# Patient Record
Sex: MALE | Race: WHITE | NOT HISPANIC OR LATINO | Employment: OTHER | URBAN - METROPOLITAN AREA
[De-identification: names, ages, dates, MRNs, and addresses within clinical notes are randomized per-mention and may not be internally consistent; named-entity substitution may affect disease eponyms.]

---

## 2017-01-11 ENCOUNTER — LAB (OUTPATIENT)
Dept: LAB | Facility: CLINIC | Age: 77
End: 2017-01-11
Payer: MEDICARE

## 2017-01-11 DIAGNOSIS — Z12.5 ENCOUNTER FOR SCREENING FOR MALIGNANT NEOPLASM OF PROSTATE: ICD-10-CM

## 2017-01-11 DIAGNOSIS — I10 ESSENTIAL (PRIMARY) HYPERTENSION: ICD-10-CM

## 2017-01-11 DIAGNOSIS — R73.09 OTHER ABNORMAL GLUCOSE: ICD-10-CM

## 2017-01-11 DIAGNOSIS — E55.9 VITAMIN D DEFICIENCY: ICD-10-CM

## 2017-01-11 DIAGNOSIS — E78.5 HYPERLIPIDEMIA: ICD-10-CM

## 2017-01-11 LAB
25(OH)D3 SERPL-MCNC: 35.7 NG/ML (ref 30–100)
ALBUMIN SERPL BCP-MCNC: 3.6 G/DL (ref 3.5–5)
ALP SERPL-CCNC: 80 U/L (ref 46–116)
ALT SERPL W P-5'-P-CCNC: 26 U/L (ref 12–78)
ANION GAP SERPL CALCULATED.3IONS-SCNC: 8 MMOL/L (ref 4–13)
AST SERPL W P-5'-P-CCNC: 21 U/L (ref 5–45)
BASOPHILS # BLD AUTO: 0.03 THOUSANDS/ΜL (ref 0–0.1)
BASOPHILS NFR BLD AUTO: 0 % (ref 0–1)
BILIRUB SERPL-MCNC: 1.39 MG/DL (ref 0.2–1)
BILIRUB UR QL STRIP: NEGATIVE
BUN SERPL-MCNC: 23 MG/DL (ref 5–25)
CALCIUM SERPL-MCNC: 9.2 MG/DL (ref 8.3–10.1)
CHLORIDE SERPL-SCNC: 106 MMOL/L (ref 100–108)
CHOLEST SERPL-MCNC: 208 MG/DL (ref 50–200)
CLARITY UR: CLEAR
CO2 SERPL-SCNC: 27 MMOL/L (ref 21–32)
COLOR UR: NORMAL
CREAT SERPL-MCNC: 1.21 MG/DL (ref 0.6–1.3)
CREAT UR-MCNC: 138 MG/DL
EOSINOPHIL # BLD AUTO: 0.08 THOUSAND/ΜL (ref 0–0.61)
EOSINOPHIL NFR BLD AUTO: 1 % (ref 0–6)
ERYTHROCYTE [DISTWIDTH] IN BLOOD BY AUTOMATED COUNT: 12.6 % (ref 11.6–15.1)
EST. AVERAGE GLUCOSE BLD GHB EST-MCNC: 111 MG/DL
GFR SERPL CREATININE-BSD FRML MDRD: 58.3 ML/MIN/1.73SQ M
GLUCOSE SERPL-MCNC: 102 MG/DL (ref 65–140)
GLUCOSE UR STRIP-MCNC: NEGATIVE MG/DL
HBA1C MFR BLD: 5.5 % (ref 4.2–6.3)
HCT VFR BLD AUTO: 43.5 % (ref 36.5–49.3)
HDLC SERPL-MCNC: 99 MG/DL (ref 40–60)
HGB BLD-MCNC: 15 G/DL (ref 12–17)
HGB UR QL STRIP.AUTO: NEGATIVE
KETONES UR STRIP-MCNC: NEGATIVE MG/DL
LDLC SERPL CALC-MCNC: 88 MG/DL (ref 0–100)
LEUKOCYTE ESTERASE UR QL STRIP: NEGATIVE
LYMPHOCYTES # BLD AUTO: 1.69 THOUSANDS/ΜL (ref 0.6–4.47)
LYMPHOCYTES NFR BLD AUTO: 22 % (ref 14–44)
MCH RBC QN AUTO: 32.3 PG (ref 26.8–34.3)
MCHC RBC AUTO-ENTMCNC: 34.5 G/DL (ref 31.4–37.4)
MCV RBC AUTO: 94 FL (ref 82–98)
MICROALBUMIN UR-MCNC: 13.8 MG/L (ref 0–20)
MICROALBUMIN/CREAT 24H UR: 10 MG/G CREATININE (ref 0–30)
MONOCYTES # BLD AUTO: 0.65 THOUSAND/ΜL (ref 0.17–1.22)
MONOCYTES NFR BLD AUTO: 9 % (ref 4–12)
NEUTROPHILS # BLD AUTO: 5.19 THOUSANDS/ΜL (ref 1.85–7.62)
NEUTS SEG NFR BLD AUTO: 68 % (ref 43–75)
NITRITE UR QL STRIP: NEGATIVE
NRBC BLD AUTO-RTO: 0 /100 WBCS
PH UR STRIP.AUTO: 6.5 [PH] (ref 4.5–8)
PLATELET # BLD AUTO: 190 THOUSANDS/UL (ref 149–390)
PMV BLD AUTO: 11.8 FL (ref 8.9–12.7)
POTASSIUM SERPL-SCNC: 4.1 MMOL/L (ref 3.5–5.3)
PROT SERPL-MCNC: 7 G/DL (ref 6.4–8.2)
PROT UR STRIP-MCNC: NEGATIVE MG/DL
PSA SERPL-MCNC: 1 NG/ML (ref 0–4)
RBC # BLD AUTO: 4.64 MILLION/UL (ref 3.88–5.62)
SODIUM SERPL-SCNC: 141 MMOL/L (ref 136–145)
SP GR UR STRIP.AUTO: 1.02 (ref 1–1.03)
TRIGL SERPL-MCNC: 106 MG/DL
TSH SERPL DL<=0.05 MIU/L-ACNC: 1.3 UIU/ML (ref 0.36–3.74)
UROBILINOGEN UR QL STRIP.AUTO: 1 E.U./DL
WBC # BLD AUTO: 7.66 THOUSAND/UL (ref 4.31–10.16)

## 2017-01-11 PROCEDURE — 81003 URINALYSIS AUTO W/O SCOPE: CPT

## 2017-01-11 PROCEDURE — 83036 HEMOGLOBIN GLYCOSYLATED A1C: CPT

## 2017-01-11 PROCEDURE — 36415 COLL VENOUS BLD VENIPUNCTURE: CPT

## 2017-01-11 PROCEDURE — 82043 UR ALBUMIN QUANTITATIVE: CPT

## 2017-01-11 PROCEDURE — 84443 ASSAY THYROID STIM HORMONE: CPT

## 2017-01-11 PROCEDURE — 80061 LIPID PANEL: CPT

## 2017-01-11 PROCEDURE — 82306 VITAMIN D 25 HYDROXY: CPT

## 2017-01-11 PROCEDURE — 80053 COMPREHEN METABOLIC PANEL: CPT

## 2017-01-11 PROCEDURE — 82570 ASSAY OF URINE CREATININE: CPT

## 2017-01-11 PROCEDURE — 85025 COMPLETE CBC W/AUTO DIFF WBC: CPT

## 2017-01-11 PROCEDURE — G0103 PSA SCREENING: HCPCS

## 2017-01-16 ENCOUNTER — ALLSCRIPTS OFFICE VISIT (OUTPATIENT)
Dept: OTHER | Facility: OTHER | Age: 77
End: 2017-01-16

## 2017-04-21 ENCOUNTER — ALLSCRIPTS OFFICE VISIT (OUTPATIENT)
Dept: OTHER | Facility: OTHER | Age: 77
End: 2017-04-21

## 2017-07-20 ENCOUNTER — ALLSCRIPTS OFFICE VISIT (OUTPATIENT)
Dept: OTHER | Facility: OTHER | Age: 77
End: 2017-07-20

## 2017-09-08 ENCOUNTER — ALLSCRIPTS OFFICE VISIT (OUTPATIENT)
Dept: OTHER | Facility: OTHER | Age: 77
End: 2017-09-08

## 2017-09-08 ENCOUNTER — LAB REQUISITION (OUTPATIENT)
Dept: LAB | Facility: HOSPITAL | Age: 77
End: 2017-09-08
Payer: MEDICARE

## 2017-09-08 DIAGNOSIS — R50.9 FEVER: ICD-10-CM

## 2017-09-08 LAB
BILIRUB UR QL STRIP: NORMAL
CLARITY UR: NORMAL
COLOR UR: NORMAL
GLUCOSE (HISTORICAL): NORMAL
HGB UR QL STRIP.AUTO: NORMAL
KETONES UR STRIP-MCNC: NORMAL MG/DL
LEUKOCYTE ESTERASE UR QL STRIP: NORMAL
NITRITE UR QL STRIP: NORMAL
PH UR STRIP.AUTO: 6 [PH]
PROT UR STRIP-MCNC: NORMAL MG/DL
SP GR UR STRIP.AUTO: 1.01
UROBILINOGEN UR QL STRIP.AUTO: 0.2

## 2017-09-08 PROCEDURE — 87086 URINE CULTURE/COLONY COUNT: CPT | Performed by: NURSE PRACTITIONER

## 2017-09-10 LAB — BACTERIA UR CULT: NORMAL

## 2017-09-11 ENCOUNTER — ALLSCRIPTS OFFICE VISIT (OUTPATIENT)
Dept: OTHER | Facility: OTHER | Age: 77
End: 2017-09-11

## 2017-09-13 ENCOUNTER — APPOINTMENT (OUTPATIENT)
Dept: LAB | Facility: CLINIC | Age: 77
End: 2017-09-13
Payer: MEDICARE

## 2017-09-13 ENCOUNTER — ALLSCRIPTS OFFICE VISIT (OUTPATIENT)
Dept: OTHER | Facility: OTHER | Age: 77
End: 2017-09-13

## 2017-09-13 DIAGNOSIS — L03.115 CELLULITIS OF RIGHT LOWER EXTREMITY: ICD-10-CM

## 2017-09-13 DIAGNOSIS — M54.9 DORSALGIA: ICD-10-CM

## 2017-09-13 LAB
BASOPHILS # BLD AUTO: 0.05 THOUSANDS/ΜL (ref 0–0.1)
BASOPHILS NFR BLD AUTO: 1 % (ref 0–1)
EOSINOPHIL # BLD AUTO: 0.17 THOUSAND/ΜL (ref 0–0.61)
EOSINOPHIL NFR BLD AUTO: 2 % (ref 0–6)
ERYTHROCYTE [DISTWIDTH] IN BLOOD BY AUTOMATED COUNT: 12.4 % (ref 11.6–15.1)
HCT VFR BLD AUTO: 42.3 % (ref 36.5–49.3)
HGB BLD-MCNC: 15.1 G/DL (ref 12–17)
LYMPHOCYTES # BLD AUTO: 1.92 THOUSANDS/ΜL (ref 0.6–4.47)
LYMPHOCYTES NFR BLD AUTO: 25 % (ref 14–44)
MCH RBC QN AUTO: 32.7 PG (ref 26.8–34.3)
MCHC RBC AUTO-ENTMCNC: 35.7 G/DL (ref 31.4–37.4)
MCV RBC AUTO: 92 FL (ref 82–98)
MONOCYTES # BLD AUTO: 1.07 THOUSAND/ΜL (ref 0.17–1.22)
MONOCYTES NFR BLD AUTO: 14 % (ref 4–12)
NEUTROPHILS # BLD AUTO: 4.56 THOUSANDS/ΜL (ref 1.85–7.62)
NEUTS SEG NFR BLD AUTO: 58 % (ref 43–75)
NRBC BLD AUTO-RTO: 0 /100 WBCS
PLATELET # BLD AUTO: 201 THOUSANDS/UL (ref 149–390)
PMV BLD AUTO: 12.9 FL (ref 8.9–12.7)
RBC # BLD AUTO: 4.62 MILLION/UL (ref 3.88–5.62)
WBC # BLD AUTO: 7.79 THOUSAND/UL (ref 4.31–10.16)

## 2017-09-13 PROCEDURE — 85025 COMPLETE CBC W/AUTO DIFF WBC: CPT

## 2017-09-13 PROCEDURE — 36415 COLL VENOUS BLD VENIPUNCTURE: CPT

## 2017-09-14 ENCOUNTER — GENERIC CONVERSION - ENCOUNTER (OUTPATIENT)
Dept: OTHER | Facility: OTHER | Age: 77
End: 2017-09-14

## 2017-10-02 ENCOUNTER — GENERIC CONVERSION - ENCOUNTER (OUTPATIENT)
Dept: OTHER | Facility: OTHER | Age: 77
End: 2017-10-02

## 2017-10-11 ENCOUNTER — GENERIC CONVERSION - ENCOUNTER (OUTPATIENT)
Dept: OTHER | Facility: OTHER | Age: 77
End: 2017-10-11

## 2017-10-12 ENCOUNTER — GENERIC CONVERSION - ENCOUNTER (OUTPATIENT)
Dept: OTHER | Facility: OTHER | Age: 77
End: 2017-10-12

## 2017-10-20 ENCOUNTER — GENERIC CONVERSION - ENCOUNTER (OUTPATIENT)
Dept: OTHER | Facility: OTHER | Age: 77
End: 2017-10-20

## 2017-10-20 ENCOUNTER — APPOINTMENT (OUTPATIENT)
Dept: PHYSICAL THERAPY | Facility: CLINIC | Age: 77
End: 2017-10-20
Payer: MEDICARE

## 2017-10-20 DIAGNOSIS — M54.9 DORSALGIA: ICD-10-CM

## 2017-10-20 PROCEDURE — 97162 PT EVAL MOD COMPLEX 30 MIN: CPT

## 2017-10-20 PROCEDURE — G8978 MOBILITY CURRENT STATUS: HCPCS

## 2017-10-20 PROCEDURE — G8979 MOBILITY GOAL STATUS: HCPCS

## 2017-10-25 ENCOUNTER — APPOINTMENT (OUTPATIENT)
Dept: PHYSICAL THERAPY | Facility: CLINIC | Age: 77
End: 2017-10-25
Payer: MEDICARE

## 2017-10-25 PROCEDURE — 97110 THERAPEUTIC EXERCISES: CPT

## 2017-10-25 PROCEDURE — 97140 MANUAL THERAPY 1/> REGIONS: CPT

## 2017-10-27 ENCOUNTER — APPOINTMENT (OUTPATIENT)
Dept: PHYSICAL THERAPY | Facility: CLINIC | Age: 77
End: 2017-10-27
Payer: MEDICARE

## 2017-10-27 PROCEDURE — 97110 THERAPEUTIC EXERCISES: CPT

## 2017-10-27 PROCEDURE — 97140 MANUAL THERAPY 1/> REGIONS: CPT

## 2017-10-30 ENCOUNTER — APPOINTMENT (OUTPATIENT)
Dept: PHYSICAL THERAPY | Facility: CLINIC | Age: 77
End: 2017-10-30
Payer: MEDICARE

## 2017-10-30 PROCEDURE — 97140 MANUAL THERAPY 1/> REGIONS: CPT

## 2017-10-30 PROCEDURE — 97110 THERAPEUTIC EXERCISES: CPT

## 2017-11-01 ENCOUNTER — APPOINTMENT (OUTPATIENT)
Dept: PHYSICAL THERAPY | Facility: CLINIC | Age: 77
End: 2017-11-01
Payer: MEDICARE

## 2017-11-01 PROCEDURE — 97140 MANUAL THERAPY 1/> REGIONS: CPT

## 2017-11-01 PROCEDURE — 97530 THERAPEUTIC ACTIVITIES: CPT

## 2017-11-01 PROCEDURE — 97110 THERAPEUTIC EXERCISES: CPT

## 2017-11-06 ENCOUNTER — APPOINTMENT (OUTPATIENT)
Dept: PHYSICAL THERAPY | Facility: CLINIC | Age: 77
End: 2017-11-06
Payer: MEDICARE

## 2017-11-06 PROCEDURE — 97530 THERAPEUTIC ACTIVITIES: CPT

## 2017-11-06 PROCEDURE — 97140 MANUAL THERAPY 1/> REGIONS: CPT

## 2017-11-06 PROCEDURE — 97110 THERAPEUTIC EXERCISES: CPT

## 2017-11-08 ENCOUNTER — APPOINTMENT (OUTPATIENT)
Dept: PHYSICAL THERAPY | Facility: CLINIC | Age: 77
End: 2017-11-08
Payer: MEDICARE

## 2017-11-08 PROCEDURE — 97140 MANUAL THERAPY 1/> REGIONS: CPT

## 2017-11-08 PROCEDURE — 97110 THERAPEUTIC EXERCISES: CPT

## 2017-11-13 ENCOUNTER — APPOINTMENT (OUTPATIENT)
Dept: PHYSICAL THERAPY | Facility: CLINIC | Age: 77
End: 2017-11-13
Payer: MEDICARE

## 2017-11-13 PROCEDURE — 97140 MANUAL THERAPY 1/> REGIONS: CPT

## 2017-11-13 PROCEDURE — 97530 THERAPEUTIC ACTIVITIES: CPT

## 2017-11-13 PROCEDURE — 97110 THERAPEUTIC EXERCISES: CPT

## 2017-11-15 ENCOUNTER — APPOINTMENT (OUTPATIENT)
Dept: PHYSICAL THERAPY | Facility: CLINIC | Age: 77
End: 2017-11-15
Payer: MEDICARE

## 2017-11-15 PROCEDURE — 97140 MANUAL THERAPY 1/> REGIONS: CPT

## 2017-11-15 PROCEDURE — 97110 THERAPEUTIC EXERCISES: CPT

## 2017-12-04 ENCOUNTER — ALLSCRIPTS OFFICE VISIT (OUTPATIENT)
Dept: OTHER | Facility: OTHER | Age: 77
End: 2017-12-04

## 2017-12-04 DIAGNOSIS — M54.9 DORSALGIA: ICD-10-CM

## 2017-12-05 ENCOUNTER — GENERIC CONVERSION - ENCOUNTER (OUTPATIENT)
Dept: OTHER | Facility: OTHER | Age: 77
End: 2017-12-05

## 2017-12-05 NOTE — PROGRESS NOTES
Assessment    1  BMI 50 0-59 9, adult (V85 43) (Z68 43)   2  Acute back pain (724 5) (M54 9)    Plan  Acute back pain    · Diclofenac Sodium 1 % Transdermal Gel (Voltaren); Apply to right low back and hiparea 4 times daily as needed for pain  BMI 50 0-59 9, adult    · *1 - 62 [facility]  * patient is morbidlyobese in now with acute back pain that is becoming chronic  Please helphim formulated diet plan with some understanding of calories and nutrition so he canlose weight  Overall needs to lose about 60-80 lb  But for right now weneed to focus on the 1st 25 lb  Status: Hold For - Scheduling  Requested for: [id number]  Care Summary provided  : Yes  Acute back pain Patient will go back to physical therapy, since none of the medicines we gave him really seem to help We could try Voltaren gel over the area 3 or 4 times daily to see if this gives him some relief His BMI is 50 in the certainly isn't helping so I will also ask him to see Nutrition to see if they can even get 26 lb off which make a big difference for him  Patient understands that this is muscular and not his bones We will recheck as scheduled or needed   Discussion/Summary  Possible side effects of new medications were reviewed with the patient/guardian today  The treatment plan was reviewed with the patient/guardian  The patient/guardian understands and agrees with the treatment plan      Chief Complaint  Pt presents here with back issues;  due 07/2018due 02/021due 04/2018exam due 07/2017exam due 07/2017      History of Present Illness  HPI: In October patient started with right lower back pain that came on after wrote a telling his garden  He was treated with muscle relaxers that really did not work, chiropractor that did not really work, physical therapy seem to make a difference  Patient was doing it up until last week and then ask for break because he could do the same exercises at home   So last Friday patient wants to the [facility], did elliptical and treadmill but did not feel like he over did it  Now his pain is back all over again  It is in his right lower back and hip where he can't straighten out, sit long lie down or sleep  All the same complaints he had last time  He has no loss of bowel or bladder continence  He is very nervous because he has reservations to fly to [place] on 12/20/2017      Active Problems  1  Acute back pain (724 5) (M54 9)   2  Adenomatous colon polyp (211 3) (D12 6)   3  Aortic ectasia (447 70) (I77 819)   4  Bilateral hip pain (719 45) (M25 551,M25 552)   5  Colonoscopy (Fiberoptic) Screening   6  Elevated hemoglobin A1c (790 29) (R73 09)   7  Fever (780 60) (R50 9)   8  FUO (fever of unknown origin) (780 60) (R50 9)   9  Hyperlipidemia (272 4) (E78 5)   10  Hypertension (401 9) (I10)   11  Lichen planus (894 5) (L43 9)   12  MTHFR mutation (270 4) (E72 12)   13  Need for prophylactic vaccination and inoculation against influenza (V04 81) (Z23)   14  Obstructive sleep apnea (327 23) (G47 33)   15  Organic impotence (607 84) (N52 9)   16  Premature atrial complexes (427 61) (I49 1)   17  Premature ventricular contraction (427 69) (I49 3)   18  Screening for neurological condition (V80 09) (Z13 89)   19  Special screening examination for neoplasm of prostate (V76 44) (Z12 5)   20  Spinal stenosis of lumbar region (724 02) (M48 061)   21  Vitamin D deficiency (268 9) (E55 9)    Past Medical History    1  History of Abnormal electrocardiogram (794 31) (R94 31)   2  History of Acute back pain (724 5) (M54 9)   3  History of Acute hepatitis c (070 51) (B17 10)   4  History of Cellulitis of leg without foot (682 6) (L03 119)   5  History of Dysplastic Nevus (238 2)   6  History of Glaucoma screening (V80 1) (Z13 5)   7  History of Glucose Intolerance (271 3)   8  History of backache (V13 59) (Z87 39)   9  History of deep venous thrombosis (V12 51) (Z86 718)   10   History of hypercholesterolemia (V12 29) (Z86 39)   11  History of low back pain (V13 59) (Z87 39)   12  History of paroxysmal supraventricular tachycardia (V12 59) (Z86 79)   13  History of transient cerebral ischemia (V12 54) (Z86 73)   14  History of Laceration Of Cheek (873 41)   15  History of Leg abrasion (916 0) (S80 819A)   16  History of Leg edema, right (782 3) (R60 0)   17  Need for prophylactic vaccination and inoculation against influenza (V04 81) (Z23)   18  History of Other nonspecific abnormal finding of lung field (793 19) (R91 8)   19  History of Palpitations (785 1) (R00 2)   20  History of Preop examination (V72 84) (Z01 818)   21  History of Screening for osteoporosis (V82 81) (Z13 820)   22  Skin rash (782 1) (R21)   23  History of Swelling of left eyelid (374 82) (O22 579)    Family History  Mother    1  Family history of Colon Cancer (V16 0)   2  Family history of Mother  At Age 58   4  No family history of mental disorder   4  Denied: Family history of Substance abuse-family  Father    5  Family history of Acute Myocardial Infarction (V17 3)   6  Family history of Father  At Age 64   7  Denied: Family history of Testicular Cancer    Social History   · Alcohol Use (History)   · Always uses seat belt   · Daily Coffee Consumption (1  Cups/Day)   · Former smoker (X45 36) (Q36 443)   · [address]  SMOKED FOR ROUGHLY 25 YEARS A PPD  · No drug use  The social history was reviewed and updated today  Surgical History    1  History of Inguinal Hernia Repair   2  History of Laminectomy Lumbar   3  History of Neuroplasty Median Nerve At Carpal Tunnel    Current Meds   1  Aspirin 81 MG TABS; Take 1 tablet daily; Therapy: (Recorded:20Gro0496) to Recorded   2  Betamethasone Dipropionate Aug 0 05 % External Cream; Apply to bilateral shins every night before bedtime whenever they are itchy; Therapy: 18ODB5491 to (Last Rx:39Ufu2286)  Requested for: 81Puj1794 Ordered   3   Centrum Silver Ultra Mens Oral Tablet; TAKE 1 TABLET DAILY; Therapy: (Recorded:04Mar2014) to Recorded   4  Cialis 20 MG Oral Tablet; Take 1 pill 1 hr before activity  No more than 1 daily; Last Rx:16Jan2017 Ordered   5  Folbic 2 5-25-2 MG Oral Tablet; One daily; Therapy: 92YMX5451 to (Last Rx:14Nov2017)  Requested for: 98ASK7998 Ordered   6  Losartan Potassium 100 MG Oral Tablet; TAKE 1 TABLET ONCE DAILY; Therapy: 05QMD7115 to (Berdie Meckel)  Requested for: 62AHX2114; Last Rx:14Nov2017 Ordered   7  Vitamin D3 TABS; 4000 iu daily; Therapy: (Recorded:04Mar2014) to Recorded    The medication list was reviewed and updated today  Allergies  1  Viagra TABS  2  No Known Environmental Allergies   3  No Known Food Allergies    Vitals   Recorded: 55LNS2404 08:24AM   Heart Rate 80   Respiration 16   Systolic 750, LUE, Sitting   Diastolic 70, LUE, Sitting   Weight 260 lb 1 6 oz   BMI Calculated 50 8   BSA Calculated 2 09       Physical Exam  General Patient in no acute distress, well appearing, well nourished and appears stated age  Mental status Patient very frustrated at this point Good judgment and insight, oriented to time person and place, recent and remote memory is intact, mood and affect are normal, cooperative, and patient is reasonable    Muscular Patient has p m  tenderness especially on the right side L5-S1 paravertebral muscles Has full flexion and side bending with some pain posterior extension and side bending to the right Good heel and toe walking      Future Appointments    Date/Time Provider Specialty Site   01/23/2018 01:30 PM Cristiano Gupta 5       Signatures   Electronically signed by : Chanelle Bone DO; Dec  4 2017  8:46AM EST                       (Author)

## 2017-12-06 ENCOUNTER — GENERIC CONVERSION - ENCOUNTER (OUTPATIENT)
Dept: FAMILY MEDICINE CLINIC | Facility: CLINIC | Age: 77
End: 2017-12-06

## 2017-12-06 ENCOUNTER — APPOINTMENT (OUTPATIENT)
Dept: PHYSICAL THERAPY | Facility: CLINIC | Age: 77
End: 2017-12-06
Payer: MEDICARE

## 2017-12-06 PROCEDURE — G8979 MOBILITY GOAL STATUS: HCPCS

## 2017-12-06 PROCEDURE — G8978 MOBILITY CURRENT STATUS: HCPCS

## 2017-12-06 PROCEDURE — 97162 PT EVAL MOD COMPLEX 30 MIN: CPT

## 2017-12-12 ENCOUNTER — APPOINTMENT (OUTPATIENT)
Dept: PHYSICAL THERAPY | Facility: CLINIC | Age: 77
End: 2017-12-12
Payer: MEDICARE

## 2017-12-12 PROCEDURE — 97140 MANUAL THERAPY 1/> REGIONS: CPT

## 2017-12-12 PROCEDURE — 97014 ELECTRIC STIMULATION THERAPY: CPT

## 2017-12-14 ENCOUNTER — APPOINTMENT (OUTPATIENT)
Dept: PHYSICAL THERAPY | Facility: CLINIC | Age: 77
End: 2017-12-14
Payer: MEDICARE

## 2017-12-14 ENCOUNTER — GENERIC CONVERSION - ENCOUNTER (OUTPATIENT)
Dept: OTHER | Facility: OTHER | Age: 77
End: 2017-12-14

## 2017-12-14 PROCEDURE — 97140 MANUAL THERAPY 1/> REGIONS: CPT

## 2017-12-14 PROCEDURE — 97014 ELECTRIC STIMULATION THERAPY: CPT

## 2017-12-15 ENCOUNTER — APPOINTMENT (OUTPATIENT)
Dept: PHYSICAL THERAPY | Facility: CLINIC | Age: 77
End: 2017-12-15
Payer: MEDICARE

## 2017-12-15 PROCEDURE — 97014 ELECTRIC STIMULATION THERAPY: CPT

## 2017-12-15 PROCEDURE — 97140 MANUAL THERAPY 1/> REGIONS: CPT

## 2017-12-15 PROCEDURE — 97110 THERAPEUTIC EXERCISES: CPT

## 2017-12-18 ENCOUNTER — APPOINTMENT (OUTPATIENT)
Dept: PHYSICAL THERAPY | Facility: CLINIC | Age: 77
End: 2017-12-18
Payer: MEDICARE

## 2017-12-18 PROCEDURE — 97014 ELECTRIC STIMULATION THERAPY: CPT

## 2017-12-18 PROCEDURE — 97140 MANUAL THERAPY 1/> REGIONS: CPT

## 2017-12-20 ENCOUNTER — APPOINTMENT (OUTPATIENT)
Dept: PHYSICAL THERAPY | Facility: CLINIC | Age: 77
End: 2017-12-20
Payer: MEDICARE

## 2017-12-20 PROCEDURE — 97140 MANUAL THERAPY 1/> REGIONS: CPT

## 2017-12-20 PROCEDURE — 97014 ELECTRIC STIMULATION THERAPY: CPT

## 2017-12-22 ENCOUNTER — APPOINTMENT (OUTPATIENT)
Dept: PHYSICAL THERAPY | Facility: CLINIC | Age: 77
End: 2017-12-22
Payer: MEDICARE

## 2017-12-22 PROCEDURE — 97110 THERAPEUTIC EXERCISES: CPT

## 2017-12-22 PROCEDURE — 97140 MANUAL THERAPY 1/> REGIONS: CPT

## 2017-12-22 PROCEDURE — 97014 ELECTRIC STIMULATION THERAPY: CPT

## 2018-01-09 NOTE — MISCELLANEOUS
Message   Recorded as Task   Date: 07/08/2016 09:35 AM, Created By: Larisa Rodgers   Task Name: Miscellaneous   Assigned To: Monse Lopez   Regarding Patient: Sandeep Caldera, Status: Active   CommentTyler Flores - 08 Jul 2016 9:35 AM     TASK CREATED  Caller: Self; [phone] (Home); [phone] (Work)  pt was treated at [facility] for a dog bite and they told him to follow up with his family   pt was offered a 10:45 appt today which pt could not do  pt does have a followup appt on tues and pt stated he will just follow up then  pt will call back if wound seems to be getting infected   fyi        Signatures   Electronically signed by : Adrienne Ochoa DO; Jul 8 2016  5:55PM EST                       (Author)

## 2018-01-11 DIAGNOSIS — R73.09 OTHER ABNORMAL GLUCOSE: ICD-10-CM

## 2018-01-11 DIAGNOSIS — I10 ESSENTIAL (PRIMARY) HYPERTENSION: ICD-10-CM

## 2018-01-11 DIAGNOSIS — E55.9 VITAMIN D DEFICIENCY: ICD-10-CM

## 2018-01-11 DIAGNOSIS — Z12.5 ENCOUNTER FOR SCREENING FOR MALIGNANT NEOPLASM OF PROSTATE: ICD-10-CM

## 2018-01-11 DIAGNOSIS — E78.5 HYPERLIPIDEMIA: ICD-10-CM

## 2018-01-12 VITALS
SYSTOLIC BLOOD PRESSURE: 118 MMHG | RESPIRATION RATE: 16 BRPM | HEART RATE: 72 BPM | BODY MASS INDEX: 51.32 KG/M2 | DIASTOLIC BLOOD PRESSURE: 62 MMHG | WEIGHT: 261.4 LBS | HEIGHT: 60 IN

## 2018-01-13 VITALS
RESPIRATION RATE: 16 BRPM | HEART RATE: 68 BPM | WEIGHT: 257.8 LBS | TEMPERATURE: 98.3 F | SYSTOLIC BLOOD PRESSURE: 124 MMHG | DIASTOLIC BLOOD PRESSURE: 72 MMHG | BODY MASS INDEX: 50.35 KG/M2

## 2018-01-13 VITALS
WEIGHT: 259.9 LBS | TEMPERATURE: 97.6 F | RESPIRATION RATE: 16 BRPM | DIASTOLIC BLOOD PRESSURE: 70 MMHG | BODY MASS INDEX: 50.76 KG/M2 | HEART RATE: 76 BPM | SYSTOLIC BLOOD PRESSURE: 116 MMHG

## 2018-01-13 VITALS
WEIGHT: 267 LBS | BODY MASS INDEX: 36.16 KG/M2 | SYSTOLIC BLOOD PRESSURE: 124 MMHG | HEART RATE: 88 BPM | DIASTOLIC BLOOD PRESSURE: 84 MMHG | HEIGHT: 72 IN

## 2018-01-13 NOTE — MISCELLANEOUS
Message   Recorded as Task   Date: 10/09/2017 01:27 PM, Created By: Sindy Chris   Task Name: Follow Up   Assigned To: Jerson Briseno   Regarding Patient: Jennifer Coombs, Status: Active   CommentLorry Cortes - 09 Oct 2017 1:27 PM     TASK CREATED  General Medical Question  pt was at the chiropractor and they told him to stop and ask you for an anti inflammatory  he uses [facility] [place]  he dropped off a paper with the other doctor's info for your records   Jerson Briseno - 09 Oct 2017 2:05 PM     TASK REPLIED TO: Previously Assigned To Ollieon Finn  Tell patient that I really do not think it is his hip bursitis but his back  Please ask him to see Dr Aden Hawk for another opinion, she is another chiropractor  Please ask him if he is taking the Valium and ask him if it is helping at all  Sindy Chris - 09 Oct 2017 3:35 PM     TASK REPLIED TO: Previously Assigned To Sindy Chrsi  pt informed  he said valium is not helping  Jerson Briseno - 09 Oct 2017 4:44 PM     TASK REASSIGNED: Previously Assigned To Landy Sun  1  Have him stop the Valium  2  I called the [facility] 1 twice a day for the muscle pain  3  See Dr Chuck Ramires - 09 Oct 2017 4:58 PM     TASK REPLIED TO: Previously Assigned To Lyla Perry  Patient was informed  He wants to think about going to see Dr Philippe Saldaña  MRP   Ollieon Gracielabiju - 09 Oct 2017 8:40 PM     TASK REPLIED TO: Previously Assigned To Jerson Briseno  He can also come back to our office for re-evaluation   Lyla Perry - 10 Oct 2017 7:01 AM     TASK REPLIED TO: Previously Assigned To Lyla Perry regarding being re-elevated at the office   MRP        Signatures   Electronically signed by : Alessia Matthews DO; Oct 11 2017  4:55PM EST                       (Author)

## 2018-01-14 VITALS
RESPIRATION RATE: 16 BRPM | WEIGHT: 261.31 LBS | HEART RATE: 84 BPM | TEMPERATURE: 98.6 F | DIASTOLIC BLOOD PRESSURE: 100 MMHG | BODY MASS INDEX: 51.03 KG/M2 | SYSTOLIC BLOOD PRESSURE: 170 MMHG

## 2018-01-14 VITALS
HEIGHT: 73 IN | RESPIRATION RATE: 16 BRPM | SYSTOLIC BLOOD PRESSURE: 110 MMHG | DIASTOLIC BLOOD PRESSURE: 60 MMHG | HEART RATE: 88 BPM | BODY MASS INDEX: 35.47 KG/M2 | WEIGHT: 267.6 LBS

## 2018-01-22 VITALS
HEART RATE: 80 BPM | WEIGHT: 263.6 LBS | RESPIRATION RATE: 16 BRPM | DIASTOLIC BLOOD PRESSURE: 70 MMHG | SYSTOLIC BLOOD PRESSURE: 132 MMHG | BODY MASS INDEX: 51.48 KG/M2

## 2018-01-22 VITALS
BODY MASS INDEX: 50.5 KG/M2 | DIASTOLIC BLOOD PRESSURE: 76 MMHG | RESPIRATION RATE: 16 BRPM | WEIGHT: 258.6 LBS | HEART RATE: 76 BPM | SYSTOLIC BLOOD PRESSURE: 138 MMHG

## 2018-01-22 VITALS
WEIGHT: 258.5 LBS | SYSTOLIC BLOOD PRESSURE: 120 MMHG | DIASTOLIC BLOOD PRESSURE: 70 MMHG | BODY MASS INDEX: 50.48 KG/M2 | HEART RATE: 72 BPM | RESPIRATION RATE: 16 BRPM

## 2018-01-23 VITALS
HEART RATE: 80 BPM | WEIGHT: 260.1 LBS | RESPIRATION RATE: 16 BRPM | BODY MASS INDEX: 50.8 KG/M2 | DIASTOLIC BLOOD PRESSURE: 70 MMHG | SYSTOLIC BLOOD PRESSURE: 138 MMHG

## 2018-01-23 NOTE — MISCELLANEOUS
Message   Recorded as Task   Date: 12/05/2017 10:24 AM, Created By: Joy Pinto   Task Name: Call Back   Assigned To: Krystal Vyas   Regarding Patient: Elisa Bradford, Status: Active   CommentTyler Snow - 05 Dec 2017 10:24 AM     TASK CREATED  Caller: Cleopatra Fermin  pt wants to go back to [facility] for a second opinion on his back but wants dr Juan J Bocanegra to order an mri  also would accupuncture help? # [phone]   Krystal Vyas - 05 Dec 2017 2:54 PM     TASK REPLIED TO: Previously Assigned To Krystal Vyas  Too early for 2nd opinion it just happened and he had a reason for, acupuncture may help, and the orthopedic guys order the MRI  I could send him today in [place] who was not excellent masseuse who may be able to help him out a lot    His card is hanging with the card file on the wall in the back   Koki 7342 Dec 2017 4:15 PM     TASK EDITED  pt informed   Krystal Vyas - 05 Dec 2017 4:33 PM     TASK EDITED  THE NOTE Jazz WHO IS AN EXCELLENT MASSEUSE        Signatures   Electronically signed by : Veronica Gan DO; Dec  5 2017  4:35PM EST                       (Author)

## 2018-01-23 NOTE — MISCELLANEOUS
Message   Recorded as Task   Date: 12/04/2017 01:17 PM, Created By: Yue Mcintyre   Task Name: Call Back   Assigned To: Landy Sun   Regarding Patient: Lindsay Cummings, Status: Active   Comment:    Gloria Woods - 04 Dec 2017 1:17 PM     TASK CREATED  Caller: [facility] pharmacy  pharmacy says voltaren/diclofenac gel is non fomulary- needs preauth  do you want the pre auth, or do you want to prescribe something else? Roberto Underwood - 04 Dec 2017 6:10 PM     TASK REASSIGNED: Previously Assigned To Rubin Sparks, can you please pre authorize this  Patient cannot take oral anti-inflammatories because of age and blood pressure  Lyla Perry - 07 Dec 2017 10:04 AM     TASK REPLIED TO: Previously Assigned To Peters,Michelle Prior Geofm Rubinstein is pending clinic review  MRP   Lyla Perry - 14 Dec 2017 12:27 PM     TASK REPLIED TO: Previously Assigned To Lyla Perry  Patient's Voltaren/Diclofenac was denied  Patient was informed   MRP        Signatures   Electronically signed by : Braeden Garcia DO; Dec 14 2017  1:10PM EST                       (Author)

## 2018-01-24 ENCOUNTER — APPOINTMENT (OUTPATIENT)
Dept: PHYSICAL THERAPY | Facility: CLINIC | Age: 78
End: 2018-01-24
Payer: MEDICARE

## 2018-01-24 ENCOUNTER — TRANSCRIBE ORDERS (OUTPATIENT)
Dept: LAB | Facility: CLINIC | Age: 78
End: 2018-01-24

## 2018-01-24 ENCOUNTER — LAB (OUTPATIENT)
Dept: LAB | Facility: CLINIC | Age: 78
End: 2018-01-24
Payer: MEDICARE

## 2018-01-24 DIAGNOSIS — G47.33 OBSTRUCTIVE SLEEP APNEA SYNDROME: ICD-10-CM

## 2018-01-24 DIAGNOSIS — I10 ESSENTIAL HYPERTENSION, MALIGNANT: Primary | ICD-10-CM

## 2018-01-24 DIAGNOSIS — E78.00 PURE HYPERCHOLESTEROLEMIA: ICD-10-CM

## 2018-01-24 DIAGNOSIS — E72.12 METHYLENE THF REDUCTASE DEFICIENCY AND HOMOCYSTINURIA (HCC): ICD-10-CM

## 2018-01-24 DIAGNOSIS — E72.11 METHYLENE THF REDUCTASE DEFICIENCY AND HOMOCYSTINURIA (HCC): ICD-10-CM

## 2018-01-24 DIAGNOSIS — E55.9 AVITAMINOSIS D: ICD-10-CM

## 2018-01-24 DIAGNOSIS — R73.09 OTHER ABNORMAL GLUCOSE: ICD-10-CM

## 2018-01-24 LAB
25(OH)D3 SERPL-MCNC: 38.2 NG/ML (ref 30–100)
ALBUMIN SERPL BCP-MCNC: 3.7 G/DL (ref 3.5–5)
ALP SERPL-CCNC: 74 U/L (ref 46–116)
ALT SERPL W P-5'-P-CCNC: 25 U/L (ref 12–78)
ANION GAP SERPL CALCULATED.3IONS-SCNC: 6 MMOL/L (ref 4–13)
AST SERPL W P-5'-P-CCNC: 19 U/L (ref 5–45)
BILIRUB SERPL-MCNC: 0.82 MG/DL (ref 0.2–1)
BUN SERPL-MCNC: 21 MG/DL (ref 5–25)
CALCIUM SERPL-MCNC: 8.9 MG/DL (ref 8.3–10.1)
CHLORIDE SERPL-SCNC: 105 MMOL/L (ref 100–108)
CHOLEST SERPL-MCNC: 188 MG/DL (ref 50–200)
CO2 SERPL-SCNC: 29 MMOL/L (ref 21–32)
CREAT SERPL-MCNC: 1.02 MG/DL (ref 0.6–1.3)
ERYTHROCYTE [DISTWIDTH] IN BLOOD BY AUTOMATED COUNT: 12.4 % (ref 11.6–15.1)
EST. AVERAGE GLUCOSE BLD GHB EST-MCNC: 111 MG/DL
GFR SERPL CREATININE-BSD FRML MDRD: 71 ML/MIN/1.73SQ M
GLUCOSE P FAST SERPL-MCNC: 103 MG/DL (ref 65–99)
HBA1C MFR BLD: 5.5 % (ref 4.2–6.3)
HCT VFR BLD AUTO: 44.2 % (ref 36.5–49.3)
HDLC SERPL-MCNC: 79 MG/DL (ref 40–60)
HGB BLD-MCNC: 15.5 G/DL (ref 12–17)
LDLC SERPL CALC-MCNC: 87 MG/DL (ref 0–100)
MCH RBC QN AUTO: 32.3 PG (ref 26.8–34.3)
MCHC RBC AUTO-ENTMCNC: 35.1 G/DL (ref 31.4–37.4)
MCV RBC AUTO: 92 FL (ref 82–98)
PLATELET # BLD AUTO: 171 THOUSANDS/UL (ref 149–390)
PMV BLD AUTO: 13 FL (ref 8.9–12.7)
POTASSIUM SERPL-SCNC: 3.9 MMOL/L (ref 3.5–5.3)
PROT SERPL-MCNC: 7.3 G/DL (ref 6.4–8.2)
RBC # BLD AUTO: 4.8 MILLION/UL (ref 3.88–5.62)
SODIUM SERPL-SCNC: 140 MMOL/L (ref 136–145)
TRIGL SERPL-MCNC: 109 MG/DL
TSH SERPL DL<=0.05 MIU/L-ACNC: 1.63 UIU/ML (ref 0.36–3.74)
WBC # BLD AUTO: 5.91 THOUSAND/UL (ref 4.31–10.16)

## 2018-01-24 PROCEDURE — 82306 VITAMIN D 25 HYDROXY: CPT

## 2018-01-24 PROCEDURE — 84443 ASSAY THYROID STIM HORMONE: CPT

## 2018-01-24 PROCEDURE — 80053 COMPREHEN METABOLIC PANEL: CPT

## 2018-01-24 PROCEDURE — 85027 COMPLETE CBC AUTOMATED: CPT

## 2018-01-24 PROCEDURE — 80061 LIPID PANEL: CPT

## 2018-01-24 PROCEDURE — 36415 COLL VENOUS BLD VENIPUNCTURE: CPT

## 2018-01-24 PROCEDURE — 83036 HEMOGLOBIN GLYCOSYLATED A1C: CPT

## 2018-01-26 ENCOUNTER — EVALUATION (OUTPATIENT)
Dept: PHYSICAL THERAPY | Facility: CLINIC | Age: 78
End: 2018-01-26
Payer: MEDICARE

## 2018-01-26 ENCOUNTER — APPOINTMENT (OUTPATIENT)
Dept: PHYSICAL THERAPY | Facility: CLINIC | Age: 78
End: 2018-01-26
Payer: MEDICARE

## 2018-01-26 DIAGNOSIS — M54.16 LUMBAR RADICULOPATHY: Primary | ICD-10-CM

## 2018-01-26 PROCEDURE — G8991 OTHER PT/OT GOAL STATUS: HCPCS | Performed by: PHYSICAL THERAPIST

## 2018-01-26 PROCEDURE — 97140 MANUAL THERAPY 1/> REGIONS: CPT | Performed by: PHYSICAL THERAPIST

## 2018-01-26 PROCEDURE — 97161 PT EVAL LOW COMPLEX 20 MIN: CPT | Performed by: PHYSICAL THERAPIST

## 2018-01-26 PROCEDURE — G8990 OTHER PT/OT CURRENT STATUS: HCPCS | Performed by: PHYSICAL THERAPIST

## 2018-01-26 NOTE — LETTER
2018    DONNIE Greer [address]    Patient: Maximo Tristan   YOB: 1940   Date of Visit: 2018       Dear Dr Handy James:    Please review the attached summary of Pelon Rodriguez progress and our plan for continued therapy, and verify that you agree therapy should continue by signing the attached document and sending it back to our office  If you have any questions or concerns, please don't hesitate to call  Sincerely,        Nicho Mendoza, PT          CC: No Recipients            PT Evaluation     Today's date: 2018  Patient name: Maximo Tristan  : 1940  MRN: [id number]  Referring provider: Santo Gregory PA-C  Dx: No diagnosis found  Assessment    Assessment details: Pt  presents to outpatient PT with pain, decreased rom, decreased strength and decreased functional mobility  He will benefit from skilled PT to address these deficits in order to achieve his goals and maximize his functional mobility  Understanding of Dx/Px/POC: excellent   Prognosis: fair    Plan  Patient would benefit from: skilled PTPlanned therapy interventions: abdominal trunk stabilization, IADL retraining, joint mobilization, manual therapy, massage, strengthening and stretching  Frequency: 2x week  Duration in weeks: 6        Subjective Evaluation    History of Present Illness  Mechanism of injury: Pt reports history of chronic LBP that has been worsening over the past few weeks  MRI revealed spondylosis of L1-L4  Has history of laminectomy/decompression  Has pain with sitting/standing for extended periods of time  Reports that he feels limited in most IADL's and is unable to work in the yard like he used  Reports R radicular symptoms        Recurrent probem  Pain  Current pain ratin  At worst pain ratin      Diagnostic Tests  MRI studies: abnormal     :   Objective     Active Range of Motion     Lumbar   Flexion: WFL  Extension: Active lumbar extension: max limited with pain  Left lateral flexion: Active left lumbar lateral flexion: max limited/stiff  Right lateral flexion: Active right lumbar lateral flexion: max limited, stiff  Left rotation: Active left lumbar rotation: mod limited, stiff  Right rotation: Active right lumbar rotation: mod limited, stiff  Tests       Thoracic   Positive slump  Lumbar   Positive slumped       Additional Tests Details  Hypomobility noted with spring test  Reactive trigger points in R parapinal and piriformis            Precautions: lumbar surgery    Daily Treatment Diary     Manual  1/26            laser x            DTM x            Lumbar pa's                                           Exercise Diary              Recumbent bike             ltr 5"x20            pball flexion stretch 3 way 10"c10            Hs stetch             Gluteal stretch                                                                                                                                                                                                                    Modalities              traction                                                             Based upon review of the patient's progress and continued therapy plan, it is my medical opinion that Alejandro More should continue physical therapy treatment at the [facility]:

## 2018-01-26 NOTE — LETTER
2018    Kristen Aguilar [address]    Patient: Sarah Jones   YOB: 1940   Date of Visit: 2018     Encounter Diagnosis     ICD-10-CM    1  Lumbar radiculopathy M54 16 PT plan of care cert/re-cert       Dear Dr Benton    Please review the attached Plan of Care from [facility] recent visit  Please verify that you agree therapy should continue by signing the attached document and sending it back to our office  If you have any questions or concerns, please don't hesitate to call  Sincerely,    Odette Medina, PT      Referring Provider:      I certify that I have read the below Plan of Care and certify the need for these services furnished under this plan of treatment while under my care  Kristen Aguilar [address]: [phone]          PT Evaluation     Today's date: 2018  Patient name: Sarah Jones  : 1940  MRN: [id number]  Referring provider: Zoey Palmer PA-C  Dx: No diagnosis found  Assessment    Assessment details: Pt  presents to outpatient PT with pain, decreased rom, decreased strength and decreased functional mobility  He will benefit from skilled PT to address these deficits in order to achieve his goals and maximize his functional mobility  Understanding of Dx/Px/POC: excellent   Prognosis: fair    Plan  Patient would benefit from: skilled PTPlanned therapy interventions: abdominal trunk stabilization, IADL retraining, joint mobilization, manual therapy, massage, strengthening and stretching  Frequency: 2x week  Duration in weeks: 6        Subjective Evaluation    History of Present Illness  Mechanism of injury: Pt reports history of chronic LBP that has been worsening over the past few weeks  MRI revealed spondylosis of L1-L4  Has history of laminectomy/decompression  Has pain with sitting/standing for extended periods of time  Reports that he feels limited in most IADL's and is unable to work in the yard like he used  Reports R radicular symptoms  Recurrent probem  Pain  Current pain ratin  At worst pain ratin      Diagnostic Tests  MRI studies: abnormal     :   Objective     Active Range of Motion     Lumbar   Flexion: WFL  Extension: Active lumbar extension: max limited with pain  Left lateral flexion: Active left lumbar lateral flexion: max limited/stiff  Right lateral flexion: Active right lumbar lateral flexion: max limited, stiff  Left rotation: Active left lumbar rotation: mod limited, stiff  Right rotation: Active right lumbar rotation: mod limited, stiff  Tests       Thoracic   Positive slump  Lumbar   Positive slumped       Additional Tests Details  Hypomobility noted with spring test  Reactive trigger points in R parapinal and piriformis            Precautions: lumbar surgery    Daily Treatment Diary     Manual              laser x            DTM x            Lumbar pa's                                           Exercise Diary              Recumbent bike             ltr 5"x20            pball flexion stretch 3 way 10"c10            Hs stetch             Gluteal stretch                                                                                                                                                                                                                    Modalities              traction

## 2018-01-26 NOTE — PROGRESS NOTES
PT Evaluation     Today's date: 2018  Patient name: Youlanda Goldmann  : 1940  MRN: [id number]  Referring provider: Nash Eng PA-C  Dx: No diagnosis found  Assessment    Assessment details: Pt  presents to outpatient PT with pain, decreased rom, decreased strength and decreased functional mobility  He will benefit from skilled PT to address these deficits in order to achieve his goals and maximize his functional mobility  Understanding of Dx/Px/POC: excellent   Prognosis: fair    Plan  Patient would benefit from: skilled PTPlanned therapy interventions: abdominal trunk stabilization, IADL retraining, joint mobilization, manual therapy, massage, strengthening and stretching  Frequency: 2x week  Duration in weeks: 6        Subjective Evaluation    History of Present Illness  Mechanism of injury: Pt reports history of chronic LBP that has been worsening over the past few weeks  MRI revealed spondylosis of L1-L4  Has history of laminectomy/decompression  Has pain with sitting/standing for extended periods of time  Reports that he feels limited in most IADL's and is unable to work in the yard like he used  Reports R radicular symptoms  Recurrent probem  Pain  Current pain ratin  At worst pain ratin      Diagnostic Tests  MRI studies: abnormal     :   Objective     Active Range of Motion     Lumbar   Flexion: WFL  Extension: Active lumbar extension: max limited with pain  Left lateral flexion: Active left lumbar lateral flexion: max limited/stiff  Right lateral flexion: Active right lumbar lateral flexion: max limited, stiff  Left rotation: Active left lumbar rotation: mod limited, stiff  Right rotation: Active right lumbar rotation: mod limited, stiff  Tests       Thoracic   Positive slump  Lumbar   Positive slumped       Additional Tests Details  Hypomobility noted with spring test  Reactive trigger points in R parapinal and piriformis Precautions: lumbar surgery    Daily Treatment Diary     Manual  1/26            laser x            DTM x            Lumbar pa's                                           Exercise Diary              Recumbent bike             ltr 5"x20            pball flexion stretch 3 way 10"c10            Hs stetch             Gluteal stretch                                                                                                                                                                                                                    Modalities              traction

## 2018-01-27 ENCOUNTER — HOSPITAL ENCOUNTER (EMERGENCY)
Facility: HOSPITAL | Age: 78
Discharge: HOME | End: 2018-01-27
Attending: EMERGENCY MEDICINE | Admitting: EMERGENCY MEDICINE
Payer: MEDICARE

## 2018-01-27 ENCOUNTER — APPOINTMENT (EMERGENCY)
Dept: RADIOLOGY | Facility: HOSPITAL | Age: 78
End: 2018-01-27
Payer: MEDICARE

## 2018-01-27 VITALS
OXYGEN SATURATION: 99 % | HEART RATE: 77 BPM | TEMPERATURE: 97.7 F | WEIGHT: 255 LBS | HEIGHT: 73 IN | BODY MASS INDEX: 33.8 KG/M2 | RESPIRATION RATE: 18 BRPM | DIASTOLIC BLOOD PRESSURE: 100 MMHG | SYSTOLIC BLOOD PRESSURE: 142 MMHG

## 2018-01-27 DIAGNOSIS — M76.31 IT BAND SYNDROME, RIGHT: ICD-10-CM

## 2018-01-27 DIAGNOSIS — M25.551 RIGHT HIP PAIN: Primary | ICD-10-CM

## 2018-01-27 DIAGNOSIS — M46.1 SACROILIITIS (HCC): ICD-10-CM

## 2018-01-27 PROCEDURE — 99283 EMERGENCY DEPT VISIT LOW MDM: CPT

## 2018-01-27 PROCEDURE — 96372 THER/PROPH/DIAG INJ SC/IM: CPT

## 2018-01-27 PROCEDURE — 73502 X-RAY EXAM HIP UNI 2-3 VIEWS: CPT

## 2018-01-27 RX ORDER — KETOROLAC TROMETHAMINE 30 MG/ML
15 INJECTION, SOLUTION INTRAMUSCULAR; INTRAVENOUS ONCE
Status: COMPLETED | OUTPATIENT
Start: 2018-01-27 | End: 2018-01-27

## 2018-01-27 RX ORDER — KETOROLAC TROMETHAMINE 30 MG/ML
INJECTION, SOLUTION INTRAMUSCULAR; INTRAVENOUS
Status: DISCONTINUED
Start: 2018-01-27 | End: 2018-01-27 | Stop reason: HOSPADM

## 2018-01-27 RX ORDER — PREDNISONE 20 MG/1
20 TABLET ORAL 2 TIMES DAILY WITH MEALS
Qty: 10 TABLET | Refills: 0 | Status: SHIPPED | OUTPATIENT
Start: 2018-01-27 | End: 2018-02-01

## 2018-01-27 RX ORDER — PROPOFOL 10 MG/ML
120 INJECTION, EMULSION INTRAVENOUS ONCE
Status: DISCONTINUED | OUTPATIENT
Start: 2018-01-27 | End: 2018-01-27

## 2018-01-27 RX ADMIN — KETOROLAC TROMETHAMINE 15 MG: 30 INJECTION, SOLUTION INTRAMUSCULAR at 09:53

## 2018-01-27 NOTE — ED PROVIDER NOTES
ASSESSMENT AND PLAN    Maximo Tristan is a 68 y o  male who presents with acute with pain  He is currently hemodynamically stable, well-appearing, does not in acute distress  He has no midline back pain, no saddle anesthesia, and reports no urgency or incontinence of urine or stool  On exam, he does have tenderness over the IT band, as well as over the SI joint on the right side,, consistent with either sacroiliitis or ID band syndrome  Initial x-ray of the right hip was negative  IM Toradol was given for pain, and on re-evaluation, the patient acknowledges relieve his pain  He will be discharged home at this time, and strict return precautions were given  Patient will follow up with his primary care physician, and I also provided with the contact information for our orthopedic clinic at [facility] in the case that he wants to establish care with a more local provider  I also discharge the patient home with 5 day course of steroids due to physical exam concern for sacroiliitis  History  Chief Complaint   Patient presents with    Hip Pain     right hip pain, chronic in nature , denies injury, had MRI and xrays in december at [facility]     49-year-old male with history of ESTER,  hypertension, hyperlipidemia, also of of lumbar spine decompression in 2015 for all spinal stenosis, with known chronic L1-L5 disease for which she currently takes physical therapy, and is being evaluated by his orthopedic surgeon at [facility] for possible surgery  He presents with right hip pain  The patient states that since his surgery, he had been doing better, however this fall he had been working in the yard, and at that time had recurrence of his back pain, as well as hip pain  He states the pain became acutely worse approximately 2 days ago    He recently had an MRI, which showed recurrent spinal stenosis at the L-spine region at L1-L4 Apparently when he saw his orthopedic surgeon at [facility] earlier this week for his pain, his pain had been improved, so his orthopedic surgeon may no further recommendations at that time  Currently, the patient states pain is so severe that he cannot lie down to sleep without pain  He states the pain is located on his right hip, over lying the iliac crest, and associated with a dull aching pain down his right lateral thigh  He denies midline back pain  He states the pain is not shooting in nature, and that he does not have any issues with his leg  He denies any fevers chills, chest pain, shortness of breath, recent travel  Prior to Admission Medications   Prescriptions Last Dose Informant Patient Reported? Taking? Cholecalciferol (VITAMIN D-3 PO)   Yes No   Sig: Take 4,000 Units by mouth daily  aspirin 81 MG tablet   Yes No   Sig: Take 162 mg by mouth daily  folic acid-pyridoxine-cyanocobalamin (FOLBIC) 2 5-25-2 mg   Yes No   Sig: Take 1 tablet by mouth daily  losartan (COZAAR) 100 MG tablet   Yes No   Sig: Take 100 mg by mouth daily  multivitamin (THERAGRAN) TABS   Yes No   Sig: Take 1 tablet by mouth daily  Facility-Administered Medications: None       Past Medical History:   Diagnosis Date    Blood clot in vein 2015    in right foot    Hypertension        Past Surgical History:   Procedure Laterality Date    BACK SURGERY      COLONOSCOPY      COLONOSCOPY N/A 2/24/2016    Procedure: COLONOSCOPY;  Surgeon: Julieta Pacheco MD;  Location: [facility];  Service:    [address] [place] [address]         History reviewed  No pertinent family history  I have reviewed and agree with the history as documented  Social History   Substance Use Topics    Smoking status: Former Smoker     Packs/day: 1 00     Types: Cigarettes     Quit date: 1982    Smokeless tobacco: Never Used    Alcohol use No        Review of Systems   Constitutional: Negative for chills and fever  HENT: Negative for congestion  Eyes: Negative for visual disturbance  Respiratory: Negative for shortness of breath  Cardiovascular: Negative for chest pain and palpitations  Gastrointestinal: Negative for nausea and vomiting  Genitourinary: Negative for decreased urine volume, difficulty urinating, frequency and urgency  Musculoskeletal: Negative for back pain and neck pain  Skin: Negative for rash  Neurological: Negative for seizures and light-headedness  Physical Exam  ED Triage Vitals   Temperature Pulse Respirations Blood Pressure SpO2   01/27/18 0829 01/27/18 0829 01/27/18 0829 01/27/18 0829 01/27/18 0829   97 7 °F (36 5 °C) 71 18 (!) 192/93 100 %      Temp Source Heart Rate Source Patient Position - Orthostatic VS BP Location FiO2 (%)   01/27/18 0829 01/27/18 1106 01/27/18 1106 -- --   Oral Monitor Lying        Pain Score       01/27/18 0829       Worst Possible Pain           Orthostatic Vital Signs  Vitals:    01/27/18 0829 01/27/18 0835 01/27/18 1106   BP: (!) 192/93 170/94 142/100   Pulse: 71  77   Patient Position - Orthostatic VS:   Lying       Physical Exam   Constitutional: He is oriented to person, place, and time  Awake and alert, well appearing  Nontoxic  No acute distress   HENT:   Head: Normocephalic and atraumatic  Mouth/Throat: No oropharyngeal exudate  Eyes: Pupils are equal, round, and reactive to light  No scleral icterus  Neck: Normal range of motion  No JVD present  Cardiovascular: Normal rate, regular rhythm and normal heart sounds  Exam reveals no friction rub  No murmur heard  Pulmonary/Chest: Effort normal  No respiratory distress  He has no wheezes  He has no rales  Abdominal: Soft  He exhibits no distension  There is no tenderness  Musculoskeletal: Normal range of motion  He exhibits no edema  Test palpation overlying the right sacroiliac joint  Patient also has tenderness on the right lateral leg, over the distribution of the ITB band  No tenderness over the greater trochanter  Straight leg raise negative   Pain is exacerbated with external rotation, abduction, and hip flexion  Negative for midline back pain   Neurological: He is alert and oriented to person, place, and time  Sensation grossly intact  5/5 strength in all extremities bilaterally  Skin: Skin is warm  No rash noted  No pallor  ED Medications  Medications   ketorolac (TORADOL) injection 15 mg (15 mg Intramuscular Given 1/27/18 0953)       Diagnostic Studies  Results Reviewed     None                 XR hip/pelv 2-3 vws right   Final Result by Get Martin DO (01/28 2905)      No acute osseous abnormality  Mild osteoarthritis bilateral hips  Workstation performed: [id number]               Procedures  Procedures      Phone Consults  ED Phone Contact    ED Course  ED Course            Identification of Seniors at [address] [place] [address] Most Recent Value   (ISAR) Identification of Seniors at Risk   Before the illness or injury that brought you to the Emergency, did you need someone to help you on a regular basis? 0 Filed at: 01/27/2018 0833   In the last 24 hours, have you needed more help than usual?  1 Filed at: 01/27/2018 1612   Have you been hospitalized for one or more nights during the past 6 months? 0 Filed at: 01/27/2018 6066   In general, do you see well?  0 Filed at: 01/27/2018 8930   In general, do you have serious problems with your memory? 0 Filed at: 01/27/2018 4621   Do you take more than three different medications every day?   1 Filed at: 01/27/2018 5294   ISAR Score  2 Filed at: 01/27/2018 5603                          [facility]  CritCare Time    Disposition  Final diagnoses:   Right hip pain   Sacroiliitis ([facility] [address] )   It band syndrome, right     Time reflects when diagnosis was documented in both MDM as applicable and the Disposition within this note     Time User Action Codes Description Comment    1/27/2018 11:33 AM Johnathon Coop Add [M25 551] Right hip pain     1/27/2018 11:33 AM Johnathon Coop Add [M46 1] Sacroiliitis ([facility] [address] )     1/27/2018 11:33 AM Johnathon Coop Add [M76 31] It band syndrome, right       ED Disposition     ED Disposition Condition Comment    Discharge  Raheel Hernandez discharge to home/self care  Condition at discharge: Good        Follow-up Information     Follow up With Specialties Details Why Contact Info    Sohan Gaines MD Orthopedic Surgery Call As needed [address] [facility]  [phone]          Discharge Medication List as of 1/27/2018 11:34 AM      START taking these medications    Details   predniSONE 20 mg tablet Take 1 tablet (20 mg total) by mouth 2 (two) times a day with meals for 5 days, Starting Sat 1/27/2018, Until Thu 2/1/2018, Print         CONTINUE these medications which have NOT CHANGED    Details   aspirin 81 MG tablet Take 162 mg by mouth daily  , Until Discontinued, Historical Med      Cholecalciferol (VITAMIN D-3 PO) Take 4,000 Units by mouth daily  , Until Discontinued, Historical Med      folic acid-pyridoxine-cyanocobalamin (FOLBIC) 2 5-25-2 mg Take 1 tablet by mouth daily  , Until Discontinued, Historical Med      losartan (COZAAR) 100 MG tablet Take 100 mg by mouth daily  , Until Discontinued, Historical Med      multivitamin (THERAGRAN) TABS Take 1 tablet by mouth daily  , Until Discontinued, Historical Med           No discharge procedures on file  ED Provider  Attending physically available and evaluated Raheel Hernandez I managed the patient along with the ED Attending      Electronically Signed by         Avel Fierro MD  01/28/18 1108

## 2018-01-27 NOTE — DISCHARGE INSTRUCTIONS
Hip Pain   WHAT YOU NEED TO KNOW:   Hip pain can be caused by a number of conditions, such as bursitis, arthritis, or muscle or tendon strain  X-rays do not show broken bones  You may have swelling in the fluid-filled sacs that protect your muscles and tendons  Hip pain can also be caused by a lower back problem  Hip pain may be caused by trauma, playing sports, or running  Your pain may start in your hip and go to your thigh, buttock, or groin  DISCHARGE INSTRUCTIONS:   Medicines:   · NSAIDs , such as ibuprofen, help decrease swelling, pain, and fever  This medicine is available with or without a doctor's order  NSAIDs can cause stomach bleeding or kidney problems in certain people  If you take blood thinner medicine, always ask your healthcare provider if NSAIDs are safe for you  Always read the medicine label and follow directions  · Take your medicine as directed  Contact your healthcare provider if you think your medicine is not helping or if you have side effects  Tell him of her if you are allergic to any medicine  Keep a list of the medicines, vitamins, and herbs you take  Include the amounts, and when and why you take them  Bring the list or the pill bottles to follow-up visits  Carry your medicine list with you in case of an emergency  Return to the emergency department if:   · Your pain gets worse  · You have numbness in your leg or toes  · You cannot put any weight on or move your hip  Contact your healthcare provider if:   · You have a fever  · Your pain does not decrease, even after treatment  · You have questions or concerns about your condition or care  Follow up with your healthcare provider as directed: You may need physical therapy, an injection, or more testing  You may need to see an orthopedic specialist  Write down your questions so you remember to ask them during your visits    Manage your hip pain:   · Rest  your injured hip so that it can heal  You may need to avoid putting any weight on your hip for at least 48 hours  Return to normal activities as directed  · Ice  the injury for 20 minutes every 4 hours, or as directed  Use an ice pack, or put crushed ice in a plastic bag  Cover it with a towel to protect your skin  Ice helps prevent tissue damage and decreases swelling and pain  · Elevate  your injured hip above the level of your heart as often as you can  This will help decrease swelling and pain  If possible, prop your hip and leg on pillows or blankets to keep the area elevated comfortably  · Maintain a healthy weight  Extra body weight can cause pressure and pain in your hip, knee, and ankle joints  Ask your healthcare provider how much you should weigh  Ask him to help you create a weight loss plan if you are overweight  · Use assistive devices as directed  You may need to use a cane or crutches  Assistive devices help decrease pain and pressure on your hip when you walk  Ask your healthcare provider for more information about assistive devices and how to use them correctly  © 2017 [facility] Information is for End User's use only and may not be sold, redistributed or otherwise used for commercial purposes  All illustrations and images included in CareNotes® are the copyrighted property of A D A M [employer] Inc  or [employer]  The above information is an  only  It is not intended as medical advice for individual conditions or treatments  Talk to your doctor, nurse or pharmacist before following any medical regimen to see if it is safe and effective for you

## 2018-01-27 NOTE — ED ATTENDING ATTESTATION
Hannah iDxon MD, saw and evaluated the patient  I have discussed the patient with the resident/non-physician practitioner and agree with the resident's/non-physician practitioner's findings, Plan of Care, and MDM as documented in the resident's/non-physician practitioner's note, except where noted  All available labs and Radiology studies were reviewed  At this point I agree with the current assessment done in the Emergency Department  I have conducted an independent evaluation of this patient a history and physical is as follows: Status post spinal stenosis surgery  Recent MRI showed recurrent stenosis  Now pain back and Left hip: PE shows L SI joint and ROM hip painful         Critical Care Time  CritCare Time    Procedures

## 2018-01-29 ENCOUNTER — OFFICE VISIT (OUTPATIENT)
Dept: PHYSICAL THERAPY | Facility: CLINIC | Age: 78
End: 2018-01-29
Payer: MEDICARE

## 2018-01-29 DIAGNOSIS — M54.16 LUMBAR RADICULOPATHY: Primary | ICD-10-CM

## 2018-01-29 PROCEDURE — 97140 MANUAL THERAPY 1/> REGIONS: CPT

## 2018-01-29 PROCEDURE — 97012 MECHANICAL TRACTION THERAPY: CPT

## 2018-01-29 NOTE — PROGRESS NOTES
Daily Note     Today's date: 2018  Patient name: Topher Zuniga  : 1940  MRN: [id number]  Referring provider: Kaity Ortiz PA-C  Dx:   Encounter Diagnosis   Name Primary?  Lumbar radiculopathy Yes                  Subjective: Pt reports his pain is a 12/10 today  Notes he can't get comfortable  Objective: See treatment diary below    Precautions: lumbar surgery    Daily Treatment Diary     Manual             laser x            DTM x x           Lumbar pa's             SIJ mobs  x                            Exercise Diary              Recumbent bike  unable           ltr 5"x20 5" x20           pball flexion stretch 3 way 10"x 10 NV           Hs stetch  3x30"           Gluteal stretch  3x30"                                                                                                                                                                                                                  Modalities              traction  10', 90#/75#, 4 steps, 50% speed           TENS  10'           MHP  10'                 Assessment: Added glute and HS stretch at today's visit and introduced traction and TENS/MHP  Pt noting decreased pain with same  Continue to progress per POC  Pt may benefit from use of back brace to enable pt to be more functional       Plan: Continue per plan of care

## 2018-01-30 ENCOUNTER — TRANSCRIBE ORDERS (OUTPATIENT)
Dept: PHYSICAL THERAPY | Facility: CLINIC | Age: 78
End: 2018-01-30

## 2018-01-30 DIAGNOSIS — M47.816 LUMBAR SPONDYLOSIS: Primary | ICD-10-CM

## 2018-01-31 ENCOUNTER — OFFICE VISIT (OUTPATIENT)
Dept: PHYSICAL THERAPY | Facility: CLINIC | Age: 78
End: 2018-01-31
Payer: MEDICARE

## 2018-01-31 DIAGNOSIS — M54.16 LUMBAR RADICULOPATHY: Primary | ICD-10-CM

## 2018-01-31 PROCEDURE — 97140 MANUAL THERAPY 1/> REGIONS: CPT

## 2018-01-31 PROCEDURE — 97014 ELECTRIC STIMULATION THERAPY: CPT

## 2018-01-31 NOTE — PROGRESS NOTES
Daily Note     Today's date: 2018  Patient name: Yajaira Wood  : 1940  MRN: [id number]  Referring provider: Roxie Gray PA-C  Dx:   Encounter Diagnosis   Name Primary?  Lumbar radiculopathy Yes                  Subjective: Pt reports his pain level is a 15/10 today  Objective: See treatment diary below    Precautions: lumbar surgery    Daily Treatment Diary   X= performed  Manual            laser x            DTM x x STM/DTM          Lumbar pa's             SIJ mobs  x                            Exercise Diary              Recumbent bike  unable           ltr 5"x20 5" x20 HELD          pball flexion stretch 3 way 10"x 10 NV HELD          Hs stetch  3x30" HELD          Gluteal stretch  3x30" HELD                                                                                                                                                                                                                 Modalities              traction  10', 90#/75#, 4 steps, 50% speed attempted          TENS  10' x          MHP  10' x                Assessment: Able to meg STM/DTM to R L-S/glute area in L side lying  Held ex program 2/2 increased pain  Attempted mechanical lumbar traction, but pt was unable to meg position for same  Able to meg MHP/TENS x 15'  Pt may benefit from use of back brace to enable pt to be more functional, may trial Nv, pending findings from consult  Plan: Pt has consult with Dr Freddy Will tomorrow

## 2018-02-01 ENCOUNTER — OFFICE VISIT (OUTPATIENT)
Dept: OBGYN CLINIC | Facility: CLINIC | Age: 78
End: 2018-02-01
Payer: MEDICARE

## 2018-02-01 VITALS
BODY MASS INDEX: 34.95 KG/M2 | DIASTOLIC BLOOD PRESSURE: 90 MMHG | HEIGHT: 72 IN | SYSTOLIC BLOOD PRESSURE: 152 MMHG | HEART RATE: 82 BPM | WEIGHT: 258 LBS

## 2018-02-01 DIAGNOSIS — M46.1 SACROILIITIS (HCC): Primary | ICD-10-CM

## 2018-02-01 PROCEDURE — 99203 OFFICE O/P NEW LOW 30 MIN: CPT | Performed by: ORTHOPAEDIC SURGERY

## 2018-02-01 NOTE — ASSESSMENT & PLAN NOTE
Findings consistent with right sacroiliitis  Discussed findings and treatment options with the patient and his wife  I recommended patient to continue taking over-the-counter NSAID and use cold compresses over the low back  Patient already have appointment to see a pain management doctor which I recommend him to attend  I also recommended him to have a right SI joint injections with cortisone  I do not think that the hip arthritis is causing him issue at this point  I advised patient to contact me if his symptoms persist despite injections  All patient's questions were answered to his satisfaction  This note is created using dictation transcription  It may contains topographical errors, grammatical errors, improperly dictated words, background noise and other errors

## 2018-02-01 NOTE — PROGRESS NOTES
Assessment:     1  Sacroiliitis ([facility] [address] )        Plan:     Problem List Items Addressed This Visit        Musculoskeletal and Integument    Sacroiliitis ([facility] [address] ) - Primary     Findings consistent with right sacroiliitis  Discussed findings and treatment options with the patient and his wife  I recommended patient to continue taking over-the-counter NSAID and use cold compresses over the low back  Patient already have appointment to see a pain management doctor which I recommend him to attend  I also recommended him to have a right SI joint injections with cortisone  I do not think that the hip arthritis is causing him issue at this point  I advised patient to contact me if his symptoms persist despite injections  All patient's questions were answered to his satisfaction  This note is created using dictation transcription  It may contains topographical errors, grammatical errors, improperly dictated words, background noise and other errors  Relevant Orders    Ambulatory referral to Pain Management         Subjective:     Patient ID: Marie Haq is a 68 y o  male  Chief Complaint:  51-year-old gentleman has been complaining of pain over right low back for the past few months  He denies any injury  He is complaining of pain with prolonged sitting, walking, lying, and standing  His pain is localized over the right lower back and sometimes radiates into the outer aspect of his right hip  He denies pain in his groin  He has no numbness or weakness down his right leg  He does have history of low back surgery done many years ago  He denies any bowel or bladder incontinence  He has been taking over-the-counter NSAID but is not helping with his pain  Information on patient's intake form was reviewed        Allergy:  No Known Allergies  Medications:  all current active meds have been reviewed  Past Medical History:  Past Medical History:   Diagnosis Date    Blood clot in vein 2015    in right foot    Hypertension      Past Surgical History:  Past Surgical History:   Procedure Laterality Date    BACK SURGERY      COLONOSCOPY      COLONOSCOPY N/A 2/24/2016    Procedure: COLONOSCOPY;  Surgeon: Bryan Kaplan MD;  Location:  MAIN OR;  Service:    [employer] HERNIA REPAIR       Family History:  History reviewed  No pertinent family history  Social History:  History   Alcohol Use No     History   Drug Use No     History   Smoking Status    Former Smoker    Packs/day: 1 00    Types: Cigarettes    Quit date: 1982   Smokeless Tobacco    Never Used     Review of Systems   Constitutional: Negative  HENT: Negative  Eyes: Negative  Respiratory: Negative  Cardiovascular: Negative  Gastrointestinal: Negative  Endocrine: Negative  Genitourinary: Negative  Musculoskeletal: Positive for arthralgias (Outer right hip), back pain (Right low back) and gait problem  Skin: Negative  Neurological: Negative for weakness and numbness  Hematological: Negative  Psychiatric/Behavioral: Negative  Objective:  BP Readings from Last 1 Encounters:   02/01/18 152/90      Wt Readings from Last 1 Encounters:   02/01/18 117 kg (258 lb)      BMI:   Estimated body mass index is 34 99 kg/m² as calculated from the following:    Height as of this encounter: 6' (1 829 m)  Weight as of this encounter: 117 kg (258 lb)  BSA:   Estimated body surface area is 2 37 meters squared as calculated from the following:    Height as of this encounter: 6' (1 829 m)  Weight as of this encounter: 117 kg (258 lb)  Physical Exam   Constitutional: He is oriented to person, place, and time  He appears well-developed  HENT:   Head: Normocephalic and atraumatic  Eyes: EOM are normal  Pupils are equal, round, and reactive to light  Neck: Neck supple  Neurological: He is alert and oriented to person, place, and time  Skin: Skin is warm  Psychiatric: He has a normal mood and affect     Nursing note and vitals reviewed  Right Hip Exam     Tenderness   The patient is experiencing no tenderness  Muscle Strength   The patient has normal right hip strength  Tests   AILYN: negative  Long: negative    Other   Erythema: absent  Sensation: normal  Pulse: present    Comments:  He has localized tenderness with palpation over right SI joint  Right hip passive internal external rotation at 90 degree of flexion without increased pain  There is slight limitation with internal and external rotation  He has negative straight a raise signs  He has got good motor and sensory function in the lower extremity  I have personally reviewed pertinent films in PACS and my interpretation is bilateral hip osteoarthritis  No soft tissue calcification  Georgia Guevara

## 2018-02-02 ENCOUNTER — TELEPHONE (OUTPATIENT)
Dept: RADIOLOGY | Facility: CLINIC | Age: 78
End: 2018-02-02

## 2018-02-02 ENCOUNTER — HOSPITAL ENCOUNTER (OUTPATIENT)
Dept: RADIOLOGY | Facility: CLINIC | Age: 78
Discharge: HOME | End: 2018-02-02
Attending: ANESTHESIOLOGY
Payer: MEDICARE

## 2018-02-02 VITALS
TEMPERATURE: 97.6 F | RESPIRATION RATE: 20 BRPM | HEART RATE: 76 BPM | OXYGEN SATURATION: 98 % | SYSTOLIC BLOOD PRESSURE: 165 MMHG | DIASTOLIC BLOOD PRESSURE: 90 MMHG

## 2018-02-02 DIAGNOSIS — M46.1 SACROILIITIS (HCC): ICD-10-CM

## 2018-02-02 DIAGNOSIS — M54.16 LUMBAR RADICULITIS: Primary | ICD-10-CM

## 2018-02-02 PROCEDURE — 27096 INJECT SACROILIAC JOINT: CPT | Performed by: ANESTHESIOLOGY

## 2018-02-02 RX ORDER — METHYLPREDNISOLONE ACETATE 80 MG/ML
80 INJECTION, SUSPENSION INTRA-ARTICULAR; INTRALESIONAL; INTRAMUSCULAR; SOFT TISSUE ONCE
Status: COMPLETED | OUTPATIENT
Start: 2018-02-02 | End: 2018-02-02

## 2018-02-02 RX ORDER — LIDOCAINE HYDROCHLORIDE 10 MG/ML
5 INJECTION, SOLUTION EPIDURAL; INFILTRATION; INTRACAUDAL; PERINEURAL ONCE
Status: COMPLETED | OUTPATIENT
Start: 2018-02-02 | End: 2018-02-02

## 2018-02-02 RX ORDER — TRAMADOL HYDROCHLORIDE 50 MG/1
50 TABLET ORAL EVERY 6 HOURS PRN
Qty: 30 TABLET | Refills: 0 | Status: SHIPPED | OUTPATIENT
Start: 2018-02-02 | End: 2018-02-07 | Stop reason: ALTCHOICE

## 2018-02-02 RX ORDER — LIDOCAINE HYDROCHLORIDE 20 MG/ML
5 INJECTION, SOLUTION EPIDURAL; INFILTRATION; INTRACAUDAL; PERINEURAL ONCE
Status: COMPLETED | OUTPATIENT
Start: 2018-02-02 | End: 2018-02-02

## 2018-02-02 RX ADMIN — IOHEXOL 1 ML: 300 INJECTION, SOLUTION INTRAVENOUS at 08:42

## 2018-02-02 RX ADMIN — METHYLPREDNISOLONE ACETATE 80 MG: 80 INJECTION, SUSPENSION INTRA-ARTICULAR; INTRALESIONAL; INTRAMUSCULAR; SOFT TISSUE at 08:42

## 2018-02-02 RX ADMIN — LIDOCAINE HYDROCHLORIDE 4 ML: 20 INJECTION, SOLUTION EPIDURAL; INFILTRATION; INTRACAUDAL; PERINEURAL at 08:42

## 2018-02-02 RX ADMIN — LIDOCAINE HYDROCHLORIDE 5 ML: 10 INJECTION, SOLUTION EPIDURAL; INFILTRATION; INTRACAUDAL; PERINEURAL at 08:42

## 2018-02-02 NOTE — DISCHARGE INSTRUCTIONS
Steroid Joint Injection   WHAT YOU NEED TO KNOW:   A steroid joint injection is a procedure to inject steroid medicine into a joint  Steroid medicine decreases pain and inflammation  The injection may also contain an anesthetic (numbing medicine) to decrease pain  It may be done to treat conditions such as arthritis, gout, or carpal tunnel syndrome  The injections may be given in your knee, ankle, shoulder, elbow, wrist, ankle or sacroiliac joint  1  Do not apply heat to any area that is numb  If you have discomfort or soreness at the injection site, you may apply ice today, 20 minutes on and 20 minutes off  Tomorrow you may use ice or warm, moist heat  Do not apply ice or heat directly to the skin  2  You may have an increase or change in the discomfort for 36-48 hours after your treatment  Apply ice and continue with any pain medicine you have been prescribed  3  Do not do anything strenuous today  You may shower, but no tub baths or hot tubs today  You may resume your normal activities tomorrow, but do not overdo it  Resume normal activities slowly when you are feeling better  4  If you experience redness, drainage or swelling at the injection site, or if you develop a fever above 100 degrees, please call [facility] at [phone] or go to the Emergency Room  5  Continue to take all routine medicines prescribed by your primary care physician unless otherwise instructed by our staff  Most blood thinners should be started again according to your regularly scheduled dosing  If you have any questions, please give our office a call  If you have a problem specifically related to your procedure, please call our office at [phone]  Problems not related to your procedure should be directed to your primary care physician

## 2018-02-02 NOTE — TELEPHONE ENCOUNTER
[facility] requesting a copy of the pt's most recent mri  Please fax to [phone] or call back w/ questions, [phone]  Please tx call back to the nurse   Thank you

## 2018-02-05 ENCOUNTER — OFFICE VISIT (OUTPATIENT)
Dept: PHYSICAL THERAPY | Facility: CLINIC | Age: 78
End: 2018-02-05
Payer: MEDICARE

## 2018-02-05 DIAGNOSIS — M54.16 LUMBAR RADICULOPATHY: Primary | ICD-10-CM

## 2018-02-05 PROCEDURE — 97140 MANUAL THERAPY 1/> REGIONS: CPT | Performed by: PHYSICAL THERAPIST

## 2018-02-05 PROCEDURE — 97012 MECHANICAL TRACTION THERAPY: CPT

## 2018-02-05 NOTE — PROGRESS NOTES
Daily Note     Today's date: 2018  Patient name: Ruperto Telles  : 1940  MRN: [id number]  Referring provider: Casa Bustamante PA-C  Dx:   Encounter Diagnosis   Name Primary?  Lumbar radiculopathy Yes                  Subjective: Patient reports his pain is at least a 9/10  Stated nothing helps at all  Reported he went and got shots on Friday but they have not seemed to help  Objective: See treatment diary below    Precautions: lumbar surgery    Daily Treatment Diary   X= performed  Manual  18         laser x            DTM x x STM/DTM STM/DTM          Lumbar pa's             SIJ mobs  x                            Exercise Diary             Recumbent bike  unable           ltr 5"x20 5" x20 HELD          pball flexion stretch 3 way 10"x 10 NV HELD          Hs stetch  3x30" HELD          Gluteal stretch  3x30" HELD                                                                                                                                                                                                                 Modalities              traction  10', 90#/75#, 4 steps, 50% speed attempted 10' 95#/75#         TENS  10' x np         MHP  10' x 15'               Assessment: Patient noted relief with heat followed by traction  Still reported having soreness stating he could "feel it was still there"  No TE today as just about every movement bothers him  Encouraged to find most comfortable position and rest to allow for injections to kick in  Plan: Progress per POC and as meg

## 2018-02-07 ENCOUNTER — APPOINTMENT (OUTPATIENT)
Dept: PHYSICAL THERAPY | Facility: CLINIC | Age: 78
End: 2018-02-07
Payer: MEDICARE

## 2018-02-07 ENCOUNTER — OFFICE VISIT (OUTPATIENT)
Dept: PAIN MEDICINE | Facility: CLINIC | Age: 78
End: 2018-02-07
Payer: MEDICARE

## 2018-02-07 ENCOUNTER — TELEPHONE (OUTPATIENT)
Dept: PAIN MEDICINE | Facility: MEDICAL CENTER | Age: 78
End: 2018-02-07

## 2018-02-07 VITALS
SYSTOLIC BLOOD PRESSURE: 148 MMHG | DIASTOLIC BLOOD PRESSURE: 80 MMHG | HEART RATE: 88 BPM | WEIGHT: 259 LBS | HEIGHT: 72 IN | BODY MASS INDEX: 35.08 KG/M2 | TEMPERATURE: 98.3 F

## 2018-02-07 DIAGNOSIS — M96.1 POSTLAMINECTOMY SYNDROME, LUMBAR: Primary | ICD-10-CM

## 2018-02-07 DIAGNOSIS — M54.16 RIGHT LUMBAR RADICULOPATHY: ICD-10-CM

## 2018-02-07 DIAGNOSIS — M48.061 LUMBAR FORAMINAL STENOSIS: ICD-10-CM

## 2018-02-07 PROBLEM — K57.30 DIVERTICULAR DISEASE OF COLON: Status: ACTIVE | Noted: 2018-02-07

## 2018-02-07 PROBLEM — M25.551 BILATERAL HIP PAIN: Status: ACTIVE | Noted: 2017-12-04

## 2018-02-07 PROBLEM — D12.6 BENIGN NEOPLASM OF COLON: Status: ACTIVE | Noted: 2018-02-07

## 2018-02-07 PROBLEM — R50.9 FUO (FEVER OF UNKNOWN ORIGIN): Status: ACTIVE | Noted: 2017-09-08

## 2018-02-07 PROBLEM — M25.552 BILATERAL HIP PAIN: Status: ACTIVE | Noted: 2017-12-04

## 2018-02-07 PROBLEM — E72.10 DISORDER OF SULFUR-BEARING AMINO ACID METABOLISM (HCC): Status: ACTIVE | Noted: 2018-02-07

## 2018-02-07 PROBLEM — R73.09 ABNORMAL GLUCOSE LEVEL: Status: ACTIVE | Noted: 2018-02-07

## 2018-02-07 PROBLEM — R50.9 FEVER: Status: ACTIVE | Noted: 2017-09-08

## 2018-02-07 PROBLEM — K64.8 INTERNAL HEMORRHOIDS: Status: ACTIVE | Noted: 2018-02-07

## 2018-02-07 PROBLEM — M54.9 ACUTE BACK PAIN: Status: ACTIVE | Noted: 2017-10-02

## 2018-02-07 PROBLEM — E78.00 PURE HYPERCHOLESTEROLEMIA: Status: ACTIVE | Noted: 2018-02-07

## 2018-02-07 PROCEDURE — 99204 OFFICE O/P NEW MOD 45 MIN: CPT | Performed by: ANESTHESIOLOGY

## 2018-02-07 RX ORDER — GABAPENTIN 100 MG/1
100 CAPSULE ORAL 3 TIMES DAILY
Qty: 90 CAPSULE | Refills: 0 | Status: SHIPPED | OUTPATIENT
Start: 2018-02-07 | End: 2022-03-01

## 2018-02-07 RX ORDER — HYDROCODONE BITARTRATE AND ACETAMINOPHEN 7.5; 325 MG/1; MG/1
1 TABLET ORAL EVERY 8 HOURS PRN
Qty: 30 TABLET | Refills: 0 | Status: SHIPPED | OUTPATIENT
Start: 2018-02-07 | End: 2020-08-31 | Stop reason: ALTCHOICE

## 2018-02-07 RX ORDER — HYDROCODONE BITARTRATE AND ACETAMINOPHEN 7.5; 325 MG/1; MG/1
1 TABLET ORAL EVERY 6 HOURS PRN
Qty: 30 TABLET | Refills: 0 | Status: SHIPPED | OUTPATIENT
Start: 2018-02-07 | End: 2018-02-07 | Stop reason: CLARIF

## 2018-02-07 RX ORDER — LORAZEPAM 1 MG/1
TABLET ORAL
Qty: 2 TABLET | Refills: 0 | Status: SHIPPED | OUTPATIENT
Start: 2018-02-07 | End: 2020-08-31 | Stop reason: ALTCHOICE

## 2018-02-07 NOTE — PROGRESS NOTES
Assessment:  1  Postlaminectomy syndrome, lumbar    2  Lumbar foraminal stenosis    3  Right lumbar radiculopathy        Plan:  Patient's pain persists despite time relative rest activity modification and physical therapy  Sacroiliac joint injection provided no significant relief  Reviewed MRI which reveals significant stenosis my recommendations are as follows:    I recommend he is re-evaluated by Neurosurgery, a referral was provided  Discontinue the tramadol does not providing relief in starting on hydrocodone p r n     The risks of opioid medications, including dependence, addiction and tolerance were explained to the patient  The patient understands and agrees to use these medications only as prescribed  I have fully discussed the potential side effects of the medication with the patient, which include, but are not limited to, constipation, drowsiness, addiction, impaired judgment and risk of fatal overdose if not taken as prescribed  I warned against driving while taking sedating medications  At this point and time, the patient is showing no signs of addiction, abuse, diversion or suicidal ideation  While the patient was in the office today, I did review the patient's report on the [address] web site and found it to be appropriate for what is being prescribed and I reviewed it for any inconsistencies or evidence of multiple prescribers/drug diversion  A copy of the patient's report can be found in their chart  Will schedule right L3/L4 transforaminal epidural steroid injection to directly address any inflammatory component of the patient's pain  In the office today reviewed the knee shins the patient's pathology deficits in diagrams and models, discussed the approach I would use for the transforaminal epidural steroid injection and provided literature for home review    Patient understands the risks associated with the procedure not limited to but including bleeding, tissue injury, allergic reaction, paralysis, exacerbation of symptoms and spinal headache patient provided written verbal consent  My impressions and treatment recommendations were discussed in detail with the patient who verbalized understanding and had no further questions  Discharge instructions were provided  I personally saw and examined the patient and I agree with the above discussed plan of care  Orders Placed This Encounter   Procedures    FL spine and pain procedure     Order Specific Question:   Reason for Exam:     Answer:   Right L3/L4 transforaminal epidural steroid injection x1     Order Specific Question:   Anticoagulant hold needed? Answer:   No    Ambulatory referral to Neurosurgery     Standing Status:   Future     Standing Expiration Date:   8/7/2018     Referral Priority:   Routine     Referral Type:   Consult - AMB     Referral Reason:   Specialty Services Required     Requested Specialty:   Neurosurgery     Number of Visits Requested:   1     Expiration Date:   2/7/2019     New Medications Ordered This Visit   Medications    HYDROcodone-acetaminophen (NORCO) 7 5-325 mg per tablet     Sig: Take 1 tablet by mouth every 6 (six) hours as needed for pain Max Daily Amount: 4 tablets     Dispense:  30 tablet     Refill:  0    gabapentin (NEURONTIN) 100 mg capsule     Sig: Take 1 capsule (100 mg total) by mouth 3 (three) times a day     Dispense:  90 capsule     Refill:  0       History of Present Illness:    Darcie Swanson is a 68 y o  male with a 6 month history of severe low back and right leg pain  He had previous lumbar spine surgery at the [address] [place] [address] but his pain has been significant he was initially referred for sacroiliac joint injection which did not provide any relief  He was started on tramadol which did not provide relief    Describes the pain as starting the right side of his low back radiates down the posterior lateral aspect of his right leg with numbness and tingling  He feels that his leg occasionally gives out  Physical therapy is not providing relief relaxation does provide relief standing and walking exacerbates his symptoms  He rates his pain as 8 to 9/10 on the visual analog scale and significant interfering with his daily living activities  I have personally reviewed and/or updated the patient's past medical history, past surgical history, family history, social history, current medications, allergies, and vital signs today  Review of Systems:    Review of Systems   Constitutional: Negative for fever and unexpected weight change  HENT: Negative for trouble swallowing  Eyes: Negative for visual disturbance  Respiratory: Positive for shortness of breath  Negative for wheezing  Cardiovascular: Negative for chest pain and palpitations  Gastrointestinal: Negative for constipation, diarrhea, nausea and vomiting  Endocrine: Negative for cold intolerance, heat intolerance and polydipsia  Genitourinary: Negative for difficulty urinating and frequency  Musculoskeletal: Positive for gait problem  Negative for arthralgias, joint swelling (joint stiffness) and myalgias  Skin: Negative for rash  Neurological: Negative for dizziness, seizures, syncope, weakness and headaches  Hematological: Does not bruise/bleed easily  Psychiatric/Behavioral: Negative for dysphoric mood  All other systems reviewed and are negative        Patient Active Problem List   Diagnosis    Sacroiliitis (HCC)    Abnormal glucose level    Acute back pain    Adenomatous colon polyp    Aortic ectasia (HCC)    Benign neoplasm of colon    Bilateral hip pain    DDD (degenerative disc disease), lumbar    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[facility] [address] )    Diverticular disease of colon    Elevated hemoglobin A1c    Encounter for long-term (current) use of other medications    Fever    FUO (fever of unknown origin)    Hyperlipidemia    Hypertension    Internal hemorrhoids    Lichen planus    Lumbar back pain    Lumbar disc herniation    MTHFR mutation (HCC)    Obstructive sleep apnea    Organic impotence    Premature atrial complexes    Premature ventricular contraction    Pure hypercholesterolemia    Radiculopathy, lumbar region    Spinal stenosis of lumbar region    Vitamin D deficiency       Past Medical History:   Diagnosis Date    Blood clot in vein 2015    in right foot    Hypertension        Past Surgical History:   Procedure Laterality Date    BACK SURGERY      COLONOSCOPY      COLONOSCOPY N/A 2/24/2016    Procedure: COLONOSCOPY;  Surgeon: Jessica Fernández MD;  Location:  MAIN OR;  Service:     HERNIA REPAIR         No family history on file  Social History     Occupational History    Not on file  Social History Main Topics    Smoking status: Former Smoker     Packs/day: 1 00     Types: Cigarettes     Quit date: 1982    Smokeless tobacco: Never Used    Alcohol use No    Drug use: No    Sexual activity: Not on file       Current Outpatient Prescriptions on File Prior to Visit   Medication Sig    aspirin 81 MG tablet Take 162 mg by mouth daily   Cholecalciferol (VITAMIN D-3 PO) Take 4,000 Units by mouth daily   folic acid-pyridoxine-cyanocobalamin (FOLBIC) 2 5-25-2 mg Take 1 tablet by mouth daily   losartan (COZAAR) 100 MG tablet Take 100 mg by mouth daily   multivitamin-minerals (CENTRUM) tablet Take 1 tablet by mouth daily    [DISCONTINUED] traMADol (ULTRAM) 50 mg tablet Take 1 tablet (50 mg total) by mouth every 6 (six) hours as needed for moderate pain for up to 10 days     No current facility-administered medications on file prior to visit          Allergies   Allergen Reactions    Sildenafil Hives       Physical Exam:    /80   Pulse 88   Temp 98 3 °F (36 8 °C)   Ht 6' (1 829 m)   Wt 117 kg (259 lb)   BMI 35 13 kg/m²     Constitutional: normal, well developed, well nourished, alert, in no distress and non-toxic and no overt pain behavior  Looks uncomfortable  Eyes: anicteric  HEENT: grossly intact  Neck: supple, symmetric, trachea midline and no masses   Pulmonary:even and unlabored  Cardiovascular:No edema or pitting edema present  Skin:Normal without rashes or lesions and well hydrated  Psychiatric:Mood and affect appropriate  Neurologic:Cranial Nerves II-XII grossly intact  Musculoskeletal:  Difficulty going from sitting to standing sitting position, no obvious skin lesions or erythema lumbar sacral spine there is no tenderness to palpation lumbar sacral spine spinous process sacroiliac joint or greater trochanter bilateral, he has weakness with right knee extension 4/5 and 3/5 strength right extensor hallucis longus extension decreased sensation right L4 distribution to pinwheel compared to the left  Deep tendon reflexes diminished but symmetrical patella and Achilles positive straight leg raising on the right negative on the left    Imaging  MRI LUMBAR SPINE WO CONTRAST (12/22/2017 3:00 PM)  MRI LUMBAR SPINE WO CONTRAST (12/22/2017 3:00 PM)   Specimen Performing Laboratory     RADIOLOGY       MRI LUMBAR SPINE WO CONTRAST (12/22/2017 3:00 PM)   Impressions   Impression:        Please note the same nomenclature was utilized as on the prior examination     There may be a hypoplastic disc at L5-S1         There has been prior posterior spinal surgery on the right at L1-2 and the    central aspect of the canal at L3-4 decreasing the central canal stenosis at    these levels         There remains a inferiorly directed disc extrusion at L1-2  There is some    decreased T2-weighted signal along the right side of the canal at the L1-2 level    which could represent postsurgical changes or calcification or even some flow    artifact   CT could be obtained for further evaluation         There remains multilevel neural foraminal stenosis as discussed above                         Workstation:[id number]  MRI LUMBAR SPINE WO CONTRAST (12/22/2017 3:00 PM)   Narrative   History: Lumbar spondylosis        MRI of the lumbar spine was performed on a 1 5 [employer] magnet  The following    sequences were obtained: sagittal STIR, T1-weighted and T2-weighted sequences as    well as axial T2-weighted sequences were performed         Reference is made to prior study 5/28/2015         Findings:        Please note for the purposes of this dictation it is assumed that the patient    has 5 lumbar-type vertebral bodies         Alignment is grossly unchanged with grade 1 anterolisthesis of L3 on L4  Lorry Silvestre   is loss of disc height L4-5         The conus medullaris terminates at the inferior L1 level         There is an old appearing endplate fracture along the inferior aspect of T11         At T12-L1 there is a minimal disc bulge with mild degenerative changes of the    facets and some bilateral neural foraminal stenosis          At L1-2 there is minimal retrolisthesis of L1 on L2  There remains a broad-based    disc bulge with an inferiorly directed right-sided disc extrusion which has    decreased in AP dimension and compared to the prior examination  There are    degenerative changes of the facets and left-sided ligamentum flavum thickening     Neck prior right-sided hemilaminectomy with resection of the right ligamentum    flavum  The central canal is not severely compressed as it was previously  Lorry Silvestre   remains severe right greater than left neural foraminal stenosis  There is some    low T2-weighted signal along the right side of the canal as visualized on series    500 image 13  Unsure if this represents postsurgical changes, flow artifact or    partial averaging with calcification   CT could be obtained for further    evaluation         At L2-3 there is a broad-based disc bulge with degenerative changes of the    facets and ligamentum flavum thickening there is moderate central canal stenosis    and bilateral neural foraminal stenosis, similar to prior study         At L3-4 there remains grade 1 anterolisthesis of L3 on L4 with degenerative    changes of the facets and ligamentum flavum thickening  There is evidence of    prior posterior spinal surgery with some stripping ligamentum flavum causing a    decrease in the central canal stenosis  There remains moderate to severe    bilateral neural foraminal stenosis          At L4-5 is posterior disc osteophyte complex with degenerative changes of the    facets  Remains a small superimposed central disc herniation  There remains    severe bilateral subarticular recess stenosis with severe left and moderate to    severe right neural foraminal stenosis          At L5-S1 there is a hypoplastic disc degenerative change facets          I have personally reviewed pertinent films in PACS

## 2018-02-07 NOTE — TELEPHONE ENCOUNTER
Pt called stating he had an injection on Friday (2/2) by Dr Verna Ernst and is having as much pain as he did before  Pls call pt at [phone]

## 2018-02-07 NOTE — PATIENT INSTRUCTIONS
Constipation   WHAT YOU NEED TO KNOW:   Constipation is when you have hard, dry bowel movements, or you go longer than usual between bowel movements  DISCHARGE INSTRUCTIONS:   Return to the emergency department if:   · You have blood in your bowel movements  · You have a fever and abdominal pain with the constipation  Contact your healthcare provider if:   · Your constipation gets worse  · You start to vomit  · You have questions or concerns about your condition or care  Medicines:   · Medicine or a fiber supplement  may help make your bowel movement softer  A laxative may help relax and loosen your intestines to help you have a bowel movement  You may also be given medicine to increase fluid in your intestines  The fluid may help move bowel movements through your intestines  · Take your medicine as directed  Contact your healthcare provider if you think your medicine is not helping or if you have side effects  Tell him of her if you are allergic to any medicine  Keep a list of the medicines, vitamins, and herbs you take  Include the amounts, and when and why you take them  Bring the list or the pill bottles to follow-up visits  Carry your medicine list with you in case of an emergency  Manage your constipation:   · Drink liquids as directed  You may need to drink extra liquids to help soften and move your bowels  Ask how much liquid to drink each day and which liquids are best for you  · Eat high-fiber foods  This may help decrease constipation by adding bulk to your bowel movements  High-fiber foods include fruit, vegetables, whole-grain breads and cereals, and beans  Your healthcare provider or dietitian can help you create a high-fiber meal plan  · Exercise regularly  Regular physical activity can help stimulate your intestines  Ask which exercises are best for you  · Schedule a time each day to have a bowel movement    This may help train your body to have regular bowel movements  Bend forward while you are on the toilet to help move the bowel movement out  Sit on the toilet for at least 10 minutes, even if you do not have a bowel movement  Follow up with your healthcare provider as directed:  Write down your questions so you remember to ask them during your visits  © 2017 [employer] Information is for End User's use only and may not be sold, redistributed or otherwise used for commercial purposes  All illustrations and images included in CareNotes® are the copyrighted property of [employer]  or [employer]  The above information is an  only  It is not intended as medical advice for individual conditions or treatments  Talk to your doctor, nurse or pharmacist before following any medical regimen to see if it is safe and effective for you

## 2018-02-07 NOTE — H&P
Assessment:  1  Postlaminectomy syndrome, lumbar    2  Lumbar foraminal stenosis    3  Right lumbar radiculopathy        Plan:  Patient's pain persists despite time relative rest activity modification and physical therapy  Sacroiliac joint injection provided no significant relief  Reviewed MRI which reveals significant stenosis my recommendations are as follows:    I recommend he is re-evaluated by Neurosurgery, a referral was provided  Discontinue the tramadol does not providing relief in starting on hydrocodone p r n     The risks of opioid medications, including dependence, addiction and tolerance were explained to the patient  The patient understands and agrees to use these medications only as prescribed  I have fully discussed the potential side effects of the medication with the patient, which include, but are not limited to, constipation, drowsiness, addiction, impaired judgment and risk of fatal overdose if not taken as prescribed  I warned against driving while taking sedating medications  At this point and time, the patient is showing no signs of addiction, abuse, diversion or suicidal ideation  While the patient was in the office today, I did review the patient's report on the [facility] web site and found it to be appropriate for what is being prescribed and I reviewed it for any inconsistencies or evidence of multiple prescribers/drug diversion  A copy of the patient's report can be found in their chart  Will schedule right L3/L4 transforaminal epidural steroid injection to directly address any inflammatory component of the patient's pain  In the office today reviewed the knee shins the patient's pathology deficits in diagrams and models, discussed the approach I would use for the transforaminal epidural steroid injection and provided literature for home review    Patient understands the risks associated with the procedure not limited to but including bleeding, tissue injury, allergic reaction, paralysis, exacerbation of symptoms and spinal headache patient provided written verbal consent  My impressions and treatment recommendations were discussed in detail with the patient who verbalized understanding and had no further questions  Discharge instructions were provided  I personally saw and examined the patient and I agree with the above discussed plan of care  Orders Placed This Encounter   Procedures    FL spine and pain procedure     Order Specific Question:   Reason for Exam:     Answer:   Right L3/L4 transforaminal epidural steroid injection x1     Order Specific Question:   Anticoagulant hold needed? Answer:   No    Ambulatory referral to Neurosurgery     Standing Status:   Future     Standing Expiration Date:   8/7/2018     Referral Priority:   Routine     Referral Type:   Consult - AMB     Referral Reason:   Specialty Services Required     Requested Specialty:   Neurosurgery     Number of Visits Requested:   1     Expiration Date:   2/7/2019     New Medications Ordered This Visit   Medications    HYDROcodone-acetaminophen (NORCO) 7 5-325 mg per tablet     Sig: Take 1 tablet by mouth every 6 (six) hours as needed for pain Max Daily Amount: 4 tablets     Dispense:  30 tablet     Refill:  0    gabapentin (NEURONTIN) 100 mg capsule     Sig: Take 1 capsule (100 mg total) by mouth 3 (three) times a day     Dispense:  90 capsule     Refill:  0       History of Present Illness:    Antony Linares is a 68 y o  male with a 6 month history of severe low back and right leg pain  He had previous lumbar spine surgery at the [address] [place] [address] but his pain has been significant he was initially referred for sacroiliac joint injection which did not provide any relief  He was started on tramadol which did not provide relief    Describes the pain as starting the right side of his low back radiates down the posterior lateral aspect of his right leg with numbness and tingling  He feels that his leg occasionally gives out  Physical therapy is not providing relief relaxation does provide relief standing and walking exacerbates his symptoms  He rates his pain as 8 to 9/10 on the visual analog scale and significant interfering with his daily living activities  I have personally reviewed and/or updated the patient's past medical history, past surgical history, family history, social history, current medications, allergies, and vital signs today  Review of Systems:    Review of Systems   Constitutional: Negative for fever and unexpected weight change  HENT: Negative for trouble swallowing  Eyes: Negative for visual disturbance  Respiratory: Positive for shortness of breath  Negative for wheezing  Cardiovascular: Negative for chest pain and palpitations  Gastrointestinal: Negative for constipation, diarrhea, nausea and vomiting  Endocrine: Negative for cold intolerance, heat intolerance and polydipsia  Genitourinary: Negative for difficulty urinating and frequency  Musculoskeletal: Positive for gait problem  Negative for arthralgias, joint swelling (joint stiffness) and myalgias  Skin: Negative for rash  Neurological: Negative for dizziness, seizures, syncope, weakness and headaches  Hematological: Does not bruise/bleed easily  Psychiatric/Behavioral: Negative for dysphoric mood  All other systems reviewed and are negative        Patient Active Problem List   Diagnosis    Sacroiliitis (HCC)    Abnormal glucose level    Acute back pain    Adenomatous colon polyp    Aortic ectasia (HCC)    Benign neoplasm of colon    Bilateral hip pain    DDD (degenerative disc disease), lumbar    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[facility] [address] )    Diverticular disease of colon    Elevated hemoglobin A1c    Encounter for long-term (current) use of other medications    Fever    FUO (fever of unknown origin)    Hyperlipidemia    Hypertension    Internal hemorrhoids    Lichen planus    Lumbar back pain    Lumbar disc herniation    MTHFR mutation (HCC)    Obstructive sleep apnea    Organic impotence    Premature atrial complexes    Premature ventricular contraction    Pure hypercholesterolemia    Radiculopathy, lumbar region    Spinal stenosis of lumbar region    Vitamin D deficiency       Past Medical History:   Diagnosis Date    Blood clot in vein 2015    in right foot    Hypertension        Past Surgical History:   Procedure Laterality Date    BACK SURGERY      COLONOSCOPY      COLONOSCOPY N/A 2/24/2016    Procedure: COLONOSCOPY;  Surgeon: Jeffrey Rodríguez MD;  Location:  MAIN OR;  Service:     HERNIA REPAIR         No family history on file  Social History     Occupational History    Not on file  Social History Main Topics    Smoking status: Former Smoker     Packs/day: 1 00     Types: Cigarettes     Quit date: 1982    Smokeless tobacco: Never Used    Alcohol use No    Drug use: No    Sexual activity: Not on file       Current Outpatient Prescriptions on File Prior to Visit   Medication Sig    aspirin 81 MG tablet Take 162 mg by mouth daily   Cholecalciferol (VITAMIN D-3 PO) Take 4,000 Units by mouth daily   folic acid-pyridoxine-cyanocobalamin (FOLBIC) 2 5-25-2 mg Take 1 tablet by mouth daily   losartan (COZAAR) 100 MG tablet Take 100 mg by mouth daily   multivitamin-minerals (CENTRUM) tablet Take 1 tablet by mouth daily    [DISCONTINUED] traMADol (ULTRAM) 50 mg tablet Take 1 tablet (50 mg total) by mouth every 6 (six) hours as needed for moderate pain for up to 10 days     No current facility-administered medications on file prior to visit          Allergies   Allergen Reactions    Sildenafil Hives       Physical Exam:    /80   Pulse 88   Temp 98 3 °F (36 8 °C)   Ht 6' (1 829 m)   Wt 117 kg (259 lb)   BMI 35 13 kg/m²      Constitutional: normal, well developed, well nourished, alert, in no distress and non-toxic and no overt pain behavior  Looks uncomfortable  Eyes: anicteric  HEENT: grossly intact  Neck: supple, symmetric, trachea midline and no masses   Pulmonary:even and unlabored  Cardiovascular:No edema or pitting edema present  Skin:Normal without rashes or lesions and well hydrated  Psychiatric:Mood and affect appropriate  Neurologic:Cranial Nerves II-XII grossly intact  Musculoskeletal:  Difficulty going from sitting to standing sitting position, no obvious skin lesions or erythema lumbar sacral spine there is no tenderness to palpation lumbar sacral spine spinous process sacroiliac joint or greater trochanter bilateral, he has weakness with right knee extension 4/5 and 3/5 strength right extensor hallucis longus extension decreased sensation right L4 distribution to pinwheel compared to the left  Deep tendon reflexes diminished but symmetrical patella and Achilles positive straight leg raising on the right negative on the left    Imaging  MRI LUMBAR SPINE WO CONTRAST (12/22/2017 3:00 PM)  MRI LUMBAR SPINE WO CONTRAST (12/22/2017 3:00 PM)   Specimen Performing Laboratory     RADIOLOGY       MRI LUMBAR SPINE WO CONTRAST (12/22/2017 3:00 PM)   Impressions   Impression:        Please note the same nomenclature was utilized as on the prior examination     There may be a hypoplastic disc at L5-S1         There has been prior posterior spinal surgery on the right at L1-2 and the    central aspect of the canal at L3-4 decreasing the central canal stenosis at    these levels         There remains a inferiorly directed disc extrusion at L1-2  There is some    decreased T2-weighted signal along the right side of the canal at the L1-2 level    which could represent postsurgical changes or calcification or even some flow    artifact   CT could be obtained for further evaluation         There remains multilevel neural foraminal stenosis as discussed above                         Workstation:[id number]  MRI LUMBAR SPINE WO CONTRAST (12/22/2017 3:00 PM)   Narrative   History: Lumbar spondylosis        MRI of the lumbar spine was performed on a 1 5 [employer] magnet  The following    sequences were obtained: sagittal STIR, T1-weighted and T2-weighted sequences as    well as axial T2-weighted sequences were performed         Reference is made to prior study 5/28/2015         Findings:        Please note for the purposes of this dictation it is assumed that the patient    has 5 lumbar-type vertebral bodies         Alignment is grossly unchanged with grade 1 anterolisthesis of L3 on L4  Malou Dill   is loss of disc height L4-5         The conus medullaris terminates at the inferior L1 level         There is an old appearing endplate fracture along the inferior aspect of T11         At T12-L1 there is a minimal disc bulge with mild degenerative changes of the    facets and some bilateral neural foraminal stenosis          At L1-2 there is minimal retrolisthesis of L1 on L2  There remains a broad-based    disc bulge with an inferiorly directed right-sided disc extrusion which has    decreased in AP dimension and compared to the prior examination  There are    degenerative changes of the facets and left-sided ligamentum flavum thickening     Neck prior right-sided hemilaminectomy with resection of the right ligamentum    flavum  The central canal is not severely compressed as it was previously  Malou Dill   remains severe right greater than left neural foraminal stenosis  There is some    low T2-weighted signal along the right side of the canal as visualized on series    500 image 13  Unsure if this represents postsurgical changes, flow artifact or    partial averaging with calcification   CT could be obtained for further    evaluation         At L2-3 there is a broad-based disc bulge with degenerative changes of the    facets and ligamentum flavum thickening there is moderate central canal stenosis    and bilateral neural foraminal stenosis, similar to prior study         At L3-4 there remains grade 1 anterolisthesis of L3 on L4 with degenerative    changes of the facets and ligamentum flavum thickening  There is evidence of    prior posterior spinal surgery with some stripping ligamentum flavum causing a    decrease in the central canal stenosis  There remains moderate to severe    bilateral neural foraminal stenosis          At L4-5 is posterior disc osteophyte complex with degenerative changes of the    facets  Remains a small superimposed central disc herniation  There remains    severe bilateral subarticular recess stenosis with severe left and moderate to    severe right neural foraminal stenosis          At L5-S1 there is a hypoplastic disc degenerative change facets          I have personally reviewed pertinent films in PACS

## 2018-02-08 ENCOUNTER — HOSPITAL ENCOUNTER (OUTPATIENT)
Dept: RADIOLOGY | Facility: CLINIC | Age: 78
Discharge: HOME | End: 2018-02-08
Attending: ANESTHESIOLOGY | Admitting: ANESTHESIOLOGY
Payer: MEDICARE

## 2018-02-08 VITALS
SYSTOLIC BLOOD PRESSURE: 157 MMHG | RESPIRATION RATE: 18 BRPM | HEART RATE: 93 BPM | DIASTOLIC BLOOD PRESSURE: 89 MMHG | TEMPERATURE: 98.2 F | OXYGEN SATURATION: 96 %

## 2018-02-08 PROCEDURE — 64484 NJX AA&/STRD TFRM EPI L/S EA: CPT | Performed by: ANESTHESIOLOGY

## 2018-02-08 PROCEDURE — 64483 NJX AA&/STRD TFRM EPI L/S 1: CPT | Performed by: ANESTHESIOLOGY

## 2018-02-08 RX ORDER — LIDOCAINE HYDROCHLORIDE 10 MG/ML
5 INJECTION, SOLUTION EPIDURAL; INFILTRATION; INTRACAUDAL; PERINEURAL ONCE
Status: COMPLETED | OUTPATIENT
Start: 2018-02-08 | End: 2018-02-08

## 2018-02-08 RX ORDER — LIDOCAINE HYDROCHLORIDE 20 MG/ML
5 INJECTION, SOLUTION EPIDURAL; INFILTRATION; INTRACAUDAL; PERINEURAL ONCE
Status: COMPLETED | OUTPATIENT
Start: 2018-02-08 | End: 2018-02-08

## 2018-02-08 RX ADMIN — IOHEXOL 1 ML: 300 INJECTION, SOLUTION INTRAVENOUS at 14:16

## 2018-02-08 RX ADMIN — DEXAMETHASONE SODIUM PHOSPHATE 20 MG: 10 INJECTION INTRAMUSCULAR; INTRAVENOUS at 14:17

## 2018-02-08 RX ADMIN — LIDOCAINE HYDROCHLORIDE 4 ML: 10 INJECTION, SOLUTION EPIDURAL; INFILTRATION; INTRACAUDAL; PERINEURAL at 14:17

## 2018-02-08 RX ADMIN — LIDOCAINE HYDROCHLORIDE 5 ML: 20 INJECTION, SOLUTION EPIDURAL; INFILTRATION; INTRACAUDAL; PERINEURAL at 14:17

## 2018-02-08 NOTE — H&P
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)    Diverticular disease of colon    Elevated hemoglobin A1c    Encounter for long-term (current) use of other medications    Fever    FUO (fever of unknown origin)    Hyperlipidemia    Hypertension    Internal hemorrhoids    Lichen planus    Lumbar back pain    Lumbar disc herniation    MTHFR mutation ([facility] [address] )    Obstructive sleep apnea    Organic impotence    Premature atrial complexes    Premature ventricular contraction    Pure hypercholesterolemia    Radiculopathy, lumbar region    Spinal stenosis of lumbar region    Vitamin D deficiency       Past Medical History:   Diagnosis Date    Blood clot in vein 2015    in right foot    Hypertension        Past Surgical History:   Procedure Laterality Date    BACK SURGERY      COLONOSCOPY      COLONOSCOPY N/A 2/24/2016    Procedure: COLONOSCOPY;  Surgeon: Tiffany Diego MD;  Location: [facility];  Service:    [facility] HERNIA REPAIR           Current Outpatient Prescriptions:     aspirin 81 MG tablet, Take 162 mg by mouth daily  , Disp: , Rfl:     Cholecalciferol (VITAMIN D-3 PO), Take 4,000 Units by mouth daily  , Disp: , Rfl:     folic acid-pyridoxine-cyanocobalamin (FOLBIC) 2 5-25-2 mg, Take 1 tablet by mouth daily  , Disp: , Rfl:     gabapentin (NEURONTIN) 100 mg capsule, Take 1 capsule (100 mg total) by mouth 3 (three) times a day, Disp: 90 capsule, Rfl: 0    HYDROcodone-acetaminophen (NORCO) 7 5-325 mg per tablet, Take 1 tablet by mouth every 8 (eight) hours as needed for pain Max Daily Amount: 3 tablets, Disp: 30 tablet, Rfl: 0    LORazepam (ATIVAN) 1 mg tablet, Take 1 tablet 60 minutes prior to procedure, may take 2nd tablet 30 minutes prior to procedure, needs  day of procedure, Disp: 2 tablet, Rfl: 0    losartan (COZAAR) 100 MG tablet, Take 100 mg by mouth daily  , Disp: , Rfl:     multivitamin-minerals (CENTRUM) tablet, Take 1 tablet by mouth daily, Disp: , Rfl:     Allergies   Allergen Reactions    Sildenafil Hives       Physical Exam:   Vitals:    02/08/18 1356   BP: 151/88   Pulse: 85   Resp: 20   Temp: 98 2 °F (36 8 °C)   SpO2: 94%     General: Awake, Alert, Oriented x 3, Mood and affect appropriate  Respiratory: Respirations even and unlabored  Cardiovascular: Peripheral pulses intact; no edema  Musculoskeletal Exam:  Decreased motion lumbar spine    ASA Score: II    Assessment: No diagnosis found      Plan: Right L3/L4 transforaminal epidural steroid injection x1

## 2018-02-08 NOTE — DISCHARGE INSTRUCTIONS
Epidural Steroid Injection   WHAT YOU NEED TO KNOW:   An epidural steroid injection (ISABEL) is a procedure to inject steroid medicine into the epidural space  The epidural space is between your spinal cord and vertebrae  Steroids reduce inflammation and fluid buildup in your spine that may be causing pain  You may be given pain medicine along with the steroids  ACTIVITY  · Do not drive or operate machinery today  · No strenuous activity today - bending, lifting, etc   · You may resume normal activites starting tomorrow - start slowly and as tolerated  · You may shower today, but no tub baths or hot tubs  · You may have numbness for several hours from the local anesthetic  Please use caution and common sense, especially with weight-bearing activities  CARE OF THE INJECTION SITE  · If you have soreness or pain, apply ice to the area today (20 minutes on/20 minutes off)  · Starting tomorrow, you may use warm, moist heat or ice if needed  · You may have an increase or change in your discomfort for 36-48 hours after your treatment  · Apply ice and continue with any pain medication you have been prescribed  · Notify [facility] if you have any of the following: redness, drainage, swelling, headache, stiff neck or fever above 100°F     SPECIAL INSTRUCTIONS  · Our office will contact you in approximately 7 days for a progress report  MEDICATIONS  · Continue to take all routine medications  · Our office may have instructed you to hold some medications  If you have a problem specifically related to your procedure, please call our office at [phone]  Problems not related to your procedure should be directed to your primary care physician

## 2018-02-09 ENCOUNTER — TELEPHONE (OUTPATIENT)
Dept: PAIN MEDICINE | Facility: CLINIC | Age: 78
End: 2018-02-09

## 2018-02-09 NOTE — TELEPHONE ENCOUNTER
Patient states he got 25% relief and pain level is a 6/10      S/P RT SIJ on 02/02/2018 w/SL [place]  RT L3/4 TFESI #2 scheduled on 02/23/2017

## 2018-02-12 ENCOUNTER — OFFICE VISIT (OUTPATIENT)
Dept: PHYSICAL THERAPY | Facility: CLINIC | Age: 78
End: 2018-02-12
Payer: MEDICARE

## 2018-02-12 DIAGNOSIS — M54.16 LUMBAR RADICULOPATHY: Primary | ICD-10-CM

## 2018-02-12 PROCEDURE — 97012 MECHANICAL TRACTION THERAPY: CPT | Performed by: PHYSICAL THERAPIST

## 2018-02-12 PROCEDURE — 97140 MANUAL THERAPY 1/> REGIONS: CPT | Performed by: PHYSICAL THERAPIST

## 2018-02-12 NOTE — PROGRESS NOTES
Daily Note     Today's date: 2018  Patient name: Brigid Alarcon  : 1940  MRN: [id number]  Referring provider: Quita Phan PA-C  Dx:   Encounter Diagnosis   Name Primary?  Lumbar radiculopathy Yes                  Subjective: Pt reports no sig changes since his Lv and that he has reached out to his orthopeadic surgeon at [facility] to discuss surgical options         Objective: See treatment diary below    Precautions: lumbar surgery    Daily Treatment Diary   X= performed  Manual  18        laser x            DTM x x STM/DTM STM/DTM  stm/dtm        Lumbar pa's             SIJ mobs  x                            Exercise Diary             Recumbent bike  unable           ltr 5"x20 5" x20 HELD          pball flexion stretch 3 way 10"x 10 NV HELD          Hs stetch  3x30" HELD          Gluteal stretch  3x30" HELD                                                                                                                                                                                                                 Modalities              traction  10', 90#/75#, 4 steps, 50% speed attempted 10' 95#/75# x        TENS  10' x np         MHP  10' x 15' x              Assessment: Patient noted relief with heat followed by traction  Still reported having soreness stating he could "feel it was still there"  No TE today as just about every movement bothers him  Encouraged to find most comfortable position and rest to allow for injections to kick in  Plan: Progress per POC and as meg

## 2018-02-14 ENCOUNTER — OFFICE VISIT (OUTPATIENT)
Dept: PHYSICAL THERAPY | Facility: CLINIC | Age: 78
End: 2018-02-14
Payer: MEDICARE

## 2018-02-14 DIAGNOSIS — M54.16 LUMBAR RADICULOPATHY: Primary | ICD-10-CM

## 2018-02-14 DIAGNOSIS — M54.40 ACUTE BILATERAL LOW BACK PAIN WITH SCIATICA, SCIATICA LATERALITY UNSPECIFIED: ICD-10-CM

## 2018-02-14 PROCEDURE — 97012 MECHANICAL TRACTION THERAPY: CPT | Performed by: PHYSICAL THERAPIST

## 2018-02-14 NOTE — PROGRESS NOTES
Daily Note     Today's date: 2018  Patient name: Irving Summers  : 1940  MRN: [id number]  Referring provider: Lei Calles PA-C  Dx:   No diagnosis found  Subjective: Pt reports that he has pain reduction after he does traction but that it does not last >24 hrs         Objective: See treatment diary below    Precautions: lumbar surgery    Daily Treatment Diary   X= performed  Manual  18       laser x            DTM x x STM/DTM STM/DTM  stm/dtm        Lumbar pa's             SIJ mobs  x                            Exercise Diary             Recumbent bike  unable           ltr 5"x20 5" x20 HELD          pball flexion stretch 3 way 10"x 10 NV HELD          Hs stetch  3x30" HELD          Gluteal stretch  3x30" HELD                                                                                                                                                                                                                 Modalities           traction  10', 90#/75#, 4 steps, 50% speed attempted 10' 95#/75# x 15 min       TENS  10' x np         MHP  10' x 15' x 15'             Assessment: Pt reports pain reduction while on traction and mild pain reduction after traction  Plan: Progress per POC and as meg

## 2018-02-15 ENCOUNTER — TELEPHONE (OUTPATIENT)
Dept: PAIN MEDICINE | Facility: CLINIC | Age: 78
End: 2018-02-15

## 2018-02-15 NOTE — TELEPHONE ENCOUNTER
Patient states he got 15-20% relief after the injection when he is sitting the pain is a 3/10 and when he is walking his pain is a 8/10/ Conf 2nd injection on 02/23/2018        S/P RT L3/4 TFESI on 02/08/2018 w/SL [place]  RT L3/4 TFESI #2 scheduled on 02/23/2018

## 2018-02-19 ENCOUNTER — OFFICE VISIT (OUTPATIENT)
Dept: PHYSICAL THERAPY | Facility: CLINIC | Age: 78
End: 2018-02-19
Payer: MEDICARE

## 2018-02-19 DIAGNOSIS — M54.16 LUMBAR RADICULOPATHY: Primary | ICD-10-CM

## 2018-02-19 PROCEDURE — 97012 MECHANICAL TRACTION THERAPY: CPT

## 2018-02-19 PROCEDURE — 97010 HOT OR COLD PACKS THERAPY: CPT

## 2018-02-19 NOTE — PROGRESS NOTES
Daily Note     Today's date: 2018  Patient name: Siri Plata  : 1940  MRN: [id number]  Referring provider: Broderick Farias PA-C  Dx:   No diagnosis found  Subjective: Patient rates right sided low back/hip pain "15/10" - complains pain is also radiating into the anterior thigh  Patient is also noticing swelling in the right foot  Patient reports he "kind of stopped taking the pills" he was prescribed as he feels OTC Tylenol often works just as well  Patient reports he has an appointment with pain management on Friday  Objective: See treatment diary below  Precautions: lumbar surgery    Daily Treatment Diary   X= performed  Manual  18       laser x            DTM x x STM/DTM STM/DTM  stm/dtm        Lumbar pa's             SIJ mobs  x               Exercise Diary             Recumbent bike  unable           ltr 5"x20 5" x20 HELD          pball flexion stretch 3 way 10"x 10 NV HELD          Hs stetch  3x30" HELD          Gluteal stretch  3x30" HELD                                                                                                                                                                                                                 Modalities          traction  10', 90#/75#, 4 steps, 50% speed attempted 10' 95#/75# x 15 min 15 min      TENS  10' x np         MHP pre tx  10' x 15' x 15' 15 min        Assessment: Unfortunately patient reports pain relief is only temporary after mechanical lumbar traction  Plan: Continue treatment as per PT plan of care

## 2018-02-21 ENCOUNTER — OFFICE VISIT (OUTPATIENT)
Dept: PHYSICAL THERAPY | Facility: CLINIC | Age: 78
End: 2018-02-21
Payer: MEDICARE

## 2018-02-21 DIAGNOSIS — M54.16 LUMBAR RADICULOPATHY: Primary | ICD-10-CM

## 2018-02-21 PROCEDURE — 97010 HOT OR COLD PACKS THERAPY: CPT

## 2018-02-21 PROCEDURE — 97012 MECHANICAL TRACTION THERAPY: CPT

## 2018-02-21 NOTE — PROGRESS NOTES
Daily Note     Today's date: 2018  Patient name: Shaylee Izaguirre  : 1940  MRN: [id number]  Referring provider: Ria Obrien PA-C  Dx:   No diagnosis found  Subjective: Patient reports no significant changes in pain since the last visit  Patient reports he is scheduled for an appointment with pain management on Friday  Patient reports he is also scheduled to see neurosurgeon in [place] on 3/6/18  Objective: See treatment diary below  Precautions: lumbar surgery    Daily Treatment Diary   X= performed  Manual  18       laser x            DTM x x STM/DTM STM/DTM  stm/dtm        Lumbar pa's             SIJ mobs  x               Exercise Diary             Recumbent bike  unable           ltr 5"x20 5" x20 HELD          pball flexion stretch 3 way 10"x 10 NV HELD          Hs stetch  3x30" HELD          Gluteal stretch  3x30" HELD              Modalities         traction  10', 90#/75#, 4 steps, 50% speed attempted 10' 95#/75# x 15 min 15 min x     TENS  10' x np         MHP pre tx  10' x 15' x 15' 15 min x       Assessment: Unfortunately patient reports pain relief continues to be only temporary after mechanical lumbar traction  Plan: Continue treatment as per PT plan of care

## 2018-02-23 ENCOUNTER — HOSPITAL ENCOUNTER (OUTPATIENT)
Dept: RADIOLOGY | Facility: CLINIC | Age: 78
Discharge: HOME | End: 2018-02-23
Attending: ANESTHESIOLOGY
Payer: MEDICARE

## 2018-02-23 VITALS
RESPIRATION RATE: 18 BRPM | TEMPERATURE: 97.7 F | SYSTOLIC BLOOD PRESSURE: 138 MMHG | HEART RATE: 91 BPM | OXYGEN SATURATION: 99 % | DIASTOLIC BLOOD PRESSURE: 86 MMHG

## 2018-02-23 DIAGNOSIS — M96.1 POSTLAMINECTOMY SYNDROME, LUMBAR: ICD-10-CM

## 2018-02-23 DIAGNOSIS — M54.16 LUMBAR RADICULOPATHY: ICD-10-CM

## 2018-02-23 DIAGNOSIS — M48.061 NEURAL FORAMINAL STENOSIS OF LUMBAR SPINE: ICD-10-CM

## 2018-02-23 PROCEDURE — 62323 NJX INTERLAMINAR LMBR/SAC: CPT | Performed by: ANESTHESIOLOGY

## 2018-02-23 RX ORDER — LIDOCAINE HYDROCHLORIDE 10 MG/ML
5 INJECTION, SOLUTION EPIDURAL; INFILTRATION; INTRACAUDAL; PERINEURAL ONCE
Status: COMPLETED | OUTPATIENT
Start: 2018-02-23 | End: 2018-02-23

## 2018-02-23 RX ORDER — METHYLPREDNISOLONE ACETATE 80 MG/ML
160 INJECTION, SUSPENSION INTRA-ARTICULAR; INTRALESIONAL; INTRAMUSCULAR; PARENTERAL; SOFT TISSUE ONCE
Status: COMPLETED | OUTPATIENT
Start: 2018-02-23 | End: 2018-02-23

## 2018-02-23 RX ADMIN — METHYLPREDNISOLONE ACETATE 160 MG: 80 INJECTION, SUSPENSION INTRA-ARTICULAR; INTRALESIONAL; INTRAMUSCULAR; SOFT TISSUE at 10:06

## 2018-02-23 RX ADMIN — IOHEXOL 1 ML: 300 INJECTION, SOLUTION INTRAVENOUS at 10:15

## 2018-02-23 RX ADMIN — LIDOCAINE HYDROCHLORIDE 5 ML: 10 INJECTION, SOLUTION EPIDURAL; INFILTRATION; INTRACAUDAL; PERINEURAL at 10:02

## 2018-02-23 NOTE — DISCHARGE INSTRUCTIONS
Epidural Steroid Injection   WHAT YOU NEED TO KNOW:   An epidural steroid injection (ISABEL) is a procedure to inject steroid medicine into the epidural space  The epidural space is between your spinal cord and vertebrae  Steroids reduce inflammation and fluid buildup in your spine that may be causing pain  You may be given pain medicine along with the steroids  ACTIVITY  · Do not drive or operate machinery today  · No strenuous activity today - bending, lifting, etc   · You may resume normal activites starting tomorrow - start slowly and as tolerated  · You may shower today, but no tub baths or hot tubs  · You may have numbness for several hours from the local anesthetic  Please use caution and common sense, especially with weight-bearing activities  CARE OF THE INJECTION SITE  · If you have soreness or pain, apply ice to the area today (20 minutes on/20 minutes off)  · Starting tomorrow, you may use warm, moist heat or ice if needed  · You may have an increase or change in your discomfort for 36-48 hours after your treatment  · Apply ice and continue with any pain medication you have been prescribed  · Notify the [facility] if you have any of the following: redness, drainage, swelling, headache, stiff neck or fever above 100°F     SPECIAL INSTRUCTIONS  · Our office will contact you in approximately 7 days for a progress report  MEDICATIONS  · Continue to take all routine medications  · Our office may have instructed you to hold some medications  If you have a problem specifically related to your procedure, please call our office at [phone]  Problems not related to your procedure should be directed to your primary care physician

## 2018-02-26 ENCOUNTER — OFFICE VISIT (OUTPATIENT)
Dept: PHYSICAL THERAPY | Facility: CLINIC | Age: 78
End: 2018-02-26
Payer: MEDICARE

## 2018-02-26 DIAGNOSIS — M54.16 LUMBAR RADICULOPATHY: Primary | ICD-10-CM

## 2018-02-26 PROCEDURE — G8992 OTHER PT/OT  D/C STATUS: HCPCS | Performed by: PHYSICAL THERAPIST

## 2018-02-26 PROCEDURE — G8991 OTHER PT/OT GOAL STATUS: HCPCS | Performed by: PHYSICAL THERAPIST

## 2018-02-26 PROCEDURE — 97010 HOT OR COLD PACKS THERAPY: CPT

## 2018-02-26 PROCEDURE — 97012 MECHANICAL TRACTION THERAPY: CPT

## 2018-02-26 NOTE — PROGRESS NOTES
Daily Note     Today's date: 2018  Patient name: Ruperto Telles  : 1940  MRN: [id number]  Referring provider: Casa Bustamante PA-C  Dx:   No diagnosis found  Subjective: Patient reports he had an epidural injection on Friday - reports he felt better on Saturday and  however "I think it started wearing off last night "  Patient reports he is now scheduled to see neurosurgeon in [place] on 18  Objective: See treatment diary below  Precautions: lumbar surgery    Daily Treatment Diary   X= performed  Manual  18       laser x            DTM x x STM/DTM STM/DTM  stm/dtm        Lumbar pa's             SIJ mobs  x               Exercise Diary             Recumbent bike  unable           ltr 5"x20 5" x20 HELD          pball flexion stretch 3 way 10"x 10 NV HELD          Hs stetch  3x30" HELD          Gluteal stretch  3x30" HELD              Modalities        traction  10', 90#/75#, 4 steps, 50% speed attempted 10' 95#/75# x 15 min 15 min x x    TENS  10' x np         MHP pre tx  10' x 15' x 15' 15 min x x      Assessment: Unfortunately patient reports pain relief continues to be only temporary after mechanical lumbar traction  Plan: Continue treatment as per PT plan of care

## 2018-02-28 ENCOUNTER — APPOINTMENT (OUTPATIENT)
Dept: PHYSICAL THERAPY | Facility: CLINIC | Age: 78
End: 2018-02-28
Payer: MEDICARE

## 2018-02-28 NOTE — PROGRESS NOTES
Pt will be undergoing surgery next week  He will be Dc'd at this time and a new episode will be initiated if appropriate post-surgery

## 2018-03-02 ENCOUNTER — TELEPHONE (OUTPATIENT)
Dept: PAIN MEDICINE | Facility: CLINIC | Age: 78
End: 2018-03-02

## 2018-03-02 NOTE — TELEPHONE ENCOUNTER
Patient states he is getting lumbar fusion surgery on  03/07/2018 with Dr Dione Domingo  Patient cancelled his f/u appointment scheduled on 03/13/2018 due to being in rehab for his surgery he states he will call back to reschedule when he gets back home       S/P LESI on 02/23/18 w/NORA Astorga  2wk f/u scheduled on 03/13/2018 w/BRENDAN

## 2018-04-02 ENCOUNTER — HOSPITAL ENCOUNTER (OUTPATIENT)
Dept: RADIOLOGY | Facility: HOSPITAL | Age: 78
Discharge: HOME | End: 2018-04-02
Attending: FAMILY MEDICINE
Payer: MEDICARE

## 2018-04-02 ENCOUNTER — TRANSCRIBE ORDERS (OUTPATIENT)
Dept: ADMINISTRATIVE | Facility: HOSPITAL | Age: 78
End: 2018-04-02

## 2018-04-02 DIAGNOSIS — M54.16 LUMBAR RADICULOPATHY: ICD-10-CM

## 2018-04-02 DIAGNOSIS — M54.16 LUMBAR RADICULOPATHY: Primary | ICD-10-CM

## 2018-04-02 PROCEDURE — 72100 X-RAY EXAM L-S SPINE 2/3 VWS: CPT

## 2018-05-01 ENCOUNTER — EVALUATION (OUTPATIENT)
Dept: PHYSICAL THERAPY | Facility: CLINIC | Age: 78
End: 2018-05-01
Payer: MEDICARE

## 2018-05-01 DIAGNOSIS — M54.16 LUMBAR RADICULOPATHY: Primary | ICD-10-CM

## 2018-05-01 PROCEDURE — 97161 PT EVAL LOW COMPLEX 20 MIN: CPT | Performed by: PHYSICAL THERAPIST

## 2018-05-01 PROCEDURE — G8990 OTHER PT/OT CURRENT STATUS: HCPCS | Performed by: PHYSICAL THERAPIST

## 2018-05-01 PROCEDURE — G8991 OTHER PT/OT GOAL STATUS: HCPCS | Performed by: PHYSICAL THERAPIST

## 2018-05-01 NOTE — PROGRESS NOTES
PT Evaluation     Today's date: 2018  Patient name: Luis Mcgarry  : 1940  MRN: [id number]  Referring provider: Gabriel Spear PA-C  Dx: No diagnosis found  Assessment/Plan     Pt  presents to outpatient PT with pain, decreased rom, decreased strength and decreased functional mobility  He will benefit from skilled PT to address these deficits in order to achieve his goals and maximize his functional mobility  PT 2-3x's/week for 8 weeks    Short Term Goals: Independent performance of initial hep  Decrease pain 2 points on VAS      Long Term Goals: Independent performance of comprehensive hep  Able to walk community distances with no AD  Performance of IADL's is returned to max level of function  Performance in recreational activities is improved to max level of function        Subjective     Pt reports that he underwent lumbar fusion surgery on 3/9/18  Was walking with a RW and has progress to a SPC  Reports that 2 weeks ago he fell asleep with his legs elevated and has been R hip pain since that time  Reports that he has difficulty with stair climbing secondary to this  Reports that he has frequent disturbances at night from pain  Reports that he has noticed a decrease in mm mass on his R thigh  Reports that he is limited in most IADL's, household activities and with his ability to walk for recreation  f/u with his surgeon on 7/10/18      Pain Currently  4/10  At worst  7/10      Objective     ROM:   Flexion: mod limited   Extension: max limited   Right Rotation: mod limited   Left Rotation: mod limited   Right Lateral Flexion: max limited   Left Lateral Flexion:  Max limited  Stiffness reported in all planes      Poor control of abdominals noted with TaA      Straight Leg Raise: neg  Slump Test:  neg  Prone Knee Bend: neg    Decreased flexibility: HS, piriformis    Flexed posture while ambulating  Visible R quad atrophy  TTP R piriformis        Precautions: lifting restrictions    Daily Treatment Diary     Manual              R piriformis TPR             R piriformis/glute stretching                                                        Exercise Diary              TM             Bike if more comfortable             ltr             TaA             TaA bridge             TaA clamshell             TaA squats             rows             laq             slr flexion             Hr/tr             Hs curls                                                                                                                         Modalities              prn

## 2018-05-01 NOTE — LETTER
May 3, 2018    DONNIE [id number]    Patient: Evin Stephens   YOB: 1940   Date of Visit: 2018     Encounter Diagnosis     ICD-10-CM    1  Lumbar radiculopathy M54 16        Dear Dr Janay Gan:    Please review the attached Plan of Care from [facility] recent visit  Please verify that you agree therapy should continue by signing the attached document and sending it back to our office  If you have any questions or concerns, please don't hesitate to call  Sincerely,    Jonnathan Galvin, PT      Referring Provider:      I certify that I have read the below Plan of Care and certify the need for these services furnished under this plan of treatment while under my care  Heather Pryor PA-C  [address] [place] [address]: [phone]              PT Evaluation     Today's date: 2018  Patient name: Evin Stephens  : 1940  MRN: [id number]  Referring provider: Chantal Graham PA-C  Dx: No diagnosis found  Assessment/Plan     Pt  presents to outpatient PT with pain, decreased rom, decreased strength and decreased functional mobility  He will benefit from skilled PT to address these deficits in order to achieve his goals and maximize his functional mobility  PT 2-3x's/week for 8 weeks    Short Term Goals: Independent performance of initial hep  Decrease pain 2 points on VAS      Long Term Goals: Independent performance of comprehensive hep  Able to walk community distances with no AD  Performance of IADL's is returned to max level of function  Performance in recreational activities is improved to max level of function        Subjective     Pt reports that he underwent lumbar fusion surgery on 3/9/18  Was walking with a RW and has progress to a SPC  Reports that 2 weeks ago he fell asleep with his legs elevated and has been R hip pain since that time   Reports that he has difficulty with stair climbing secondary to this  Reports that he has frequent disturbances at night from pain  Reports that he has noticed a decrease in mm mass on his R thigh  Reports that he is limited in most IADL's, household activities and with his ability to walk for recreation  f/u with his surgeon on 7/10/18      Pain Currently  4/10  At worst  7/10      Objective     ROM:   Flexion: mod limited   Extension: max limited   Right Rotation: mod limited   Left Rotation: mod limited   Right Lateral Flexion: max limited   Left Lateral Flexion:  Max limited  Stiffness reported in all planes      Poor control of abdominals noted with TaA      Straight Leg Raise: neg  Slump Test:  neg  Prone Knee Bend: neg    Decreased flexibility: HS, piriformis    Flexed posture while ambulating  Visible R quad atrophy  TTP R piriformis        Precautions: lifting restrictions    Daily Treatment Diary     Manual              R piriformis TPR             R piriformis/glute stretching                                                        Exercise Diary              TM             Bike if more comfortable             ltr             TaA             TaA bridge             TaA clamshell             TaA squats             rows             laq             slr flexion             Hr/tr             Hs curls                                                                                                                         Modalities              prn

## 2018-05-03 ENCOUNTER — OFFICE VISIT (OUTPATIENT)
Dept: PHYSICAL THERAPY | Facility: CLINIC | Age: 78
End: 2018-05-03
Payer: MEDICARE

## 2018-05-03 ENCOUNTER — TRANSCRIBE ORDERS (OUTPATIENT)
Dept: PHYSICAL THERAPY | Facility: CLINIC | Age: 78
End: 2018-05-03

## 2018-05-03 DIAGNOSIS — M54.16 LUMBAR RADICULOPATHY: Primary | ICD-10-CM

## 2018-05-03 PROCEDURE — 97140 MANUAL THERAPY 1/> REGIONS: CPT | Performed by: PHYSICAL THERAPIST

## 2018-05-03 PROCEDURE — 97110 THERAPEUTIC EXERCISES: CPT | Performed by: PHYSICAL THERAPIST

## 2018-05-03 NOTE — PROGRESS NOTES
Daily Note     Today's date: 5/3/2018  Patient name: Heide Torres  : 1940  MRN: [id number]  Referring provider: Jessi Hammond PA-C  Dx: No diagnosis found  Subjective: Pt reports complaince with his hep but that he "may have overdone it" Reports mod increase in pain  Objective: See treatment diary below      Manual              R piriformis TPR nv            R piriformis/glute stretching nv                                                       Exercise Diary  5/3            TM 4'            Bike if more comfortable             ltr 5"x20            TaA 5"x10            TaA bridge with ball squeeze 5"x15            TaA clamshell             TaA squats 20            rows             laq 5"x 30            slr flexion             Hr/tr 20            Hs curls 3#x20            sidestepping 6 laps            slr abd standing 3#x20                                                                                              Modalities              prn                                             Assessment: Initiated therex as listed with no complaints, monitor response and progress as meg nV      Plan: Continue per plan of care

## 2018-05-07 ENCOUNTER — OFFICE VISIT (OUTPATIENT)
Dept: PHYSICAL THERAPY | Facility: CLINIC | Age: 78
End: 2018-05-07
Payer: MEDICARE

## 2018-05-07 DIAGNOSIS — M54.16 LUMBAR RADICULOPATHY: Primary | ICD-10-CM

## 2018-05-07 PROCEDURE — 97140 MANUAL THERAPY 1/> REGIONS: CPT

## 2018-05-07 PROCEDURE — 97110 THERAPEUTIC EXERCISES: CPT

## 2018-05-07 NOTE — PROGRESS NOTES
Daily Note     Today's date: 2018  Patient name: Evin Stephens  : 1940  MRN: [id number]  Referring provider: Chantal Graham PA-C  Dx:   Encounter Diagnosis     ICD-10-CM    1  Lumbar radiculopathy M54 16                 Subjective: Patient reports he had difficulty sleeping last night - states, "I just couldn't find the right position "    Objective: See treatment diary below  Manual              R piriformis TPR nv            R piriformis/glute stretching nv 30"x3                                                      Exercise Diary  5/3 5/7           TM 4' 5'           Bike if more comfortable             ltr 5"x20 5"x20           TaA 5"x10 5"x10           TaA bridge with ball squeeze 5"x15 5"x20           TaA clamshell  Supine  Green  2x10           TaA squats 20 pball   2x10           rows             laq 5"x 30 1# 5"x30           slr flexion  1x10           Hr/tr 20 2x10           Hs curls 3#x20 3# 2x10           sidestepping 6 laps 6 laps           slr abd standing 3#x20 3# 2x10                                                                                             Modalities              prn                                         Assessment: Addition of clamshells and slr flexion exercises are performed with complaints of fatigue  Plan: Continue treatment as per PT plan of care

## 2018-05-09 ENCOUNTER — OFFICE VISIT (OUTPATIENT)
Dept: PHYSICAL THERAPY | Facility: CLINIC | Age: 78
End: 2018-05-09
Payer: MEDICARE

## 2018-05-09 DIAGNOSIS — M54.16 LUMBAR RADICULOPATHY: Primary | ICD-10-CM

## 2018-05-09 PROCEDURE — 97110 THERAPEUTIC EXERCISES: CPT

## 2018-05-09 NOTE — PROGRESS NOTES
Daily Note     Today's date: 2018  Patient name: Sarah Cho  : 1940  MRN: [id number]  Referring provider: Harika Plata PA-C  Dx:   Encounter Diagnosis     ICD-10-CM    1  Lumbar radiculopathy M54 16                 Subjective: Patient reports he spent approximately 5 hours in the car yesterday and noticed increased stiffness in his back because of this  Objective: See treatment diary below  Manual             R piriformis TPR nv            R piriformis/glute stretching nv 30"x3 30"x3                                                     Exercise Diary  5/3 5/7 5/9          TM 4' 5' 5'          Bike if more comfortable             ltr 5"x20 5"x20 5"x20          TaA 5"x10 5"x10 5"x10          TaA bridge with ball squeeze 5"x15 5"x20 5"x20          TaA clamshell  Supine  Green  2x10 Supine  Green  2x10          TaA squats 20 pball   2x10 pball  2x10          rows             laq 5"x 30 1# 5"x30 3# x30          slr flexion  1x10 1x10          Hr/tr 20 2x10 2x10          Hs curls 3#x20 3# 2x10 3# x20          sidestepping 6 laps 6 laps 6 laps          slr abd standing 3#x20 3# 2x10 3# x20                                                                                            Modalities              prn                                         Assessment: Increased weight to 3 pounds for LAQ with complaints of mild right lower extremity fatigue  Patient reports decreased stiffness with manual piriformis stretching  Plan: Continue treatment as per PT plan of care

## 2018-05-11 ENCOUNTER — OFFICE VISIT (OUTPATIENT)
Dept: PHYSICAL THERAPY | Facility: CLINIC | Age: 78
End: 2018-05-11
Payer: MEDICARE

## 2018-05-11 DIAGNOSIS — M54.16 LUMBAR RADICULOPATHY: Primary | ICD-10-CM

## 2018-05-11 PROCEDURE — 97140 MANUAL THERAPY 1/> REGIONS: CPT | Performed by: PHYSICAL THERAPIST

## 2018-05-11 PROCEDURE — 97110 THERAPEUTIC EXERCISES: CPT | Performed by: PHYSICAL THERAPIST

## 2018-05-11 NOTE — PROGRESS NOTES
Daily Note     Today's date: 2018  Patient name: Bing Olivo  : 1940  MRN: [id number]  Referring provider: Arabella Hamlin PA-C  Dx:   No diagnosis found  Subjective: Pt reports that he is starting to have "more good days but still has bad days"  Objective: See treatment diary below  Manual            R piriformis TPR nv            R piriformis/glute stretching nv 30"x3 30"x3 3                                                    Exercise Diary  5/3 5/7 5/9 5/11         TM 4' 5' 5' 5'         Bike if more comfortable             ltr 5"x20 5"x20 5"x20 20         TaA 5"x10 5"x10 5"x10 hep         TaA bridge with ball squeeze 5"x15 5"x20 5"x20 20         TaA clamshell  Supine  Green  2x10 Supine  Green  2x10 30         TaA squats 20 pball   2x10 pball  2x10 20         rows             laq 5"x 30 1# 5"x30 3# x30 3#x30         slr flexion  1x10 1x10 nv         Hr/tr 20 2x10 2x10 20         Hs curls 3#x20 3# 2x10 3# x20 20         sidestepping 6 laps 6 laps 6 laps 6 laps         slr abd standing 3#x20 3# 2x10 3# x20 30           Step outs    Blue tb c10ea                                                                              Modalities              prn                                         Assessment: Progressed therex as listed with no complaints other then reports of fitigue  Plan: Continue treatment as per PT plan of care

## 2018-05-14 ENCOUNTER — OFFICE VISIT (OUTPATIENT)
Dept: PHYSICAL THERAPY | Facility: CLINIC | Age: 78
End: 2018-05-14
Payer: MEDICARE

## 2018-05-14 DIAGNOSIS — M54.16 LUMBAR RADICULOPATHY: Primary | ICD-10-CM

## 2018-05-14 PROCEDURE — 97110 THERAPEUTIC EXERCISES: CPT

## 2018-05-14 NOTE — PROGRESS NOTES
Daily Note     Today's date: 2018  Patient name: Jennifer Coley  : 1940  MRN: [id number]  Referring provider: Nury Elizabeth PA-C  Dx:   Encounter Diagnosis     ICD-10-CM    1  Lumbar radiculopathy M54 16                 Subjective: Pt reports that he has "pain once in a while " Notes pain in the morning and of being "aware that I have done something" for a short time after therapy   Objective: See treatment diary below  Manual           R piriformis TPR nv            R piriformis/glute stretching nv 30"x3 30"x3 3 30"x3                                                   Exercise Diary  5/3 5/7 5/9 5/11 5/14        TM 4' 5' 5' 5' 5'        Bike if more comfortable             ltr 5"x20 5"x20 5"x20 20 5"x20        TaA 5"x10 5"x10 5"x10 hep hep        TaA bridge with ball squeeze 5"x15 5"x20 5"x20 20 5"x20        TaA clamshell  Supine  Green  2x10 Supine  Green  2x10 30 Supine GTB  2x10        TaA squats 20 pball   2x10 pball  2x10 20 pball  x20        rows             laq 5"x 30 1# 5"x30 3# x30 3#x30 3#x30        slr flexion  1x10 1x10 nv 1x10        Hr/tr 20 2x10 2x10 20 x20        Hs curls 3#x20 3# 2x10 3# x20 20 3#  x30        sidestepping 6 laps 6 laps 6 laps 6 laps 6laps        slr abd standing 3#x20 3# 2x10 3# x20 30   3#  x30        Step outs    Blue tb c10ea Blue  TB  x10  ea                                                                             Modalities              prn                                         Assessment: Performed exercise program w/o complaint  Required vc;ing at times throughout same  Able to meg manual stretch  Will monitor  Plan: Continue treatment as per PT plan of care

## 2018-05-16 ENCOUNTER — OFFICE VISIT (OUTPATIENT)
Dept: PHYSICAL THERAPY | Facility: CLINIC | Age: 78
End: 2018-05-16
Payer: MEDICARE

## 2018-05-16 DIAGNOSIS — M54.16 LUMBAR RADICULOPATHY: Primary | ICD-10-CM

## 2018-05-16 PROCEDURE — 97110 THERAPEUTIC EXERCISES: CPT

## 2018-05-16 NOTE — PROGRESS NOTES
Daily Note     Today's date: 2018  Patient name: Bing Olivo  : 1940  MRN: [id number]  Referring provider: Arabella Hamlin PA-C  Dx:   Encounter Diagnosis     ICD-10-CM    1  Lumbar radiculopathy M54 16                 Subjective: Patient arrives to today's treatment complaining of tightness in his lower back  Patient reports he drove to and from [place] yesterday - 2 1/2 hours each way  Patient reports he would like to return to exercising at the [facility]  Objective: See treatment diary below  Manual          R piriformis TPR nv            R piriformis/glute stretching nv 30"x3 30"x3 3 30"x3 30"x4                                                  Exercise Diary  5/3 5/7 5/9 5/11 5/14 5/16       TM 4' 5' 5' 5' 5' 5' 10' NV      Bike if more comfortable             ltr 5"x20 5"x20 5"x20 20 5"x20 5"x20       TaA 5"x10 5"x10 5"x10 hep hep        TaA bridge with ball squeeze 5"x15 5"x20 5"x20 20 5"x20 5"x30       TaA clamshell  Supine  Green  2x10 Supine  Green  2x10 30 Supine GTB  2x10 Supine  Green  3x10       TaA squats 20 pball   2x10 pball  2x10 20 pball  x20 pball  3x10       rows             laq 5"x 30 1# 5"x30 3# x30 3#x30 3#x30 3#  3x10       slr flexion  1x10 1x10 nv 1x10 2x10       Hr/tr 20 2x10 2x10 20 x20 30 ea       Hs curls 3#x20 3# 2x10 3# x20 20 3#  x30 3#  3x10       sidestepping 6 laps 6 laps 6 laps 6 laps 6 laps Green  6 laps       slr abd standing 3#x20 3# 2x10 3# x20 30   3#  x30 3#  3x10       Step outs    Blue tb 10ea Blue  TB  x10  ea Blue tb  15 ea                                                  Modalities              prn                            Assessment: Progression to the green theraband for sidestepping is tolerated well  Patient complains of right lower extremity fatigue when performing supine slr flexion  Plan: Continue treatment as per PT plan of care  Consider increasing time on the treadmill next visit

## 2018-05-18 ENCOUNTER — OFFICE VISIT (OUTPATIENT)
Dept: PHYSICAL THERAPY | Facility: CLINIC | Age: 78
End: 2018-05-18
Payer: MEDICARE

## 2018-05-18 DIAGNOSIS — M54.16 LUMBAR RADICULOPATHY: Primary | ICD-10-CM

## 2018-05-18 PROCEDURE — 97110 THERAPEUTIC EXERCISES: CPT

## 2018-05-18 NOTE — PROGRESS NOTES
Daily Note     Today's date: 2018  Patient name: Owen Cevallos  : 1940  MRN: [id number]  Referring provider: Jayant Mcghee PA-C  Dx:   Encounter Diagnosis     ICD-10-CM    1  Lumbar radiculopathy M54 16                 Subjective: No significant changes to report since the last visit  Objective: See treatment diary below  Manual         R piriformis TPR nv            R piriformis/glute stretching nv 30"x3 30"x3 3 30"x3 30"x4 30"x4                       Exercise Diary  5/3 5/7 5/9 5/11 5/14 5/16 5/18      TM 4' 5' 5' 5' 5' 5' 10'      Bike if more comfortable             ltr 5"x20 5"x20 5"x20 20 5"x20 5"x20 5"x20      TaA 5"x10 5"x10 5"x10 hep hep        TaA bridge with ball squeeze 5"x15 5"x20 5"x20 20 5"x20 5"x30 5"x30      TaA clamshell  Supine  Green  2x10 Supine  Green  2x10 30 Supine GTB  2x10 Supine  Green  3x10 Supine  Green  3x10      TaA squats 20 pball   2x10 pball  2x10 20 pball  x20 pball  3x10 pball  2x10      rows             laq 5"x 30 1# 5"x30 3# x30 3#x30 3#x30 3#  3x10 5#  2x10      slr flexion  1x10 1x10 nv 1x10 2x10 2x10      Hr/tr 20 2x10 2x10 20 x20 30 ea 30 ea      Hs curls 3#x20 3# 2x10 3# x20 20 3#  x30 3#  3x10 5#  2x10      sidestepping 6 laps 6 laps 6 laps 6 laps 6 laps Green  6 laps Green  6 laps      slr abd standing 3#x20 3# 2x10 3# x20 30   3#  x30 3#  3x10 3#  3x10      Step outs    Blue tb 10ea Blue  TB  x10  ea Blue tb  15 ea Blue tb  15 ea                                                 Modalities              prn                            Assessment: Fatigue reported with progression to the 5 pound cuff weight when performing LAQ and hamstring curls  Decreased stiffness reported with manual glute stretching  Plan: Continue treatment as per PT plan of care

## 2018-05-21 ENCOUNTER — OFFICE VISIT (OUTPATIENT)
Dept: PHYSICAL THERAPY | Facility: CLINIC | Age: 78
End: 2018-05-21
Payer: MEDICARE

## 2018-05-21 DIAGNOSIS — M54.16 LUMBAR RADICULOPATHY: Primary | ICD-10-CM

## 2018-05-21 PROCEDURE — 97140 MANUAL THERAPY 1/> REGIONS: CPT

## 2018-05-21 PROCEDURE — 97110 THERAPEUTIC EXERCISES: CPT

## 2018-05-21 NOTE — PROGRESS NOTES
Daily Note     Today's date: 2018  Patient name: Kash Suárez  : 1940  MRN: [id number]  Referring provider: Diogo Diaz PA-C  Dx:   Encounter Diagnosis     ICD-10-CM    1  Lumbar radiculopathy M54 16        Start Time: 1400  Stop Time: 1028  Total time in clinic (min): 51 minutes  Subjective: "I'm a little sore and tired today "    Objective: See treatment diary below  Manual        R piriformis TPR nv            R piriformis/glute stretching nv 30"x3 30"x3 3 30"x3 30"x4 30"x4 4x30''                      Exercise Diary  5/3 5/7 5/9 5/11 5/14 5/16 5/18 5/21     TM 4' 5' 5' 5' 5' 5' 10' 8'     Bike if more comfortable             ltr 5"x20 5"x20 5"x20 20 5"x20 5"x20 5"x20 20x5''     TaA 5"x10 5"x10 5"x10 hep hep        TaA bridge with ball squeeze 5"x15 5"x20 5"x20 20 5"x20 5"x30 5"x30 30x5''     TaA clamshell  Supine  Green  2x10 Supine  Green  2x10 30 Supine GTB  2x10 Supine  Green  3x10 Supine  Green  3x10 3x10  Supine  gtb     TaA squats 20 pball   2x10 pball  2x10 20 pball  x20 pball  3x10 pball  2x10 2x10   pball     rows             laq 5"x 30 1# 5"x30 3# x30 3#x30 3#x30 3#  3x10 5#  2x10 5#  3x10     slr flexion  1x10 1x10 nv 1x10 2x10 2x10 2x10     Hr/tr 20 2x10 2x10 20 x20 30 ea 30 ea 30x ea     Hs curls 3#x20 3# 2x10 3# x20 20 3#  x30 3#  3x10 5#  2x10 5#  2x10     sidestepping 6 laps 6 laps 6 laps 6 laps 6 laps Green  6 laps Green  6 laps 6 laps  gtb     slr abd standing 3#x20 3# 2x10 3# x20 30   3#  x30 3#  3x10 3#  3x10 3#  3x10     Step outs    Blue tb 10ea Blue  TB  x10  ea Blue tb  15 ea Blue tb  15 ea btb  15x ea                                                Modalities              prn                            Assessment: Patient noted generalized fatigue this afternoon and expressed a desire to hold back on some of the more strenuous PREs such as the TM    Patient is a good rehabilitation candidate and will benefit from continued skilled PT intervention to address his remaining deficits  Plan: Continue treatment as per PT plan of care

## 2018-05-23 ENCOUNTER — OFFICE VISIT (OUTPATIENT)
Dept: PHYSICAL THERAPY | Facility: CLINIC | Age: 78
End: 2018-05-23
Payer: MEDICARE

## 2018-05-23 DIAGNOSIS — M54.16 LUMBAR RADICULOPATHY: Primary | ICD-10-CM

## 2018-05-23 PROCEDURE — 97140 MANUAL THERAPY 1/> REGIONS: CPT

## 2018-05-23 PROCEDURE — 97110 THERAPEUTIC EXERCISES: CPT

## 2018-05-25 ENCOUNTER — OFFICE VISIT (OUTPATIENT)
Dept: PHYSICAL THERAPY | Facility: CLINIC | Age: 78
End: 2018-05-25
Payer: MEDICARE

## 2018-05-25 DIAGNOSIS — M54.16 LUMBAR RADICULOPATHY: Primary | ICD-10-CM

## 2018-05-25 PROCEDURE — 97110 THERAPEUTIC EXERCISES: CPT

## 2018-05-25 NOTE — PROGRESS NOTES
Daily Note     Today's date: 2018  Patient name: Lambert Velasco  : 1940  MRN: [id number]  Referring provider: Boogie Pedraza PA-C  Dx:   Encounter Diagnosis     ICD-10-CM    1  Lumbar radiculopathy M54 16                 Subjective: Patient arrives to today's treatment complaining of mild stiffness in his lower back - this is improved after performing lower trunk rotations  Patient reports he did weeding in his yard yesterday - also replaced some bricks around his patio  Objective: See treatment diary below      Manual      R piriformis TPR nv            R piriformis/glute stretching nv 30"x3 30"x3 3 30"x3 30"x4 30"x4 4x30'' 30"x4 30"x4                    Exercise Diary  5/3 5/7 5/9 5/11 5/14 5/16 5/18 5/21 5/23 5/25   TM 4' 5' 5' 5' 5' 5' 10' 8' 8' 2 2 mph  10 min   Bike if more comfortable             ltr 5"x20 5"x20 5"x20 20 5"x20 5"x20 5"x20 20x5'' 5"x20 5"x20   TaA 5"x10 5"x10 5"x10 hep hep        TaA bridge with ball squeeze 5"x15 5"x20 5"x20 20 5"x20 5"x30 5"x30 30x5'' 5"x30 5"x30   TaA clamshell  Supine  Green  2x10 Supine  Green  2x10 30 Supine GTB  2x10 Supine  Green  3x10 Supine  Green  3x10 3x10  Supine  gtb Blue  3x10 Blue  3x10   TaA squats 20 pball   2x10 pball  2x10 20 pball  x20 pball  3x10 pball  2x10 2x10   pball pball  2x10 pball  3x10   rows             laq 5"x 30 1# 5"x30 3# x30 3#x30 3#x30 3#  3x10 5#  2x10 5#  3x10 30#  2x10 40# x10  50# x10   slr flexion  1x10 1x10 nv 1x10 2x10 2x10 2x10 2x10 2x10   Hr/tr 20 2x10 2x10 20 x20 30 ea 30 ea 30x ea 30 ea 30 ea   Hs curls 3#x20 3# 2x10 3# x20 20 3#  x30 3#  3x10 5#  2x10 5#  2x10 50# x10  60# x10 60# x10  70# x10   sidestepping 6 laps 6 laps 6 laps 6 laps 6 laps Green  6 laps Green  6 laps 6 laps  gtb [employer]  6 laps Blue   8 laps   slr abd standing 3#x20 3# 2x10 3# x20 30   3#  x30 3#  3x10 3#  3x10 3#  3x10 3#  3x10 3#  3x10   Step outs    Blue tb 10ea Blue  TB  x10  ea Blue tb  15 ea Blue tb  15 ea btb  15x ea Cable column 50#  15 ea Cable column  50#  15 ea                                 Modalities              prn                            Assessment: Therapeutic exercise program is tolerated well  Decreased stiffness reported with manual glute stretching  Plan: Continue treatment as per PT plan of care

## 2018-05-30 ENCOUNTER — APPOINTMENT (OUTPATIENT)
Dept: PHYSICAL THERAPY | Facility: CLINIC | Age: 78
End: 2018-05-30
Payer: MEDICARE

## 2018-05-31 ENCOUNTER — OFFICE VISIT (OUTPATIENT)
Dept: CARDIOLOGY CLINIC | Facility: CLINIC | Age: 78
End: 2018-05-31
Payer: MEDICARE

## 2018-05-31 VITALS
SYSTOLIC BLOOD PRESSURE: 124 MMHG | BODY MASS INDEX: 33.24 KG/M2 | HEART RATE: 68 BPM | DIASTOLIC BLOOD PRESSURE: 76 MMHG | HEIGHT: 74 IN | WEIGHT: 259 LBS

## 2018-05-31 DIAGNOSIS — I10 ESSENTIAL HYPERTENSION: ICD-10-CM

## 2018-05-31 DIAGNOSIS — I49.1 PREMATURE ATRIAL COMPLEXES: ICD-10-CM

## 2018-05-31 DIAGNOSIS — I49.3 PVC (PREMATURE VENTRICULAR CONTRACTION): Primary | ICD-10-CM

## 2018-05-31 DIAGNOSIS — G47.33 OBSTRUCTIVE SLEEP APNEA: ICD-10-CM

## 2018-05-31 PROCEDURE — 99214 OFFICE O/P EST MOD 30 MIN: CPT | Performed by: INTERNAL MEDICINE

## 2018-05-31 PROCEDURE — 93000 ELECTROCARDIOGRAM COMPLETE: CPT | Performed by: INTERNAL MEDICINE

## 2018-05-31 NOTE — PROGRESS NOTES
Cardiology Follow Up    [address]  1940  [phone]  [address] [facility]  [address] [place] [address] [address]    1  PVC (premature ventricular contraction)  POCT ECG   2  Premature atrial complexes     3  Obstructive sleep apnea     4  Essential hypertension         Interval History:     Ed returns for followup regarding his history of hypertension, hypertensive heart disease, and hypercholesterolemia  He also has palpitations associated with PACs, PVCs and brief short runs of atrial tachycardia on a Holter monitor  I had him go through an echo and stress nuclear testing about 3 years ago  He had no ischemia by stress testing and his echo demonstrated normal left ventricular ejection fraction, mild left ventricular hypertrophy and diastolic dysfunction and no significant valvular disease  A few visits ago he conveyed to me to me two instances of what sound like a transient ischemic attack  What he described was left-sided arm weakness to the point where he was not able to lift his arm, lasting only for 10-20 seconds and then completely resolving  He was also continuing to get intermittent palpitations  Because of a concern for paroxysmal atrial fibrillation, I had him wear a month long event monitor  This did not demonstrate any atrial fibrillation, just PACs and PVCs as his Holter monitor showed  At that time he was not wearing his CPAP mask for sleep apnea  After getting another sleep study and seeing his sleep physician, he became compliant on this mask  Also he was diagnosed with a lower extremity DVT, and took Xarelto for 6 months  He went through a workup for hypercoagulable state which was negative  Ed's symptoms of palpitations have tremendously decreased ever since getting back on CPAP  He denies any other cardiac symptoms currently  He denies any chest pain or any worsening shortness of breath   He is active and does exercise on a regular basis  He does get some shortness of breath with upper levels of exertion which have not changed for years  He denies orthopnea, PND or any other symptoms of congestive heart failure  He remains in sinus rhythm today  He has not had any return of focal neurologic deficits  Patient Active Problem List   Diagnosis    Sacroiliitis (HCC)    Abnormal glucose level    Acute back pain    Adenomatous colon polyp    Aortic ectasia (HCC)    Benign neoplasm of colon    Bilateral hip pain    DDD (degenerative disc disease), lumbar    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[facility] [address] )    Diverticular disease of colon    Elevated hemoglobin A1c    Encounter for long-term (current) use of other medications    Fever    FUO (fever of unknown origin)    Hyperlipidemia    Hypertension    Internal hemorrhoids    Lichen planus    Lumbar back pain    Lumbar disc herniation    [facility]FR mutation [facility])    Obstructive sleep apnea    Organic impotence    Premature atrial complexes    Premature ventricular contraction    Pure hypercholesterolemia    Radiculopathy, lumbar region    Spinal stenosis of lumbar region    Vitamin D deficiency     Past Medical History:   Diagnosis Date    Blood clot in vein 2015    in right foot    Hypertension      Social History     Social History    Marital status: /Civil Union     Spouse name: N/A    Number of children: N/A    Years of education: N/A     Occupational History    Not on file  Social History Main Topics    Smoking status: Former Smoker     Packs/day: 1 00     Types: Cigarettes     Quit date: 1982    Smokeless tobacco: Never Used    Alcohol use No    Drug use: No    Sexual activity: Not on file     Other Topics Concern    Not on file     Social History Narrative    No narrative on file      No family history on file    Past Surgical History:   Procedure Laterality Date    BACK SURGERY      COLONOSCOPY      COLONOSCOPY N/A 2/24/2016    Procedure: COLONOSCOPY;  Surgeon: Loly Barrientos MD;  Location: [facility];  Service:    Areta Emmer HERNIA REPAIR         Current Outpatient Prescriptions:     aspirin 81 MG tablet, Take 162 mg by mouth daily  , Disp: , Rfl:     Cholecalciferol (VITAMIN D-3 PO), Take 4,000 Units by mouth daily  , Disp: , Rfl:     folic acid-pyridoxine-cyanocobalamin (FOLBIC) 2 5-25-2 mg, Take 1 tablet by mouth daily  , Disp: , Rfl:     LORazepam (ATIVAN) 1 mg tablet, Take 1 tablet 60 minutes prior to procedure, may take 2nd tablet 30 minutes prior to procedure, needs  day of procedure, Disp: 2 tablet, Rfl: 0    losartan (COZAAR) 100 MG tablet, Take 100 mg by mouth daily  , Disp: , Rfl:     multivitamin-minerals (CENTRUM) tablet, Take 1 tablet by mouth daily, Disp: , Rfl:     gabapentin (NEURONTIN) 100 mg capsule, Take 1 capsule (100 mg total) by mouth 3 (three) times a day, Disp: 90 capsule, Rfl: 0    HYDROcodone-acetaminophen (NORCO) 7 5-325 mg per tablet, Take 1 tablet by mouth every 8 (eight) hours as needed for pain Max Daily Amount: 3 tablets, Disp: 30 tablet, Rfl: 0  Allergies   Allergen Reactions    Sildenafil Hives       Labs:  Lab Results   Component Value Date     01/24/2018     12/17/2015    K 3 9 01/24/2018    K 4 5 12/17/2015     01/24/2018     12/17/2015    CO2 29 01/24/2018    CO2 30 12/17/2015    BUN 21 01/24/2018    BUN 20 12/17/2015    CREATININE 1 02 01/24/2018    CREATININE 1 01 12/17/2015    GLUCOSE 102 01/11/2017    GLUCOSE 92 12/17/2015    CALCIUM 8 9 01/24/2018    CALCIUM 8 6 12/17/2015     Lab Results   Component Value Date    WBC 5 91 01/24/2018    WBC 6 61 12/17/2015    HGB 15 5 01/24/2018    HGB 15 4 12/17/2015    HCT 44 2 01/24/2018    HCT 44 8 12/17/2015    MCV 92 01/24/2018    MCV 92 12/17/2015     01/24/2018     12/17/2015     Lab Results   Component Value Date    CHOL 188 01/24/2018    CHOL 185 12/17/2015    TRIG 109 01/24/2018    TRIG 162 12/17/2015    HDL 79 (H) 01/24/2018    HDL 80 12/17/2015     Imaging:   ECG obtained today demonstrates sinus rhythm, possible prior inferior infarct, PACs, poor R-wave progression  Review of Systems:  Review of Systems   Constitutional: Negative  HENT: Negative  Eyes: Negative  Respiratory: Negative  Cardiovascular: Positive for palpitations  Gastrointestinal: Negative  Musculoskeletal: Positive for back pain  Skin: Negative  Allergic/Immunologic: Negative  Neurological: Negative  Hematological: Negative  Psychiatric/Behavioral: Negative  All other systems reviewed and are negative  Physical Exam:  Physical Exam   Constitutional: He is oriented to person, place, and time  He appears well-developed and well-nourished  HENT:   Head: Normocephalic and atraumatic  Eyes: EOM are normal  Pupils are equal, round, and reactive to light  Right eye exhibits no discharge  Left eye exhibits no discharge  No scleral icterus  Neck: Normal range of motion  Neck supple  No JVD present  No thyromegaly present  Cardiovascular: Normal rate, regular rhythm, S1 normal, S2 normal, normal heart sounds, intact distal pulses and normal pulses  Extrasystoles are present  Exam reveals no gallop and no friction rub  No murmur heard  Pulmonary/Chest: Effort normal and breath sounds normal  No respiratory distress  He has no wheezes  He has no rales  Abdominal: Soft  Bowel sounds are normal  He exhibits no distension  There is no tenderness  Musculoskeletal: Normal range of motion  He exhibits no edema, tenderness or deformity  Neurological: He is alert and oriented to person, place, and time  No cranial nerve deficit  Skin: Skin is warm and dry  No rash noted  Psychiatric: He has a normal mood and affect  Judgment and thought content normal    Nursing note and vitals reviewed  Discussion/Summary:    1   PVCs - These are improved symptomatically ever since getting back on CPAP  His palpitations are now at minimum  He we'll continue the same medical therapy  I've asked him to continue to keep his caffeine intake low  No changes were made and we will continue to follow yearly    2  History of DVT - He completed a course of anticoagulation  His workup for a hypercoagulable state was negative  Therefore long-term anticoagulation was not needed  3  HTN - Well controlled  No changes were made to his therapy today  I've asked him to periodically check his blood pressure home  We will now follow yearly  I've asked him to call in the interim if any symptoms change  4  Questionable TIA in the past - As stated I did have him wear an event monitor that showed no evidence of atrial fibrillation  However, certainly he could qualify for a Loop recorder  But these symptoms were remote and he is now on CPAP for his sleep apnea, however if they do not return we could pursue this  No changes were made from this perspective  Counseling / Coordination of Care  Total floor / unit time spent today 25 minutes  Greater than 50% of total time was spent with the patient and / or family counseling and / or coordination of care

## 2018-06-01 ENCOUNTER — OFFICE VISIT (OUTPATIENT)
Dept: PHYSICAL THERAPY | Facility: CLINIC | Age: 78
End: 2018-06-01
Payer: MEDICARE

## 2018-06-01 DIAGNOSIS — M54.16 LUMBAR RADICULOPATHY: Primary | ICD-10-CM

## 2018-06-01 PROCEDURE — 97110 THERAPEUTIC EXERCISES: CPT

## 2018-06-01 NOTE — PROGRESS NOTES
Daily Note     Today's date: 2018  Patient name: Gem Villar  : 1940  MRN: [id number]  Referring provider: Regulo Monaco PA-C  Dx:   Encounter Diagnosis     ICD-10-CM    1  Lumbar radiculopathy M54 16                 Subjective: Patient reports he planted 18 tomato plants on  - reports "I took it slow" and took frequent rest breaks  Patient reports his back felt stiff the next day however not painful  Objective: See treatment diary below  Manual     R piriformis TPR             R piriformis/glute stretching 30"x4         30"x4                    Exercise Diary     TM 2 2 mph  10 min         2 2 mph  10 min   Bike if more comfortable             ltr 5"x20         5"x20   TaA             TaA bridge with ball squeeze 5"x30         5"x30   TaA clamshell Black  3x10         Blue  3x10   TaA squats 20         pball  3x10   rows             laq 40# x10  60# x10         40# x10  50# x10   slr flexion 3x10         2x10   Hr/tr 20         30 ea   Hs curls 60# x10  80# x10         60# x10  70# x10   sidestepping Black tb  8 laps         Blue   8 laps   slr abd standing 3#  3x10         3#  3x10   Step outs Cable column 50#  20 ea         Cable column  50#  15 ea                                 Modalities              prn                            Assessment: Fatigue reported with increase in weight for hamstring curls on knee flexion machine  Plan: Continue treatment as per PT plan of care

## 2018-06-04 ENCOUNTER — OFFICE VISIT (OUTPATIENT)
Dept: PHYSICAL THERAPY | Facility: CLINIC | Age: 78
End: 2018-06-04
Payer: MEDICARE

## 2018-06-04 DIAGNOSIS — M54.16 LUMBAR RADICULOPATHY: Primary | ICD-10-CM

## 2018-06-04 PROCEDURE — 97116 GAIT TRAINING THERAPY: CPT | Performed by: PHYSICAL THERAPIST

## 2018-06-04 PROCEDURE — 97112 NEUROMUSCULAR REEDUCATION: CPT | Performed by: PHYSICAL THERAPIST

## 2018-06-04 NOTE — PROGRESS NOTES
Daily Note     Today's date: 2018  Patient name: Helen Padron  : 1940  MRN: [id number]  Referring provider: Aquilino Mabry PA-C  Dx:   No diagnosis found  Subjective: Pt reports that he feels like he is slowly improving, reports that his walking is improving  Objective: See treatment diary below  Manual     R piriformis TPR             R piriformis/glute stretching 30"x4 kl        30"x4                    Exercise Diary     TM 2 2 mph  10 min 12min        2 2 mph  10 min   Bike if more comfortable             ltr 5"x20 20        5"x20   TaA             TaA bridge with ball squeeze 5"x30         5"x30   TaA clamshell Black  3x10 30        Blue  3x10   TaA squats 20 30        pball  3x10   rows             laq 40# x10  60# x10 60#x20        40# x10  50# x10   slr flexion 3x10 30        2x10   Hr/tr 20 20        30 ea   Hs curls 60# x10  80# x10 80#x20        60# x10  70# x10   sidestepping Black tb  8 laps 8        Blue   8 laps   slr abd standing 3#  3x10 30        3#  3x10   Step outs Cable column 50#  20 ea 20        Cable column  50#  15 ea   lpd  Cc 50# 5"x20                            Modalities              prn                            Assessment: Increased reps and resistance as listed with no complaints other then fatigue  Monitor respj      Plan: Continue treatment as per PT plan of care

## 2018-06-06 ENCOUNTER — OFFICE VISIT (OUTPATIENT)
Dept: PHYSICAL THERAPY | Facility: CLINIC | Age: 78
End: 2018-06-06
Payer: MEDICARE

## 2018-06-06 DIAGNOSIS — M54.16 LUMBAR RADICULOPATHY: Primary | ICD-10-CM

## 2018-06-06 PROCEDURE — 97110 THERAPEUTIC EXERCISES: CPT

## 2018-06-06 PROCEDURE — 97116 GAIT TRAINING THERAPY: CPT

## 2018-06-06 NOTE — PROGRESS NOTES
Daily Note     Today's date: 2018  Patient name: Kash Suárez  : 1940  MRN: [id number]  Referring provider: Diogo Diaz PA-C  Dx:   Encounter Diagnosis     ICD-10-CM    1  Lumbar radiculopathy M54 16                 Subjective: Patient reports he was walking around yesterday for approximately 1 hour looking at flowers - admits to bending down several times - reports he is now experiencing increased soreness on the right side of his lower back  Objective: See treatment diary below  Manual     R piriformis DTM   JLW          R piriformis/glute stretching 30"x4 kl JLW       30"x4                    Exercise Diary     TM 2 2 mph  10 min 12 min 12 min  2 2-2 5 mph       2 2 mph  10 min   Bike if more comfortable             ltr 5"x20 20 5"x20       5"x20   TaA             TaA bridge with ball squeeze 5"x30  5"x30       5"x30   TaA clamshell Black  3x10 30 Black  3x10       Blue  3x10   TaA squats 20 30 pball  2x10       pball  3x10   rows             laq 40# x10  60# x10 60#x20 50#  2x10       40# x10  50# x10   slr flexion 3x10 30 2x10       2x10   Hr/tr 20 20 2x10       30 ea   Hs curls 60# x10  80# x10 80#x20 60#  2x10       60# x10  70# x10   sidestepping Black tb  8 laps 8 Black 6 laps       Blue   8 laps   slr abd standing 3#  3x10 30 3#  2x10       3#  3x10   Step outs Cable column 50#  20 ea 20 NP       Cable column  50#  15 ea   lpd  Cc 50# 5"x20 NP                           Modalities              prn                            Assessment: Therapeutic exercise program is tolerated well  With additional DTM, patient reports a decrease in soreness and stiffness  Plan: Continue treatment as per PT plan of care

## 2018-06-08 ENCOUNTER — OFFICE VISIT (OUTPATIENT)
Dept: PHYSICAL THERAPY | Facility: CLINIC | Age: 78
End: 2018-06-08
Payer: MEDICARE

## 2018-06-08 DIAGNOSIS — M54.16 LUMBAR RADICULOPATHY: Primary | ICD-10-CM

## 2018-06-08 PROCEDURE — 97116 GAIT TRAINING THERAPY: CPT

## 2018-06-08 PROCEDURE — 97110 THERAPEUTIC EXERCISES: CPT

## 2018-06-08 NOTE — PROGRESS NOTES
Daily Note     Today's date: 2018  Patient name: Summer Marin  : 1940  MRN: [id number]  Referring provider: Asia Mcgarry PA-C  Dx:   No diagnosis found  Subjective: Patient reports he is still noticing some discomfort in his lower back however "it's not as much as it was on Wednesday "    Objective: See treatment diary below  Manual     R piriformis DTM   JLW NP         R piriformis/glute stretching 30"x4 kl JLW JLW      30"x4                    Exercise Diary     TM 2 2 mph  10 min 12 min 12 min  2 2-2 5 mph 10 min  2 2 mph      2 2 mph  10 min   Bike if more comfortable             ltr 5"x20 20 5"x20 5"x20      5"x20   TaA             TaA bridge with ball squeeze 5"x30  5"x30 5"x30      5"x30   TaA clamshell Black  3x10 30 Black  3x10 Black  3x10      Blue  3x10   TaA squats 20 30 pball  2x10 pball  2x10      pball  3x10   rows             laq 40# x10  60# x10 60#x20 50#  2x10 60#  2x10      40# x10  50# x10   slr flexion 3x10 30 2x10 2x10      2x10   Hr/tr 20 20 2x10 2x10      30 ea   Hs curls 60# x10  80# x10 80#x20 60#  2x10 60#  2x10      60# x10  70# x10   sidestepping Black tb  8 laps 8 Black 6 laps Black tb  6 laps      Blue   8 laps   slr abd standing 3#  3x10 30 3#  2x10 3#  2x10      3#  3x10   Step outs Cable column 50#  20 ea 20 NP NP      Cable column  50#  15 ea   lpd  Cc 50# 5"x20 NP NP                          Modalities              prn                            Assessment: Therapeutic exercise program is tolerated well  Plan: Continue treatment as per PT plan of care

## 2018-06-11 ENCOUNTER — OFFICE VISIT (OUTPATIENT)
Dept: PHYSICAL THERAPY | Facility: CLINIC | Age: 78
End: 2018-06-11
Payer: MEDICARE

## 2018-06-11 DIAGNOSIS — M54.16 LUMBAR RADICULOPATHY: Primary | ICD-10-CM

## 2018-06-11 PROCEDURE — 97110 THERAPEUTIC EXERCISES: CPT

## 2018-06-11 PROCEDURE — 97116 GAIT TRAINING THERAPY: CPT

## 2018-06-11 PROCEDURE — 97530 THERAPEUTIC ACTIVITIES: CPT

## 2018-06-11 NOTE — PROGRESS NOTES
Daily Note     Today's date: 2018  Patient name: Gem Villar  : 1940  MRN: [id number]  Referring provider: Regulo Monaco PA-C  Dx:   Encounter Diagnosis     ICD-10-CM    1  Lumbar radiculopathy M54 16                 Subjective: Patient reports he planted martínez on Saturday - reports taking rest breaks as needed "when my back gets stiff" maybe every 30 minutes  Patient reports "today is a good day" adding he is not experiencing any significant back pain  Objective: See treatment diary below  Manual     R piriformis DTM   JLW NP NP        R piriformis/glute stretching 30"x4 kl JLW JLW JLW     30"x4                    Exercise Diary          TM 2 2 mph  10 min 12 min 12 min  2 2-2 5 mph 10 min  2 2 mph 12 min  2 2 mph        Bike if more comfortable             ltr 5"x20 20 5"x20 5"x20 5"x20        TaA             TaA bridge with ball squeeze 5"x30  5"x30 5"x30 5"x20        TaA clamshell Black  3x10 30 Black  3x10 Black  3x10 Black  3x10        TaA squats 20 30 pball  2x10 pball  2x10 pball  2x10        rows             laq 40# x10  60# x10 60#x20 50#  2x10 60#  2x10 60#  3x10        slr flexion 3x10 30 2x10 2x10 2x10        Hr/tr 20 20 2x10 2x10 3x10        Hs curls 60# x10  80# x10 80#x20 60#  2x10 60#  2x10 60#  3x10        sidestepping Black tb  8 laps 8 Black 6 laps Black tb  6 laps Black tb  8 laps        slr abd standing 3#  3x10 30 3#  2x10 3#  2x10 3#  3x10        Step outs Cable column 50#  20 ea 20 NP NP CC 40#  2x10        lpd  Cc 50# 5"x20 NP NP CC 40#  2x10                         Modalities              prn                            Assessment: No complaints reported when resuming step outs and lat pull downs on the cable column today  Plan: Continue treatment as per PT plan of care

## 2018-06-13 ENCOUNTER — OFFICE VISIT (OUTPATIENT)
Dept: PHYSICAL THERAPY | Facility: CLINIC | Age: 78
End: 2018-06-13
Payer: MEDICARE

## 2018-06-13 ENCOUNTER — APPOINTMENT (OUTPATIENT)
Dept: PHYSICAL THERAPY | Facility: CLINIC | Age: 78
End: 2018-06-13
Payer: MEDICARE

## 2018-06-13 DIAGNOSIS — M54.16 LUMBAR RADICULOPATHY: Primary | ICD-10-CM

## 2018-06-13 PROCEDURE — 97110 THERAPEUTIC EXERCISES: CPT

## 2018-06-13 PROCEDURE — 97530 THERAPEUTIC ACTIVITIES: CPT

## 2018-06-13 PROCEDURE — 97116 GAIT TRAINING THERAPY: CPT

## 2018-06-13 NOTE — PROGRESS NOTES
Daily Note     Today's date: 2018  Patient name: Vik Gross  : 1940  MRN: [id number]  Referring provider: Say Bauer PA-C  Dx:   Encounter Diagnosis     ICD-10-CM    1  Lumbar radiculopathy M54 16                 Subjective: Patient denies back pain today  Objective: See treatment diary below  Manual         R piriformis DTM   JLW NP NP NP       R piriformis/glute stretching 30"x4 kl JLW JLW JLW JLW                        Exercise Diary         TM 2 2 mph  10 min 12 min 12 min  2 2-2 5 mph 10 min  2 2 mph 12 min  2 2 mph 12 min  2 2 mph       Bike if more comfortable             ltr 5"x20 20 5"x20 5"x20 5"x20 5"x20       TaA             TaA bridge with ball squeeze 5"x30  5"x30 5"x30 5"x20 5"x20       TaA clamshell Black  3x10 30 Black  3x10 Black  3x10 Black  3x10 Black  3x10       TaA squats 20 30 pball  2x10 pball  2x10 pball  2x10 pball  2x15       rows             laq 40# x10  60# x10 60#x20 50#  2x10 60#  2x10 60#  3x10 60#  3x10       slr flexion 3x10 30 2x10 2x10 2x10 2x10       Hr/tr 20 20 2x10 2x10 3x10 3x10       Hs curls 60# x10  80# x10 80#x20 60#  2x10 60#  2x10 60#  3x10 60#  3x10       sidestepping Black tb  8 laps 8 Black 6 laps Black tb  6 laps Black tb  8 laps Black tb  8 laps       slr abd standing 3#  3x10 30 3#  2x10 3#  2x10 3#  3x10 NP       Step outs Cable column 50#  20 ea 20 NP NP CC 40#  2x10 CC 50#  2x10       lpd  Cc 50# 5"x20 NP NP CC 40#  2x10 CC 50#  2x10                        Modalities              prn                            Assessment: Therapeutic exercise program is tolerated well without complaints  Plan: Continue treatment as per PT plan of care

## 2018-06-15 ENCOUNTER — APPOINTMENT (OUTPATIENT)
Dept: PHYSICAL THERAPY | Facility: CLINIC | Age: 78
End: 2018-06-15
Payer: MEDICARE

## 2018-06-18 ENCOUNTER — OFFICE VISIT (OUTPATIENT)
Dept: PHYSICAL THERAPY | Facility: CLINIC | Age: 78
End: 2018-06-18
Payer: MEDICARE

## 2018-06-18 DIAGNOSIS — M54.16 LUMBAR RADICULOPATHY: Primary | ICD-10-CM

## 2018-06-18 PROCEDURE — 97530 THERAPEUTIC ACTIVITIES: CPT

## 2018-06-18 PROCEDURE — 97116 GAIT TRAINING THERAPY: CPT

## 2018-06-18 PROCEDURE — 97110 THERAPEUTIC EXERCISES: CPT

## 2018-06-18 NOTE — PROGRESS NOTES
Daily Note     Today's date: 2018  Patient name: Turner Mclaughlin  : 1940  MRN: [id number]  Referring provider: Zandra Lee PA-C  Dx:   Encounter Diagnosis     ICD-10-CM    1  Lumbar radiculopathy M54 16                 Subjective: Patient reports he planted martínez on Saturday and tolerated this well  Objective: See treatment diary below  Manual        R piriformis DTM   JLW NP NP NP NP      R piriformis/glute stretching 30"x4 kl JLW JLW JLW JLW NP                       Exercise Diary        TM 2 2 mph  10 min 12 min 12 min  2 2-2 5 mph 10 min  2 2 mph 12 min  2 2 mph 12 min  2 2 mph 12 min  2 3 mph      Bike if more comfortable             ltr 5"x20 20 5"x20 5"x20 5"x20 5"x20 5"x20      TaA             TaA bridge with ball squeeze 5"x30  5"x30 5"x30 5"x20 5"x20 5"x30      TaA clamshell Black  3x10 30 Black  3x10 Black  3x10 Black  3x10 Black  3x10 Black  3x10      TaA squats 20 30 pball  2x10 pball  2x10 pball  2x10 pball  2x15 pball  2x15      rows             laq 40# x10  60# x10 60#x20 50#  2x10 60#  2x10 60#  3x10 60#  3x10 60#  2x15      slr flexion 3x10 30 2x10 2x10 2x10 2x10 2x10      Hr/tr 20 20 2x10 2x10 3x10 3x10 NP      Hs curls 60# x10  80# x10 80#x20 60#  2x10 60#  2x10 60#  3x10 60#  3x10 60#  2x15      sidestepping Black tb  8 laps 8 Black 6 laps Black tb  6 laps Black tb  8 laps Black tb  8 laps Black tb  8 laps      slr abd standing 3#  3x10 30 3#  2x10 3#  2x10 3#  3x10 NP NP      Step outs Cable column 50#  20 ea 20 NP NP CC 40#  2x10 CC 50#  2x10 CC 50#  2x12      lpd  Cc 50# 5"x20 NP NP CC 40#  2x10 CC 50#  2x10 CC 50#  2x12      glute stretch       30"x4          Modalities              prn                            Assessment: Therapeutic exercise program is tolerated well without complaints  Patient does well performing glute stretching independently this visit      Plan: Continue treatment as per PT plan of care

## 2018-06-20 ENCOUNTER — APPOINTMENT (OUTPATIENT)
Dept: PHYSICAL THERAPY | Facility: CLINIC | Age: 78
End: 2018-06-20
Payer: MEDICARE

## 2018-06-22 ENCOUNTER — OFFICE VISIT (OUTPATIENT)
Dept: PHYSICAL THERAPY | Facility: CLINIC | Age: 78
End: 2018-06-22
Payer: MEDICARE

## 2018-06-22 DIAGNOSIS — M54.16 LUMBAR RADICULOPATHY: Primary | ICD-10-CM

## 2018-06-22 PROCEDURE — 97110 THERAPEUTIC EXERCISES: CPT

## 2018-06-22 PROCEDURE — 97116 GAIT TRAINING THERAPY: CPT

## 2018-06-22 PROCEDURE — 97530 THERAPEUTIC ACTIVITIES: CPT

## 2018-06-22 NOTE — PROGRESS NOTES
Daily Note     Today's date: 2018  Patient name: Funmilayo Oswald  : 1940  MRN: [id number]  Referring provider: Argentina Carter PA-C  Dx:   Encounter Diagnosis     ICD-10-CM    1  Lumbar radiculopathy M54 16                 Subjective: No significant changes to report since the last visit  Objective: See treatment diary below  Manual       R piriformis DTM   JLW NP NP NP NP NP     R piriformis/glute stretching 30"x4 kl JLW JLW JLW JLW NP NP                      Exercise Diary       TM 2 2 mph  10 min 12 min 12 min  2 2-2 5 mph 10 min  2 2 mph 12 min  2 2 mph 12 min  2 2 mph 12 min  2 3 mph 12 min  2 3 mph     Bike if more comfortable             ltr 5"x20 20 5"x20 5"x20 5"x20 5"x20 5"x20 5"x20     TaA             TaA bridge with ball squeeze 5"x30  5"x30 5"x30 5"x20 5"x20 5"x30 5"x30     TaA clamshell Black  3x10 30 Black  3x10 Black  3x10 Black  3x10 Black  3x10 Black  3x10 Black   3x10     TaA squats 20 30 pball  2x10 pball  2x10 pball  2x10 pball  2x15 pball  2x15 pball  2x15     rows             laq 40# x10  60# x10 60#x20 50#  2x10 60#  2x10 60#  3x10 60#  3x10 60#  2x15 60#  2x15     slr flexion 3x10 30 2x10 2x10 2x10 2x10 2x10 3x10     Hr/tr 20 20 2x10 2x10 3x10 3x10 NP NP     Hs curls 60# x10  80# x10 80#x20 60#  2x10 60#  2x10 60#  3x10 60#  3x10 60#  2x15 60#  2x15     sidestepping Black tb  8 laps 8 Black 6 laps Black tb  6 laps Black tb  8 laps Black tb  8 laps Black tb  8 laps Black tb  8  laps     slr abd standing 3#  3x10 30 3#  2x10 3#  2x10 3#  3x10 NP NP NP     Step outs Cable column 50#  20 ea 20 NP NP CC 40#  2x10 CC 50#  2x10 CC 50#  2x12 CC 50#  2x10     lpd  Cc 50# 5"x20 NP NP CC 40#  2x10 CC 50#  2x10 CC 50#  2x12 CC 50#  2x12     glute stretch       30"x4 30"x4         Modalities              prn                            Assessment: Therapeutic exercise program is tolerated well without complaints  Patient continues to be comfortable performing glute stretching independently  Plan: Continue treatment as per PT plan of care

## 2018-06-25 ENCOUNTER — OFFICE VISIT (OUTPATIENT)
Dept: PHYSICAL THERAPY | Facility: CLINIC | Age: 78
End: 2018-06-25
Payer: MEDICARE

## 2018-06-25 DIAGNOSIS — M54.16 LUMBAR RADICULOPATHY: Primary | ICD-10-CM

## 2018-06-25 PROCEDURE — 97110 THERAPEUTIC EXERCISES: CPT

## 2018-06-25 PROCEDURE — 97112 NEUROMUSCULAR REEDUCATION: CPT

## 2018-06-25 NOTE — PROGRESS NOTES
Daily Note     Today's date: 2018  Patient name: Casey Bryant  : 1940  MRN: [id number]  Referring provider: Rachel Mchugh PA-C  Dx:   Encounter Diagnosis     ICD-10-CM    1  Lumbar radiculopathy M54 16        Start Time: 3130  Stop Time: 1530  Total time in clinic (min): 45 minutes    Subjective: Patient reports to clinic reporting no new significant change since LV  Stated he is able to garden with no pain as long as he takes breaks  Objective: See treatment diary below      Assessment: Tolerated treatment well  Patient exhibited good technique with therapeutic exercises  No complaints at all with any TE      Plan: Continue per plan of care       Manual       R piriformis DTM     JLW NP NP NP NP NP       R piriformis/glute stretching 30"x4 kl JLW JLW JLW JLW NP NP                                     Exercise Diary       TM 2 2 mph  10 min 12 min 12 min  2 2-2 5 mph 10 min  2 2 mph 12 min  2 2 mph 12 min  2 2 mph 12 min  2 3 mph 12 min  2 3 mph  13 min 2 5 mph     Bike if more comfortable                       ltr 5"x20 20 5"x20 5"x20 5"x20 5"x20 5"x20 5"x20  5"x20     TaA                       TaA bridge with ball squeeze 5"x30   5"x30 5"x30 5"x20 5"x20 5"x30 5"x30  5"x30     TaA clamshell Black  3x10 30 Black  3x10 Black  3x10 Black  3x10 Black  3x10 Black  3x10 Black   3x10  Black 3x10     TaA squats 20 30 pball  2x10 pball  2x10 pball  2x10 pball  2x15 pball  2x15 pball  2x15  pball 2x15     rows                       laq 40# x10  60# x10 60#x20 50#  2x10 60#  2x10 60#  3x10 60#  3x10 60#  2x15 60#  2x15  60# 2x15     slr flexion 3x10 30 2x10 2x10 2x10 2x10 2x10 3x10  3x10     Hr/tr 20 20 2x10 2x10 3x10 3x10 NP NP       Hs curls 60# x10  80# x10 80#x20 60#  2x10 60#  2x10 60#  3x10 60#  3x10 60#  2x15 60#  2x15  60# 2x15     sidestepping Black tb  8 laps 8 Black 6 laps Black tb  6 laps Black tb  8 laps Black tb  8 laps Black tb  8 laps Black tb  8  laps  Black tb 8 laps     slr abd standing 3#  3x10 30 3#  2x10 3#  2x10 3#  3x10 NP NP NP       Step outs Cable column 50#  20 ea 20 NP NP CC 40#  2x10 CC 50#  2x10 CC 50#  2x12 CC 50#  2x10 [address] [facility] 50# 2x10     lpd   Cc 50# 5"x20 NP NP CC 40#  2x10 CC 50#  2x10 CC 50#  2x12 CC 50#  2x12  CC 50# 2x12     glute stretch             30"x4 30"x4  30"x4           Modalities                        prn

## 2018-06-27 ENCOUNTER — TRANSCRIBE ORDERS (OUTPATIENT)
Dept: ADMINISTRATIVE | Facility: HOSPITAL | Age: 78
End: 2018-06-27

## 2018-06-27 ENCOUNTER — HOSPITAL ENCOUNTER (OUTPATIENT)
Dept: RADIOLOGY | Facility: HOSPITAL | Age: 78
Discharge: HOME | End: 2018-06-27
Payer: MEDICARE

## 2018-06-27 ENCOUNTER — OFFICE VISIT (OUTPATIENT)
Dept: PHYSICAL THERAPY | Facility: CLINIC | Age: 78
End: 2018-06-27
Payer: MEDICARE

## 2018-06-27 DIAGNOSIS — M48.062 PSEUDOCLAUDICATION SYNDROME: ICD-10-CM

## 2018-06-27 DIAGNOSIS — M54.16 LUMBAR RADICULOPATHY: Primary | ICD-10-CM

## 2018-06-27 DIAGNOSIS — M48.062 PSEUDOCLAUDICATION SYNDROME: Primary | ICD-10-CM

## 2018-06-27 PROCEDURE — 72100 X-RAY EXAM L-S SPINE 2/3 VWS: CPT

## 2018-06-27 PROCEDURE — 97110 THERAPEUTIC EXERCISES: CPT

## 2018-06-27 PROCEDURE — 97112 NEUROMUSCULAR REEDUCATION: CPT

## 2018-06-27 NOTE — PROGRESS NOTES
Daily Note     Today's date: 2018  Patient name: Tavia Edwards  : 1940  MRN: [id number]  Referring provider: Darin Armijo PA-C  Dx:   Encounter Diagnosis     ICD-10-CM    1  Lumbar radiculopathy M54 16                 Subjective: No significant changes to report since the last visit  Objective: See treatment diary below      Manual     R piriformis DTM     JLW NP NP NP NP NP    NP   R piriformis/glute stretching 30"x4 kl JLW JLW JLW JLW NP NP    NP                                 Exercise Diary     TM 2 2 mph  10 min 12 min 12 min  2 2-2 5 mph 10 min  2 2 mph 12 min  2 2 mph 12 min  2 2 mph 12 min  2 3 mph 12 min  2 3 mph  13 min 2 5 mph  14 min   2 5 mph   Bike if more comfortable                       ltr 5"x20 20 5"x20 5"x20 5"x20 5"x20 5"x20 5"x20  5"x20  5"x20   TaA                       TaA bridge with ball squeeze 5"x30   5"x30 5"x30 5"x20 5"x20 5"x30 5"x30  5"x30  5"x30   TaA clamshell Black  3x10 30 Black  3x10 Black  3x10 Black  3x10 Black  3x10 Black  3x10 Black   3x10  Black 3x10  black   3x10   TaA squats 20 30 pball  2x10 pball  2x10 pball  2x10 pball  2x15 pball  2x15 pball  2x15  pball 2x15  pball    2x15   rows                       laq 40# x10  60# x10 60#x20 50#  2x10 60#  2x10 60#  3x10 60#  3x10 60#  2x15 60#  2x15  60# 2x15  60#   2x15   slr flexion 3x10 30 2x10 2x10 2x10 2x10 2x10 3x10  3x10  3x10   Hr/tr 20 20 2x10 2x10 3x10 3x10 NP NP    NP   Hs curls 60# x10  80# x10 80#x20 60#  2x10 60#  2x10 60#  3x10 60#  3x10 60#  2x15 60#  2x15  60# 2x15  60#   2x15   sidestepping Black tb  8 laps 8 Black 6 laps Black tb  6 laps Black tb  8 laps Black tb  8 laps Black tb  8 laps Black tb  8  laps  Black tb 8 laps  black tb   8 laps   slr abd standing 3#  3x10 30 3#  2x10 3#  2x10 3#  3x10 NP NP NP    NP   Step outs Cable column 50#  20 ea 20 NP NP CC 40#  2x10 CC 50#  2x10 CC 50#  2x12 CC 50#  2x10 [address] [facility] [place] 50# 2x10 [address] [facility] [place]   60#   3x10   lpd   Cc 50# 5"x20 NP NP CC 40#  2x10 CC 50#  2x10 CC 50#  2x12 CC 50#  2x12 [address] [facility] [place] 50# 2x12 [address] [facility] [place]   60#   3x10   glute stretch             30"x4 30"x4  30"x4  30"x4         Modalities                        prn                                                 Assessment: No complaints reported throughout therapeutic exercise program     Plan: Continue treatment as per PT plan of care

## 2018-06-29 ENCOUNTER — OFFICE VISIT (OUTPATIENT)
Dept: PHYSICAL THERAPY | Facility: CLINIC | Age: 78
End: 2018-06-29
Payer: MEDICARE

## 2018-06-29 DIAGNOSIS — M54.16 LUMBAR RADICULOPATHY: Primary | ICD-10-CM

## 2018-06-29 PROCEDURE — G8991 OTHER PT/OT GOAL STATUS: HCPCS | Performed by: PHYSICAL THERAPIST

## 2018-06-29 PROCEDURE — 97110 THERAPEUTIC EXERCISES: CPT | Performed by: PHYSICAL THERAPIST

## 2018-06-29 PROCEDURE — 97112 NEUROMUSCULAR REEDUCATION: CPT | Performed by: PHYSICAL THERAPIST

## 2018-06-29 PROCEDURE — G8990 OTHER PT/OT CURRENT STATUS: HCPCS | Performed by: PHYSICAL THERAPIST

## 2018-06-29 PROCEDURE — 97530 THERAPEUTIC ACTIVITIES: CPT | Performed by: PHYSICAL THERAPIST

## 2018-06-29 NOTE — LETTER
2018    DONNIE Ratliff [address]    Patient: Funmilayo Oswald   YOB: 1940   Date of Visit: 2018     Encounter Diagnosis     ICD-10-CM    1  Lumbar radiculopathy M54 16        Dear Dr Janette Macias:    Please review the attached Plan of Care from [facility] recent visit  Please verify that you agree therapy should continue by signing the attached document and sending it back to our office  If you have any questions or concerns, please don't hesitate to call  Sincerely,    Rolando Montelongo PT      Referring Provider:      I certify that I have read the below Plan of Care and certify the need for these services furnished under this plan of treatment while under my care  Agustín Grace PA-C  [address] [place] [address]: [phone]          PT Re-Evaluation     Today's date: 2018  Patient name: Funmilayo Oswald  : 1940  MRN: [id number]  Referring provider: Argentina Carter PA-C  Dx: No diagnosis found  Assessment/Plan   Pt is being treated with outpatient PT  They have made good gains in rom, strength, pain reduction and functional mobility but continues to have deficits  They will benefit from continued skilled PT to address these deficits and to progress to an indep hep  Thank you for this referral       PT 2-3x's/week for 2-4 weeks    Short Term Goals: Independent performance of initial hep-met  Decrease pain 2 points on VAS-met      Long Term Goals: Independent performance of comprehensive hep  Able to walk community distances with no AD  Performance of IADL's is returned to max level of function  Performance in recreational activities is improved to max level of function        Subjective     Pt reports overall improvements in his pain and mobility since starting PT but continues to have limitations   Reports that he fatigues quickly when performing household chores and when working in his garden  Reports that her requires a [facility] for community ambulation  Reports compliance with his current hep        Pain Currently  2/10  At worst  4/10      Objective     ROM:   Flexion: mod limited   Extension: max limited   Right Rotation: min limited   Left Rotation: min limited   Right Lateral Flexion: max limited   Left Lateral Flexion:  Max limited  Stiffness reported in all planes    improved control of abdominals noted with TaA but pt fatigues with a dynamic challenge      Straight Leg Raise: neg  Slump Test:  neg  Prone Knee Bend: neg    Decreased flexibility: HS, piriformis    Flexed posture while ambulating  Visible R quad atrophy          Manual              R piriformis DTM np            R piriformis/glute stretching np                                          Exercise Diary  6/29 6/27   TM 16' 2 5          14 min   2 5 mph   Bike if more comfortable              ltr 20          5"x20   TaA              TaA bridge with ball squeeze 30          5"x30   TaA clamshell 30          black   3x10   TaA squats 30          pball    2x15   rows              laq 60#x30          60#   2x15   slr flexion 30          3x10   Hr/tr           NP   Hs curls 30          60#   2x15   sidestepping 8 laps          black tb   8 laps   slr abd standing           NP   Step outs 30          CC   60#   3x10   lpd 60#x30          CC   60#   3x10   glute stretch 30"x4          30"x4         Modalities                        prn

## 2018-06-29 NOTE — PROGRESS NOTES
PT Re-Evaluation     Today's date: 2018  Patient name: Shin Matthew  : 1940  MRN: [id number]  Referring provider: Anu Gilmore PA-C  Dx: No diagnosis found  Assessment/Plan   Pt is being treated with outpatient PT  They have made good gains in rom, strength, pain reduction and functional mobility but continues to have deficits  They will benefit from continued skilled PT to address these deficits and to progress to an indep hep  Thank you for this referral       PT 2-3x's/week for 2-4 weeks    Short Term Goals: Independent performance of initial hep-met  Decrease pain 2 points on VAS-met      Long Term Goals: Independent performance of comprehensive hep  Able to walk community distances with no AD  Performance of IADL's is returned to max level of function  Performance in recreational activities is improved to max level of function        Subjective     Pt reports overall improvements in his pain and mobility since starting PT but continues to have limitations  Reports that he fatigues quickly when performing household chores and when working in his garden  Reports that her requires a [facility] for community ambulation  Reports compliance with his current hep        Pain Currently  2/10  At worst  4/10      Objective     ROM:   Flexion: mod limited   Extension: max limited   Right Rotation: min limited   Left Rotation: min limited   Right Lateral Flexion: max limited   Left Lateral Flexion:  Max limited  Stiffness reported in all planes    improved control of abdominals noted with TaA but pt fatigues with a dynamic challenge      Straight Leg Raise: neg  Slump Test:  neg  Prone Knee Bend: neg    Decreased flexibility: HS, piriformis    Flexed posture while ambulating  Visible R quad atrophy          Manual              R piriformis DTM np            R piriformis/glute stretching np                                          Exercise Diary     TM 16' 2 5          73 min   2 5 mph   Bike if more comfortable              ltr 20          5"x20   TaA              TaA bridge with ball squeeze 30          5"x30   TaA clamshell 30          black   3x10   TaA squats 30          pball    2x15   rows              laq 60#x30          60#   2x15   slr flexion 30          3x10   Hr/tr           NP   Hs curls 30          60#   2x15   sidestepping 8 laps          black tb   8 laps   slr abd standing           NP   Step outs 30          CC   60#   3x10   lpd 60#x30          CC   60#   3x10   glute stretch 30"x4          30"x4         Modalities                        prn

## 2018-07-02 ENCOUNTER — APPOINTMENT (OUTPATIENT)
Dept: PHYSICAL THERAPY | Facility: CLINIC | Age: 78
End: 2018-07-02
Payer: MEDICARE

## 2018-07-05 ENCOUNTER — OFFICE VISIT (OUTPATIENT)
Dept: PHYSICAL THERAPY | Facility: CLINIC | Age: 78
End: 2018-07-05
Payer: MEDICARE

## 2018-07-05 DIAGNOSIS — M54.16 LUMBAR RADICULOPATHY: Primary | ICD-10-CM

## 2018-07-05 PROCEDURE — 97110 THERAPEUTIC EXERCISES: CPT

## 2018-07-05 PROCEDURE — 97112 NEUROMUSCULAR REEDUCATION: CPT

## 2018-07-05 NOTE — PROGRESS NOTES
Daily Note     Today's date: 2018  Patient name: Heide Torres  : 1940  MRN: [id number]  Referring provider: Jessi Hammond PA-C  Dx:   Encounter Diagnosis     ICD-10-CM    1  Lumbar radiculopathy M54 16                   Subjective: Patient reports he saw his neurosurgeon on Tuesday who told him everything looks okay and his back needs about 2 months for a lot of the symptoms to improve  Today, Patient indicates he has a twinge in the right low back  Objective: See treatment diary below        Manual              R piriformis DTM np np           R piriformis/glute stretching np np                                         Exercise Diary     TM 16' 2 5 15 min  2 5 mph         14 min   2 5 mph   Bike if more comfortable              ltr 20 30         5"x20   TaA              TaA bridge with ball squeeze 30 30         5"x30   TaA clamshell 30 30         black   3x10   TaA squats 30 np         pball    2x15   rows              laq 60#x30 60#x30         60#   2x15   slr flexion 30 30         3x10   Hr/tr           NP   Hs curls 30 60#  30         60#   2x15   sidestepping 8 laps np         black tb   8 laps   slr abd standing  np         NP   Step outs 30 np         CC   60#   3x10   lpd 60#x30 60#x30         CC   60#   3x10   glute stretch 30"x4 30"x4         30"x4         Modalities                        prn                                                       Assessment: Tolerated treatment well  Patient to see Lam Armas DPT next visit for a re-evaluation  Plan: Continue therapy as tolerated per plan of care

## 2018-07-13 ENCOUNTER — APPOINTMENT (OUTPATIENT)
Dept: PHYSICAL THERAPY | Facility: CLINIC | Age: 78
End: 2018-07-13
Payer: MEDICARE

## 2018-07-17 ENCOUNTER — OFFICE VISIT (OUTPATIENT)
Dept: PHYSICAL THERAPY | Facility: CLINIC | Age: 78
End: 2018-07-17
Payer: MEDICARE

## 2018-07-17 DIAGNOSIS — M54.16 LUMBAR RADICULOPATHY: Primary | ICD-10-CM

## 2018-07-17 PROCEDURE — 97112 NEUROMUSCULAR REEDUCATION: CPT

## 2018-07-17 PROCEDURE — 97110 THERAPEUTIC EXERCISES: CPT

## 2018-07-17 NOTE — PROGRESS NOTES
Daily Note     Today's date: 2018  Patient name: Funmilayo Oswald  : 1940  MRN: [id number]  Referring provider: Argentina Carter PA-C  Dx:   Encounter Diagnosis     ICD-10-CM    1  Lumbar radiculopathy M54 16      1:1 with PTA CR 2:00- 2:45  Subjective: Patient no longer experiencing radicular symptoms just AM stiffness that resolves after 30 mins  Objective: See treatment diary below        Manual             R piriformis DTM np np NP         R piriformis/glute stretching np np NP               Exercise Diary     TM 16' 2 5 15 min  2 5 mph 15 mins       14 min   2 5 mph   ltr 20 30 30 ea        5"x20   TaA   --          TaA bridge with ball squeeze 30 30 5"x30       5"x30   TaA clamshell 30 Black  30 Black supine  5"x30       black   3x10   TaA squats 30 np pball  2x15       pball    2x15   rows   --          laq 60#x30 60#x30 CC  60#  2x15       60#   2x15   slr flexion 30 30 30 ea        3x10   Hr/tr   --       NP   Hs curls 30 60#  30 CC  60#  2x15       60#   2x15   sidestepping 8 laps np Black TB  8 laps       black tb   8 laps   slr abd standing  np --       NP   Step outs 30 np --       CC   60#   3x10   lpd 60#x30 60#x30 CC  60#  2x15       CC   60#   3x10   glute stretch 30"x4 30"x4 30"x4       30"x4         Modalities                        prn                                                       Assessment: Patient deferred progressions  Cues to perform exercises at a slow and controlled pace  Scheduled for RE [place]  Plan: Continue therapy as tolerated per plan of care

## 2018-07-19 ENCOUNTER — APPOINTMENT (OUTPATIENT)
Dept: PHYSICAL THERAPY | Facility: CLINIC | Age: 78
End: 2018-07-19
Payer: MEDICARE

## 2018-07-25 ENCOUNTER — EVALUATION (OUTPATIENT)
Dept: PHYSICAL THERAPY | Facility: CLINIC | Age: 78
End: 2018-07-25
Payer: MEDICARE

## 2018-07-25 DIAGNOSIS — M54.16 LUMBAR RADICULOPATHY: Primary | ICD-10-CM

## 2018-07-25 PROCEDURE — 97140 MANUAL THERAPY 1/> REGIONS: CPT | Performed by: PHYSICAL THERAPIST

## 2018-07-25 PROCEDURE — G8992 OTHER PT/OT  D/C STATUS: HCPCS | Performed by: PHYSICAL THERAPIST

## 2018-07-25 PROCEDURE — 97110 THERAPEUTIC EXERCISES: CPT | Performed by: PHYSICAL THERAPIST

## 2018-07-25 PROCEDURE — 97112 NEUROMUSCULAR REEDUCATION: CPT | Performed by: PHYSICAL THERAPIST

## 2018-07-25 PROCEDURE — G8991 OTHER PT/OT GOAL STATUS: HCPCS | Performed by: PHYSICAL THERAPIST

## 2018-07-25 NOTE — PROGRESS NOTES
Daily Note     Today's date: 2018  Patient name: Leesa Zazueta  : 1940  MRN: [id number]  Referring provider: Damian Trevizo PA-C  Dx:   Encounter Diagnosis     ICD-10-CM    1  Lumbar radiculopathy M54 16      1:1 with PTA CR 2:00- 2:45  Subjective: Pt has achieved the majority of his goals set at the start of PT and will be DC'd at this time  Objective: See treatment diary below        Manual            R piriformis DTM np np NP         R piriformis/glute stretching np np NP               Exercise Diary     TM 16' 2 5 15 min  2 5 mph 15 mins 15min      14 min   2 5 mph   ltr 20 30 30 ea  30      5"x20   TaA   --          TaA bridge with ball squeeze 30 30 5"x30       5"x30   TaA clamshell 30 Black  30 Black supine  5"x30       black   3x10   TaA squats 30 np pball  2x15       pball    2x15   rows   --          laq 60#x30 60#x30 CC  60#  2x15       60#   2x15   slr flexion 30 30 30 ea        3x10   Hr/tr   --       NP   Hs curls 30 60#  30 CC  60#  2x15       60#   2x15   sidestepping 8 laps np Black TB  8 laps       black tb   8 laps   slr abd standing  np --       NP   Step outs 30 np --       CC   60#   3x10   lpd 60#x30 60#x30 CC  60#  2x15       CC   60#   3x10   glute stretch 30"x4 30"x4 30"x4       30"x4   Leg press   2 plates 30                 Modalities                        prn                                                       Assessment: Patient deferred progressions  Cues to perform exercises at a slow and controlled pace  Good tolerance to manual tx and good form noted with therex  Plan: DC to hep

## 2018-08-13 ENCOUNTER — TELEPHONE (OUTPATIENT)
Dept: PULMONOLOGY | Facility: CLINIC | Age: 78
End: 2018-08-13

## 2018-08-13 NOTE — TELEPHONE ENCOUNTER
Dilan Regan calling saying he is having issues with hi CPAP machine  He says whenever he turns it on, it makes a humming sound and he cannot sleep  He said he called [facility] in [facility] but they do not seem to know how to help him  He said he is not due for a new CPAP machine until February of 2019  Please call patient  He hasn't been seen in 2 years  Maybe he needs an appt with the CPAP clinic?

## 2018-08-28 NOTE — TELEPHONE ENCOUNTER
Patient called again today stating he is still having issues with his cpap machine   He is also aware of his appointment 10/30/18

## 2018-10-01 ENCOUNTER — TRANSCRIBE ORDERS (OUTPATIENT)
Dept: ADMINISTRATIVE | Facility: HOSPITAL | Age: 78
End: 2018-10-01

## 2018-10-01 ENCOUNTER — HOSPITAL ENCOUNTER (OUTPATIENT)
Dept: RADIOLOGY | Facility: HOSPITAL | Age: 78
Discharge: HOME | End: 2018-10-01
Payer: MEDICARE

## 2018-10-01 DIAGNOSIS — M47.816 LUMBAR SPONDYLOSIS: Primary | ICD-10-CM

## 2018-10-01 DIAGNOSIS — M47.816 LUMBAR SPONDYLOSIS: ICD-10-CM

## 2018-10-01 PROCEDURE — 72100 X-RAY EXAM L-S SPINE 2/3 VWS: CPT

## 2018-10-25 RX ORDER — TADALAFIL 20 MG/1
TABLET ORAL
COMMUNITY
End: 2020-08-31 | Stop reason: ALTCHOICE

## 2018-10-25 RX ORDER — ACETAMINOPHEN 500 MG
1000 TABLET ORAL AS NEEDED
COMMUNITY
Start: 2018-03-09 | End: 2022-03-01

## 2018-10-25 RX ORDER — TRIAMCINOLONE ACETONIDE 1 MG/G
CREAM TOPICAL EVERY 12 HOURS
COMMUNITY
Start: 2018-04-11 | End: 2022-03-01

## 2018-10-25 RX ORDER — SENNOSIDES 8.6 MG
17.2 TABLET ORAL
COMMUNITY
Start: 2018-03-09 | End: 2022-03-01

## 2018-10-25 RX ORDER — OXYCODONE HYDROCHLORIDE 5 MG/1
5-10 TABLET ORAL
COMMUNITY
Start: 2018-03-09 | End: 2020-08-31 | Stop reason: ALTCHOICE

## 2018-10-25 RX ORDER — DIAZEPAM 5 MG/1
5 TABLET ORAL
COMMUNITY
Start: 2018-03-09 | End: 2020-12-14 | Stop reason: ALTCHOICE

## 2018-10-25 RX ORDER — POLYETHYLENE GLYCOL 3350 17 G/17G
17 POWDER, FOR SOLUTION ORAL AS NEEDED
COMMUNITY
Start: 2018-03-09 | End: 2022-03-01

## 2018-10-25 RX ORDER — BETAMETHASONE DIPROPIONATE 0.5 MG/G
CREAM TOPICAL
COMMUNITY
Start: 2015-12-29 | End: 2022-03-01

## 2018-10-30 ENCOUNTER — OFFICE VISIT (OUTPATIENT)
Dept: PULMONOLOGY | Facility: CLINIC | Age: 78
End: 2018-10-30
Payer: MEDICARE

## 2018-10-30 ENCOUNTER — DOCUMENTATION (OUTPATIENT)
Dept: PULMONOLOGY | Facility: CLINIC | Age: 78
End: 2018-10-30

## 2018-10-30 VITALS
HEART RATE: 90 BPM | BODY MASS INDEX: 32.33 KG/M2 | SYSTOLIC BLOOD PRESSURE: 126 MMHG | OXYGEN SATURATION: 97 % | TEMPERATURE: 98.9 F | HEIGHT: 75 IN | RESPIRATION RATE: 18 BRPM | WEIGHT: 260 LBS | DIASTOLIC BLOOD PRESSURE: 80 MMHG

## 2018-10-30 DIAGNOSIS — G47.33 OBSTRUCTIVE SLEEP APNEA: Primary | ICD-10-CM

## 2018-10-30 PROCEDURE — 99214 OFFICE O/P EST MOD 30 MIN: CPT | Performed by: INTERNAL MEDICINE

## 2018-10-30 NOTE — PROGRESS NOTES
Assessment/Plan:    Obstructive sleep apnea  Unfortunately Nathanael Sandy continues to suffer from severe obstructive sleep apnea which certainly puts him at risk for uncontrolled heart disease, tachy arrhythmias, stroke as well as a myriad of other side effects of untreated severe sleep apnea  He was compliant doing well on his CPAP until his machine malfunctioned  Over the course of the past 2 years he has had trouble with increased noise coming from his CPAP machine  He has taken back to [place] several times and at 6 is the problem temporarily but continues to malfunction  He is followed suggestion such as getting a white noise machine and using cotton balls in his ears to suppress the noise and is still unable to tolerate the noise and not able to use his machine  He is now sleeping without the support of his CPAP machine and is at great health risk  I suggested that week contact [facility] and will do so to try and get him a new CPAP machine  We discussed other options but really there are no other successful options for his degree of sleep apnea as he has an AHI of close to 80  Diagnoses and all orders for this visit:    Obstructive sleep apnea    Other orders  -     acetaminophen (TYLENOL) 500 mg tablet; Take 1,000 mg by mouth  -     betamethasone, augmented, (DIPROLENE-AF) 0 05 % cream; Apply topically  -     tadalafil (CIALIS) 20 MG tablet; 1 tablet  -     diazepam (VALIUM) 5 mg tablet; Take 5 mg by mouth  -     diclofenac sodium (VOLTAREN) 1 %; Place on the skin  -     Discontinue: folic acid-pyridoxine-cyanocobalamin (FOLBIC) 2 5-25-2 mg; every 24 hours  -     oxyCODONE (ROXICODONE) 5 mg immediate release tablet; Take 5-10 mg by mouth  -     polyethylene glycol (MIRALAX) 17 g packet; Take 17 g by mouth  -     senna (SENOKOT) 8 6 mg; Take 17 2 mg by mouth  -     triamcinolone (KENALOG) 0 1 % cream; Every 12 hours          Subjective:      Patient ID: Siri Plata is a 66 y o  male      Mr Sabillon Gris is here for follow-up of sleep apnea  He has not been using his CPAP for over 6 months due to increased noise and malfunction of the machine  During that time  He continues to suffer from daytime sleepiness snoring and witnessed apneas        The following portions of the patient's history were reviewed and updated as appropriate: allergies, current medications, past family history, past medical history, past social history, past surgical history and problem list     Review of Systems   Constitutional: Negative  HENT: Negative  Eyes: Negative  Respiratory: Negative for shortness of breath  Cardiovascular: Negative  Gastrointestinal: Negative  Endocrine: Negative  Genitourinary: Negative  Allergic/Immunologic: Negative  Neurological: Negative  Hematological: Negative  Psychiatric/Behavioral: Negative  Objective:      /80 (BP Location: Left arm, Patient Position: Sitting, Cuff Size: Adult)   Pulse 90   Temp 98 9 °F (37 2 °C) (Tympanic)   Resp 18   Ht 6' 3" (1 905 m)   Wt 118 kg (260 lb)   SpO2 97%   BMI 32 50 kg/m²          Physical Exam   Constitutional: He is oriented to person, place, and time  He appears well-developed and well-nourished  HENT:   Head: Normocephalic  Eyes: Pupils are equal, round, and reactive to light  Neck: Neck supple  Cardiovascular: Normal rate  Pulmonary/Chest: Effort normal  No respiratory distress  He has no wheezes  He has no rales  Abdominal: Soft  Musculoskeletal: Normal range of motion  He exhibits no edema  Neurological: He is alert and oriented to person, place, and time  Skin: Skin is warm and dry

## 2018-10-30 NOTE — PROGRESS NOTES
I called [facility] regarding the noise and trouble the patient is having with his cpap machine they agreed to give him a loner cpap and want the patient to bring his machine in so that they can look at it again  Medicare will not cover another one until February  I called his home and spoke with his wife [facility] she will relay the message to Mayelin Lopez

## 2018-10-30 NOTE — ASSESSMENT & PLAN NOTE
Unfortunately Soniya Marie continues to suffer from severe obstructive sleep apnea which certainly puts him at risk for uncontrolled heart disease, tachy arrhythmias, stroke as well as a myriad of other side effects of untreated severe sleep apnea  He was compliant doing well on his CPAP until his machine malfunctioned  Over the course of the past 2 years he has had trouble with increased noise coming from his CPAP machine  He has taken back to [place] several times and at 6 is the problem temporarily but continues to malfunction  He is followed suggestion such as getting a white noise machine and using cotton balls in his ears to suppress the noise and is still unable to tolerate the noise and not able to use his machine  He is now sleeping without the support of his CPAP machine and is at great health risk  I suggested that week contact [facility] and will do so to try and get him a new CPAP machine  We discussed other options but really there are no other successful options for his degree of sleep apnea as he has an AHI of close to 80

## 2018-12-11 ENCOUNTER — TRANSCRIBE ORDERS (OUTPATIENT)
Dept: PHYSICAL THERAPY | Facility: CLINIC | Age: 78
End: 2018-12-11

## 2018-12-11 DIAGNOSIS — M54.16 LUMBAR RADICULOPATHY: Primary | ICD-10-CM

## 2019-01-16 ENCOUNTER — TRANSCRIBE ORDERS (OUTPATIENT)
Dept: ADMINISTRATIVE | Facility: HOSPITAL | Age: 79
End: 2019-01-16

## 2019-01-16 ENCOUNTER — APPOINTMENT (OUTPATIENT)
Dept: LAB | Facility: HOSPITAL | Age: 79
End: 2019-01-16
Attending: FAMILY MEDICINE
Payer: MEDICARE

## 2019-01-16 DIAGNOSIS — E78.00 PURE HYPERCHOLESTEROLEMIA: ICD-10-CM

## 2019-01-16 DIAGNOSIS — I10 ESSENTIAL HYPERTENSION, MALIGNANT: ICD-10-CM

## 2019-01-16 DIAGNOSIS — R73.09 OTHER ABNORMAL GLUCOSE: ICD-10-CM

## 2019-01-16 DIAGNOSIS — R63.8 EFFECTS OF THIRST: Primary | ICD-10-CM

## 2019-01-16 DIAGNOSIS — E55.9 AVITAMINOSIS D: ICD-10-CM

## 2019-01-16 DIAGNOSIS — E72.12 METHYLENE THF REDUCTASE DEFICIENCY AND HOMOCYSTINURIA (HCC): ICD-10-CM

## 2019-01-16 DIAGNOSIS — E72.11 METHYLENE THF REDUCTASE DEFICIENCY AND HOMOCYSTINURIA (HCC): ICD-10-CM

## 2019-01-16 DIAGNOSIS — G47.33 OBSTRUCTIVE SLEEP APNEA (ADULT) (PEDIATRIC): ICD-10-CM

## 2019-01-16 DIAGNOSIS — R63.8 EFFECTS OF THIRST: ICD-10-CM

## 2019-01-16 LAB
25(OH)D3 SERPL-MCNC: 31.7 NG/ML (ref 30–100)
ALBUMIN SERPL BCP-MCNC: 3.5 G/DL (ref 3.5–5)
ALP SERPL-CCNC: 92 U/L (ref 46–116)
ALT SERPL W P-5'-P-CCNC: 30 U/L (ref 12–78)
ANION GAP SERPL CALCULATED.3IONS-SCNC: 11 MMOL/L (ref 4–13)
AST SERPL W P-5'-P-CCNC: 21 U/L (ref 5–45)
BILIRUB SERPL-MCNC: 1.2 MG/DL (ref 0.2–1)
BUN SERPL-MCNC: 20 MG/DL (ref 5–25)
CALCIUM SERPL-MCNC: 8.9 MG/DL (ref 8.3–10.1)
CHLORIDE SERPL-SCNC: 104 MMOL/L (ref 100–108)
CHOLEST SERPL-MCNC: 194 MG/DL (ref 50–200)
CO2 SERPL-SCNC: 26 MMOL/L (ref 21–32)
CREAT SERPL-MCNC: 1.08 MG/DL (ref 0.6–1.3)
ERYTHROCYTE [DISTWIDTH] IN BLOOD BY AUTOMATED COUNT: 11.9 % (ref 11.6–15.1)
GFR SERPL CREATININE-BSD FRML MDRD: 65 ML/MIN/1.73SQ M
GLUCOSE P FAST SERPL-MCNC: 98 MG/DL (ref 65–99)
HCT VFR BLD AUTO: 44.8 % (ref 36.5–49.3)
HDLC SERPL-MCNC: 81 MG/DL (ref 40–60)
HGB BLD-MCNC: 15.4 G/DL (ref 12–17)
LDLC SERPL CALC-MCNC: 92 MG/DL (ref 0–100)
MCH RBC QN AUTO: 32.2 PG (ref 26.8–34.3)
MCHC RBC AUTO-ENTMCNC: 34.4 G/DL (ref 31.4–37.4)
MCV RBC AUTO: 94 FL (ref 82–98)
PLATELET # BLD AUTO: 196 THOUSANDS/UL (ref 149–390)
PMV BLD AUTO: 11.3 FL (ref 8.9–12.7)
POTASSIUM SERPL-SCNC: 3.9 MMOL/L (ref 3.5–5.3)
PROT SERPL-MCNC: 7.3 G/DL (ref 6.4–8.2)
PSA SERPL-MCNC: 1.1 NG/ML (ref 0–4)
RBC # BLD AUTO: 4.78 MILLION/UL (ref 3.88–5.62)
SODIUM SERPL-SCNC: 141 MMOL/L (ref 136–145)
TRIGL SERPL-MCNC: 106 MG/DL
WBC # BLD AUTO: 6.89 THOUSAND/UL (ref 4.31–10.16)

## 2019-01-16 PROCEDURE — 82306 VITAMIN D 25 HYDROXY: CPT

## 2019-01-16 PROCEDURE — 80053 COMPREHEN METABOLIC PANEL: CPT

## 2019-01-16 PROCEDURE — 85027 COMPLETE CBC AUTOMATED: CPT

## 2019-01-16 PROCEDURE — G0103 PSA SCREENING: HCPCS

## 2019-01-16 PROCEDURE — 36415 COLL VENOUS BLD VENIPUNCTURE: CPT

## 2019-01-16 PROCEDURE — 80061 LIPID PANEL: CPT

## 2019-01-18 NOTE — H&P
History of Present Illness: The patient is a 68 y o  male who presents with complaints of low back and right leg pain  Patient Active Problem List   Diagnosis    Sacroiliitis (HCC)    Abnormal glucose level    Acute back pain    Adenomatous colon polyp    Aortic ectasia (HCC)    Benign neoplasm of colon    Bilateral hip pain    DDD (degenerative disc disease), lumbar    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[address] )    Diverticular disease of colon    Elevated hemoglobin A1c    Encounter for long-term (current) use of other medications    Fever    FUO (fever of unknown origin)    Hyperlipidemia    Hypertension    Internal hemorrhoids    Lichen planus    Lumbar back pain    Lumbar disc herniation    MTHFR mutation (HCC)    Obstructive sleep apnea    Organic impotence    Premature atrial complexes    Premature ventricular contraction    Pure hypercholesterolemia    Radiculopathy, lumbar region    Spinal stenosis of lumbar region    Vitamin D deficiency       Past Medical History:   Diagnosis Date    Blood clot in vein 2015    in right foot    Hypertension        Past Surgical History:   Procedure Laterality Date    BACK SURGERY      COLONOSCOPY      COLONOSCOPY N/A 2/24/2016    Procedure: COLONOSCOPY;  Surgeon: Leta Jacques MD;  Location: [facility];  Service:    [facility] HERNIA REPAIR           Current Outpatient Prescriptions:     aspirin 81 MG tablet, Take 162 mg by mouth daily  , Disp: , Rfl:     Cholecalciferol (VITAMIN D-3 PO), Take 4,000 Units by mouth daily  , Disp: , Rfl:     folic acid-pyridoxine-cyanocobalamin (FOLBIC) 2 5-25-2 mg, Take 1 tablet by mouth daily  , Disp: , Rfl:     losartan (COZAAR) 100 MG tablet, Take 100 mg by mouth daily  , Disp: , Rfl:     multivitamin-minerals (CENTRUM) tablet, Take 1 tablet by mouth daily, Disp: , Rfl:     gabapentin (NEURONTIN) 100 mg capsule, Take 1 capsule (100 mg total) by mouth 3 (three) times a day, Disp: 90 capsule, Rfl: 0    HYDROcodone-acetaminophen (NORCO) 7 5-325 mg per tablet, Take 1 tablet by mouth every 8 (eight) hours as needed for pain Max Daily Amount: 3 tablets, Disp: 30 tablet, Rfl: 0    LORazepam (ATIVAN) 1 mg tablet, Take 1 tablet 60 minutes prior to procedure, may take 2nd tablet 30 minutes prior to procedure, needs  day of procedure, Disp: 2 tablet, Rfl: 0    Allergies   Allergen Reactions    Sildenafil Hives       Physical Exam:   Vitals:    02/23/18 0943   BP: 138/86   Pulse: 81   Resp: 20   Temp: 97 7 °F (36 5 °C)   SpO2: 99%     General: Awake, Alert, Oriented x 3, Mood and affect appropriate  Respiratory: Respirations even and unlabored  Cardiovascular: Peripheral pulses intact; no edema  Musculoskeletal Exam:  Decreased range of motion lumbar spine    ASA Score: II    Assessment:   1  Postlaminectomy syndrome, lumbar    2  Neural foraminal stenosis of lumbar spine    3   Lumbar radiculopathy        Plan: MAYA Initial (On Arrival)

## 2019-03-04 ENCOUNTER — TELEPHONE (OUTPATIENT)
Dept: PULMONOLOGY | Facility: CLINIC | Age: 79
End: 2019-03-04

## 2019-03-05 DIAGNOSIS — Z99.89 OSA ON CPAP: Primary | ICD-10-CM

## 2019-03-05 DIAGNOSIS — G47.33 OSA ON CPAP: Primary | ICD-10-CM

## 2019-03-18 ENCOUNTER — TELEPHONE (OUTPATIENT)
Dept: PULMONOLOGY | Facility: CLINIC | Age: 79
End: 2019-03-18

## 2019-03-18 NOTE — TELEPHONE ENCOUNTER
I spoke with pt and he states that he feels as though the pressure that he is set at now (4-20 cm) for his CPAP is to low  He states that when he puts the mask on he feels as though his breath is labored and that it takes more effort to breath  He wanted to know if we could change his pressure from 4-20 cm to 8-20 cm, because his old machine was set at 8, and it was working well for him  I have sent the message to Dr Darin Ribeiro

## 2019-03-19 DIAGNOSIS — Z99.89 OSA ON CPAP: Primary | ICD-10-CM

## 2019-03-19 DIAGNOSIS — G47.33 OSA ON CPAP: Primary | ICD-10-CM

## 2019-03-19 NOTE — TELEPHONE ENCOUNTER
Order was placed by Dr Regina Lux and faxed to [address]  on 3/19/19 at 9:00am  Patient has been notified

## 2019-03-21 NOTE — TELEPHONE ENCOUNTER
Pt called back and wants to inform me that he heard from [address] , and that they changed his pressures and it will take about 24hrs for him to notice a change on his machine

## 2019-03-21 NOTE — TELEPHONE ENCOUNTER
I returned Mr Suzanne Geiger phone call and he was not home  I left a message with his wife, to have him call back when he comes home

## 2019-04-16 ENCOUNTER — TELEPHONE (OUTPATIENT)
Dept: CARDIOLOGY CLINIC | Facility: CLINIC | Age: 79
End: 2019-04-16

## 2019-05-30 ENCOUNTER — OFFICE VISIT (OUTPATIENT)
Dept: CARDIOLOGY CLINIC | Facility: CLINIC | Age: 79
End: 2019-05-30
Payer: MEDICARE

## 2019-05-30 VITALS
HEIGHT: 75 IN | BODY MASS INDEX: 32.33 KG/M2 | WEIGHT: 260 LBS | SYSTOLIC BLOOD PRESSURE: 114 MMHG | DIASTOLIC BLOOD PRESSURE: 66 MMHG | HEART RATE: 84 BPM

## 2019-05-30 DIAGNOSIS — G47.33 OBSTRUCTIVE SLEEP APNEA: ICD-10-CM

## 2019-05-30 DIAGNOSIS — I49.3 PREMATURE VENTRICULAR CONTRACTION: Primary | ICD-10-CM

## 2019-05-30 DIAGNOSIS — I49.1 PREMATURE ATRIAL COMPLEXES: ICD-10-CM

## 2019-05-30 DIAGNOSIS — I10 ESSENTIAL HYPERTENSION: ICD-10-CM

## 2019-05-30 PROCEDURE — 99214 OFFICE O/P EST MOD 30 MIN: CPT | Performed by: INTERNAL MEDICINE

## 2019-05-30 RX ORDER — KETOROLAC TROMETHAMINE 5 MG/ML
SOLUTION OPHTHALMIC
Refills: 1 | COMMUNITY
Start: 2019-04-28 | End: 2020-08-31 | Stop reason: ALTCHOICE

## 2019-05-30 RX ORDER — OFLOXACIN 3 MG/ML
SOLUTION/ DROPS OPHTHALMIC
Refills: 1 | COMMUNITY
Start: 2019-04-28 | End: 2020-08-31 | Stop reason: ALTCHOICE

## 2019-05-30 RX ORDER — TRIPROLIDINE/PSEUDOEPHEDRINE 2.5MG-60MG
TABLET ORAL
Refills: 1 | COMMUNITY
Start: 2019-04-09 | End: 2020-08-31 | Stop reason: ALTCHOICE

## 2019-07-02 ENCOUNTER — DOCUMENTATION (OUTPATIENT)
Dept: PULMONOLOGY | Facility: CLINIC | Age: 79
End: 2019-07-02

## 2020-01-22 ENCOUNTER — APPOINTMENT (OUTPATIENT)
Dept: LAB | Facility: CLINIC | Age: 80
End: 2020-01-22
Payer: MEDICARE

## 2020-01-22 ENCOUNTER — TRANSCRIBE ORDERS (OUTPATIENT)
Dept: URGENT CARE | Facility: CLINIC | Age: 80
End: 2020-01-22

## 2020-01-22 DIAGNOSIS — E72.11 METHYLENE THF REDUCTASE DEFICIENCY AND HOMOCYSTINURIA (HCC): ICD-10-CM

## 2020-01-22 DIAGNOSIS — E72.12 METHYLENE THF REDUCTASE DEFICIENCY AND HOMOCYSTINURIA (HCC): ICD-10-CM

## 2020-01-22 DIAGNOSIS — E55.9 AVITAMINOSIS D: ICD-10-CM

## 2020-01-22 DIAGNOSIS — E78.00 PURE HYPERCHOLESTEROLEMIA: ICD-10-CM

## 2020-01-22 DIAGNOSIS — G47.33 OBSTRUCTIVE SLEEP APNEA (ADULT) (PEDIATRIC): ICD-10-CM

## 2020-01-22 DIAGNOSIS — I10 ESSENTIAL HYPERTENSION, MALIGNANT: ICD-10-CM

## 2020-01-22 DIAGNOSIS — I10 ESSENTIAL HYPERTENSION, MALIGNANT: Primary | ICD-10-CM

## 2020-01-22 LAB
ALBUMIN SERPL BCP-MCNC: 3.4 G/DL (ref 3.5–5)
ALP SERPL-CCNC: 77 U/L (ref 46–116)
ALT SERPL W P-5'-P-CCNC: 35 U/L (ref 12–78)
ANION GAP SERPL CALCULATED.3IONS-SCNC: 3 MMOL/L (ref 4–13)
AST SERPL W P-5'-P-CCNC: 23 U/L (ref 5–45)
BILIRUB SERPL-MCNC: 1.42 MG/DL (ref 0.2–1)
BUN SERPL-MCNC: 22 MG/DL (ref 5–25)
CALCIUM SERPL-MCNC: 9.3 MG/DL (ref 8.3–10.1)
CHLORIDE SERPL-SCNC: 108 MMOL/L (ref 100–108)
CHOLEST SERPL-MCNC: 183 MG/DL (ref 50–200)
CO2 SERPL-SCNC: 29 MMOL/L (ref 21–32)
CREAT SERPL-MCNC: 1.29 MG/DL (ref 0.6–1.3)
ERYTHROCYTE [DISTWIDTH] IN BLOOD BY AUTOMATED COUNT: 12.3 % (ref 11.6–15.1)
GFR SERPL CREATININE-BSD FRML MDRD: 52 ML/MIN/1.73SQ M
GLUCOSE P FAST SERPL-MCNC: 103 MG/DL (ref 65–99)
HCT VFR BLD AUTO: 49.5 % (ref 36.5–49.3)
HDLC SERPL-MCNC: 80 MG/DL
HGB BLD-MCNC: 16.4 G/DL (ref 12–17)
LDLC SERPL CALC-MCNC: 83 MG/DL (ref 0–100)
MCH RBC QN AUTO: 32 PG (ref 26.8–34.3)
MCHC RBC AUTO-ENTMCNC: 33.1 G/DL (ref 31.4–37.4)
MCV RBC AUTO: 97 FL (ref 82–98)
NONHDLC SERPL-MCNC: 103 MG/DL
PLATELET # BLD AUTO: 149 THOUSANDS/UL (ref 149–390)
PMV BLD AUTO: 11.9 FL (ref 8.9–12.7)
POTASSIUM SERPL-SCNC: 4 MMOL/L (ref 3.5–5.3)
PROT SERPL-MCNC: 7.1 G/DL (ref 6.4–8.2)
PSA SERPL-MCNC: 1.2 NG/ML (ref 0–4)
RBC # BLD AUTO: 5.12 MILLION/UL (ref 3.88–5.62)
SODIUM SERPL-SCNC: 140 MMOL/L (ref 136–145)
TRIGL SERPL-MCNC: 101 MG/DL
TSH SERPL DL<=0.05 MIU/L-ACNC: 1.13 UIU/ML (ref 0.36–3.74)
WBC # BLD AUTO: 7.95 THOUSAND/UL (ref 4.31–10.16)

## 2020-01-22 PROCEDURE — 36415 COLL VENOUS BLD VENIPUNCTURE: CPT

## 2020-01-22 PROCEDURE — 80061 LIPID PANEL: CPT

## 2020-01-22 PROCEDURE — G0103 PSA SCREENING: HCPCS

## 2020-01-22 PROCEDURE — 84443 ASSAY THYROID STIM HORMONE: CPT

## 2020-01-22 PROCEDURE — 85027 COMPLETE CBC AUTOMATED: CPT

## 2020-01-22 PROCEDURE — 80053 COMPREHEN METABOLIC PANEL: CPT

## 2020-05-14 ENCOUNTER — TELEPHONE (OUTPATIENT)
Dept: CARDIOLOGY CLINIC | Facility: CLINIC | Age: 80
End: 2020-05-14

## 2020-05-14 ENCOUNTER — OFFICE VISIT (OUTPATIENT)
Dept: CARDIOLOGY CLINIC | Facility: CLINIC | Age: 80
End: 2020-05-14
Payer: MEDICARE

## 2020-05-14 VITALS
HEIGHT: 75 IN | HEART RATE: 89 BPM | BODY MASS INDEX: 32.95 KG/M2 | DIASTOLIC BLOOD PRESSURE: 74 MMHG | SYSTOLIC BLOOD PRESSURE: 110 MMHG | WEIGHT: 265 LBS

## 2020-05-14 DIAGNOSIS — I48.19 PERSISTENT ATRIAL FIBRILLATION (HCC): Primary | ICD-10-CM

## 2020-05-14 DIAGNOSIS — E78.2 MIXED HYPERLIPIDEMIA: ICD-10-CM

## 2020-05-14 DIAGNOSIS — G47.33 OBSTRUCTIVE SLEEP APNEA: ICD-10-CM

## 2020-05-14 DIAGNOSIS — I10 ESSENTIAL HYPERTENSION: ICD-10-CM

## 2020-05-14 DIAGNOSIS — I49.3 PREMATURE VENTRICULAR CONTRACTION: ICD-10-CM

## 2020-05-14 DIAGNOSIS — I49.1 PREMATURE ATRIAL COMPLEXES: ICD-10-CM

## 2020-05-14 PROCEDURE — 99215 OFFICE O/P EST HI 40 MIN: CPT | Performed by: INTERNAL MEDICINE

## 2020-05-14 PROCEDURE — 93000 ELECTROCARDIOGRAM COMPLETE: CPT | Performed by: INTERNAL MEDICINE

## 2020-05-14 RX ORDER — METOPROLOL SUCCINATE 25 MG/1
25 TABLET, EXTENDED RELEASE ORAL DAILY
Qty: 90 TABLET | Refills: 3 | Status: SHIPPED | OUTPATIENT
Start: 2020-05-14 | End: 2021-04-20 | Stop reason: SDUPTHER

## 2020-05-14 RX ORDER — LOSARTAN POTASSIUM 50 MG/1
50 TABLET ORAL DAILY
Qty: 90 TABLET | Refills: 3 | Status: SHIPPED | OUTPATIENT
Start: 2020-05-14

## 2020-05-15 DIAGNOSIS — I48.19 PERSISTENT ATRIAL FIBRILLATION (HCC): Primary | ICD-10-CM

## 2020-05-15 RX ORDER — WARFARIN SODIUM 5 MG/1
TABLET ORAL
Qty: 90 TABLET | Refills: 1 | Status: SHIPPED | OUTPATIENT
Start: 2020-05-15 | End: 2020-10-15 | Stop reason: SDUPTHER

## 2020-05-18 ENCOUNTER — APPOINTMENT (OUTPATIENT)
Dept: LAB | Facility: HOSPITAL | Age: 80
End: 2020-05-18
Attending: INTERNAL MEDICINE
Payer: MEDICARE

## 2020-05-18 DIAGNOSIS — I48.19 PERSISTENT ATRIAL FIBRILLATION (HCC): ICD-10-CM

## 2020-05-18 LAB
INR PPP: 1.11 (ref 0.84–1.19)
PROTHROMBIN TIME: 13.9 SECONDS (ref 11.6–14.5)

## 2020-05-18 PROCEDURE — 85610 PROTHROMBIN TIME: CPT

## 2020-05-18 PROCEDURE — 36415 COLL VENOUS BLD VENIPUNCTURE: CPT

## 2020-05-19 ENCOUNTER — ANTICOAG VISIT (OUTPATIENT)
Dept: CARDIOLOGY CLINIC | Facility: CLINIC | Age: 80
End: 2020-05-19

## 2020-05-19 DIAGNOSIS — I48.19 PERSISTENT ATRIAL FIBRILLATION (HCC): Primary | ICD-10-CM

## 2020-05-19 DIAGNOSIS — I48.19 PERSISTENT ATRIAL FIBRILLATION (HCC): ICD-10-CM

## 2020-05-21 ENCOUNTER — ANTICOAG VISIT (OUTPATIENT)
Dept: CARDIOLOGY CLINIC | Facility: CLINIC | Age: 80
End: 2020-05-21

## 2020-05-21 ENCOUNTER — APPOINTMENT (OUTPATIENT)
Dept: LAB | Facility: CLINIC | Age: 80
End: 2020-05-21
Payer: MEDICARE

## 2020-05-21 DIAGNOSIS — I48.19 PERSISTENT ATRIAL FIBRILLATION (HCC): ICD-10-CM

## 2020-05-21 LAB
INR PPP: 1.25 (ref 0.84–1.19)
PROTHROMBIN TIME: 15.3 SECONDS (ref 11.6–14.5)

## 2020-05-21 PROCEDURE — 36415 COLL VENOUS BLD VENIPUNCTURE: CPT

## 2020-05-21 PROCEDURE — 85610 PROTHROMBIN TIME: CPT

## 2020-05-26 ENCOUNTER — ANTICOAG VISIT (OUTPATIENT)
Dept: CARDIOLOGY CLINIC | Facility: CLINIC | Age: 80
End: 2020-05-26

## 2020-05-26 ENCOUNTER — APPOINTMENT (OUTPATIENT)
Dept: LAB | Facility: CLINIC | Age: 80
End: 2020-05-26
Payer: MEDICARE

## 2020-05-26 DIAGNOSIS — I48.19 PERSISTENT ATRIAL FIBRILLATION (HCC): ICD-10-CM

## 2020-05-28 ENCOUNTER — APPOINTMENT (OUTPATIENT)
Dept: LAB | Facility: CLINIC | Age: 80
End: 2020-05-28
Payer: MEDICARE

## 2020-05-28 ENCOUNTER — ANTICOAG VISIT (OUTPATIENT)
Dept: CARDIOLOGY CLINIC | Facility: CLINIC | Age: 80
End: 2020-05-28

## 2020-05-28 DIAGNOSIS — I48.19 PERSISTENT ATRIAL FIBRILLATION (HCC): ICD-10-CM

## 2020-05-28 LAB
INR PPP: 1.6 (ref 0.84–1.19)
PROTHROMBIN TIME: 18.6 SECONDS (ref 11.6–14.5)

## 2020-05-28 PROCEDURE — 36415 COLL VENOUS BLD VENIPUNCTURE: CPT

## 2020-05-28 PROCEDURE — 85610 PROTHROMBIN TIME: CPT

## 2020-06-01 ENCOUNTER — APPOINTMENT (OUTPATIENT)
Dept: LAB | Facility: CLINIC | Age: 80
End: 2020-06-01
Payer: MEDICARE

## 2020-06-01 ENCOUNTER — ANTICOAG VISIT (OUTPATIENT)
Dept: CARDIOLOGY CLINIC | Facility: CLINIC | Age: 80
End: 2020-06-01

## 2020-06-01 DIAGNOSIS — I48.19 PERSISTENT ATRIAL FIBRILLATION (HCC): ICD-10-CM

## 2020-06-01 LAB
INR PPP: 2.06 (ref 0.84–1.19)
PROTHROMBIN TIME: 22.7 SECONDS (ref 11.6–14.5)

## 2020-06-01 PROCEDURE — 85610 PROTHROMBIN TIME: CPT

## 2020-06-01 PROCEDURE — 36415 COLL VENOUS BLD VENIPUNCTURE: CPT

## 2020-06-08 ENCOUNTER — ANTICOAG VISIT (OUTPATIENT)
Dept: CARDIOLOGY CLINIC | Facility: CLINIC | Age: 80
End: 2020-06-08

## 2020-06-08 ENCOUNTER — APPOINTMENT (OUTPATIENT)
Dept: LAB | Facility: CLINIC | Age: 80
End: 2020-06-08
Payer: MEDICARE

## 2020-06-08 DIAGNOSIS — I48.19 PERSISTENT ATRIAL FIBRILLATION (HCC): ICD-10-CM

## 2020-06-08 LAB
INR PPP: 2 (ref 0.84–1.19)
PROTHROMBIN TIME: 22.2 SECONDS (ref 11.6–14.5)

## 2020-06-08 PROCEDURE — 85610 PROTHROMBIN TIME: CPT

## 2020-06-08 PROCEDURE — 36415 COLL VENOUS BLD VENIPUNCTURE: CPT

## 2020-06-22 ENCOUNTER — APPOINTMENT (OUTPATIENT)
Dept: LAB | Facility: CLINIC | Age: 80
End: 2020-06-22
Payer: MEDICARE

## 2020-06-22 ENCOUNTER — ANTICOAG VISIT (OUTPATIENT)
Dept: CARDIOLOGY CLINIC | Facility: CLINIC | Age: 80
End: 2020-06-22

## 2020-06-22 DIAGNOSIS — I48.19 PERSISTENT ATRIAL FIBRILLATION (HCC): ICD-10-CM

## 2020-07-06 ENCOUNTER — ANTICOAG VISIT (OUTPATIENT)
Dept: CARDIOLOGY CLINIC | Facility: CLINIC | Age: 80
End: 2020-07-06

## 2020-07-06 ENCOUNTER — APPOINTMENT (OUTPATIENT)
Dept: LAB | Facility: CLINIC | Age: 80
End: 2020-07-06
Payer: MEDICARE

## 2020-07-06 DIAGNOSIS — I48.19 PERSISTENT ATRIAL FIBRILLATION (HCC): ICD-10-CM

## 2020-07-20 ENCOUNTER — APPOINTMENT (OUTPATIENT)
Dept: LAB | Facility: CLINIC | Age: 80
End: 2020-07-20
Payer: MEDICARE

## 2020-07-21 ENCOUNTER — ANTICOAG VISIT (OUTPATIENT)
Dept: CARDIOLOGY CLINIC | Facility: CLINIC | Age: 80
End: 2020-07-21

## 2020-07-21 DIAGNOSIS — I48.19 PERSISTENT ATRIAL FIBRILLATION (HCC): ICD-10-CM

## 2020-07-22 ENCOUNTER — HOSPITAL ENCOUNTER (OUTPATIENT)
Dept: NON INVASIVE DIAGNOSTICS | Age: 80
Discharge: HOME | End: 2020-07-22
Payer: MEDICARE

## 2020-07-22 DIAGNOSIS — I48.19 PERSISTENT ATRIAL FIBRILLATION (HCC): ICD-10-CM

## 2020-07-22 PROCEDURE — 93306 TTE W/DOPPLER COMPLETE: CPT

## 2020-07-22 PROCEDURE — 93306 TTE W/DOPPLER COMPLETE: CPT | Performed by: INTERNAL MEDICINE

## 2020-08-03 ENCOUNTER — APPOINTMENT (OUTPATIENT)
Dept: LAB | Facility: CLINIC | Age: 80
End: 2020-08-03
Payer: MEDICARE

## 2020-08-03 ENCOUNTER — ANTICOAG VISIT (OUTPATIENT)
Dept: CARDIOLOGY CLINIC | Facility: CLINIC | Age: 80
End: 2020-08-03

## 2020-08-03 DIAGNOSIS — I48.19 PERSISTENT ATRIAL FIBRILLATION (HCC): ICD-10-CM

## 2020-08-24 ENCOUNTER — APPOINTMENT (OUTPATIENT)
Dept: LAB | Facility: CLINIC | Age: 80
End: 2020-08-24
Payer: MEDICARE

## 2020-08-24 ENCOUNTER — ANTICOAG VISIT (OUTPATIENT)
Dept: CARDIOLOGY CLINIC | Facility: CLINIC | Age: 80
End: 2020-08-24

## 2020-08-24 DIAGNOSIS — I48.19 PERSISTENT ATRIAL FIBRILLATION (HCC): ICD-10-CM

## 2020-08-28 ENCOUNTER — TELEPHONE (OUTPATIENT)
Dept: ADMINISTRATIVE | Facility: HOSPITAL | Age: 80
End: 2020-08-28

## 2020-08-31 ENCOUNTER — CONSULT (OUTPATIENT)
Dept: VASCULAR SURGERY | Facility: CLINIC | Age: 80
End: 2020-08-31
Payer: MEDICARE

## 2020-08-31 VITALS
WEIGHT: 262 LBS | HEIGHT: 75 IN | DIASTOLIC BLOOD PRESSURE: 84 MMHG | RESPIRATION RATE: 18 BRPM | BODY MASS INDEX: 32.58 KG/M2 | TEMPERATURE: 98 F | HEART RATE: 80 BPM | SYSTOLIC BLOOD PRESSURE: 118 MMHG

## 2020-08-31 DIAGNOSIS — R09.89 DECREASED PEDAL PULSES: Primary | ICD-10-CM

## 2020-08-31 PROCEDURE — 99203 OFFICE O/P NEW LOW 30 MIN: CPT | Performed by: NURSE PRACTITIONER

## 2020-08-31 NOTE — PROGRESS NOTES
Assessment/Plan:    Localized edema  70-year-old male with HTN, HLD, AFib on warfarin, history of right lower extremity DVT, MTHFR, degenerative disc disease, lumbar disc herniation who presents to the office for evaluation of bilateral lower extremity swelling with R ankle superficial ulceration  -Chronic bilateral lower extremity swelling  -Decreased pedal pulses  -Will evaluate with ankle brachial index for baseline perfusion  -Light Tubigrip compression given  -Will return to the office after testing and determine if he can tolerate a higher level compression  -Dermagran dressing to ulceration  Supplies ordered through [employer]  Diagnoses and all orders for this visit:    Decreased pedal pulses  -     VAS SIMI & waveform analysis, multiple levels; Future          Subjective:      Patient ID: Siri Plata is a 78 y o  male  New patient presents in office for b/l leg swelling for years that is getting worse  Patient reports his legs get blisters on his legs that end up popping and opening  Patient has 2 small wounds from blisters that have popped on his R ankle/shine  Patient denies any pain, numbness, or tingling in the legs or feet  Patient reports he wears compression stockings daily  Patient is taking aspirin and warfarin  HPI   Patient presents to the office for evaluation of bilateral lower extremity swelling with blistering and weeping ulceration  He is referred by Dr Cash Ellis  No significant cellulitis  He is wearing over-the-counter compression socks  He denies any significant pain or discomfort in the lower extremities  He is on warfarin for AFib  He had history of right lower extremity DVT was treated with Xarelto for 6 months      The following portions of the patient's history were reviewed and updated as appropriate: allergies, current medications, past family history, past medical history, past social history, past surgical history and problem list   Review of systems reviewed    Review of Systems   Constitutional: Negative  Negative for chills and fever  HENT: Negative  Negative for ear pain, hearing loss, sinus pain, sneezing, sore throat and trouble swallowing  Eyes: Negative  Negative for pain  Respiratory: Negative  Negative for cough, chest tightness and shortness of breath  Cardiovascular: Positive for leg swelling  Negative for chest pain  Gastrointestinal: Negative  Negative for diarrhea, nausea and vomiting  Endocrine: Negative  Genitourinary: Negative  Negative for difficulty urinating  Musculoskeletal: Negative  Negative for gait problem  Skin: Positive for wound (R leg)  Allergic/Immunologic: Negative  Neurological: Negative  Negative for speech difficulty, numbness and headaches  Hematological: Negative  Does not bruise/bleed easily  Psychiatric/Behavioral: Negative  Negative for self-injury  Objective:  I have reviewed and made appropriate changes to the review of systems input by the medical assistant      Vitals:    08/31/20 1440   BP: 118/84   BP Location: Left arm   Patient Position: Sitting   Cuff Size: Adult   Pulse: 80   Resp: 18   Temp: 98 °F (36 7 °C)   TempSrc: Tympanic   Weight: 119 kg (262 lb)   Height: 6' 3" (1 905 m)       Patient Active Problem List   Diagnosis    Sacroiliitis (HCC)    Abnormal glucose level    Acute back pain    Adenomatous colon polyp    Aortic ectasia (HCC)    Benign neoplasm of colon    Bilateral hip pain    DDD (degenerative disc disease), lumbar    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)    Diverticular disease of colon    Elevated hemoglobin A1c    Encounter for long-term (current) use of other medications    Fever    FUO (fever of unknown origin)    Hyperlipidemia    Hypertension    Internal hemorrhoids    Lichen planus    Lumbar back pain    Lumbar disc herniation    MTHFR mutation (HCC)    Obstructive sleep apnea    Organic impotence    Premature atrial complexes    Premature ventricular contraction    Pure hypercholesterolemia    Radiculopathy, lumbar region    Spinal stenosis of lumbar region    Vitamin D deficiency    Persistent atrial fibrillation (HCC)    Localized edema       Past Surgical History:   Procedure Laterality Date    BACK SURGERY      COLONOSCOPY      COLONOSCOPY N/A 2016    Procedure: COLONOSCOPY;  Surgeon: Leta Jacques MD;  Location: [facility] OR;  Service:    [address] [place] [address] [address]         History reviewed  No pertinent family history      Social History     Socioeconomic History    Marital status: /Civil Union     Spouse name: Not on file    Number of children: Not on file    Years of education: Not on file    Highest education level: Not on file   Occupational History    Not on file   Social Needs    Financial resource strain: Not on file    Food insecurity     Worry: Not on file     Inability: Not on file   [employer] needs     Medical: Not on file     Non-medical: Not on file   Tobacco Use    Smoking status: Former Smoker     Packs/day: 1 00     Types: Cigarettes     Last attempt to quit: 1982     Years since quittin 7    Smokeless tobacco: Never Used   Substance and Sexual Activity    Alcohol use: No    Drug use: No    Sexual activity: Not on file   Lifestyle    Physical activity     Days per week: Not on file     Minutes per session: Not on file    Stress: Not on file   Relationships    Social connections     Talks on phone: Not on file     Gets together: Not on file     Attends Zoroastrian service: Not on file     Active member of club or organization: Not on file     Attends meetings of clubs or organizations: Not on file     Relationship status: Not on file    Intimate partner violence     Fear of current or ex partner: Not on file     Emotionally abused: Not on file     Physically abused: Not on file     Forced sexual activity: Not on file   Other Topics Concern    Not on file Social History Narrative    Not on file       Allergies   Allergen Reactions    Sildenafil Hives     Other reaction(s): severe congestion, cialis was OK         Current Outpatient Medications:     acetaminophen (TYLENOL) 500 mg tablet, Take 1,000 mg by mouth as needed , Disp: , Rfl:     aspirin 81 MG tablet, Take 81 mg by mouth Take 1 tablet every other day, Disp: , Rfl:     betamethasone, augmented, (DIPROLENE-AF) 0 05 % cream, Apply topically, Disp: , Rfl:     Cholecalciferol (VITAMIN D-3 PO), Take 4,000 Units by mouth daily  , Disp: , Rfl:     diclofenac sodium (VOLTAREN) 1 %, Place on the skin, Disp: , Rfl:     folic acid-pyridoxine-cyanocobalamin (FOLBIC) 2 5-25-2 mg, Take 1 tablet by mouth daily  , Disp: , Rfl:     losartan (COZAAR) 50 mg tablet, Take 1 tablet (50 mg total) by mouth daily, Disp: 90 tablet, Rfl: 3    metoprolol succinate (TOPROL-XL) 25 mg 24 hr tablet, Take 1 tablet (25 mg total) by mouth daily, Disp: 90 tablet, Rfl: 3    multivitamin-minerals (CENTRUM) tablet, Take 1 tablet by mouth daily, Disp: , Rfl:     polyethylene glycol (MIRALAX) 17 g packet, Take 17 g by mouth as needed , Disp: , Rfl:     warfarin (COUMADIN) 5 mg tablet, Take one tablet by mouth daily or as directed by a physician , Disp: 90 tablet, Rfl: 1    diazepam (VALIUM) 5 mg tablet, Take 5 mg by mouth, Disp: , Rfl:     gabapentin (NEURONTIN) 100 mg capsule, Take 1 capsule (100 mg total) by mouth 3 (three) times a day (Patient not taking: Reported on 8/31/2020), Disp: 90 capsule, Rfl: 0    senna (SENOKOT) 8 6 mg, Take 17 2 mg by mouth, Disp: , Rfl:     triamcinolone (KENALOG) 0 1 % cream, Every 12 hours, Disp: , Rfl:       /84 (BP Location: Left arm, Patient Position: Sitting, Cuff Size: Adult)   Pulse 80   Temp 98 °F (36 7 °C) (Tympanic)   Resp 18   Ht 6' 3" (1 905 m)   Wt 119 kg (262 lb)   BMI 32 75 kg/m²          Physical Exam  Vitals signs and nursing note reviewed     Constitutional: Appearance: Normal appearance  He is obese  HENT:      Head: Normocephalic and atraumatic  Eyes:      Extraocular Movements: Extraocular movements intact  Cardiovascular:      Pulses:           Dorsalis pedis pulses are detected w/ Doppler on the right side and detected w/ Doppler on the left side  Heart sounds: Normal heart sounds  Pulmonary:      Effort: Pulmonary effort is normal       Breath sounds: Normal breath sounds  Abdominal:      General: Abdomen is flat  Palpations: Abdomen is soft  Musculoskeletal:         General: Swelling present  Skin:     General: Skin is warm  Comments: Small superficial ulceration right ankle   Neurological:      General: No focal deficit present  Mental Status: He is alert and oriented to person, place, and time  Psychiatric:         Behavior: Behavior normal          Thought Content:  Thought content normal

## 2020-09-10 PROBLEM — R60.0 LOCALIZED EDEMA: Status: ACTIVE | Noted: 2020-09-10

## 2020-09-10 NOTE — ASSESSMENT & PLAN NOTE
42-year-old male with HTN, HLD, AFib on warfarin, history of right lower extremity DVT, MTHFR, degenerative disc disease, lumbar disc herniation who presents to the office for evaluation of bilateral lower extremity swelling with R ankle superficial ulceration  -Chronic bilateral lower extremity swelling  -Decreased pedal pulses  -Will evaluate with ankle brachial index for baseline perfusion  -Light Tubigrip compression given  -Will return to the office after testing and determine if he can tolerate a higher level compression  -Dermagran dressing to ulceration  Supplies ordered through [employer]

## 2020-09-21 ENCOUNTER — ANTICOAG VISIT (OUTPATIENT)
Dept: CARDIOLOGY CLINIC | Facility: CLINIC | Age: 80
End: 2020-09-21

## 2020-09-21 ENCOUNTER — APPOINTMENT (OUTPATIENT)
Dept: LAB | Facility: CLINIC | Age: 80
End: 2020-09-21
Payer: MEDICARE

## 2020-09-21 DIAGNOSIS — I48.19 PERSISTENT ATRIAL FIBRILLATION (HCC): ICD-10-CM

## 2020-09-22 ENCOUNTER — HOSPITAL ENCOUNTER (OUTPATIENT)
Dept: NON INVASIVE DIAGNOSTICS | Facility: CLINIC | Age: 80
Discharge: HOME | End: 2020-09-22
Payer: MEDICARE

## 2020-09-22 DIAGNOSIS — R09.89 DECREASED PEDAL PULSES: ICD-10-CM

## 2020-09-22 PROCEDURE — 93923 UPR/LXTR ART STDY 3+ LVLS: CPT

## 2020-09-22 PROCEDURE — 93923 UPR/LXTR ART STDY 3+ LVLS: CPT | Performed by: SURGERY

## 2020-09-25 ENCOUNTER — TELEPHONE (OUTPATIENT)
Dept: VASCULAR SURGERY | Facility: CLINIC | Age: 80
End: 2020-09-25

## 2020-09-25 NOTE — TELEPHONE ENCOUNTER

## 2020-09-28 ENCOUNTER — OFFICE VISIT (OUTPATIENT)
Dept: VASCULAR SURGERY | Facility: CLINIC | Age: 80
End: 2020-09-28
Payer: MEDICARE

## 2020-09-28 VITALS
DIASTOLIC BLOOD PRESSURE: 76 MMHG | HEART RATE: 68 BPM | SYSTOLIC BLOOD PRESSURE: 128 MMHG | WEIGHT: 265 LBS | HEIGHT: 74 IN | RESPIRATION RATE: 20 BRPM | BODY MASS INDEX: 34.01 KG/M2 | TEMPERATURE: 97.3 F

## 2020-09-28 DIAGNOSIS — R60.0 LOCALIZED EDEMA: Primary | ICD-10-CM

## 2020-09-28 PROCEDURE — 99213 OFFICE O/P EST LOW 20 MIN: CPT | Performed by: NURSE PRACTITIONER

## 2020-09-28 NOTE — PROGRESS NOTES
Assessment/Plan:    Localized edema  68-year-old male with HTN, HLD, AFib on warfarin, chronic BLE swelling with stasis changes, history of right lower extremity DVT, MTHFR, degenerative disc disease, lumbar disc herniation who returns to the office to review SIMI 9/22/20  -LEAD showed triphasic arterial waveforms, SIMI 1 22/1 15  -Rx 20-30mmHg compression given   -R ankle ulceration healed now w/ multiple small scabs BLE secondary to scratching   -Eucerin or Aquaphor moisturizer daily to maintain skin integrity   -Periodic leg elevation and frequent ambulation   -Follow up in 3 months to check lower extremity swelling with use of compression   -Notify the office if new lower extremity wounds  Diagnoses and all orders for this visit:    Localized edema  -     Compression Stocking          Subjective:      Patient ID: Margot Terry is a 78 y o  male  Patient had an SIMI & Waveform on 9/22/20  Pt has several scabbed over areas on BLE  Pt denies leg pain, but does pick at the legs  Pt changes the Dermagram dressing daily  Pt uses compression stocking and elevates his legs daily  Pt is on ASA 81 mg and Warfarin  HPI  Patient returns to the office to review SIMI 9/22/20  R ankle ulceration healed with Dermagran dressing daily   Now has small BLE scabs from scratching to the distal thigh knee and medial below knee calf   He uses vaseline intensive lotion on occasion when skin feels dry   He is wearing over the counter compression  Reviewed LEAD which showed triphasic arterial waveforms and preserved SIMI  The following portions of the patient's history were reviewed and updated as appropriate: allergies, current medications, past family history, past medical history, past social history, past surgical history and problem list   ROS reviewed     Review of Systems   Constitutional: Negative  HENT: Negative  Eyes: Negative  Respiratory: Negative  Cardiovascular: Positive for leg swelling  Gastrointestinal: Negative  Endocrine: Negative  Genitourinary: Negative  Musculoskeletal: Positive for arthralgias and myalgias  Skin: Positive for color change and wound  Allergic/Immunologic: Negative  Neurological: Negative  Hematological: Negative  Psychiatric/Behavioral: Negative  Objective:    I have reviewed and made appropriate changes to the review of systems input by the medical assistant  Vitals:    09/28/20 1409 09/28/20 1412   BP: 132/80 128/76   BP Location: Right arm Left arm   Patient Position: Sitting Sitting   Pulse: 68    Resp: 20    Temp: (!) 97 3 °F (36 3 °C)    TempSrc: Tympanic    Weight: 120 kg (265 lb)    Height: 6' 2" (1 88 m)        Patient Active Problem List   Diagnosis    Sacroiliitis (HCC)    Abnormal glucose level    Acute back pain    Adenomatous colon polyp    Aortic ectasia (HCC)    Benign neoplasm of colon    Bilateral hip pain    DDD (degenerative disc disease), lumbar    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)    Diverticular disease of colon    Elevated hemoglobin A1c    Encounter for long-term (current) use of other medications    Fever    FUO (fever of unknown origin)    Hyperlipidemia    Hypertension    Internal hemorrhoids    Lichen planus    Lumbar back pain    Lumbar disc herniation    MTHFR mutation (HCC)    Obstructive sleep apnea    Organic impotence    Premature atrial complexes    Premature ventricular contraction    Pure hypercholesterolemia    Radiculopathy, lumbar region    Spinal stenosis of lumbar region    Vitamin D deficiency    Persistent atrial fibrillation (HCC)    Localized edema       Past Surgical History:   Procedure Laterality Date    BACK SURGERY      COLONOSCOPY      COLONOSCOPY N/A 2/24/2016    Procedure: COLONOSCOPY;  Surgeon: Tiffany Diego MD;  [facility]: [facility];  Service:    [facility] HERNIA REPAIR         History reviewed  No pertinent family history      Social History     Socioeconomic History    Marital status: /Civil Union     Spouse name: Not on file    Number of children: Not on file    Years of education: Not on file    Highest education level: Not on file   Occupational History    Not on file   Social Needs    Financial resource strain: Not on file    Food insecurity     Worry: Not on file     Inability: Not on file    Transportation needs     Medical: Not on file     Non-medical: Not on file   Tobacco Use    Smoking status: Former Smoker     Packs/day: 1 00     Types: Cigarettes     Last attempt to quit:      Years since quittin 7    Smokeless tobacco: Never Used   Substance and Sexual Activity    Alcohol use: No    Drug use: No    Sexual activity: Not on file   Lifestyle    Physical activity     Days per week: Not on file     Minutes per session: Not on file    Stress: Not on file   Relationships    Social connections     Talks on phone: Not on file     Gets together: Not on file     Attends Synagogue service: Not on file     Active member of club or organization: Not on file     Attends meetings of clubs or organizations: Not on file     Relationship status: Not on file    Intimate partner violence     Fear of current or ex partner: Not on file     Emotionally abused: Not on file     Physically abused: Not on file     Forced sexual activity: Not on file   Other Topics Concern    Not on file   Social History Narrative    Not on file       Allergies   Allergen Reactions    Sildenafil Hives     Other reaction(s): severe congestion, cialis was OK         Current Outpatient Medications:     acetaminophen (TYLENOL) 500 mg tablet, Take 1,000 mg by mouth as needed , Disp: , Rfl:     aspirin 81 MG tablet, Take 81 mg by mouth Take 1 tablet every other day, Disp: , Rfl:     folic acid-pyridoxine-cyanocobalamin (FOLBIC) 2 5-25-2 mg, Take 1 tablet by mouth daily  , Disp: , Rfl:     losartan (COZAAR) 50 mg tablet, Take 1 tablet (50 mg total) by mouth daily, Disp: 90 tablet, Rfl: 3    metoprolol succinate (TOPROL-XL) 25 mg 24 hr tablet, Take 1 tablet (25 mg total) by mouth daily, Disp: 90 tablet, Rfl: 3    multivitamin-minerals (CENTRUM) tablet, Take 1 tablet by mouth daily, Disp: , Rfl:     polyethylene glycol (MIRALAX) 17 g packet, Take 17 g by mouth as needed , Disp: , Rfl:     triamcinolone (KENALOG) 0 1 % cream, Every 12 hours, Disp: , Rfl:     warfarin (COUMADIN) 5 mg tablet, Take one tablet by mouth daily or as directed by a physician , Disp: 90 tablet, Rfl: 1    betamethasone, augmented, (DIPROLENE-AF) 0 05 % cream, Apply topically, Disp: , Rfl:     Cholecalciferol (VITAMIN D-3 PO), Take 4,000 Units by mouth daily  , Disp: , Rfl:     diazepam (VALIUM) 5 mg tablet, Take 5 mg by mouth, Disp: , Rfl:     diclofenac sodium (VOLTAREN) 1 %, Place on the skin, Disp: , Rfl:     gabapentin (NEURONTIN) 100 mg capsule, Take 1 capsule (100 mg total) by mouth 3 (three) times a day (Patient not taking: Reported on 8/31/2020), Disp: 90 capsule, Rfl: 0    senna (SENOKOT) 8 6 mg, Take 17 2 mg by mouth, Disp: , Rfl:     /76 (BP Location: Left arm, Patient Position: Sitting)   Pulse 68   Temp (!) 97 3 °F (36 3 °C) (Tympanic)   Resp 20   Ht 6' 2" (1 88 m)   Wt 120 kg (265 lb)   BMI 34 02 kg/m²          Physical Exam  Vitals signs and nursing note reviewed  Eyes:      Extraocular Movements: Extraocular movements intact  Pulmonary:      Effort: Pulmonary effort is normal    Abdominal:      Palpations: Abdomen is soft  Musculoskeletal:         General: No swelling  Skin:     General: Skin is warm and dry  Comments: BLE stasis changes below knee and small scabs BLE thigh, knee and medial calf    Neurological:      General: No focal deficit present  Mental Status: He is alert and oriented to person, place, and time     Psychiatric:         Mood and Affect: Mood normal          Behavior: Behavior normal

## 2020-09-28 NOTE — PATIENT INSTRUCTIONS
Varicose Veins   AMBULATORY CARE:   Varicose veins  are veins that become large, twisted, and swollen  They are common on the back of the calves, knees, and thighs  Varicose veins are caused by valves in your veins that do not work properly  This causes blood to collect and increase pressure in the veins of your legs  The increased pressure causes your veins to stretch, get larger, swell, and twist        Common symptoms include the following: Your symptoms may be worse after you stand or sit for long periods of time  You may have any of the following:  · Blue, purple, or bulging veins in your legs     · Pain, swelling, or muscle cramps in your legs    · Feeling of fatigue or heaviness in your legs    · Cramping in your legs  Seek care immediately if:   · You have a wound that does not heal or is infected  · You have an injury that has broken your skin and caused your varicose veins to bleed  · Your leg is swollen and hard  · You notice that your legs or feet are turning blue or black  · Your leg feels warm, tender, and painful  It may look swollen and red  Contact your healthcare provider if:   · You have pain in your leg that does not go away or gets worse  · You notice sudden large bruising on your legs  · You have a rash on your leg  · Your symptoms keep you from doing your daily activities  · You have questions or concerns about your condition or care  Treatment of varicose veins  aims to decrease symptoms, improve appearance, and prevent further problems  Treatment will depend on which veins are affected and how severe your condition is  You may need procedures to treat or remove your varicose veins  For example, your healthcare provider may inject a solution or use a laser to close the varicose veins  Surgery to remove long veins may also be done  Ask your healthcare provider for more information about procedures used to treat varicose veins    Manage varicose veins:   · Do not sit or stand for long periods of time  This can cause the blood to collect in your legs and make your symptoms worse  Bend or rotate your ankles several times every hour  Walk around for a few minutes every hour to get blood moving in your legs  · Do not cross your legs when you sit  This decreases blood flow to your feet and can make your symptoms worse  · Do not wear tight clothing or shoes  Do not wear high-heeled shoes  Do not wear clothes that are tight around the waist or knees  · Maintain a healthy weight  Being overweight or obese can make your varicose veins worse  Ask your healthcare provider how much you should weigh  Ask him or her to help you create a weight loss plan if you are overweight  · Wear pressure stockings as directed  The stockings are tight and put pressure on your legs  They improve blood flow and help prevent clots  · Elevate your legs  Keep them above the level of your heart for 15 to 30 minutes several times a day  You can also prop the end of your bed up slightly to elevate your legs while you sleep  This will help blood to flow back to your heart  · Get regular exercise  Talk to your healthcare provider about the best exercise plan for you  Exercise can improve blood flow to your legs and feet  Follow up with your healthcare provider as directed:  Write down your questions so you remember to ask them during your visits  © 2017 [facility] Information is for End User's use only and may not be sold, redistributed or otherwise used for commercial purposes  All illustrations and images included in CareNotes® are the copyrighted property of A D A M , Inc  or [employer]  The above information is an  only  It is not intended as medical advice for individual conditions or treatments  Talk to your doctor, nurse or pharmacist before following any medical regimen to see if it is safe and effective for you

## 2020-09-29 NOTE — ASSESSMENT & PLAN NOTE
57-year-old male with HTN, HLD, AFib on warfarin, chronic BLE swelling with stasis changes, history of right lower extremity DVT, MTHFR, degenerative disc disease, lumbar disc herniation who returns to the office to review SIMI 9/22/20  -LEAD showed triphasic arterial waveforms, SIMI 1 22/1 15  -Rx 20-30mmHg compression given   -R ankle ulceration healed now w/ multiple small scabs BLE secondary to scratching   -Eucerin or Aquaphor moisturizer daily to maintain skin integrity   -Periodic leg elevation and frequent ambulation   -Follow up in 3 months to check lower extremity swelling with use of compression   -Notify the office if new lower extremity wounds

## 2020-10-15 ENCOUNTER — TELEPHONE (OUTPATIENT)
Dept: CARDIOLOGY CLINIC | Facility: CLINIC | Age: 80
End: 2020-10-15

## 2020-10-15 DIAGNOSIS — I48.19 PERSISTENT ATRIAL FIBRILLATION (HCC): ICD-10-CM

## 2020-10-15 RX ORDER — WARFARIN SODIUM 5 MG/1
TABLET ORAL
Qty: 135 TABLET | Refills: 3 | Status: SHIPPED | OUTPATIENT
Start: 2020-10-15 | End: 2021-10-19

## 2020-10-19 ENCOUNTER — APPOINTMENT (OUTPATIENT)
Dept: LAB | Facility: CLINIC | Age: 80
End: 2020-10-19
Payer: MEDICARE

## 2020-10-20 ENCOUNTER — ANTICOAG VISIT (OUTPATIENT)
Dept: CARDIOLOGY CLINIC | Facility: CLINIC | Age: 80
End: 2020-10-20

## 2020-10-20 DIAGNOSIS — I48.19 PERSISTENT ATRIAL FIBRILLATION (HCC): ICD-10-CM

## 2020-11-16 ENCOUNTER — ANTICOAG VISIT (OUTPATIENT)
Dept: CARDIOLOGY CLINIC | Facility: CLINIC | Age: 80
End: 2020-11-16

## 2020-11-16 ENCOUNTER — LAB (OUTPATIENT)
Dept: LAB | Facility: CLINIC | Age: 80
End: 2020-11-16
Payer: MEDICARE

## 2020-11-16 DIAGNOSIS — I48.19 PERSISTENT ATRIAL FIBRILLATION (HCC): ICD-10-CM

## 2020-12-11 ENCOUNTER — TELEPHONE (OUTPATIENT)
Dept: VASCULAR SURGERY | Facility: CLINIC | Age: 80
End: 2020-12-11

## 2020-12-14 ENCOUNTER — ANTICOAG VISIT (OUTPATIENT)
Dept: CARDIOLOGY CLINIC | Facility: CLINIC | Age: 80
End: 2020-12-14

## 2020-12-14 ENCOUNTER — TRANSCRIBE ORDERS (OUTPATIENT)
Dept: LAB | Facility: CLINIC | Age: 80
End: 2020-12-14

## 2020-12-14 ENCOUNTER — OFFICE VISIT (OUTPATIENT)
Dept: VASCULAR SURGERY | Facility: CLINIC | Age: 80
End: 2020-12-14
Payer: MEDICARE

## 2020-12-14 ENCOUNTER — LAB (OUTPATIENT)
Dept: LAB | Facility: CLINIC | Age: 80
End: 2020-12-14
Payer: MEDICARE

## 2020-12-14 VITALS
SYSTOLIC BLOOD PRESSURE: 140 MMHG | HEART RATE: 86 BPM | WEIGHT: 269.2 LBS | DIASTOLIC BLOOD PRESSURE: 90 MMHG | HEIGHT: 74 IN | BODY MASS INDEX: 34.55 KG/M2

## 2020-12-14 DIAGNOSIS — I48.19 PERSISTENT ATRIAL FIBRILLATION (HCC): ICD-10-CM

## 2020-12-14 DIAGNOSIS — R60.0 LOCALIZED EDEMA: Primary | ICD-10-CM

## 2020-12-14 PROCEDURE — 99213 OFFICE O/P EST LOW 20 MIN: CPT | Performed by: NURSE PRACTITIONER

## 2021-01-11 ENCOUNTER — ANTICOAG VISIT (OUTPATIENT)
Dept: CARDIOLOGY CLINIC | Facility: CLINIC | Age: 81
End: 2021-01-11

## 2021-01-11 ENCOUNTER — LAB (OUTPATIENT)
Dept: LAB | Facility: CLINIC | Age: 81
End: 2021-01-11
Payer: MEDICARE

## 2021-01-11 DIAGNOSIS — I48.19 PERSISTENT ATRIAL FIBRILLATION (HCC): ICD-10-CM

## 2021-01-18 ENCOUNTER — HOSPITAL ENCOUNTER (OUTPATIENT)
Dept: RADIOLOGY | Facility: HOSPITAL | Age: 81
Discharge: HOME | End: 2021-01-18
Attending: FAMILY MEDICINE
Payer: MEDICARE

## 2021-01-18 ENCOUNTER — TRANSCRIBE ORDERS (OUTPATIENT)
Dept: ADMINISTRATIVE | Facility: HOSPITAL | Age: 81
End: 2021-01-18

## 2021-01-18 DIAGNOSIS — M79.673 PAIN OF FOOT, UNSPECIFIED LATERALITY: Primary | ICD-10-CM

## 2021-01-18 DIAGNOSIS — M79.673 PAIN OF FOOT, UNSPECIFIED LATERALITY: ICD-10-CM

## 2021-01-18 PROCEDURE — 73630 X-RAY EXAM OF FOOT: CPT

## 2021-02-08 ENCOUNTER — ANTICOAG VISIT (OUTPATIENT)
Dept: CARDIOLOGY CLINIC | Facility: CLINIC | Age: 81
End: 2021-02-08

## 2021-02-08 ENCOUNTER — LAB (OUTPATIENT)
Dept: LAB | Facility: CLINIC | Age: 81
End: 2021-02-08
Payer: MEDICARE

## 2021-02-08 DIAGNOSIS — I48.19 PERSISTENT ATRIAL FIBRILLATION (HCC): ICD-10-CM

## 2021-03-08 ENCOUNTER — LAB (OUTPATIENT)
Dept: LAB | Facility: CLINIC | Age: 81
End: 2021-03-08
Payer: MEDICARE

## 2021-03-08 ENCOUNTER — ANTICOAG VISIT (OUTPATIENT)
Dept: CARDIOLOGY CLINIC | Facility: CLINIC | Age: 81
End: 2021-03-08

## 2021-03-08 ENCOUNTER — TRANSCRIBE ORDERS (OUTPATIENT)
Dept: ADMINISTRATIVE | Facility: HOSPITAL | Age: 81
End: 2021-03-08

## 2021-03-08 DIAGNOSIS — M54.16 RADICULOPATHY, LUMBAR REGION: Primary | ICD-10-CM

## 2021-03-08 DIAGNOSIS — I48.19 PERSISTENT ATRIAL FIBRILLATION (HCC): ICD-10-CM

## 2021-03-16 ENCOUNTER — OFFICE VISIT (OUTPATIENT)
Dept: CARDIOLOGY CLINIC | Facility: CLINIC | Age: 81
End: 2021-03-16
Payer: MEDICARE

## 2021-03-16 VITALS
WEIGHT: 272 LBS | BODY MASS INDEX: 34.91 KG/M2 | HEIGHT: 74 IN | DIASTOLIC BLOOD PRESSURE: 70 MMHG | HEART RATE: 95 BPM | SYSTOLIC BLOOD PRESSURE: 110 MMHG

## 2021-03-16 DIAGNOSIS — G47.33 OBSTRUCTIVE SLEEP APNEA: ICD-10-CM

## 2021-03-16 DIAGNOSIS — I49.3 PREMATURE VENTRICULAR CONTRACTION: ICD-10-CM

## 2021-03-16 DIAGNOSIS — I49.1 PREMATURE ATRIAL COMPLEXES: ICD-10-CM

## 2021-03-16 DIAGNOSIS — I48.19 PERSISTENT ATRIAL FIBRILLATION (HCC): Primary | ICD-10-CM

## 2021-03-16 DIAGNOSIS — I10 ESSENTIAL HYPERTENSION: ICD-10-CM

## 2021-03-16 DIAGNOSIS — E78.2 MIXED HYPERLIPIDEMIA: ICD-10-CM

## 2021-03-16 PROCEDURE — 99214 OFFICE O/P EST MOD 30 MIN: CPT | Performed by: INTERNAL MEDICINE

## 2021-03-16 PROCEDURE — 93000 ELECTROCARDIOGRAM COMPLETE: CPT | Performed by: INTERNAL MEDICINE

## 2021-03-16 NOTE — PATIENT INSTRUCTIONS
A-fib (Atrial Fibrillation)   AMBULATORY CARE:   Atrial fibrillation (a-fib)  is an irregular heartbeat  It reduces your heart's ability to pump blood through your body  A-fib may come and go, or it may be a long-term condition  A-fib can cause life-threatening blood clots, stroke, or heart failure  It is important to treat and manage a-fib to help prevent these problems  Common signs and symptoms include the following:   · A heartbeat that races, pounds, or flutters    · Weakness, severe tiredness, or confusion    · Feeling lightheaded, sweaty, dizzy, or faint    · Shortness of breath or anxiety    · Chest pain or pressure    Call your local emergency number (911 in the [address] [facility]) if:   · You have any of the following signs of a heart attack:      ? Squeezing, pressure, or pain in your chest    ? You may  also have any of the following:     ? Discomfort or pain in your back, neck, jaw, stomach, or arm    ? Shortness of breath    ? Nausea or vomiting    ? Lightheadedness or a sudden cold sweat    · You have any of the following signs of a stroke:      ? Numbness or drooping on one side of your face     ? Weakness in an arm or leg    ? Confusion or difficulty speaking    ? Dizziness, a severe headache, or vision loss    Call your cardiologist if:   · Your arm or leg feels warm, tender, and painful  It may look swollen and red  · Your heart rate is more than 110 beats per minute  · You have new or worsening swelling in your legs, feet, ankles, or abdomen  · You are short of breath, even at rest      · You have questions or concerns about your condition or care  Treatment for A-fib:  Conditions that cause a-fib, such as thyroid disease, will be treated  You may also need any of the following:  · Heart medicines  help control your heart rate or rhythm  You may need more than one medicine to treat your symptoms  · Antiplatelet or blood thinner medicines  help prevent blood clots and stroke  · Cardioversion  is a procedure to return your heart rate and rhythm to normal  It can be done using medicines or electric shock  · A-fib ablation  is a procedure that uses energy to burn a small area of heart tissue  This creates scar tissue and prevents electrical signals that cause a-fib  You may need this procedure more than once  Ask for more information on a-fib ablation  · A pacemaker  may be inserted into your heart  A pacemaker is a device that controls your heartbeat  A pacemaker may be inserted during an ablation procedure or surgery  Ask your healthcare provider for more information on pacemakers  · Surgery  may be needed if other procedures do not work  During surgery your healthcare provider will make cuts in the upper part of your heart  The provider will stitch the cuts together to create scar tissue  The scar tissue will prevent electrical signals that cause a-fib  Manage A-fib:   · Know your target heart rate  Learn how to check your pulse and monitor your heart rate  · Know the risks if you choose to drink alcohol  Alcohol can make a-fib hard to manage  Ask your healthcare provider if it is safe for you to drink alcohol  A drink of alcohol is 12 ounces of beer, 5 ounces of wine, or 1½ ounces of liquor  · Do not smoke  Nicotine can cause heart damage and make it more difficult to manage your a-fib  Do not use e-cigarettes or smokeless tobacco in place of cigarettes or to help you quit  They still contain nicotine  Ask your healthcare provider for information if you currently smoke and need help quitting  · Eat heart-healthy foods  Heart healthy foods will help keep your cholesterol low  These include fruits, vegetables, whole-grain breads, low-fat dairy products, beans, lean meats, and fish  Replace butter and margarine with heart-healthy oils such as olive oil and canola oil  · Maintain a healthy weight  Ask your healthcare provider how much you should weigh  Ask him or her to help you create a safe weight loss plan if you are overweight  Even a small goal of a 10% weight loss can improve your heart health  · Get regular physical activity  Physical activity helps improve your heart health  Get at least 150 minutes of moderate aerobic physical activity each week  Your healthcare provider can help you create an activity plan  · Manage other health conditions  This includes high blood pressure or cholesterol, sleep apnea, diabetes, and other heart conditions  Take medicine as directed and follow your treatment plan  Follow up with your cardiologist as directed: You will need regular blood tests and monitoring  Write down your questions so you remember to ask them during your visits  © Copyright [address] Information is for End User's use only and may not be sold, redistributed or otherwise used for commercial purposes  All illustrations and images included in CareNotes® are the copyrighted property of [employer]  or [facility] [employer]   The above information is an  only  It is not intended as medical advice for individual conditions or treatments  Talk to your doctor, nurse or pharmacist before following any medical regimen to see if it is safe and effective for you

## 2021-03-16 NOTE — PROGRESS NOTES
Cardiology Follow Up    [address]  1940  [id number]  [address] [facility]  [address] [place] [address] [address]    1  Persistent atrial fibrillation (HCC)  POCT ECG   2  Premature ventricular contraction     3  Premature atrial complexes     4  Obstructive sleep apnea     5  Mixed hyperlipidemia     6  Essential hypertension           Discussion/Summary:    1  Persistent atrial fibrillation - He remains asymptomatic and heart rates remain controlled  He will remain on the same dose of Toprol-XL  He is on warfarin for stroke prevention and has his INRs followed regularly  We will continue to follow ECGs with each visit  He will see us back in 6 months  2   PVCs - These were once symptomatic, when he was off CPAP  However now he is asymptomatic and was started on Toprol-XL for his atrial fibrillation  No other issues from this standpoint  3   HTN - Well controlled  No changes were made to his therapy today  He should periodically check his blood pressure at home  4   Questionable TIA in the past - This is presumed to be a TIA, and likely embolic from paroxysmal atrial fibrillation  Now on anticoagulation  Interval History:     Ed returns for followup regarding his history of hypertension, hypertensive heart disease, and hypercholesterolemia  He also has palpitations associated with PACs, PVCs and brief short runs of atrial tachycardia on a Holter monitor  I had him go through an echo and stress nuclear testing about 5 years ago  He had no ischemia by stress testing and his echo demonstrated normal left ventricular ejection fraction, mild left ventricular hypertrophy and diastolic dysfunction and no significant valvular disease  Then a couple years ago he conveyed to me two instances of what sounded like a transient ischemic attack   What he described was left-sided arm weakness to the point where he was not able to lift his arm, lasting only for 10-20 seconds and then completely resolving  He was also continuing to get intermittent palpitations  Because of a concern for paroxysmal atrial fibrillation, I had him wear a month long event monitor  This did not demonstrate any atrial fibrillation, just PACs and PVCs as his Holter monitor showed  At that time he was not wearing his CPAP mask for sleep apnea  After getting another sleep study and seeing his sleep physician, he became compliant on this mask  Also he was diagnosed with a lower extremity DVT, and took Xarelto for 6 months  He went through a workup for hypercoagulable state which was negative  Then at our last visit Ed arrived  in new onset atrial fibrillation  His heart rates were controlled and he was asymptomatic from this standpoint  At that visit we added Toprol-XL 25 mg daily and decreased his losartan  Since he was asymptomatic we chose to pursue a rate control strategy  We also initiated anticoagulation, but he did not like the price of Xarelto  Therefore he was started on warfarin  We did an echocardiogram that showed normal LV systolic function, moderate LVH and moderate biatrial enlargement  He continues to remain asymptomatic from an atrial fibrillation standpoint  He denies any other cardiac symptoms currently  He denies any chest pain or any worsening shortness of breath  He is active without limitations  He does get some shortness of breath with upper levels of exertion which have not changed for years  He denies orthopnea, PND or any other symptoms of congestive heart failure  He remains in sinus rhythm today  He has not had any return of focal neurologic deficits  He is active and does exercise on a regular basis without limitations        Patient Active Problem List   Diagnosis    Sacroiliitis (HCC)    Abnormal glucose level    Acute back pain    Adenomatous colon polyp    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)    Diverticular disease of colon    Elevated hemoglobin A1c    Encounter for long-term (current) use of other medications    Fever    FUO (fever of unknown origin)    Hyperlipidemia    Hypertension    Internal hemorrhoids    Lichen planus    Lumbar back pain    Lumbar disc herniation    MTHFR mutation (HCC)    Obstructive sleep apnea    Organic impotence    Premature atrial complexes    Premature ventricular contraction    Pure hypercholesterolemia    Radiculopathy, lumbar region    Spinal stenosis of lumbar region    Vitamin D deficiency    Persistent atrial fibrillation (HCC)    Localized edema     Past Medical History:   Diagnosis Date    Blood clot in vein 2015    in right foot    Hypertension      Social History     Socioeconomic History    Marital status: /Civil Union     Spouse name: Not on file    Number of children: Not on file    Years of education: Not on file    Highest education level: Not on file   Occupational History    Not on file   Social Needs    Financial resource strain: Not on file    Food insecurity     Worry: Not on file     Inability: Not on file   [employer] needs     Medical: Not on file     Non-medical: Not on file   Tobacco Use    Smoking status: Former Smoker     Packs/day: 1 00     Types: Cigarettes     Quit date:      Years since quittin 2    Smokeless tobacco: Never Used   Substance and Sexual Activity    Alcohol use: No    Drug use: No    Sexual activity: Not on file   Lifestyle    Physical activity     Days per week: Not on file     Minutes per session: Not on file    Stress: Not on file   Relationships    Social connections     Talks on phone: Not on file     Gets together: Not on file     Attends [employer] service: Not on file     Active member of club or organization: Not on file     Attends meetings of clubs or organizations: Not on file     Relationship status: Not on file    Intimate partner violence     Fear of current or ex partner: Not on file     Emotionally abused: Not on file     Physically abused: Not on file     Forced sexual activity: Not on file   Other Topics Concern    Not on file   Social History Narrative    Not on file      No family history on file  Past Surgical History:   Procedure Laterality Date    BACK SURGERY      COLONOSCOPY      COLONOSCOPY N/A 2/24/2016    Procedure: COLONOSCOPY;  Surgeon: Malu Medrano MD;  Location: [facility];  Service:    [facility] HERNIA REPAIR         Current Outpatient Medications:     acetaminophen (TYLENOL) 500 mg tablet, Take 1,000 mg by mouth as needed , Disp: , Rfl:     aspirin 81 MG tablet, Take 81 mg by mouth Take 1 tablet every other day, Disp: , Rfl:     betamethasone, augmented, (DIPROLENE-AF) 0 05 % cream, Apply topically, Disp: , Rfl:     Cholecalciferol (VITAMIN D-3 PO), Take 4,000 Units by mouth daily  , Disp: , Rfl:     diclofenac sodium (VOLTAREN) 1 %, Place on the skin, Disp: , Rfl:     folic acid-pyridoxine-cyanocobalamin (FOLBIC) 2 5-25-2 mg, Take 1 tablet by mouth daily  , Disp: , Rfl:     losartan (COZAAR) 50 mg tablet, Take 1 tablet (50 mg total) by mouth daily (Patient taking differently: Take 25 mg by mouth daily ), Disp: 90 tablet, Rfl: 3    metoprolol succinate (TOPROL-XL) 25 mg 24 hr tablet, Take 1 tablet (25 mg total) by mouth daily, Disp: 90 tablet, Rfl: 3    multivitamin-minerals (CENTRUM) tablet, Take 1 tablet by mouth daily, Disp: , Rfl:     polyethylene glycol (MIRALAX) 17 g packet, Take 17 g by mouth as needed , Disp: , Rfl:     senna (SENOKOT) 8 6 mg, Take 17 2 mg by mouth, Disp: , Rfl:     triamcinolone (KENALOG) 0 1 % cream, Every 12 hours, Disp: , Rfl:     warfarin (COUMADIN) 5 mg tablet, TAKE 1 TO 1 1/2 TABS BY MOUTH DAILY OR AS DIRECTED BY PHYSICIAN , Disp: 135 tablet, Rfl: 3    gabapentin (NEURONTIN) 100 mg capsule, Take 1 capsule (100 mg total) by mouth 3 (three) times a day (Patient not taking: Reported on 8/31/2020), Disp: 90 capsule, Rfl: 0  Allergies   Allergen Reactions    Sildenafil Hives     Other reaction(s): severe congestion, cialis was OK       Labs:  Lab Results   Component Value Date     12/17/2015    K 4 0 01/22/2020    K 4 5 12/17/2015     01/22/2020     12/17/2015    CO2 29 01/22/2020    CO2 30 12/17/2015    BUN 22 01/22/2020    BUN 20 12/17/2015    CREATININE 1 29 01/22/2020    CREATININE 1 01 12/17/2015    GLUCOSE 92 12/17/2015    CALCIUM 9 3 01/22/2020    CALCIUM 8 6 12/17/2015     Lab Results   Component Value Date    WBC 7 95 01/22/2020    WBC 6 61 12/17/2015    HGB 16 4 01/22/2020    HGB 15 4 12/17/2015    HCT 49 5 (H) 01/22/2020    HCT 44 8 12/17/2015    MCV 97 01/22/2020    MCV 92 12/17/2015     01/22/2020     12/17/2015     Lab Results   Component Value Date    CHOL 185 12/17/2015    TRIG 101 01/22/2020    TRIG 162 12/17/2015    HDL 80 01/22/2020    HDL 80 12/17/2015     Imaging:  ECG today shows atrial fibrillation, left axis deviation low voltage, poor R-wave progression    Review of Systems:  Review of Systems   Constitutional: Negative  HENT: Negative  Eyes: Negative  Respiratory: Negative  Cardiovascular: Positive for palpitations  Gastrointestinal: Negative  Musculoskeletal: Positive for back pain  Skin: Negative  Allergic/Immunologic: Negative  Neurological: Negative  Hematological: Negative  Psychiatric/Behavioral: Negative  All other systems reviewed and are negative  Vitals:    03/16/21 1524   BP: 110/70   BP Location: Left arm   Patient Position: Sitting   Cuff Size: Standard   Pulse: 95   Weight: 123 kg (272 lb)   Height: 6' 2" (1 88 m)       Physical Exam:  Physical Exam   Constitutional: He is oriented to person, place, and time  He appears well-developed and well-nourished  HENT:   Head: Normocephalic and atraumatic  Eyes: Pupils are equal, round, and reactive to light  EOM are normal  Right eye exhibits no discharge  Left eye exhibits no discharge  No scleral icterus  Neck: Normal range of motion  Neck supple  No JVD present  No thyromegaly present  Cardiovascular: Normal rate, regular rhythm, S1 normal, S2 normal, normal heart sounds, intact distal pulses and normal pulses  Extrasystoles are present  Exam reveals no gallop and no friction rub  No murmur heard  Pulmonary/Chest: Effort normal and breath sounds normal  No respiratory distress  He has no wheezes  He has no rales  Abdominal: Soft  Bowel sounds are normal  He exhibits no distension  There is no abdominal tenderness  Musculoskeletal: Normal range of motion  General: No tenderness, deformity or edema  Neurological: He is alert and oriented to person, place, and time  No cranial nerve deficit  Skin: Skin is warm and dry  No rash noted  Psychiatric: He has a normal mood and affect  Judgment and thought content normal    Nursing note and vitals reviewed  Counseling / Coordination of Care  Total floor / unit time spent today 25 minutes  Greater than 50% of total time was spent with the patient and / or family counseling and / or coordination of care

## 2021-03-25 ENCOUNTER — HOSPITAL ENCOUNTER (OUTPATIENT)
Dept: MRI IMAGING | Facility: HOSPITAL | Age: 81
Discharge: HOME | End: 2021-03-25
Attending: FAMILY MEDICINE
Payer: MEDICARE

## 2021-03-25 DIAGNOSIS — M54.16 RADICULOPATHY, LUMBAR REGION: ICD-10-CM

## 2021-03-25 PROCEDURE — 72148 MRI LUMBAR SPINE W/O DYE: CPT

## 2021-03-25 PROCEDURE — G1004 CDSM NDSC: HCPCS

## 2021-04-05 ENCOUNTER — EVALUATION (OUTPATIENT)
Dept: PHYSICAL THERAPY | Facility: CLINIC | Age: 81
End: 2021-04-05
Payer: MEDICARE

## 2021-04-05 DIAGNOSIS — M21.372 LEFT FOOT DROP: ICD-10-CM

## 2021-04-05 DIAGNOSIS — M54.16 LUMBAR RADICULOPATHY, CHRONIC: Primary | ICD-10-CM

## 2021-04-05 PROCEDURE — 97162 PT EVAL MOD COMPLEX 30 MIN: CPT

## 2021-04-05 NOTE — PROGRESS NOTES
PT Evaluation     Today's date: 2021  Patient name: Brigid Alarcon  : 1940  MRN: [id number]  Referring provider: Ryan Fuller DO  Dx:   Encounter Diagnosis     ICD-10-CM    1  Lumbar radiculopathy, chronic  M54 16    2  Left foot drop  M21 372                   Assessment  Assessment details: Pt is a [de-identified] y o  male presenting to PT with complaints of L ankle weakness and diagnosis of lumbar radiculopathy with L foot drop  PT findings include: relatively strong L ankle DF with no evidence of significant foot drop  Pt with slight ankle DF ROM deficits B  Pt ambulating with no drop foot and good clearance  MRI foraminal narrowing at level L5 may be consistent with L ankle weakness  Pt with no significant balance or clearance issues and with good mm  Activation and strength of L anterior tibialis, therefore PT does not recommend AFO at this time  Pt reports no worsening of foot drop, therefore will address strengthening and core stabilization program at this time  Pt will benefit from skilled PT to address above impairments to return to PLOF with less restriction and to reach functional goals  Impairments: abnormal or restricted ROM, impaired balance, impaired physical strength and lacks appropriate home exercise program  Functional limitations: weakness with walking, feeling of unsteadiness  Symptom irritability: lowUnderstanding of Dx/Px/POC: good   Prognosis: fair    Goals  1  Pt will demonstrate understanding of HEP and POC in order to improve compliance with course of rehab in 2 weeks  2  Pt will demonstrate decreased pain in the lower back by at lesat 3 points VAS in order for pt to ambulate with less restriction by d/c    3  Pt will demonstrate 5/5 L ankle DF MMT so that pt can step over objects without restriction by d/c    4  Pt will demonstrate improvement in L ankle DF ROM by 5° to promote improved gait by d/c       Plan  Planned modality interventions: low level laser therapy  Planned therapy interventions: joint mobilization, manual therapy, patient education, stretching, strengthening, therapeutic activities, therapeutic exercise, abdominal trunk stabilization and home exercise program  Frequency: 2x week  Duration in weeks: 6  Treatment plan discussed with: patient        Subjective Evaluation    History of Present Illness  Mechanism of injury: Pt reports that he started to notice L ankle weakness around 3 months ago (foot drop)  States that in the morning he specifically notices slapping of foot on the ground  He denies any specific mechanism or any changes in back pain; no red flag signs of bowel/bladder changes, no numbness/tingling  Pt with significant history of multiple level laminectomy and lumbar fusion in 2018  Pt reports he has felt more unsteady this year vs last year  Pt had MRI performed due to foot drop type symptoms; degeneration noted  Pt has seen podiatrist who thought potential compression stocking contributing to nn  Involvement  Pt is questioning whether ankle brace/orthosis is necessary at this time  Pain  Current pain ratin  At best pain ratin  At worst pain ratin  Quality: dull ache, throbbing and tight  Aggravating factors: nothing  Progression: no change      Diagnostic Tests  MRI studies: abnormal  Patient Goals  Patient goals for therapy: decreased pain, increased strength, independence with ADLs/IADLs and improved balance          Objective    Ankle ROM (R/L):  DF: 5°/5°  Lumbar ROM:  Flex: WNL (however lacking about 50% lumbosacral flex)  Ext: WNL (pain in R LB)  SB: symmetrical B (pain noted in R LB)    Repeated motions:  Status quo all directions     Joint mobility:   TCJ AP: hypo/hypo    Ankle Strength:  DF: 5/5; 4/5  PF: 5/5; 5/5    Heel rise endurance TBA    Balance:  SLS TBA  Tandem stance TBA       Precautions: lumbar fusion, hx laminectomy, on warfarin  Manuals 4/5            TCJ AP joint mob             Laser peroneal nn   L Neuro Re-Ed                                                                                                        Ther Ex             3 way ankle DF, INV/EV HEP demo (grn)            HR/TR standing HEP demo            Bridge             PPT             Isometric abdominal crunch Cue to not hold breath NV                                                   Ther Activity             Bike              SLS             Tandem Stance             Marching with Ankle DF              Gait Training                                       Modalities

## 2021-04-05 NOTE — LETTER
2021    Mervat Beverly DO  [address] [facility] [address]    Patient: Darcie Swanson   YOB: 1940   Date of Visit: 2021     Encounter Diagnosis     ICD-10-CM    1  Lumbar radiculopathy, chronic  M54 16    2  Left foot drop  M21 372        Dear Dr Spencer Odell: Thank you for your recent referral of Darcie Swanson  Please review the attached evaluation summary from Edward's recent visit  Please verify that you agree with the plan of care by signing the attached order  If you have any questions or concerns, please do not hesitate to call  I sincerely appreciate the opportunity to share in the care of one of your patients and hope to have another opportunity to work with you in the near future  Sincerely,    Lucas Deleon, PT      Referring Provider:      I certify that I have read the below Plan of Care and certify the need for these services furnished under this plan of treatment while under my care  Mervat Beverly DO  [address] [address]  Via Fax: [phone]          PT Evaluation     Today's date: 2021  Patient name: Darcie Swanson  : 1940  MRN: [id number]  Referring provider: Stanton Olivas DO  Dx:   Encounter Diagnosis     ICD-10-CM    1  Lumbar radiculopathy, chronic  M54 16    2  Left foot drop  M21 372                   Assessment  Assessment details: Pt is a [de-identified] y o  male presenting to PT with complaints of L ankle weakness and diagnosis of lumbar radiculopathy with L foot drop  PT findings include: relatively strong L ankle DF with no evidence of significant foot drop  Pt with slight ankle DF ROM deficits B  Pt ambulating with no drop foot and good clearance  MRI foraminal narrowing at level L5 may be consistent with L ankle weakness  Pt with no significant balance or clearance issues and with good mm  Activation and strength of L anterior tibialis, therefore PT does not recommend AFO at this time   Pt reports no worsening of foot drop, therefore will address strengthening and core stabilization program at this time  Pt will benefit from skilled PT to address above impairments to return to PLOF with less restriction and to reach functional goals  Impairments: abnormal or restricted ROM, impaired balance, impaired physical strength and lacks appropriate home exercise program  Functional limitations: weakness with walking, feeling of unsteadiness  Symptom irritability: lowUnderstanding of Dx/Px/POC: good   Prognosis: fair    Goals  1  Pt will demonstrate understanding of HEP and POC in order to improve compliance with course of rehab in 2 weeks  2  Pt will demonstrate decreased pain in the lower back by at lesat 3 points VAS in order for pt to ambulate with less restriction by d/c    3  Pt will demonstrate 5/5 L ankle DF MMT so that pt can step over objects without restriction by d/c    4  Pt will demonstrate improvement in L ankle DF ROM by 5° to promote improved gait by d/c  Plan  Planned modality interventions: low level laser therapy  Planned therapy interventions: joint mobilization, manual therapy, patient education, stretching, strengthening, therapeutic activities, therapeutic exercise, abdominal trunk stabilization and home exercise program  Frequency: 2x week  Duration in weeks: 6  Treatment plan discussed with: patient        Subjective Evaluation    History of Present Illness  Mechanism of injury: Pt reports that he started to notice L ankle weakness around 3 months ago (foot drop)  States that in the morning he specifically notices slapping of foot on the ground  He denies any specific mechanism or any changes in back pain; no red flag signs of bowel/bladder changes, no numbness/tingling  Pt with significant history of multiple level laminectomy and lumbar fusion in 2018  Pt reports he has felt more unsteady this year vs last year  Pt had MRI performed due to foot drop type symptoms; degeneration noted   Pt has seen podiatrist who thought potential compression stocking contributing to nn  Involvement  Pt is questioning whether ankle brace/orthosis is necessary at this time  Pain  Current pain ratin  At best pain ratin  At worst pain ratin  Quality: dull ache, throbbing and tight  Aggravating factors: nothing  Progression: no change      Diagnostic Tests  MRI studies: abnormal  Patient Goals  Patient goals for therapy: decreased pain, increased strength, independence with ADLs/IADLs and improved balance          Objective    Ankle ROM (R/L):  DF: 5°/5°  Lumbar ROM:  Flex: WNL (however lacking about 50% lumbosacral flex)  Ext: WNL (pain in R LB)  SB: symmetrical B (pain noted in R LB)    Repeated motions:  Status quo all directions     Joint mobility:   TCJ AP: hypo/hypo    Ankle Strength:  DF: 5/5; 4/5  PF: 5/5; 5/5    Heel rise endurance TBA    Balance:  SLS TBA  Tandem stance TBA       Precautions: lumbar fusion, hx laminectomy, on warfarin  Manuals 4/5            TCJ AP joint mob             Laser peroneal nn   L                                       Neuro Re-Ed                                                                                                        Ther Ex             3 way ankle DF, INV/EV HEP demo (grn)            HR/TR standing HEP demo            Bridge             PPT             Isometric abdominal crunch Cue to not hold breath NV                                                   Ther Activity             Bike              SLS             Tandem Stance             Marching with Ankle DF              Gait Training                                       Modalities

## 2021-04-06 ENCOUNTER — ANTICOAG VISIT (OUTPATIENT)
Dept: CARDIOLOGY CLINIC | Facility: CLINIC | Age: 81
End: 2021-04-06

## 2021-04-06 ENCOUNTER — APPOINTMENT (OUTPATIENT)
Dept: LAB | Facility: CLINIC | Age: 81
End: 2021-04-06
Payer: MEDICARE

## 2021-04-06 DIAGNOSIS — I48.19 PERSISTENT ATRIAL FIBRILLATION (HCC): ICD-10-CM

## 2021-04-12 ENCOUNTER — OFFICE VISIT (OUTPATIENT)
Dept: PHYSICAL THERAPY | Facility: CLINIC | Age: 81
End: 2021-04-12
Payer: MEDICARE

## 2021-04-12 DIAGNOSIS — M54.16 LUMBAR RADICULOPATHY, CHRONIC: Primary | ICD-10-CM

## 2021-04-12 DIAGNOSIS — M21.372 LEFT FOOT DROP: ICD-10-CM

## 2021-04-12 PROCEDURE — 97530 THERAPEUTIC ACTIVITIES: CPT

## 2021-04-12 PROCEDURE — 97110 THERAPEUTIC EXERCISES: CPT

## 2021-04-12 PROCEDURE — 97140 MANUAL THERAPY 1/> REGIONS: CPT

## 2021-04-15 ENCOUNTER — OFFICE VISIT (OUTPATIENT)
Dept: PHYSICAL THERAPY | Facility: CLINIC | Age: 81
End: 2021-04-15
Payer: MEDICARE

## 2021-04-15 DIAGNOSIS — M54.16 LUMBAR RADICULOPATHY, CHRONIC: Primary | ICD-10-CM

## 2021-04-15 DIAGNOSIS — M21.372 LEFT FOOT DROP: ICD-10-CM

## 2021-04-15 PROCEDURE — 97110 THERAPEUTIC EXERCISES: CPT

## 2021-04-15 PROCEDURE — 97530 THERAPEUTIC ACTIVITIES: CPT

## 2021-04-15 NOTE — PROGRESS NOTES
Daily Note     Today's date: 4/15/2021  Patient name: Rashid Kraft  : 1940  MRN: [id number]  Referring provider: Darnelle Councilman, DO  Dx:   Encounter Diagnosis     ICD-10-CM    1  Lumbar radiculopathy, chronic  M54 16    2  Left foot drop  M21 372                   Subjective: Pt reports that he feels okay today with no soreness after last PT visit  Objective: See treatment diary below      Assessment: Initiated calf stretch for L LE  Attempted heel walking, pt able to actively perform DF on L, however upon WB, unable to maintain DF  PT will continue to monitor and per pt ability make appropriate progressions  Plan: Continue per plan of care  Precautions: lumbar fusion, hx laminectomy, on warfarin  Manuals 4/5 4/12 4/15          TCJ AP joint mob  DL Gr II-III L DL Gr II-III L          Laser peroneal nn  L  5'                                      Neuro Re-Ed                                                                                                        Ther Ex             3 way ankle DF, INV/EV HEP demo (grn) Grn 20x each direction  Grn 30x each  HR/TR standing HEP demo 30x ea  30x ea  Bridge   2x10          PPT HEP 5"x30           Isometric abdominal crunch Cue to not hold breath NV 15x 5"; cue to breathe 20x 5"          Hip Adduction sq    20x 5" W/ PPT 20x 5"          Calf stretch  NV prostretch 3x30" ea  Ther Activity             Bike   8' 8'          SLS  attempted Difficulty maintaining           Tandem Stance  2x30" each 2x30" each             NBOS balance   Feet together (no LOB noted)          Marching with Ankle DF   Red @ midfoot 2x10 alternating Red @ midfoot 3x10 alternating          Heel Walking   4x12'          Gait Training                                       Modalities

## 2021-04-19 ENCOUNTER — OFFICE VISIT (OUTPATIENT)
Dept: PHYSICAL THERAPY | Facility: CLINIC | Age: 81
End: 2021-04-19
Payer: MEDICARE

## 2021-04-19 DIAGNOSIS — M54.16 LUMBAR RADICULOPATHY, CHRONIC: Primary | ICD-10-CM

## 2021-04-19 DIAGNOSIS — M21.372 LEFT FOOT DROP: ICD-10-CM

## 2021-04-19 PROCEDURE — 97110 THERAPEUTIC EXERCISES: CPT

## 2021-04-19 PROCEDURE — 97530 THERAPEUTIC ACTIVITIES: CPT

## 2021-04-19 NOTE — PROGRESS NOTES
Daily Note     Today's date: 2021  Patient name: Sarah Jones  : 1940  MRN: [id number]  Referring provider: Morales Hutchins DO  Dx:   Encounter Diagnosis     ICD-10-CM    1  Lumbar radiculopathy, chronic  M54 16    2  Left foot drop  M21 372                   Subjective: Pt reports no pain, "it's still flopping "      Objective: See treatment diary below      Assessment: Performed exercise program w/o difficulty or discomfort  Tolerated treatment well  Patient would benefit from continued PT      Plan: Continue per plan of care  Precautions: lumbar fusion, hx laminectomy, on warfarin  Manuals 4/5 4/12 4/15 4/19         TCJ AP joint mob  DL Gr II-III L DL Gr II-III L DL  Gr II-III         Laser peroneal nn  L  5'                                      Neuro Re-Ed                                                                                                        Ther Ex             3 way ankle DF, INV/EV HEP demo (grn) Grn 20x each direction  Grn 30x each  Grn  30x ea         HR/TR standing HEP demo 30x ea  30x ea  30x  ea         Bridge   2x10 2x10         PPT HEP 5"x30           Isometric abdominal crunch Cue to not hold breath NV 15x 5"; cue to breathe 20x 5" 20x 5"         Hip Adduction sq    20x 5" W/ PPT 20x 5" W/  PPT  20x5"         Calf stretch  NV prostretch 3x30" ea   prostretch  3x30" ea                      Ther Activity             Bike   8' 8' 8'         SLS  attempted Difficulty maintaining           Tandem Stance  2x30" each 2x30" each    2x30"  ea         NBOS balance   Feet together (no LOB noted)  1'         Marching with Ankle DF   Red @ midfoot 2x10 alternating Red @ midfoot 3x10 alternating Red@  Midfoot  3x10 alternating         Heel Walking   4x12' 4x12'         Gait Training                                       Modalities

## 2021-04-20 DIAGNOSIS — I48.19 PERSISTENT ATRIAL FIBRILLATION (HCC): ICD-10-CM

## 2021-04-20 RX ORDER — METOPROLOL SUCCINATE 25 MG/1
25 TABLET, EXTENDED RELEASE ORAL DAILY
Qty: 90 TABLET | Refills: 3 | Status: SHIPPED | OUTPATIENT
Start: 2021-04-20 | End: 2021-05-11 | Stop reason: SDUPTHER

## 2021-04-22 ENCOUNTER — OFFICE VISIT (OUTPATIENT)
Dept: PHYSICAL THERAPY | Facility: CLINIC | Age: 81
End: 2021-04-22
Payer: MEDICARE

## 2021-04-22 DIAGNOSIS — M21.372 LEFT FOOT DROP: ICD-10-CM

## 2021-04-22 DIAGNOSIS — M54.16 LUMBAR RADICULOPATHY, CHRONIC: Primary | ICD-10-CM

## 2021-04-22 PROCEDURE — 97110 THERAPEUTIC EXERCISES: CPT

## 2021-04-22 PROCEDURE — 97530 THERAPEUTIC ACTIVITIES: CPT

## 2021-04-22 PROCEDURE — 97140 MANUAL THERAPY 1/> REGIONS: CPT

## 2021-04-22 NOTE — PROGRESS NOTES
Daily Note     Today's date: 2021  Patient name: Paco Khan  : 1940  MRN: [id number]  Referring provider: Nolberto Shah DO  Dx:   Encounter Diagnosis     ICD-10-CM    1  Lumbar radiculopathy, chronic  M54 16    2  Left foot drop  M21 372                   Subjective: Pt reports he has been doing well  He reports being consistent with his home exercise program  Does feel fatigue with exercises, especially 4 way ankle at home  Objective: See treatment diary below      Assessment: Pt continues to fatigue with exercises and DF  Pt educated to continue progressions with increased repetitions as able, especially with DF  PT to continue to monitor and make progressions to maximize function  Plan: Continue per plan of care  Precautions: lumbar fusion, hx laminectomy, on warfarin  Manuals 4/5 4/12 4/15 4/19 4/22        TCJ AP joint mob  DL Gr II-III L DL Gr II-III L DL  Gr II-III DL  Gr II-III        Laser peroneal nn  L  5'    3'                                  Neuro Re-Ed                                                                                                        Ther Ex             3 way ankle DF, INV/EV HEP demo (grn) Grn 20x each direction  Grn 30x each  Grn  30x ea Grn 30x ea  HR/TR standing HEP demo 30x ea  30x ea  30x  ea 30x ea  (NV 2x30 ea)        Bridge   2x10 2x10 3x10        PPT HEP 5"x30           Isometric abdominal crunch Cue to not hold breath NV 15x 5"; cue to breathe 20x 5" 20x 5" 20x 5" pball on lap        Hip Adduction sq    20x 5" W/ PPT 20x 5" W/  PPT  20x5" W/  PPT  20x5"        Calf stretch  NV prostretch 3x30" ea   prostretch  3x30" ea prostretch  3x30" ea                     Ther Activity             Bike   8' 8' 8' 8'        SLS  attempted Difficulty maintaining   2x30" ea  Tandem Stance  2x30" each 2x30" each    2x30"  ea 2x30" ea (airex)        NBOS balance   Feet together (no LOB noted)  1' np        Marching with Ankle DF   Red @ midfoot 2x10 alternating Red @ midfoot 3x10 alternating Red@  Midfoot  3x10 alternating Grn @ midfoot 3x10 alternating        Heel Walking   4x12' 4x12' 4x12'        Gait Training                                       Modalities

## 2021-04-26 ENCOUNTER — OFFICE VISIT (OUTPATIENT)
Dept: PHYSICAL THERAPY | Facility: CLINIC | Age: 81
End: 2021-04-26
Payer: MEDICARE

## 2021-04-26 DIAGNOSIS — M54.16 LUMBAR RADICULOPATHY, CHRONIC: Primary | ICD-10-CM

## 2021-04-26 DIAGNOSIS — M21.372 LEFT FOOT DROP: ICD-10-CM

## 2021-04-26 PROCEDURE — 97530 THERAPEUTIC ACTIVITIES: CPT

## 2021-04-26 PROCEDURE — 97140 MANUAL THERAPY 1/> REGIONS: CPT

## 2021-04-26 PROCEDURE — 97110 THERAPEUTIC EXERCISES: CPT

## 2021-04-26 NOTE — PROGRESS NOTES
Daily Note     Today's date: 2021  Patient name: Youlanda Goldmann  : 1940  MRN: [id number]  Referring provider: Suzi Andrew DO  Dx:   Encounter Diagnosis     ICD-10-CM    1  Lumbar radiculopathy, chronic  M54 16    2  Left foot drop  M21 372                   Subjective: "Pretty good "      Objective: See treatment diary below      Assessment: Increased reps for HR/TR, as below  Performed exercise program w/o complaint, although B/L SLS was challenging today  Comfortable during manual therapies  Tolerated treatment well  Patient would benefit from continued PT      Plan: Continue per plan of care  Precautions: lumbar fusion, hx laminectomy, on warfarin  Manuals 4/5 4/12 4/15 4/19 4/22 4/26       TCJ AP joint mob  DL Gr II-III L DL Gr II-III L DL  Gr II-III DL  Gr II-III DL  Gr  II-III       Laser peroneal nn  L  5'    3' 3'                                 Neuro Re-Ed                                                                                                        Ther Ex             3 way ankle DF, INV/EV HEP demo (grn) Grn 20x each direction  Grn 30x each  Grn  30x ea Grn 30x ea  Grn  30x ea       HR/TR standing HEP demo 30x ea  30x ea  30x  ea 30x ea  (NV 2x30 ea) 2x30  ea       Bridge   2x10 2x10 3x10 3x10       PPT HEP 5"x30           Isometric abdominal crunch Cue to not hold breath NV 15x 5"; cue to breathe 20x 5" 20x 5" 20x 5" pball on lap 20x5"  pball  On lap       Hip Adduction sq    20x 5" W/ PPT 20x 5" W/  PPT  20x5" W/  PPT  20x5" W/  PPT  20x5"       Calf stretch  NV prostretch 3x30" ea   prostretch  3x30" ea prostretch  3x30" ea prostretch  3x30"  ea                    Ther Activity             Bike   8' 8' 8' 8' 8'       SLS  attempted Difficulty maintaining   2x30" ea  2x30"  ea       Tandem Stance  2x30" each 2x30" each    2x30"  ea 2x30" ea (airex)  2x30  Ea  airex       NBOS balance   Feet together (no LOB noted)  1' np  45"       Marching with Ankle DF   Red @ midfoot 2x10 alternating Red @ midfoot 3x10 alternating Red@  Midfoot  3x10 alternating Grn @ midfoot 3x10 alternating [email or web address] foot  3x10 alternating       Heel Walking   4x12' 4x12' 4x12' 4x12'       Gait Training                                       Modalities

## 2021-04-29 ENCOUNTER — OFFICE VISIT (OUTPATIENT)
Dept: PHYSICAL THERAPY | Facility: CLINIC | Age: 81
End: 2021-04-29
Payer: MEDICARE

## 2021-04-29 DIAGNOSIS — M21.372 LEFT FOOT DROP: ICD-10-CM

## 2021-04-29 DIAGNOSIS — M54.16 LUMBAR RADICULOPATHY, CHRONIC: Primary | ICD-10-CM

## 2021-04-29 PROCEDURE — 97140 MANUAL THERAPY 1/> REGIONS: CPT

## 2021-04-29 PROCEDURE — 97530 THERAPEUTIC ACTIVITIES: CPT

## 2021-04-29 PROCEDURE — 97110 THERAPEUTIC EXERCISES: CPT

## 2021-04-29 NOTE — PROGRESS NOTES
Daily Note     Today's date: 2021  Patient name: Yajaira Wood  : 1940  MRN: [id number]  Referring provider: Teresa Tam DO  Dx:   Encounter Diagnosis     ICD-10-CM    1  Lumbar radiculopathy, chronic  M54 16    2  Left foot drop  M21 372                   Subjective: Pt reports feeling sore from working in his garden yesterday  He feels like he did a lot  Objective: See treatment diary below      Assessment: Pt progressing with balance well without as many episodes of LOB or reaching strategy  Continue to challenge pt as able  Pt unable to keep ankle DF with heel walking, however able to perform during swing phase, therefore maintaining clearance needed to avoid difficulty with obstacle negotiation  Balance and strengthening to continue to be goal to maintain strength and improve static/dynamic balance in WB  Plan: Continue per plan of care  Precautions: lumbar fusion, hx laminectomy, on warfarin  Manuals 4/5 4/12 4/15 4/19 4/22 4/26 4/29      TCJ AP joint mob  DL Gr II-III L DL Gr II-III L DL  Gr II-III DL  Gr II-III DL  Gr  II-III DL Gr II-III      Laser peroneal nn  L  5'    3' 3' 4'                                 Neuro Re-Ed                                                                                                        Ther Ex             3 way ankle DF, INV/EV HEP demo (grn) Grn 20x each direction  Grn 30x each  Grn  30x ea Grn 30x ea  Grn  30x ea Blue 30x ea  HR/TR standing HEP demo 30x ea  30x ea  30x  ea 30x ea  (NV 2x30 ea) 2x30  ea 2x30 ea         Bridge   2x10 2x10 3x10 3x10 3x10 5"      PPT HEP 5"x30           Isometric abdominal crunch Cue to not hold breath NV 15x 5"; cue to breathe 20x 5" 20x 5" 20x 5" pball on lap 20x5"  pball  On lap 20x5"  pball  On lap      Hip Adduction sq    20x 5" W/ PPT 20x 5" W/  PPT  20x5" W/  PPT  20x5" W/  PPT  20x5" W/  PPT  20x5"      Calf stretch  NV prostretch 3x30" ea   prostretch  3x30" ea prostretch  3x30" ea prostretch  3x30"  ea       Biodex Balance Catch       2' easy lvl 12      Ther Activity             Bike   8' 8' 8' 8' 8' 8'      SLS  attempted Difficulty maintaining   2x30" ea  2x30"  ea 2x30" ea  Tandem Stance  2x30" each 2x30" each    2x30"  ea 2x30" ea (airex)  2x30  Ea  airex 2x30" ea airex      NBOS balance   Feet together (no LOB noted)  1' np  45"       Marching with Ankle DF   Red @ midfoot 2x10 alternating Red @ midfoot 3x10 alternating Red@  Midfoot  3x10 alternating Grn @ midfoot 3x10 alternating [email or web address] foot  3x10 alternating Grn 3x10 alternating      Heel Walking   4x12' 4x12' 4x12' 4x12' 4x12'      Tandem walking       Trial NV      Gait Training                                       Modalities

## 2021-05-03 ENCOUNTER — APPOINTMENT (OUTPATIENT)
Dept: LAB | Facility: CLINIC | Age: 81
End: 2021-05-03
Payer: MEDICARE

## 2021-05-03 ENCOUNTER — ANTICOAG VISIT (OUTPATIENT)
Dept: CARDIOLOGY CLINIC | Facility: CLINIC | Age: 81
End: 2021-05-03

## 2021-05-03 DIAGNOSIS — I48.19 PERSISTENT ATRIAL FIBRILLATION (HCC): ICD-10-CM

## 2021-05-04 ENCOUNTER — OFFICE VISIT (OUTPATIENT)
Dept: PHYSICAL THERAPY | Facility: CLINIC | Age: 81
End: 2021-05-04
Payer: MEDICARE

## 2021-05-04 DIAGNOSIS — M54.16 LUMBAR RADICULOPATHY, CHRONIC: Primary | ICD-10-CM

## 2021-05-04 DIAGNOSIS — M21.372 LEFT FOOT DROP: ICD-10-CM

## 2021-05-04 PROCEDURE — 97530 THERAPEUTIC ACTIVITIES: CPT

## 2021-05-04 PROCEDURE — 97110 THERAPEUTIC EXERCISES: CPT

## 2021-05-04 NOTE — PROGRESS NOTES
Daily Note     Today's date: 2021  Patient name: Oliver Dobson  : 1940  MRN: [id number]  Referring provider: Samantha Johnston DO  Dx:   Encounter Diagnosis     ICD-10-CM    1  Lumbar radiculopathy, chronic  M54 16    2  Left foot drop  M21 372                   Subjective: Pt offered no new complaints upon arrival to therapy  Objective: See treatment diary below      Assessment:  Biodex was challenging, performed a second set with different foot placement, with same issues  Performed new exercise and remaining program w/o complaint  Comfortable during manual therapies  Tolerated treatment well  Will monitor  Patient would benefit from continued PT      Plan: Continue per plan of care  Precautions: lumbar fusion, hx laminectomy, on warfarin  Manuals 4/5 4/12 4/15 4/19 4/22 4/26 4/29 5/4     TCJ AP joint mob  DL Gr II-III L DL Gr II-III L DL  Gr II-III DL  Gr II-III DL  Gr  II-III DL Gr II-III DL  Gr  II-III     Laser peroneal nn  L  5'    3' 3' 4'  3'                               Neuro Re-Ed                                                                                                        Ther Ex             3 way ankle DF, INV/EV HEP demo (grn) Grn 20x each direction  Grn 30x each  Grn  30x ea Grn 30x ea  Grn  30x ea Blue 30x ea  Blue  30x ea     HR/TR standing HEP demo 30x ea  30x ea  30x  ea 30x ea  (NV 2x30 ea) 2x30  ea 2x30 ea    2x30  ea     Bridge   2x10 2x10 3x10 3x10 3x10 5" 3x10  5"     PPT HEP 5"x30           Isometric abdominal crunch Cue to not hold breath NV 15x 5"; cue to breathe 20x 5" 20x 5" 20x 5" pball on lap 20x5"  pball  On lap 20x5"  pball  On lap 20x5"  pball on lap     Hip Adduction sq    20x 5" W/ PPT 20x 5" W/  PPT  20x5" W/  PPT  20x5" W/  PPT  20x5" W/  PPT  20x5" W/  PPT  20x5"     Calf stretch  NV prostretch 3x30" ea   prostretch  3x30" ea prostretch  3x30" ea prostretch  3x30"  ea       Biodex Balance Catch       2' easy lvl 12 2'x2  Easy  L-12 Ther Activity             Bike   8' 8' 8' 8' 8' 8' 8'     SLS  attempted Difficulty maintaining   2x30" ea  2x30"  ea 2x30" ea  2x30"  ea     Tandem Stance  2x30" each 2x30" each    2x30"  ea 2x30" ea (airex)  2x30  Ea  airex 2x30" ea airex 2x30"  Ea airex     NBOS balance   Feet together (no LOB noted)  1' np  45"       Marching with Ankle DF   Red @ midfoot 2x10 alternating Red @ midfoot 3x10 alternating Red@  Midfoot  3x10 alternating Grn @ midfoot 3x10 alternating [email or web address] foot  3x10 alternating Grn 3x10 alternating Grn  3x10  alternating     Heel Walking   4x12' 4x12' 4x12' 4x12' 4x12' 4x12'     Tandem walking       Trial NV 4x12'     Gait Training                                       Modalities

## 2021-05-06 ENCOUNTER — OFFICE VISIT (OUTPATIENT)
Dept: PHYSICAL THERAPY | Facility: CLINIC | Age: 81
End: 2021-05-06
Payer: MEDICARE

## 2021-05-06 DIAGNOSIS — M54.16 LUMBAR RADICULOPATHY, CHRONIC: Primary | ICD-10-CM

## 2021-05-06 DIAGNOSIS — M21.372 LEFT FOOT DROP: ICD-10-CM

## 2021-05-06 PROCEDURE — 97110 THERAPEUTIC EXERCISES: CPT

## 2021-05-06 PROCEDURE — 97530 THERAPEUTIC ACTIVITIES: CPT

## 2021-05-06 PROCEDURE — 97140 MANUAL THERAPY 1/> REGIONS: CPT

## 2021-05-06 NOTE — PROGRESS NOTES
Daily Note     Today's date: 2021  Patient name: Brigid Alarcon  : 1940  MRN: [id number]  Referring provider: Ryan Fuller DO  Dx:   Encounter Diagnosis     ICD-10-CM    1  Lumbar radiculopathy, chronic  M54 16    2  Left foot drop  M21 372                   Subjective: Pt reports feeling as if he is not hearing as much foot slapping while walking this morning  Objective: See treatment diary below      Assessment: Pt continues with steady progressions in ability to maintain static balance with less reaching and less LOB  Pt continues with challenge with biodex catch, likely fatigue contributing to difficulty  Pt will benefit from continued skilled PT  Plan: Continue per plan of care  Precautions: lumbar fusion, hx laminectomy, on warfarin  Manuals 4/5 4/12 4/15 4/19 4/22 4/26 4/29 5/4 5/6    TCJ AP joint mob  DL Gr II-III L DL Gr II-III L DL  Gr II-III DL  Gr II-III DL  Gr  II-III DL Gr II-III DL  Gr  II-III DL  Gr  II-III    Laser peroneal nn  L  5'    3' 3' 4'  3' 3'                              Neuro Re-Ed                                                                                                        Ther Ex             3 way ankle DF, INV/EV HEP demo (grn) Grn 20x each direction  Grn 30x each  Grn  30x ea Grn 30x ea  Grn  30x ea Blue 30x ea  Blue  30x ea Blue  30x ea    HR/TR standing HEP demo 30x ea  30x ea  30x  ea 30x ea  (NV 2x30 ea) 2x30  ea 2x30 ea  2x30  ea 2x30 ea       Bridge   2x10 2x10 3x10 3x10 3x10 5" 3x10  5" 3x10  5"    PPT HEP 5"x30           Isometric abdominal crunch Cue to not hold breath NV 15x 5"; cue to breathe 20x 5" 20x 5" 20x 5" pball on lap 20x5"  pball  On lap 20x5"  pball  On lap 20x5"  pball on lap 20x5"  pball on lap    Hip Adduction sq    20x 5" W/ PPT 20x 5" W/  PPT  20x5" W/  PPT  20x5" W/  PPT  20x5" W/  PPT  20x5" W/  PPT  20x5" W/  PPT  20x5"    Calf stretch  NV prostretch 3x30" ea   prostretch  3x30" ea prostretch  3x30" ea prostretch  3x30"  ea       Biodex Balance Catch       2' easy lvl 12 2'x2  Easy  L-12 2'  Easy  L-12    Ther Activity             Bike   8' 8' 8' 8' 8' 8' 8' 8'    SLS  attempted Difficulty maintaining   2x30" ea  2x30"  ea 2x30" ea  2x30"  ea 2x30"  ea    Tandem Stance  2x30" each 2x30" each    2x30"  ea 2x30" ea (airex)  2x30  Ea  airex 2x30" ea airex 2x30"  Ea airex 2x30"  Ea airex    NBOS balance   Feet together (no LOB noted)  1' np  45"       Marching with Ankle DF   Red @ midfoot 2x10 alternating Red @ midfoot 3x10 alternating Red@  Midfoot  3x10 alternating Grn @ midfoot 3x10 alternating [email or web address] foot  3x10 alternating Grn 3x10 alternating Grn  3x10  alternating Blue 20x alternating    Heel Walking   4x12' 4x12' 4x12' 4x12' 4x12' 4x12' 4x12'     Tandem walking       Trial NV 4x12' 4x12'     Gait Training                                       Modalities

## 2021-05-10 ENCOUNTER — OFFICE VISIT (OUTPATIENT)
Dept: PHYSICAL THERAPY | Facility: CLINIC | Age: 81
End: 2021-05-10
Payer: MEDICARE

## 2021-05-10 DIAGNOSIS — M21.372 LEFT FOOT DROP: ICD-10-CM

## 2021-05-10 DIAGNOSIS — M54.16 LUMBAR RADICULOPATHY, CHRONIC: Primary | ICD-10-CM

## 2021-05-10 PROCEDURE — 97110 THERAPEUTIC EXERCISES: CPT

## 2021-05-10 PROCEDURE — 97140 MANUAL THERAPY 1/> REGIONS: CPT

## 2021-05-10 PROCEDURE — 97530 THERAPEUTIC ACTIVITIES: CPT

## 2021-05-10 NOTE — PROGRESS NOTES
Daily Note     Today's date: 5/10/2021  Patient name: Seamus Holder  : 1940  MRN: [id number]  Referring provider: Jossie Islas DO  Dx:   Encounter Diagnosis     ICD-10-CM    1  Lumbar radiculopathy, chronic  M54 16    2  Left foot drop  M21 372                   Subjective: "No pain at all, really," adding  "it just flops "      Objective: See treatment diary below      Assessment: Biodex remains quite challenging  Performed remaining exercises w/o complaint  Comfortable throughout manual therapies  Tolerated treatment well  Patient would benefit from continued PT      Plan: Continue per plan of care  Precautions: lumbar fusion, hx laminectomy, on warfarin  Manuals 4/5 4/12 4/15 4/19 4/22 4/26 4/29 5/4 5/6 5/10   TCJ AP joint mob  DL Gr II-III L DL Gr II-III L DL  Gr II-III DL  Gr II-III DL  Gr  II-III DL Gr II-III DL  Gr  II-III DL  Gr  II-III DL  Gr  II-III   Laser peroneal nn  L  5'    3' 3' 4'  3' 3' 3'                             Neuro Re-Ed                                                                                                        Ther Ex             3 way ankle DF, INV/EV HEP demo (grn) Grn 20x each direction  Grn 30x each  Grn  30x ea Grn 30x ea  Grn  30x ea Blue 30x ea  Blue  30x ea Blue  30x ea Blue  30x  ea   HR/TR standing HEP demo 30x ea  30x ea  30x  ea 30x ea  (NV 2x30 ea) 2x30  ea 2x30 ea  2x30  ea 2x30 ea    2x30  ea   Bridge   2x10 2x10 3x10 3x10 3x10 5" 3x10  5" 3x10  5" 3x10  5"   PPT HEP 5"x30           Isometric abdominal crunch Cue to not hold breath NV 15x 5"; cue to breathe 20x 5" 20x 5" 20x 5" pball on lap 20x5"  pball  On lap 20x5"  pball  On lap 20x5"  pball on lap 20x5"  pball on lap 20x5"  pball on lap   Hip Adduction sq    20x 5" W/ PPT 20x 5" W/  PPT  20x5" W/  PPT  20x5" W/  PPT  20x5" W/  PPT  20x5" W/  PPT  20x5" W/  PPT  20x5" w/PPT 20x5"   Calf stretch  NV prostretch 3x30" ea   prostretch  3x30" ea prostretch  3x30" ea prostretch  3x30"  ea       Biodex Balance Catch       2' easy lvl 12 2'x2  Easy  L-12 2'  Easy  L-12 2'   Easy  L-12   Ther Activity             Bike   8' 8' 8' 8' 8' 8' 8' 8' 8'   SLS  attempted Difficulty maintaining   2x30" ea  2x30"  ea 2x30" ea  2x30"  ea 2x30"  ea 2x30"  ea   Tandem Stance  2x30" each 2x30" each    2x30"  ea 2x30" ea (airex)  2x30  Ea  airex 2x30" ea airex 2x30"  Ea airex 2x30"  Ea airex 2x30  Ea  airex   NBOS balance   Feet together (no LOB noted)  1' np  45"       Marching with Ankle DF   Red @ midfoot 2x10 alternating Red @ midfoot 3x10 alternating Red@  Midfoot  3x10 alternating Grn @ midfoot 3x10 alternating [email or web address] foot  3x10 alternating Grn 3x10 alternating Grn  3x10  alternating Blue 20x alternating  blue  20x alternating   Heel Walking   4x12' 4x12' 4x12' 4x12' 4x12' 4x12' 4x12'  4x12'   Tandem walking       Trial NV 4x12' 4x12'  4x12'   Gait Training                                       Modalities

## 2021-05-11 DIAGNOSIS — I48.19 PERSISTENT ATRIAL FIBRILLATION (HCC): ICD-10-CM

## 2021-05-11 RX ORDER — METOPROLOL SUCCINATE 25 MG/1
25 TABLET, EXTENDED RELEASE ORAL DAILY
Qty: 90 TABLET | Refills: 3 | Status: SHIPPED | OUTPATIENT
Start: 2021-05-11 | End: 2022-04-28

## 2021-05-13 ENCOUNTER — TRANSCRIBE ORDERS (OUTPATIENT)
Dept: PHYSICAL THERAPY | Facility: CLINIC | Age: 81
End: 2021-05-13

## 2021-05-13 ENCOUNTER — EVALUATION (OUTPATIENT)
Dept: PHYSICAL THERAPY | Facility: CLINIC | Age: 81
End: 2021-05-13
Payer: MEDICARE

## 2021-05-13 DIAGNOSIS — M21.372 LEFT FOOT DROP: ICD-10-CM

## 2021-05-13 DIAGNOSIS — M54.16 LUMBAR RADICULOPATHY, CHRONIC: Primary | ICD-10-CM

## 2021-05-13 PROCEDURE — 97530 THERAPEUTIC ACTIVITIES: CPT

## 2021-05-13 PROCEDURE — 97110 THERAPEUTIC EXERCISES: CPT

## 2021-05-13 NOTE — PROGRESS NOTES
Daily Note/Re-evaluation     Today's date: 2021  Patient name: Rodriguez King  : 1940  MRN: [id number]  Referring provider: Severiano Flor DO  Dx:   Encounter Diagnosis     ICD-10-CM    1  Lumbar radiculopathy, chronic  M54 16    2  Left foot drop  M21 372                   Subjective: Pt reports he has been doing well  Continues with foot slapping, but feels that it is improving  Pt continues with low back pain, at times no pain but with heavy activities such as bending and lifting, pain will increase  Objective: See treatment diary below    Assessment  Assessment details: Pt is a [de-identified] y o  male presenting to PT with complaints of L ankle weakness and diagnosis of lumbar radiculopathy with L foot drop  PT findings of significant improvement in ankle strength, static balance and ankle DF ROM  Static balance of unable to maintain tandem stance improved from less than 10" to 30" without LOB  Pt continues with difficulty performing SLS  Pt pain in low back is better at best, but at worst continues rating 6/10; this is consistent with FOTO back score, however primary deficit of balance and L foot drop is improved  Pt will benefit from couple more weeks of continued skilled PT with focus on ankle strength and balance to maximize function  Impairments: abnormal or restricted ROM, impaired balance, impaired physical strength and lacks appropriate home exercise program  Functional limitations: weakness with walking, feeling of unsteadiness  Symptom irritability: lowUnderstanding of Dx/Px/POC: good   Prognosis: fair    Goals  1  Pt will demonstrate understanding of HEP and POC in order to improve compliance with course of rehab in 2 weeks  (MET)  2  Pt will demonstrate decreased pain in the lower back by at least 3 points VAS in order for pt to ambulate with less restriction by d/c  (ongoing)  3  Pt will demonstrate 5/5 L ankle DF MMT so that pt can step over objects without restriction by d/c  (MET)  4  Pt will demonstrate improvement in L ankle DF ROM by 5° to promote improved gait by d/c  (MET)    Plan  Planned modality interventions: low level laser therapy  Planned therapy interventions: joint mobilization, manual therapy, patient education, stretching, strengthening, therapeutic activities, therapeutic exercise, abdominal trunk stabilization and home exercise program  Frequency: 2x week  Duration in weeks: 6  Treatment plan discussed with: patient      Pain  Current pain ratin  At best pain ratin  At worst pain ratin  Quality: dull ache, throbbing and tight  Aggravating factors: nothing  Progression: no change      Diagnostic Tests  MRI studies: abnormal  Patient Goals  Patient goals for therapy: decreased pain, increased strength, independence with ADLs/IADLs and improved balance          Objective    Ankle ROM (R/L):  DF: 5°/10°  Lumbar ROM:  Flex: WNL (however lacking about 50% lumbosacral flex)  Ext: WNL (pain in R LB)   SB: symmetrical B (pain noted in R LB)    Repeated motions:  Status quo all directions     Joint mobility:   TCJ AP: hypo/hypo    Ankle Strength:  DF: 5/5; 5/5  PF: 5/5; 5/5    Balance:  Tandem Stance:   30" R foot forward   30" L foot forward    SLS: 5" B     Plan: Continue per plan of care  Precautions: lumbar fusion, hx laminectomy, on warfarin  Manuals 5/13  4/15 4/19 4/22 4/26 4/29 5/4 5/6 5/10   TCJ AP joint mob DL Gr II-III  DL Gr II-III L DL  Gr II-III DL  Gr II-III DL  Gr  II-III DL Gr II-III DL  Gr  II-III DL  Gr  II-III DL  Gr  II-III   Laser peroneal nn  L NV    3' 3' 4'  3' 3' 3'                             Neuro Re-Ed                                                                                                        Ther Ex             3 way ankle Blue 30x ea  Grn 30x each  Grn  30x ea Grn 30x ea  Grn  30x ea Blue 30x ea  Blue  30x ea Blue  30x ea Blue  30x  ea   HR/TR NV  30x ea  30x  ea 30x ea  (NV 2x30 ea) 2x30  ea 2x30 ea  2x30  ea 2x30 ea  2x30  ea   Bridge   2x10 2x10 3x10 3x10 3x10 5" 3x10  5" 3x10  5" 3x10  5"   PPT             Isometric abdominal crunch   20x 5" 20x 5" 20x 5" pball on lap 20x5"  pball  On lap 20x5"  pball  On lap 20x5"  pball on lap 20x5"  pball on lap 20x5"  pball on lap   Hip Adduction sq  W/ PPT 20x 5" W/  PPT  20x5" W/  PPT  20x5" W/  PPT  20x5" W/  PPT  20x5" W/  PPT  20x5" W/  PPT  20x5" w/PPT 20x5"   Calf stretch   prostretch 3x30" ea   prostretch  3x30" ea prostretch  3x30" ea prostretch  3x30"  ea       Biodex Balance Catch 2'   Easy  L-12      2' easy lvl 12 2'x2  Easy  L-12 2'  Easy  L-12 2'   Easy  L-12   Tests/measures See obj  Ther Activity             Bike  8'  8' 8' 8' 8' 8' 8' 8' 8'   SLS 2x30"  ea    2x30" ea  2x30"  ea 2x30" ea  2x30"  ea 2x30"  ea 2x30"  ea   SLS cone knock overs 10x each  Tandem Stance 2x30  Ea  airex  2x30" each    2x30"  ea 2x30" ea (airex)  2x30  Ea  airex 2x30" ea airex 2x30"  Ea airex 2x30"  Ea airex 2x30  Ea  airex   NBOS balance NV Trial ball toss on rebounder w/ NBOS  Feet together (no LOB noted)  1' np  39"       Marching with Ankle DF  NV  Red @ midfoot 3x10 alternating Red@  Midfoot  3x10 alternating Grn @ midfoot 3x10 alternating [email or web address] foot  3x10 alternating Grn 3x10 alternating Grn  3x10  alternating Blue 20x alternating  blue  20x alternating   Heel Walking NV  4x12' 4x12' 4x12' 4x12' 4x12' 4x12' 4x12'  4x12'   Tandem walking NV      Trial NV 4x12' 4x12'  4x12'   Gait Training                                       Modalities

## 2021-05-13 NOTE — LETTER
May 13, 2021    Mervat Beverly, [address] [facility]  [address] [address]    Patient: Darcie Swanson   YOB: 1940   Date of Visit: 2021     Encounter Diagnosis     ICD-10-CM    1  Lumbar radiculopathy, chronic  M54 16    2  Left foot drop  M21 372        Dear Dr Spencer Odell: Thank you for your recent referral of Darcie Swanson  Please review the attached evaluation summary from Edward's recent visit  Please verify that you agree with the plan of care by signing the attached order  If you have any questions or concerns, please do not hesitate to call  I sincerely appreciate the opportunity to share in the care of one of your patients and hope to have another opportunity to work with you in the near future  Sincerely,    Lucas Deleon, PT      Referring Provider:      I certify that I have read the below Plan of Care and certify the need for these services furnished under this plan of treatment while under my care  Mervat Beverly, [address] [facility]  [address] [address]  Via Fax: [phone]          Daily Note/Re-evaluation     Today's date: 2021  Patient name: Darcie Swanson  : 1940  MRN: [id number]  Referring provider: Stanton Olivas DO  Dx:   Encounter Diagnosis     ICD-10-CM    1  Lumbar radiculopathy, chronic  M54 16    2  Left foot drop  M21 372                   Subjective: Pt reports he has been doing well  Continues with foot slapping, but feels that it is improving  Pt continues with low back pain, at times no pain but with heavy activities such as bending and lifting, pain will increase  Objective: See treatment diary below    Assessment  Assessment details: Pt is a [de-identified] y o  male presenting to PT with complaints of L ankle weakness and diagnosis of lumbar radiculopathy with L foot drop  PT findings of significant improvement in ankle strength, static balance and ankle DF ROM   Static balance of unable to maintain tandem stance improved from less than 10" to 30" without LOB  Pt continues with difficulty performing SLS  Pt pain in low back is better at best, but at worst continues rating 6/10; this is consistent with FOTO back score, however primary deficit of balance and L foot drop is improved  Pt will benefit from couple more weeks of continued skilled PT with focus on ankle strength and balance to maximize function  Impairments: abnormal or restricted ROM, impaired balance, impaired physical strength and lacks appropriate home exercise program  Functional limitations: weakness with walking, feeling of unsteadiness  Symptom irritability: lowUnderstanding of Dx/Px/POC: good   Prognosis: fair    Goals  1  Pt will demonstrate understanding of HEP and POC in order to improve compliance with course of rehab in 2 weeks  (MET)  2  Pt will demonstrate decreased pain in the lower back by at least 3 points VAS in order for pt to ambulate with less restriction by d/c  (ongoing)  3  Pt will demonstrate 5/5 L ankle DF MMT so that pt can step over objects without restriction by d/c  (MET)  4  Pt will demonstrate improvement in L ankle DF ROM by 5° to promote improved gait by d/c   (MET)    Plan  Planned modality interventions: low level laser therapy  Planned therapy interventions: joint mobilization, manual therapy, patient education, stretching, strengthening, therapeutic activities, therapeutic exercise, abdominal trunk stabilization and home exercise program  Frequency: 2x week  Duration in weeks: 6  Treatment plan discussed with: patient      Pain  Current pain ratin  At best pain ratin  At worst pain ratin  Quality: dull ache, throbbing and tight  Aggravating factors: nothing  Progression: no change      Diagnostic Tests  MRI studies: abnormal  Patient Goals  Patient goals for therapy: decreased pain, increased strength, independence with ADLs/IADLs and improved balance          Objective    Ankle ROM (R/L):  DF: 5°/10°  Lumbar ROM:  Flex: WNL (however lacking about 50% lumbosacral flex)  Ext: WNL (pain in R LB)   SB: symmetrical B (pain noted in R LB)    Repeated motions:  Status quo all directions     Joint mobility:   TCJ AP: hypo/hypo    Ankle Strength:  DF: 5/5; 5/5  PF: 5/5; 5/5    Balance:  Tandem Stance:   30" R foot forward   30" L foot forward    SLS: 5" B     Plan: Continue per plan of care  Precautions: lumbar fusion, hx laminectomy, on warfarin  Manuals 5/13  4/15 4/19 4/22 4/26 4/29 5/4 5/6 5/10   TCJ AP joint mob DL Gr II-III  DL Gr II-III L DL  Gr II-III DL  Gr II-III DL  Gr  II-III DL Gr II-III DL  Gr  II-III DL  Gr  II-III DL  Gr  II-III   Laser peroneal nn  L NV    3' 3' 4'  3' 3' 3'                             Neuro Re-Ed                                                                                                        Ther Ex             3 way ankle Blue 30x ea  Grn 30x each  Grn  30x ea Grn 30x ea  Grn  30x ea Blue 30x ea  Blue  30x ea Blue  30x ea Blue  30x  ea   HR/TR NV  30x ea  30x  ea 30x ea  (NV 2x30 ea) 2x30  ea 2x30 ea  2x30  ea 2x30 ea  2x30  ea   Bridge   2x10 2x10 3x10 3x10 3x10 5" 3x10  5" 3x10  5" 3x10  5"   PPT             Isometric abdominal crunch   20x 5" 20x 5" 20x 5" pball on lap 20x5"  pball  On lap 20x5"  pball  On lap 20x5"  pball on lap 20x5"  pball on lap 20x5"  pball on lap   Hip Adduction sq  W/ PPT 20x 5" W/  PPT  20x5" W/  PPT  20x5" W/  PPT  20x5" W/  PPT  20x5" W/  PPT  20x5" W/  PPT  20x5" w/PPT 20x5"   Calf stretch   prostretch 3x30" ea   prostretch  3x30" ea prostretch  3x30" ea prostretch  3x30"  ea       Biodex Balance Catch 2'   Easy  L-12      2' easy lvl 12 2'x2  Easy  L-12 2'  Easy  L-12 2'   Easy  L-12   Tests/measures See obj  Ther Activity             Bike  8'  8' 8' 8' 8' 8' 8' 8' 8'   SLS 2x30"  ea    2x30" ea  2x30"  ea 2x30" ea  2x30"  ea 2x30"  ea 2x30"  ea   SLS cone knock overs 10x each  Tandem Stance 2x30  Ea  airex  2x30" each    2x30"  ea 2x30" ea (airex)  2x30  Ea  airex 2x30" ea airex 2x30"  Ea airex 2x30"  Ea airex 2x30  Ea  airex   NBOS balance NV Trial ball toss on rebounder w/ NBOS  Feet together (no LOB noted)  1' np  39"       Marching with Ankle DF  NV  Red @ midfoot 3x10 alternating Red@  Midfoot  3x10 alternating Grn @ midfoot 3x10 alternating [email or web address] foot  3x10 alternating Grn 3x10 alternating Grn  3x10  alternating Blue 20x alternating  blue  20x alternating   Heel Walking NV  4x12' 4x12' 4x12' 4x12' 4x12' 4x12' 4x12'  4x12'   Tandem walking NV      Trial NV 4x12' 4x12'  4x12'   Gait Training                                       Modalities

## 2021-05-17 ENCOUNTER — OFFICE VISIT (OUTPATIENT)
Dept: PHYSICAL THERAPY | Facility: CLINIC | Age: 81
End: 2021-05-17
Payer: MEDICARE

## 2021-05-17 DIAGNOSIS — M21.372 LEFT FOOT DROP: ICD-10-CM

## 2021-05-17 DIAGNOSIS — M54.16 LUMBAR RADICULOPATHY, CHRONIC: Primary | ICD-10-CM

## 2021-05-17 PROCEDURE — 97530 THERAPEUTIC ACTIVITIES: CPT

## 2021-05-17 PROCEDURE — 97110 THERAPEUTIC EXERCISES: CPT

## 2021-05-17 PROCEDURE — 97140 MANUAL THERAPY 1/> REGIONS: CPT

## 2021-05-17 NOTE — PROGRESS NOTES
Daily Note     Today's date: 2021  Patient name: Thedford Boas  : 1940  MRN: [id number]  Referring provider: Yanira Frances DO  Dx: No diagnosis found  Subjective: "It's better, sometimes it's hard to say "      Objective: See treatment diary below      Assessment: Performed new exercise and ex progression w/o complaint  Comfortable throughout manual therapies  Tolerated treatment well  Patient would benefit from continued PT      Plan: Continue per plan of care  Precautions: lumbar fusion, hx laminectomy, on warfarin  Manuals 5/13 5/17  4/19 4/22 4/26 4/29 5/4 5/6 5/10   TCJ AP joint mob DL Gr II-III DL  Gr II-III  DL  Gr II-III DL  Gr II-III DL  Gr  II-III DL Gr II-III DL  Gr  II-III DL  Gr  II-III DL  Gr  II-III   Laser peroneal nn  L NV 3'   3' 3' 4'  3' 3' 3'                             Neuro Re-Ed                                                                                                        Ther Ex             3 way ankle Blue 30x ea  Blue  30x ea  Grn  30x ea Grn 30x ea  Grn  30x ea Blue 30x ea  Blue  30x ea Blue  30x ea Blue  30x  ea   HR/TR NV 2x30  ea  30x  ea 30x ea  (NV 2x30 ea) 2x30  ea 2x30 ea  2x30  ea 2x30 ea  2x30  ea   Bridge    2x10 3x10 3x10 3x10 5" 3x10  5" 3x10  5" 3x10  5"   PPT             Isometric abdominal crunch    20x 5" 20x 5" pball on lap 20x5"  pball  On lap 20x5"  pball  On lap 20x5"  pball on lap 20x5"  pball on lap 20x5"  pball on lap   Hip Adduction sq  W/  PPT  20x5" W/  PPT  20x5" W/  PPT  20x5" W/  PPT  20x5" W/  PPT  20x5" W/  PPT  20x5" w/PPT 20x5"   Calf stretch    prostretch  3x30" ea prostretch  3x30" ea prostretch  3x30"  ea       Biodex Balance Catch 2'   Easy  L-12 2'  Easy L-12     2' easy lvl 12 2'x2  Easy  L-12 2'  Easy  L-12 2'   Easy  L-12   Tests/measures See obj  Ther Activity             Bike  8' 8'       8' 8' 8' 8' 8' 8' 8'   SLS 2x30"  ea 2x30"   2x30" ea  2x30"  ea 2x30" ea    2x30"  ea 2x30"  ea 2x30"  ea   SLS cone knock overs 10x each    NV           Tandem Stance 2x30  Ea  airex 2x30"ea  2x30"  ea 2x30" ea (airex)  2x30  Ea  airex 2x30" ea airex 2x30"  Ea airex 2x30"  Ea airex 2x30  Ea  airex   NBOS balance NV Trial ball toss on rebounder w/ NBOS  2# x30     1' np  45"       Marching with Ankle DF  NV Blue   30x alternating  Red@  Midfoot  3x10 alternating Grn @ midfoot 3x10 alternating [email or web address] foot  3x10 alternating Grn 3x10 alternating Grn  3x10  alternating Blue 20x alternating  blue  20x alternating   Heel Walking NV 4x12'  4x12' 4x12' 4x12' 4x12' 4x12' 4x12'  4x12'   Tandem walking NV 4x12'     Trial NV 4x12' 4x12'  4x12'   Gait Training                                       Modalities

## 2021-05-20 ENCOUNTER — TRANSCRIBE ORDERS (OUTPATIENT)
Dept: ADMINISTRATIVE | Facility: HOSPITAL | Age: 81
End: 2021-05-20

## 2021-05-20 ENCOUNTER — OFFICE VISIT (OUTPATIENT)
Dept: PHYSICAL THERAPY | Facility: CLINIC | Age: 81
End: 2021-05-20
Payer: MEDICARE

## 2021-05-20 ENCOUNTER — LAB (OUTPATIENT)
Dept: LAB | Facility: HOSPITAL | Age: 81
End: 2021-05-20
Attending: FAMILY MEDICINE
Payer: MEDICARE

## 2021-05-20 DIAGNOSIS — M21.372 LEFT FOOT DROP: ICD-10-CM

## 2021-05-20 DIAGNOSIS — I48.19 PERSISTENT ATRIAL FIBRILLATION (HCC): Primary | ICD-10-CM

## 2021-05-20 DIAGNOSIS — R26.81 UNSTEADINESS: ICD-10-CM

## 2021-05-20 DIAGNOSIS — I10 DIASTOLIC HYPERTENSION: ICD-10-CM

## 2021-05-20 DIAGNOSIS — M54.16 LUMBAR RADICULOPATHY, CHRONIC: Primary | ICD-10-CM

## 2021-05-20 DIAGNOSIS — R26.81 UNSTEADINESS: Primary | ICD-10-CM

## 2021-05-20 LAB
25(OH)D3 SERPL-MCNC: 37.3 NG/ML (ref 30–100)
ALBUMIN SERPL BCP-MCNC: 3.5 G/DL (ref 3.5–5)
ALP SERPL-CCNC: 76 U/L (ref 46–116)
ALT SERPL W P-5'-P-CCNC: 31 U/L (ref 12–78)
ANION GAP SERPL CALCULATED.3IONS-SCNC: 4 MMOL/L (ref 4–13)
AST SERPL W P-5'-P-CCNC: 28 U/L (ref 5–45)
BILIRUB SERPL-MCNC: 2.1 MG/DL (ref 0.2–1)
BUN SERPL-MCNC: 22 MG/DL (ref 5–25)
CALCIUM SERPL-MCNC: 9.6 MG/DL (ref 8.3–10.1)
CHLORIDE SERPL-SCNC: 107 MMOL/L (ref 100–108)
CHOLEST SERPL-MCNC: 178 MG/DL (ref 50–200)
CO2 SERPL-SCNC: 30 MMOL/L (ref 21–32)
CREAT SERPL-MCNC: 1.28 MG/DL (ref 0.6–1.3)
ERYTHROCYTE [DISTWIDTH] IN BLOOD BY AUTOMATED COUNT: 12.4 % (ref 11.6–15.1)
EST. AVERAGE GLUCOSE BLD GHB EST-MCNC: 117 MG/DL
GFR SERPL CREATININE-BSD FRML MDRD: 53 ML/MIN/1.73SQ M
GLUCOSE P FAST SERPL-MCNC: 105 MG/DL (ref 65–99)
HBA1C MFR BLD: 5.7 %
HCT VFR BLD AUTO: 50.2 % (ref 36.5–49.3)
HDLC SERPL-MCNC: 63 MG/DL
HGB BLD-MCNC: 17.2 G/DL (ref 12–17)
LDLC SERPL CALC-MCNC: 95 MG/DL (ref 0–100)
MCH RBC QN AUTO: 32.6 PG (ref 26.8–34.3)
MCHC RBC AUTO-ENTMCNC: 34.3 G/DL (ref 31.4–37.4)
MCV RBC AUTO: 95 FL (ref 82–98)
PLATELET # BLD AUTO: 155 THOUSANDS/UL (ref 149–390)
PMV BLD AUTO: 12.5 FL (ref 8.9–12.7)
POTASSIUM SERPL-SCNC: 4.2 MMOL/L (ref 3.5–5.3)
PROT SERPL-MCNC: 7.1 G/DL (ref 6.4–8.2)
RBC # BLD AUTO: 5.27 MILLION/UL (ref 3.88–5.62)
SODIUM SERPL-SCNC: 141 MMOL/L (ref 136–145)
TRIGL SERPL-MCNC: 102 MG/DL
TSH SERPL DL<=0.05 MIU/L-ACNC: 1.78 UIU/ML (ref 0.36–3.74)
WBC # BLD AUTO: 7.53 THOUSAND/UL (ref 4.31–10.16)

## 2021-05-20 PROCEDURE — 83036 HEMOGLOBIN GLYCOSYLATED A1C: CPT

## 2021-05-20 PROCEDURE — 97110 THERAPEUTIC EXERCISES: CPT

## 2021-05-20 PROCEDURE — 80053 COMPREHEN METABOLIC PANEL: CPT

## 2021-05-20 PROCEDURE — 82306 VITAMIN D 25 HYDROXY: CPT

## 2021-05-20 PROCEDURE — 84443 ASSAY THYROID STIM HORMONE: CPT

## 2021-05-20 PROCEDURE — 80061 LIPID PANEL: CPT

## 2021-05-20 PROCEDURE — 85027 COMPLETE CBC AUTOMATED: CPT

## 2021-05-20 PROCEDURE — 97530 THERAPEUTIC ACTIVITIES: CPT

## 2021-05-20 NOTE — PROGRESS NOTES
Daily Note     Today's date: 2021  Patient name: Rodriguez King  : 1940  MRN: [id number]  Referring provider: Severiano Flor DO  Dx:   Encounter Diagnosis     ICD-10-CM    1  Lumbar radiculopathy, chronic  M54 16    2  Left foot drop  M21 372                   Subjective: Pt reports his L drop foot seem a little better  Objective: See treatment diary below      Assessment: Performed exercise program w/o complaint  Comfortable throughout manual therapies  Tolerated treatment well  Patient would benefit from continued PT      Plan: Continue per plan of care  Precautions: lumbar fusion, hx laminectomy, on warfarin  Manuals 5/13 5/17 5/20  4/22 4/26 4/29 5/4 5/6 5/10   TCJ AP joint mob DL Gr II-III DL  Gr II-III DL  Gr II-III  DL  Gr II-III DL  Gr  II-III DL Gr II-III DL  Gr  II-III DL  Gr  II-III DL  Gr  II-III   Laser peroneal nn  L NV 3' 3'  3' 3' 4'  3' 3' 3'                             Neuro Re-Ed                                                                                                        Ther Ex             3 way ankle Blue 30x ea  Blue  30x ea Blue  30x ea  Grn 30x ea  Grn  30x ea Blue 30x ea  Blue  30x ea Blue  30x ea Blue  30x  ea   HR/TR NV 2x30  ea 2x30   ea  30x ea  (NV 2x30 ea) 2x30  ea 2x30 ea  2x30  ea 2x30 ea  2x30  ea   Bridge     3x10 3x10 3x10 5" 3x10  5" 3x10  5" 3x10  5"   PPT             Isometric abdominal crunch     20x 5" pball on lap 20x5"  pball  On lap 20x5"  pball  On lap 20x5"  pball on lap 20x5"  pball on lap 20x5"  pball on lap   Hip Adduction sq  W/  PPT  20x5" W/  PPT  20x5" W/  PPT  20x5" W/  PPT  20x5" W/  PPT  20x5" w/PPT 20x5"   Calf stretch     prostretch  3x30" ea prostretch  3x30"  ea       Biodex Balance Catch 2'   Easy  L-12 2'  Easy L-12 Not avail  NV    2' easy lvl 12 2'x2  Easy  L-12 2'  Easy  L-12 2'   Easy  L-12   Tests/measures See obj               Ther Activity             Bike  8' 8'      8'  8' 8' 8' 8' 8' 8'   SLS 2x30"  ea 2x30" 2x30"  2x30" ea  2x30"  ea 2x30" ea  2x30"  ea 2x30"  ea 2x30"  ea   SLS cone knock overs 10x each    NV           Tandem Stance 2x30  Ea  airex 2x30"ea 2x30"ea  2x30" ea (airex)  2x30  Ea  airex 2x30" ea airex 2x30"  Ea airex 2x30"  Ea airex 2x30  Ea  airex   NBOS balance NV Trial ball toss on rebounder w/ NBOS  2# x30    2#x30  np  45"       Marching with Ankle DF  NV Blue   30x alternating  Blue  30x  alternating  Grn @ midfoot 3x10 alternating [email or web address] foot  3x10 alternating Grn 3x10 alternating Grn  3x10  alternating Blue 20x alternating  blue  20x alternating   Heel Walking NV 4x12'   4x12' 4x12' 4x12' 4x12' 4x12'  4x12'   Tandem walking NV 4x12'     Trial NV 4x12' 4x12'  4x12'   Gait Training                                       Modalities

## 2021-05-24 ENCOUNTER — OFFICE VISIT (OUTPATIENT)
Dept: PHYSICAL THERAPY | Facility: CLINIC | Age: 81
End: 2021-05-24
Payer: MEDICARE

## 2021-05-24 DIAGNOSIS — M54.16 LUMBAR RADICULOPATHY, CHRONIC: Primary | ICD-10-CM

## 2021-05-24 DIAGNOSIS — M21.372 LEFT FOOT DROP: ICD-10-CM

## 2021-05-24 PROCEDURE — 97110 THERAPEUTIC EXERCISES: CPT

## 2021-05-24 PROCEDURE — 97530 THERAPEUTIC ACTIVITIES: CPT

## 2021-05-24 NOTE — PROGRESS NOTES
Daily Note     Today's date: 2021  Patient name: Rashid Kraft  : 1940  MRN: [id number]  Referring provider: Darnelle Councilman, DO  Dx:   Encounter Diagnosis     ICD-10-CM    1  Lumbar radiculopathy, chronic  M54 16    2  Left foot drop  M21 372                   Subjective: Pt arrives offering no complaints  Objective: See treatment diary below      Assessment: Pt is able to maintain strength of L anterior tibialis, therefore able to continue to step over obstacles without risk of foot catch  He does continue with subjective foot slapping but not functionally impacting patient  PT continued to challenge balance  Pt will benefit from 1x more PT to review HEP and maintenance program for strength; pt to be discharged from skilled PT NV  Plan: Continue per plan of care  Precautions: lumbar fusion, hx laminectomy, on warfarin  Manuals 5/13 5/17 5/20 5/24  4/26 4/29 5/4 5/6 5/10   TCJ AP joint mob DL Gr II-III DL  Gr II-III DL  Gr II-III DL Gr II-III  DL  Gr  II-III DL Gr II-III DL  Gr  II-III DL  Gr  II-III DL  Gr  II-III   Laser peroneal nn  L NV 3' 3' 3'  3' 4'  3' 3' 3'                             Neuro Re-Ed                                                                                                        Ther Ex             3 way ankle Blue 30x ea  Blue  30x ea Blue  30x ea Blue  30x ea  Grn  30x ea Blue 30x ea  Blue  30x ea Blue  30x ea Blue  30x  ea   HR/TR NV 2x30  ea 2x30   ea 2x30 ea  2x30  ea 2x30 ea  2x30  ea 2x30 ea  2x30  ea   Bridge      3x10 3x10 5" 3x10  5" 3x10  5" 3x10  5"   PPT             Isometric abdominal crunch      20x5"  pball  On lap 20x5"  pball  On lap 20x5"  pball on lap 20x5"  pball on lap 20x5"  pball on lap   Hip Adduction sq  W/  PPT  20x5" W/  PPT  20x5" W/  PPT  20x5" W/  PPT  20x5" w/PPT 20x5"   Calf stretch      prostretch  3x30"  ea       Biodex Balance Catch 2'   Easy  L-12 2'  Easy L-12 Not avail    NV 3' easy L-12   2' easy lvl 12 2'x2  Easy  L-12 2'  Easy  L-12 2'   Easy  L-12   Tests/measures See obj  Ther Activity             Bike  8' 8'      8' 8'  8' 8' 8' 8' 8'   SLS 2x30"  ea 2x30" 2x30" 2x30"  2x30"  ea 2x30" ea  2x30"  ea 2x30"  ea 2x30"  ea   SLS cone knock overs 10x each  NV  10x each            Tandem Stance 2x30  Ea  airex 2x30"ea 2x30"ea D/c    2x30  Ea  airex 2x30" ea airex 2x30"  Ea airex 2x30"  Ea airex 2x30  Ea  airex   NBOS balance NV Trial ball toss on rebounder w/ NBOS  2# x30    2#x30 semitandem 2# x30   45"       Marching with Ankle DF  NV Blue   30x alternating  Blue  30x  alternating  Blue  30x  alternating  [email or web address] foot  3x10 alternating Grn 3x10 alternating Grn  3x10  alternating Blue 20x alternating  blue  20x alternating   Heel Walking NV 4x12'    4x12' 4x12' 4x12' 4x12'  4x12'   Tandem walking NV 4x12'  Balance beam 4x12'    Trial NV 4x12' 4x12'  4x12'   Gait Training                                       Modalities

## 2021-05-27 ENCOUNTER — OFFICE VISIT (OUTPATIENT)
Dept: PHYSICAL THERAPY | Facility: CLINIC | Age: 81
End: 2021-05-27
Payer: MEDICARE

## 2021-05-27 DIAGNOSIS — M54.16 LUMBAR RADICULOPATHY, CHRONIC: Primary | ICD-10-CM

## 2021-05-27 DIAGNOSIS — M21.372 LEFT FOOT DROP: ICD-10-CM

## 2021-05-27 PROCEDURE — 97530 THERAPEUTIC ACTIVITIES: CPT

## 2021-05-27 PROCEDURE — 97110 THERAPEUTIC EXERCISES: CPT

## 2021-05-27 NOTE — PROGRESS NOTES
Daily Note/Discharge     Today's date: 2021  Patient name: Sarah Jones  : 1940  MRN: [id number]  Referring provider: Morales Hutchins DO  Dx:   Encounter Diagnosis     ICD-10-CM    1  Lumbar radiculopathy, chronic  M54 16    2  Left foot drop  M21 372                   Subjective: Yesterday noted being in prolonged seated, forward bending posture while working in his garden without issues  However upon waking up today, signficant stiffness in the back and some glute pain  Other than this, no complaints; has been doing well  Objective: See treatment diary below      Assessment: Pt understands home exercises well, with maintenance of strength in ankle mm  Pt continues without foot drop with swing phase, therefore no risk of foot catching  Pt is appropriate for discharge from skilled PT  Educated on maintenance of HEP  Plan: Discharge from skilled PT  Precautions: lumbar fusion, hx laminectomy, on warfarin  Manuals 5/13 5/17 5/20 5/24   4/29 5/4 5/6 5/10   TCJ AP joint mob DL Gr II-III DL  Gr II-III DL  Gr II-III DL Gr II-III   DL Gr II-III DL  Gr  II-III DL  Gr  II-III DL  Gr  II-III   Laser peroneal nn  L NV 3' 3' 3'   4'  3' 3' 3'                             Neuro Re-Ed                                                                                                        Ther Ex             3 way ankle Blue 30x ea  Blue  30x ea Blue  30x ea Blue  30x ea   Blue 30x ea  Blue  30x ea Blue  30x ea Blue  30x  ea   HR/TR NV 2x30  ea 2x30   ea 2x30 ea   2x30 ea  2x30  ea 2x30 ea  2x30  ea   Bridge       3x10 5" 3x10  5" 3x10  5" 3x10  5"   PPT             Isometric abdominal crunch       20x5"  pball  On lap 20x5"  pball on lap 20x5"  pball on lap 20x5"  pball on lap   Hip Adduction sq  W/  PPT  20x5" W/  PPT  20x5" W/  PPT  20x5" w/PPT 20x5"   Calf stretch             Biodex Balance Catch 2'   Easy  L-12 2'  Easy L-12 Not avail    NV 3' easy L-12   2' easy lvl 12 2'x2  Easy  L-12 2'  Easy  L-12 2'   Easy  L-12   Tests/measures See obj  Ther Activity             Bike  8' 8'      8' 8'   8' 8' 8' 8'   SLS 2x30"  ea 2x30" 2x30" 2x30"   2x30" ea  2x30"  ea 2x30"  ea 2x30"  ea   SLS cone knock overs 10x each  NV  10x each            Tandem Stance 2x30  Ea  airex 2x30"ea 2x30"ea D/c    2x30" ea airex 2x30"  Ea airex 2x30"  Ea airex 2x30  Ea  airex   NBOS balance NV Trial ball toss on rebounder w/ NBOS  2# x30    2#x30 semitandem 2# x30         Marching with Ankle DF  NV Blue   30x alternating  Blue  30x  alternating  Blue  30x  alternating   Grn 3x10 alternating Grn  3x10  alternating Blue 20x alternating  blue  20x alternating   Heel Walking NV 4x12'     4x12' 4x12' 4x12'  4x12'   Tandem walking NV 4x12'  Balance beam 4x12'    Trial NV 4x12' 4x12'  4x12'   Gait Training                                       Modalities

## 2021-06-01 ENCOUNTER — APPOINTMENT (OUTPATIENT)
Dept: LAB | Facility: CLINIC | Age: 81
End: 2021-06-01
Payer: MEDICARE

## 2021-06-01 ENCOUNTER — ANTICOAG VISIT (OUTPATIENT)
Dept: CARDIOLOGY CLINIC | Facility: CLINIC | Age: 81
End: 2021-06-01

## 2021-06-01 DIAGNOSIS — I48.19 PERSISTENT ATRIAL FIBRILLATION (HCC): ICD-10-CM

## 2021-06-07 ENCOUNTER — TELEPHONE (OUTPATIENT)
Dept: CARDIOLOGY CLINIC | Facility: CLINIC | Age: 81
End: 2021-06-07

## 2021-06-07 NOTE — TELEPHONE ENCOUNTER
Pt's PCP called - saw pt for pre-op visit  Pt will be having eyelid surgery 6/21  They would like to hold coumadin for 5 days prior (6/16 to 6/22 per OV note)    They would like to confirm if this is OK and does pt need bridging? Please advise, thank you

## 2021-06-09 ENCOUNTER — TELEPHONE (OUTPATIENT)
Dept: CARDIOLOGY CLINIC | Facility: CLINIC | Age: 81
End: 2021-06-09

## 2021-06-28 ENCOUNTER — PREPPED CHART (OUTPATIENT)
Dept: URBAN - METROPOLITAN AREA CLINIC 6 | Facility: CLINIC | Age: 81
End: 2021-06-28

## 2021-07-06 ENCOUNTER — ANTICOAG VISIT (OUTPATIENT)
Dept: CARDIOLOGY CLINIC | Facility: CLINIC | Age: 81
End: 2021-07-06

## 2021-07-06 ENCOUNTER — APPOINTMENT (OUTPATIENT)
Dept: LAB | Facility: CLINIC | Age: 81
End: 2021-07-06
Payer: MEDICARE

## 2021-07-06 DIAGNOSIS — I48.19 PERSISTENT ATRIAL FIBRILLATION (HCC): Primary | ICD-10-CM

## 2021-07-26 ENCOUNTER — APPOINTMENT (OUTPATIENT)
Dept: LAB | Facility: CLINIC | Age: 81
End: 2021-07-26
Payer: MEDICARE

## 2021-07-26 ENCOUNTER — ANTICOAG VISIT (OUTPATIENT)
Dept: CARDIOLOGY CLINIC | Facility: CLINIC | Age: 81
End: 2021-07-26

## 2021-07-26 DIAGNOSIS — I48.19 PERSISTENT ATRIAL FIBRILLATION (HCC): Primary | ICD-10-CM

## 2021-08-11 ENCOUNTER — APPOINTMENT (OUTPATIENT)
Dept: LAB | Facility: CLINIC | Age: 81
End: 2021-08-11
Payer: MEDICARE

## 2021-08-11 ENCOUNTER — ANTICOAG VISIT (OUTPATIENT)
Dept: CARDIOLOGY CLINIC | Facility: CLINIC | Age: 81
End: 2021-08-11

## 2021-08-11 DIAGNOSIS — I48.19 PERSISTENT ATRIAL FIBRILLATION (HCC): Primary | ICD-10-CM

## 2021-08-30 ENCOUNTER — APPOINTMENT (OUTPATIENT)
Dept: LAB | Facility: CLINIC | Age: 81
End: 2021-08-30
Payer: MEDICARE

## 2021-08-30 ENCOUNTER — POST-OP CHECK (OUTPATIENT)
Dept: URBAN - METROPOLITAN AREA CLINIC 6 | Facility: CLINIC | Age: 81
End: 2021-08-30

## 2021-08-30 ENCOUNTER — ANTICOAG VISIT (OUTPATIENT)
Dept: CARDIOLOGY CLINIC | Facility: CLINIC | Age: 81
End: 2021-08-30

## 2021-08-30 DIAGNOSIS — Z98.890: ICD-10-CM

## 2021-08-30 DIAGNOSIS — H02.413: ICD-10-CM

## 2021-08-30 DIAGNOSIS — I48.19 PERSISTENT ATRIAL FIBRILLATION (HCC): Primary | ICD-10-CM

## 2021-08-30 PROCEDURE — 99024 POSTOP FOLLOW-UP VISIT: CPT

## 2021-08-30 ASSESSMENT — VISUAL ACUITY
OS_CC: 20/30-2
OD_CC: 20/30-2

## 2021-08-30 ASSESSMENT — TONOMETRY
OD_IOP_MMHG: 18
OS_IOP_MMHG: 12

## 2021-09-27 ENCOUNTER — APPOINTMENT (OUTPATIENT)
Dept: LAB | Facility: CLINIC | Age: 81
End: 2021-09-27
Payer: MEDICARE

## 2021-09-27 ENCOUNTER — ANTICOAG VISIT (OUTPATIENT)
Dept: CARDIOLOGY CLINIC | Facility: CLINIC | Age: 81
End: 2021-09-27

## 2021-09-27 DIAGNOSIS — I48.19 PERSISTENT ATRIAL FIBRILLATION (HCC): Primary | ICD-10-CM

## 2021-10-13 PROBLEM — E66.01 MORBID OBESITY (HCC): Status: ACTIVE | Noted: 2021-10-13

## 2021-10-19 ENCOUNTER — APPOINTMENT (OUTPATIENT)
Dept: LAB | Facility: CLINIC | Age: 81
End: 2021-10-19
Payer: MEDICARE

## 2021-10-19 DIAGNOSIS — I48.19 PERSISTENT ATRIAL FIBRILLATION (HCC): ICD-10-CM

## 2021-10-19 RX ORDER — WARFARIN SODIUM 5 MG/1
TABLET ORAL
Qty: 135 TABLET | Refills: 3 | Status: SHIPPED | OUTPATIENT
Start: 2021-10-19 | End: 2022-06-13 | Stop reason: SDUPTHER

## 2021-10-25 ENCOUNTER — ANTICOAG VISIT (OUTPATIENT)
Dept: CARDIOLOGY CLINIC | Facility: CLINIC | Age: 81
End: 2021-10-25

## 2021-10-25 ENCOUNTER — APPOINTMENT (OUTPATIENT)
Dept: LAB | Facility: CLINIC | Age: 81
End: 2021-10-25
Payer: MEDICARE

## 2021-10-25 DIAGNOSIS — I48.19 PERSISTENT ATRIAL FIBRILLATION (HCC): Primary | ICD-10-CM

## 2021-11-22 ENCOUNTER — ANTICOAG VISIT (OUTPATIENT)
Dept: CARDIOLOGY CLINIC | Facility: CLINIC | Age: 81
End: 2021-11-22

## 2021-11-22 ENCOUNTER — APPOINTMENT (OUTPATIENT)
Dept: LAB | Facility: CLINIC | Age: 81
End: 2021-11-22
Payer: MEDICARE

## 2021-11-22 DIAGNOSIS — I48.19 PERSISTENT ATRIAL FIBRILLATION (HCC): Primary | ICD-10-CM

## 2021-11-30 ENCOUNTER — APPOINTMENT (OUTPATIENT)
Dept: LAB | Facility: HOSPITAL | Age: 81
End: 2021-11-30
Attending: FAMILY MEDICINE
Payer: MEDICARE

## 2021-11-30 ENCOUNTER — ANTICOAG VISIT (OUTPATIENT)
Dept: CARDIOLOGY CLINIC | Facility: CLINIC | Age: 81
End: 2021-11-30

## 2021-11-30 DIAGNOSIS — Z01.818 OTHER SPECIFIED PRE-OPERATIVE EXAMINATION: ICD-10-CM

## 2021-11-30 DIAGNOSIS — I48.19 PERSISTENT ATRIAL FIBRILLATION (HCC): Primary | ICD-10-CM

## 2021-11-30 LAB
25(OH)D3 SERPL-MCNC: 32.2 NG/ML (ref 30–100)
ALBUMIN SERPL BCP-MCNC: 3.3 G/DL (ref 3.5–5)
ALP SERPL-CCNC: 88 U/L (ref 46–116)
ALT SERPL W P-5'-P-CCNC: 29 U/L (ref 12–78)
ANION GAP SERPL CALCULATED.3IONS-SCNC: 5 MMOL/L (ref 4–13)
AST SERPL W P-5'-P-CCNC: 23 U/L (ref 5–45)
BILIRUB SERPL-MCNC: 1.39 MG/DL (ref 0.2–1)
BUN SERPL-MCNC: 23 MG/DL (ref 5–25)
CALCIUM ALBUM COR SERPL-MCNC: 9.7 MG/DL (ref 8.3–10.1)
CALCIUM SERPL-MCNC: 9.1 MG/DL (ref 8.3–10.1)
CHLORIDE SERPL-SCNC: 106 MMOL/L (ref 100–108)
CHOLEST SERPL-MCNC: 193 MG/DL
CO2 SERPL-SCNC: 29 MMOL/L (ref 21–32)
CREAT SERPL-MCNC: 1.17 MG/DL (ref 0.6–1.3)
ERYTHROCYTE [DISTWIDTH] IN BLOOD BY AUTOMATED COUNT: 13.1 % (ref 11.6–15.1)
EST. AVERAGE GLUCOSE BLD GHB EST-MCNC: 120 MG/DL
GFR SERPL CREATININE-BSD FRML MDRD: 58 ML/MIN/1.73SQ M
GLUCOSE P FAST SERPL-MCNC: 99 MG/DL (ref 65–99)
HBA1C MFR BLD: 5.8 %
HCT VFR BLD AUTO: 51.4 % (ref 36.5–49.3)
HDLC SERPL-MCNC: 78 MG/DL
HGB BLD-MCNC: 17.4 G/DL (ref 12–17)
LDLC SERPL CALC-MCNC: 94 MG/DL (ref 0–100)
MCH RBC QN AUTO: 32 PG (ref 26.8–34.3)
MCHC RBC AUTO-ENTMCNC: 33.9 G/DL (ref 31.4–37.4)
MCV RBC AUTO: 95 FL (ref 82–98)
PLATELET # BLD AUTO: 178 THOUSANDS/UL (ref 149–390)
PMV BLD AUTO: 11.4 FL (ref 8.9–12.7)
POTASSIUM SERPL-SCNC: 4.2 MMOL/L (ref 3.5–5.3)
PROT SERPL-MCNC: 7.4 G/DL (ref 6.4–8.2)
RBC # BLD AUTO: 5.43 MILLION/UL (ref 3.88–5.62)
SODIUM SERPL-SCNC: 140 MMOL/L (ref 136–145)
TRIGL SERPL-MCNC: 105 MG/DL
WBC # BLD AUTO: 8.64 THOUSAND/UL (ref 4.31–10.16)

## 2021-11-30 PROCEDURE — 82306 VITAMIN D 25 HYDROXY: CPT

## 2021-11-30 PROCEDURE — 80053 COMPREHEN METABOLIC PANEL: CPT

## 2021-11-30 PROCEDURE — 85027 COMPLETE CBC AUTOMATED: CPT

## 2021-11-30 PROCEDURE — 80061 LIPID PANEL: CPT

## 2021-11-30 PROCEDURE — 83036 HEMOGLOBIN GLYCOSYLATED A1C: CPT

## 2022-01-06 ENCOUNTER — ANTICOAG VISIT (OUTPATIENT)
Dept: CARDIOLOGY CLINIC | Facility: CLINIC | Age: 82
End: 2022-01-06

## 2022-01-06 ENCOUNTER — APPOINTMENT (OUTPATIENT)
Dept: LAB | Facility: CLINIC | Age: 82
End: 2022-01-06
Payer: MEDICARE

## 2022-01-06 DIAGNOSIS — I48.19 PERSISTENT ATRIAL FIBRILLATION (HCC): Primary | ICD-10-CM

## 2022-01-21 ENCOUNTER — ANTICOAG VISIT (OUTPATIENT)
Dept: CARDIOLOGY CLINIC | Facility: CLINIC | Age: 82
End: 2022-01-21

## 2022-01-21 ENCOUNTER — APPOINTMENT (OUTPATIENT)
Dept: LAB | Facility: CLINIC | Age: 82
End: 2022-01-21
Payer: MEDICARE

## 2022-01-21 DIAGNOSIS — I48.19 PERSISTENT ATRIAL FIBRILLATION (HCC): Primary | ICD-10-CM

## 2022-02-07 ENCOUNTER — ANTICOAG VISIT (OUTPATIENT)
Dept: CARDIOLOGY CLINIC | Facility: CLINIC | Age: 82
End: 2022-02-07

## 2022-02-07 ENCOUNTER — APPOINTMENT (OUTPATIENT)
Dept: LAB | Facility: CLINIC | Age: 82
End: 2022-02-07
Payer: MEDICARE

## 2022-02-07 DIAGNOSIS — I48.19 PERSISTENT ATRIAL FIBRILLATION (HCC): Primary | ICD-10-CM

## 2022-02-14 ENCOUNTER — OFFICE VISIT (OUTPATIENT)
Dept: VASCULAR SURGERY | Facility: CLINIC | Age: 82
End: 2022-02-14
Payer: MEDICARE

## 2022-02-14 VITALS
WEIGHT: 270 LBS | SYSTOLIC BLOOD PRESSURE: 110 MMHG | HEART RATE: 74 BPM | HEIGHT: 75 IN | BODY MASS INDEX: 33.57 KG/M2 | DIASTOLIC BLOOD PRESSURE: 84 MMHG

## 2022-02-14 DIAGNOSIS — I87.2 CHRONIC VENOUS INSUFFICIENCY OF LOWER EXTREMITY: Chronic | ICD-10-CM

## 2022-02-14 DIAGNOSIS — I82.561 CHRONIC DEEP VEIN THROMBOSIS (DVT) OF CALF MUSCLE VEIN OF RIGHT LOWER EXTREMITY (HCC): Primary | Chronic | ICD-10-CM

## 2022-02-14 PROCEDURE — 99213 OFFICE O/P EST LOW 20 MIN: CPT | Performed by: SURGERY

## 2022-02-14 RX ORDER — FOLIC ACID 1 MG/1
2 TABLET ORAL DAILY
COMMUNITY
Start: 2022-02-12

## 2022-02-14 RX ORDER — DOXYCYCLINE 100 MG/1
CAPSULE ORAL
COMMUNITY
Start: 2022-02-01 | End: 2022-03-01 | Stop reason: ALTCHOICE

## 2022-02-14 RX ORDER — FUROSEMIDE 20 MG/1
20 TABLET ORAL DAILY PRN
COMMUNITY
Start: 2021-11-23

## 2022-02-14 NOTE — LETTER
February 14, 2022     [facility] [address] [place] [id number]    Patient: Ruperto Telles   YOB: 1940   Date of Visit: 2/14/2022       Dear Dr Navi Barrett: Thank you for referring Barbara Payan to me for evaluation  Below are the relevant portions of my assessment and plan of care  Chronic venous insufficiency of lower extremity  Chronic venous insufficiency bilateral lower extremities with associated hyperpigmentation and lipodermatosclerosis  We discussed the pathophysiology of venous disease/post phlebitic syndrome in the importance of compressive therapy, elevation, walking/exercise and weight loss  He already utilizes knee-high compressive stockings daily and replaces them every 6 months  I have encouraged him to continue his current therapy  He will follow up on an as-needed basis  Of note in regards to the rash on both lower extremities he has been evaluated and is being treated by Dermatology  If you have questions, please do not hesitate to call me  I look forward to following Kelli Thibodeaux along with you           Sincerely,        Gabino Jansen MD        CC: No Recipients

## 2022-02-14 NOTE — PATIENT INSTRUCTIONS
Chronic venous insufficiency of lower extremity  Chronic venous insufficiency bilateral lower extremities with associated hyperpigmentation and lipodermatosclerosis  We discussed the pathophysiology of venous disease/post phlebitic syndrome in the importance of compressive therapy, elevation, walking/exercise and weight loss  He already utilizes knee-high compressive stockings daily and replaces them every 6 months  I have encouraged him to continue his current therapy  He will follow up on an as-needed basis  Of note in regards to the rash on both lower extremities he has been evaluated and is being treated by Dermatology

## 2022-02-14 NOTE — ASSESSMENT & PLAN NOTE
Chronic venous insufficiency bilateral lower extremities with associated hyperpigmentation and lipodermatosclerosis  We discussed the pathophysiology of venous disease/post phlebitic syndrome in the importance of compressive therapy, elevation, walking/exercise and weight loss  He already utilizes knee-high compressive stockings daily and replaces them every 6 months  I have encouraged him to continue his current therapy  He will follow up on an as-needed basis  Of note in regards to the rash on both lower extremities he has been evaluated and is being treated by Dermatology

## 2022-02-14 NOTE — PROGRESS NOTES
Assessment/Plan:    Chronic venous insufficiency of lower extremity  Chronic venous insufficiency bilateral lower extremities with associated hyperpigmentation and lipodermatosclerosis  We discussed the pathophysiology of venous disease/post phlebitic syndrome in the importance of compressive therapy, elevation, walking/exercise and weight loss  He already utilizes knee-high compressive stockings daily and replaces them every 6 months  I have encouraged him to continue his current therapy  He will follow up on an as-needed basis  Of note in regards to the rash on both lower extremities he has been evaluated and is being treated by Dermatology  Diagnoses and all orders for this visit:    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 (HCC)    Chronic venous insufficiency of lower extremity    Other orders  -     doxycycline monohydrate (MONODOX) 100 mg capsule; TAKE 1 CAPSULE BY MOUTH TWICE DAILY WITH MEALS FOR 10 DAYS  -     folic acid (FOLVITE) 1 mg tablet  -     furosemide (LASIX) 20 mg tablet          Subjective:      Patient ID: Owen Valdivia is a 80 y o  male  Patient new to our practice referred by Choco Hendrickson for EDEMA  Patient present for Edema on both legs L>R  Patient also has some discoloration and tinging  Patient wear compression but don't elevated his legs much  Patient is currently taking ASA and Warfarin  80-year-old with long history of lower extremity swelling and previous history of DVT associated with hypercoagulability from MTHFR mutation on chronic Coumadin therapy  He continues to complain of bilateral lower extremity swelling more severe on the left as compared to the right despite the use of compressive stockings daily  He does replace the stockings every 6 months  He utilizes a Jobst 20-30 mm compression  He has noticed some increased skin pigmentation more so on the left calf as compared to the right        Previous venous duplex studies with chronic DVT in the right posterior tibial and peroneal veins  Previous arterial duplex 2020 with normal ankle-brachial indices bilaterally  The following portions of the patient's history were reviewed and updated as appropriate: allergies, current medications, past family history, past medical history, past social history, past surgical history and problem list     Past Medical History:  Past Medical History:   Diagnosis Date    Blood clot in vein     in right foot    Hypertension        Past Surgical History:  Past Surgical History:   Procedure Laterality Date    BACK SURGERY  2018    COLONOSCOPY      COLONOSCOPY N/A 2016    Procedure: COLONOSCOPY;  Surgeon: Stu Smith MD;  Location: [facility];  Service:    Valentin Bledsoe HERNIA REPAIR         Social History:  Social History     Substance and Sexual Activity   Alcohol Use Yes    Comment: 4 drinks a week     Social History     Substance and Sexual Activity   Drug Use No     Social History     Tobacco Use   Smoking Status Former Smoker    Packs/day:     Years:     Pack years: 26     Types: Cigarettes    Quit date: 18    Years since quittin 1   Smokeless Tobacco Never Used       Family History:  Family History   Problem Relation Age of Onset    Heart disease Other     Cancer Other        Allergies: Allergies   Allergen Reactions    Sildenafil Hives     Other reaction(s): severe congestion, cialis was OK       Medications:    Current Outpatient Medications:     Cholecalciferol (VITAMIN D-3 PO), Take 4,000 Units by mouth daily  , Disp: , Rfl:     folic acid-pyridoxine-cyanocobalamin (FOLBIC) 2 5-25-2 mg, Take 1 tablet by mouth daily  , Disp: , Rfl:     losartan (COZAAR) 50 mg tablet, Take 1 tablet (50 mg total) by mouth daily (Patient taking differently: Take 25 mg by mouth daily ), Disp: 90 tablet, Rfl: 3    metoprolol succinate (TOPROL-XL) 25 mg 24 hr tablet, Take 1 tablet (25 mg total) by mouth daily, Disp: 90 tablet, Rfl: 3    multivitamin-minerals (CENTRUM) tablet, Take 1 tablet by mouth daily, Disp: , Rfl:     warfarin (COUMADIN) 5 mg tablet, TAKE 1 TO 1 AND 1/2 TABLETS BY MOUTH DAILY AS DIRECTED BY PHYSICIAN, Disp: 135 tablet, Rfl: 3    acetaminophen (TYLENOL) 500 mg tablet, Take 1,000 mg by mouth as needed , Disp: , Rfl:     aspirin 81 MG tablet, Take 81 mg by mouth Take 1 tablet every other day, Disp: , Rfl:     betamethasone, augmented, (DIPROLENE-AF) 0 05 % cream, Apply topically, Disp: , Rfl:     diclofenac sodium (VOLTAREN) 1 %, Place on the skin, Disp: , Rfl:     doxycycline monohydrate (MONODOX) 100 mg capsule, TAKE 1 CAPSULE BY MOUTH TWICE DAILY WITH MEALS FOR 10 DAYS, Disp: , Rfl:     fluticasone (FLONASE) 50 mcg/act nasal spray, 1 spray into each nostril 2 (two) times a day, Disp: 10 mL, Rfl: 1    folic acid (FOLVITE) 1 mg tablet, , Disp: , Rfl:     furosemide (LASIX) 20 mg tablet, , Disp: , Rfl:     gabapentin (NEURONTIN) 100 mg capsule, Take 1 capsule (100 mg total) by mouth 3 (three) times a day, Disp: 90 capsule, Rfl: 0    polyethylene glycol (MIRALAX) 17 g packet, Take 17 g by mouth as needed , Disp: , Rfl:     senna (SENOKOT) 8 6 mg, Take 17 2 mg by mouth, Disp: , Rfl:     triamcinolone (KENALOG) 0 1 % cream, Every 12 hours, Disp: , Rfl:     Vitals:  /84 (02/14/22 1405)    Temp      Pulse 74 (02/14/22 1405)   Resp      SpO2        Lab Results and Cultures:   CBC with diff:   Lab Results   Component Value Date    WBC 8 64 11/30/2021    HGB 17 4 (H) 11/30/2021    HCT 51 4 (H) 11/30/2021    MCV 95 11/30/2021     11/30/2021    MCH 32 0 11/30/2021    MCHC 33 9 11/30/2021    RDW 13 1 11/30/2021    MPV 11 4 11/30/2021    NRBC 0 09/13/2017   ,   BMP/CMP:  Lab Results   Component Value Date     12/17/2015    K 4 2 11/30/2021    K 4 5 12/17/2015     11/30/2021     12/17/2015    CO2 29 11/30/2021    CO2 30 12/17/2015    ANIONGAP 6 12/17/2015    BUN 23 11/30/2021    BUN 20 12/17/2015    CREATININE 1 17 11/30/2021    CREATININE 1 01 12/17/2015    GLUCOSE 92 12/17/2015    CALCIUM 9 1 11/30/2021    CALCIUM 8 6 12/17/2015    AST 23 11/30/2021    AST 21 12/17/2015    ALT 29 11/30/2021    ALT 30 12/17/2015    ALKPHOS 88 11/30/2021    ALKPHOS 78 12/17/2015    PROT 7 0 12/17/2015    BILITOT 1 04 (H) 12/17/2015    EGFR 58 11/30/2021   ,   Lipid Panel:   Lab Results   Component Value Date    CHOL 185 12/17/2015   ,   Coags:   Lab Results   Component Value Date    INR 2 41 (H) 02/07/2022   ,       Review of Systems   Constitutional: Negative  HENT: Negative  Eyes: Negative  Respiratory: Positive for shortness of breath  Cardiovascular: Positive for leg swelling (at times  )  Gastrointestinal: Negative  Endocrine: Negative  Genitourinary: Negative  Musculoskeletal: Negative  Skin: Positive for color change (BLE)  Allergic/Immunologic: Negative  Neurological: Negative  Hematological: Negative  Psychiatric/Behavioral: Negative  Objective:      /84 (BP Location: Right arm, Patient Position: Sitting, Cuff Size: Large)   Pulse 74   Ht 6' 3" (1 905 m)   Wt 122 kg (270 lb)   BMI 33 75 kg/m²          Physical Exam  Constitutional:       Appearance: Normal appearance  He is obese  Cardiovascular:      Rate and Rhythm: Normal rate  Pulses:           Dorsalis pedis pulses are 1+ on the right side and 1+ on the left side  Musculoskeletal:         General: Swelling (Bilateral calves and ankles) present  No tenderness  Normal range of motion  Skin:     General: Skin is warm  Comments: Hyperpigmentation of the skin more severe on the left as compared to the right with skin thickening consistent with chronic venous insufficiency/lipid amount of sclerosis    There is also a rash over both lower extremities    Neurological:      General: No focal deficit present        Mental Status: He is alert and oriented to person, place, and time  Sensory: No sensory deficit  Motor: No weakness     Psychiatric:         Mood and Affect: Mood normal          Behavior: Behavior normal

## 2022-03-01 ENCOUNTER — OFFICE VISIT (OUTPATIENT)
Dept: CARDIOLOGY CLINIC | Facility: CLINIC | Age: 82
End: 2022-03-01
Payer: MEDICARE

## 2022-03-01 VITALS
DIASTOLIC BLOOD PRESSURE: 78 MMHG | BODY MASS INDEX: 33.69 KG/M2 | SYSTOLIC BLOOD PRESSURE: 120 MMHG | HEIGHT: 75 IN | HEART RATE: 78 BPM | WEIGHT: 271 LBS

## 2022-03-01 DIAGNOSIS — G47.33 OBSTRUCTIVE SLEEP APNEA: ICD-10-CM

## 2022-03-01 DIAGNOSIS — I10 PRIMARY HYPERTENSION: ICD-10-CM

## 2022-03-01 DIAGNOSIS — I49.3 PREMATURE VENTRICULAR CONTRACTION: ICD-10-CM

## 2022-03-01 DIAGNOSIS — I48.19 PERSISTENT ATRIAL FIBRILLATION (HCC): Primary | ICD-10-CM

## 2022-03-01 PROCEDURE — 99214 OFFICE O/P EST MOD 30 MIN: CPT | Performed by: INTERNAL MEDICINE

## 2022-03-01 RX ORDER — CLOBETASOL PROPIONATE 0.5 MG/G
CREAM TOPICAL 2 TIMES DAILY
COMMUNITY

## 2022-03-01 RX ORDER — PYRIDOXINE HCL (VITAMIN B6) 25 MG
25 TABLET ORAL DAILY
COMMUNITY

## 2022-03-01 NOTE — PROGRESS NOTES
Cardiology Follow Up    [address]  1940  [id number]  [address] [facility]  [address]    1  Persistent atrial fibrillation ([address] )     2  Premature ventricular contraction     3  Primary hypertension     4  Obstructive sleep apnea         Discussion/Summary:    1  Persistent atrial fibrillation - He remains asymptomatic and heart rates remain controlled  He will remain on the same dose of Toprol-XL  He is on warfarin for stroke prevention and has his INRs followed regularly  We will see him back in 1 year  2  PVCs - These were once symptomatic, when he was off CPAP  However now he is asymptomatic and was started on Toprol-XL for his atrial fibrillation  No other issues from this standpoint  3   HTN - Well controlled  No changes were made to his therapy today  He should periodically check his blood pressure at home  4   Questionable TIA in the past - This is presumed to be a TIA, and likely embolic from paroxysmal atrial fibrillation  Now on anticoagulation  5   ESTER - Compliant on CPAP  Interval History:     Ed returns for followup regarding his history of hypertension, hypertensive heart disease, and hypercholesterolemia  He also has palpitations associated with PACs, PVCs and brief short runs of atrial tachycardia on a Holter monitor  I had him go through an echo and stress nuclear testing about 5 years ago  He had no ischemia by stress testing and his echo demonstrated normal left ventricular ejection fraction, mild left ventricular hypertrophy and diastolic dysfunction and no significant valvular disease  Then a couple years ago he conveyed to me two instances of what sounded like a transient ischemic attack   What he described was left-sided arm weakness to the point where he was not able to lift his arm, lasting only for 10-20 seconds and then completely resolving  He was also continuing to get intermittent palpitations  Because of a concern for paroxysmal atrial fibrillation, I had him wear a month long event monitor  This did not demonstrate any atrial fibrillation, just PACs and PVCs as his Holter monitor showed  At that time he was not wearing his CPAP mask for sleep apnea  After getting another sleep study and seeing his sleep physician, he became compliant on this mask  Also he was diagnosed with a lower extremity DVT, and took Xarelto for 6 months  He went through a workup for hypercoagulable state which was negative  Then close to 2 years ago Ed arrived  in new onset atrial fibrillation  His heart rates were controlled and he was asymptomatic from this standpoint  At that visit we added Toprol-XL 25 mg daily and decreased his losartan  Since he was asymptomatic we chose to pursue a rate control strategy  We also initiated anticoagulation, but he did not like the price of Xarelto  Therefore he was started on warfarin  We did an echocardiogram that showed normal LV systolic function, moderate LVH and moderate biatrial enlargement  He denies any significant cardiac symptoms currently  He denies any chest pain or any worsening shortness of breath  He is active without limitations  He does get some shortness of breath with upper levels of exertion which have not changed for years  He has very few and far between palpitations, sometimes with exertion  No lightheadedness or syncope  He denies orthopnea, PND or any other symptoms of congestive heart failure  He has not had any return of focal neurologic deficits  He is active and does exercise on a regular basis without limitations        Patient Active Problem List   Diagnosis    Sacroiliitis (HCC)    Abnormal glucose level    Acute back pain    Adenomatous colon polyp    Aortic ectasia (HCC)    Benign neoplasm of colon    Bilateral hip pain    DDD (degenerative disc disease), lumbar  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)    Diverticular disease of colon    Elevated hemoglobin A1c    Encounter for long-term (current) use of other medications    Fever    FUO (fever of unknown origin)    Hyperlipidemia    Hypertension    Internal hemorrhoids    Lichen planus    Lumbar back pain    Lumbar disc herniation    MTHFR mutation    Obstructive sleep apnea    Organic impotence    Premature atrial complexes    Premature ventricular contraction    Pure hypercholesterolemia    Radiculopathy, lumbar region    Spinal stenosis of lumbar region    Vitamin D deficiency    Persistent atrial fibrillation (HCC)    Localized edema    Morbid obesity (HCC)    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 (HCC)    Chronic venous insufficiency of lower extremity     Past Medical History:   Diagnosis Date    Blood clot in vein     in right foot    Hypertension      Social History     Socioeconomic History    Marital status: /Civil Union     Spouse name: Not on file    Number of children: Not on file    Years of education: Not on file    Highest education level: Not on file   Occupational History    Not on file   Tobacco Use    Smoking status: Former Smoker     Packs/day: 1 00     Years: 26 00     Pack years: 26 00     Types: Cigarettes     Quit date:      Years since quittin 1    Smokeless tobacco: Never Used   Vaping Use    Vaping Use: Never used   Substance and Sexual Activity    Alcohol use: Yes     Comment: 4 drinks a week    Drug use: No    Sexual activity: Not on file   Other Topics Concern    Not on file   Social History Narrative    Not on file     Social Determinants of Health     Financial Resource Strain: Not on file   Food Insecurity: Not on file   Transportation Needs: Not on file   Physical Activity: Not on file   Stress: Not on file   Social Connections: Not on file   Intimate Partner Violence: Not on file   Housing Stability: Not on file      Family History   Problem Relation Age of Onset    Heart disease Other     Cancer Other      Past Surgical History:   Procedure Laterality Date    BACK SURGERY  2018    COLONOSCOPY      COLONOSCOPY N/A 2/24/2016    Procedure: COLONOSCOPY;  Surgeon: Nick Alonso MD;  Location:  MAIN OR;  Service:    [facility] HERNIA REPAIR         Current Outpatient Medications:     Cholecalciferol (VITAMIN D-3 PO), Take 4,000 Units by mouth daily  , Disp: , Rfl:     clobetasol (TEMOVATE) 0 05 % cream, Apply topically 2 (two) times a day, Disp: , Rfl:     folic acid (FOLVITE) 1 mg tablet, Take 2 mg by mouth daily  , Disp: , Rfl:     furosemide (LASIX) 20 mg tablet, Take 20 mg by mouth daily as needed  , Disp: , Rfl:     losartan (COZAAR) 50 mg tablet, Take 1 tablet (50 mg total) by mouth daily (Patient taking differently: Take 25 mg by mouth daily ), Disp: 90 tablet, Rfl: 3    metoprolol succinate (TOPROL-XL) 25 mg 24 hr tablet, Take 1 tablet (25 mg total) by mouth daily, Disp: 90 tablet, Rfl: 3    multivitamin-minerals (CENTRUM) tablet, Take 1 tablet by mouth daily, Disp: , Rfl:     pyridoxine (B-6) 25 MG tablet, Take 25 mg by mouth daily, Disp: , Rfl:     warfarin (COUMADIN) 5 mg tablet, TAKE 1 TO 1 AND 1/2 TABLETS BY MOUTH DAILY AS DIRECTED BY PHYSICIAN, Disp: 135 tablet, Rfl: 3  Allergies   Allergen Reactions    Sildenafil Hives     Other reaction(s): severe congestion, cialis was OK       Labs:  Lab Results   Component Value Date     12/17/2015    K 4 2 11/30/2021    K 4 5 12/17/2015     11/30/2021     12/17/2015    CO2 29 11/30/2021    CO2 30 12/17/2015    BUN 23 11/30/2021    BUN 20 12/17/2015    CREATININE 1 17 11/30/2021    CREATININE 1 01 12/17/2015    GLUCOSE 92 12/17/2015    CALCIUM 9 1 11/30/2021    CALCIUM 8 6 12/17/2015     Lab Results   Component Value Date    WBC 8 64 11/30/2021    WBC 6 61 12/17/2015    HGB 17 4 (H) 11/30/2021    HGB 15 4 12/17/2015    HCT 51 4 (H) 11/30/2021    HCT 44 8 12/17/2015    MCV 95 11/30/2021    MCV 92 12/17/2015     11/30/2021     12/17/2015     Lab Results   Component Value Date    CHOL 185 12/17/2015    TRIG 105 11/30/2021    TRIG 162 12/17/2015    HDL 78 11/30/2021    HDL 80 12/17/2015     Imaging:    ECHO (7/2020):  LEFT VENTRICLE:  Systolic function was normal by visual assessment  Ejection fraction was estimated in the range of 60 % to 70 %  Atrial fibrillation  Although no diagnostic regional wall motion abnormality was identified, this possibility cannot be completely excluded on the basis of this study  Wall thickness was moderately increased  There was moderate concentric hypertrophy      LEFT ATRIUM:  The atrium was moderately dilated      RIGHT ATRIUM:  The atrium was moderately dilated      MITRAL VALVE:  There was mild annular calcification  There was trace regurgitation      TRICUSPID VALVE:  There was mild regurgitation  Review of Systems:  Review of Systems   Constitutional: Negative  HENT: Negative  Eyes: Negative  Respiratory: Negative  Cardiovascular: Positive for palpitations and leg swelling  Gastrointestinal: Negative  Musculoskeletal: Positive for back pain  Skin: Negative  Allergic/Immunologic: Negative  Neurological: Negative  Hematological: Negative  Psychiatric/Behavioral: Negative  All other systems reviewed and are negative  Vitals:    03/01/22 1141   BP: 120/78   BP Location: Left arm   Patient Position: Sitting   Cuff Size: Standard   Pulse: 78   Weight: 123 kg (271 lb)   Height: 6' 3" (1 905 m)     Physical Exam:  Physical Exam  Vitals and nursing note reviewed  Constitutional:       Appearance: He is well-developed  HENT:      Head: Normocephalic and atraumatic  Eyes:      General: No scleral icterus  Right eye: No discharge  Left eye: No discharge  Pupils: Pupils are equal, round, and reactive to light  Neck:      Thyroid: No thyromegaly  Vascular: No JVD  Cardiovascular:      Rate and Rhythm: Normal rate  Rhythm irregularly irregular  Pulses: Normal pulses  Heart sounds: Normal heart sounds, S1 normal and S2 normal  No murmur heard  No friction rub  No gallop  Pulmonary:      Effort: Pulmonary effort is normal  No respiratory distress  Breath sounds: Normal breath sounds  No wheezing or rales  Abdominal:      General: Bowel sounds are normal  There is no distension  Palpations: Abdomen is soft  Tenderness: There is no abdominal tenderness  Musculoskeletal:         General: No tenderness or deformity  Normal range of motion  Cervical back: Normal range of motion and neck supple  Right lower le+ Pitting Edema present  Left lower le+ Pitting Edema present  Skin:     General: Skin is warm and dry  Findings: No rash  Neurological:      Mental Status: He is alert and oriented to person, place, and time  Cranial Nerves: No cranial nerve deficit  Psychiatric:         Thought Content: Thought content normal          Judgment: Judgment normal        Counseling / Coordination of Care  Total floor / unit time spent today 25 minutes  Greater than 50% of total time was spent with the patient and / or family counseling and / or coordination of care

## 2022-03-01 NOTE — PATIENT INSTRUCTIONS
Low-Sodium Diet   AMBULATORY CARE:   A low-sodium diet  limits foods that are high in sodium (salt)  You will need to follow a low-sodium diet if you have high blood pressure, kidney disease, or heart failure  You may also need to follow this diet if you have a condition that is causing your body to retain (hold) extra fluid  You may need to limit the amount of sodium you eat in a day to 1,500 to 2,000 mg  Ask your healthcare provider how much sodium you can have each day  How to use food labels to choose foods that are low in sodium:  Read food labels to find the amount of sodium they contain  The amount of sodium is listed in milligrams (mg)  The % Daily Value (DV) column tells you how much of your daily needs are met by 1 serving of the food for each nutrient listed  Choose foods that have less than 5% of the DV of sodium  These foods are considered low in sodium  Foods that have 20% or more of the DV of sodium are considered high in sodium  Some food labels may also list any of the following terms that tell you about the sodium content in the food:  · Sodium-free:  Less than 5 mg in each serving    · Very low sodium:  35 mg of sodium or less in each serving    · Low sodium:  140 mg of sodium or less in each serving    · Reduced sodium: At least 25% less sodium in each serving than the regular type    · Light in sodium:  50% less sodium in each serving    · Unsalted or no added salt:  No extra salt is added during processing (the food may still contain sodium)       Foods to avoid:  Salty foods are high in sodium  You should avoid the following:  · Processed foods:      ? Mixes for cornbread, biscuits, cake, and pudding     ? Instant foods, such as potatoes, cereals, noodles, and rice     ? Packaged foods, such as bread stuffing, rice and pasta mixes, snack dip mixes, and macaroni and cheese     ? Canned foods, such as canned vegetables, soups, broths, sauces, and vegetable or tomato juice    ?  Snack foods, such as salted chips, popcorn, pretzels, pork rinds, salted crackers, and salted nuts    ? Frozen foods, such as dinners, entrees, vegetables with sauces, and breaded meats    ? Sauerkraut, pickled vegetables, and other foods prepared in brine    · Meats and cheeses:      ? Smoked or cured meat, such as corned beef, goodman, ham, hot dogs, and sausage    ? Canned meats or spreads, such as potted meats, sardines, anchovies, and imitation seafood    ? Deli or lunch meats, such as bologna, ham, turkey, and roast beef    ? Processed cheese, such as American cheese and cheese spreads    · Condiments, sauces, and seasonings:      ? Salt (¼ teaspoon of salt contains 575 mg of sodium)    ? Seasonings made with salt, such as garlic salt, celery salt, onion salt, and seasoned salt    ? Regular soy sauce, barbecue sauce, teriyaki sauce, steak sauce, Worcestershire sauce, and most flavored vinegars    ? Canned gravy and mixes     ? Regular condiments, such as mustard, ketchup, and salad dressings    ? Pickles and olives    ? Meat tenderizers and monosodium glutamate (MSG)    Foods to include:  Read the food label to find the exact amount of sodium in each serving  · Bread and cereal:  Try to choose breads with less than 80 mg of sodium per serving  ? Bread, roll, noelle, tortilla, or unsalted crackers  ? Ready-to-eat cereals with less than 5% DV of sodium (examples include shredded wheat and puffed rice)    ? Pasta    · Vegetables and fruits:      ? Unsalted fresh, frozen, or canned vegetables    ? Fresh, frozen, or canned fruits    ? Fruit juice    · Dairy:  One serving has about 150 mg of sodium  ? Milk, all types    ? Yogurt    ? Hard cheese, such as cheddar, Swiss, [employer], or mozzarella    · Meat and other protein foods:  Some raw meats may have added sodium  ? Plain meats, fish, and poultry     ? Eggs    · Other foods:      ? Homemade pudding    ? Unsalted nuts, popcorn, or pretzels    ?  Unsalted butter or margarine    Ways to decrease sodium:   · Add spices and herbs to foods instead of salt during cooking  Use salt-free seasonings to add flavor to foods  Examples include onion powder, garlic powder, basil, mckoy powder, paprika, and parsley  Try lemon or lime juice or vinegar to give foods a tart flavor  Use hot peppers, pepper, or cayenne pepper to add a spicy flavor  · Do not keep a salt shaker at your kitchen table  This may help keep you from adding salt to food at the table  A teaspoon of salt has 2,300 mg of sodium  It may take time to get used to enjoying the natural flavor of food instead of adding salt  Talk to your healthcare provider before you use salt substitutes  Some salt substitutes have a high amount of potassium and need to be avoided if you have kidney disease  · Choose low-sodium foods at restaurants  Meals from restaurants are often high in sodium  Some restaurants have nutrition information on the menu that tells you the amount of sodium in their foods  If possible, ask for your food to be prepared with less, or no salt  · Shop for unsalted or low-sodium foods and snacks at the grocery store  Examples include unsalted or low-sodium broths, soups, and canned vegetables  Choose fresh or frozen vegetables instead  Choose unsalted nuts or seeds or fresh fruits or vegetables as snacks  Read food labels and choose salt-free, very low-sodium, or low-sodium foods  © Copyright [employer] 2022 Information is for End User's use only and may not be sold, redistributed or otherwise used for commercial purposes  All illustrations and images included in CareNotes® are the copyrighted property of A D A M [employer] Inc  or Marlyn Castañeda   The above information is an  only  It is not intended as medical advice for individual conditions or treatments  Talk to your doctor, nurse or pharmacist before following any medical regimen to see if it is safe and effective for you

## 2022-03-07 ENCOUNTER — ANTICOAG VISIT (OUTPATIENT)
Dept: CARDIOLOGY CLINIC | Facility: CLINIC | Age: 82
End: 2022-03-07

## 2022-03-07 ENCOUNTER — APPOINTMENT (OUTPATIENT)
Dept: LAB | Facility: CLINIC | Age: 82
End: 2022-03-07
Payer: MEDICARE

## 2022-03-07 DIAGNOSIS — I48.19 PERSISTENT ATRIAL FIBRILLATION (HCC): Primary | ICD-10-CM

## 2022-04-04 ENCOUNTER — ANTICOAG VISIT (OUTPATIENT)
Dept: CARDIOLOGY CLINIC | Facility: CLINIC | Age: 82
End: 2022-04-04

## 2022-04-04 ENCOUNTER — APPOINTMENT (OUTPATIENT)
Dept: LAB | Facility: CLINIC | Age: 82
End: 2022-04-04
Payer: MEDICARE

## 2022-04-04 DIAGNOSIS — I48.19 PERSISTENT ATRIAL FIBRILLATION (HCC): Primary | ICD-10-CM

## 2022-04-26 ENCOUNTER — EVALUATION (OUTPATIENT)
Dept: PHYSICAL THERAPY | Facility: CLINIC | Age: 82
End: 2022-04-26
Payer: MEDICARE

## 2022-04-26 DIAGNOSIS — M17.0 OSTEOARTHRITIS OF BOTH KNEES, UNSPECIFIED OSTEOARTHRITIS TYPE: Primary | ICD-10-CM

## 2022-04-26 PROCEDURE — 97140 MANUAL THERAPY 1/> REGIONS: CPT | Performed by: PHYSICAL THERAPIST

## 2022-04-26 PROCEDURE — 97161 PT EVAL LOW COMPLEX 20 MIN: CPT | Performed by: PHYSICAL THERAPIST

## 2022-04-26 PROCEDURE — 97110 THERAPEUTIC EXERCISES: CPT | Performed by: PHYSICAL THERAPIST

## 2022-04-26 NOTE — LETTER
2022    Monica Cárdenas DO  [address] [facility]    Patient: Rigoberto Torres   YOB: 1940   Date of Visit: 2022     Encounter Diagnosis     ICD-10-CM    1  Osteoarthritis of both knees, unspecified osteoarthritis type  M17 0        Dear Dr Claudette Butler:    Thank you for your recent referral of Rigoberto Torres  Please review the attached evaluation summary from Edward's recent visit  Please verify that you agree with the plan of care by signing the attached order  If you have any questions or concerns, please do not hesitate to call  I sincerely appreciate the opportunity to share in the care of one of your patients and hope to have another opportunity to work with you in the near future  Sincerely,    Emma Jones, PT      Referring Provider:      I certify that I have read the below Plan of Care and certify the need for these services furnished under this plan of treatment while under my care  Monica Cárdenas DO  [address] [place] [address]  Via Fax: [phone]          PT Evaluation     Today's date: 2022  Patient name: Rigoberto Torres  : 1940  MRN: [id number]  Referring provider: Lila Og DO  Dx:   Encounter Diagnosis     ICD-10-CM    1  Osteoarthritis of both knees, unspecified osteoarthritis type  M17 0                   Assessment  Assessment details: Pt is a 79yo male presenting with chronic knee OA  Pt has decreased B/L knee ext rom, Rt LE strength that increase the load on the joints  Due to these impairments pt is having difficulty with ADL's  Pt impairments are consistent with knee OA  Pt would benefit from course of PT services to improve rom and strength to decrease pain with ADL's    Impairments: abnormal or restricted ROM, activity intolerance, impaired physical strength, lacks appropriate home exercise program and pain with function  Functional limitations: ADL's, walking  Symptom irritability: lowUnderstanding of Dx/Px/POC: good   Prognosis: fair    Goals  1  Pt will improve b/l knee ext rom to 0 degrees  2  Pt will be able to walk for 5 minutes without pain  3  Pt will improve LE strength to 5/5 to allow minimal pain with ADL's   4  Pt will be independent with HEP upon discharge  Plan  Patient would benefit from: skilled physical therapy  Planned modality interventions: low level laser therapy  Planned therapy interventions: functional ROM exercises, therapeutic activities, therapeutic exercise, therapeutic training, stretching, strengthening, home exercise program, neuromuscular re-education, manual therapy, patient education and joint mobilization  Frequency: 2x week  Duration in visits: 12  Duration in weeks: 6  Treatment plan discussed with: patient        Subjective Evaluation    History of Present Illness  Mechanism of injury: Pt reports knee pain (Rt>Lt) for 2 months ago  Xray shows more spurring in the the rt knee  Pt reports she got a cortisone injection in Rt knee that helped but still having some pain  Pt reports pain is worse in the morning  "Once they get moving they are better"  Rt hip and back also limit his walking  Quality of life: fair    Pain  Current pain rating: 3  At worst pain ratin  Quality: dull ache and throbbing  Relieving factors: rest  Aggravating factors: stair climbing  Progression: worsening    Exercise history: yard work, walking      Diagnostic Tests  X-ray: abnormal        Objective     Tenderness     Right Knee   Tenderness in the lateral patella and quadriceps tendon  Passive Range of Motion   Left Knee   Flexion: 130 degrees   Extension: -4 degrees     Right Knee   Flexion: 130 degrees   Extension: -10 degrees     Mobility   Patellar Mobility:   Left Knee   WFL: medial, lateral, superior and inferior       Right Knee   WFL: lateral, superior and inferior  Hypermobile: medial     Additional Mobility Details  Pain on lateral aspect of patella with medial glide    Strength/Myotome Testing     Left Knee   Flexion: 5  Extension: 5    Right Knee   Flexion: 4  Extension: 4    Additional Strength Details  Hip Flexion: Rt 4 Lt 5  Hip Extension: Rt 4- Lt 4-  Hip ABD: Rt 3+ Lt 3+  Hip IR: Rt 5 Lt 5  Hip ER: Rt 5 Lt 5      Flowsheet Rows      Most Recent Value   PT/OT G-Codes    Current Score 47   Projected Score 61             Precautions: a-fib, on warifin      Manuals 4/26            Laser knee OA protocol FH            Knee Ext stretching FH            Jt mobs                          Neuro Re-Ed             Standing hip 3 ways             SLS                                                                              Ther Ex             Quad sets 10x5''            SLR 2x10            Bridges 2x10 5''            Clamshells 2x10 green            TKE with ball             Mini squats                                       Ther Activity             Bike             Step ups (f/l)             Gait Training                                       Modalities

## 2022-04-26 NOTE — PROGRESS NOTES
PT Evaluation     Today's date: 2022  Patient name: Rojelio Jain  : 1940  MRN: [id number]  Referring provider: Jessica Bradley DO  Dx:   Encounter Diagnosis     ICD-10-CM    1  Osteoarthritis of both knees, unspecified osteoarthritis type  M17 0                   Assessment  Assessment details: Pt is a 79yo male presenting with chronic knee OA  Pt has decreased B/L knee ext rom, Rt LE strength that increase the load on the joints  Due to these impairments pt is having difficulty with ADL's  Pt impairments are consistent with knee OA  Pt would benefit from course of PT services to improve rom and strength to decrease pain with ADL's  Impairments: abnormal or restricted ROM, activity intolerance, impaired physical strength, lacks appropriate home exercise program and pain with function  Functional limitations: ADL's, walking  Symptom irritability: lowUnderstanding of Dx/Px/POC: good   Prognosis: fair    Goals  1  Pt will improve b/l knee ext rom to 0 degrees  2  Pt will be able to walk for 5 minutes without pain  3  Pt will improve LE strength to 5/5 to allow minimal pain with ADL's   4  Pt will be independent with HEP upon discharge  Plan  Patient would benefit from: skilled physical therapy  Planned modality interventions: low level laser therapy  Planned therapy interventions: functional ROM exercises, therapeutic activities, therapeutic exercise, therapeutic training, stretching, strengthening, home exercise program, neuromuscular re-education, manual therapy, patient education and joint mobilization  Frequency: 2x week  Duration in visits: 12  Duration in weeks: 6  Treatment plan discussed with: patient        Subjective Evaluation    History of Present Illness  Mechanism of injury: Pt reports knee pain (Rt>Lt) for 2 months ago  Xray shows more spurring in the the rt knee  Pt reports she got a cortisone injection in Rt knee that helped but still having some pain  Pt reports pain is worse in the morning  "Once they get moving they are better"  Rt hip and back also limit his walking  Quality of life: fair    Pain  Current pain rating: 3  At worst pain ratin  Quality: dull ache and throbbing  Relieving factors: rest  Aggravating factors: stair climbing  Progression: worsening    Exercise history: yard work, walking      Diagnostic Tests  X-ray: abnormal        Objective     Tenderness     Right Knee   Tenderness in the lateral patella and quadriceps tendon  Passive Range of Motion   Left Knee   Flexion: 130 degrees   Extension: -4 degrees     Right Knee   Flexion: 130 degrees   Extension: -10 degrees     Mobility   Patellar Mobility:   Left Knee   WFL: medial, lateral, superior and inferior       Right Knee   WFL: lateral, superior and inferior  Hypermobile: medial     Additional Mobility Details  Pain on lateral aspect of patella with medial glide    Strength/Myotome Testing     Left Knee   Flexion: 5  Extension: 5    Right Knee   Flexion: 4  Extension: 4    Additional Strength Details  Hip Flexion: Rt 4 Lt 5  Hip Extension: Rt 4- Lt 4-  Hip ABD: Rt 3+ Lt 3+  Hip IR: Rt 5 Lt 5  Hip ER: Rt 5 Lt 5      Flowsheet Rows      Most Recent Value   PT/OT G-Codes    Current Score 47   Projected Score 61             Precautions: a-fib, on warifin      Manuals             Laser knee OA protocol FH            Knee Ext stretching FH            Jt mobs                          Neuro Re-Ed             Standing hip 3 ways             SLS                                                                              Ther Ex             Quad sets 10x5''            SLR 2x10            Bridges 2x10 5''            Clamshells 2x10 green            TKE with ball             Mini squats                                       Ther Activity             Bike             Step ups (f/l)             Gait Training                                       Modalities

## 2022-04-28 ENCOUNTER — OFFICE VISIT (OUTPATIENT)
Dept: PHYSICAL THERAPY | Facility: CLINIC | Age: 82
End: 2022-04-28
Payer: MEDICARE

## 2022-04-28 DIAGNOSIS — I48.19 PERSISTENT ATRIAL FIBRILLATION (HCC): ICD-10-CM

## 2022-04-28 DIAGNOSIS — M17.0 OSTEOARTHRITIS OF BOTH KNEES, UNSPECIFIED OSTEOARTHRITIS TYPE: Primary | ICD-10-CM

## 2022-04-28 PROCEDURE — 97140 MANUAL THERAPY 1/> REGIONS: CPT | Performed by: PHYSICAL THERAPIST

## 2022-04-28 PROCEDURE — 97110 THERAPEUTIC EXERCISES: CPT | Performed by: PHYSICAL THERAPIST

## 2022-04-28 RX ORDER — METOPROLOL SUCCINATE 25 MG/1
TABLET, EXTENDED RELEASE ORAL
Qty: 90 TABLET | Refills: 3 | Status: SHIPPED | OUTPATIENT
Start: 2022-04-28

## 2022-04-28 NOTE — PROGRESS NOTES
Daily Note     Today's date: 2022  Patient name: Mis Dow  : 1940  MRN: [id number]  Referring provider: Manuela Reese DO  Dx:   Encounter Diagnosis     ICD-10-CM    1  Osteoarthritis of both knees, unspecified osteoarthritis type  M17 0                   Subjective: Pt reports a little soreness in both knees following evaluation  Objective: See treatment diary below      Assessment: Pt tolerated exercises well today  Pt had some back discomfort with mat exercises  Pt reported improvement at the end of session  Continue to progress as able  Plan: Continue per plan of care        Precautions: a-fib, on warifin      Manuals            Laser knee OA protocol FH FH           Knee Ext stretching  FH           Jt mobs  FH                        Neuro Re-Ed             Standing hip 3 ways             SLS                                                                              Ther Ex             Quad sets 10x5'' 10x5''           SLR 2x10 2x10           Bridges 2x10 5'' 2x10 5''           Clamshells 2x10 green 2x10 green           LAQ  2x10 5#           TKE with ball  10x10''           Mini squats             Seated Hamstring stretch  3x15''           Leg Press             Ther Activity             Bike  5'           Step ups (f/l)             Gait Training                                       Modalities

## 2022-05-02 ENCOUNTER — APPOINTMENT (OUTPATIENT)
Dept: LAB | Facility: CLINIC | Age: 82
End: 2022-05-02
Payer: MEDICARE

## 2022-05-02 ENCOUNTER — ANTICOAG VISIT (OUTPATIENT)
Dept: CARDIOLOGY CLINIC | Facility: CLINIC | Age: 82
End: 2022-05-02

## 2022-05-02 DIAGNOSIS — I48.19 PERSISTENT ATRIAL FIBRILLATION (HCC): Primary | ICD-10-CM

## 2022-05-03 ENCOUNTER — OFFICE VISIT (OUTPATIENT)
Dept: PHYSICAL THERAPY | Facility: CLINIC | Age: 82
End: 2022-05-03
Payer: MEDICARE

## 2022-05-03 DIAGNOSIS — M17.0 OSTEOARTHRITIS OF BOTH KNEES, UNSPECIFIED OSTEOARTHRITIS TYPE: Primary | ICD-10-CM

## 2022-05-03 PROCEDURE — 97110 THERAPEUTIC EXERCISES: CPT | Performed by: PHYSICAL THERAPIST

## 2022-05-03 PROCEDURE — 97140 MANUAL THERAPY 1/> REGIONS: CPT | Performed by: PHYSICAL THERAPIST

## 2022-05-03 NOTE — PROGRESS NOTES
Daily Note     Today's date: 5/3/2022  Patient name: Diogo Stockton  : 1940  MRN: [id number]  Referring provider: Tejal Mendez DO  Dx:   Encounter Diagnosis     ICD-10-CM    1  Osteoarthritis of both knees, unspecified osteoarthritis type  M17 0                   Subjective: Pt reports no new complaints,knees still bother him some  Objective: See treatment diary below      Assessment: Pt tolerating manual well and showing improvement with knee ext rom  Pt had some discomfort with leg press added today, will continue at 30# next session  Continue to progress quad loading as tolerable  Plan: Continue per plan of care        Precautions: a-fib, on warifin      Manuals 4/26 4/29 5/3          Laser knee OA protocol  FH FH          Knee Ext stretching [facility] FH          Jt mobs  [facility]                       Neuro Re-Ed             Standing hip 3 ways             SLS                                                                              Ther Ex             Quad sets 10x5'' 10x5'' 10x10''          SLR 2x10 2x10 2x10          Bridges 2x10 5'' 2x10 5'' 2x10 5''          Clamshells 2x10 green 2x10 green 2x10 green          LAQ  2x10 5# 2x10 5# Leg Ext         TKE with ball  10x10'' 10x10''          Mini squats             Seated Hamstring stretch  3x15''           Leg Press   2x10 30/40#          Ther Activity             Bike  5' 8' L3          Step ups (f/l)             Gait Training                                       Modalities

## 2022-05-05 ENCOUNTER — OFFICE VISIT (OUTPATIENT)
Dept: PHYSICAL THERAPY | Facility: CLINIC | Age: 82
End: 2022-05-05
Payer: MEDICARE

## 2022-05-05 DIAGNOSIS — M17.0 OSTEOARTHRITIS OF BOTH KNEES, UNSPECIFIED OSTEOARTHRITIS TYPE: Primary | ICD-10-CM

## 2022-05-05 PROCEDURE — 97530 THERAPEUTIC ACTIVITIES: CPT | Performed by: PHYSICAL THERAPIST

## 2022-05-05 PROCEDURE — 97110 THERAPEUTIC EXERCISES: CPT | Performed by: PHYSICAL THERAPIST

## 2022-05-05 PROCEDURE — 97140 MANUAL THERAPY 1/> REGIONS: CPT | Performed by: PHYSICAL THERAPIST

## 2022-05-05 NOTE — PROGRESS NOTES
Daily Note     Today's date: 2022  Patient name: Milagros Peters  : 1940  MRN: [id number]  Referring provider: Shonna Osorio DO  Dx:   Encounter Diagnosis     ICD-10-CM    1  Osteoarthritis of both knees, unspecified osteoarthritis type  M17 0                   Subjective: Pt reports knees are feeling pretty good today  Objective: See treatment diary below      Assessment: Pt tolerated added exercises well with minimal discomfort  Pt is showing improvement with knee ext rom as well  Continue to progress advanced standing exercises as tolerated  Plan: Continue per plan of care        Precautions: a-fib, on warifin      Manuals 4/26 4/29 5/3 5/5         Laser knee OA protocol FH FH FH FH         Knee Ext stretching FH FH FH FH         Jt mobs  FH FH FH                      Neuro Re-Ed             Standing hip 3 ways             SLS                                                                              Ther Ex             Quad sets 10x5'' 10x5'' 10x10'' 10x10''         SLR 2x10 2x10 2x10 2x10         Bridges 2x10 5'' 2x10 5'' 2x10 5'' 2x10 5''         Clamshells 2x10 green 2x10 green 2x10 green 3x10 green         LAQ  2x10 5# 2x10 5# Leg Ext         TKE with ball  10x10'' 10x10'' 10x10''         Mini squats    2x10         Seated Hamstring stretch  3x15''           Leg Press   2x10 30/40# 2x10 40#         Leg Ext    2x10 30#         Ther Activity             Bike  5' 8' L3 8' L3         Step ups (f/l)             Gait Training                                       Modalities

## 2022-05-11 ENCOUNTER — OFFICE VISIT (OUTPATIENT)
Dept: PHYSICAL THERAPY | Facility: CLINIC | Age: 82
End: 2022-05-11
Payer: MEDICARE

## 2022-05-11 DIAGNOSIS — M17.0 OSTEOARTHRITIS OF BOTH KNEES, UNSPECIFIED OSTEOARTHRITIS TYPE: Primary | ICD-10-CM

## 2022-05-11 PROCEDURE — 97110 THERAPEUTIC EXERCISES: CPT | Performed by: PHYSICAL THERAPIST

## 2022-05-11 PROCEDURE — 97530 THERAPEUTIC ACTIVITIES: CPT | Performed by: PHYSICAL THERAPIST

## 2022-05-11 PROCEDURE — 97140 MANUAL THERAPY 1/> REGIONS: CPT | Performed by: PHYSICAL THERAPIST

## 2022-05-11 NOTE — PROGRESS NOTES
Daily Note     Today's date: 2022  Patient name: Arina Smiley  : 1940  MRN: [id number]  Referring provider: Martin Ribeiro DO  Dx:   Encounter Diagnosis     ICD-10-CM    1  Osteoarthritis of both knees, unspecified osteoarthritis type  M17 0                   Subjective: Pt reports knees are feeling pretty good  Objective: See treatment diary below      Assessment: Knee Ext rom is much improved and full  Added hip 3 ways which was tolerated well but showed decreased hip rom, added stretching in for hips  Will continue with strengthening as tolerable  Plan: Continue per plan of care        Precautions: a-fib, on warifin      Manuals 4/26 4/29 5/3 5/5 5/11        Laser knee OA protocol FH FH FH FH FH        Knee Ext stretching FH FH FH FH FH        Jt mobs  FH FH FH FH                     Neuro Re-Ed             Standing hip 3 ways     10x ea        SLS                                                                              Ther Ex             Quad sets 10x5'' 10x5'' 10x10'' 10x10'' --        SLR 2x10 2x10 2x10 2x10 2x10        Bridges 2x10 5'' 2x10 5'' 2x10 5'' 2x10 5'' 2x10 5''        Clamshells 2x10 green 2x10 green 2x10 green 3x10 green 3x10 green        LAQ  2x10 5# 2x10 5# Leg Ext --        TKE with ball  10x10'' 10x10'' 10x10'' 10x10''        Mini squats    2x10 2x10        Seated Hamstring stretch  3x15''   3x15''        Seated piriformis stretch     3x15''        Leg Press   2x10 30/40# 2x10 40# 2x10 60#        Leg Ext    2x10 30# 2x10 30#        Ther Activity             Bike  5' 8' L3 8' L3 8' L3        Step ups (f/l)             Gait Training                                       Modalities

## 2022-05-13 ENCOUNTER — OFFICE VISIT (OUTPATIENT)
Dept: PHYSICAL THERAPY | Facility: CLINIC | Age: 82
End: 2022-05-13
Payer: MEDICARE

## 2022-05-13 DIAGNOSIS — M17.0 OSTEOARTHRITIS OF BOTH KNEES, UNSPECIFIED OSTEOARTHRITIS TYPE: Primary | ICD-10-CM

## 2022-05-13 PROCEDURE — 97140 MANUAL THERAPY 1/> REGIONS: CPT | Performed by: PHYSICAL THERAPIST

## 2022-05-13 PROCEDURE — 97110 THERAPEUTIC EXERCISES: CPT | Performed by: PHYSICAL THERAPIST

## 2022-05-13 NOTE — PROGRESS NOTES
Daily Note     Today's date: 2022  Patient name: Isma Islas  : 1940  MRN: [id number]  Referring provider: Efraín Albrecht DO  Dx:   Encounter Diagnosis     ICD-10-CM    1  Osteoarthritis of both knees, unspecified osteoarthritis type  M17 0                   Subjective: Pt reports some day are much improved but so days are still sore  Objective: See treatment diary below      Assessment: Pt tolerating program well  Progressed some strengthening exercises  Pt will be away for a week, educated to continue strengthening exercises  Knee ext rom has improved if still improved when back, decrease manual for ext  Plan: Continue per plan of care        Precautions: a-fib, on warifin      Manuals 4/26 4/29 5/3 5/5 5/11 5/13       Laser knee OA protocol FH FH FH FH FH FH       Knee Ext stretching FH FH FH FH FH FH       Jt mobs  FH FH FH FH FH                    Neuro Re-Ed             Standing hip 3 ways     10x ea 10x ea       SLS                                                                              Ther Ex             Quad sets 10x5'' 10x5'' 10x10'' 10x10'' --        SLR 2x10 2x10 2x10 2x10 2x10 2x10       Bridges 2x10 5'' 2x10 5'' 2x10 5'' 2x10 5'' 2x10 5'' 2x10 5''       Clamshells 2x10 green 2x10 green 2x10 green 3x10 green 3x10 green 3x10 green       LAQ  2x10 5# 2x10 5# Leg Ext --        TKE with ball  10x10'' 10x10'' 10x10'' 10x10'' 10x10''       Mini squats    2x10 2x10 2x10       Seated Hamstring stretch  3x15''   3x15'' 3x15''       Seated piriformis stretch     3x15'' 3x15''       Leg Press   2x10 30/40# 2x10 40# 2x10 60# 2x10 50#       Leg Ext    2x10 30# 2x10 30# 2x10 40#       Ther Activity             Bike  5' 8' L3 8' L3 8' L3 8' L4       Step ups (f/l)      10xea 6''       Gait Training                                       Modalities

## 2022-05-23 ENCOUNTER — LAB (OUTPATIENT)
Dept: LAB | Facility: HOSPITAL | Age: 82
End: 2022-05-23
Attending: FAMILY MEDICINE
Payer: MEDICARE

## 2022-05-23 ENCOUNTER — ANTICOAG VISIT (OUTPATIENT)
Dept: CARDIOLOGY CLINIC | Facility: CLINIC | Age: 82
End: 2022-05-23

## 2022-05-23 DIAGNOSIS — I48.19 PERSISTENT ATRIAL FIBRILLATION (HCC): Primary | ICD-10-CM

## 2022-05-23 DIAGNOSIS — I48.91 ATRIAL FIBRILLATION, UNSPECIFIED TYPE (HCC): ICD-10-CM

## 2022-05-23 DIAGNOSIS — D68.59 PRIMARY HYPERCOAGULABLE STATE (HCC): ICD-10-CM

## 2022-05-23 DIAGNOSIS — R26.89 SCISSOR GAIT: ICD-10-CM

## 2022-05-23 DIAGNOSIS — I87.2 PERIPHERAL VENOUS INSUFFICIENCY: ICD-10-CM

## 2022-05-23 LAB
25(OH)D3 SERPL-MCNC: 52.1 NG/ML (ref 30–100)
ALBUMIN SERPL BCP-MCNC: 3.5 G/DL (ref 3.5–5)
ALP SERPL-CCNC: 81 U/L (ref 46–116)
ALT SERPL W P-5'-P-CCNC: 35 U/L (ref 12–78)
ANION GAP SERPL CALCULATED.3IONS-SCNC: 7 MMOL/L (ref 4–13)
AST SERPL W P-5'-P-CCNC: 33 U/L (ref 5–45)
BASOPHILS # BLD AUTO: 0.05 THOUSANDS/ΜL (ref 0–0.1)
BASOPHILS NFR BLD AUTO: 1 % (ref 0–1)
BILIRUB SERPL-MCNC: 1.61 MG/DL (ref 0.2–1)
BUN SERPL-MCNC: 25 MG/DL (ref 5–25)
CALCIUM SERPL-MCNC: 9.4 MG/DL (ref 8.3–10.1)
CHLORIDE SERPL-SCNC: 107 MMOL/L (ref 100–108)
CHOLEST SERPL-MCNC: 186 MG/DL
CO2 SERPL-SCNC: 27 MMOL/L (ref 21–32)
CREAT SERPL-MCNC: 1.29 MG/DL (ref 0.6–1.3)
EOSINOPHIL # BLD AUTO: 0.22 THOUSAND/ΜL (ref 0–0.61)
EOSINOPHIL NFR BLD AUTO: 3 % (ref 0–6)
ERYTHROCYTE [DISTWIDTH] IN BLOOD BY AUTOMATED COUNT: 12.8 % (ref 11.6–15.1)
EST. AVERAGE GLUCOSE BLD GHB EST-MCNC: 120 MG/DL
GFR SERPL CREATININE-BSD FRML MDRD: 51 ML/MIN/1.73SQ M
GLUCOSE P FAST SERPL-MCNC: 96 MG/DL (ref 65–99)
HBA1C MFR BLD: 5.8 %
HCT VFR BLD AUTO: 49.7 % (ref 36.5–49.3)
HDLC SERPL-MCNC: 70 MG/DL
HGB BLD-MCNC: 17.3 G/DL (ref 12–17)
IMM GRANULOCYTES # BLD AUTO: 0.02 THOUSAND/UL (ref 0–0.2)
IMM GRANULOCYTES NFR BLD AUTO: 0 % (ref 0–2)
LDLC SERPL CALC-MCNC: 99 MG/DL (ref 0–100)
LYMPHOCYTES # BLD AUTO: 1.99 THOUSANDS/ΜL (ref 0.6–4.47)
LYMPHOCYTES NFR BLD AUTO: 27 % (ref 14–44)
MCH RBC QN AUTO: 32.4 PG (ref 26.8–34.3)
MCHC RBC AUTO-ENTMCNC: 34.8 G/DL (ref 31.4–37.4)
MCV RBC AUTO: 93 FL (ref 82–98)
MONOCYTES # BLD AUTO: 0.8 THOUSAND/ΜL (ref 0.17–1.22)
MONOCYTES NFR BLD AUTO: 11 % (ref 4–12)
NEUTROPHILS # BLD AUTO: 4.32 THOUSANDS/ΜL (ref 1.85–7.62)
NEUTS SEG NFR BLD AUTO: 58 % (ref 43–75)
NONHDLC SERPL-MCNC: 116 MG/DL
NRBC BLD AUTO-RTO: 0 /100 WBCS
PLATELET # BLD AUTO: 140 THOUSANDS/UL (ref 149–390)
PMV BLD AUTO: 12.7 FL (ref 8.9–12.7)
POTASSIUM SERPL-SCNC: 4.1 MMOL/L (ref 3.5–5.3)
PROT SERPL-MCNC: 6.9 G/DL (ref 6.4–8.2)
RBC # BLD AUTO: 5.34 MILLION/UL (ref 3.88–5.62)
SODIUM SERPL-SCNC: 141 MMOL/L (ref 136–145)
TRIGL SERPL-MCNC: 86 MG/DL
WBC # BLD AUTO: 7.4 THOUSAND/UL (ref 4.31–10.16)

## 2022-05-23 PROCEDURE — 80061 LIPID PANEL: CPT

## 2022-05-23 PROCEDURE — 82306 VITAMIN D 25 HYDROXY: CPT

## 2022-05-23 PROCEDURE — 83036 HEMOGLOBIN GLYCOSYLATED A1C: CPT

## 2022-05-23 PROCEDURE — 85025 COMPLETE CBC W/AUTO DIFF WBC: CPT

## 2022-05-23 PROCEDURE — 80053 COMPREHEN METABOLIC PANEL: CPT

## 2022-05-24 ENCOUNTER — OFFICE VISIT (OUTPATIENT)
Dept: PHYSICAL THERAPY | Facility: CLINIC | Age: 82
End: 2022-05-24
Payer: MEDICARE

## 2022-05-24 DIAGNOSIS — M17.0 OSTEOARTHRITIS OF BOTH KNEES, UNSPECIFIED OSTEOARTHRITIS TYPE: Primary | ICD-10-CM

## 2022-05-24 PROCEDURE — 97110 THERAPEUTIC EXERCISES: CPT | Performed by: PHYSICAL THERAPIST

## 2022-05-24 PROCEDURE — 97530 THERAPEUTIC ACTIVITIES: CPT | Performed by: PHYSICAL THERAPIST

## 2022-05-24 PROCEDURE — 97140 MANUAL THERAPY 1/> REGIONS: CPT | Performed by: PHYSICAL THERAPIST

## 2022-05-24 NOTE — PROGRESS NOTES
Daily Note     Today's date: 2022  Patient name: Quintin Salazar  : 1940  MRN: [id number]  Referring provider: Abdoulaye Do DO  Dx:   Encounter Diagnosis     ICD-10-CM    1  Osteoarthritis of both knees, unspecified osteoarthritis type  M17 0                   Subjective: Pt was on vacation last week  Pt [place] the [place] and doing well,but just a little pain  Objective: See treatment diary below      Assessment: Pt knee extension showing good improvement with rom  Pt still having some discomfort with joint mobs on lateral aspect of musculature  Pt tolerating strengthening exercises well  Plan: Continue per plan of care        Precautions: a-fib, on warifin      Manuals 4/26 4/29 5/3 5/5 5/11 5/13 5/24      Laser knee OA protocol FH FH FH FH FH FH       Knee Ext stretching FH FH FH FH FH FH --      Jt mobs  FH FH FH FH FH FH                   Neuro Re-Ed             Standing hip 3 ways     10x ea 10x ea 10x ea      SLS                                                                              Ther Ex             Quad sets 10x5'' 10x5'' 10x10'' 10x10'' --        SLR 2x10 2x10 2x10 2x10 2x10 2x10 2x10      Bridges 2x10 5'' 2x10 5'' 2x10 5'' 2x10 5'' 2x10 5'' 2x10 5'' 2x10 5''      Clamshells 2x10 green 2x10 green 2x10 green 3x10 green 3x10 green 3x10 green 3x10 blue      LAQ  2x10 5# 2x10 5# Leg Ext --        TKE with ball  10x10'' 10x10'' 10x10'' 10x10'' 10x10'' Black 20x5''      Mini squats    2x10 2x10 2x10 2x10      Seated Hamstring stretch  3x15''   3x15'' 3x15'' 3x15''      Seated piriformis stretch     3x15'' 3x15'' 3x15''      Leg Press   2x10 30/40# 2x10 40# 2x10 60# 2x10 50# 2x10 50#      Leg Ext    2x10 30# 2x10 30# 2x10 40# 2x10 40#      Side stepping             Ther Activity             Bike  5' 8' L3 8' L3 8' L3 8' L4 8' L4      Step ups (f/l)      10xea 6'' 10xea 6''      Gait Training                                       Modalities

## 2022-05-26 ENCOUNTER — OFFICE VISIT (OUTPATIENT)
Dept: PHYSICAL THERAPY | Facility: CLINIC | Age: 82
End: 2022-05-26
Payer: MEDICARE

## 2022-05-26 DIAGNOSIS — M17.0 OSTEOARTHRITIS OF BOTH KNEES, UNSPECIFIED OSTEOARTHRITIS TYPE: Primary | ICD-10-CM

## 2022-05-26 PROCEDURE — 97110 THERAPEUTIC EXERCISES: CPT | Performed by: PHYSICAL THERAPIST

## 2022-05-26 PROCEDURE — 97140 MANUAL THERAPY 1/> REGIONS: CPT | Performed by: PHYSICAL THERAPIST

## 2022-05-26 PROCEDURE — 97530 THERAPEUTIC ACTIVITIES: CPT | Performed by: PHYSICAL THERAPIST

## 2022-05-26 NOTE — PROGRESS NOTES
Daily Note     Today's date: 2022  Patient name: Adonis Macias  : 1940  MRN: [id number]  Referring provider: Ildefonso Ruggiero DO  Dx:   Encounter Diagnosis     ICD-10-CM    1  Osteoarthritis of both knees, unspecified osteoarthritis type  M17 0                   Subjective: Pt reports right knee is bothering him since yesterday when he was moving patio furniture upstairs, but its different spot of his knee  Objective: See treatment diary below      Assessment: Due to quad discomfort from yesterday's house tasks, added STM and inferior patellar mobs and decreased load to quad tendon today  Continue to progress back next session  Plan: Continue per plan of care        Precautions: a-fib, on warifin      Manuals 4/26 4/29 5/3 5/5 5/11 5/13 5/24 5/26     Laser knee OA protocol FH FH FH FH FH FH FH FH     Knee Ext stretching FH FH FH FH FH FH -- STM FH     Jt mobs  FH FH FH FH FH FH FH                  Neuro Re-Ed             Standing hip 3 ways     10x ea 10x ea 10x ea 10x ea     SLS                                                                              Ther Ex             Quad sets 10x5'' 10x5'' 10x10'' 10x10'' --        SLR 2x10 2x10 2x10 2x10 2x10 2x10 2x10 2x10     Bridges 2x10 5'' 2x10 5'' 2x10 5'' 2x10 5'' 2x10 5'' 2x10 5'' 2x10 5'' 2x10 5''     Clamshells 2x10 green 2x10 green 2x10 green 3x10 green 3x10 green 3x10 green 3x10 blue      LAQ  2x10 5# 2x10 5# Leg Ext --        TKE with ball  10x10'' 10x10'' 10x10'' 10x10'' 10x10'' Black 20x5'' 20x5''black     Mini squats    2x10 2x10 2x10 2x10 2x10     Seated Hamstring stretch  3x15''   3x15'' 3x15'' 3x15''      Seated piriformis stretch     3x15'' 3x15'' 3x15''      Leg Press   2x10 30/40# 2x10 40# 2x10 60# 2x10 50# 2x10 50#      Leg Ext    2x10 30# 2x10 30# 2x10 40# 2x10 40#      Side stepping        5x10ft green     Ther Activity             Bike  5' 8' L3 8' L3 8' L3 8' L4 8' L4 8' L3     Step ups (f/l)      10xea 6'' 10xea 6'' 10xea 8''     Gait Training                                       Modalities

## 2022-05-31 ENCOUNTER — OFFICE VISIT (OUTPATIENT)
Dept: PHYSICAL THERAPY | Facility: CLINIC | Age: 82
End: 2022-05-31
Payer: MEDICARE

## 2022-05-31 DIAGNOSIS — M17.0 OSTEOARTHRITIS OF BOTH KNEES, UNSPECIFIED OSTEOARTHRITIS TYPE: Primary | ICD-10-CM

## 2022-05-31 PROCEDURE — 97140 MANUAL THERAPY 1/> REGIONS: CPT | Performed by: PHYSICAL THERAPIST

## 2022-05-31 PROCEDURE — 97110 THERAPEUTIC EXERCISES: CPT | Performed by: PHYSICAL THERAPIST

## 2022-05-31 NOTE — PROGRESS NOTES
Daily Note     Today's date: 2022  Patient name: Caitlin Whitaker  : 1940  MRN: [id number]  Referring provider: Carin Noonan DO  Dx:   Encounter Diagnosis     ICD-10-CM    1  Osteoarthritis of both knees, unspecified osteoarthritis type  M17 0                   Subjective: Pt reports distal quad is still bothering him but he was also gardening over the weekend and kneeling  Objective: See treatment diary below      Assessment: Due to distal quad discomfort, added IASTM and quad stretching in sidelying for Rt LE  Also added eccentrics for quad with leg ext machine to work on tendon health  Pt tolerating program fairly well but discomfort throughout program with weight bearing bending exercises  Plan: Continue per plan of care        Precautions: a-fib, on warifin      Manuals 4/26 4/29 5/3 5/5 5/11 5/13 5/24 5/26 5/31    Laser knee OA protocol FH FH FH FH FH FH FH FH FH    Knee Ext stretching FH FH FH FH FH FH -- STM FH IASTM FH    Jt mobs  FH FH FH FH FH FH FH     Manual quad stretch SL         FH    Neuro Re-Ed             Standing hip 3 ways     10x ea 10x ea 10x ea 10x ea     SLS                                                                              Ther Ex             Quad sets 10x5'' 10x5'' 10x10'' 10x10'' --        SLR 2x10 2x10 2x10 2x10 2x10 2x10 2x10 2x10     Supine Hip flexor stretch         3x15''ea    Bridges 2x10 5'' 2x10 5'' 2x10 5'' 2x10 5'' 2x10 5'' 2x10 5'' 2x10 5'' 2x10 5'' 2x10 5''    Clamshells 2x10 green 2x10 green 2x10 green 3x10 green 3x10 green 3x10 green 3x10 blue      LAQ  2x10 5# 2x10 5# Leg Ext --        TKE with ball  10x10'' 10x10'' 10x10'' 10x10'' 10x10'' Black 20x5'' 20x5''black     Mini squats    2x10 2x10 2x10 2x10 2x10 2x10    Seated Hamstring stretch  3x15''   3x15'' 3x15'' 3x15''      Seated piriformis stretch     3x15'' 3x15'' 3x15''      Leg Press   2x10 30/40# 2x10 40# 2x10 60# 2x10 50# 2x10 50#      Leg Ext    2x10 30# 2x10 30# 2x10 40# 2x10 40# 20x 20# Ecc    Side stepping        5x10ft green 5x10ft green    Ther Activity             Bike  5' 8' L3 8' L3 8' L3 8' L4 8' L4 8' L3 8'L3    Step ups (f/l)      10xea 6'' 10xea 6'' 10xea 8''     Bosu step ups         10xea    Gait Training                                       Modalities

## 2022-06-02 ENCOUNTER — EVALUATION (OUTPATIENT)
Dept: PHYSICAL THERAPY | Facility: CLINIC | Age: 82
End: 2022-06-02
Payer: MEDICARE

## 2022-06-02 DIAGNOSIS — M17.0 OSTEOARTHRITIS OF BOTH KNEES, UNSPECIFIED OSTEOARTHRITIS TYPE: Primary | ICD-10-CM

## 2022-06-02 PROCEDURE — 97140 MANUAL THERAPY 1/> REGIONS: CPT | Performed by: PHYSICAL THERAPIST

## 2022-06-02 PROCEDURE — 97110 THERAPEUTIC EXERCISES: CPT | Performed by: PHYSICAL THERAPIST

## 2022-06-02 NOTE — PROGRESS NOTES
Daily Note     Today's date: 2022  Patient name: Elsa Aguilar  : 1940  MRN: [id number]  Referring provider: Jesus Friday, DO  Dx:   Encounter Diagnosis     ICD-10-CM    1  Osteoarthritis of both knees, unspecified osteoarthritis type  M17 0              Assessment  Assessment details: Pt is a 79yo male presenting with chronic knee OA, that is improving with lateral aspect knee pain  Pt recently was doing a lot of steps and having more discomfort in distal quad tendon  That pain is decreasing but has limited progressions due to flare up  Pt would benefit from 4 more weeks of therapy for advanced strengthening  Impairments: abnormal or restricted ROM, activity intolerance, impaired physical strength, lacks appropriate home exercise program and pain with function  Functional limitations: ADL's, walking  Symptom irritability: lowUnderstanding of Dx/Px/POC: good   Prognosis: fair    Goals  1  Pt will improve b/l knee ext rom to 0 degrees  MET  2  Pt will be able to walk for 5 minutes without pain  Progressing 3 minutes  3  Pt will improve LE strength to 5/5 to allow minimal pain with ADL's  Progressing  4  Pt will be independent with HEP upon discharge  Plan  Patient would benefit from: skilled physical therapy  Planned modality interventions: low level laser therapy  Planned therapy interventions: functional ROM exercises, therapeutic activities, therapeutic exercise, therapeutic training, stretching, strengthening, home exercise program, neuromuscular re-education, manual therapy, patient education and joint mobilization  Frequency: 2x week  Duration in visits: 8  Duration in weeks: 4  Treatment plan discussed with: patient        Subjective Evaluation    History of Present Illness  Mechanism of injury: Pt reports knee pain is improving from recent flare up    Quality of life: fair    Pain  Current pain ratin  At worst pain ratin  Quality: dull ache and throbbing  Relieving factors: rest  Aggravating factors: stair climbing  Progression: worsening    Exercise history: yard work, walking      Diagnostic Tests  X-ray: abnormal        Objective     Tenderness     Right Knee   Tenderness in the lateral patella and quadriceps tendon  Passive Range of Motion   Left Knee   Flexion: 130 degrees   Extension: 0 degrees     Right Knee   Flexion: 130 degrees   Extension: 0 degrees     Mobility   Patellar Mobility:   Left Knee   WFL: medial, lateral, superior and inferior       Right Knee   WFL: lateral, superior and inferior  Hypermobile: medial     Additional Mobility Details  No pain with patellar glides    Strength/Myotome Testing     Left Knee   Flexion: 5  Extension: 5    Right Knee   Flexion: 5  Extension: 4+    Additional Strength Details  Hip Flexion: Rt 5 Lt 5  Hip Extension: Rt 4 Lt 4  Hip ABD: Rt 4 Lt 4  Hip IR: Rt 5 Lt 5  Hip ER: Rt 5 Lt 5        Precautions: a-fib, on warifin      Manuals 4/26 4/29 5/3 5/5 5/11 5/13 5/24 5/26 5/31 6/2   Laser knee OA protocol FH FH FH FH FH FH FH FH FH    Knee Ext stretching FH FH FH FH FH FH -- STM FH IASTM FH IASTM FH   Jt mobs  FH FH FH FH FH FH FH     Manual quad stretch SL         FH FH   Neuro Re-Ed             Standing hip 3 ways     10x ea 10x ea 10x ea 10x ea     SLS                                                                              Ther Ex             Quad sets 10x5'' 10x5'' 10x10'' 10x10'' --        SLR 2x10 2x10 2x10 2x10 2x10 2x10 2x10 2x10  3x10   Supine Hip flexor stretch         3x15''ea    Bridges 2x10 5'' 2x10 5'' 2x10 5'' 2x10 5'' 2x10 5'' 2x10 5'' 2x10 5'' 2x10 5'' 2x10 5'' --   Clamshells 2x10 green 2x10 green 2x10 green 3x10 green 3x10 green 3x10 green 3x10 blue   3x10 blue   LAQ  2x10 5# 2x10 5# Leg Ext --        TKE with ball  10x10'' 10x10'' 10x10'' 10x10'' 10x10'' Black 20x5'' 20x5''black  20x5'' black   Mini squats    2x10 2x10 2x10 2x10 2x10 2x10 2x10   Seated Hamstring stretch  3x15''   3x15'' 3x15'' 3x15''      Seated piriformis stretch     3x15'' 3x15'' 3x15''      Leg Press   2x10 30/40# 2x10 40# 2x10 60# 2x10 50# 2x10 50#      Leg Ext    2x10 30# 2x10 30# 2x10 40# 2x10 40#  20x 20# Ecc 2x10 20# SL   Side stepping        5x10ft green 5x10ft green 5x10ft green   Ther Activity             Bike  5' 8' L3 8' L3 8' L3 8' L4 8' L4 8' L3 8'L3 8' L3   Step ups (f/l)      10xea 6'' 10xea 6'' 10xea 8''  20x 4''   Bosu step ups         10xea    Gait Training                                       Modalities

## 2022-06-02 NOTE — LETTER
2022    Denice Severe, DO  [address] [facility]    Patient: Tawana Juan   YOB: 1940   Date of Visit: 2022     Encounter Diagnosis     ICD-10-CM    1  Osteoarthritis of both knees, unspecified osteoarthritis type  M17 0 CANCELED: PT plan of care cert/re-cert       Dear Dr Addison Copeland:    Thank you for your recent referral of Tawana Juan  Please review the attached evaluation summary from Edward's recent visit  Please verify that you agree with the plan of care by signing the attached order  If you have any questions or concerns, please do not hesitate to call  I sincerely appreciate the opportunity to share in the care of one of your patients and hope to have another opportunity to work with you in the near future  Sincerely,    Armand Tam, PT      Referring Provider:      I certify that I have read the below Plan of Care and certify the need for these services furnished under this plan of treatment while under my care  Denice Severe, DO  [address] [place] [address]  Via Fax: [phone]          Daily Note     Today's date: 2022  Patient name: Tawana Juan  : 1940  MRN: [id number]  Referring provider: Dmitriy Baldwin DO  Dx:   Encounter Diagnosis     ICD-10-CM    1  Osteoarthritis of both knees, unspecified osteoarthritis type  M17 0              Assessment  Assessment details: Pt is a 79yo male presenting with chronic knee OA, that is improving with lateral aspect knee pain  Pt recently was doing a lot of steps and having more discomfort in distal quad tendon  That pain is decreasing but has limited progressions due to flare up  Pt would benefit from 4 more weeks of therapy for advanced strengthening    Impairments: abnormal or restricted ROM, activity intolerance, impaired physical strength, lacks appropriate home exercise program and pain with function  Functional limitations: ADL's, walking  Symptom irritability: lowUnderstanding of Dx/Px/POC: good   Prognosis: fair    Goals  1  Pt will improve b/l knee ext rom to 0 degrees  MET  2  Pt will be able to walk for 5 minutes without pain  Progressing 3 minutes  3  Pt will improve LE strength to 5/5 to allow minimal pain with ADL's  Progressing  4  Pt will be independent with HEP upon discharge  Plan  Patient would benefit from: skilled physical therapy  Planned modality interventions: low level laser therapy  Planned therapy interventions: functional ROM exercises, therapeutic activities, therapeutic exercise, therapeutic training, stretching, strengthening, home exercise program, neuromuscular re-education, manual therapy, patient education and joint mobilization  Frequency: 2x week  Duration in visits: 8  Duration in weeks: 4  Treatment plan discussed with: patient        Subjective Evaluation    History of Present Illness  Mechanism of injury: Pt reports knee pain is improving from recent flare up  Quality of life: fair    Pain  Current pain ratin  At worst pain ratin  Quality: dull ache and throbbing  Relieving factors: rest  Aggravating factors: stair climbing  Progression: worsening    Exercise history: yard work, walking      Diagnostic Tests  X-ray: abnormal        Objective     Tenderness     Right Knee   Tenderness in the lateral patella and quadriceps tendon  Passive Range of Motion   Left Knee   Flexion: 130 degrees   Extension: 0 degrees     Right Knee   Flexion: 130 degrees   Extension: 0 degrees     Mobility   Patellar Mobility:   Left Knee   WFL: medial, lateral, superior and inferior       Right Knee   WFL: lateral, superior and inferior  Hypermobile: medial     Additional Mobility Details  No pain with patellar glides    Strength/Myotome Testing     Left Knee   Flexion: 5  Extension: 5    Right Knee   Flexion: 5  Extension: 4+    Additional Strength Details  Hip Flexion: Rt 5 Lt 5  Hip Extension: Rt 4 Lt 4  Hip ABD: Rt 4 Lt 4  Hip IR: Rt 5 Lt 5  Hip ER: Rt 5 Lt 5        Precautions: a-fib, on warifin      Manuals 4/26 4/29 5/3 5/5 5/11 5/13 5/24 5/26 5/31 6/2   Laser knee OA protocol FH FH FH FH FH FH FH FH FH    Knee Ext stretching FH FH FH FH FH FH -- STM FH IASTM FH IASTM FH   Jt mobs  FH FH FH FH FH FH FH     Manual quad stretch SL         FH FH   Neuro Re-Ed             Standing hip 3 ways     10x ea 10x ea 10x ea 10x ea     SLS                                                                              Ther Ex             Quad sets 10x5'' 10x5'' 10x10'' 10x10'' --        SLR 2x10 2x10 2x10 2x10 2x10 2x10 2x10 2x10  3x10   Supine Hip flexor stretch         3x15''ea    Bridges 2x10 5'' 2x10 5'' 2x10 5'' 2x10 5'' 2x10 5'' 2x10 5'' 2x10 5'' 2x10 5'' 2x10 5'' --   Clamshells 2x10 green 2x10 green 2x10 green 3x10 green 3x10 green 3x10 green 3x10 blue   3x10 blue   LAQ  2x10 5# 2x10 5# Leg Ext --        TKE with ball  10x10'' 10x10'' 10x10'' 10x10'' 10x10'' Black 20x5'' 20x5''black  20x5'' black   Mini squats    2x10 2x10 2x10 2x10 2x10 2x10 2x10   Seated Hamstring stretch  3x15''   3x15'' 3x15'' 3x15''      Seated piriformis stretch     3x15'' 3x15'' 3x15''      Leg Press   2x10 30/40# 2x10 40# 2x10 60# 2x10 50# 2x10 50#      Leg Ext    2x10 30# 2x10 30# 2x10 40# 2x10 40#  20x 20# Ecc 2x10 20# SL   Side stepping        5x10ft green 5x10ft green 5x10ft green   Ther Activity             Bike  5' 8' L3 8' L3 8' L3 8' L4 8' L4 8' L3 8'L3 8' L3   Step ups (f/l)      10xea 6'' 10xea 6'' 10xea 8''  20x 4''   Bosu step ups         10xea    Gait Training                                       Modalities

## 2022-06-07 ENCOUNTER — OFFICE VISIT (OUTPATIENT)
Dept: PHYSICAL THERAPY | Facility: CLINIC | Age: 82
End: 2022-06-07
Payer: MEDICARE

## 2022-06-07 DIAGNOSIS — M17.0 OSTEOARTHRITIS OF BOTH KNEES, UNSPECIFIED OSTEOARTHRITIS TYPE: Primary | ICD-10-CM

## 2022-06-07 PROCEDURE — 97110 THERAPEUTIC EXERCISES: CPT | Performed by: PHYSICAL THERAPIST

## 2022-06-07 PROCEDURE — 97140 MANUAL THERAPY 1/> REGIONS: CPT | Performed by: PHYSICAL THERAPIST

## 2022-06-07 NOTE — PROGRESS NOTES
Daily Note     Today's date: 2022  Patient name: Becki Rausch  : 1940  MRN: [id number]  Referring provider: Bishnu Bolaños DO  Dx:   Encounter Diagnosis     ICD-10-CM    1  Osteoarthritis of both knees, unspecified osteoarthritis type  M17 0                   Subjective: Pt reports he got an injection for the quad tendon, pain is improved but not gone  Objective: See treatment diary below      Assessment: Pt tolerating quad dominant exercises better than last session but slow improvement  Will continue slow progression as tolerated for quad tendon and continue to progress to functional activities  Plan: Continue per plan of care        Precautions: a-fib, on warifin      Manuals 6/7  5/3 5/5 5/11 5/13 5/24 5/26 5/31 6/2   Laser knee OA protocol FH  FH FH FH FH FH FH FH    Knee Ext stretching IASTM FH  FH FH FH FH -- STM FH IASTM FH IASTM FH   Jt mobs   FH FH FH FH FH FH     Manual quad stretch SL FH        FH FH   Neuro Re-Ed             Standing hip 3 ways     10x ea 10x ea 10x ea 10x ea     SLS                                                                              Ther Ex             Quad sets   10x10'' 10x10'' --        SLR 2x10  2x10 2x10 2x10 2x10 2x10 2x10  3x10   Supine Hip flexor stretch         3x15''ea    Bridges   2x10 5'' 2x10 5'' 2x10 5'' 2x10 5'' 2x10 5'' 2x10 5'' 2x10 5'' --   Clamshells   2x10 green 3x10 green 3x10 green 3x10 green 3x10 blue   3x10 blue   LAQ   2x10 5# Leg Ext --        TKE with ball   10x10'' 10x10'' 10x10'' 10x10'' Black 20x5'' 20x5''black  20x5'' black   Mini squats 2x10   2x10 2x10 2x10 2x10 2x10 2x10 2x10   Seated Hamstring stretch     3x15'' 3x15'' 3x15''      Seated piriformis stretch     3x15'' 3x15'' 3x15''      Leg Press   2x10 30/40# 2x10 40# 2x10 60# 2x10 50# 2x10 50#      Leg Ext 2x10 20# Ecc   2x10 30# 2x10 30# 2x10 40# 2x10 40#  20x 20# Ecc 2x10 20# SL   Side stepping 5x10ft green       5x10ft green 5x10ft green 5x10ft green   Ther Activity             Bike 8' L3  8' L3 8' L3 8' L3 8' L4 8' L4 8' L3 8'L3 8' L3   Step ups (f/l) 20x 4''     10xea 6'' 10xea 6'' 10xea 8''  20x 4''   Bosu step ups 10xea        10xea    Gait Training                                       Modalities

## 2022-06-08 ENCOUNTER — ANTICOAG VISIT (OUTPATIENT)
Dept: CARDIOLOGY CLINIC | Facility: CLINIC | Age: 82
End: 2022-06-08

## 2022-06-08 ENCOUNTER — APPOINTMENT (OUTPATIENT)
Dept: LAB | Facility: CLINIC | Age: 82
End: 2022-06-08
Payer: MEDICARE

## 2022-06-08 DIAGNOSIS — I48.19 PERSISTENT ATRIAL FIBRILLATION (HCC): Primary | ICD-10-CM

## 2022-06-08 LAB
INR PPP: 2.26 (ref 0.84–1.19)
PROTHROMBIN TIME: 23.8 SECONDS (ref 11.6–14.5)

## 2022-06-08 PROCEDURE — 36415 COLL VENOUS BLD VENIPUNCTURE: CPT

## 2022-06-08 PROCEDURE — 85610 PROTHROMBIN TIME: CPT

## 2022-06-09 ENCOUNTER — OFFICE VISIT (OUTPATIENT)
Dept: PHYSICAL THERAPY | Facility: CLINIC | Age: 82
End: 2022-06-09
Payer: MEDICARE

## 2022-06-09 DIAGNOSIS — M17.0 OSTEOARTHRITIS OF BOTH KNEES, UNSPECIFIED OSTEOARTHRITIS TYPE: Primary | ICD-10-CM

## 2022-06-09 PROCEDURE — 97110 THERAPEUTIC EXERCISES: CPT | Performed by: PHYSICAL THERAPIST

## 2022-06-09 PROCEDURE — 97530 THERAPEUTIC ACTIVITIES: CPT | Performed by: PHYSICAL THERAPIST

## 2022-06-09 NOTE — PROGRESS NOTES
Daily Note     Today's date: 2022  Patient name: Tay Jonas  : 1940  MRN: [id number]  Referring provider: Nadeen Rodgers DO  Dx:   Encounter Diagnosis     ICD-10-CM    1  Osteoarthritis of both knees, unspecified osteoarthritis type  M17 0                   Subjective: Pt reports he was trimming bushes yesterday so he is feeling sore overall, mostly back and hip though  85% overall  Objective: See treatment diary below      Assessment: Pt was progressed back into leg press and other standing exercises  Pt tolerated POC better, with just small discomfort in knee  Pt would benefit from a few more weeks of advancement in POC and look to discharge if knee is still feeling good  Plan: Continue per plan of care        Precautions: a-fib, on warifin      Manuals    Laser knee OA protocol FH FH   FH FH FH FH FH    Knee Ext stretching IASTM FH    FH FH -- STM FH IASTM FH IASTM FH   Jt mobs     FH FH FH FH     Manual quad stretch SL FH        FH FH   Neuro Re-Ed             Standing hip 3 ways  10x ea green   10x ea 10x ea 10x ea 10x ea     SLS  5x10''                                                                            Ther Ex             SLR 2x10    2x10 2x10 2x10 2x10  3x10   Supine Hip flexor stretch         3x15''ea    Bridges     2x10 5'' 2x10 5'' 2x10 5'' 2x10 5'' 2x10 5'' --   Clamshells     3x10 green 3x10 green 3x10 blue   3x10 blue   TKE with ball     10x10'' 10x10'' Black 20x5'' 20x5''black  20x5'' black   Mini squats 2x10 2x10   2x10 2x10 2x10 2x10 2x10 2x10   Seated Hamstring stretch     3x15'' 3x15'' 3x15''      Seated piriformis stretch     3x15'' 3x15'' 3x15''      Leg Press  2x10 30#   2x10 60# 2x10 50# 2x10 50#      Leg Ext 2x10 20# Ecc 2x10 20# ecc   2x10 30# 2x10 40# 2x10 40#  20x 20# Ecc 2x10 20# SL   Side stepping 5x10ft green 5x10ft green      5x10ft green 5x10ft green 5x10ft green   Ther Activity             Bike 8' L3 8' L3 8' L3 8' L3 8' L3 8' L4 8' L4 8' L3 8'L3 8' L3   Step ups (f/l) 20x 4'' 20x ea 6''    10xea 6'' 10xea 6'' 10xea 8''  20x 4''   Bosu step ups 10xea 10x ea       10xea    Gait Training                                       Modalities

## 2022-06-13 ENCOUNTER — OFFICE VISIT (OUTPATIENT)
Dept: PHYSICAL THERAPY | Facility: CLINIC | Age: 82
End: 2022-06-13
Payer: MEDICARE

## 2022-06-13 DIAGNOSIS — M17.0 OSTEOARTHRITIS OF BOTH KNEES, UNSPECIFIED OSTEOARTHRITIS TYPE: Primary | ICD-10-CM

## 2022-06-13 DIAGNOSIS — I48.19 PERSISTENT ATRIAL FIBRILLATION (HCC): ICD-10-CM

## 2022-06-13 PROCEDURE — 97530 THERAPEUTIC ACTIVITIES: CPT | Performed by: PHYSICAL THERAPIST

## 2022-06-13 PROCEDURE — 97110 THERAPEUTIC EXERCISES: CPT | Performed by: PHYSICAL THERAPIST

## 2022-06-13 RX ORDER — WARFARIN SODIUM 5 MG/1
TABLET ORAL
Qty: 135 TABLET | Refills: 3 | Status: SHIPPED | OUTPATIENT
Start: 2022-06-13

## 2022-06-13 NOTE — PROGRESS NOTES
Daily Note     Today's date: 2022  Patient name: Harriet Daugherty  : 1940  MRN: [id number]  Referring provider: Omar Kinney DO  Dx:   Encounter Diagnosis     ICD-10-CM    1  Osteoarthritis of both knees, unspecified osteoarthritis type  M17 0                   Subjective: Pt reports knees are feeling pretty good but his back has been bothering him more  Objective: See treatment diary below      Assessment: Pt is reporting more back discomfort on his right side today  Step ups are getting easier and less discomfort with them  Continue to progress for knee and monitor back discomfort as well  Plan: Continue per plan of care        Precautions: a-fib, on warifin      Manuals    Laser knee OA protocol FH FH FH  FH FH FH FH FH    Knee Ext stretching IASTM FH    FH FH -- STM FH IASTM FH IASTM FH   Jt mobs     FH FH FH FH     Manual quad stretch SL FH        FH FH   Neuro Re-Ed             Standing hip 3 ways  10x ea green 10xea green  10x ea 10x ea 10x ea 10x ea     SLS  5x10'' 5x10''                                                                           Ther Ex             SLR 2x10 2x10 2x10  2x10 2x10 2x10 2x10  3x10   Supine Hip flexor stretch         3x15''ea    Bridges     2x10 5'' 2x10 5'' 2x10 5'' 2x10 5'' 2x10 5'' --   Clamshells     3x10 green 3x10 green 3x10 blue   3x10 blue   TKE with ball     10x10'' 10x10'' Black 20x5'' 20x5''black  20x5'' black   Mini squats 2x10 2x10 2x10  2x10 2x10 2x10 2x10 2x10 2x10   Seated Hamstring stretch     3x15'' 3x15'' 3x15''      Seated piriformis stretch     3x15'' 3x15'' 3x15''      Leg Press  2x10 30# 2x10 30#  2x10 60# 2x10 50# 2x10 50#      Leg Ext 2x10 20# Ecc 2x10 20# ecc 2x10 20# ecc  2x10 30# 2x10 40# 2x10 40#  20x 20# Ecc 2x10 20# SL   Side stepping 5x10ft green 5x10ft green 5x10ft green     5x10ft green 5x10ft green 5x10ft green   Ther Activity             Bike 8' L3 8' L3 8' L3  8' L3 8' L4 8' L4 8' L3 8'L3 8' L3   Step ups (f/l) 20x 4'' 20x ea 6'' 20xea 6''   10xea 6'' 10xea 6'' 10xea 8''  20x 4''   Bosu step ups 10xea 10x ea 10x ea      10xea    Gait Training                                       Modalities

## 2022-06-17 ENCOUNTER — OFFICE VISIT (OUTPATIENT)
Dept: PHYSICAL THERAPY | Facility: CLINIC | Age: 82
End: 2022-06-17
Payer: MEDICARE

## 2022-06-17 DIAGNOSIS — M17.0 OSTEOARTHRITIS OF BOTH KNEES, UNSPECIFIED OSTEOARTHRITIS TYPE: Primary | ICD-10-CM

## 2022-06-17 PROCEDURE — 97530 THERAPEUTIC ACTIVITIES: CPT | Performed by: PHYSICAL THERAPIST

## 2022-06-17 PROCEDURE — 97110 THERAPEUTIC EXERCISES: CPT | Performed by: PHYSICAL THERAPIST

## 2022-06-17 NOTE — PROGRESS NOTES
Daily Note     Today's date: 2022  Patient name: Adonis Stage  : 1940  MRN: [id number]  Referring provider: Ildefonso Ruggiero DO  Dx:   Encounter Diagnosis     ICD-10-CM    1  Osteoarthritis of both knees, unspecified osteoarthritis type  M17 0                   Subjective: Pt reports knee and back pain comes and goes  Objective: See treatment diary below      Assessment: Pt tolerating treatment very well  Will continue to progress with strengthening as able  Pt educated to decrease to 1xweek with general improvements  Pt agreed with POC  Plan: Continue per plan of care        Precautions: a-fib, on warifin      Manuals    Laser knee OA protocol FH FH FH FH  FH FH FH FH    Knee Ext stretching IASTM FH     FH -- STM FH IASTM FH IASTM FH   Jt mobs      FH FH FH     Manual quad stretch SL FH        FH FH   Neuro Re-Ed             Standing hip 3 ways  10x ea green 10xea green 2x10ea green  10x ea 10x ea 10x ea     SLS  5x10'' 5x10'' 5x10''                                                                          Ther Ex             SLR 2x10 2x10 2x10 2x10  2x10 2x10 2x10  3x10   Supine Hip flexor stretch         3x15''ea    Bridges      2x10 5'' 2x10 5'' 2x10 5'' 2x10 5'' --   Clamshells      3x10 green 3x10 blue   3x10 blue   TKE with ball      10x10'' Black 20x5'' 20x5''black  20x5'' black   Mini squats 2x10 2x10 2x10 2x10  2x10 2x10 2x10 2x10 2x10   Seated Hamstring stretch      3x15'' 3x15''      Seated piriformis stretch      3x15'' 3x15''      Leg Press  2x10 30# 2x10 30# 2x10 30#  2x10 50# 2x10 50#      Leg Ext 2x10 20# Ecc 2x10 20# ecc 2x10 20# ecc 2x10 20# ecc  2x10 40# 2x10 40#  20x 20# Ecc 2x10 20# SL   Side stepping 5x10ft green 5x10ft green 5x10ft green 5x10ft green    5x10ft green 5x10ft green 5x10ft green   Ther Activity             Bike 8' L3 8' L3 8' L3 8' L3  8' L4 8' L4 8' L3 8'L3 8' L3   Step ups (f/l) 20x 4'' 20x ea 6'' 20xea 6'' 20xea 6''  10xea 6'' 10xea 6'' 10xea 8''  20x 4''   Bosu step ups 10xea 10x ea 10x ea 10xea     10xea    Gait Training                                       Modalities

## 2022-06-20 ENCOUNTER — OFFICE VISIT (OUTPATIENT)
Dept: PHYSICAL THERAPY | Facility: CLINIC | Age: 82
End: 2022-06-20
Payer: MEDICARE

## 2022-06-20 DIAGNOSIS — M17.0 OSTEOARTHRITIS OF BOTH KNEES, UNSPECIFIED OSTEOARTHRITIS TYPE: Primary | ICD-10-CM

## 2022-06-20 PROCEDURE — 97110 THERAPEUTIC EXERCISES: CPT | Performed by: PHYSICAL THERAPIST

## 2022-06-20 PROCEDURE — 97530 THERAPEUTIC ACTIVITIES: CPT | Performed by: PHYSICAL THERAPIST

## 2022-06-20 NOTE — PROGRESS NOTES
Daily Note     Today's date: 2022  Patient name: Mis Dow  : 1940  MRN: [id number]  Referring provider: Manuela Reese DO  Dx:   Encounter Diagnosis     ICD-10-CM    1  Osteoarthritis of both knees, unspecified osteoarthritis type  M17 0                   Subjective: Pt reports his back is bothering him more than his knee lately  Objective: See treatment diary below      Assessment: Pt tolerating treatment well  Pt will decrease to 1xweek for carry over of strength and advancement of strengthening  Continue to progress as able  Plan: Continue per plan of care        Precautions: a-fib, on warifin      Manuals    Laser knee OA protocol FH FH FH FH FH  FH FH FH    Knee Ext stretching IASTM FH      -- STM FH IASTM FH IASTM FH   Jt mobs       FH FH     Manual quad stretch SL FH        FH FH   Neuro Re-Ed             Standing hip 3 ways  10x ea green 10xea green 2x10ea green 2x10 ea green  10x ea 10x ea     SLS  5x10'' 5x10'' 5x10'' 5x10''                                                                         Ther Ex             SLR 2x10 2x10 2x10 2x10 2x10  2x10 2x10  3x10   Supine Hip flexor stretch         3x15''ea    Bridges       2x10 5'' 2x10 5'' 2x10 5'' --   Clamshells       3x10 blue   3x10 blue   TKE with ball       Black 20x5'' 20x5''black  20x5'' black   Mini squats 2x10 2x10 2x10 2x10 2x10  2x10 2x10 2x10 2x10   Seated Hamstring stretch       3x15''      Seated piriformis stretch       3x15''      Leg Press  2x10 30# 2x10 30# 2x10 30# 2x10 30#  2x10 50#      Leg Ext 2x10 20# Ecc 2x10 20# ecc 2x10 20# ecc 2x10 20# ecc 2x10 20# ecc  2x10 40#  20x 20# Ecc 2x10 20# SL   Side stepping 5x10ft green 5x10ft green 5x10ft green 5x10ft green 5x10ft green   5x10ft green 5x10ft green 5x10ft green   Ther Activity             Bike 8' L3 8' L3 8' L3 8' L3 8' L3  8' L4 8' L3 8'L3 8' L3   Step ups (f/l) 20x 4'' 20x ea 6'' 20xea 6'' 20xea 6'' 20xea 6'' 10xea 6'' 10xea 8''  20x 4''   Bosu step ups 10xea 10x ea 10x ea 10xea 10x ea    10xea    Gait Training                                       Modalities

## 2022-06-24 ENCOUNTER — OFFICE VISIT (OUTPATIENT)
Dept: PHYSICAL THERAPY | Facility: CLINIC | Age: 82
End: 2022-06-24
Payer: MEDICARE

## 2022-06-24 DIAGNOSIS — M17.0 OSTEOARTHRITIS OF BOTH KNEES, UNSPECIFIED OSTEOARTHRITIS TYPE: Primary | ICD-10-CM

## 2022-06-24 PROCEDURE — 97110 THERAPEUTIC EXERCISES: CPT | Performed by: PHYSICAL THERAPIST

## 2022-06-24 PROCEDURE — 97530 THERAPEUTIC ACTIVITIES: CPT | Performed by: PHYSICAL THERAPIST

## 2022-06-24 NOTE — PROGRESS NOTES
Daily Note     Today's date: 2022  Patient name: Abby Villanueva  : 1940  MRN: [id number]  Referring provider: Gustavo Mccabe DO  Dx:   Encounter Diagnosis     ICD-10-CM    1  Osteoarthritis of both knees, unspecified osteoarthritis type  M17 0                   Subjective: Pt reports back has still been worse than the knee lately  Objective: See treatment diary below      Assessment: Pt tolerating knee POC very well with minimal discomfort throughout  Pt to decrease to 1x week and possible discharge in 2-3 weeks  Plan: Continue per plan of care        Precautions: a-fib, on warifin      Manuals    Laser knee OA protocol FH FH FH FH FH FH  FH FH    Knee Ext stretching IASTM FH       STM FH IASTM FH IASTM FH   Jt mobs        FH     Manual quad stretch SL FH        FH FH   Neuro Re-Ed             Standing hip 3 ways  10x ea green 10xea green 2x10ea green 2x10 ea green 2x10 ea green  10x ea     SLS  5x10'' 5x10'' 5x10'' 5x10'' 5x10''                                                                        Ther Ex             SLR 2x10 2x10 2x10 2x10 2x10 2x10  2x10  3x10   Crossed LTR      To Lt 10x5''       Supine Hip flexor stretch         3x15''ea    Bridges        2x10 5'' 2x10 5'' --   Clamshells          3x10 blue   TKE with ball        20x5''black  20x5'' black   Mini squats 2x10 2x10 2x10 2x10 2x10 2x10  2x10 2x10 2x10   Seated Hamstring stretch             Seated piriformis stretch             Leg Press  2x10 30# 2x10 30# 2x10 30# 2x10 30# 2x10 30#       Leg Ext 2x10 20# Ecc 2x10 20# ecc 2x10 20# ecc 2x10 20# ecc 2x10 20# ecc 2x10 20# ecc   20x 20# Ecc 2x10 20# SL   Side stepping 5x10ft green 5x10ft green 5x10ft green 5x10ft green 5x10ft green --  5x10ft green 5x10ft green 5x10ft green   Ther Activity             Bike 8' L3 8' L3 8' L3 8' L3 8' L3 8'L3  8' L3 8'L3 8' L3   Step ups (f/l) 20x 4'' 20x ea 6'' 20xea 6'' 20xea 6'' 20xea 6'' 20xea 6'' 10xea 8''  20x 4''   Bosu step ups 10xea 10x ea 10x ea 10xea 10x ea 10xea   10xea    Gait Training                                       Modalities

## 2022-06-29 ENCOUNTER — OFFICE VISIT (OUTPATIENT)
Dept: PHYSICAL THERAPY | Facility: CLINIC | Age: 82
End: 2022-06-29
Payer: MEDICARE

## 2022-06-29 DIAGNOSIS — M17.0 OSTEOARTHRITIS OF BOTH KNEES, UNSPECIFIED OSTEOARTHRITIS TYPE: Primary | ICD-10-CM

## 2022-06-29 PROCEDURE — 97110 THERAPEUTIC EXERCISES: CPT | Performed by: PHYSICAL THERAPIST

## 2022-06-29 PROCEDURE — 97530 THERAPEUTIC ACTIVITIES: CPT | Performed by: PHYSICAL THERAPIST

## 2022-06-29 NOTE — PROGRESS NOTES
Daily Note     Today's date: 2022  Patient name: Adonis Stage  : 1940  MRN: [id number]  Referring provider: Ildefonso Ruggiero DO  Dx:   Encounter Diagnosis     ICD-10-CM    1  Osteoarthritis of both knees, unspecified osteoarthritis type  M17 0                   Subjective: Pt reports his knee is bothering him a little more today  Objective: See treatment diary below      Assessment: Pt tolerated program well  Step downs seem to be the most challenging on the knee eccentrically, educated to patient regarding the load tolerance and loading eccentrically  Monitor progress with decreasing frequency  Plan: Continue per plan of care        Precautions: a-fib, on warifin      Manuals    Laser knee OA protocol FH FH FH FH FH FH FH      Knee Ext stretching IASTM FH         IASTM FH   Jt mobs             Manual quad stretch SL FH         FH   Neuro Re-Ed             Standing hip 3 ways  10x ea green 10xea green 2x10ea green 2x10 ea green 2x10 ea green 2x10ea green      SLS  5x10'' 5x10'' 5x10'' 5x10'' 5x10'' 5x10''                                                                       Ther Ex             SLR 2x10 2x10 2x10 2x10 2x10 2x10 2x10   3x10   Crossed LTR      To Lt 10x5''       Supine Hip flexor stretch             Bridges          --   Clamshells          3x10 blue   TKE with ball          20x5'' black   Mini squats 2x10 2x10 2x10 2x10 2x10 2x10 2x10   2x10   Seated Hamstring stretch             Seated piriformis stretch             Leg Press  2x10 30# 2x10 30# 2x10 30# 2x10 30# 2x10 30# 2x10 30#      Leg Ext 2x10 20# Ecc 2x10 20# ecc 2x10 20# ecc 2x10 20# ecc 2x10 20# ecc 2x10 20# ecc 2x10 20# ecc   2x10 20# SL   Side stepping 5x10ft green 5x10ft green 5x10ft green 5x10ft green 5x10ft green --    5x10ft green   Ther Activity             Bike 8' L3 8' L3 8' L3 8' L3 8' L3 8'L3 8' L3   8' L3   Step ups (f/l) 20x 4'' 20x ea 6'' 20xea 6'' 20xea 6'' 20xea 6'' 20xea 6'' 20xea 6''   20x 4''   Bosu step ups 10xea 10x ea 10x ea 10xea 10x ea 10xea 10x ea      Gait Training                                       Modalities

## 2022-07-06 ENCOUNTER — OFFICE VISIT (OUTPATIENT)
Dept: PHYSICAL THERAPY | Facility: CLINIC | Age: 82
End: 2022-07-06
Payer: MEDICARE

## 2022-07-06 DIAGNOSIS — M17.0 OSTEOARTHRITIS OF BOTH KNEES, UNSPECIFIED OSTEOARTHRITIS TYPE: Primary | ICD-10-CM

## 2022-07-06 PROCEDURE — 97110 THERAPEUTIC EXERCISES: CPT | Performed by: PHYSICAL THERAPIST

## 2022-07-06 PROCEDURE — 97530 THERAPEUTIC ACTIVITIES: CPT | Performed by: PHYSICAL THERAPIST

## 2022-07-06 NOTE — PROGRESS NOTES
Daily Note     Today's date: 2022  Patient name: Shaylee Izaguirre  : 1940  MRN: [id number]  Referring provider: Tommy Alcala DO  Dx:   Encounter Diagnosis     ICD-10-CM    1  Osteoarthritis of both knees, unspecified osteoarthritis type  M17 0                   Subjective: Pt reports back was bothering him yesterday  Today his left knee is bothering him more  Objective: See treatment diary below      Assessment: Pt overall tolerating exercises better  Pt still reports slight discomfort overall  Educated on possible plateau, likely discharge in 2 weeks  Plan: Continue per plan of care        Precautions: a-fib, on warifin      Manuals    Laser knee OA protocol FH FH FH FH FH FH FH FH     Knee Ext stretching IASTM FH         IASTM FH   Jt mobs             Manual quad stretch SL FH         FH   Neuro Re-Ed             Standing hip 3 ways  10x ea green 10xea green 2x10ea green 2x10 ea green 2x10 ea green 2x10ea green 2x10ea green     SLS  5x10'' 5x10'' 5x10'' 5x10'' 5x10'' 5x10'' 5x10''                                                                      Ther Ex             SLR 2x10 2x10 2x10 2x10 2x10 2x10 2x10   3x10   Crossed LTR      To Lt 10x5''       Supine Hip flexor stretch             Bridges          --   Clamshells          3x10 blue   TKE with ball          20x5'' black   Mini squats 2x10 2x10 2x10 2x10 2x10 2x10 2x10 2x10  2x10   Seated Hamstring stretch             Seated piriformis stretch             Leg Press  2x10 30# 2x10 30# 2x10 30# 2x10 30# 2x10 30# 2x10 30# 2x10 30#     Leg Ext 2x10 20# Ecc 2x10 20# ecc 2x10 20# ecc 2x10 20# ecc 2x10 20# ecc 2x10 20# ecc 2x10 20# ecc 2x10 20# ecc  2x10 20# SL   Side stepping 5x10ft green 5x10ft green 5x10ft green 5x10ft green 5x10ft green --    5x10ft green   Ther Activity             Bike 8' L3 8' L3 8' L3 8' L3 8' L3 8'L3 8' L3 8' L4  8' L3   Step ups (f/l) 20x 4'' 20x ea 6'' 20xea 6'' 20xea 6'' 20xea 6'' 20xea 6'' 20xea 6'' 20xea 6''  20x 4''   Bosu step ups 10xea 10x ea 10x ea 10xea 10x ea 10xea 10x ea 10xea     Gait Training                                       Modalities

## 2022-07-07 ENCOUNTER — ANTICOAG VISIT (OUTPATIENT)
Dept: CARDIOLOGY CLINIC | Facility: CLINIC | Age: 82
End: 2022-07-07

## 2022-07-07 ENCOUNTER — APPOINTMENT (OUTPATIENT)
Dept: LAB | Facility: CLINIC | Age: 82
End: 2022-07-07
Payer: MEDICARE

## 2022-07-07 DIAGNOSIS — I48.19 PERSISTENT ATRIAL FIBRILLATION (HCC): Primary | ICD-10-CM

## 2022-07-13 ENCOUNTER — OFFICE VISIT (OUTPATIENT)
Dept: PHYSICAL THERAPY | Facility: CLINIC | Age: 82
End: 2022-07-13
Payer: MEDICARE

## 2022-07-13 DIAGNOSIS — M17.0 OSTEOARTHRITIS OF BOTH KNEES, UNSPECIFIED OSTEOARTHRITIS TYPE: Primary | ICD-10-CM

## 2022-07-13 PROCEDURE — 97530 THERAPEUTIC ACTIVITIES: CPT | Performed by: PHYSICAL THERAPIST

## 2022-07-13 PROCEDURE — 97110 THERAPEUTIC EXERCISES: CPT | Performed by: PHYSICAL THERAPIST

## 2022-07-13 NOTE — PROGRESS NOTES
Daily Note     Today's date: 2022  Patient name: Topher Zuniga  : 1940  MRN: [id number]  Referring provider: Ania Lopez DO  Dx:   Encounter Diagnosis     ICD-10-CM    1  Osteoarthritis of both knees, unspecified osteoarthritis type  M17 0                   Subjective: Pt reports he was working under the sink this morning so his back is really sore  Objective: See treatment diary below      Assessment: Pt tolerating therapy well with minimal cues for proper form  Pt is possible discharge next week to [facility]  Plan: Potential discharge next visit       Precautions: a-fib, on warifin      Manuals    Laser knee OA protocol FH FH FH FH FH FH FH FH     Knee Ext stretching IASTM FH         IASTM FH   Jt mobs             Manual quad stretch SL FH         FH   Neuro Re-Ed             Standing hip 3 ways  10x ea green 10xea green 2x10ea green 2x10 ea green 2x10 ea green 2x10ea green 2x10ea green     SLS  5x10'' 5x10'' 5x10'' 5x10'' 5x10'' 5x10'' 5x10'' 5x10''    PPT         10x10''                                                        Ther Ex             SLR 2x10 2x10 2x10 2x10 2x10 2x10 2x10  2x10 3x10   Crossed LTR      To Lt 10x5''   10x5''    SKTC         10x5''    Bridges          --   Clamshells          3x10 blue   TKE with ball          20x5'' black   Mini squats 2x10 2x10 2x10 2x10 2x10 2x10 2x10 2x10 2x10 2x10   Seated Hamstring stretch             Seated piriformis stretch             Leg Press  2x10 30# 2x10 30# 2x10 30# 2x10 30# 2x10 30# 2x10 30# 2x10 30# 3x10 30#    Leg Ext 2x10 20# Ecc 2x10 20# ecc 2x10 20# ecc 2x10 20# ecc 2x10 20# ecc 2x10 20# ecc 2x10 20# ecc 2x10 20# ecc 2x10 20# ecc 2x10 20# SL   Side stepping 5x10ft green 5x10ft green 5x10ft green 5x10ft green 5x10ft green --    5x10ft green   Ther Activity             Bike 8' L3 8' L3 8' L3 8' L3 8' L3 8'L3 8' L3 8' L4 8' L4 8' L3   Step ups (f/l) 20x 4'' 20x ea 6'' 20xea 6'' 20xea 6'' 20xea 6'' 20xea 6'' 20xea 6'' 20xea 6'' 20xea 6'' 20x 4''   Bosu step ups 10xea 10x ea 10x ea 10xea 10x ea 10xea 10x ea 10xea 10xea    Gait Training                                       Modalities

## 2022-07-20 ENCOUNTER — OFFICE VISIT (OUTPATIENT)
Dept: PHYSICAL THERAPY | Facility: CLINIC | Age: 82
End: 2022-07-20
Payer: MEDICARE

## 2022-07-20 DIAGNOSIS — M17.0 OSTEOARTHRITIS OF BOTH KNEES, UNSPECIFIED OSTEOARTHRITIS TYPE: Primary | ICD-10-CM

## 2022-07-20 PROCEDURE — 97110 THERAPEUTIC EXERCISES: CPT | Performed by: PHYSICAL THERAPIST

## 2022-07-20 PROCEDURE — 97530 THERAPEUTIC ACTIVITIES: CPT | Performed by: PHYSICAL THERAPIST

## 2022-07-20 NOTE — PROGRESS NOTES
Daily Note     Today's date: 2022  Patient name: Shaylee Izaguirre  : 1940  MRN: [id number]  Referring provider: Tommy Alcala DO  Dx:   Encounter Diagnosis     ICD-10-CM    1  Osteoarthritis of both knees, unspecified osteoarthritis type  M17 0                   Subjective: Pt reports good and bad days  Today is a good day  Objective: See treatment diary below      Assessment: Pt feels he is able to continue with HEP at home, most goals are met  Discharge to HEP  Plan: Discharge to HEP       Precautions: a-fib, on warifin      Manuals    Laser knee OA protocol FH FH FH FH FH FH FH FH     Knee Ext stretching IASTM FH            Jt mobs             Manual quad stretch SL FH            Neuro Re-Ed             Standing hip 3 ways  10x ea green 10xea green 2x10ea green 2x10 ea green 2x10 ea green 2x10ea green 2x10ea green     SLS  5x10'' 5x10'' 5x10'' 5x10'' 5x10'' 5x10'' 5x10'' 5x10'' 5x10''   PPT         10x10''                                                        Ther Ex             SLR 2x10 2x10 2x10 2x10 2x10 2x10 2x10  2x10    Crossed LTR      To Lt 10x5''   10x5''    SKTC         10x5''    Bridges             Clamshells             TKE with ball             Mini squats 2x10 2x10 2x10 2x10 2x10 2x10 2x10 2x10 2x10 2x10   Seated Hamstring stretch             Seated piriformis stretch             Leg Press  2x10 30# 2x10 30# 2x10 30# 2x10 30# 2x10 30# 2x10 30# 2x10 30# 3x10 30# 3x10 30#   Leg Ext 2x10 20# Ecc 2x10 20# ecc 2x10 20# ecc 2x10 20# ecc 2x10 20# ecc 2x10 20# ecc 2x10 20# ecc 2x10 20# ecc 2x10 20# ecc 2x10 20# ecc   Side stepping 5x10ft green 5x10ft green 5x10ft green 5x10ft green 5x10ft green --       Ther Activity             Bike 8' L3 8' L3 8' L3 8' L3 8' L3 8'L3 8' L3 8' L4 8' L4 8' L4   Step ups (f/l) 20x 4'' 20x ea 6'' 20xea 6'' 20xea 6'' 20xea 6'' 20xea 6'' 20xea 6'' 20xea 6'' 20xea 6'' 20x ea 6''   Bosu step ups 10xea 10x ea 10x ea 10xea 10x ea 10xea 10x ea 10xea 10xea 10xea   Gait Training                                       Modalities

## 2022-08-02 ENCOUNTER — APPOINTMENT (OUTPATIENT)
Dept: LAB | Facility: CLINIC | Age: 82
End: 2022-08-02
Payer: MEDICARE

## 2022-08-02 ENCOUNTER — ANTICOAG VISIT (OUTPATIENT)
Dept: CARDIOLOGY CLINIC | Facility: CLINIC | Age: 82
End: 2022-08-02

## 2022-08-02 DIAGNOSIS — I48.19 PERSISTENT ATRIAL FIBRILLATION (HCC): Primary | ICD-10-CM

## 2022-09-06 ENCOUNTER — ANTICOAG VISIT (OUTPATIENT)
Dept: CARDIOLOGY CLINIC | Facility: CLINIC | Age: 82
End: 2022-09-06

## 2022-09-06 ENCOUNTER — APPOINTMENT (OUTPATIENT)
Dept: LAB | Facility: CLINIC | Age: 82
End: 2022-09-06
Payer: MEDICARE

## 2022-09-06 DIAGNOSIS — I48.19 PERSISTENT ATRIAL FIBRILLATION (HCC): Primary | ICD-10-CM

## 2022-09-22 ENCOUNTER — PROBLEM (OUTPATIENT)
Dept: URBAN - METROPOLITAN AREA CLINIC 6 | Facility: CLINIC | Age: 82
End: 2022-09-22

## 2022-09-22 DIAGNOSIS — Z98.890: ICD-10-CM

## 2022-09-22 DIAGNOSIS — H10.013: ICD-10-CM

## 2022-09-22 DIAGNOSIS — H02.413: ICD-10-CM

## 2022-09-22 PROCEDURE — 92012 INTRM OPH EXAM EST PATIENT: CPT

## 2022-09-22 ASSESSMENT — TONOMETRY
OS_IOP_MMHG: 17
OD_IOP_MMHG: 19

## 2022-09-22 ASSESSMENT — VISUAL ACUITY
OS_PH: 20/30-1
OS_CC: 20/40+1
OD_CC: 20/20

## 2022-10-11 ENCOUNTER — ANTICOAG VISIT (OUTPATIENT)
Dept: CARDIOLOGY CLINIC | Facility: CLINIC | Age: 82
End: 2022-10-11

## 2022-10-11 ENCOUNTER — APPOINTMENT (OUTPATIENT)
Dept: LAB | Facility: CLINIC | Age: 82
End: 2022-10-11
Payer: MEDICARE

## 2022-10-11 DIAGNOSIS — I48.19 PERSISTENT ATRIAL FIBRILLATION (HCC): Primary | ICD-10-CM

## 2022-10-21 ENCOUNTER — FOLLOW UP (OUTPATIENT)
Dept: URBAN - METROPOLITAN AREA CLINIC 6 | Facility: CLINIC | Age: 82
End: 2022-10-21

## 2022-10-21 DIAGNOSIS — H10.012: ICD-10-CM

## 2022-10-21 DIAGNOSIS — H02.413: ICD-10-CM

## 2022-10-21 DIAGNOSIS — Z98.890: ICD-10-CM

## 2022-10-21 DIAGNOSIS — H35.371: ICD-10-CM

## 2022-10-21 DIAGNOSIS — Z96.1: ICD-10-CM

## 2022-10-21 PROCEDURE — 92014 COMPRE OPH EXAM EST PT 1/>: CPT

## 2022-10-21 PROCEDURE — 92134 CPTRZ OPH DX IMG PST SGM RTA: CPT

## 2022-10-21 ASSESSMENT — VISUAL ACUITY
OD_PH: 20/30-2
OS_SC: 20/50-2
OS_PH: 20/30-1
OD_SC: 20/40-1
OU_SC: J5

## 2022-10-21 ASSESSMENT — TONOMETRY
OS_IOP_MMHG: 14
OD_IOP_MMHG: 18

## 2022-11-14 ENCOUNTER — APPOINTMENT (OUTPATIENT)
Dept: LAB | Facility: CLINIC | Age: 82
End: 2022-11-14

## 2022-11-14 ENCOUNTER — ANTICOAG VISIT (OUTPATIENT)
Dept: CARDIOLOGY CLINIC | Facility: CLINIC | Age: 82
End: 2022-11-14

## 2022-11-14 DIAGNOSIS — I48.19 PERSISTENT ATRIAL FIBRILLATION (HCC): Primary | ICD-10-CM

## 2022-11-21 ENCOUNTER — RX CHECK (OUTPATIENT)
Dept: URBAN - METROPOLITAN AREA CLINIC 6 | Facility: CLINIC | Age: 82
End: 2022-11-21

## 2022-11-21 DIAGNOSIS — H52.13: ICD-10-CM

## 2022-11-21 PROCEDURE — 92015 DETERMINE REFRACTIVE STATE: CPT

## 2022-11-21 ASSESSMENT — VISUAL ACUITY
OD_SC: 20/70
OS_SC: 20/60+1

## 2022-12-01 ENCOUNTER — APPOINTMENT (OUTPATIENT)
Dept: LAB | Facility: HOSPITAL | Age: 82
End: 2022-12-01
Attending: INTERNAL MEDICINE

## 2022-12-01 ENCOUNTER — ANTICOAG VISIT (OUTPATIENT)
Dept: CARDIOLOGY CLINIC | Facility: CLINIC | Age: 82
End: 2022-12-01

## 2022-12-01 DIAGNOSIS — E72.11 METHYLENE THF REDUCTASE DEFICIENCY AND HOMOCYSTINURIA (HCC): ICD-10-CM

## 2022-12-01 DIAGNOSIS — I48.19 PERSISTENT ATRIAL FIBRILLATION (HCC): Primary | ICD-10-CM

## 2022-12-01 DIAGNOSIS — D68.59 PRIMARY HYPERCOAGULABLE STATE (HCC): ICD-10-CM

## 2022-12-01 DIAGNOSIS — E78.00 PURE HYPERCHOLESTEROLEMIA: ICD-10-CM

## 2022-12-01 DIAGNOSIS — I48.19 PERSISTENT ATRIAL FIBRILLATION (HCC): ICD-10-CM

## 2022-12-01 DIAGNOSIS — R73.09 IMPAIRED GLUCOSE TOLERANCE TEST: ICD-10-CM

## 2022-12-01 DIAGNOSIS — E72.12 METHYLENE THF REDUCTASE DEFICIENCY AND HOMOCYSTINURIA (HCC): ICD-10-CM

## 2022-12-01 LAB
ALBUMIN SERPL BCP-MCNC: 3.4 G/DL (ref 3.5–5)
ALP SERPL-CCNC: 92 U/L (ref 46–116)
ALT SERPL W P-5'-P-CCNC: 30 U/L (ref 12–78)
ANION GAP SERPL CALCULATED.3IONS-SCNC: 5 MMOL/L (ref 4–13)
AST SERPL W P-5'-P-CCNC: 29 U/L (ref 5–45)
BASOPHILS # BLD AUTO: 0.06 THOUSANDS/ÂΜL (ref 0–0.1)
BASOPHILS NFR BLD AUTO: 1 % (ref 0–1)
BILIRUB SERPL-MCNC: 1.85 MG/DL (ref 0.2–1)
BUN SERPL-MCNC: 21 MG/DL (ref 5–25)
CALCIUM ALBUM COR SERPL-MCNC: 9.8 MG/DL (ref 8.3–10.1)
CALCIUM SERPL-MCNC: 9.3 MG/DL (ref 8.3–10.1)
CHLORIDE SERPL-SCNC: 107 MMOL/L (ref 96–108)
CHOLEST SERPL-MCNC: 194 MG/DL
CO2 SERPL-SCNC: 28 MMOL/L (ref 21–32)
CREAT SERPL-MCNC: 1.2 MG/DL (ref 0.6–1.3)
EOSINOPHIL # BLD AUTO: 0.11 THOUSAND/ÂΜL (ref 0–0.61)
EOSINOPHIL NFR BLD AUTO: 1 % (ref 0–6)
ERYTHROCYTE [DISTWIDTH] IN BLOOD BY AUTOMATED COUNT: 12.6 % (ref 11.6–15.1)
GFR SERPL CREATININE-BSD FRML MDRD: 55 ML/MIN/1.73SQ M
GLUCOSE P FAST SERPL-MCNC: 109 MG/DL (ref 65–99)
HCT VFR BLD AUTO: 52.9 % (ref 36.5–49.3)
HDLC SERPL-MCNC: 74 MG/DL
HGB BLD-MCNC: 17.4 G/DL (ref 12–17)
IMM GRANULOCYTES # BLD AUTO: 0.02 THOUSAND/UL (ref 0–0.2)
IMM GRANULOCYTES NFR BLD AUTO: 0 % (ref 0–2)
LDLC SERPL CALC-MCNC: 94 MG/DL (ref 0–100)
LYMPHOCYTES # BLD AUTO: 2.14 THOUSANDS/ÂΜL (ref 0.6–4.47)
LYMPHOCYTES NFR BLD AUTO: 26 % (ref 14–44)
MCH RBC QN AUTO: 31.6 PG (ref 26.8–34.3)
MCHC RBC AUTO-ENTMCNC: 32.9 G/DL (ref 31.4–37.4)
MCV RBC AUTO: 96 FL (ref 82–98)
MONOCYTES # BLD AUTO: 0.98 THOUSAND/ÂΜL (ref 0.17–1.22)
MONOCYTES NFR BLD AUTO: 12 % (ref 4–12)
NEUTROPHILS # BLD AUTO: 4.84 THOUSANDS/ÂΜL (ref 1.85–7.62)
NEUTS SEG NFR BLD AUTO: 60 % (ref 43–75)
NONHDLC SERPL-MCNC: 120 MG/DL
NRBC BLD AUTO-RTO: 0 /100 WBCS
PLATELET # BLD AUTO: 149 THOUSANDS/UL (ref 149–390)
PMV BLD AUTO: 12.6 FL (ref 8.9–12.7)
POTASSIUM SERPL-SCNC: 3.9 MMOL/L (ref 3.5–5.3)
PROT SERPL-MCNC: 7.7 G/DL (ref 6.4–8.4)
RBC # BLD AUTO: 5.51 MILLION/UL (ref 3.88–5.62)
SODIUM SERPL-SCNC: 140 MMOL/L (ref 135–147)
TRIGL SERPL-MCNC: 132 MG/DL
WBC # BLD AUTO: 8.15 THOUSAND/UL (ref 4.31–10.16)

## 2022-12-02 LAB
EST. AVERAGE GLUCOSE BLD GHB EST-MCNC: 114 MG/DL
HBA1C MFR BLD: 5.6 %

## 2022-12-21 ENCOUNTER — APPOINTMENT (OUTPATIENT)
Dept: LAB | Facility: CLINIC | Age: 82
End: 2022-12-21

## 2022-12-21 ENCOUNTER — ANTICOAG VISIT (OUTPATIENT)
Dept: CARDIOLOGY CLINIC | Facility: CLINIC | Age: 82
End: 2022-12-21

## 2022-12-21 DIAGNOSIS — I48.19 PERSISTENT ATRIAL FIBRILLATION (HCC): Primary | ICD-10-CM

## 2023-01-05 ENCOUNTER — APPOINTMENT (OUTPATIENT)
Dept: LAB | Facility: CLINIC | Age: 83
End: 2023-01-05

## 2023-01-05 ENCOUNTER — ANTICOAG VISIT (OUTPATIENT)
Dept: CARDIOLOGY CLINIC | Facility: CLINIC | Age: 83
End: 2023-01-05

## 2023-01-05 DIAGNOSIS — I48.19 PERSISTENT ATRIAL FIBRILLATION (HCC): Primary | ICD-10-CM

## 2023-01-25 ENCOUNTER — APPOINTMENT (OUTPATIENT)
Dept: LAB | Facility: CLINIC | Age: 83
End: 2023-01-25

## 2023-01-25 ENCOUNTER — ANTICOAG VISIT (OUTPATIENT)
Dept: CARDIOLOGY CLINIC | Facility: CLINIC | Age: 83
End: 2023-01-25

## 2023-01-25 DIAGNOSIS — I48.19 PERSISTENT ATRIAL FIBRILLATION (HCC): Primary | ICD-10-CM

## 2023-02-13 ENCOUNTER — ANTICOAG VISIT (OUTPATIENT)
Dept: CARDIOLOGY CLINIC | Facility: CLINIC | Age: 83
End: 2023-02-13

## 2023-02-13 ENCOUNTER — APPOINTMENT (OUTPATIENT)
Dept: LAB | Facility: CLINIC | Age: 83
End: 2023-02-13

## 2023-02-13 DIAGNOSIS — I48.19 PERSISTENT ATRIAL FIBRILLATION (HCC): Primary | ICD-10-CM

## 2023-03-21 ENCOUNTER — ANTICOAG VISIT (OUTPATIENT)
Dept: CARDIOLOGY CLINIC | Facility: CLINIC | Age: 83
End: 2023-03-21

## 2023-03-21 ENCOUNTER — APPOINTMENT (OUTPATIENT)
Dept: LAB | Facility: CLINIC | Age: 83
End: 2023-03-21

## 2023-03-21 DIAGNOSIS — I48.19 PERSISTENT ATRIAL FIBRILLATION (HCC): Primary | ICD-10-CM

## 2023-04-19 ENCOUNTER — APPOINTMENT (OUTPATIENT)
Dept: LAB | Facility: CLINIC | Age: 83
End: 2023-04-19

## 2023-04-24 ENCOUNTER — APPOINTMENT (INPATIENT)
Dept: NON INVASIVE DIAGNOSTICS | Facility: HOSPITAL | Age: 83
End: 2023-04-24

## 2023-04-24 ENCOUNTER — HOSPITAL ENCOUNTER (INPATIENT)
Facility: HOSPITAL | Age: 83
LOS: 9 days | Discharge: HOME HEALTH | End: 2023-05-03
Attending: EMERGENCY MEDICINE | Admitting: INTERNAL MEDICINE

## 2023-04-24 ENCOUNTER — APPOINTMENT (EMERGENCY)
Dept: RADIOLOGY | Facility: HOSPITAL | Age: 83
End: 2023-04-24

## 2023-04-24 ENCOUNTER — APPOINTMENT (EMERGENCY)
Dept: NON INVASIVE DIAGNOSTICS | Facility: HOSPITAL | Age: 83
End: 2023-04-24

## 2023-04-24 DIAGNOSIS — R60.0 BILATERAL LOWER EXTREMITY EDEMA: ICD-10-CM

## 2023-04-24 DIAGNOSIS — L03.90 CELLULITIS: Primary | ICD-10-CM

## 2023-04-24 DIAGNOSIS — I48.19 PERSISTENT ATRIAL FIBRILLATION (HCC): ICD-10-CM

## 2023-04-24 DIAGNOSIS — I87.2 CHRONIC VENOUS INSUFFICIENCY OF LOWER EXTREMITY: Chronic | ICD-10-CM

## 2023-04-24 DIAGNOSIS — R60.9 SWELLING: ICD-10-CM

## 2023-04-24 DIAGNOSIS — L03.119 CELLULITIS OF LOWER EXTREMITY, UNSPECIFIED LATERALITY: ICD-10-CM

## 2023-04-24 PROBLEM — E66.9 OBESITY: Status: ACTIVE | Noted: 2018-03-07

## 2023-04-24 PROBLEM — M48.061 LUMBAR STENOSIS: Status: ACTIVE | Noted: 2018-03-07

## 2023-04-24 PROBLEM — N18.31 STAGE 3A CHRONIC KIDNEY DISEASE (CKD) (HCC): Status: ACTIVE | Noted: 2023-04-24

## 2023-04-24 PROBLEM — I10 HTN (HYPERTENSION): Status: ACTIVE | Noted: 2018-03-07

## 2023-04-24 PROBLEM — G47.33 OSA (OBSTRUCTIVE SLEEP APNEA): Status: ACTIVE | Noted: 2018-03-07

## 2023-04-24 PROBLEM — E80.6 HYPERBILIRUBINEMIA: Status: ACTIVE | Noted: 2023-04-24

## 2023-04-24 LAB
2HR DELTA HS TROPONIN: 10 NG/L
4HR DELTA HS TROPONIN: 9 NG/L
ALBUMIN SERPL BCP-MCNC: 3 G/DL (ref 3.5–5)
ALP SERPL-CCNC: 84 U/L (ref 46–116)
ALT SERPL W P-5'-P-CCNC: 31 U/L (ref 12–78)
ANION GAP SERPL CALCULATED.3IONS-SCNC: 4 MMOL/L (ref 4–13)
APTT PPP: 45 SECONDS (ref 23–37)
AST SERPL W P-5'-P-CCNC: 27 U/L (ref 5–45)
BASOPHILS # BLD AUTO: 0.04 THOUSANDS/ΜL (ref 0–0.1)
BASOPHILS NFR BLD AUTO: 0 % (ref 0–1)
BILIRUB SERPL-MCNC: 2.93 MG/DL (ref 0.2–1)
BUN SERPL-MCNC: 25 MG/DL (ref 5–25)
CALCIUM ALBUM COR SERPL-MCNC: 9.2 MG/DL (ref 8.3–10.1)
CALCIUM SERPL-MCNC: 8.4 MG/DL (ref 8.3–10.1)
CARDIAC TROPONIN I PNL SERPL HS: 48 NG/L
CARDIAC TROPONIN I PNL SERPL HS: 57 NG/L
CARDIAC TROPONIN I PNL SERPL HS: 58 NG/L
CHLORIDE SERPL-SCNC: 107 MMOL/L (ref 96–108)
CO2 SERPL-SCNC: 27 MMOL/L (ref 21–32)
CREAT SERPL-MCNC: 1.24 MG/DL (ref 0.6–1.3)
EOSINOPHIL # BLD AUTO: 0.07 THOUSAND/ΜL (ref 0–0.61)
EOSINOPHIL NFR BLD AUTO: 1 % (ref 0–6)
ERYTHROCYTE [DISTWIDTH] IN BLOOD BY AUTOMATED COUNT: 12.4 % (ref 11.6–15.1)
GFR SERPL CREATININE-BSD FRML MDRD: 53 ML/MIN/1.73SQ M
GLUCOSE SERPL-MCNC: 144 MG/DL (ref 65–140)
HCT VFR BLD AUTO: 46.3 % (ref 36.5–49.3)
HGB BLD-MCNC: 15.8 G/DL (ref 12–17)
IMM GRANULOCYTES # BLD AUTO: 0.04 THOUSAND/UL (ref 0–0.2)
IMM GRANULOCYTES NFR BLD AUTO: 0 % (ref 0–2)
INR PPP: 2.17 (ref 0.84–1.19)
LYMPHOCYTES # BLD AUTO: 1.38 THOUSANDS/ΜL (ref 0.6–4.47)
LYMPHOCYTES NFR BLD AUTO: 14 % (ref 14–44)
MCH RBC QN AUTO: 32.5 PG (ref 26.8–34.3)
MCHC RBC AUTO-ENTMCNC: 34.1 G/DL (ref 31.4–37.4)
MCV RBC AUTO: 95 FL (ref 82–98)
MONOCYTES # BLD AUTO: 1.13 THOUSAND/ΜL (ref 0.17–1.22)
MONOCYTES NFR BLD AUTO: 12 % (ref 4–12)
NEUTROPHILS # BLD AUTO: 7.06 THOUSANDS/ΜL (ref 1.85–7.62)
NEUTS SEG NFR BLD AUTO: 73 % (ref 43–75)
NRBC BLD AUTO-RTO: 0 /100 WBCS
NT-PROBNP SERPL-MCNC: 1939 PG/ML
PLATELET # BLD AUTO: 186 THOUSANDS/UL (ref 149–390)
PMV BLD AUTO: 11.2 FL (ref 8.9–12.7)
POTASSIUM SERPL-SCNC: 3.9 MMOL/L (ref 3.5–5.3)
PROCALCITONIN SERPL-MCNC: 0.09 NG/ML
PROT SERPL-MCNC: 6.8 G/DL (ref 6.4–8.4)
PROTHROMBIN TIME: 24.4 SECONDS (ref 11.6–14.5)
RBC # BLD AUTO: 4.86 MILLION/UL (ref 3.88–5.62)
SODIUM SERPL-SCNC: 138 MMOL/L (ref 135–147)
WBC # BLD AUTO: 9.72 THOUSAND/UL (ref 4.31–10.16)

## 2023-04-24 RX ORDER — CEFAZOLIN SODIUM 2 G/50ML
2000 SOLUTION INTRAVENOUS EVERY 8 HOURS
Status: DISCONTINUED | OUTPATIENT
Start: 2023-04-24 | End: 2023-04-24

## 2023-04-24 RX ORDER — METOPROLOL SUCCINATE 25 MG/1
25 TABLET, EXTENDED RELEASE ORAL DAILY
Status: DISCONTINUED | OUTPATIENT
Start: 2023-04-25 | End: 2023-05-03 | Stop reason: HOSPADM

## 2023-04-24 RX ORDER — FOLIC ACID 1 MG/1
2 TABLET ORAL DAILY
Status: DISCONTINUED | OUTPATIENT
Start: 2023-04-24 | End: 2023-05-03 | Stop reason: HOSPADM

## 2023-04-24 RX ORDER — WARFARIN SODIUM 5 MG/1
5 TABLET ORAL
Status: DISCONTINUED | OUTPATIENT
Start: 2023-04-24 | End: 2023-04-24

## 2023-04-24 RX ORDER — WARFARIN SODIUM 7.5 MG/1
7.5 TABLET ORAL
Status: DISCONTINUED | OUTPATIENT
Start: 2023-04-27 | End: 2023-05-03 | Stop reason: HOSPADM

## 2023-04-24 RX ORDER — FUROSEMIDE 20 MG/1
20 TABLET ORAL DAILY
Status: DISCONTINUED | OUTPATIENT
Start: 2023-04-24 | End: 2023-04-27

## 2023-04-24 RX ORDER — PYRIDOXINE HCL (VITAMIN B6) 50 MG
50 TABLET ORAL DAILY
Status: DISCONTINUED | OUTPATIENT
Start: 2023-04-24 | End: 2023-05-03 | Stop reason: HOSPADM

## 2023-04-24 RX ORDER — HEPARIN SODIUM 5000 [USP'U]/ML
5000 INJECTION, SOLUTION INTRAVENOUS; SUBCUTANEOUS EVERY 12 HOURS SCHEDULED
Status: DISCONTINUED | OUTPATIENT
Start: 2023-04-24 | End: 2023-04-24

## 2023-04-24 RX ORDER — WARFARIN SODIUM 5 MG/1
5 TABLET ORAL
Status: DISCONTINUED | OUTPATIENT
Start: 2023-04-25 | End: 2023-05-03 | Stop reason: HOSPADM

## 2023-04-24 RX ORDER — LOSARTAN POTASSIUM 50 MG/1
50 TABLET ORAL DAILY
Status: DISCONTINUED | OUTPATIENT
Start: 2023-04-25 | End: 2023-04-27

## 2023-04-24 RX ORDER — PYRIDOXINE HCL (VITAMIN B6) 50 MG
25 TABLET ORAL DAILY
Status: DISCONTINUED | OUTPATIENT
Start: 2023-04-24 | End: 2023-04-24

## 2023-04-24 RX ADMIN — VANCOMYCIN HYDROCHLORIDE 1750 MG: 5 INJECTION, POWDER, LYOPHILIZED, FOR SOLUTION INTRAVENOUS at 14:14

## 2023-04-24 RX ADMIN — FUROSEMIDE 20 MG: 20 TABLET ORAL at 16:02

## 2023-04-24 NOTE — ASSESSMENT & PLAN NOTE
Patient has hx of right lower extremity DVT in 2014 at which time he was seen by hematology & placed on Xarelto for 6 months  Patient had coagulability testing in the past; patient has MTHFR mutation    · Lower extremity Dopplers this admission shows no signs of DVT bilaterally  · Currently patient is on warfarin for persistent A-fib  · Continue folate supplementation in setting of MTHFR mut

## 2023-04-24 NOTE — ASSESSMENT & PLAN NOTE
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 which was treated with Xarelto  Lower extremity Dopplers did not show any DVTs bilateral this admission  BNP elevated to almost 2000  No cultures or MRSA infection in chart  Differentials include bilateral lower extremity cellulitis versus erysipelas vs venous stasis versus scalded skin, however no systemic symptoms with normal leukocyte count and no fevers  Able to express green/yellow drainage concerning for abscess     · Start IV antibiotics, patient given 1 dose of vancomycin in the ED; continue until r/o MRSA -- MRSA culture obtained  · Start Lasix 20 mg p o  daily  · Obtain echocardiogram to rule out cardiac fluid overload  · Follow-up BMP and CBC  · Monitor fever curve  · Monitor I's and O's and weights  · PT/OT eval

## 2023-04-24 NOTE — ASSESSMENT & PLAN NOTE
"Patient has chronic venous insufficiency for past 1 to 2 years  He noticed its gotten worse in the past 2 months  For about 1 to 2 weeks he noticed his legs were weeping and had \"bubbles\" on his skin  At home patient prescribed Lasix 20 mg as needed p o  however patient has not been taking it often due to frequent urination  At home he uses compression stockings but has not been able to since he has had worsening of lower extremity rash    · Start IV antibiotics as above  · Wound care consulted, appreciate recommendations  ·   "

## 2023-04-24 NOTE — ED PROVIDER NOTES
History  Chief Complaint   Patient presents with    Leg Swelling     Pt c/o BLLE swelling and drainage x 1 week  HPI  Patient is a 28-year-old male presenting for concerns of increased erythema and swelling of right lower extremity  Past medical history significant for hypertension, chronic DVT of right lower extremity with chronic lower extremity edema  Patient states she has had worsening bilateral lower extremity swelling with difficulty ambulating for the past week  Patient had been placed on antibiotic and Epsom salt for venous stasis wounds  Patient denies any fever/chills, nausea/vomit/diarrhea, shortness of breath, lightheadedness, dizziness, chest pain, abdominal pain  Prior to Admission Medications   Prescriptions Last Dose Informant Patient Reported? Taking? Cholecalciferol (VITAMIN D-3 PO) 2023  Yes Yes   Sig: Take 4,000 Units by mouth daily     clobetasol (TEMOVATE) 0 05 % cream Unknown  Yes No   Sig: Apply topically 2 (two) times a day   dexamethasone sodium phosphate 0 1 % ophthalmic solution Unknown  No No   Si drops into left ear twice daily x 10 days   folic acid (FOLVITE) 1 mg tablet 2023  Yes Yes   Sig: Take 2 mg by mouth daily     furosemide (LASIX) 20 mg tablet Past Month  Yes Yes   Sig: Take 20 mg by mouth daily as needed     losartan (COZAAR) 50 mg tablet 2023  No Yes   Sig: Take 1 tablet (50 mg total) by mouth daily   metoprolol succinate (TOPROL-XL) 25 mg 24 hr tablet 2023  No Yes   Sig: TAKE 1 TABLET(25 MG) BY MOUTH DAILY   multivitamin-minerals (CENTRUM) tablet 2023  Yes Yes   Sig: Take 1 tablet by mouth daily   pyridoxine (B-6) 25 MG tablet 2023  Yes Yes   Sig: Take 25 mg by mouth daily   warfarin (COUMADIN) 5 mg tablet 2023  No Yes   Sig: TAKE 1 TO 1 AND 1/2 TABLETS BY MOUTH DAILY AS DIRECTED BY PHYSICIAN      Facility-Administered Medications: None       Past Medical History:   Diagnosis Date    Blood clot in vein     in right foot    Hypertension        Past Surgical History:   Procedure Laterality Date    BACK SURGERY  2018    COLONOSCOPY      COLONOSCOPY N/A 2016    Procedure: COLONOSCOPY;  Surgeon: Mat Singletary MD;  Location:  MAIN OR;  Service:    Nina Jara HERNIA REPAIR         Family History   Problem Relation Age of Onset    Heart disease Other     Cancer Other      I have reviewed and agree with the history as documented  E-Cigarette/Vaping    E-Cigarette Use Never User      E-Cigarette/Vaping Substances    Nicotine No     THC No     CBD No     Flavoring No     Other No     Unknown No      Social History     Tobacco Use    Smoking status: Former     Packs/day: 1      Years:  00     Pack years: 26 00     Types: Cigarettes     Quit date:      Years since quittin 3    Smokeless tobacco: Never   Vaping Use    Vaping Use: Never used   Substance Use Topics    Alcohol use: Yes     Comment: 4 drinks a week    Drug use: No        Review of Systems   Constitutional: Negative  HENT: Negative  Eyes: Negative  Respiratory: Negative  Cardiovascular: Negative  Gastrointestinal: Negative  Endocrine: Negative  Genitourinary: Negative  Musculoskeletal:        Increased right lower extremity swelling and erythema as well as difficulty with ambulation, increased bilateral extremity edema  Skin: Positive for wound  Patient has chronic venous stasis wounds on bilateral lower extremities  Allergic/Immunologic: Negative  Neurological: Negative  Hematological: Negative  Psychiatric/Behavioral: Negative          Physical Exam  ED Triage Vitals [23 0959]   Temperature Pulse Respirations Blood Pressure SpO2   97 7 °F (36 5 °C) 104 18 140/78 99 %      Temp Source Heart Rate Source Patient Position - Orthostatic VS BP Location FiO2 (%)   Temporal Monitor Lying Right arm --      Pain Score       5             Orthostatic Vital Signs  Vitals:    23 1245 23 1315 04/24/23 1345 04/24/23 1456   BP: 123/71 119/70 118/73 119/67   Pulse: 84 88 84 82   Patient Position - Orthostatic VS: Lying Lying Lying Sitting       Physical Exam  Vitals and nursing note reviewed  Constitutional:       Appearance: Normal appearance  He is normal weight  HENT:      Head: Normocephalic and atraumatic  Right Ear: Tympanic membrane, ear canal and external ear normal       Left Ear: Tympanic membrane, ear canal and external ear normal       Nose: Nose normal       Mouth/Throat:      Mouth: Mucous membranes are moist       Pharynx: Oropharynx is clear  Eyes:      Extraocular Movements: Extraocular movements intact  Conjunctiva/sclera: Conjunctivae normal       Pupils: Pupils are equal, round, and reactive to light  Cardiovascular:      Rate and Rhythm: Normal rate and regular rhythm  Heart sounds: Normal heart sounds  Comments: Dopplerable PT and DP pulses bilaterally  Pulmonary:      Effort: Pulmonary effort is normal       Breath sounds: Normal breath sounds  Abdominal:      General: Abdomen is flat  Bowel sounds are normal       Palpations: Abdomen is soft  Musculoskeletal:         General: Normal range of motion  Cervical back: Normal range of motion and neck supple  Skin:     General: Skin is warm  Capillary Refill: Capillary refill takes less than 2 seconds  Findings: Erythema present  Comments: Bilateral lower extremity venous stasis wounds  Patient has increased right lower extremity swelling with erythema, foot is warm to touch  Neurological:      General: No focal deficit present  Mental Status: He is alert and oriented to person, place, and time  Psychiatric:         Mood and Affect: Mood normal          Behavior: Behavior normal          Thought Content:  Thought content normal          Judgment: Judgment normal          ED Medications  Medications   multivitamin-minerals (CENTRUM) tablet 1 tablet (1 tablet Oral Refused 8/37/05 8004)   folic acid (FOLVITE) tablet 2 mg (2 mg Oral Refused 4/24/23 1558)   pyridoxine (VITAMIN B6) tablet 50 mg (50 mg Oral Refused 4/24/23 1558)   metoprolol succinate (TOPROL-XL) 24 hr tablet 25 mg (has no administration in time range)   losartan (COZAAR) tablet 50 mg (has no administration in time range)   furosemide (LASIX) tablet 20 mg (20 mg Oral Given 4/24/23 1602)   warfarin (COUMADIN) tablet 7 5 mg (has no administration in time range)   warfarin (COUMADIN) tablet 5 mg (has no administration in time range)   vancomycin (VANCOCIN) 1500 mg in sodium chloride 0 9% 250 mL IVPB (has no administration in time range)   vancomycin (VANCOCIN) 1,750 mg in sodium chloride 0 9 % 500 mL IVPB (1,750 mg Intravenous New Bag 4/24/23 1414)       Diagnostic Studies  Results Reviewed     Procedure Component Value Units Date/Time    HS Troponin I 4hr [[id number]]  (Abnormal) Collected: 04/24/23 1444    Lab Status: Final result Specimen: Blood from Arm, Right Updated: 04/24/23 1521     hs TnI 4hr 57 ng/L      Delta 4hr hsTnI 9 ng/L     HS Troponin I 2hr [[id number]]  (Abnormal) Collected: 04/24/23 1249    Lab Status: Final result Specimen: Blood from Arm, Right Updated: 04/24/23 1327     hs TnI 2hr 58 ng/L      Delta 2hr hsTnI 10 ng/L     NT-BNP [facility] only [[id number]]  (Abnormal) Collected: 04/24/23 1046    Lab Status: Final result Specimen: Blood from Arm, Right Updated: 04/24/23 1224     NT-proBNP 1,939 pg/mL     Procalcitonin [[id number]]  (Normal) Collected: 04/24/23 1046    Lab Status: Final result Specimen: Blood from Arm, Right Updated: 04/24/23 1131     Procalcitonin 0 09 ng/ml     HS Troponin 0hr (reflex protocol) [[id number]]  (Normal) Collected: 04/24/23 1046    Lab Status: Final result Specimen: Blood from Arm, Right Updated: 04/24/23 1122     hs TnI 0hr 48 ng/L     Protime-INR [[id number]]  (Abnormal) Collected: 04/24/23 1046    Lab Status: Final result Specimen: Blood from Arm, Right Updated: 04/24/23 1116     Protime 24 4 seconds      INR 2 17    APTT [[id number]]  (Abnormal) Collected: 04/24/23 1046    Lab Status: Final result Specimen: Blood from Arm, Right Updated: 04/24/23 1116     PTT 45 seconds     Comprehensive metabolic panel [[id number]]  (Abnormal) Collected: 04/24/23 1046    Lab Status: Final result Specimen: Blood from Arm, Right Updated: 04/24/23 1113     Sodium 138 mmol/L      Potassium 3 9 mmol/L      Chloride 107 mmol/L      CO2 27 mmol/L      ANION GAP 4 mmol/L      BUN 25 mg/dL      Creatinine 1 24 mg/dL      Glucose 144 mg/dL      Calcium 8 4 mg/dL      Corrected Calcium 9 2 mg/dL      AST 27 U/L      ALT 31 U/L      Alkaline Phosphatase 84 U/L      Total Protein 6 8 g/dL      Albumin 3 0 g/dL      Total Bilirubin 2 93 mg/dL      eGFR 53 ml/min/1 73sq m     Narrative:      [place] guidelines for Chronic Kidney Disease (CKD):     Stage 1 with normal or high GFR (GFR > 90 mL/min/1 73 square meters)    Stage 2 Mild CKD (GFR = 60-89 mL/min/1 73 square meters)    Stage 3A Moderate CKD (GFR = 45-59 mL/min/1 73 square meters)    Stage 3B Moderate CKD (GFR = 30-44 mL/min/1 73 square meters)    Stage 4 Severe CKD (GFR = 15-29 mL/min/1 73 square meters)    Stage 5 End Stage CKD (GFR <15 mL/min/1 73 square meters)  Note: GFR calculation is accurate only with a steady state creatinine    CBC and differential [[id number]] Collected: 04/24/23 1046    Lab Status: Final result Specimen: Blood from Arm, Right Updated: 04/24/23 1054     WBC 9 72 Thousand/uL      RBC 4 86 Million/uL      Hemoglobin 15 8 g/dL      Hematocrit 46 3 %      MCV 95 fL      MCH 32 5 pg      MCHC 34 1 g/dL      RDW 12 4 %      MPV 11 2 fL      Platelets 171 Thousands/uL      nRBC 0 /100 WBCs      Neutrophils Relative 73 %      Immat GRANS % 0 %      Lymphocytes Relative 14 %      Monocytes Relative 12 %      Eosinophils Relative 1 %      Basophils Relative 0 %      Neutrophils Absolute 7 06 Thousands/µL Immature Grans Absolute 0 04 Thousand/uL      Lymphocytes Absolute 1 38 Thousands/µL      Monocytes Absolute 1 13 Thousand/µL      Eosinophils Absolute 0 07 Thousand/µL      Basophils Absolute 0 04 Thousands/µL                  XR chest 2 views   Final Result by Erwin Reilly MD (04/24 1419)      No acute cardiopulmonary disease  Workstation performed: [id number]         VAS lower limb venous duplex study, complete bilateral   Final Result by Fritz Hinojosa MD (04/24 1429)      XR foot 3+ views RIGHT   Final Result by Erwin Reilly MD (04/24 1400)      Soft tissue swelling along the dorsum of the foot without an acute osseous abnormality  Workstation performed: [id number]               Procedures  Procedures      ED Course  ED Course as of 04/24/23 1903   Mon Apr 24, 2023   1126 hs TnI 0hr: 50               Identification of Seniors at [facility] Most Recent Value   (ISAR) Identification of Seniors at Risk    Before the illness or injury that brought you to the Emergency, did you need someone to help you on a regular basis? 0 Filed at: 04/24/2023 1000   In the last 24 hours, have you needed more help than usual? 0 Filed at: 04/24/2023 1000   Have you been hospitalized for one or more nights during the past 6 months? 0 Filed at: 04/24/2023 1000   In general, do you see well? 0 Filed at: 04/24/2023 1000   In general, do you have serious problems with your memory? 0 Filed at: 04/24/2023 1000   Do you take more than three different medications every day? 1 Filed at: 04/24/2023 1000   ISAR Score 1 Filed at: 04/24/2023 1000                              Medical Decision Making  Patient is a 66-year-old male presenting for increased erythema and swelling of right lower extremity  DDx: Cellulitis, osteomyelitis, new DVT/DVT propagation  We will plan for baseline lab work, duplex bilateral lower extremities, x-ray right foot    Given concern for infection, most likely need admission for IV antibiotics and treatment  Cellulitis: acute illness or injury  Amount and/or Complexity of Data Reviewed  Labs: ordered  Decision-making details documented in ED Course  Radiology: ordered  Risk  Decision regarding hospitalization  Disposition  Final diagnoses:   Cellulitis     Time reflects when diagnosis was documented in both MDM as applicable and the Disposition within this note     Time User Action Codes Description Comment    4/24/2023 11:03 AM Estela GRACIA Add [R60 9] Swelling     4/24/2023  1:53 PM Sujey Rodriguez Add [L03 90] Cellulitis       ED Disposition     ED Disposition   Admit    Condition   Stable    Date/Time   Mon Apr 24, 2023  1:53 PM    Comment   Case was discussed with Dr Angela Dugan and the patient's admission status was agreed to be Admission Status: inpatient status to the service of Dr Angela Dugan   Follow-up Information    None         Current Discharge Medication List      CONTINUE these medications which have NOT CHANGED    Details   Cholecalciferol (VITAMIN D-3 PO) Take 4,000 Units by mouth daily        folic acid (FOLVITE) 1 mg tablet Take 2 mg by mouth daily        furosemide (LASIX) 20 mg tablet Take 20 mg by mouth daily as needed        losartan (COZAAR) 50 mg tablet Take 1 tablet (50 mg total) by mouth daily  Qty: 90 tablet, Refills: 3    Associated Diagnoses: Essential hypertension      metoprolol succinate (TOPROL-XL) 25 mg 24 hr tablet TAKE 1 TABLET(25 MG) BY MOUTH DAILY  Qty: 90 tablet, Refills: 3    Associated Diagnoses: Persistent atrial fibrillation (HCC)      multivitamin-minerals (CENTRUM) tablet Take 1 tablet by mouth daily      pyridoxine (B-6) 25 MG tablet Take 25 mg by mouth daily      warfarin (COUMADIN) 5 mg tablet TAKE 1 TO 1 AND 1/2 TABLETS BY MOUTH DAILY AS DIRECTED BY PHYSICIAN  Qty: 135 tablet, Refills: 3    Associated Diagnoses: Persistent atrial fibrillation (HCC)      clobetasol (TEMOVATE) 0 05 % cream Apply topically 2 (two) times a day      dexamethasone sodium phosphate 0 1 % ophthalmic solution 4 drops into left ear twice daily x 10 days  Qty: 5 mL, Refills: 0    Associated Diagnoses: Left otitis media with effusion; S/P tympanic tube insertion; Granulation tissue of ear canal           No discharge procedures on file  PDMP Review     None           ED Provider  Attending physically available and evaluated Miguel Angel Folwer I managed the patient along with the ED Attending      Electronically Signed by         Marianne Dior MD  04/24/23 9532

## 2023-04-24 NOTE — ASSESSMENT & PLAN NOTE
History of AF on beta blocker and warfarin  INR 2 2 on admission, at baseline   Currently RRR on exam    · Continue toprol xl 25 mg daily  · Warfarin 5 mg Mon, Tue, Wed, Fri; 7 5 mg on Thurs  · Follow INR-PT

## 2023-04-24 NOTE — PROGRESS NOTES
Misti Moreno is a 80 y o  male who is currently ordered Vancomycin IV with management by the Pharmacy Consult service  Relevant clinical data and objective / subjective history reviewed  Vancomycin Assessment:  Indication and Goal AUC/Trough: Soft tissue (goal -600, trough >10)  Clinical Status: worsening  Micro:   Pending  Renal Function:  SCr: 1 24 mg/dL (2023)  CrCl: 64 2 mL/min (2023)  Renal replacement: Not on dialysis  Days of Therapy: 1  Current Dose: 1750 mg IV once   Vancomycin Plan:  New Dosin mg IV q24h   Estimated AUC: 449 mcg*hr/mL  Estimated Trough: 13 8 mcg/mL  Next Level: 2023 @ 0600  Renal Function Monitoring: Daily BMP and [place] will continue to follow closely for s/sx of nephrotoxicity, infusion reactions and appropriateness of therapy  BMP and CBC will be ordered per protocol  We will continue to follow the patients culture results and clinical progress daily      Jude Bateman, Pharmacist

## 2023-04-24 NOTE — ASSESSMENT & PLAN NOTE
Lab Results   Component Value Date    EGFR 53 04/24/2023    EGFR 55 12/01/2022    EGFR 51 05/23/2022    CREATININE 1 24 04/24/2023    CREATININE 1 20 12/01/2022    CREATININE 1 29 05/23/2022   Baseline creatinine 1 2 since 2020  Currently at baseline    · Avoid nephrotoxins  · Continue monitoring BMP  · Lasix 20 mg daily  ·  I's and O's/daily weights

## 2023-04-24 NOTE — LETTER
To whom it may concern,  Please note that Mr Mar Villar was admitted to the hospital between April 24, 2023 and May 2, 6894 for complications associated with congestive heart failure and cellulitis  Due to his medical condition he is not medically stable to go on flight due to concern for potential respiratory complications                Dr Marisol Gloria internal medicine  [phone]

## 2023-04-24 NOTE — PLAN OF CARE
Problem: MOBILITY - ADULT  Goal: Maintain or return to baseline ADL function  Description: INTERVENTIONS:  -  Assess patient's ability to carry out ADLs; assess patient's baseline for ADL function and identify physical deficits which impact ability to perform ADLs (bathing, care of mouth/teeth, toileting, grooming, dressing, etc )  - Assess/evaluate cause of self-care deficits   - Assess range of motion  - Assess patient's mobility; develop plan if impaired  - Assess patient's need for assistive devices and provide as appropriate  - Encourage maximum independence but intervene and supervise when necessary  - Involve family in performance of ADLs  - Assess for home care needs following discharge   - Consider OT consult to assist with ADL evaluation and planning for discharge  - Provide patient education as appropriate  Outcome: Progressing  Goal: Maintains/Returns to pre admission functional level  Description: INTERVENTIONS:  - Perform BMAT or MOVE assessment daily    - Set and communicate daily mobility goal to care team and patient/family/caregiver  - Collaborate with rehabilitation services on mobility goals if consulted  - Perform Range of Motion 3 times a day  - Reposition patient every 2 hours    - Dangle patient 3 times a day  - Stand patient 3 times a day  - Ambulate patient 3 times a day  - Out of bed to chair 3 times a day   - Out of bed for meals 3 times a day  - Out of bed for toileting  - Record patient progress and toleration of activity level   Outcome: Progressing     Problem: PAIN - ADULT  Goal: Verbalizes/displays adequate comfort level or baseline comfort level  Description: Interventions:  - Encourage patient to monitor pain and request assistance  - Assess pain using appropriate pain scale  - Administer analgesics based on type and severity of pain and evaluate response  - Implement non-pharmacological measures as appropriate and evaluate response  - Consider cultural and social influences on pain and pain management  - Notify physician/advanced practitioner if interventions unsuccessful or patient reports new pain  Outcome: Progressing     Problem: INFECTION - ADULT  Goal: Absence or prevention of progression during hospitalization  Description: INTERVENTIONS:  - Assess and monitor for signs and symptoms of infection  - Monitor lab/diagnostic results  - Monitor all insertion sites, i e  indwelling lines, tubes, and drains  - Monitor endotracheal if appropriate and nasal secretions for changes in amount and color  - [place] appropriate cooling/warming therapies per order  - Administer medications as ordered  - Instruct and encourage patient and family to use good hand hygiene technique  - Identify and instruct in appropriate isolation precautions for identified infection/condition  Outcome: Progressing  Goal: Absence of fever/infection during neutropenic period  Description: INTERVENTIONS:  - Monitor WBC    Outcome: Progressing     Problem: SAFETY ADULT  Goal: Maintain or return to baseline ADL function  Description: INTERVENTIONS:  -  Assess patient's ability to carry out ADLs; assess patient's baseline for ADL function and identify physical deficits which impact ability to perform ADLs (bathing, care of mouth/teeth, toileting, grooming, dressing, etc )  - Assess/evaluate cause of self-care deficits   - Assess range of motion  - Assess patient's mobility; develop plan if impaired  - Assess patient's need for assistive devices and provide as appropriate  - Encourage maximum independence but intervene and supervise when necessary  - Involve family in performance of ADLs  - Assess for home care needs following discharge   - Consider OT consult to assist with ADL evaluation and planning for discharge  - Provide patient education as appropriate  Outcome: Progressing  Goal: Maintains/Returns to pre admission functional level  Description: INTERVENTIONS:  - Perform BMAT or MOVE assessment daily    - Set and communicate daily mobility goal to care team and patient/family/caregiver  - Collaborate with rehabilitation services on mobility goals if consulted  - Out of bed for toileting  - Record patient progress and toleration of activity level   Outcome: Progressing  Goal: Patient will remain free of falls  Description: INTERVENTIONS:  - Educate patient/family on patient safety including physical limitations  - Instruct patient to call for assistance with activity   - Consult OT/PT to assist with strengthening/mobility   - Keep Call bell within reach  - Keep bed low and locked with side rails adjusted as appropriate  - Keep care items and personal belongings within reach  - Initiate and maintain comfort rounds  - Make Fall Risk Sign visible to staff  - Apply yellow socks and bracelet for high fall risk patients  - Consider moving patient to room near nurses station  Outcome: Progressing     Problem: DISCHARGE PLANNING  Goal: Discharge to home or other facility with appropriate resources  Description: INTERVENTIONS:  - Identify barriers to discharge w/patient and caregiver  - Arrange for needed discharge resources and transportation as appropriate  - Identify discharge learning needs (meds, wound care, etc )  - Arrange for interpretive services to assist at discharge as needed  - Refer to Case Management Department for coordinating discharge planning if the patient needs post-hospital services based on physician/advanced practitioner order or complex needs related to functional status, cognitive ability, or social support system  Outcome: Progressing     Problem: Knowledge Deficit  Goal: Patient/family/caregiver demonstrates understanding of disease process, treatment plan, medications, and discharge instructions  Description: Complete learning assessment and assess knowledge base    Interventions:  - Provide teaching at level of understanding  - Provide teaching via preferred learning methods  Outcome: Progressing

## 2023-04-24 NOTE — H&P
INTERNAL MEDICINE ADMISSION H&P     Name: Alfie Miller   Age & Sex: 80 y o  male   MRN: [id number]  Unit/Bed#: ED 11   Encounter: [id number]  Primary Care Provider: Darlin Mackay DO    Code Status: No Order  Admission Status: INPATIENT   Disposition: Patient requires Med/Surg    Admit to team: SLIM    ASSESSMENT/PLAN     Active Problems:    Hypertension    Persistent atrial fibrillation ([facility] [address] )    Morbid obesity ([facility] [address] )    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 (HCC)    Chronic venous insufficiency of lower extremity    ESTER (obstructive sleep apnea)    Lower extremity cellulitis      Hyperbilirubinemia  Assessment & Plan  History of hyperbilirubinemia seen on labs  Total bilirubin elevated to 2 9 this admission from a baseline of 1 2-1 8  No recent abdominal imaging  No evidence of abdominal pain or hemolysis with normal hemoglobin  · Continue to trend LFTs  · We will order hepatic function panel    Stage 3a chronic kidney disease (CKD) [facility])  Assessment & Plan  Lab Results   Component Value Date    EGFR 53 04/24/2023    EGFR 55 12/01/2022    EGFR 51 05/23/2022    CREATININE 1 24 04/24/2023    CREATININE 1 20 12/01/2022    CREATININE 1 29 05/23/2022   Baseline creatinine 1 2 since 2020  Currently at baseline  · Avoid nephrotoxins  · Continue monitoring BMP  · Lasix 20 mg daily  ·  I's and O's/daily weights    Lower extremity cellulitis  Assessment & Plan  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 which was treated with Xarelto    Lower extremity Dopplers did "not show any DVTs bilateral this admission  BNP elevated to almost 2000  No cultures or MRSA infection in chart  Differentials include bilateral lower extremity cellulitis versus erysipelas vs venous stasis versus scalded skin, however no systemic symptoms with normal leukocyte count and no fevers  Able to express green/yellow drainage concerning for abscess  · Start IV antibiotics, patient given 1 dose of vancomycin in the ED; continue until r/o MRSA -- MRSA culture obtained  · Start Lasix 20 mg p o  daily  · Obtain echocardiogram to rule out cardiac fluid overload  · Follow-up BMP and CBC  · Monitor fever curve  · Monitor I's and O's and weights  · PT/OT eval      ESTER (obstructive sleep apnea)  Assessment & Plan  · Ordered CPAP at night    Chronic venous insufficiency of lower extremity  Assessment & Plan  Patient has chronic venous insufficiency for past 1 to 2 years  He noticed its gotten worse in the past 2 months  For about 1 to 2 weeks he noticed his legs were weeping and had \"bubbles\" on his skin  At home patient prescribed Lasix 20 mg as needed p o  however patient has not been taking it often due to frequent urination  At home he uses compression stockings but has not been able to since he has had worsening of lower extremity rash  · Start IV antibiotics as above  · Wound care consulted, appreciate recommendations  ·     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 [facility])  Assessment & Plan  Patient has hx of right lower extremity DVT in 2014 at which time he was seen by hematology & placed on Xarelto for 6 months  Patient had coagulability testing in the past; patient has MTHFR mutation  · Lower extremity Dopplers this admission shows no signs of DVT bilaterally  · Currently patient is on warfarin for persistent A-fib  · Continue folate supplementation in setting of MTHFR mut    Morbid obesity ([facility] [address] )  Assessment & Plan  Patient with BMI of 33 8     · Encourage " weight loss/lifestyle modifications    Persistent atrial fibrillation ([facility] [address] )  Assessment & Plan  History of AF on beta blocker and warfarin  INR 2 2 on admission, at baseline  Currently RRR on exam    · Continue toprol xl 25 mg daily  · Warfarin 5 mg Mon, Tue, Wed, Fri; 7 5 mg on Thurs  · Follow INR-PT    Hypertension  Assessment & Plan  · BP normotensive in ED  · Patient prescribed lasix 20 mg daily PRN, however does not take it often, due to frequent urination  · Continue on losartan 50 mg daily and Toprol xl 25 mg daily  · Start lasix 20 mg PO daily while inpatient        VTE Pharmacologic Prophylaxis: RX contraindicated due to: Patient on warfarin , as above  VTE Mechanical Prophylaxis: sequential compression device    CHIEF COMPLAINT     Chief Complaint   Patient presents with    Leg Swelling     Pt c/o BLLE swelling and drainage x 1 week  HISTORY OF PRESENT ILLNESS     Patient is an 80-year-old male with past medical history of Chronic venous insufficiency of lower extremities, pretension, ESTER on CPAP, persistent A-fib on warfarin, remote history of right lower extremity DVT with MTHFR mutation (2014) who presents with lower extremity edema and pain bilaterally  Patient noticed that he has had a worsening rash over lower extremity in the past 2 weeks with bubbling of skin layer, skin weeping, redness of bilateral lower extremities not improving on oral antibiotics prescribed outpatient by PCP (but him and his wife do not know which one)  He states that he has had lower extremity edema for the past year and has worsening of symptoms in the past 2 months-- symptoms are waxing and waning  Denies any new medications besides antibiotics which were started after rash had erupted  Was prescribed Lasix 20 mg p o  as needed however he has not been using it frequently due to frequent urination    Patient denies any shortness of breath or dyspnea on exertion--he does have difficulty walking longer distances due to lower extremity edema and pain  Denies fevers, chills, palpitations, chest pain  ED course: Patient presented with blood pressure 140/78 pulse of 104 which normalized to 80 and afebrile  Saturating 100% on room air  Chest x-ray without consolidations or pulmonary edema/effusion  Lower extremity Dopplers did not show any DVTs bilateral this admission  BNP elevated to almost 2000  Procalcitonin negative and normal leukocyte count  Patient given 1 dose of vancomycin in the ED  REVIEW OF SYSTEMS     Review of Systems   Constitutional: Negative for chills, fatigue, fever and unexpected weight change  HENT: Negative for ear pain and sore throat  Eyes: Negative for pain and visual disturbance  Respiratory: Negative for cough and shortness of breath  Cardiovascular: Positive for leg swelling  Negative for chest pain and palpitations  Gastrointestinal: Negative for abdominal pain and vomiting  Genitourinary: Negative for dysuria and hematuria  Musculoskeletal: Positive for arthralgias and back pain  Negative for gait problem and joint swelling  Skin: Positive for rash  Negative for color change  Neurological: Negative for seizures and syncope  All other systems reviewed and are negative  OBJECTIVE     Vitals:    23 1111 23 1206 23 1245 23 1315   BP: 98/50 110/56 123/71 119/70   BP Location:  Left arm     Pulse: 86 88 84 88   Resp:   20 18   Temp:  98 5 °F (36 9 °C)     TempSrc:  Oral     SpO2: 97% 99% 98% 99%      Temperature:   Temp (24hrs), Av 1 °F (36 7 °C), Min:97 7 °F (36 5 °C), Max:98 5 °F (36 9 °C)    Temperature: 98 5 °F (36 9 °C)  Intake & Output:  I/O     None        Weights: There is no height or weight on file to calculate BMI  Weight (last 2 days)     None        Physical Exam  Vitals and nursing note reviewed  Constitutional:       General: He is not in acute distress  Appearance: He is well-developed     HENT:      Head: Normocephalic and atraumatic  Eyes:      Conjunctiva/sclera: Conjunctivae normal    Cardiovascular:      Rate and Rhythm: Normal rate and regular rhythm  Heart sounds: No murmur heard  Pulmonary:      Effort: Pulmonary effort is normal  No respiratory distress  Breath sounds: Normal breath sounds  No wheezing or rales  Abdominal:      Palpations: Abdomen is soft  Tenderness: There is no abdominal tenderness  Musculoskeletal:         General: Swelling present  Cervical back: Neck supple  Right lower leg: Edema present  Left lower leg: Edema present  Skin:     General: Skin is warm and dry  Capillary Refill: Capillary refill takes less than 2 seconds  Findings: Rash present  Comments: Soft bullae and erythema on anterior pretibial lower extremities bilaterally  Erythema does not appear sharply demarcated  Erythema R>L foot  Weeping present bilaterally with serous fluid  Mildly tender to touch over lower extremities  2+ pitting edema on bilateral lower extremities and feet  Neurological:      Mental Status: He is alert and oriented to person, place, and time     Psychiatric:         Mood and Affect: Mood normal        PAST MEDICAL HISTORY     Past Medical History:   Diagnosis Date    Blood clot in vein 2015    in right foot    Hypertension      PAST SURGICAL HISTORY     Past Surgical History:   Procedure Laterality Date    BACK SURGERY  2018    COLONOSCOPY      COLONOSCOPY N/A 2/24/2016    Procedure: COLONOSCOPY;  Surgeon: Marija Velez MD;  Location: [facility];  Service:    [place] [address] HISTORY     Social History     Substance and Sexual Activity   Alcohol Use Yes    Comment: 4 drinks a week     Social History     Substance and Sexual Activity   Drug Use No     Social History     Tobacco Use   Smoking Status Former    Packs/day: 1 00    Years: 26 00    Pack years: 26 00    Types: Cigarettes    Quit date: 18    Years since quittin 3   Smokeless Tobacco Never     Family History   Problem Relation Age of Onset    Heart disease Other     Cancer Other      LABORATORY DATA     Labs: I have personally reviewed pertinent reports  Results from last 7 days   Lab Units 23  1046   WBC Thousand/uL 9 72   HEMOGLOBIN g/dL 15 8   HEMATOCRIT % 46 3   PLATELETS Thousands/uL 186   NEUTROS PCT % 73   MONOS PCT % 12      Results from last 7 days   Lab Units 23  1046   POTASSIUM mmol/L 3 9   CHLORIDE mmol/L 107   CO2 mmol/L 27   BUN mg/dL 25   CREATININE mg/dL 1 24   CALCIUM mg/dL 8 4   ALK PHOS U/L 84   ALT U/L 31   AST U/L 27              Results from last 7 days   Lab Units 23  1046 23  1124   INR  2 17* 2 32*   PTT seconds 45*  --              Micro:  Lab Results   Component Value Date    URINECX No Growth <1000 cfu/mL 2017     IMAGING & DIAGNOSTIC TESTS     Imaging: I have personally reviewed pertinent reports  No results found  EKG, Pathology, and Other Studies: I have personally reviewed pertinent reports  ALLERGIES     Allergies   Allergen Reactions    Sildenafil Hives     Other reaction(s): severe congestion, cialis was OK     MEDICATIONS PRIOR TO ARRIVAL     Prior to Admission medications    Medication Sig Start Date End Date Taking? Authorizing Provider   Cholecalciferol (VITAMIN D-3 PO) Take 4,000 Units by mouth daily      Historical Provider, MD   clobetasol (TEMOVATE) 0 05 % cream Apply topically 2 (two) times a day    Historical Provider, MD   dexamethasone sodium phosphate 0 1 % ophthalmic solution 4 drops into left ear twice daily x 10 days 23   Gian Garcia PA-C   folic acid (FOLVITE) 1 mg tablet Take 2 mg by mouth daily   22   Historical Provider, MD   furosemide (LASIX) 20 mg tablet Take 20 mg by mouth daily as needed   21   Historical Provider, MD   losartan (COZAAR) 50 mg tablet Take 1 tablet (50 mg total) by mouth daily  Patient taking differently: Take 25 "mg by mouth daily 5/14/20   Lj Conrad MD   metoprolol succinate (TOPROL-XL) 25 mg 24 hr tablet TAKE 1 TABLET(25 MG) BY MOUTH DAILY 4/28/22   Lj Conrad MD   multivitamin-minerals (CENTRUM) tablet Take 1 tablet by mouth daily    Historical Provider, MD   pyridoxine (B-6) 25 MG tablet Take 25 mg by mouth daily    Historical Provider, MD   warfarin (COUMADIN) 5 mg tablet TAKE 1 TO 1 AND 1/2 TABLETS BY MOUTH DAILY AS DIRECTED BY PHYSICIAN 6/13/22   Lj Conrad MD     MEDICATIONS ADMINISTERED IN LAST 24 HOURS     CURRENT MEDICATIONS     Current Facility-Administered Medications   Medication Dose Route Frequency Provider Last Rate    vancomycin  15 mg/kg Intravenous Once Kaye Brewster MD               Admission Time  I spent 30 minutes admitting the patient  This involved direct patient contact where I performed a full history and physical, reviewing previous records, and reviewing laboratory and other diagnostic studies  Portions of the record may have been created with voice recognition software  Occasional wrong word or \"sound a like\" substitutions may have occurred due to the inherent limitations of voice recognition software    Read the chart carefully and recognize, using context, where substitutions have occurred     ==  More Simmons MD  [address]  Internal Medicine Residency PGY-2    "

## 2023-04-24 NOTE — ASSESSMENT & PLAN NOTE
History of hyperbilirubinemia seen on labs  Total bilirubin elevated to 2 9 this admission from a baseline of 1 2-1 8  No recent abdominal imaging  No evidence of abdominal pain or hemolysis with normal hemoglobin    · Continue to trend LFTs  · We will order hepatic function panel

## 2023-04-24 NOTE — ED ATTENDING ATTESTATION
4/24/2023  IValentino MD, saw and evaluated the patient  I have discussed the patient with the resident/non-physician practitioner and agree with the resident's/non-physician practitioner's findings, Plan of Care, and MDM as documented in the resident's/non-physician practitioner's note, except where noted  All available labs and Radiology studies were reviewed  I was present for key portions of any procedure(s) performed by the resident/non-physician practitioner and I was immediately available to provide assistance  At this point I agree with the current assessment done in the Emergency Department  I have conducted an independent evaluation of this patient a history and physical is as follows: This is an evaluation of an 80-year-old male with past medical history of hypertension as well as previous DVT as well as chronic lower extremity edema who presents to the emergency department complaining of worsening bilateral lower extremity swelling with difficulty ambulating  Patient states that the swelling has intensified over the past few weeks  Was previously seen by his PCP as well as dermatology who started him on an antibiotic and Epsom salts which initially seemed to improve some of the venous stasis wounds however now the swelling is worse again most specifically on the right foot and ankle  Also notes that his foot has had worsening erythema  No fevers no chills  No abdominal pain  No chest pain no shortness of breath  No dizziness no lightheadedness  On exam patient is nontoxic  Vital signs currently stable  HEENT is normocephalic and atraumatic  Heart is regular rate  Lungs are clear  Abdomen is soft positive bowel sounds no rebound no guarding nontender to palpation  +2 to 3+ edema bilateral lower extremities with right slightly greater than left  Stigmata of venous stasis ulcerations throughout bilateral anterior tib-fib's    Positive erythema over foot warm to the "touch  Mildly tender to palpation  Intact pulses  Capillary for less than 2 seconds  Awake alert oriented  Assessment and plan 80-year-old male with worsening lower extremity edema and pain  Evaluate with bilateral duplex to ensure no new DVT  Will check blood work including INR  We will also obtain EKG and troponin  Will obtain x-ray of foot  Will likely require antibiotics if work-up is otherwise negative  Portions of the record may have been created with voice recognition software  Occasional wrong word or sound-a-like\" substitutions may have occurred due to the inherent limitations of voice recognition software  Review chart carefully and recognize, using context, where substitutions have occurred      ED Course         Critical Care Time  Procedures      "

## 2023-04-24 NOTE — ASSESSMENT & PLAN NOTE
· BP normotensive in ED  · Patient prescribed lasix 20 mg daily PRN, however does not take it often, due to frequent urination     · Continue on losartan 50 mg daily and Toprol xl 25 mg daily  · Start lasix 20 mg PO daily while inpatient

## 2023-04-25 ENCOUNTER — APPOINTMENT (INPATIENT)
Dept: NON INVASIVE DIAGNOSTICS | Facility: HOSPITAL | Age: 83
End: 2023-04-25

## 2023-04-25 LAB
ALBUMIN SERPL BCP-MCNC: 2.7 G/DL (ref 3.5–5)
ALP SERPL-CCNC: 75 U/L (ref 46–116)
ALT SERPL W P-5'-P-CCNC: 27 U/L (ref 12–78)
ANION GAP SERPL CALCULATED.3IONS-SCNC: 5 MMOL/L (ref 4–13)
AORTIC ROOT: 3.4 CM
APICAL FOUR CHAMBER EJECTION FRACTION: 70 %
ASCENDING AORTA: 3.4 CM
AST SERPL W P-5'-P-CCNC: 27 U/L (ref 5–45)
ATRIAL RATE: 101 BPM
ATRIAL RATE: 250 BPM
BASOPHILS # BLD AUTO: 0.06 THOUSANDS/ΜL (ref 0–0.1)
BASOPHILS NFR BLD AUTO: 1 % (ref 0–1)
BILIRUB DIRECT SERPL-MCNC: 0.64 MG/DL (ref 0–0.2)
BILIRUB SERPL-MCNC: 2.43 MG/DL (ref 0.2–1)
BUN SERPL-MCNC: 22 MG/DL (ref 5–25)
CALCIUM SERPL-MCNC: 8.7 MG/DL (ref 8.3–10.1)
CHLORIDE SERPL-SCNC: 106 MMOL/L (ref 96–108)
CO2 SERPL-SCNC: 27 MMOL/L (ref 21–32)
CREAT SERPL-MCNC: 1.11 MG/DL (ref 0.6–1.3)
EOSINOPHIL # BLD AUTO: 0.11 THOUSAND/ΜL (ref 0–0.61)
EOSINOPHIL NFR BLD AUTO: 1 % (ref 0–6)
ERYTHROCYTE [DISTWIDTH] IN BLOOD BY AUTOMATED COUNT: 12.3 % (ref 11.6–15.1)
FRACTIONAL SHORTENING: 29 % (ref 28–44)
GFR SERPL CREATININE-BSD FRML MDRD: 61 ML/MIN/1.73SQ M
GLUCOSE SERPL-MCNC: 115 MG/DL (ref 65–140)
HCT VFR BLD AUTO: 46.9 % (ref 36.5–49.3)
HGB BLD-MCNC: 16 G/DL (ref 12–17)
IMM GRANULOCYTES # BLD AUTO: 0.05 THOUSAND/UL (ref 0–0.2)
IMM GRANULOCYTES NFR BLD AUTO: 1 % (ref 0–2)
INR PPP: 2.29 (ref 0.84–1.19)
INTERVENTRICULAR SEPTUM IN DIASTOLE (PARASTERNAL SHORT AXIS VIEW): 1.6 CM
INTERVENTRICULAR SEPTUM: 1.9 CM (ref 0.6–1.1)
LAAS-AP2: 32.2 CM2
LAAS-AP4: 34.8 CM2
LEFT ATRIUM SIZE: 4.6 CM
LEFT INTERNAL DIMENSION IN SYSTOLE: 2.4 CM (ref 2.1–4)
LEFT VENTRICULAR INTERNAL DIMENSION IN DIASTOLE: 3.4 CM (ref 3.5–6)
LEFT VENTRICULAR POSTERIOR WALL IN END DIASTOLE: 2.6 CM
LEFT VENTRICULAR STROKE VOLUME: 31 ML
LVSV (TEICH): 31 ML
LYMPHOCYTES # BLD AUTO: 1.36 THOUSANDS/ΜL (ref 0.6–4.47)
LYMPHOCYTES NFR BLD AUTO: 15 % (ref 14–44)
MCH RBC QN AUTO: 32.9 PG (ref 26.8–34.3)
MCHC RBC AUTO-ENTMCNC: 34.1 G/DL (ref 31.4–37.4)
MCV RBC AUTO: 96 FL (ref 82–98)
MONOCYTES # BLD AUTO: 1.3 THOUSAND/ΜL (ref 0.17–1.22)
MONOCYTES NFR BLD AUTO: 14 % (ref 4–12)
MRSA NOSE QL CULT: NORMAL
NEUTROPHILS # BLD AUTO: 6.16 THOUSANDS/ΜL (ref 1.85–7.62)
NEUTS SEG NFR BLD AUTO: 68 % (ref 43–75)
NRBC BLD AUTO-RTO: 0 /100 WBCS
P AXIS: 37 DEGREES
PLATELET # BLD AUTO: 176 THOUSANDS/UL (ref 149–390)
PMV BLD AUTO: 11.2 FL (ref 8.9–12.7)
POTASSIUM SERPL-SCNC: 4 MMOL/L (ref 3.5–5.3)
PROT SERPL-MCNC: 6.4 G/DL (ref 6.4–8.4)
PROTHROMBIN TIME: 25.5 SECONDS (ref 11.6–14.5)
QRS AXIS: 262 DEGREES
QRS AXIS: 263 DEGREES
QRSD INTERVAL: 72 MS
QRSD INTERVAL: 74 MS
QT INTERVAL: 306 MS
QT INTERVAL: 336 MS
QTC INTERVAL: 390 MS
QTC INTERVAL: 396 MS
RA PRESSURE ESTIMATED: 8 MMHG
RBC # BLD AUTO: 4.87 MILLION/UL (ref 3.88–5.62)
RIGHT ATRIUM AREA SYSTOLE A4C: 34.2 CM2
RIGHT VENTRICLE ID DIMENSION: 4.4 CM
SL CV LEFT ATRIUM LENGTH A2C: 7.6 CM
SL CV LV EF: 65
SL CV PED ECHO LEFT VENTRICLE DIASTOLIC VOLUME (MOD BIPLANE) 2D: 50 ML
SL CV PED ECHO LEFT VENTRICLE SYSTOLIC VOLUME (MOD BIPLANE) 2D: 20 ML
SODIUM SERPL-SCNC: 138 MMOL/L (ref 135–147)
T WAVE AXIS: -2 DEGREES
T WAVE AXIS: 43 DEGREES
TRICUSPID ANNULAR PLANE SYSTOLIC EXCURSION: 1.7 CM
URATE SERPL-MCNC: 7.5 MG/DL (ref 3.5–8.5)
VENTRICULAR RATE: 101 BPM
VENTRICULAR RATE: 81 BPM
WBC # BLD AUTO: 9.04 THOUSAND/UL (ref 4.31–10.16)

## 2023-04-25 RX ORDER — COLCHICINE 0.6 MG/1
1.2 TABLET ORAL ONCE
Status: COMPLETED | OUTPATIENT
Start: 2023-04-25 | End: 2023-04-25

## 2023-04-25 RX ADMIN — Medication 50 MG: at 09:55

## 2023-04-25 RX ADMIN — METOPROLOL SUCCINATE 25 MG: 25 TABLET, EXTENDED RELEASE ORAL at 09:51

## 2023-04-25 RX ADMIN — Medication 1 TABLET: at 09:51

## 2023-04-25 RX ADMIN — VANCOMYCIN HYDROCHLORIDE 1500 MG: 10 INJECTION, POWDER, LYOPHILIZED, FOR SOLUTION INTRAVENOUS at 09:55

## 2023-04-25 RX ADMIN — FOLIC ACID 2 MG: 1 TABLET ORAL at 09:51

## 2023-04-25 RX ADMIN — WARFARIN SODIUM 5 MG: 5 TABLET ORAL at 17:46

## 2023-04-25 RX ADMIN — FUROSEMIDE 20 MG: 20 TABLET ORAL at 09:51

## 2023-04-25 RX ADMIN — COLCHICINE 1.2 MG: 0.6 TABLET ORAL at 17:46

## 2023-04-25 RX ADMIN — LOSARTAN POTASSIUM 50 MG: 50 TABLET, FILM COATED ORAL at 09:51

## 2023-04-25 NOTE — DISCHARGE INSTR - OTHER ORDERS
Skin Care Plan:  1-Cleanse b/l lower extremity wounds with NSS, pat dry, apply adaptic to the wound beds, and top with maxorb silver  Cover with ABD pads  Secure the dressings with kerlex  Change every other day and as needed for soilage/dislodgement  2-Turn/reposition q2h or when medically stable for pressure re-distribution on skin   3-Elevate heels to offload pressure  4-Moisturize skin daily with skin nourishing cream  5-Ehob cushion in chair when out of bed  6-Preventative Hydraguard to buttocks, sacrum, and bilateral heels BID and PRN  7-Ambulatory Referral to outpatient wound center for continued treatment of wounds  Referral placed

## 2023-04-25 NOTE — PLAN OF CARE
Problem: MOBILITY - ADULT  Goal: Maintain or return to baseline ADL function  Description: INTERVENTIONS:  -  Assess patient's ability to carry out ADLs; assess patient's baseline for ADL function and identify physical deficits which impact ability to perform ADLs (bathing, care of mouth/teeth, toileting, grooming, dressing, etc )  - Assess/evaluate cause of self-care deficits   - Assess range of motion  - Assess patient's mobility; develop plan if impaired  - Assess patient's need for assistive devices and provide as appropriate  - Encourage maximum independence but intervene and supervise when necessary  - Involve family in performance of ADLs  - Assess for home care needs following discharge   - Consider OT consult to assist with ADL evaluation and planning for discharge  - Provide patient education as appropriate  Outcome: Progressing       Problem: INFECTION - ADULT  Goal: Absence or prevention of progression during hospitalization  Description: INTERVENTIONS:  - Assess and monitor for signs and symptoms of infection  - Monitor lab/diagnostic results  - Monitor all insertion sites, i e  indwelling lines, tubes, and drains  - Monitor endotracheal if appropriate and nasal secretions for changes in amount and color  - [place] appropriate cooling/warming therapies per order  - Administer medications as ordered  - Instruct and encourage patient and family to use good hand hygiene technique  - Identify and instruct in appropriate isolation precautions for identified infection/condition  Outcome: Progressing

## 2023-04-25 NOTE — ASSESSMENT & PLAN NOTE
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 which was treated with Xarelto  Lower extremity Dopplers did not show any DVTs bilateral this admission  BNP elevated to almost 2000  No cultures or MRSA infection in chart  Differentials include bilateral lower extremity cellulitis versus erysipelas vs venous stasis versus scalded skin, however no systemic symptoms with normal leukocyte count and no fevers  Able to express green/yellow drainage concerning for abscess     · Continue IV antibiotics, patient given 1 dose of vancomycin in the ED; continue until r/o MRSA -- MRSA culture obtained  · Started Lasix 20 mg p o  daily on admission, continue now  · F/u echocardiogram to rule out cardiac fluid overload  · Consider imaging if fails to improve  · Monitor fever curve  · Obtain podiatry evaluation for assistance with wound care  · Monitor I's and O's and weights  · PT/OT eval

## 2023-04-25 NOTE — ASSESSMENT & PLAN NOTE
BP controlled  · Patient prescribed lasix 20 mg daily PRN, however does not take it often, due to frequent urination     · Continue on losartan 50 mg daily and Toprol xl 25 mg daily  · Started lasix 20 mg PO daily while inpatient

## 2023-04-25 NOTE — ASSESSMENT & PLAN NOTE
Patient has hx of right lower extremity DVT in 2014 at which time he was seen by hematology & placed on Xarelto for 6 months  Patient had coagulability testing in the past; patient has MTHFR mutation     Lower extremity Dopplers this admission shows no signs of DVT bilaterally   Currently patient is on warfarin for persistent A-fib   Continue folate supplementation in setting of MTHFR mut

## 2023-04-25 NOTE — PROGRESS NOTES
Mayito Ayala is a 80 y o  male who is currently ordered Vancomycin IV with management by the Pharmacy Consult service  Relevant clinical data and objective / subjective history reviewed  Vancomycin Assessment:  Indication and Goal AUC/Trough: Soft tissue (goal -600, trough >10)  Clinical Status: stable  Micro:     Renal Function:  SCr: 1 11 mg/dL  CrCl: 72 4 mL/min  Renal replacement: Not on dialysis  Days of Therapy: 2  Current Dose: 1500 mg IV q24h  Vancomycin Plan:  New Dosing: continue 1500 mg IV q24h  Estimated AUC: 413 mcg*hr/mL  Estimated Trough: 12 4 mcg/mL  Next Level: 4/27 @ 0600  Renal Function Monitoring: Daily BMP and [place] will continue to follow closely for s/sx of nephrotoxicity, infusion reactions and appropriateness of therapy  BMP and CBC will be ordered per protocol  We will continue to follow the patients culture results and clinical progress daily      Manjinder Rodriguez, Pharmacist

## 2023-04-25 NOTE — ASSESSMENT & PLAN NOTE
Lab Results   Component Value Date    EGFR 61 04/25/2023    EGFR 53 04/24/2023    EGFR 55 12/01/2022    CREATININE 1 11 04/25/2023    CREATININE 1 24 04/24/2023    CREATININE 1 20 12/01/2022   Baseline creatinine 1 2 since 2020  Currently at baseline     Avoid nephrotoxins   Continue monitoring BMP   Lasix 20 mg daily    I's and O's/daily weights

## 2023-04-25 NOTE — ASSESSMENT & PLAN NOTE
History of hyperbilirubinemia seen on labs  Total bilirubin elevated to 2 9 this admission from a baseline of 1 2-1 8  · No recent abdominal imaging  · No evidence of abdominal pain or hemolysis with normal hemoglobin    · Continue to trend LFTs, slightly improved this AM

## 2023-04-25 NOTE — CONSULTS
[address] [facility] - Consultation      Patient Information:   Debora Araujo 80 y o  male MRN: [id number]  Unit/Bed#: [facility] 816-01 Encounter: [id number]  PCP: Meryle Robinson, DO  Date of Admission:  4/24/2023  Date of Consultation: 04/25/23  Requesting Physician: Pradeep Durham MD      ASSESSMENT:    Debora Araujo is a 80 y o  male with:    1  Bilateral venous stasis ulcerations  2  Bilateral lower extremity edema  3  Erythema and warmth to the right foot without ulceration  4  Possible acute gout  5  Pain at the left right first metatarsophalangeal joint  6  CKD 3A  7  Chronic DVT right lower extremity    PLAN:    · No acute need for surgical intervention  · Local wound care with Maxorb, DSD with compression  · X-ray reviewed: Negative  · Venous duplex reviewed: Negative  · Uric Acid   · Rest of care per primary team     Weight Bearing Status: WBAT    SUBJECTIVE    History of Present Illness:    Debora Araujo is a 80 y o  male with past medical history significant for CKD 3, hypertension, morbid obesity, chronic DVT, sleep apnea, hyperbilirubinemia, is admitted for lower extremity cellulitis  Patient came into the hospital because of redness and swelling to the right lower extremity  He also is having pain to the first metatarsophalangeal joint on the right side  This has been happening for approximately 1 week  Pain is localized to the first MPJ it feels sharp and there is no traumatic onset  Patient says he woke up 1 morning and noticed the big toe it was painful  Is it hurt about the same since that day  The redness to the lower extremity was also there and he got concerned and came in for evaluation  Patient reports no history of infection  States that the wounds of the lower extremities are new as of just a couple months ago  He and his wife have been dressing them and trying to keep them clean  They drain constantly  Denies any fever, nausea, vomiting, chills         Review of Systems:    Constitutional: Negative  HENT: Negative  Eyes: Negative  Respiratory: Negative  Cardiovascular: Negative  Gastrointestinal: Negative  Musculoskeletal: Pain right first MPJ  Skin: Venous stasis ulcerations with erythema swelling  Neurological: Negative  Psych: Negative       Past Medical and Surgical History:     Past Medical History:   Diagnosis Date    Blood clot in vein     in right foot    Hypertension        Past Surgical History:   Procedure Laterality Date    BACK SURGERY  2018    COLONOSCOPY      COLONOSCOPY N/A 2016    Procedure: COLONOSCOPY;  Surgeon: Vianney Jasmine MD;  Location:  MAIN OR;  Service:    Nicole Poisson HERNIA REPAIR         Meds/Allergies:    Medications Prior to Admission   Medication    Cholecalciferol (VITAMIN D-3 PO)    folic acid (FOLVITE) 1 mg tablet    furosemide (LASIX) 20 mg tablet    losartan (COZAAR) 50 mg tablet    metoprolol succinate (TOPROL-XL) 25 mg 24 hr tablet    multivitamin-minerals (CENTRUM) tablet    pyridoxine (B-6) 25 MG tablet    warfarin (COUMADIN) 5 mg tablet    clobetasol (TEMOVATE) 0 05 % cream    dexamethasone sodium phosphate 0 1 % ophthalmic solution       Allergies   Allergen Reactions    Sildenafil Hives     Other reaction(s): severe congestion, cialis was OK       Social History:     Marital Status: /Civil Union    Substance Use History:   Social History     Substance and Sexual Activity   Alcohol Use Yes    Comment: 4 drinks a week     Social History     Tobacco Use   Smoking Status Former    Packs/day: 1 00    Years: 26 00    Pack years: 26 00    Types: Cigarettes    Quit date: 18    Years since quittin 3   Smokeless Tobacco Never     Social History     Substance and Sexual Activity   Drug Use No       Family History:    Family History   Problem Relation Age of Onset    Heart disease Other     Cancer Other          OBJECTIVE:    Vitals:   Blood Pressure: 123/93 (23 0749)  Pulse: 77 "(04/25/23 0749)  Temperature: 97 5 °F (36 4 °C) (04/25/23 0749)  Temp Source: Oral (04/24/23 1206)  Respirations: 14 (04/25/23 0749)  Height: 6' 2\" (188 cm) (04/25/23 0749)  Weight - Scale: 121 kg (265 lb 14 oz) (04/25/23 1006)  SpO2: 98 % (04/25/23 0749)    Physical Exam:     General Appearance: Alert, cooperative, no distress  HEENT: Head normocephalic, atraumatic, without obvious abnormality  Heart: Normal rate and rhythm  Lungs: Non-labored breathing  No respiratory distress  Abdomen: Without distension  Psychiatric: AAOx3  Lower Extremity:  Vascular:   DP pulses are nonpalpable but biphasic with Doppler bilateral  PT pulses are palpable and biphasic/triphasic with Doppler bilateral  Capillary fill is within normal limits  Right foot is slightly warmer than the left  Significant edema 2+ pitting    Musculoskeletal:   Pain to palpation of first metatarsophalangeal joint  Pain on range of motion  Clicking is also present with range of motion  Negative for pain to all other bony prominences and tendons  Neurological:   Epicritic sensation is intact  Sharp dull discrimination is intact  Protective sensation is intact      Dermatological:   Erythema noted significantly to the dorsum of the right foot  Mild increase in temperature  No surrounding ulcerations or wounds could be identified  Significant edema to bilateral lower extremities from base of toes to the tibial tuberosity  Venous stasis ulcerations noted to the anterior aspect of bilateral lower extremities as well as the posterior aspect of the right lower extremity  These are draining clear serous drainage  Wounds and Ulcerations noted as follows:    Wound #1 located on the right anterior tibia measuring approximately 10 cm x 12 cm x 0 2 cm with good skin bridge in between  There is surrounding vesicles with clear draining fluid  Negative for signs of infection negative probe to bone mostly granular wound base      Wound #2 located on the " "anterior left tibia measuring 7 cm x 10 5 cm x 0 2 cm with many vesicles and some healing eschars  Negative for signs of infection surrounding erythema present consistent with venous stasis skin changes  Draining clear serous drainage  Wound #3 located in the posterior right tibia measuring approximately 1 5 x 1 5 cm with fibrotic base negative probe to bone negative for signs of infection with clear drainage  Clinical Images 04/25/23:                              Additional Data:     Lab Results: I have personally reviewed pertinent labs including:    Results from last 7 days   Lab Units 04/25/23  0456   WBC Thousand/uL 9 04   HEMOGLOBIN g/dL 16 0   HEMATOCRIT % 46 9   PLATELETS Thousands/uL 176   NEUTROS PCT % 68   LYMPHS PCT % 15   MONOS PCT % 14*   EOS PCT % 1     Results from last 7 days   Lab Units 04/25/23  0456   POTASSIUM mmol/L 4 0   CHLORIDE mmol/L 106   CO2 mmol/L 27   BUN mg/dL 22   CREATININE mg/dL 1 11   CALCIUM mg/dL 8 7   ALK PHOS U/L 75   ALT U/L 27   AST U/L 27     Results from last 7 days   Lab Units 04/25/23  0456   INR  2 29*       Cultures: I have personally reviewed pertinent cultures including:              Imaging: I have personally reviewed pertinent films in PACS  EKG, Pathology, and Other Studies: I have personally reviewed pertinent reports  ** Please Note: Portions of the record may have been created with voice recognition software  Occasional wrong word or \"sound a like\" substitutions may have occurred due to the inherent limitations of voice recognition software  Read the chart carefully and recognize, using context, where substitutions have occurred   **  "

## 2023-04-25 NOTE — PROGRESS NOTES
[facility]  Progress Note  Name: David Cardozo  MRN: [id number]  Unit/Bed#: [facility] 868-18 I Date of Admission: 4/24/2023   Date of Service: 4/25/2023 I Hospital Day: 1      Left VM for wife with update  Assessment/Plan   * Lower extremity cellulitis  Assessment & Plan  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 which was treated with Xarelto  Lower extremity Dopplers did not show any DVTs bilateral this admission  BNP elevated to almost 2000  No cultures or MRSA infection in chart  Differentials include bilateral lower extremity cellulitis versus erysipelas vs venous stasis versus scalded skin, however no systemic symptoms with normal leukocyte count and no fevers  Able to express green/yellow drainage concerning for abscess  · Continue IV antibiotics, patient given 1 dose of vancomycin in the ED; continue until r/o MRSA -- MRSA culture obtained  · Started Lasix 20 mg p o  daily on admission, continue now  · F/u echocardiogram to rule out cardiac fluid overload  · Consider imaging if fails to improve  · Monitor fever curve  · Obtain podiatry evaluation for assistance with wound care  · Monitor I's and O's and weights  · PT/OT eval    Hyperbilirubinemia  Assessment & Plan  History of hyperbilirubinemia seen on labs  Total bilirubin elevated to 2 9 this admission from a baseline of 1 2-1 8  · No recent abdominal imaging  · No evidence of abdominal pain or hemolysis with normal hemoglobin    · Continue to trend LFTs, slightly improved this AM      Stage 3a "chronic kidney disease (CKD) [facility])  Assessment & Plan  Lab Results   Component Value Date    EGFR 61 04/25/2023    EGFR 53 04/24/2023    EGFR 55 12/01/2022    CREATININE 1 11 04/25/2023    CREATININE 1 24 04/24/2023    CREATININE 1 20 12/01/2022   Baseline creatinine 1 2 since 2020  Currently at baseline   Avoid nephrotoxins   Continue monitoring BMP   Lasix 20 mg daily    I's and O's/daily weights    ESTER (obstructive sleep apnea)  Assessment & Plan  · CPAP HS    Chronic venous insufficiency of lower extremity  Assessment & Plan  Patient has chronic venous insufficiency for past 1 to 2 years  He noticed its gotten worse in the past 2 months  For about 1 to 2 weeks he noticed his legs were weeping and had \"bubbles\" on his skin  At home patient prescribed Lasix 20 mg as needed p o  however patient has not been taking it often due to frequent urination  At home he uses compression stockings but has not been able to since he has had worsening of lower extremity rash   IV antibiotics as above  [facility] consulted for wound care, appreciate recommendations    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 [facility])  Assessment & Plan  Patient has hx of right lower extremity DVT in 2014 at which time he was seen by hematology & placed on Xarelto for 6 months  Patient had coagulability testing in the past; patient has MTHFR mutation   Lower extremity Dopplers this admission shows no signs of DVT bilaterally   Currently patient is on warfarin for persistent A-fib   Continue folate supplementation in setting of MTHFR mut    Morbid obesity ([facility] [address] )  Assessment & Plan  Body mass index is 35 56 kg/m²  · Affects all aspects of care, encourage weight loss    Persistent atrial fibrillation (HCC)  Assessment & Plan  History of AF on beta blocker and warfarin  INR 2 2 on admission, at baseline   Currently RRR on exam    · Continue toprol xl 25 mg daily  · Warfarin 5 mg Mon, Tue, Wed, Fri; " 7 5 mg on Thurs  · Follow INR-PT    Hypertension  Assessment & Plan  BP controlled  · Patient prescribed lasix 20 mg daily PRN, however does not take it often, due to frequent urination  · Continue on losartan 50 mg daily and Toprol xl 25 mg daily  · Started lasix 20 mg PO daily while inpatient             VTE Pharmacologic Prophylaxis: VTE Score: 7 Moderate Risk (Score 3-4) - Pharmacological DVT Prophylaxis Ordered: warfarin (Coumadin)  Patient Centered Rounds: I performed bedside rounds with nursing staff today  Discussions with Specialists or Other Care Team Provider: appreciate wound care and podiatry    Education and Discussions with Family / Patient: will call wife   Total Time Spent on Date of Encounter in care of patient: 25 minutes This time was spent on one or more of the following: performing physical exam; counseling and coordination of care; obtaining or reviewing history; documenting in the medical record; reviewing/ordering tests, medications or procedures; communicating with other healthcare professionals and discussing with patient's family/caregivers  Current Length of Stay: 1 day(s)  Current Patient Status: Inpatient   Certification Statement: The patient will continue to require additional inpatient hospital stay due to IV abx   Discharge Plan: Anticipate discharge in 24-48 hrs to home  Code Status: Level 1 - Full Code    Subjective:   Pt reports doing ok  Feels like R foot redness is slightly improved  Swelling mildly improved  No fevers/chills  Denies SOB  Objective:     Vitals:   Temp (24hrs), Av 8 °F (36 6 °C), Min:97 5 °F (36 4 °C), Max:98 5 °F (36 9 °C)    Temp:  [97 5 °F (36 4 °C)-98 5 °F (36 9 °C)] 97 5 °F (36 4 °C)  HR:  [] 77  Resp:  [14-20] 14  BP: ()/(50-95) 123/93  SpO2:  [97 %-100 %] 98 %  Body mass index is 35 56 kg/m²  Input and Output Summary (last 24 hours):      Intake/Output Summary (Last 24 hours) at 2023 7880  Last data filed at 4/24/2023 1814  Gross per 24 hour   Intake 180 ml   Output 350 ml   Net -170 ml       Physical Exam:   Physical Exam  Vitals and nursing note reviewed  Constitutional:       General: He is not in acute distress  Appearance: He is obese  Comments: On RA    Cardiovascular:      Rate and Rhythm: Normal rate and regular rhythm  Abdominal:      General: There is no distension  Tenderness: There is no abdominal tenderness  Musculoskeletal:         General: Swelling (R foot) present  Right lower leg: Edema present  Left lower leg: Edema present  Skin:     Findings: Erythema (dorsum R foot, warm to touch ) present  Comments: B/l LE dressed by wound care    Neurological:      Mental Status: He is oriented to person, place, and time     Psychiatric:         Mood and Affect: Mood normal          Additional Data:     Labs:  Results from last 7 days   Lab Units 04/25/23  0456   WBC Thousand/uL 9 04   HEMOGLOBIN g/dL 16 0   HEMATOCRIT % 46 9   PLATELETS Thousands/uL 176   NEUTROS PCT % 68   LYMPHS PCT % 15   MONOS PCT % 14*   EOS PCT % 1     Results from last 7 days   Lab Units 04/25/23  0456   SODIUM mmol/L 138   POTASSIUM mmol/L 4 0   CHLORIDE mmol/L 106   CO2 mmol/L 27   BUN mg/dL 22   CREATININE mg/dL 1 11   ANION GAP mmol/L 5   CALCIUM mg/dL 8 7   ALBUMIN g/dL 2 7*   TOTAL BILIRUBIN mg/dL 2 43*   ALK PHOS U/L 75   ALT U/L 27   AST U/L 27   GLUCOSE RANDOM mg/dL 115     Results from last 7 days   Lab Units 04/25/23  0456   INR  2 29*             Results from last 7 days   Lab Units 04/24/23  1046   PROCALCITONIN ng/ml 0 09       Lines/Drains:  Invasive Devices     Peripheral Intravenous Line  Duration           Peripheral IV 04/24/23 Right Antecubital <1 day                      Imaging: Reviewed radiology reports from this admission including: R foot xray, venous duplex     Recent Cultures (last 7 days):         Last 24 Hours Medication List:   Current Facility-Administered Medications Medication Dose Route Frequency Provider Last Rate    folic acid  2 mg Oral Daily Lito Pierson MD      furosemide  20 mg Oral Daily Lito Pierson MD      losartan  50 mg Oral Daily Lito Pierson MD      metoprolol succinate  25 mg Oral Daily Lito Pierson MD      multivitamin-minerals  1 tablet Oral Daily Lito Pierson MD      pyridoxine  50 mg Oral Daily Lito Pierson MD      vancomycin  1,500 mg Intravenous Q24H Lito Pierson MD      warfarin  5 mg Oral Once per day on Mon Tue Wed Fri [facility] warfarin  7 5 mg Oral Once per day on Thu Lito Pierson MD          Today, Patient Was Seen By: Luz Elena Medina PA-C    **Please Note: This note may have been constructed using a voice recognition system  **

## 2023-04-25 NOTE — CONSULTS
Consultation - [address] 80 y o  male MRN: [id number]  Unit/Bed#: [facility] 816-01 Encounter: [id number]    Assessment:   Chronic venous hypertension with ulcer of bilateral lower extremity  Non-pressure ulcer of right lower extremity limited to breakdown of the skin  Non-pressure ulcer of left lower extremity limited to breakdown of the skin  Cellulitis  Obesity     Plan:  · B/l lower extremity wounds related to edema with underlying venous disease  · Areas of moist loose scabbing with noted trapping of drainage underneath, mechanically debrided with gauze on the LLE to reveal partial thickness wounds with clean wound bases  · Wounds to RLE mechanically debrided with gauze   · Will recommend adaptic, silver alginate and ABD pads to manage the drainage  · Elevate b/l lower extremities for edema management as patient tolerates  · podiatry consult pending    · IV ABX and diuretics as per primary service  · Venous doppler to b/l lower extremities negative for DVT  · Educated patient to not leave wounds ROSARIO or soak in water or epsom salts, etc    Will recommend preventative nursing skin care measures   Patient verbalized understanding of plan of care   [facility] wound care team with questions or concerns   Routine wound care follow-up while admitted  AVS updated   [facility] as an outpatient, ambulatory referral placed  History of Present Illness:  Patient is an 80year old male who is admitted with lower extremity cellulitis  History of - venous stasis, HTN, ESTER, a-fib, CKD, and DVT  Wound care consulted for b/l lower extremity wounds  Patient is alert and oriented x 4, cooperative and agreeable for the exam  Patient declined a full skin assessment, denies skin alterations other than his legs  Patient reports he has not been able to wear his compression stockings due to the increased edema  Per H&P patient has not been taking his prescribed lasix  Patient has followed with vascular surgery as an outpatient  Patient reports the wounds started as water blisters and have been presents to the RLE for about 1 1/2 weeks and the LLE for several weeks  Patient has been applying neosporin and soaking his legs in cold water with Epson salts  Wounds open to air at time of the assessment  Patient states the redness had been getting worse to his legs which prompted him to come to the hospital  Denies fever, chills, or increased pain related to the wound  Endorses some mild itching to the lower extremities  Subjective:    Review of Systems   Constitutional: Negative for chills and fever  HENT: Negative for congestion and sneezing  Respiratory: Negative for cough  Cardiovascular: Positive for leg swelling  Skin: Positive for wound  B/l lower extremities   Psychiatric/Behavioral: Negative for agitation         Historical Information   Past Medical History:   Diagnosis Date    Blood clot in vein     in right foot    Hypertension      Past Surgical History:   Procedure Laterality Date    BACK SURGERY  2018    COLONOSCOPY      COLONOSCOPY N/A 2016    Procedure: COLONOSCOPY;  Surgeon: Love Rios MD;  Location: [facility] OR;  Service:    [facility] HERNIA REPAIR       Social History   Social History     Substance and Sexual Activity   Alcohol Use Yes    Comment: 4 drinks a week     Social History     Substance and Sexual Activity   Drug Use No     E-Cigarette/Vaping    E-Cigarette Use Never User      E-Cigarette/Vaping Substances    Nicotine No     THC No     CBD No     Flavoring No     Other No     Unknown No      Social History     Tobacco Use   Smoking Status Former    Packs/day:     Years:     Pack years:     Types: Cigarettes    Quit date: Joanna Nieves Years since quittin 3   Smokeless Tobacco Never     Family History:   Family History   Problem Relation Age of Onset    Heart disease Other     Cancer Other "      Meds/Allergies   current meds:   Current Facility-Administered Medications   Medication Dose Route Frequency    folic acid (FOLVITE) tablet 2 mg  2 mg Oral Daily    furosemide (LASIX) tablet 20 mg  20 mg Oral Daily    losartan (COZAAR) tablet 50 mg  50 mg Oral Daily    metoprolol succinate (TOPROL-XL) 24 hr tablet 25 mg  25 mg Oral Daily    multivitamin-minerals (CENTRUM) tablet 1 tablet  1 tablet Oral Daily    pyridoxine (VITAMIN B6) tablet 50 mg  50 mg Oral Daily    vancomycin (VANCOCIN) 1500 mg in sodium chloride 0 9% 250 mL IVPB  1,500 mg Intravenous Q24H    warfarin (COUMADIN) tablet 5 mg  5 mg Oral Once per day on Mon Tue Wed Fri    [START ON 4/27/2023] warfarin (COUMADIN) tablet 7 5 mg  7 5 mg Oral Once per day on Thu     Allergies   Allergen Reactions    Sildenafil Hives     Other reaction(s): severe congestion, cialis was OK       Objective   Vitals: Blood pressure 123/93, pulse 77, temperature 97 5 °F (36 4 °C), resp  rate 14, height 6' 2\" (1 88 m), weight 121 kg (265 lb 14 oz), SpO2 98 %       Wounds:     Wound 04/24/23 Pretibial Right (Active)   Wound Image            04/25/23 0925   Wound Length (cm) 10 cm 04/25/23 0925   Wound Width (cm) 12 cm 04/25/23 0925   Wound Depth (cm) 0 2 cm 04/25/23 0925   Wound Surface Area (cm^2) 120 cm^2 04/25/23 0925   Wound Volume (cm^3) 24 cm^3 04/25/23 0925   Calculated Wound Volume (cm^3) 24 cm^3 04/25/23 0925       Wound 04/24/23 Pretibial Left (Active)   Wound Image       04/25/23 0930   Wound Length (cm) 7 cm 04/25/23 0930   Wound Width (cm) 11 5 cm 04/25/23 0930   Wound Depth (cm) 0 2 cm 04/25/23 0930   Wound Surface Area (cm^2) 80 5 cm^2 04/25/23 0930   Wound Volume (cm^3) 16 1 cm^3 04/25/23 0930   Calculated Wound Volume (cm^3) 16 1 cm^3 04/25/23 0930       Wound 04/25/23 Tibial Posterior;Right (Active)   Wound Image       04/25/23 0929   Wound Length (cm) 4 5 cm 04/25/23 0929   Wound Width (cm) 2 cm 04/25/23 0929   Wound Depth (cm) 0 1 cm 04/25/23 " 1108   Wound Surface Area (cm^2) 9 cm^2 23 0929   Wound Volume (cm^3) 0 9 cm^3 23 0929   Calculated Wound Volume (cm^3) 0 9 cm^3 23 5572         Physical Exam  Constitutional:       General: He is not in acute distress  Appearance: He is not ill-appearing, toxic-appearing or diaphoretic  HENT:      Head: Normocephalic and atraumatic  Right Ear: External ear normal       Left Ear: External ear normal    Eyes:      Conjunctiva/sclera: Conjunctivae normal    Cardiovascular:      Pulses:           Dorsalis pedis pulses are 1+ on the right side and 1+ on the left side  Posterior tibial pulses are 1+ on the right side and 1+ on the left side  Pulmonary:      Effort: Pulmonary effort is normal  No respiratory distress  Musculoskeletal:      Right lower le+ Edema present  Left lower le+ Edema present  Skin:     General: Skin is warm and dry  Findings: Erythema and wound present  Comments: 1  LLE - anterior aspect with scattered round/oval shaped partial thickness wounds measured together with moist pink/red tissue  Edges fragile and attached without maceration  No intact blisters  Moderate serous drainage  Lory-wound/LLE is dry, fragile, and intact with areas of adhered scabbing, scar tissue, and erythema  2  RLE - anterior and posterior lower extremity with scattered oval/round shaped partial thickness wounds  Each aspect cluster of wounds measured together  Mixed yellow and pink/red moist tissue  Edges fragile and attached with maceration in some aspects  Moderate amount of serous drainage  Lory-wound/RLE is dry and intact with fragile pink skin, scar tissue, and some small intact serous blisters  Redness/warmth extending to the R foot  3  B/l heels are dry and intact without redness or wounds          No induration, fluctuance, odor, warmth/temperature differences, redness, or purulence noted to the above mentioned wounds and skin areas assessed "(exceptions as noted above)  New dressings applied as noted above  Patient tolerated assessment well- denies pain  Neurological:      Mental Status: He is alert  Lab, Imaging and other studies: I have personally reviewed pertinent reports  Code Status: Level 1 - Full Code      Counseling / Coordination of Care  Total time spent today:    Total time (face-to-face and non-face-to-face) spent on today's visit was 24 minutes  This includes preparation for the visits (H&P on 4/24/23, SLIM note 4/25/23, outpatient vascular note on 2/14/22, and venous doppler on 4/24/23  ) performance of a medically appropriate history and examination, and orders for medications/treatments or testing  Discussed assessment findings, and plan of care/recommendations with patients LIVIA Calderon, RAJ      Portions of the record may have been created with voice recognition software  Occasional wrong word or \"sound a like\" substitutions may have occurred due to the inherent limitations of voice recognition software    Read the chart carefully and recognize, using context, where substitutions have occurred      "

## 2023-04-25 NOTE — PROGRESS NOTES
Pastoral Care Progress Note    2023  Patient: Mikayla Poon : 1940  Admission Date & Time: 2023  MRN: [id number] CSN: [id number]         23 1500   Clinical Encounter Type   Visited With Patient   [facility] Encounters   [facility] Needs Prayer   Sacramental Encounters   Sacrament of Sick-Anointing Patient declined anointing     Bill Peoples met with the pt and provided prayers and blessings  The pt declined Fr's offers for anointing  No further needs were expressed at this time  Chaplains still remain available

## 2023-04-25 NOTE — ASSESSMENT & PLAN NOTE
"Patient has chronic venous insufficiency for past 1 to 2 years  He noticed its gotten worse in the past 2 months  For about 1 to 2 weeks he noticed his legs were weeping and had \"bubbles\" on his skin  At home patient prescribed Lasix 20 mg as needed p o  however patient has not been taking it often due to frequent urination  At home he uses compression stockings but has not been able to since he has had worsening of lower extremity rash     IV antibiotics as above  Nicole Al[facility] Podiatry consulted for wound care, appreciate recommendations  "

## 2023-04-25 NOTE — PLAN OF CARE
Problem: MOBILITY - ADULT  Goal: Maintain or return to baseline ADL function  Description: INTERVENTIONS:  -  Assess patient's ability to carry out ADLs; assess patient's baseline for ADL function and identify physical deficits which impact ability to perform ADLs (bathing, care of mouth/teeth, toileting, grooming, dressing, etc )  - Assess/evaluate cause of self-care deficits   - Assess range of motion  - Assess patient's mobility; develop plan if impaired  - Assess patient's need for assistive devices and provide as appropriate  - Encourage maximum independence but intervene and supervise when necessary  - Involve family in performance of ADLs  - Assess for home care needs following discharge   - Consider OT consult to assist with ADL evaluation and planning for discharge  - Provide patient education as appropriate  Outcome: Progressing  Goal: Maintains/Returns to pre admission functional level  Description: INTERVENTIONS:  - Perform BMAT or MOVE assessment daily    - Set and communicate daily mobility goal to care team and patient/family/caregiver     - Collaborate with rehabilitation services on mobility goals if consulted  - Stand patient 3 times a day  - Ambulate patient 3 times a day  - Out of bed to chair 2  times a day   - Out of bed for toileting  - Record patient progress and toleration of activity level   Outcome: Progressing     Problem: PAIN - ADULT  Goal: Verbalizes/displays adequate comfort level or baseline comfort level  Description: Interventions:  - Encourage patient to monitor pain and request assistance  - Assess pain using appropriate pain scale  - Administer analgesics based on type and severity of pain and evaluate response  - Implement non-pharmacological measures as appropriate and evaluate response  - Consider cultural and social influences on pain and pain management  - Notify physician/advanced practitioner if interventions unsuccessful or patient reports new pain  Outcome: Progressing     Problem: INFECTION - ADULT  Goal: Absence or prevention of progression during hospitalization  Description: INTERVENTIONS:  - Assess and monitor for signs and symptoms of infection  - Monitor lab/diagnostic results  - Monitor all insertion sites, i e  indwelling lines, tubes, and drains  - Monitor endotracheal if appropriate and nasal secretions for changes in amount and color  - [place] appropriate cooling/warming therapies per order  - Administer medications as ordered  - Instruct and encourage patient and family to use good hand hygiene technique  - Identify and instruct in appropriate isolation precautions for identified infection/condition  Outcome: Progressing  Goal: Absence of fever/infection during neutropenic period  Description: INTERVENTIONS:  - Monitor WBC    Outcome: Progressing     Problem: SAFETY ADULT  Goal: Maintain or return to baseline ADL function  Description: INTERVENTIONS:  -  Assess patient's ability to carry out ADLs; assess patient's baseline for ADL function and identify physical deficits which impact ability to perform ADLs (bathing, care of mouth/teeth, toileting, grooming, dressing, etc )  - Assess/evaluate cause of self-care deficits   - Assess range of motion  - Assess patient's mobility; develop plan if impaired  - Assess patient's need for assistive devices and provide as appropriate  - Encourage maximum independence but intervene and supervise when necessary  - Involve family in performance of ADLs  - Assess for home care needs following discharge   - Consider OT consult to assist with ADL evaluation and planning for discharge  - Provide patient education as appropriate  Outcome: Progressing  Goal: Maintains/Returns to pre admission functional level  Description: INTERVENTIONS:  - Perform BMAT or MOVE assessment daily    - Set and communicate daily mobility goal to care team and patient/family/caregiver     - Collaborate with rehabilitation services on mobility goals if consulted  - Stand patient 3 times a day  - Ambulate patient 3 times a day  - Out of bed to chair 2  times a day   - Out of bed for toileting  - Record patient progress and toleration of activity level   Outcome: Progressing  Goal: Patient will remain free of falls  Description: INTERVENTIONS:  - Educate patient/family on patient safety including physical limitations  - Instruct patient to call for assistance with activity   - Consult OT/PT to assist with strengthening/mobility   - Keep Call bell within reach  - Keep bed low and locked with side rails adjusted as appropriate  - Keep care items and personal belongings within reach  - Initiate and maintain comfort rounds  - Make Fall Risk Sign visible to staff  - Apply yellow socks and bracelet for high fall risk patients  - Consider moving patient to room near nurses station  Outcome: Progressing     Problem: DISCHARGE PLANNING  Goal: Discharge to home or other facility with appropriate resources  Description: INTERVENTIONS:  - Identify barriers to discharge w/patient and caregiver  - Arrange for needed discharge resources and transportation as appropriate  - Identify discharge learning needs (meds, wound care, etc )  - Arrange for interpretive services to assist at discharge as needed  - Refer to Case Management Department for coordinating discharge planning if the patient needs post-hospital services based on physician/advanced practitioner order or complex needs related to functional status, cognitive ability, or social support system  Outcome: Progressing     Problem: Knowledge Deficit  Goal: Patient/family/caregiver demonstrates understanding of disease process, treatment plan, medications, and discharge instructions  Description: Complete learning assessment and assess knowledge base    Interventions:  - Provide teaching at level of understanding  - Provide teaching via preferred learning methods  Outcome: Progressing     Problem: Prexisting or High Potential for Compromised Skin Integrity  Goal: Skin integrity is maintained or improved  Description: INTERVENTIONS:  - Identify patients at risk for skin breakdown  - Assess and monitor skin integrity  - Assess and monitor nutrition and hydration status  - Monitor labs   - Assess for incontinence   - Turn and reposition patient  - Assist with mobility/ambulation  - Relieve pressure over bony prominences  - Avoid friction and shearing  - Provide appropriate hygiene as needed including keeping skin clean and dry  - Evaluate need for skin moisturizer/barrier cream  - Collaborate with interdisciplinary team   - Patient/family teaching  - Consider wound care consult   Outcome: Progressing     Problem: Nutrition/Hydration-ADULT  Goal: Nutrient/Hydration intake appropriate for improving, restoring or maintaining nutritional needs  Description: Monitor and assess patient's nutrition/hydration status for malnutrition  Collaborate with interdisciplinary team and initiate plan and interventions as ordered  Monitor patient's weight and dietary intake as ordered or per policy  Utilize nutrition screening tool and intervene as necessary  Determine patient's food preferences and provide high-protein, high-caloric foods as appropriate       INTERVENTIONS:  - Monitor oral intake, urinary output, labs, and treatment plans  - Assess nutrition and hydration status and recommend course of action  - Evaluate amount of meals eaten  - Assist patient with eating if necessary   - Allow adequate time for meals  - Recommend/ encourage appropriate diets, oral nutritional supplements, and vitamin/mineral supplements  - Order, calculate, and assess calorie counts as needed  - Recommend, monitor, and adjust tube feedings and TPN/PPN based on assessed needs  - Assess need for intravenous fluids  - Provide specific nutrition/hydration education as appropriate  - Include patient/family/caregiver in decisions related to nutrition  Outcome: Progressing

## 2023-04-26 ENCOUNTER — HOME HEALTH ADMISSION (OUTPATIENT)
Dept: HOME HEALTH SERVICES | Facility: HOME HEALTHCARE | Age: 83
End: 2023-04-26

## 2023-04-26 LAB
ANION GAP SERPL CALCULATED.3IONS-SCNC: 3 MMOL/L (ref 4–13)
BASOPHILS # BLD AUTO: 0.07 THOUSANDS/ΜL (ref 0–0.1)
BASOPHILS NFR BLD AUTO: 1 % (ref 0–1)
BUN SERPL-MCNC: 23 MG/DL (ref 5–25)
CALCIUM SERPL-MCNC: 8.7 MG/DL (ref 8.3–10.1)
CHLORIDE SERPL-SCNC: 107 MMOL/L (ref 96–108)
CO2 SERPL-SCNC: 29 MMOL/L (ref 21–32)
CREAT SERPL-MCNC: 1.12 MG/DL (ref 0.6–1.3)
EOSINOPHIL # BLD AUTO: 0.15 THOUSAND/ΜL (ref 0–0.61)
EOSINOPHIL NFR BLD AUTO: 2 % (ref 0–6)
ERYTHROCYTE [DISTWIDTH] IN BLOOD BY AUTOMATED COUNT: 12.5 % (ref 11.6–15.1)
GFR SERPL CREATININE-BSD FRML MDRD: 60 ML/MIN/1.73SQ M
GLUCOSE SERPL-MCNC: 106 MG/DL (ref 65–140)
HCT VFR BLD AUTO: 46.1 % (ref 36.5–49.3)
HGB BLD-MCNC: 15.3 G/DL (ref 12–17)
IMM GRANULOCYTES # BLD AUTO: 0.06 THOUSAND/UL (ref 0–0.2)
IMM GRANULOCYTES NFR BLD AUTO: 1 % (ref 0–2)
INR PPP: 2.05 (ref 0.84–1.19)
LYMPHOCYTES # BLD AUTO: 1.61 THOUSANDS/ΜL (ref 0.6–4.47)
LYMPHOCYTES NFR BLD AUTO: 16 % (ref 14–44)
MCH RBC QN AUTO: 32.6 PG (ref 26.8–34.3)
MCHC RBC AUTO-ENTMCNC: 33.2 G/DL (ref 31.4–37.4)
MCV RBC AUTO: 98 FL (ref 82–98)
MONOCYTES # BLD AUTO: 1.4 THOUSAND/ΜL (ref 0.17–1.22)
MONOCYTES NFR BLD AUTO: 14 % (ref 4–12)
NEUTROPHILS # BLD AUTO: 6.53 THOUSANDS/ΜL (ref 1.85–7.62)
NEUTS SEG NFR BLD AUTO: 66 % (ref 43–75)
NRBC BLD AUTO-RTO: 0 /100 WBCS
PLATELET # BLD AUTO: 184 THOUSANDS/UL (ref 149–390)
PMV BLD AUTO: 11.4 FL (ref 8.9–12.7)
POTASSIUM SERPL-SCNC: 4 MMOL/L (ref 3.5–5.3)
PROTHROMBIN TIME: 23.4 SECONDS (ref 11.6–14.5)
RBC # BLD AUTO: 4.69 MILLION/UL (ref 3.88–5.62)
SODIUM SERPL-SCNC: 139 MMOL/L (ref 135–147)
WBC # BLD AUTO: 9.82 THOUSAND/UL (ref 4.31–10.16)

## 2023-04-26 RX ORDER — FUROSEMIDE 10 MG/ML
20 INJECTION INTRAMUSCULAR; INTRAVENOUS ONCE
Status: COMPLETED | OUTPATIENT
Start: 2023-04-26 | End: 2023-04-26

## 2023-04-26 RX ORDER — LIDOCAINE 50 MG/G
1 PATCH TOPICAL DAILY
Status: DISCONTINUED | OUTPATIENT
Start: 2023-04-26 | End: 2023-05-03 | Stop reason: HOSPADM

## 2023-04-26 RX ADMIN — WARFARIN SODIUM 5 MG: 5 TABLET ORAL at 17:13

## 2023-04-26 RX ADMIN — FOLIC ACID 2 MG: 1 TABLET ORAL at 08:25

## 2023-04-26 RX ADMIN — FUROSEMIDE 20 MG: 10 INJECTION, SOLUTION INTRAMUSCULAR; INTRAVENOUS at 13:15

## 2023-04-26 RX ADMIN — METOPROLOL SUCCINATE 25 MG: 25 TABLET, EXTENDED RELEASE ORAL at 08:25

## 2023-04-26 RX ADMIN — VANCOMYCIN HYDROCHLORIDE 1500 MG: 10 INJECTION, POWDER, LYOPHILIZED, FOR SOLUTION INTRAVENOUS at 08:26

## 2023-04-26 RX ADMIN — LOSARTAN POTASSIUM 50 MG: 50 TABLET, FILM COATED ORAL at 08:25

## 2023-04-26 RX ADMIN — Medication 50 MG: at 08:27

## 2023-04-26 RX ADMIN — LIDOCAINE 5% 1 PATCH: 700 PATCH TOPICAL at 20:58

## 2023-04-26 RX ADMIN — FUROSEMIDE 20 MG: 20 TABLET ORAL at 08:25

## 2023-04-26 RX ADMIN — Medication 1 TABLET: at 08:26

## 2023-04-26 NOTE — ASSESSMENT & PLAN NOTE
Lab Results   Component Value Date    EGFR 60 04/26/2023    EGFR 61 04/25/2023    EGFR 53 04/24/2023    CREATININE 1 12 04/26/2023    CREATININE 1 11 04/25/2023    CREATININE 1 24 04/24/2023   Baseline creatinine 1 2 since 2020  Currently at baseline     Avoid nephrotoxins   Continue monitoring BMP   Lasix 20 mg daily    I's and O's/daily weights

## 2023-04-26 NOTE — ASSESSMENT & PLAN NOTE
History of hyperbilirubinemia seen on labs  Total bilirubin elevated to 2 9 this admission from a baseline of 1 2-1 8    · No recent abdominal imaging  · No evidence of abdominal pain or hemolysis with normal hemoglobin

## 2023-04-26 NOTE — ASSESSMENT & PLAN NOTE
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 which was treated with Xarelto  Lower extremity Dopplers did not show any DVTs bilateral this admission  BNP elevated to almost 2000  No cultures or MRSA infection in chart  Differentials include bilateral lower extremity cellulitis versus erysipelas vs venous stasis versus scalded skin, however no systemic symptoms with normal leukocyte count and no fevers  Able to express green/yellow drainage concerning for abscess     · Continue IV Vancomycin  · Still redness, swelling, edema noted today  · Started Lasix 20 mg p o  daily on admission, continue now  · F/u echocardiogram to rule out cardiac fluid overload  · Will order one dose of IV lasix 20 mg due to leg edema  · Podiatry appreciated

## 2023-04-26 NOTE — PHYSICAL THERAPY NOTE
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[facility] )    Diverticular disease of colon    Elevated hemoglobin A1c    Encounter for long-term (current) use of other medications    Fever    FUO (fever of unknown origin)    Hyperlipidemia    Hypertension    Internal hemorrhoids    Lichen planus    Lumbar back pain    Lumbar disc herniation    MTHFR mutation    Obstructive sleep apnea    Organic impotence    Premature atrial complexes    Premature ventricular contraction    Pure hypercholesterolemia    Radiculopathy, lumbar region    Spinal stenosis of lumbar region    Vitamin D deficiency    Persistent atrial fibrillation (HCC)    Localized edema    Morbid obesity (HCC)    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 (HCC)    Chronic venous insufficiency of lower extremity    HTN (hypertension)    Obesity    ESTER (obstructive sleep apnea)    Lumbar stenosis    Lower extremity cellulitis    Stage 3a chronic kidney disease (CKD) ([facility] )    Hyperbilirubinemia       Past Medical History  Past Medical History:   Diagnosis Date    Blood clot in vein 2015    in right foot    Hypertension        Past Surgical History  Past Surgical History:   Procedure Laterality Date    BACK SURGERY  2018    COLONOSCOPY      COLONOSCOPY N/A 2/24/2016    Procedure: COLONOSCOPY;  Surgeon: Daniel Escobar MD;  Location:  MAIN OR;  Service:     HERNIA REPAIR            04/26/23 0835   PT Last Visit   PT Visit Date 04/26/23   Note Type   Note type Evaluation   Pain Assessment   Pain Assessment Tool 0-10   Pain Score No Pain   Pain Location/Orientation Orientation: Right;Location: Foot   Pain Onset/Description Descriptor: Pressure   Hospital Pain Intervention(s) Repositioned   Restrictions/Precautions   Weight Bearing Precautions Per Order Yes   RLE Weight Bearing Per Order WBAT  (Per podiatry note)   LLE Weight Bearing Per Order WBAT  (per podiatry note)   Other Precautions Fall Risk;Pain   Home Living   Type of [address] Two level;Bed/bath upstairs  (Split level home  6 +8 stairs with HR)   Bathroom Shower/Tub Tub/shower unit   Bathroom Toilet Standard   Bathroom Equipment Shower chair   Home Equipment Cane   Additional Comments Pt lives in [facility] level home with spouse, 0STE , uses cane prn   Prior Function   Level of [place] Independent with ADLs; Independent with functional mobility; Independent with IADLS   Lives With Spouse   Receives Help From Family   IADLs Independent with driving; Independent with meal prep; Independent with medication management   Falls in the last 6 months 0   Vocational Retired   Comments Pt states spouse shares IADLs tasks with him   Cognition   Overall Cognitive Status WFL   Attention Within functional limits   Orientation Level Oriented X4   Following Commands Follows all commands and directions without difficulty   Comments Pt cooperative during session   RLE Assessment   RLE Assessment WFL   LLE Assessment   LLE Assessment WFL   Bed Mobility   Supine to Sit Unable to assess   Sit to Supine Unable to assess   Additional Comments Pt noted to be OOB in chair upon arrival    Transfers   Sit to Stand 5  Supervision   Stand to Sit 5  Supervision   Additional Comments Holds cane but does not use it during session   Ambulation/Elevation   Gait pattern   (Slow gait, wide NISHANT)   Gait Assistance 5  Supervision   Assistive Device   (Holds cane in hand but does not use it)   Distance 200'   Stair Management Assistance 5  Supervision   Stair Management Technique One rail L  (Nonreciprocal negotiating down, Reciprocal negotaiting up)   Number of Stairs 7   Ambulation/Elevation Additional Comments Pt ambulates in hallway with steady gait, c/o feeling of pressure R foot with weight bearing  Balance   Static Sitting Normal   Dynamic Sitting Good   Static Standing Fair +   Dynamic Standing Fair   Ambulatory Fair   Activity Tolerance   Activity Tolerance Patient tolerated treatment well   Medical Staff Made Aware OT Jessica Richmond DPT   Nurse Made Aware RN cleared pt for therapy   Assessment   Prognosis Good   Problem List Decreased endurance   Assessment Pt is a 80 y o  male seen for PT evaluation s/p admit to [facility] on 4/24/2023  Pt was admitted with a primary dx of: Lower extremity cellulitis,CKD, h/o LE DVT, Afib  PT now consulted for assessment of mobility and d/c needs  Pt with Up and OOB as tolerated  orders  Pts current comorbidities affecting treatment include: Sacroilitis, Acute back pain, DDD, Hip pain  Pts current clinical presentation is Evolving/ Medium complexity due to Ongoing medical management for primary dx, Decreased activity tolerance compared to baseline  Prior to admission, pt was Ind for mobility and ADLs, use cane prn , lives with supportive spouse  Upon evaluation, pt currently is requiring Supervision for transfers; S for ambulation 200 ft w/ holding SPC but does not use it ; and S for navigating 7 steps up/down with 1 HR  Pt with good mobility at the time of PT eval, encourage continued mobility with staff /restorative tech , no further Skilled PT services indicated  At conclusion of PT session all needs in reach, RN notified of session findings/recommendations and pt returned back in recliner chair with phone and call bell within reach  Pt denies any further questions at this time  The patient's AM-PAC Basic Mobility Inpatient Short Form Raw Score is  19   A Raw score of greater than 16 suggests the patient may benefit from discharge to home  Please also refer to the recommendation of the Physical Therapist for safe discharge planning  Recommend Home,no PT services upon hospital D/C  Goals   Patient Goals to go home   Plan   Treatment/Interventions Spoke to nursing;OT   PT Frequency Other (Comment)  (PT Eval Only)   Recommendation   PT Discharge Recommendation No rehabilitation needs   Equipment Recommended Cane   AM-PAC Basic Mobility Inpatient   Turning in Flat Bed Without Bedrails 4   Lying on Back to Sitting on Edge of Flat Bed Without Bedrails 3   Moving Bed to Chair 3   Standing Up From Chair Using Arms 3   Walk in Room 3   Climb 3-5 Stairs With Railing 3   Basic Mobility Inpatient Raw Score 19   Basic Mobility Standardized Score 42 48   Highest Level Of Mobility   JH-HLM Goal 6: Walk 10 steps or more   JH-HLM Achieved 7: Walk 25 feet or more   Modified Carson Scale   Modified Carson Scale 2   End of Consult   Patient Position at End of Consult All needs within reach; Bedside chair         [place], PT DPT

## 2023-04-26 NOTE — PLAN OF CARE
Problem: MOBILITY - ADULT  Goal: Maintain or return to baseline ADL function  Description: INTERVENTIONS:  -  Assess patient's ability to carry out ADLs; assess patient's baseline for ADL function and identify physical deficits which impact ability to perform ADLs (bathing, care of mouth/teeth, toileting, grooming, dressing, etc )  - Assess/evaluate cause of self-care deficits   - Assess range of motion  - Assess patient's mobility; develop plan if impaired  - Assess patient's need for assistive devices and provide as appropriate  - Encourage maximum independence but intervene and supervise when necessary  - Involve family in performance of ADLs  - Assess for home care needs following discharge   - Consider OT consult to assist with ADL evaluation and planning for discharge  - Provide patient education as appropriate  Outcome: Progressing     Problem: MOBILITY - ADULT  Goal: Maintains/Returns to pre admission functional level  Description: INTERVENTIONS:  - Perform BMAT or MOVE assessment daily    - Set and communicate daily mobility goal to care team and patient/family/caregiver     - Collaborate with rehabilitation services on mobility goals if consulted  - Stand patient 3 times a day  - Ambulate patient 3 times a day  - Out of bed to chair 2  times a day   - Out of bed for toileting  - Record patient progress and toleration of activity level   Outcome: Progressing

## 2023-04-26 NOTE — PROGRESS NOTES
"Podiatry - Progress Note  Patient: Debora Araujo 80 y o  male   MRN: [id number]  PCP: Meryle Robinson, DO  [facility] Encounter: [id number]  Date Of Visit: 23    ASSESSMENT:    Debora Araujo is a 80 y o  male with:    1  Bilateral venous stasis ulcerations  2  Bilateral lower extremity edema  3  Erythema and warmth to the right foot without ulceration  4  Possible acute gout  5  Pain at the left right first metatarsophalangeal joint  6  CKD 3A  7  Chronic DVT right lower extremity    PLAN:    · Plan for continued local wound care to bilateral lower extremity VSU consisting of Adaptic/Maxorb DSD  Wounds appear stable at this time  · Recommend continued treatment for acute gout consisting of Colcrys, per primary team    · No acute leukocytosis present, will continue to trend labs/vitals  · Continue local wound care, appreciate nursing assistance with dressing changes  · Elevation and offloading on green foam wedges or pillows when non-ambulatory  · Rest of care per primary team      Weightbearing status: Weightbearing as tolerated    SUBJECTIVE:     The patient was seen, evaluated, and assessed at bedside today  The patient was awake, alert, and in no acute distress  No acute events overnight  The patient reports that he is feeling well today  Patient denies N/V/F/chills/SOB/CP  OBJECTIVE:     Vitals:   /73   Pulse 77   Temp (!) 97 3 °F (36 3 °C)   Resp 17   Ht 6' 2\" (1 88 m)   Wt 121 kg (265 lb 14 oz)   SpO2 98%   BMI 34 14 kg/m²     Temp (24hrs), Av 7 °F (36 5 °C), Min:97 3 °F (36 3 °C), Max:98 °F (36 7 °C)      Physical Exam:     Lungs: Non labored breathing  Abdomen: Soft, non-tender  Lower Extremity:  Cardiovascular status at baseline from admission  Neurological status at baseline from admission  Musculoskeletal status at baseline from admission  No calf tenderness noted       Wound #1 located on the right anterior tibia measuring approximately 10 cm x 12 cm x " "0 2 cm with good skin bridge in between  There is surrounding vesicles with clear draining fluid  Negative for signs of infection negative probe to bone mostly granular wound base      Wound #2 located on the anterior left tibia measuring 7 cm x 10 5 cm x 0 2 cm with many vesicles and some healing eschars  Negative for signs of infection surrounding erythema present consistent with venous stasis skin changes  Draining clear serous drainage      Wound #3 located in the posterior right tibia measuring approximately 1 5 x 1 5 cm with fibrotic base negative probe to bone negative for signs of infection with clear drainage  Clinical Images 04/26/23: Additional Data:     Labs:    Results from last 7 days   Lab Units 04/26/23  0610   WBC Thousand/uL 9 82   HEMOGLOBIN g/dL 15 3   HEMATOCRIT % 46 1   PLATELETS Thousands/uL 184   NEUTROS PCT % 66   LYMPHS PCT % 16   MONOS PCT % 14*   EOS PCT % 2     Results from last 7 days   Lab Units 04/26/23  0610 04/25/23  0456   POTASSIUM mmol/L 4 0 4 0   CHLORIDE mmol/L 107 106   CO2 mmol/L 29 27   BUN mg/dL 23 22   CREATININE mg/dL 1 12 1 11   CALCIUM mg/dL 8 7 8 7   ALK PHOS U/L  --  75   ALT U/L  --  27   AST U/L  --  27     Results from last 7 days   Lab Units 04/26/23  0610   INR  2 05*       * I Have Reviewed All Lab Data Listed Above  Recent Cultures (last 7 days):               Imaging: I have personally reviewed pertinent films in PACS  EKG, Pathology, and Other Studies: I have personally reviewed pertinent reports  ** Please Note: Portions of the record may have been created with voice recognition software  Occasional wrong word or \"sound a like\" substitutions may have occurred due to the inherent limitations of voice recognition software  Read the chart carefully and recognize, using context, where substitutions have occurred   **      "

## 2023-04-26 NOTE — ASSESSMENT & PLAN NOTE
"Patient has chronic venous insufficiency for past 1 to 2 years  He noticed its gotten worse in the past 2 months  For about 1 to 2 weeks he noticed his legs were weeping and had \"bubbles\" on his skin  At home patient prescribed Lasix 20 mg as needed p o  however patient has not been taking it often due to frequent urination  At home he uses compression stockings but has not been able to since he has had worsening of lower extremity rash     IV antibiotics as above  [facility] consulted for wound care, appreciate recommendations  "

## 2023-04-26 NOTE — ASSESSMENT & PLAN NOTE
Patient has hx of right lower extremity DVT in 2014 at which time he was seen by hematology & placed on Xarelto for 6 months  Patient had coagulability testing in the past; patient has MTHFR mutation     Lower extremity Dopplers this admission shows no signs of DVT bilaterally   Currently patient is on warfarin for persistent A-fib   Continue folate supplementation in setting of MTHFR mut   INR therapeutic

## 2023-04-26 NOTE — CASE MANAGEMENT
Case Management Assessment & Discharge Planning Note    Patient name Vicki Mak  Location [facility]16/[facility] 988-97 MRN [id number]  : 1940 Date 2023       Current Admission Date: 2023  Current Admission Diagnosis:Lower extremity cellulitis   Patient Active Problem List    Diagnosis Date Noted    Lower extremity cellulitis 2023    Stage 3a chronic [facility] disease (CKD) [facility] ) 2023    Hyperbilirubinemia 2023    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 ([facility] ) 2022    Chronic venous insufficiency of lower extremity 2022    Morbid obesity ([facility] ) 10/13/2021    Localized edema 09/10/2020    Persistent atrial fibrillation ([facility] ) 2020    HTN (hypertension) 2018    Obesity 2018    ESTER (obstructive sleep apnea) 2018    Lumbar stenosis 2018    Abnormal glucose level 2018    Benign neoplasm of colon 2018    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[facility] ) 2018    Diverticular disease of colon 2018    Internal hemorrhoids 2018    Pure hypercholesterolemia 2018    Sacroiliitis ([facility] ) 2018    Bilateral hip pain 2017    Acute back pain 10/02/2017    Fever 2017    FUO (fever of unknown origin) 2017    Adenomatous colon polyp 2015    Spinal stenosis of lumbar region 09/10/2015    Encounter for long-term (current) use of other medications 2015    DDD (degenerative disc disease), lumbar 2015    Lumbar back pain 2015    Lumbar disc herniation 2015    Radiculopathy, lumbar region 2015    MTHFR mutation 2015    Elevated hemoglobin A1c 2014    Premature atrial complexes 2014    Aortic ectasia ([facility] ) 2014    Hyperlipidemia 2013    Obstructive sleep apnea 2013    Premature ventricular contraction     Lichen planus     Hypertension 04/04/2012    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    Vitamin D deficiency 04/04/2012      LOS (days): 2  Geometric Mean LOS (GMLOS) (days): 3 20  Days to GMLOS:1 2     OBJECTIVE:    Risk of Unplanned Readmission Score: 10 2         Current admission status: Inpatient       Preferred Pharmacy:   [facility] [address] [address]  Phone: [phone] Fax: [phone]    Primary Care Provider: Rodo Carey DO    Primary Insurance: MEDICARE  Secondary Insurance: AARP    ASSESSMENT:  [employer]     [address] [address], [address] [place] [address] Chiquita Vanegasd   Primary Phone: [phone] (Home)               Advance Directives  Does patient have Advance Directives?: Yes  Advance Directives: Living will, Power of  for health care, Power of  for finance  Primary Contact: wife, Salomón Cotter [phone]         Readmission Root Cause  30 Day Readmission: No    Patient Information  Admitted from[de-identified] Home  Mental Status: Alert  During Assessment patient was accompanied by: Not accompanied during assessment  Assessment information provided by[de-identified] Patient  Primary Caregiver: Self  Support Systems: Spouse/significant other, Self  What city do you live in?: [address] [place] [address] entry access options   Select all that apply : No steps to enter home  Type of Current Residence: Other (Comment) (split level6 steps  to main floor then 8 steps to bedroom)  In the last 12 months, was there a time when you were not able to pay the mortgage or rent on time?: No  In the last 12 months, how many places have you lived?: 1  In the last 12 months, was there a time when you did not have a steady place to sleep or slept in a shelter (including now)?: No  Homeless/housing insecurity resource given?: N/A  Living Arrangements: Lives w/ Spouse/significant other  Is patient a ?: Yes  Is patient active with [facility] [address])?: No    Activities of Daily Living Prior to Admission  Functional Status: Independent  Completes ADLs independently?: Yes  Ambulates independently?: Yes  Does patient use assisted devices?: Yes  Assisted Devices (DME) used: Straight Cane, CPAP  Does patient have a history of Outpatient Therapy (PT/OT)?: Yes ([facility] [address])  Does the patient have a history of Short-Term Rehab?: No  Does patient have a history of HHC?: No  Does patient currently have [address]?: No         Patient Information Continued  Income Source: Pension/penitentiary  Does patient have prescription coverage?: Yes  Within the past 12 months, you worried that your food would run out before you got the money to buy more : Never true  Within the past 12 months, the food you bought just didn't last and you didn't have money to get more : Never true  Food insecurity resource given?: N/A  Does patient receive dialysis treatments?: No  Does patient have a history of substance abuse?: No  Does patient have a history of Mental Health Diagnosis?: No         Means of Transportation  Means of Transport to Appts[de-identified] Drives Self  In the past 12 months, has lack of transportation kept you from medical appointments or from getting medications?: No  In the past 12 months, has lack of transportation kept you from meetings, work, or from getting things needed for daily living?: No  Was application for public transport provided?: N/A        DISCHARGE DETAILS:    Discharge planning discussed with[de-identified] patient at bedside  Freedom of Choice: Yes  Comments - Freedom of Choice: Discussed VN f/u for wound care teaching  Pt is agreeable to same and blanket referrals with preference for [facility]   Referrals sent via Aidin  CM contacted family/caregiver?: No- see comments (declined)             Contacts  Patient Contacts: wife, Cheyanne Heaton [phone]  Relationship to Patient[de-identified] Family  Contact Method: Phone  Reason/Outcome: Continuity of Care, Emergency [address]         Is the patient interested in [address] at discharge?: Yes  [address] requested[de-identified] [address] Name[de-identified] [facility] Provider[de-identified] PCP  Home Health Services Needed[de-identified] Wound/Ostomy Care  Homebound Criteria Met[de-identified] Requires the Assistance of Another Person for Safe Ambulation or to Leave the Home, Uses an Assist Device (i e  cane, walker, etc)  Supporting Clincal Findings[de-identified] Limited Endurance         Other Referral/Resources/Interventions Provided:  Interventions: [facility]    Would you like to participate in our [facility] service program?  : No - Declined       Additional Comments: met with pt to introduce CM role and obtain baseline assessment information and discuss DCP  Pt lives with wife in split level home  IPTA  retired/drives   Uses cane as needed       **addendum 1457  Pt has been accepted at [facility] for f/u VN for wound care

## 2023-04-26 NOTE — PROGRESS NOTES
Janelle Amaya is a 80 y o  male who is currently ordered Vancomycin IV with management by the Pharmacy Consult service  Relevant clinical data and objective / subjective history reviewed  Vancomycin Assessment:  Indication and Goal AUC/Trough: Soft tissue (goal -600, trough >10)  Clinical Status: stable  Micro:     Renal Function:  SCr: 1 12mg/dL  CrCl: 69 6 mL/min  Renal replacement: Not on dialysis  Days of Therapy: 3  Current Dose: 1500 mg IV q24h  Vancomycin Plan:  New Dosing: continue 1500 mg IV q24h  Estimated AUC: 432mcg*hr/mL  Estimated Trough: 13 mcg/mL  Next Level: 4/27/23 am labs  Renal Function Monitoring: Daily BMP and [place] will continue to follow closely for s/sx of nephrotoxicity, infusion reactions and appropriateness of therapy  BMP and CBC will be ordered per protocol  We will continue to follow the patients culture results and clinical progress daily      Dedele Floridalma, Pharmacist

## 2023-04-26 NOTE — OCCUPATIONAL THERAPY NOTE
Occupational Therapy Evaluation     Patient Name: Lucia Phipps  ITOMQ'A Date: 4/26/2023  Problem List  Principal Problem:    Lower extremity cellulitis  Active Problems:    Hypertension    Persistent atrial fibrillation ([facility] [address] )    Morbid obesity (HCC)    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 (HCC)    Chronic venous insufficiency of lower extremity    ESTER (obstructive sleep apnea)    Stage 3a chronic kidney disease (CKD) ([facility] [address] )    Hyperbilirubinemia    Past Medical History  Past Medical History:   Diagnosis Date    Blood clot in vein 2015    in right foot    Hypertension      Past Surgical History  Past Surgical History:   Procedure Laterality Date    BACK SURGERY  2018    COLONOSCOPY      COLONOSCOPY N/A 2/24/2016    Procedure: COLONOSCOPY;  Surgeon: Yee Cabrera MD;  Location:  MAIN OR;  Service:     HERNIA REPAIR             04/26/23 0836   OT Last Visit   OT Visit Date 04/26/23   Note Type   Note type Evaluation   Additional Comments Pt seen w/ PT to increase safety, decrease fall risk, and maximize functional/occupational performance 2* medical complexity which is a regression from pt's functional baseline  Pain Assessment   Pain Assessment Tool 0-10   Pain Score No Pain   Hospital Pain Intervention(s) Repositioned; Ambulation/increased activity; Emotional support   Restrictions/Precautions   Weight Bearing Precautions Per Order Yes   RLE Weight Bearing Per Order WBAT   LLE Weight Bearing Per Order WBAT   Other Precautions Fall Risk;Pain   Home Living   Type of Home House  (Split level home with 0 YE)   Home Layout Two level;Bed/bath upstairs; Access   Bathroom Shower/Tub Tub/shower unit   Bathroom Toilet Standard   Bathroom Equipment Shower chair   Bathroom Accessibility Accessible   Home Equipment Cane   Additional Comments Pt reports living in a split level home with his wife with 0 YE and 6 + 8 steps inside; uses cane prn for functional mobility   Prior "Function   Level of [place] Independent with ADLs; Independent with functional mobility; Independent with IADLS   Lives With Spouse   Receives Help From Family   IADLs Independent with driving; Independent with meal prep; Independent with medication management   Falls in the last 6 months 0   Vocational Retired   Comments (+) driving; pt reporting that him and his wife share IADL tasks   Lifestyle   Autonomy PTA, pt was independent in ADLs/IADLs and functional mobility w/ cane prn; (+) driving   Reciprocal Relationships Lives with his supportive wife who can assist with functional needs prn   Service to Others Retired; reports previously working as  at [employer] Enjoys gardening and making his own [employer] sauce   Subjective   Subjective \"Sometimes it's painful, sometime's it's not, but I can't do all the things I used to do  \"   ADL   Where Assessed Chair   Eating Assistance 7  Independent   Grooming Assistance 7  Independent   UB Bathing Assistance 7  Independent   LB Bathing Assistance 5  Supervision/Setup   UB Dressing Assistance 7  Independent   LB Dressing Assistance 5  Supervision/Setup   Toileting Assistance  5  Supervision/Setup   Functional Assistance 5  Supervision/Setup   Bed Mobility   Supine to Sit Unable to assess   Sit to Supine Unable to assess   Additional Comments Upon arrival, pt found sitting upright in recliner; @ end of session, pt left sitting upright in recliner with all functional needs in reach   Transfers   Sit to Stand 5  Supervision   Stand to Sit 5  Supervision   Additional Comments w/ cane prn   Functional Mobility   Functional Mobility 5  Supervision   Additional Comments Pt completed long household functional mobility distances @ supervision level w/ cane prn; no LOB noted   Additional items SPC   Balance   Static Sitting Normal   Dynamic Sitting Good   Static Standing Fair +   Dynamic Standing Fair   Ambulatory Fair   Activity Tolerance " Activity Tolerance Patient tolerated treatment well   Medical Staff Made Aware MISSY [place]; Rockland Prader DPT   Nurse Made Aware RN cleared   RUE Assessment   RUE Assessment WFL   LUE Assessment   LUE Assessment WFL   Hand Function   Gross Motor Coordination Functional   Fine Motor Coordination Functional   Psychosocial   Psychosocial (WDL) WDL   Cognition   Overall Cognitive Status WFL   Arousal/Participation Alert; Responsive;Arousable; Cooperative   Attention Within functional limits   Orientation Level Oriented X4   Memory Within functional limits   Following Commands Follows all commands and directions without difficulty   Comments Pt pleasant and cooperative   Assessment   Prognosis Fair   Assessment Pt is a pleasant and cooperative 81 yo male who presented to [facility] with LE edema and b/l pain with worsening rash in b/l LE  Pt dx w/ LE cellulitis  Pt  has a past medical history of Blood clot in vein (2015) and Hypertension  Pt with active OT orders and OT consulted to assess pt's functional status and occupational performance to determine safe d/c needs  Pt seen with PT to increase safety, decrease fall risk, and maximize functional/occupational performance 2* medical complexity which is a regression from pt's functional baseline  Pt lives with his wife in a split level home with 0 YE and 6 + 8 steps inside  PTA, pt was independent in ADLs/IADLs and functional mobility w/ cane prn  (+) driving  Discussed role and scope of OT in which pt was receptive  Currently, pt performing functional transfers/mobility @ supervision level w/ cane prn, UB ADLs @ an independent level, and LB ADLs w/ supervision  From an OT standpoint, recommend discharge to home with increased support once medically stable  Pt was receptive regarding education on returning home safely with energy conservation techniques and demonstrated good carryover during occupational/functional performance   At this time, pt demonstrates good insight/safety awareness and does not express any concerns regarding performing ADL/IADL/functional mobility tasks  No further skilled acute care OT services are needed at this time  The patient's raw score on the AM-PAC Daily Activity Inpatient Short Form is 21  A raw score of greater than or equal to 19 suggests the patient may benefit from discharge to home  Please refer to the recommendation of the Occupational Therapist for safe discharge planning  Recommend continued engagement in ADL/functional mobility tasks with nursing and restorative therapy staff as appropriate to promote the highest level of independence prior to discharge  OT is discharging pt from caseload at this time, please reconsult if needed  Goals   Patient Goals To go home   Plan   OT Frequency Eval only   Recommendation   OT Discharge Recommendation No rehabilitation needs   AM-PAC Daily Activity Inpatient   Lower Body Dressing 3   Bathing 3   Toileting 3   Upper Body Dressing 4   Grooming 4   Eating 4   Daily Activity Raw Score 21   Daily Activity Standardized Score (Calc for Raw Score >=11) 44 27   AM-PAC Applied Cognition Inpatient   Following a Speech/Presentation 4   Understanding Ordinary Conversation 4   Taking Medications 4   Remembering Where Things Are Placed or Put Away 4   Remembering List of 4-5 Errands 4   Taking Care of Complicated Tasks 4   Applied Cognition Raw Score 24   Applied Cognition Standardized Score 62 21   End of Consult   Education Provided Yes   Patient Position at End of Consult Bedside chair; All needs within reach   Nurse Communication Nurse aware of consult     Stephanie Briceno MS, OTR/L

## 2023-04-26 NOTE — PROGRESS NOTES
[facility]  Progress Note  Name: Emily Madera  MRN: [id number]  Unit/Bed#: PPHP 001-11 I Date of Admission: 4/24/2023   Date of Service: 4/26/2023 I Hospital Day: 2    Assessment/Plan   * Lower extremity cellulitis  Assessment & Plan  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 which was treated with Xarelto  Lower extremity Dopplers did not show any DVTs bilateral this admission  BNP elevated to almost 2000  No cultures or MRSA infection in chart  Differentials include bilateral lower extremity cellulitis versus erysipelas vs venous stasis versus scalded skin, however no systemic symptoms with normal leukocyte count and no fevers  Able to express green/yellow drainage concerning for abscess  · Continue IV Vancomycin  · Still redness, swelling, edema noted today  · Started Lasix 20 mg p o  daily on admission, continue now  · F/u echocardiogram to rule out cardiac fluid overload  · Will order one dose of IV lasix 20 mg due to leg edema  · Podiatry appreciated    Hyperbilirubinemia  Assessment & Plan  History of hyperbilirubinemia seen on labs  Total bilirubin elevated to 2 9 this admission from a baseline of 1 2-1 8    · No recent abdominal imaging  · No evidence of abdominal pain or hemolysis with normal hemoglobin    Stage 3a chronic kidney disease (CKD) [facility])  Assessment & Plan  Lab Results   Component Value Date    EGFR 60 04/26/2023    EGFR 61 04/25/2023    EGFR 53 04/24/2023    CREATININE 1 12 04/26/2023    CREATININE 1 11 04/25/2023    CREATININE 1 24 "04/24/2023   Baseline creatinine 1 2 since 2020  Currently at baseline   Avoid nephrotoxins   Continue monitoring BMP   Lasix 20 mg daily    I's and O's/daily weights    ESTER (obstructive sleep apnea)  Assessment & Plan  · CPAP HS    Chronic venous insufficiency of lower extremity  Assessment & Plan  Patient has chronic venous insufficiency for past 1 to 2 years  He noticed its gotten worse in the past 2 months  For about 1 to 2 weeks he noticed his legs were weeping and had \"bubbles\" on his skin  At home patient prescribed Lasix 20 mg as needed p o  however patient has not been taking it often due to frequent urination  At home he uses compression stockings but has not been able to since he has had worsening of lower extremity rash   IV antibiotics as above  [facility] consulted for wound care, appreciate recommendations    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 [facility])  Assessment & Plan  Patient has hx of right lower extremity DVT in 2014 at which time he was seen by hematology & placed on Xarelto for 6 months  Patient had coagulability testing in the past; patient has MTHFR mutation   Lower extremity Dopplers this admission shows no signs of DVT bilaterally   Currently patient is on warfarin for persistent A-fib   Continue folate supplementation in setting of MTHFR mut   INR therapeutic    Morbid obesity ([facility] [address] )  Assessment & Plan  Body mass index is 34 14 kg/m²  · Affects all aspects of care, encourage weight loss    Persistent atrial fibrillation (HCC)  Assessment & Plan  History of AF on beta blocker and warfarin  INR 2 2 on admission, at baseline  Currently RRR on exam    · Continue toprol xl 25 mg daily  · Warfarin 5 mg Mon, Tue, Wed, Fri; 7 5 mg on Thurs  · Follow INR-PT    Hypertension  Assessment & Plan  BP controlled  · Patient prescribed lasix 20 mg daily PRN, however does not take it often, due to frequent urination     · Continue on losartan 50 " mg daily and Toprol xl 25 mg daily  · Started lasix 20 mg PO daily while inpatient           VTE Pharmacologic Prophylaxis:   Pharmacologic: Warfarin (Coumadin)  Mechanical VTE Prophylaxis in Place: Yes    Patient Centered Rounds: I have performed bedside rounds with nursing staff today  Current Length of Stay: 2 day(s)    Current Patient Status: Inpatient   Certification Statement: The patient will continue to require additional inpatient hospital stay due to IV abx    Discharge Plan: 24 hours to home if cellulitis improves    Code Status: Level 1 - Full Code      Subjective:   Patient sitting up on bedside sofa  Examined the lower extremity celulitis  Multiple superficial wounds on both anterior shins  Redness bilaterally, pitting edema 2+ bilaterally  Objective:     Vitals:   Temp (24hrs), Av 7 °F (36 5 °C), Min:97 3 °F (36 3 °C), Max:98 °F (36 7 °C)    Temp:  [97 3 °F (36 3 °C)-98 °F (36 7 °C)] 97 3 °F (36 3 °C)  HR:  [77-89] 77  Resp:  [17] 17  BP: (129-136)/(65-75) 136/73  SpO2:  [98 %-99 %] 98 %  Body mass index is 34 14 kg/m²  Input and Output Summary (last 24 hours): Intake/Output Summary (Last 24 hours) at 2023 1223  Last data filed at 2023 2200  Gross per 24 hour   Intake 236 ml   Output 950 ml   Net -714 ml       Physical Exam:     Physical Exam  Vitals and nursing note reviewed  Constitutional:       General: He is not in acute distress  Appearance: Normal appearance  HENT:      Head: Normocephalic and atraumatic  Right Ear: External ear normal       Left Ear: External ear normal       Nose: Nose normal    Eyes:      Extraocular Movements: Extraocular movements intact  Pulmonary:      Effort: Pulmonary effort is normal    Musculoskeletal:         General: Swelling present  Cervical back: Normal range of motion  Right lower leg: Edema present  Left lower leg: Edema present  Skin:     Findings: Erythema, lesion and rash present        Comments: Bilateral lower extremity   Neurological:      Mental Status: He is alert  Mental status is at baseline     Psychiatric:         Mood and Affect: Mood normal           Additional Data:     Labs:    Results from last 7 days   Lab Units 04/26/23  0610   WBC Thousand/uL 9 82   HEMOGLOBIN g/dL 15 3   HEMATOCRIT % 46 1   PLATELETS Thousands/uL 184   NEUTROS PCT % 66   LYMPHS PCT % 16   MONOS PCT % 14*   EOS PCT % 2     Results from last 7 days   Lab Units 04/26/23  0610 04/25/23  0456   SODIUM mmol/L 139 138   POTASSIUM mmol/L 4 0 4 0   CHLORIDE mmol/L 107 106   CO2 mmol/L 29 27   BUN mg/dL 23 22   CREATININE mg/dL 1 12 1 11   ANION GAP mmol/L 3* 5   CALCIUM mg/dL 8 7 8 7   ALBUMIN g/dL  --  2 7*   TOTAL BILIRUBIN mg/dL  --  2 43*   ALK PHOS U/L  --  75   ALT U/L  --  27   AST U/L  --  27   GLUCOSE RANDOM mg/dL 106 115     Results from last 7 days   Lab Units 04/26/23  0610   INR  2 05*             Results from last 7 days   Lab Units 04/24/23  1046   PROCALCITONIN ng/ml 0 09          Recent Cultures (last 7 days):           Last 24 Hours Medication List:   Current Facility-Administered Medications   Medication Dose Route Frequency Provider Last Rate    folic acid  2 mg Oral Daily More Mcmullen MD      furosemide  20 mg Intravenous Once Shanna Palumbo MD      furosemide  20 mg Oral Daily Verna Sanders MD      losartan  50 mg Oral Daily Verna Sanders MD      metoprolol succinate  25 mg Oral Daily Verna Sanders MD      multivitamin-minerals  1 tablet Oral Daily Verna Sanders MD      pyridoxine  50 mg Oral Daily Verna Sanders MD      vancomycin  1,500 mg Intravenous Q24H Verna Sanders MD      warfarin  5 mg Oral Once per day on Mon Tue Wed Fri MD Murphy Jones ON 4/27/2023] warfarin  7 5 mg Oral Once per day on Thu Verna Sanders MD          Today, Patient Was Seen By: Shanna Palumbo MD    ** Please Note: Dictation voice to text software may have been used in the creation of this document   **

## 2023-04-27 ENCOUNTER — APPOINTMENT (INPATIENT)
Dept: RADIOLOGY | Facility: HOSPITAL | Age: 83
End: 2023-04-27
Attending: FAMILY MEDICINE

## 2023-04-27 LAB
ANION GAP SERPL CALCULATED.3IONS-SCNC: 6 MMOL/L (ref 4–13)
BUN SERPL-MCNC: 22 MG/DL (ref 5–25)
CALCIUM SERPL-MCNC: 9.1 MG/DL (ref 8.3–10.1)
CHLORIDE SERPL-SCNC: 105 MMOL/L (ref 96–108)
CO2 SERPL-SCNC: 26 MMOL/L (ref 21–32)
CREAT SERPL-MCNC: 1.12 MG/DL (ref 0.6–1.3)
ERYTHROCYTE [DISTWIDTH] IN BLOOD BY AUTOMATED COUNT: 12.3 % (ref 11.6–15.1)
GFR SERPL CREATININE-BSD FRML MDRD: 60 ML/MIN/1.73SQ M
GLUCOSE SERPL-MCNC: 110 MG/DL (ref 65–140)
HCT VFR BLD AUTO: 47.8 % (ref 36.5–49.3)
HGB BLD-MCNC: 16.5 G/DL (ref 12–17)
INR PPP: 1.84 (ref 0.84–1.19)
MCH RBC QN AUTO: 33 PG (ref 26.8–34.3)
MCHC RBC AUTO-ENTMCNC: 34.5 G/DL (ref 31.4–37.4)
MCV RBC AUTO: 96 FL (ref 82–98)
PLATELET # BLD AUTO: 189 THOUSANDS/UL (ref 149–390)
PMV BLD AUTO: 10.6 FL (ref 8.9–12.7)
POTASSIUM SERPL-SCNC: 3.7 MMOL/L (ref 3.5–5.3)
PROTHROMBIN TIME: 21.5 SECONDS (ref 11.6–14.5)
RBC # BLD AUTO: 5 MILLION/UL (ref 3.88–5.62)
SODIUM SERPL-SCNC: 137 MMOL/L (ref 135–147)
VANCOMYCIN SERPL-MCNC: 11.6 UG/ML (ref 10–20)
WBC # BLD AUTO: 8.87 THOUSAND/UL (ref 4.31–10.16)

## 2023-04-27 RX ORDER — COLCHICINE 0.6 MG/1
0.6 TABLET ORAL DAILY
Status: DISCONTINUED | OUTPATIENT
Start: 2023-04-28 | End: 2023-05-03 | Stop reason: HOSPADM

## 2023-04-27 RX ORDER — COLCHICINE 0.6 MG/1
1.2 TABLET ORAL ONCE
Status: COMPLETED | OUTPATIENT
Start: 2023-04-27 | End: 2023-04-27

## 2023-04-27 RX ORDER — FUROSEMIDE 10 MG/ML
40 INJECTION INTRAMUSCULAR; INTRAVENOUS
Status: DISCONTINUED | OUTPATIENT
Start: 2023-04-27 | End: 2023-04-30

## 2023-04-27 RX ORDER — CEFAZOLIN SODIUM 2 G/50ML
2000 SOLUTION INTRAVENOUS EVERY 8 HOURS
Status: DISCONTINUED | OUTPATIENT
Start: 2023-04-27 | End: 2023-05-03 | Stop reason: HOSPADM

## 2023-04-27 RX ADMIN — WARFARIN SODIUM 7.5 MG: 7.5 TABLET ORAL at 17:41

## 2023-04-27 RX ADMIN — COLCHICINE 1.2 MG: 0.6 TABLET ORAL at 17:41

## 2023-04-27 RX ADMIN — FUROSEMIDE 20 MG: 20 TABLET ORAL at 08:50

## 2023-04-27 RX ADMIN — FUROSEMIDE 40 MG: 10 INJECTION, SOLUTION INTRAMUSCULAR; INTRAVENOUS at 13:59

## 2023-04-27 RX ADMIN — LIDOCAINE 5% 1 PATCH: 700 PATCH TOPICAL at 08:50

## 2023-04-27 RX ADMIN — FOLIC ACID 2 MG: 1 TABLET ORAL at 08:49

## 2023-04-27 RX ADMIN — CEFAZOLIN SODIUM 2000 MG: 2 SOLUTION INTRAVENOUS at 13:59

## 2023-04-27 RX ADMIN — Medication 1 TABLET: at 08:49

## 2023-04-27 RX ADMIN — METOPROLOL SUCCINATE 25 MG: 25 TABLET, EXTENDED RELEASE ORAL at 08:50

## 2023-04-27 RX ADMIN — LOSARTAN POTASSIUM 50 MG: 50 TABLET, FILM COATED ORAL at 08:49

## 2023-04-27 RX ADMIN — CEFAZOLIN SODIUM 2000 MG: 2 SOLUTION INTRAVENOUS at 21:39

## 2023-04-27 RX ADMIN — Medication 50 MG: at 08:52

## 2023-04-27 RX ADMIN — VANCOMYCIN HYDROCHLORIDE 1500 MG: 10 INJECTION, POWDER, LYOPHILIZED, FOR SOLUTION INTRAVENOUS at 08:54

## 2023-04-27 NOTE — ASSESSMENT & PLAN NOTE
Patient has hx of right lower extremity DVT in 2014 at which time he was seen by hematology & placed on Xarelto for 6 months  Patient had coagulability testing in the past; patient has MTHFR mutation     Lower extremity Dopplers this admission shows no signs of DVT bilaterally   Currently patient is on warfarin for persistent A-fib   Continue folate supplementation in setting of MTHFR mut   INR subtherapeutic, patient will receive increased dose of 7 5 today

## 2023-04-27 NOTE — PROGRESS NOTES
"Podiatry - Progress Note  Patient: Kehinde Shearer 80 y o  male   MRN: [id number]  PCP: Rodo Carey DO  Unit/Bed#: PPHP 816-01 Encounter: [id number]  Date Of Visit: 23    ASSESSMENT:    Kehinde Shearer is a 80 y o  male with:    1  Bilateral venous stasis ulcerations  2  Bilateral lower extremity edema  3  Erythema and warmth to the right foot without ulceration  4  Possible acute gout  5  Pain at the left right first metatarsophalangeal joint  6  CKD 3A  7  Chronic DVT right lower extremity    PLAN:    · ***  · Continue local wound care, ***appreciate nursing assistance with dressing changes  · Elevation and offloading on green foam wedges or pillows when non-ambulatory  · Rest of care per primary team      Weightbearing status: {rlweightbearin}    SUBJECTIVE:     The patient was seen, evaluated, and assessed at bedside today  The patient was awake, alert, and in no acute distress  No acute events overnight  The patient reports ***  Patient denies N/V/F/chills/SOB/CP  OBJECTIVE:     Vitals:   /85   Pulse 76   Temp 97 8 °F (36 6 °C)   Resp 18   Ht 6' 2\" (1 88 m)   Wt 118 kg (261 lb 3 9 oz)   SpO2 99%   BMI 33 54 kg/m²     Temp (24hrs), Av 9 °F (36 6 °C), Min:97 8 °F (36 6 °C), Max:97 9 °F (36 6 °C)      Physical Exam:     Lungs: Non labored breathing  Abdomen: Soft, non-tender  Lower Extremity:  Cardiovascular status at baseline from admission  Neurological status at baseline from admission  Musculoskeletal status at baseline from admission  No calf tenderness noted       Wound #: ***  Location: ***  Length ***cm: Width ***cm: Depth ***cm:   Deepest Tissue Noted in Base: ***  Probe to Bone: {YES/NO:28638}  Peripheral Skin Description: {wound edge:66783}  Granulation: ***% Fibrotic Tissue: ***% Necrotic Tissue: ***%   Drainage Amount: {drainage:10006}  Signs of Infection: {YES/NO:76341}    Clinical Images 23:  ***    Additional Data:     Labs:    Results from last  " "days   Lab Units 04/27/23  0516 04/26/23  0610   WBC Thousand/uL 8 87 9 82   HEMOGLOBIN g/dL 16 5 15 3   HEMATOCRIT % 47 8 46 1   PLATELETS Thousands/uL 189 184   NEUTROS PCT %  --  66   LYMPHS PCT %  --  16   MONOS PCT %  --  14*   EOS PCT %  --  2     Results from last 7 days   Lab Units 04/27/23  0516 04/26/23  0610 04/25/23  0456   POTASSIUM mmol/L 3 7   < > 4 0   CHLORIDE mmol/L 105   < > 106   CO2 mmol/L 26   < > 27   BUN mg/dL 22   < > 22   CREATININE mg/dL 1 12   < > 1 11   CALCIUM mg/dL 9 1   < > 8 7   ALK PHOS U/L  --   --  75   ALT U/L  --   --  27   AST U/L  --   --  27    < > = values in this interval not displayed  Results from last 7 days   Lab Units 04/26/23  0610   INR  2 05*       * I Have Reviewed All Lab Data Listed Above  Recent Cultures (last 7 days):               Imaging: I have personally reviewed pertinent films in PACS  EKG, Pathology, and Other Studies: I have personally reviewed pertinent reports  ** Please Note: Portions of the record may have been created with voice recognition software  Occasional wrong word or \"sound a like\" substitutions may have occurred due to the inherent limitations of voice recognition software  Read the chart carefully and recognize, using context, where substitutions have occurred   **      "

## 2023-04-27 NOTE — ASSESSMENT & PLAN NOTE
Patient has chronic venous insufficiency for past 1 to 2 years with chronic pedal edema  As per admission note, patient has 2+ pitting edema    On my exam today patient has 4+ pitting edema, extending to the thighs  We will hold oral Lasix, start IV Lasix 40 mg twice daily  Monitor intake and output, daily weight  Obtain chest x-ray

## 2023-04-27 NOTE — PLAN OF CARE
Problem: MOBILITY - ADULT  Goal: Maintain or return to baseline ADL function  Description: INTERVENTIONS:  -  Assess patient's ability to carry out ADLs; assess patient's baseline for ADL function and identify physical deficits which impact ability to perform ADLs (bathing, care of mouth/teeth, toileting, grooming, dressing, etc )  - Assess/evaluate cause of self-care deficits   - Assess range of motion  - Assess patient's mobility; develop plan if impaired  - Assess patient's need for assistive devices and provide as appropriate  - Encourage maximum independence but intervene and supervise when necessary  - Involve family in performance of ADLs  - Assess for home care needs following discharge   - Consider OT consult to assist with ADL evaluation and planning for discharge  - Provide patient education as appropriate  Outcome: Progressing  Goal: Maintains/Returns to pre admission functional level  Description: INTERVENTIONS:  - Perform BMAT or MOVE assessment daily    - Set and communicate daily mobility goal to care team and patient/family/caregiver     - Collaborate with rehabilitation services on mobility goals if consulted  - Stand patient 3 times a day  - Ambulate patient 3 times a day  - Out of bed to chair 2  times a day   - Out of bed for toileting  - Record patient progress and toleration of activity level   Outcome: Progressing     Problem: PAIN - ADULT  Goal: Verbalizes/displays adequate comfort level or baseline comfort level  Description: Interventions:  - Encourage patient to monitor pain and request assistance  - Assess pain using appropriate pain scale  - Administer analgesics based on type and severity of pain and evaluate response  - Implement non-pharmacological measures as appropriate and evaluate response  - Consider cultural and social influences on pain and pain management  - Notify physician/advanced practitioner if interventions unsuccessful or patient reports new pain  Outcome: Progressing     Problem: INFECTION - ADULT  Goal: Absence or prevention of progression during hospitalization  Description: INTERVENTIONS:  - Assess and monitor for signs and symptoms of infection  - Monitor lab/diagnostic results  - Monitor all insertion sites, i e  indwelling lines, tubes, and drains  - Monitor endotracheal if appropriate and nasal secretions for changes in amount and color  - [place] appropriate cooling/warming therapies per order  - Administer medications as ordered  - Instruct and encourage patient and family to use good hand hygiene technique  - Identify and instruct in appropriate isolation precautions for identified infection/condition  Outcome: Progressing  Goal: Absence of fever/infection during neutropenic period  Description: INTERVENTIONS:  - Monitor WBC    Outcome: Progressing     Problem: SAFETY ADULT  Goal: Maintain or return to baseline ADL function  Description: INTERVENTIONS:  -  Assess patient's ability to carry out ADLs; assess patient's baseline for ADL function and identify physical deficits which impact ability to perform ADLs (bathing, care of mouth/teeth, toileting, grooming, dressing, etc )  - Assess/evaluate cause of self-care deficits   - Assess range of motion  - Assess patient's mobility; develop plan if impaired  - Assess patient's need for assistive devices and provide as appropriate  - Encourage maximum independence but intervene and supervise when necessary  - Involve family in performance of ADLs  - Assess for home care needs following discharge   - Consider OT consult to assist with ADL evaluation and planning for discharge  - Provide patient education as appropriate  Outcome: Progressing  Goal: Maintains/Returns to pre admission functional level  Description: INTERVENTIONS:  - Perform BMAT or MOVE assessment daily    - Set and communicate daily mobility goal to care team and patient/family/caregiver     - Collaborate with rehabilitation services on mobility goals if consulted  - Stand patient 3 times a day  - Ambulate patient 3 times a day  - Out of bed to chair 2  times a day   - Out of bed for toileting  - Record patient progress and toleration of activity level   Outcome: Progressing  Goal: Patient will remain free of falls  Description: INTERVENTIONS:  - Educate patient/family on patient safety including physical limitations  - Instruct patient to call for assistance with activity   - Consult OT/PT to assist with strengthening/mobility   - Keep Call bell within reach  - Keep bed low and locked with side rails adjusted as appropriate  - Keep care items and personal belongings within reach  - Initiate and maintain comfort rounds  - Make Fall Risk Sign visible to staff  - Apply yellow socks and bracelet for high fall risk patients  - Consider moving patient to room near nurses station  Outcome: Progressing     Problem: DISCHARGE PLANNING  Goal: Discharge to home or other facility with appropriate resources  Description: INTERVENTIONS:  - Identify barriers to discharge w/patient and caregiver  - Arrange for needed discharge resources and transportation as appropriate  - Identify discharge learning needs (meds, wound care, etc )  - Arrange for interpretive services to assist at discharge as needed  - Refer to Case Management Department for coordinating discharge planning if the patient needs post-hospital services based on physician/advanced practitioner order or complex needs related to functional status, cognitive ability, or social support system  Outcome: Progressing     Problem: Knowledge Deficit  Goal: Patient/family/caregiver demonstrates understanding of disease process, treatment plan, medications, and discharge instructions  Description: Complete learning assessment and assess knowledge base    Interventions:  - Provide teaching at level of understanding  - Provide teaching via preferred learning methods  Outcome: Progressing     Problem: Prexisting or High Potential for Compromised Skin Integrity  Goal: Skin integrity is maintained or improved  Description: INTERVENTIONS:  - Identify patients at risk for skin breakdown  - Assess and monitor skin integrity  - Assess and monitor nutrition and hydration status  - Monitor labs   - Assess for incontinence   - Turn and reposition patient  - Assist with mobility/ambulation  - Relieve pressure over bony prominences  - Avoid friction and shearing  - Provide appropriate hygiene as needed including keeping skin clean and dry  - Evaluate need for skin moisturizer/barrier cream  - Collaborate with interdisciplinary team   - Patient/family teaching  - Consider wound care consult   Outcome: Progressing     Problem: Nutrition/Hydration-ADULT  Goal: Nutrient/Hydration intake appropriate for improving, restoring or maintaining nutritional needs  Description: Monitor and assess patient's nutrition/hydration status for malnutrition  Collaborate with interdisciplinary team and initiate plan and interventions as ordered  Monitor patient's weight and dietary intake as ordered or per policy  Utilize nutrition screening tool and intervene as necessary  Determine patient's food preferences and provide high-protein, high-caloric foods as appropriate       INTERVENTIONS:  - Monitor oral intake, urinary output, labs, and treatment plans  - Assess nutrition and hydration status and recommend course of action  - Evaluate amount of meals eaten  - Assist patient with eating if necessary   - Allow adequate time for meals  - Recommend/ encourage appropriate diets, oral nutritional supplements, and vitamin/mineral supplements  - Order, calculate, and assess calorie counts as needed  - Recommend, monitor, and adjust tube feedings and TPN/PPN based on assessed needs  - Assess need for intravenous fluids  - Provide specific nutrition/hydration education as appropriate  - Include patient/family/caregiver in decisions related to nutrition  Outcome: Progressing

## 2023-04-27 NOTE — PROGRESS NOTES
Kehinde Shearer is a 80 y o  male who is currently ordered Vancomycin IV with management by the Pharmacy Consult service  Relevant clinical data and objective / subjective history reviewed  Vancomycin Assessment:  Indication and Goal AUC/Trough: Soft tissue (goal -600, trough >10)  Clinical Status: stable  Micro:     Renal Function:  SCr: 1 12 mg/dL  CrCl: 69 7 mL/min  Renal replacement: Not on dialysis  Days of Therapy: 4  Current Dose: 1500 mg IV q24h  Vancomycin Plan:  New Dosing: continue 1500 mg IV q24h  Estimated AUC: 409 mcg*hr/mL  Estimated Trough: 12 mcg/mL  Next Level: 5/4 @ 0600  Renal Function Monitoring: Daily BMP and [place] will continue to follow closely for s/sx of nephrotoxicity, infusion reactions and appropriateness of therapy  BMP and CBC will be ordered per protocol  We will continue to follow the patients culture results and clinical progress daily      Ethan Bowers, Pharmacist

## 2023-04-27 NOTE — ASSESSMENT & PLAN NOTE
BP controlled  · Patient prescribed lasix 20 mg daily PRN, however does not take it often, due to frequent urination     · Continue on losartan 50 mg daily and Toprol xl 25 mg daily

## 2023-04-27 NOTE — PROGRESS NOTES
[facility]  Progress Note  Name: Leta Duane  MRN: [id number]  Unit/Bed#: [facility] 003-03 I Date of Admission: 4/24/2023   Date of Service: 4/27/2023 I Hospital Day: 3    Assessment/Plan   * Lower extremity cellulitis  Assessment & Plan  · Patient has a history of chronic venous insufficiency with lower extremity edema and occasional weeping  Patient presented with bubbling of skin layer, skin weeping, redness of bilateral lower extremities not improving on oral antibiotics prescribed outpatient by PCP (but him and wife do not know which one)  · Patient has a history of chronic DVT in RLE from 2014 which was treated with Xarelto  · Lower extremity Dopplers did not show any DVTs bilateral this admission  · BNP elevated to almost 2000  · no systemic symptoms with normal leukocyte count and no fevers  Able to express green/yellow drainage concerning for abscess  · Patient was on IV vancomycin  MRSA negative, de-escalate to IV cefazolin  · Still redness, swelling, edema noted today  · Podiatry appreciated recommend starting, Colcrys for acute gout flareup    Hyperbilirubinemia  Assessment & Plan  History of hyperbilirubinemia seen on labs  Total bilirubin elevated to 2 9 this admission from a baseline of 1 2-1 8  · No recent abdominal imaging  · No evidence of abdominal pain or hemolysis with normal hemoglobin    Stage 3a chronic kidney disease (CKD) [facility])  Assessment & Plan  Lab Results   Component Value Date    EGFR 60 04/27/2023    EGFR 60 04/26/2023    EGFR 61 04/25/2023    CREATININE 1 12 04/27/2023    CREATININE 1 12 04/26/2023    CREATININE 1 11 04/25/2023   Baseline creatinine 1 2 since 2020  Currently at baseline     Avoid nephrotoxins   Continue monitoring BMP    I's and O's/daily weights    ESTER (obstructive sleep apnea)  Assessment & Plan  · CPAP HS    Chronic venous insufficiency of lower extremity  Assessment & Plan  Patient has chronic venous insufficiency for past 1 to 2 years with chronic pedal edema  As per admission note, patient has 2+ pitting edema  On my exam today patient has 4+ pitting edema, extending to the thighs  We will hold oral Lasix, start IV Lasix 40 mg twice daily  Monitor intake and output, daily weight  Obtain chest x-ray    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 [facility])  Assessment & Plan  Patient has hx of right lower extremity DVT in 2014 at which time he was seen by hematology & placed on Xarelto for 6 months  Patient had coagulability testing in the past; patient has MTHFR mutation   Lower extremity Dopplers this admission shows no signs of DVT bilaterally   Currently patient is on warfarin for persistent A-fib   Continue folate supplementation in setting of MTHFR mut   INR subtherapeutic, patient will receive increased dose of 7 5 today    Morbid obesity ([facility] [address] )  Assessment & Plan  Body mass index is 33 54 kg/m²  · Affects all aspects of care, encourage weight loss    Persistent atrial fibrillation (HCC)  Assessment & Plan  History of AF on beta blocker and warfarin  INR 2 2 on admission, at baseline  Currently RRR on exam    · Continue toprol xl 25 mg daily  · Warfarin 5 mg Mon, Tue, Wed, Fri; 7 5 mg on Thurs  · Follow INR-PT    Hypertension  Assessment & Plan  BP controlled  · Patient prescribed lasix 20 mg daily PRN, however does not take it often, due to frequent urination  · Continue on losartan 50 mg daily and Toprol xl 25 mg daily         VTE Pharmacologic Prophylaxis:   Pharmacologic: Warfarin (Coumadin)  Mechanical VTE Prophylaxis in Place: Yes    Patient Centered Rounds: I have performed bedside rounds with nursing staff today  Current Length of Stay: 3 day(s)    Current Patient Status: Inpatient   Certification Statement: The patient will continue to require additional inpatient hospital stay due to Pending IV diuresis    Discharge Plan:  Will need another 24 to 48 hours     Code Status: Level 1 - Full Code      Subjective:   patient is ambulating in the room  Continues to have puffiness in the leg  Denies any dyspnea  On my exam, lower extremity edema significantly worsened, currently 4+ on my exam   Based on previous record patient had 2+ pitting edema  Objective:     Vitals:   Temp (24hrs), Av 7 °F (36 5 °C), Min:97 1 °F (36 2 °C), Max:97 9 °F (36 6 °C)    Temp:  [97 1 °F (36 2 °C)-97 9 °F (36 6 °C)] 97 1 °F (36 2 °C)  HR:  [] 91  Resp:  [16-18] 18  BP: (137-144)/(85-86) 137/85  SpO2:  [96 %-100 %] 96 %  Body mass index is 33 54 kg/m²  Input and Output Summary (last 24 hours): Intake/Output Summary (Last 24 hours) at 2023 1910  Last data filed at 2023 1744  Gross per 24 hour   Intake 956 ml   Output 3200 ml   Net -2244 ml       Physical Exam:     Physical Exam  Vitals and nursing note reviewed  Constitutional:       General: He is not in acute distress  Appearance: Normal appearance  He is obese  HENT:      Head: Normocephalic and atraumatic  Right Ear: External ear normal       Left Ear: External ear normal       Nose: Nose normal    Eyes:      Extraocular Movements: Extraocular movements intact  Pulmonary:      Effort: Pulmonary effort is normal    Musculoskeletal:      Cervical back: Normal range of motion  Right lower leg: Edema present  Left lower leg: Edema present  Comments: 4+ bilateral pitting edema   Neurological:      Mental Status: He is alert  Mental status is at baseline     Psychiatric:         Mood and Affect: Mood normal          Additional Data:     Labs:    Results from last 7 days   Lab Units 23  0516 23  0610   WBC Thousand/uL 8 87 9 82   HEMOGLOBIN g/dL 16 5 15 3   HEMATOCRIT % 47 8 46 1   PLATELETS Thousands/uL 189 184   NEUTROS PCT %  --  66   LYMPHS PCT %  --  16   MONOS PCT %  --  14*   EOS PCT %  --  2     Results from last 7 days   Lab Units 23  0516 04/26/23  0610 04/25/23  0456   SODIUM mmol/L 137   < > 138   POTASSIUM mmol/L 3 7   < > 4 0   CHLORIDE mmol/L 105   < > 106   CO2 mmol/L 26   < > 27   BUN mg/dL 22   < > 22   CREATININE mg/dL 1 12   < > 1 11   ANION GAP mmol/L 6   < > 5   CALCIUM mg/dL 9 1   < > 8 7   ALBUMIN g/dL  --   --  2 7*   TOTAL BILIRUBIN mg/dL  --   --  2 43*   ALK PHOS U/L  --   --  75   ALT U/L  --   --  27   AST U/L  --   --  27   GLUCOSE RANDOM mg/dL 110   < > 115    < > = values in this interval not displayed  Results from last 7 days   Lab Units 04/27/23  1417   INR  1 84*             Results from last 7 days   Lab Units 04/24/23  1046   PROCALCITONIN ng/ml 0 09          Recent Cultures (last 7 days):           Last 24 Hours Medication List:   Current Facility-Administered Medications   Medication Dose Route Frequency Provider Last Rate    cefazolin  2,000 mg Intravenous Q8H Shanna Palumbo MD 2,000 mg (04/27/23 1359)    [START ON 4/28/2023] colchicine  0 6 mg Oral Daily Shanna Palumbo MD      folic acid  2 mg Oral Daily Verna Sanders MD      furosemide  40 mg Intravenous BID (diuretic) Shanna Palumbo MD      lidocaine  1 patch Topical Daily LIVIA Cardenas      metoprolol succinate  25 mg Oral Daily Verna Sanders MD      multivitamin-minerals  1 tablet Oral Daily Verna Sanders MD      pyridoxine  50 mg Oral Daily Verna Sanders MD      warfarin  5 mg Oral Once per day on Mon Tue Wed Fri Verna Sanders MD      warfarin  7 5 mg Oral Once per day on Thu Verna Sanders MD          Today, Patient Was Seen By: Shanna Palumbo MD    ** Please Note: Dictation voice to text software may have been used in the creation of this document   **

## 2023-04-27 NOTE — ASSESSMENT & PLAN NOTE
· Patient has a history of chronic venous insufficiency with lower extremity edema and occasional weeping  Patient presented with bubbling of skin layer, skin weeping, redness of bilateral lower extremities not improving on oral antibiotics prescribed outpatient by PCP (but him and wife do not know which one)  · Patient has a history of chronic DVT in E from 2014 which was treated with Xarelto  · Lower extremity Dopplers did not show any DVTs bilateral this admission  · BNP elevated to almost 2000  · no systemic symptoms with normal leukocyte count and no fevers  Able to express green/yellow drainage concerning for abscess  · Patient was on IV vancomycin    MRSA negative, de-escalate to IV cefazolin  · Still redness, swelling, edema noted today  · Podiatry appreciated recommend starting, Colcrys for acute gout flareup

## 2023-04-27 NOTE — ASSESSMENT & PLAN NOTE
Lab Results   Component Value Date    EGFR 60 04/27/2023    EGFR 60 04/26/2023    EGFR 61 04/25/2023    CREATININE 1 12 04/27/2023    CREATININE 1 12 04/26/2023    CREATININE 1 11 04/25/2023   Baseline creatinine 1 2 since 2020  Currently at baseline     Avoid nephrotoxins   Continue monitoring BMP    I's and O's/daily weights

## 2023-04-28 PROBLEM — R60.0 BILATERAL LOWER EXTREMITY EDEMA: Status: ACTIVE | Noted: 2022-02-14

## 2023-04-28 LAB
ANION GAP SERPL CALCULATED.3IONS-SCNC: 5 MMOL/L (ref 4–13)
BUN SERPL-MCNC: 25 MG/DL (ref 5–25)
CALCIUM SERPL-MCNC: 9.3 MG/DL (ref 8.3–10.1)
CHLORIDE SERPL-SCNC: 104 MMOL/L (ref 96–108)
CO2 SERPL-SCNC: 30 MMOL/L (ref 21–32)
CREAT SERPL-MCNC: 1.22 MG/DL (ref 0.6–1.3)
GFR SERPL CREATININE-BSD FRML MDRD: 54 ML/MIN/1.73SQ M
GLUCOSE SERPL-MCNC: 117 MG/DL (ref 65–140)
INR PPP: 2.02 (ref 0.84–1.19)
POTASSIUM SERPL-SCNC: 3.4 MMOL/L (ref 3.5–5.3)
PROTHROMBIN TIME: 23.1 SECONDS (ref 11.6–14.5)
SODIUM SERPL-SCNC: 139 MMOL/L (ref 135–147)

## 2023-04-28 RX ORDER — POTASSIUM CHLORIDE 20 MEQ/1
40 TABLET, EXTENDED RELEASE ORAL ONCE
Status: COMPLETED | OUTPATIENT
Start: 2023-04-28 | End: 2023-04-28

## 2023-04-28 RX ADMIN — METOPROLOL SUCCINATE 25 MG: 25 TABLET, EXTENDED RELEASE ORAL at 10:25

## 2023-04-28 RX ADMIN — FOLIC ACID 2 MG: 1 TABLET ORAL at 10:02

## 2023-04-28 RX ADMIN — POTASSIUM CHLORIDE 40 MEQ: 1500 TABLET, EXTENDED RELEASE ORAL at 10:04

## 2023-04-28 RX ADMIN — WARFARIN SODIUM 5 MG: 5 TABLET ORAL at 17:53

## 2023-04-28 RX ADMIN — FUROSEMIDE 40 MG: 10 INJECTION, SOLUTION INTRAMUSCULAR; INTRAVENOUS at 16:45

## 2023-04-28 RX ADMIN — Medication 1 TABLET: at 10:03

## 2023-04-28 RX ADMIN — FUROSEMIDE 40 MG: 10 INJECTION, SOLUTION INTRAMUSCULAR; INTRAVENOUS at 09:29

## 2023-04-28 RX ADMIN — CEFAZOLIN SODIUM 2000 MG: 2 SOLUTION INTRAVENOUS at 20:49

## 2023-04-28 RX ADMIN — COLCHICINE 0.6 MG: 0.6 TABLET ORAL at 10:02

## 2023-04-28 RX ADMIN — CEFAZOLIN SODIUM 2000 MG: 2 SOLUTION INTRAVENOUS at 05:09

## 2023-04-28 RX ADMIN — LIDOCAINE 5% 1 PATCH: 700 PATCH TOPICAL at 10:04

## 2023-04-28 RX ADMIN — CEFAZOLIN SODIUM 2000 MG: 2 SOLUTION INTRAVENOUS at 12:22

## 2023-04-28 RX ADMIN — Medication 50 MG: at 10:09

## 2023-04-28 NOTE — ASSESSMENT & PLAN NOTE
Patient has chronic venous insufficiency for past 1 to 2 years, on PRN lasix at home  As per admission note, patient has 2+ pitting edema, worsened to 4+ pitting edema  started IV Lasix 40 mg twice daily  Monitor intake and output, daily weight  CXR negative  Echo LVEF 65%, biatrial enlargement, severe right atrial enlargement, consider amyloidosis

## 2023-04-28 NOTE — ASSESSMENT & PLAN NOTE
· Patient has a history of chronic venous insufficiency with lower extremity edema and occasional weeping  Patient presented with bubbling of skin layer, skin weeping, redness of bilateral lower extremities not improving on oral antibiotics prescribed outpatient by PCP (but him and wife do not know which one)  · Patient has a history of chronic DVT in E from 2014 which was treated with Xarelto  · Lower extremity Dopplers did not show any DVTs bilateral this admission  · BNP elevated to almost 2000  · no systemic symptoms with normal leukocyte count and no fevers  Able to express green/yellow drainage concerning for abscess  · Patient was on IV vancomycin  MRSA negative, de-escalate to IV cefazolin  · examined wounds today, improving     · Podiatry appreciated recommend starting, Colcrys for acute gout flareup on right 1st toe

## 2023-04-28 NOTE — PROGRESS NOTES
"Podiatry - Progress Note  Patient: Ankti Oropeza 80 y o  male   MRN: [id number]  PCP: Mis Leslie DO  [facility]counter: [id number]  Date Of Visit: 23    ASSESSMENT:    Ankit Oropeza is a 80 y o  male with:    1  Bilateral venous stasis ulcerations  2  Bilateral lower extremity edema  3  Erythema and warmth to the right foot without ulceration  4  Possible acute gout  5  Pain at the left right first metatarsophalangeal joint  6  CKD 3A  7  Chronic DVT right lower extremity      PLAN:    [address] [place] [address] for continued local wound care to bilateral lower extremity VSU consisting of Adaptic/Maxorb DSD  Wounds appear stable at this time   Recommend continued treatment for acute gout consisting of Colcrys, per primary team for at least 2 more weeks until acute gouty flare up resides   Continue local wound care, appreciate nursing assistance with dressing changes   IV Lasix per primary team for lower extremity edema   Elevation and offloading on green foam wedges or pillows when non-ambulatory   Rest of care per primary team      Weightbearing status: Weightbearing as tolerated    SUBJECTIVE:     The patient was seen, evaluated, and assessed at bedside today  The patient was awake, alert, and in no acute distress  No acute events overnight  The patient reports that he is feeling well today  Patient denies N/V/F/chills/SOB/CP  OBJECTIVE:     Vitals:   /64   Pulse 75   Temp 97 5 °F (36 4 °C) (Oral)   Resp 16   Ht 6' 2\" (1 88 m)   Wt 118 kg (261 lb 3 9 oz)   SpO2 98%   BMI 33 54 kg/m²     Temp (24hrs), Av 4 °F (36 3 °C), Min:97 1 °F (36 2 °C), Max:97 6 °F (36 4 °C)      Physical Exam:     Lungs: Non labored breathing  Abdomen: Soft, non-tender  Lower Extremity:  Cardiovascular status at baseline from admission  Neurological status at baseline from admission  Musculoskeletal status at baseline from admission  No calf tenderness noted       Wound #1 located on the right " "anterior tibia measuring approximately 10 cm x 12 cm x 0 2 cm with good skin bridge in between  Negative for signs of infection negative probe to bone, 100% granular wound base      Wound #2 located on the anterior left tibia measuring 7 cm x 10 5 cm x 0 2 cm with many vesicles and some healing eschars  Negative for signs of infection surrounding erythema present consistent with venous stasis skin changes  Draining clear serous drainage      Wound #3 located in the posterior right tibia measuring approximately 1 5 x 1 5 cm with granular wound base negative probe to bone negative for signs of infection with clear drainage  Clinical Images 04/28/23:    LLE:      RLE:           Additional Data:     Labs:    Results from last 7 days   Lab Units 04/27/23  0516 04/26/23  0610   WBC Thousand/uL 8 87 9 82   HEMOGLOBIN g/dL 16 5 15 3   HEMATOCRIT % 47 8 46 1   PLATELETS Thousands/uL 189 184   NEUTROS PCT %  --  66   LYMPHS PCT %  --  16   MONOS PCT %  --  14*   EOS PCT %  --  2     Results from last 7 days   Lab Units 04/28/23  0525 04/26/23  0610 04/25/23  0456   POTASSIUM mmol/L 3 4*   < > 4 0   CHLORIDE mmol/L 104   < > 106   CO2 mmol/L 30   < > 27   BUN mg/dL 25   < > 22   CREATININE mg/dL 1 22   < > 1 11   CALCIUM mg/dL 9 3   < > 8 7   ALK PHOS U/L  --   --  75   ALT U/L  --   --  27   AST U/L  --   --  27    < > = values in this interval not displayed  Results from last 7 days   Lab Units 04/28/23  0525   INR  2 02*       * I Have Reviewed All Lab Data Listed Above  Recent Cultures (last 7 days):               Imaging: I have personally reviewed pertinent films in PACS  EKG, Pathology, and Other Studies: I have personally reviewed pertinent reports  ** Please Note: Portions of the record may have been created with voice recognition software  Occasional wrong word or \"sound a like\" substitutions may have occurred due to the inherent limitations of voice recognition software   Read the chart carefully and " recognize, using context, where substitutions have occurred   **

## 2023-04-28 NOTE — ASSESSMENT & PLAN NOTE
Patient has hx of right lower extremity DVT in 2014 at which time he was seen by hematology & placed on Xarelto for 6 months  Patient had coagulability testing in the past; patient has MTHFR mutation     Lower extremity Dopplers this admission shows no signs of DVT bilaterally   Currently patient is on warfarin for persistent A-fib   Continue folate supplementation in setting of MTHFR mut   INR therapeutic, continue coumadin

## 2023-04-28 NOTE — CONSULTS
Reason for Consult / Principal Problem:Concern for Amyloid    Physician Requesting Consult:  Mone Bolaños MD    Cardiologist: Dr Claudeen Harpin  Assessment and Plan      Current Problem List   Principal Problem:    Lower extremity cellulitis  Active Problems:    Hypertension    Persistent atrial fibrillation (HCC)    Morbid obesity (HCC)    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 (HCC)    Bilateral lower extremity edema    ESTER (obstructive sleep apnea)    Stage 3a chronic kidney disease (CKD) (HCC)    Hyperbilirubinemia    Assessment/Plan:    1  Suspected amyloid - patient with large QRS/echo LV thickness mis match  Has biatrial enlargement as well  Reports burning  He is being treated for cellulitis as well  Reports history of carpal tunnel syndrome  There is considerable suspician for amyloid  · Will obtain PYP scan  · Obtain SPEP/UPEP/FLC  · Does appear mildly fluid overload - continue IV lasix BID 40 mg - monitor creatine - he endorses no specific heart failure symptoms though and lungs are clear to auscultation  · If does have amyloid should come off beta blocker and patient should follow with heart failure  · Low yield for CRISS, HIV, hemochromatosis  2   Permament atrial fibrillation  ·  Cont  Warfarin  · Cont  Metoprolol succinate for now - if does have amyloid should come off  · Monitor on tele  3  Chronic thromboembolism  ·  On warfarin  4  ESTER  5  CKD    Subjective     CC: Suspected amyloid  HPI: This is a 80-year-old male with a past medical history significant for deep venous thrombosis, MTHFR mutation, hyperbilirubinemia, hypertension, obstructive sleep apnea, atrial fibrillation, hyperlipidemia, who we have been consulted for concern for heart failure    The patient had an echocardiogram performed which was notable for biatrial dilation, severely increased wall thickness and mild MR and TR   EKG notable for low voltage right superior axis deviation  Patient's chest x-ray yesterday was notable for no acute cardiopulmonary disease patient had a BMP performed which was 193  Patient's creatinine is 1 22  His INR is 2 02  His troponin was mildly elevated on presentation  No previous ischemic work-up is noted in our chart  The patient currently is resting comfortably  He endorses no chest pain  No previous ischemic work up  No shortness of breath  No orthopnea  He endorses increased swelling in his legs  He reports lesions on his legs as well  He reports  Previous history of carpal tunnel syndrome  He has decreased hearing  He reports burning in his legs  He otherwise denies any other cardiac complaints  Denies ever having chest pain  He does endorse decreased functional capacity  TREADMILL STRESS  No results found for this or any previous visit      ----------------------------------------------------------------------------------------------  NUCLEAR STRESS TEST: No results found for this or any previous visit      No results found for this or any previous visit       --------------------------------------------------------------------------------  CATH:  No results found for this or any previous visit     --------------------------------------------------------------------------------  ECHO:   Results for orders placed during the hospital encounter of 20    Echo complete with contrast if indicated    [facility]  [employer] [address]    Transthoracic Echocardiogram  2D, M-mode, Doppler, and Color Doppler    Study date:  2020    Patient: Kaleigh Gallegos  MR number: [id number]  Account number: [de-identified]  : 1940  Age: 78 years  Gender: Male  Status: Outpatient  Location: [facility]  Height: 75 in  Weight: 264 4 lb  BP: 110/ 70 mmHg    Indications: Persistent Afib    Diagnoses: I48 0 - Atrial fibrillation    Sonographer:  Sherie Gaxiola, [facility]  Primary Physician:  Blanche Weaver DO  Referring Physician:  Argelia Yeung MD  Group:  [id number]  Interpreting Physician:  Mikey Richardson MD    SUMMARY    LEFT VENTRICLE:  Systolic function was normal by visual assessment  Ejection fraction was estimated in the range of 60 % to 70 %  Atrial fibrillation  Although no diagnostic regional wall motion abnormality was identified, this possibility cannot be completely excluded on the basis of this study  Wall thickness was moderately increased  There was moderate concentric hypertrophy  LEFT ATRIUM:  The atrium was moderately dilated  RIGHT ATRIUM:  The atrium was moderately dilated  MITRAL VALVE:  There was mild annular calcification  There was trace regurgitation  TRICUSPID VALVE:  There was mild regurgitation  HISTORY: PRIOR HISTORY: ESTER; Aortic ectasia; HTN; HLD; PACs; PVCs; Afib; Vitamin D deficiency; Blood clot in vein; F  smoker    PROCEDURE: The study was performed in the [facility]  This was a routine study  The transthoracic approach was used  The study included complete 2D imaging, M-mode, complete spectral Doppler, and color Doppler  Echocardiographic views were limited due to decreased penetration and lung interference  This was a technically difficult study  LEFT VENTRICLE: Size was normal  Systolic function was normal by visual assessment  Ejection fraction was estimated in the range of 60 % to 70 %  Atrial fibrillation Although no diagnostic regional wall motion abnormality was identified,  this possibility cannot be completely excluded on the basis of this study  Wall thickness was moderately increased  There was moderate concentric hypertrophy  DOPPLER: Transmitral flow pattern: atrial fibrillation  RIGHT VENTRICLE: The size was normal  Systolic function was normal  Wall thickness was normal  Not well visualized  LEFT ATRIUM: The atrium was moderately dilated      RIGHT ATRIUM: The atrium was moderately dilated  MITRAL VALVE: There was mild annular calcification  There was mild thickening  Not well visualized  DOPPLER: There was no evidence for stenosis  There was trace regurgitation  AORTIC VALVE: The valve was trileaflet  Leaflets exhibited normal thickness, normal cuspal separation, and sclerosis  DOPPLER: Transaortic velocity was within the normal range  There was no evidence for stenosis  There was no  regurgitation  TRICUSPID VALVE: Not well visualized  DOPPLER: There was no evidence for stenosis  There was mild regurgitation  Pulmonary artery systolic pressure was mildly increased  PULMONIC VALVE: Not well visualized  DOPPLER: The transpulmonic velocity was within the normal range  There was no regurgitation  PERICARDIUM: There was no pericardial effusion  The pericardium was normal in appearance  AORTA: The root exhibited normal size  SYSTEMIC VEINS: IVC: The inferior vena cava was not well visualized      SYSTEM MEASUREMENT TABLES    2D  %FS: 32 53 %  Ao Diam: 3 35 cm  EDV(Teich): 69 79 ml  EF(Teich): 61 45 %  ESV(Cube): 19 58 ml  ESV(Teich): 26 91 ml  IVSd: 1 45 cm  LA Area: 29 24 cm2  LA Diam: 4 14 cm  LVEDV MOD A4C: 113 ml  LVEF MOD A4C: 72 56 %  LVESV MOD A4C: 31 ml  LVIDd: 4 cm  LVIDs: 2 7 cm  LVLd A4C: 9 08 cm  LVLs A4C: 6 35 cm  LVPWd: 1 51 cm  RA Area: 23 89 cm2  RV Diam : 3 88 cm  SI(Cube): 17 81 ml/m2  SI(Teich): 17 29 ml/m2  SV MOD A4C: 82 ml  SV(Cube): 44 18 ml  SV(Teich): 42 89 ml    CW  TR Vmax: 2 83 m/s  TR maxP 05 mmHg    MM  TAPSE: 1 99 cm    PW  E': 0 08 m/s  E/E': 11 95  MV A Dain: 0 22 m/s  MV Dec [place]: 5 58 m/s2  MV DecT: 166 13 ms  MV E Dain: 0 93 m/s  MV E/A Ratio: 4 27    [employer] Accredited [facility]    Prepared and electronically signed by    Brian Mosher MD  Signed 2020 15:02:12    No results found for this or any previous visit     --------------------------------------------------------------------------------  HOLTER  No results found for this or any previous visit     --------------------------------------------------------------------------------  CAROTIDS  No results found for this or any previous visit  Family History:   Family History   Problem Relation Age of Onset    Heart disease Other     Cancer Other      Historical Information   Past Medical History:   Diagnosis Date    Blood clot in vein     in right foot    Hypertension      Past Surgical History:   Procedure Laterality Date    BACK SURGERY      COLONOSCOPY      COLONOSCOPY N/A 2016    Procedure: COLONOSCOPY;  Surgeon: Dianne Flood MD;  Location: [facility] OR;  Service:    [facility] HERNIA REPAIR       Social History   Social History     Substance and Sexual Activity   Alcohol Use Yes    Comment: 4 drinks a week     Social History     Substance and Sexual Activity   Drug Use No     Social History     Tobacco Use   Smoking Status Former    Packs/day:  00    Years:     Pack years:  00    Types: Cigarettes    Quit date: 18    Years since quittin 3   Smokeless Tobacco Never     Family History:   Family History   Problem Relation Age of Onset    Heart disease Other     Cancer Other        Review of Systems:  Review of Systems   Constitutional: Positive for appetite change  Respiratory: Negative for shortness of breath  Cardiovascular: Positive for leg swelling  Negative for chest pain  Skin: Positive for rash     Neurological:        Burning             Scheduled Meds:  Current Facility-Administered Medications   Medication Dose Route Frequency Provider Last Rate    cefazolin  2,000 mg Intravenous Q8H Radha Perez MD 2,000 mg (23 1222)    colchicine  0 6 mg Oral Daily Radha Perez MD      folic acid  2 mg Oral Daily [address], MD      furosemide  40 mg Intravenous BID (diuretic) Radha Perez MD  lidocaine  1 patch Topical Daily LIVIA Wilson      metoprolol succinate  25 mg Oral Daily Darlin Johnson MD      multivitamin-minerals  1 tablet Oral Daily Darlin Johnson MD      pyridoxine  50 mg Oral Daily Darlin Johnson MD      warfarin  5 mg Oral Once per day on  Darlin Johnson MD      warfarin  7 5 mg Oral Once per day on  Soyeon Elder Bur, MD       Continuous Infusions:   PRN Meds:   all current active meds have been reviewed    Allergies   Allergen Reactions    Sildenafil Hives     Other reaction(s): severe congestion, cialis was OK       Objective   Vitals: Temp (24hrs), Av 6 °F (36 4 °C), Min:97 5 °F (36 4 °C), Max:97 6 °F (36 4 °C)  Current: Temperature: 97 5 °F (36 4 °C)  Patient Vitals for the past 24 hrs:   BP Temp Temp src Pulse Resp SpO2 Weight   23 0600 110/64 97 5 °F (36 4 °C) Oral 75 16 98 % --   23 0600 -- -- -- -- -- -- 118 kg (259 lb 14 8 oz)   23 2321 111/61 97 6 °F (36 4 °C) -- 62 -- 99 % --    Body mass index is 33 37 kg/m²  Orthostatic Blood Pressures    Flowsheet Row Most Recent Value   Blood Pressure 110/64 filed at 2023 0600   Patient Position - Orthostatic VS Sitting filed at 2023 1456              Invasive Devices     Peripheral Intravenous Line  Duration           Peripheral IV 23 Dorsal (posterior); Left Forearm 1 day                Physical Exam:  Physical Exam  Constitutional:       Appearance: Normal appearance  Cardiovascular:      Rate and Rhythm: Normal rate  Rhythm irregular  Pulmonary:      Effort: Pulmonary effort is normal  No respiratory distress  Breath sounds: No wheezing or rhonchi  Musculoskeletal:      Right lower leg: Edema present  Left lower leg: Edema present  Skin:     General: Skin is warm  Neurological:      Mental Status: He is alert               Lab Results:   Results from last 7 days   Lab Units 23  0516 23  0610 23  0455 04/24/23  1046   WBC Thousand/uL 8 87 9 82 9 04 9 72   HEMOGLOBIN g/dL 16 5 15 3 16 0 15 8   HEMATOCRIT % 47 8 46 1 46 9 46 3   PLATELETS Thousands/uL 189 184 176 186   NEUTROS PCT %  --  66 68 73   MONOS PCT %  --  14* 14* 12      Results from last 7 days   Lab Units 04/28/23  0525 04/27/23  1417 04/27/23  0516 04/26/23  0610 04/25/23  0456 04/24/23  1046   SODIUM mmol/L 139  --  137 139 138 138   POTASSIUM mmol/L 3 4*  --  3 7 4 0 4 0 3 9   CHLORIDE mmol/L 104  --  105 107 106 107   CO2 mmol/L 30  --  26 29 27 27   BUN mg/dL 25  --  22 23 22 25   CREATININE mg/dL 1 22  --  1 12 1 12 1 11 1 24   CALCIUM mg/dL 9 3  --  9 1 8 7 8 7 8 4   ALK PHOS U/L  --   --   --   --  75 84   ALT U/L  --   --   --   --  27 31   AST U/L  --   --   --   --  27 27   INR  2 02* 1 84*  --  2 05* 2 29* 2 17*   PTT seconds  --   --   --   --   --  45*   EGFR ml/min/1 73sq m 54  --  60 60 61 53     Results from last 7 days   Lab Units 04/28/23  0525 04/27/23  1417 04/26/23  0610 04/25/23  0456 04/24/23  1046   INR  2 02* 1 84* 2 05* 2 29* 2 17*   PTT seconds  --   --   --   --  45*             No results found for: PHART, YLE0DMG, PO2ART, NPR7WPJ, W2GKJPSD, BEART, SOURCE  No components found for: HIV1X2  No results found for: HAV, HEPAIGM, HEPBIGM, HEPBCAB, HBEAG, HEPCAB  No results found for: SPEP, UPEP   Lab Results   Component Value Date    HGBA1C 5 6 12/01/2022    HGBA1C 5 8 (H) 05/23/2022    HGBA1C 5 8 (H) 11/30/2021     Lab Results   Component Value Date    CHOL 185 12/17/2015    CHOL 223 (H) 11/26/2013      Lab Results   Component Value Date    HDL 74 12/01/2022    HDL 70 05/23/2022    HDL 78 11/30/2021      Lab Results   Component Value Date    LDLCALC 94 12/01/2022    LDLCALC 99 05/23/2022    LDLCALC 94 11/30/2021      Lab Results   Component Value Date    TRIG 132 12/01/2022    TRIG 86 05/23/2022    TRIG 105 11/30/2021     No components found for: PROCAL          Imaging: I have personally reviewed pertinent reports

## 2023-04-28 NOTE — PROGRESS NOTES
[address] [facility]  Progress Note  Name: Fermín Ramsey  MRN: [id number]  [facility] Date of Admission: 4/24/2023   Date of Service: 4/28/2023 I Hospital Day: 4    Assessment/Plan   * Lower extremity cellulitis  Assessment & Plan  · Patient has a history of chronic venous insufficiency with lower extremity edema and occasional weeping  Patient presented with bubbling of skin layer, skin weeping, redness of bilateral lower extremities not improving on oral antibiotics prescribed outpatient by PCP (but him and wife do not know which one)  · Patient has a history of chronic DVT in RLE from 2014 which was treated with Xarelto  · Lower extremity Dopplers did not show any DVTs bilateral this admission  · BNP elevated to almost 2000  · no systemic symptoms with normal leukocyte count and no fevers  Able to express green/yellow drainage concerning for abscess  · Patient was on IV vancomycin  MRSA negative, de-escalate to IV cefazolin  · examined wounds today, improving  · Podiatry appreciated recommend starting, Colcrys for acute gout flareup on right 1st toe    Hyperbilirubinemia  Assessment & Plan  History of hyperbilirubinemia seen on labs  Total bilirubin elevated to 2 9 this admission from a baseline of 1 2-1 8  · No recent abdominal imaging  · No evidence of abdominal pain or hemolysis with normal hemoglobin    Stage 3a chronic kidney disease (CKD) [facility])  Assessment & Plan  Lab Results   Component Value Date    EGFR 54 04/28/2023    EGFR 60 04/27/2023    EGFR 60 04/26/2023    CREATININE 1 22 04/28/2023    CREATININE 1 12 04/27/2023    CREATININE 1 12 04/26/2023   Baseline creatinine 1 2 since 2020  Currently at baseline     Avoid nephrotoxins   Continue monitoring BMP    I's and O's/daily weights    ESTER (obstructive sleep apnea)  Assessment & Plan  · CPAP HS    Bilateral lower extremity edema  Assessment & Plan  Patient has chronic venous insufficiency for past 1 to 2 years, on PRN lasix at home  As per admission note, patient has 2+ pitting edema, worsened to 4+ pitting edema  started IV Lasix 40 mg twice daily  Monitor intake and output, daily weight  CXR negative  Echo LVEF 65%, biatrial enlargement, severe right atrial enlargement, consider amyloidosis  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 [facility])  Assessment & Plan  Patient has hx of right lower extremity DVT in 2014 at which time he was seen by hematology & placed on Xarelto for 6 months  Patient had coagulability testing in the past; patient has MTHFR mutation   Lower extremity Dopplers this admission shows no signs of DVT bilaterally   Currently patient is on warfarin for persistent A-fib   Continue folate supplementation in setting of MTHFR mut   INR therapeutic, continue coumadin    Morbid obesity ([facility] [address] )  Assessment & Plan  Body mass index is 33 37 kg/m²  · Affects all aspects of care, encourage weight loss    Persistent atrial fibrillation (HCC)  Assessment & Plan  History of AF on beta blocker and warfarin  INR 2 2 on admission, at baseline  Currently RRR on exam    · Continue toprol xl 25 mg daily  · Warfarin 5 mg Mon, Tue, Wed, Fri; 7 5 mg on Thurs  · Follow INR-PT    Hypertension  Assessment & Plan  BP controlled    · Continue Toprol xl 25 mg daily  · Losartan Held to make room for IV Lasix                VTE Pharmacologic Prophylaxis:   Pharmacologic: Warfarin (Coumadin)  Mechanical VTE Prophylaxis in Place: Yes    Patient Centered Rounds: I have performed bedside rounds with nursing staff today  Current Length of Stay: 4 day(s)    Current Patient Status: Inpatient   Certification Statement: The patient will continue to require additional inpatient hospital stay due to pending improvment in lower extremities   pending cardiology consult    Discharge Plan: pending progress    Code Status: Level 1 - Full Code      Subjective: Patient reports of improvement in swelling if both extremities  Denies any dyspnea, chills, nausea, vomiting or any other complains  Objective:     Vitals:   Temp (24hrs), Av 4 °F (36 3 °C), Min:97 1 °F (36 2 °C), Max:97 6 °F (36 4 °C)    Temp:  [97 1 °F (36 2 °C)-97 6 °F (36 4 °C)] 97 5 °F (36 4 °C)  HR:  [62-91] 75  Resp:  [16-18] 16  BP: (110-137)/(61-85) 110/64  SpO2:  [96 %-99 %] 98 %  Body mass index is 33 37 kg/m²  Input and Output Summary (last 24 hours): Intake/Output Summary (Last 24 hours) at 2023 1439  Last data filed at 2023 0830  Gross per 24 hour   Intake 656 ml   Output 900 ml   Net -244 ml       Physical Exam:     Physical Exam  Vitals and nursing note reviewed  Constitutional:       General: He is not in acute distress  Appearance: Normal appearance  HENT:      Head: Normocephalic and atraumatic  Right Ear: External ear normal       Left Ear: External ear normal       Nose: Nose normal    Eyes:      Extraocular Movements: Extraocular movements intact  Pulmonary:      Effort: Pulmonary effort is normal    Musculoskeletal:      Cervical back: Normal range of motion  Right lower leg: Edema present  Left lower leg: Edema present  Comments: Bilateral pitting edema 3+   Neurological:      Mental Status: He is alert  Mental status is at baseline     Psychiatric:         Mood and Affect: Mood normal             Additional Data:     Labs:    Results from last 7 days   Lab Units 23  0516 23  0610   WBC Thousand/uL 8 87 9 82   HEMOGLOBIN g/dL 16 5 15 3   HEMATOCRIT % 47 8 46 1   PLATELETS Thousands/uL 189 184   NEUTROS PCT %  --  66   LYMPHS PCT %  --  16   MONOS PCT %  --  14*   EOS PCT %  --  2     Results from last 7 days   Lab Units 23  0525 23  0610 23  0456   SODIUM mmol/L 139   < > 138   POTASSIUM mmol/L 3 4*   < > 4 0   CHLORIDE mmol/L 104   < > 106   CO2 mmol/L 30   < > 27   BUN mg/dL 25   < > 22   CREATININE mg/dL 1 22   < > 1 11   ANION GAP mmol/L 5   < > 5   CALCIUM mg/dL 9 3   < > 8 7   ALBUMIN g/dL  --   --  2 7*   TOTAL BILIRUBIN mg/dL  --   --  2 43*   ALK PHOS U/L  --   --  75   ALT U/L  --   --  27   AST U/L  --   --  27   GLUCOSE RANDOM mg/dL 117   < > 115    < > = values in this interval not displayed  Results from last 7 days   Lab Units 04/28/23  0525   INR  2 02*             Results from last 7 days   Lab Units 04/24/23  1046   PROCALCITONIN ng/ml 0 09              Recent Cultures (last 7 days):           Last 24 Hours Medication List:   Current Facility-Administered Medications   Medication Dose Route Frequency Provider Last Rate    cefazolin  2,000 mg Intravenous Q8H Kenisha Keith MD 2,000 mg (04/28/23 1222)    colchicine  0 6 mg Oral Daily Will Gibson MD      folic acid  2 mg Oral Daily Rosanne York MD      furosemide  40 mg Intravenous BID (diuretic) Will Gibson MD      lidocaine  1 patch Topical Daily LIVIA Mace      metoprolol succinate  25 mg Oral Daily Rosanne York MD      multivitamin-minerals  1 tablet Oral Daily Rosanne York MD      pyridoxine  50 mg Oral Daily Rosanne York MD      warfarin  5 mg Oral Once per day on Mon Tue Wed Fri Rosanne York MD      warfarin  7 5 mg Oral Once per day on Thu Rosanne York MD          Today, Patient Was Seen By: Will Gibson MD    ** Please Note: Dictation voice to text software may have been used in the creation of this document   **

## 2023-04-28 NOTE — CONSULTS
Vancomycin IV Pharmacy-to-Dose Consultation    Frantz Mosher is a 80 y o  male who was receiving Vancomycin IV with management by the Pharmacy Consult service for treatment of Soft tissue (goal -600, trough >10)    The patient's Vancomycin therapy has been discontinued  Thank you for allowing us to take part in this patient's care  Pharmacy will sign-off now; please call or re-consult if there are any questions          Juwan Mac, PharmD  Pharmacist

## 2023-04-28 NOTE — ASSESSMENT & PLAN NOTE
Lab Results   Component Value Date    EGFR 54 04/28/2023    EGFR 60 04/27/2023    EGFR 60 04/26/2023    CREATININE 1 22 04/28/2023    CREATININE 1 12 04/27/2023    CREATININE 1 12 04/26/2023   Baseline creatinine 1 2 since 2020  Currently at baseline     Avoid nephrotoxins   Continue monitoring BMP    I's and O's/daily weights

## 2023-04-29 ENCOUNTER — APPOINTMENT (OUTPATIENT)
Dept: RADIOLOGY | Facility: HOSPITAL | Age: 83
End: 2023-04-29

## 2023-04-29 LAB
ANION GAP SERPL CALCULATED.3IONS-SCNC: 3 MMOL/L (ref 4–13)
BUN SERPL-MCNC: 27 MG/DL (ref 5–25)
CALCIUM SERPL-MCNC: 8.8 MG/DL (ref 8.3–10.1)
CHLORIDE SERPL-SCNC: 105 MMOL/L (ref 96–108)
CO2 SERPL-SCNC: 31 MMOL/L (ref 21–32)
CREAT SERPL-MCNC: 1.34 MG/DL (ref 0.6–1.3)
GFR SERPL CREATININE-BSD FRML MDRD: 48 ML/MIN/1.73SQ M
GLUCOSE SERPL-MCNC: 113 MG/DL (ref 65–140)
INR PPP: 2.29 (ref 0.84–1.19)
POTASSIUM SERPL-SCNC: 3.3 MMOL/L (ref 3.5–5.3)
PROTHROMBIN TIME: 25.5 SECONDS (ref 11.6–14.5)
SODIUM SERPL-SCNC: 139 MMOL/L (ref 135–147)

## 2023-04-29 RX ADMIN — Medication 1 TABLET: at 09:13

## 2023-04-29 RX ADMIN — CEFAZOLIN SODIUM 2000 MG: 2 SOLUTION INTRAVENOUS at 04:45

## 2023-04-29 RX ADMIN — Medication 50 MG: at 09:20

## 2023-04-29 RX ADMIN — LIDOCAINE 5% 1 PATCH: 700 PATCH TOPICAL at 09:13

## 2023-04-29 RX ADMIN — CEFAZOLIN SODIUM 2000 MG: 2 SOLUTION INTRAVENOUS at 16:15

## 2023-04-29 RX ADMIN — FOLIC ACID 2 MG: 1 TABLET ORAL at 09:13

## 2023-04-29 RX ADMIN — CEFAZOLIN SODIUM 2000 MG: 2 SOLUTION INTRAVENOUS at 20:52

## 2023-04-29 RX ADMIN — FUROSEMIDE 40 MG: 10 INJECTION, SOLUTION INTRAMUSCULAR; INTRAVENOUS at 09:18

## 2023-04-29 RX ADMIN — METOPROLOL SUCCINATE 25 MG: 25 TABLET, EXTENDED RELEASE ORAL at 09:13

## 2023-04-29 RX ADMIN — FUROSEMIDE 40 MG: 10 INJECTION, SOLUTION INTRAMUSCULAR; INTRAVENOUS at 16:15

## 2023-04-29 RX ADMIN — COLCHICINE 0.6 MG: 0.6 TABLET ORAL at 09:20

## 2023-04-29 NOTE — ASSESSMENT & PLAN NOTE
Patient has chronic venous insufficiency for past 1 to 2 years, on PRN lasix at home  As per admission note, patient has 2+ pitting edema, worsened to 4+ pitting edema  started IV Lasix 40 mg twice daily  Monitor intake and output, daily weight  CXR negative  Echo LVEF 65%, biatrial enlargement, severe right atrial enlargement, consider amyloidosis  Cardio consult appreciated  Pending NM cardiac amyloidosis, SPEP/UPEP, free light chain  On exam, lower extremity edema improving, 2+ today  Creatinine 1 34  will discuss with Cardio   May consider switching to PO Lasix

## 2023-04-29 NOTE — PLAN OF CARE
Problem: PAIN - ADULT  Goal: Verbalizes/displays adequate comfort level or baseline comfort level  Description: Interventions:  - Encourage patient to monitor pain and request assistance  - Assess pain using appropriate pain scale  - Administer analgesics based on type and severity of pain and evaluate response  - Implement non-pharmacological measures as appropriate and evaluate response  - Consider cultural and social influences on pain and pain management  - Notify physician/advanced practitioner if interventions unsuccessful or patient reports new pain  4/29/2023 1630 by Juliet Campos RN  Outcome: Progressing  4/29/2023 1627 by Juliet Campos RN  Outcome: Progressing

## 2023-04-29 NOTE — ASSESSMENT & PLAN NOTE
· Patient has a history of chronic venous insufficiency with lower extremity edema and occasional weeping  Patient presented with bubbling of skin layer, skin weeping, redness of bilateral lower extremities not improving on oral antibiotics prescribed outpatient by PCP (but him and wife do not know which one)  · Patient has a history of chronic DVT in RLE from 2014 which was treated with Xarelto  · Lower extremity Dopplers did not show any DVTs bilateral this admission  · BNP elevated to almost 2000  · Patient was on IV vancomycin  MRSA negative, de-escalate to IV cefazolin  · Cellulitis and wounds are improving on IV cefazolin   Will switch to PO keflex once ready to be discharged to complete 14 days  · As per podiatry recs, started Colcrys for acute gout flareup on right 1st toe x 2 weeks

## 2023-04-29 NOTE — ASSESSMENT & PLAN NOTE
Lab Results   Component Value Date    EGFR 48 04/29/2023    EGFR 54 04/28/2023    EGFR 60 04/27/2023    CREATININE 1 34 (H) 04/29/2023    CREATININE 1 22 04/28/2023    CREATININE 1 12 04/27/2023    Baseline creatinine 1 2 since 2020   Today creatinine 1 34   On IV lasix      Continue monitoring BMP    I's and O's/daily weights

## 2023-04-29 NOTE — PROGRESS NOTES
[address] [facility]  Progress Note  Name: Gala Chew  MRN: [id number]  [facility] Date of Admission: 4/24/2023   Date of Service: 4/29/2023 I Hospital Day: 5    Assessment/Plan   * Lower extremity cellulitis  Assessment & Plan  · Patient has a history of chronic venous insufficiency with lower extremity edema and occasional weeping  Patient presented with bubbling of skin layer, skin weeping, redness of bilateral lower extremities not improving on oral antibiotics prescribed outpatient by PCP (but him and wife do not know which one)  · Patient has a history of chronic DVT in RLE from 2014 which was treated with Xarelto  · Lower extremity Dopplers did not show any DVTs bilateral this admission  · BNP elevated to almost 2000  · Patient was on IV vancomycin  MRSA negative, de-escalate to IV cefazolin  · Cellulitis and wounds are improving on IV cefazolin  Will switch to PO keflex once ready to be discharged to complete 14 days  · As per podiatry recs, started Colcrys for acute gout flareup on right 1st toe x 2 weeks    Hyperbilirubinemia  Assessment & Plan  History of hyperbilirubinemia seen on labs  Total bilirubin elevated to 2 9 this admission from a baseline of 1 2-1 8  · No recent abdominal imaging  · No evidence of abdominal pain or hemolysis with normal hemoglobin    Stage 3a chronic kidney disease (CKD) [facility])  Assessment & Plan  Lab Results   Component Value Date    EGFR 48 04/29/2023    EGFR 54 04/28/2023    EGFR 60 04/27/2023    CREATININE 1 34 (H) 04/29/2023    CREATININE 1 22 04/28/2023    CREATININE 1 12 04/27/2023    Baseline creatinine 1 2 since 2020   Today creatinine 1 34   On IV lasix      Continue monitoring BMP    I's and O's/daily weights    ESTER (obstructive sleep apnea)  Assessment & Plan  · CPAP HS    Bilateral lower extremity edema  Assessment & Plan  Patient has chronic venous insufficiency for past 1 to 2 years, on PRN lasix at home  As per admission note, patient has 2+ pitting edema, worsened to 4+ pitting edema  started IV Lasix 40 mg twice daily  Monitor intake and output, daily weight  CXR negative  Echo LVEF 65%, biatrial enlargement, severe right atrial enlargement, consider amyloidosis  Cardio consult appreciated  Pending NM cardiac amyloidosis, SPEP/UPEP, free light chain  On exam, lower extremity edema improving, 2+ today  Creatinine 1 34  will discuss with Cardio  May consider switching to PO Lasix    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 [facility])  Assessment & Plan  Patient has hx of right lower extremity DVT in 2014 at which time he was seen by hematology & placed on Xarelto for 6 months  Patient had coagulability testing in the past; patient has MTHFR mutation   Lower extremity Dopplers this admission shows no signs of DVT bilaterally   Currently patient is on warfarin for persistent A-fib   Continue folate supplementation in setting of MTHFR mut   INR therapeutic, continue coumadin    Morbid obesity ([facility] [address] )  Assessment & Plan  Body mass index is 33 37 kg/m²  · Affects all aspects of care, encourage weight loss    Persistent atrial fibrillation (HCC)  Assessment & Plan  History of AF on beta blocker and warfarin  INR 2 2 on admission, at baseline  Currently RRR on exam    · Continue toprol xl 25 mg daily  · Warfarin 5 mg Mon, Tue, Wed, Fri; 7 5 mg on Thurs  · Follow INR-PT    Hypertension  Assessment & Plan  BP controlled    · Continue Toprol xl 25 mg daily  · Losartan Held to make room for IV Lasix                VTE Pharmacologic Prophylaxis:   Pharmacologic: Warfarin (Coumadin)  Mechanical VTE Prophylaxis in Place: No     Patient Centered Rounds: I have performed bedside rounds with nursing staff today           Current Length of Stay: 5 day(s)    Current Patient Status: Inpatient   Certification Statement: The patient will continue to require additional inpatient hospital stay due to pendign IV diuresis, pendnig cardiac nuclear medicine image    Discharge Plan: pendign progress    Code Status: Level 1 - Full Code      Subjective:   Patient examined at bedside  No dyspnea reported  Leg edema improving  No further edema noted above knee  Noted 2+ pitting edema  Awaiting cardiac scan    Objective:     Vitals:   Temp (24hrs), Av 7 °F (36 5 °C), Min:97 5 °F (36 4 °C), Max:97 9 °F (36 6 °C)    Temp:  [97 5 °F (36 4 °C)-97 9 °F (36 6 °C)] 97 9 °F (36 6 °C)  HR:  [] 108  Resp:  [16-17] 17  BP: (110-129)/(74-94) 117/74  SpO2:  [96 %-99 %] 96 %  Body mass index is 33 37 kg/m²  Input and Output Summary (last 24 hours): Intake/Output Summary (Last 24 hours) at 2023 1130  Last data filed at 2023 0451  Gross per 24 hour   Intake 480 ml   Output 4850 ml   Net -4370 ml       Physical Exam:     Physical Exam  Vitals and nursing note reviewed  Constitutional:       General: He is not in acute distress  Appearance: He is well-developed  He is obese  HENT:      Head: Normocephalic and atraumatic  Eyes:      Conjunctiva/sclera: Conjunctivae normal    Cardiovascular:      Heart sounds: No murmur heard  Pulmonary:      Effort: Pulmonary effort is normal  No respiratory distress  Abdominal:      Palpations: Abdomen is soft  Tenderness: There is no abdominal tenderness  Musculoskeletal:         General: No swelling  Cervical back: Neck supple  Right lower leg: Edema present  Left lower leg: Edema present  Skin:     General: Skin is warm and dry  Capillary Refill: Capillary refill takes less than 2 seconds  Neurological:      Mental Status: He is alert     Psychiatric:         Mood and Affect: Mood normal           Additional Data:     Labs:    Results from last 7 days   Lab Units 23  0516 23  0610   WBC Thousand/uL 8 87 9 82   HEMOGLOBIN g/dL 16 5 15 3   HEMATOCRIT % 47 8 46 1   PLATELETS Thousands/uL 189 184 NEUTROS PCT %  --  66   LYMPHS PCT %  --  16   MONOS PCT %  --  14*   EOS PCT %  --  2     Results from last 7 days   Lab Units 04/29/23  0452 04/26/23  0610 04/25/23  0456   SODIUM mmol/L 139   < > 138   POTASSIUM mmol/L 3 3*   < > 4 0   CHLORIDE mmol/L 105   < > 106   CO2 mmol/L 31   < > 27   BUN mg/dL 27*   < > 22   CREATININE mg/dL 1 34*   < > 1 11   ANION GAP mmol/L 3*   < > 5   CALCIUM mg/dL 8 8   < > 8 7   ALBUMIN g/dL  --   --  2 7*   TOTAL BILIRUBIN mg/dL  --   --  2 43*   ALK PHOS U/L  --   --  75   ALT U/L  --   --  27   AST U/L  --   --  27   GLUCOSE RANDOM mg/dL 113   < > 115    < > = values in this interval not displayed  Results from last 7 days   Lab Units 04/29/23  0452   INR  2 29*             Results from last 7 days   Lab Units 04/24/23  1046   PROCALCITONIN ng/ml 0 09                Recent Cultures (last 7 days):           Last 24 Hours Medication List:   Current Facility-Administered Medications   Medication Dose Route Frequency Provider Last Rate    cefazolin  2,000 mg Intravenous Q8H Kenisha Keith MD 2,000 mg (04/29/23 0445)    colchicine  0 6 mg Oral Daily Teri Linda MD      folic acid  2 mg Oral Daily Chey Plata MD      furosemide  40 mg Intravenous BID (diuretic) Teri Linda MD      lidocaine  1 patch Topical Daily LIVIA Carlson      metoprolol succinate  25 mg Oral Daily Chey Plata MD      multivitamin-minerals  1 tablet Oral Daily Chey Plata MD      pyridoxine  50 mg Oral Daily Chey Plata MD      warfarin  5 mg Oral Once per day on Mon Tue Wed Fri Chey Plata MD      warfarin  7 5 mg Oral Once per day on Thu Chey Plata MD          Today, Patient Was Seen By: Teri Linda MD    ** Please Note: Dictation voice to text software may have been used in the creation of this document   **

## 2023-04-30 LAB
ANION GAP SERPL CALCULATED.3IONS-SCNC: 4 MMOL/L (ref 4–13)
BUN SERPL-MCNC: 29 MG/DL (ref 5–25)
CALCIUM SERPL-MCNC: 9.4 MG/DL (ref 8.3–10.1)
CHLORIDE SERPL-SCNC: 100 MMOL/L (ref 96–108)
CO2 SERPL-SCNC: 34 MMOL/L (ref 21–32)
CREAT SERPL-MCNC: 1.5 MG/DL (ref 0.6–1.3)
ERYTHROCYTE [DISTWIDTH] IN BLOOD BY AUTOMATED COUNT: 12.1 % (ref 11.6–15.1)
GFR SERPL CREATININE-BSD FRML MDRD: 42 ML/MIN/1.73SQ M
GLUCOSE SERPL-MCNC: 119 MG/DL (ref 65–140)
HCT VFR BLD AUTO: 51 % (ref 36.5–49.3)
HGB BLD-MCNC: 17.5 G/DL (ref 12–17)
INR PPP: 2.39 (ref 0.84–1.19)
MCH RBC QN AUTO: 32.3 PG (ref 26.8–34.3)
MCHC RBC AUTO-ENTMCNC: 34.3 G/DL (ref 31.4–37.4)
MCV RBC AUTO: 94 FL (ref 82–98)
PLATELET # BLD AUTO: 218 THOUSANDS/UL (ref 149–390)
PMV BLD AUTO: 10.6 FL (ref 8.9–12.7)
POTASSIUM SERPL-SCNC: 3.4 MMOL/L (ref 3.5–5.3)
PROTHROMBIN TIME: 26.4 SECONDS (ref 11.6–14.5)
RBC # BLD AUTO: 5.41 MILLION/UL (ref 3.88–5.62)
SODIUM SERPL-SCNC: 138 MMOL/L (ref 135–147)
WBC # BLD AUTO: 8.56 THOUSAND/UL (ref 4.31–10.16)

## 2023-04-30 RX ADMIN — LIDOCAINE 5% 1 PATCH: 700 PATCH TOPICAL at 07:53

## 2023-04-30 RX ADMIN — FOLIC ACID 2 MG: 1 TABLET ORAL at 07:54

## 2023-04-30 RX ADMIN — Medication 1 TABLET: at 07:54

## 2023-04-30 RX ADMIN — METOPROLOL SUCCINATE 25 MG: 25 TABLET, EXTENDED RELEASE ORAL at 07:54

## 2023-04-30 RX ADMIN — CEFAZOLIN SODIUM 2000 MG: 2 SOLUTION INTRAVENOUS at 13:48

## 2023-04-30 RX ADMIN — FUROSEMIDE 40 MG: 10 INJECTION, SOLUTION INTRAMUSCULAR; INTRAVENOUS at 07:52

## 2023-04-30 RX ADMIN — Medication 50 MG: at 07:57

## 2023-04-30 RX ADMIN — COLCHICINE 0.6 MG: 0.6 TABLET ORAL at 07:56

## 2023-04-30 RX ADMIN — CEFAZOLIN SODIUM 2000 MG: 2 SOLUTION INTRAVENOUS at 03:32

## 2023-04-30 RX ADMIN — CEFAZOLIN SODIUM 2000 MG: 2 SOLUTION INTRAVENOUS at 19:29

## 2023-04-30 NOTE — ASSESSMENT & PLAN NOTE
Patient has chronic venous insufficiency for past 1 to 2 years, on PRN lasix at home  · As per admission note, patient has 2+ pitting edema, worsened to 4+ pitting edema  · started IV Lasix 40 mg twice daily  · Monitor intake and output, daily weight  · CXR negative  · Echo LVEF 65%, biatrial enlargement, severe right atrial enlargement, consider amyloidosis  · Cardio consult appreciated  · Pending NM cardiac amyloidosis, SPEP/UPEP, free light chain  · On exam, lower extremity edema improving, 2+ today  · Creatinine 1 5  I discussed with cardio Dr Marisol Salas  Will administer one dose IV lasix today, then hold   F/U Creatinine tomorrow

## 2023-04-30 NOTE — PLAN OF CARE
Problem: MOBILITY - ADULT  Goal: Maintain or return to baseline ADL function  Description: INTERVENTIONS:  -  Assess patient's ability to carry out ADLs; assess patient's baseline for ADL function and identify physical deficits which impact ability to perform ADLs (bathing, care of mouth/teeth, toileting, grooming, dressing, etc )  - Assess/evaluate cause of self-care deficits   - Assess range of motion  - Assess patient's mobility; develop plan if impaired  - Assess patient's need for assistive devices and provide as appropriate  - Encourage maximum independence but intervene and supervise when necessary  - Involve family in performance of ADLs  - Assess for home care needs following discharge   - Consider OT consult to assist with ADL evaluation and planning for discharge  - Provide patient education as appropriate  Outcome: Progressing  Goal: Maintains/Returns to pre admission functional level  Description: INTERVENTIONS:  - Perform BMAT or MOVE assessment daily    - Set and communicate daily mobility goal to care team and patient/family/caregiver     - Collaborate with rehabilitation services on mobility goals if consulted  - Stand patient 3 times a day  - Ambulate patient 3 times a day  - Out of bed to chair 2  times a day   - Out of bed for toileting  - Record patient progress and toleration of activity level   Outcome: Progressing     Problem: PAIN - ADULT  Goal: Verbalizes/displays adequate comfort level or baseline comfort level  Description: Interventions:  - Encourage patient to monitor pain and request assistance  - Assess pain using appropriate pain scale  - Administer analgesics based on type and severity of pain and evaluate response  - Implement non-pharmacological measures as appropriate and evaluate response  - Consider cultural and social influences on pain and pain management  - Notify physician/advanced practitioner if interventions unsuccessful or patient reports new pain  Outcome: Progressing     Problem: INFECTION - ADULT  Goal: Absence or prevention of progression during hospitalization  Description: INTERVENTIONS:  - Assess and monitor for signs and symptoms of infection  - Monitor lab/diagnostic results  - Monitor all insertion sites, i e  indwelling lines, tubes, and drains  - Monitor endotracheal if appropriate and nasal secretions for changes in amount and color  - [place] appropriate cooling/warming therapies per order  - Administer medications as ordered  - Instruct and encourage patient and family to use good hand hygiene technique  - Identify and instruct in appropriate isolation precautions for identified infection/condition  Outcome: Progressing  Goal: Absence of fever/infection during neutropenic period  Description: INTERVENTIONS:  - Monitor WBC    Outcome: Progressing     Problem: DISCHARGE PLANNING  Goal: Discharge to home or other facility with appropriate resources  Description: INTERVENTIONS:  - Identify barriers to discharge w/patient and caregiver  - Arrange for needed discharge resources and transportation as appropriate  - Identify discharge learning needs (meds, wound care, etc )  - Arrange for interpretive services to assist at discharge as needed  - Refer to Case Management Department for coordinating discharge planning if the patient needs post-hospital services based on physician/advanced practitioner order or complex needs related to functional status, cognitive ability, or social support system  Outcome: Progressing     Problem: Knowledge Deficit  Goal: Patient/family/caregiver demonstrates understanding of disease process, treatment plan, medications, and discharge instructions  Description: Complete learning assessment and assess knowledge base    Interventions:  - Provide teaching at level of understanding  - Provide teaching via preferred learning methods  Outcome: Progressing     Problem: Prexisting or High Potential for Compromised Skin Integrity  Goal: Skin integrity is maintained or improved  Description: INTERVENTIONS:  - Identify patients at risk for skin breakdown  - Assess and monitor skin integrity  - Assess and monitor nutrition and hydration status  - Monitor labs   - Assess for incontinence   - Turn and reposition patient  - Assist with mobility/ambulation  - Relieve pressure over bony prominences  - Avoid friction and shearing  - Provide appropriate hygiene as needed including keeping skin clean and dry  - Evaluate need for skin moisturizer/barrier cream  - Collaborate with interdisciplinary team   - Patient/family teaching  - Consider wound care consult   Outcome: Progressing

## 2023-04-30 NOTE — ASSESSMENT & PLAN NOTE
Lab Results   Component Value Date    EGFR 42 04/30/2023    EGFR 48 04/29/2023    EGFR 54 04/28/2023    CREATININE 1 50 (H) 04/30/2023    CREATININE 1 34 (H) 04/29/2023    CREATININE 1 22 04/28/2023    Baseline creatinine 1 2 since 2020   Today creatinine 1 5   On IV lasix      Continue monitoring BMP    I's and O's/daily weights

## 2023-04-30 NOTE — PROGRESS NOTES
"Cardiology Progress Note - Severo Lovett 80 y o  male MRN: [id number]    Unit/Bed#: [facility] 816-01 Encounter: [id number]        Subjective:    No significant events overnight  No chest pain or shortness of breath  Review of Systems   Constitutional: Negative for malaise/fatigue  Cardiovascular: Negative for chest pain  Respiratory: Negative for shortness of breath  Objective:   Vitals: Blood pressure 125/78, pulse (!) 112, temperature 97 8 °F (36 6 °C), temperature source Oral, resp  rate 16, height 6' 2\" (1 88 m), weight 116 kg (256 lb 13 4 oz), SpO2 97 %  , Body mass index is 32 98 kg/m² ,   Orthostatic Blood Pressures    Flowsheet Row Most Recent Value   Blood Pressure 125/78 filed at 2023 0737   Patient Position - Orthostatic VS Lying filed at 2023 3038         Systolic (59EYT), GMT:344 , Min:115 , POLLY:577     Diastolic (76FMD), AV, Min:74, Max:78      Intake/Output Summary (Last 24 hours) at 2023 0847  Last data filed at 2023 2137  Gross per 24 hour   Intake 480 ml   Output 2150 ml   Net -1670 ml     Weight (last 2 days)     Date/Time Weight    23 1645 116 (256 84)    23 0600 118 (259 92)                Physical Exam  Vitals reviewed  Constitutional:       Appearance: Normal appearance  HENT:      Head: Normocephalic  Nose: Nose normal       Mouth/Throat:      Mouth: Mucous membranes are moist       Pharynx: Oropharynx is clear  Eyes:      General: No scleral icterus  Conjunctiva/sclera: Conjunctivae normal    Cardiovascular:      Rate and Rhythm: Normal rate  Rhythm irregular  Heart sounds: No murmur heard  No friction rub  No gallop  Pulmonary:      Effort: Pulmonary effort is normal  No respiratory distress  Breath sounds: Normal breath sounds  No wheezing or rales  Abdominal:      General: Abdomen is flat  Bowel sounds are normal  There is no distension  Palpations: Abdomen is soft  Tenderness:  There is no " abdominal tenderness  There is no guarding  Musculoskeletal:      Cervical back: Normal range of motion and neck supple  Right lower leg: Edema present  Left lower leg: Edema present  Skin:     General: Skin is warm and dry  Neurological:      General: No focal deficit present  Mental Status: He is alert and oriented to person, place, and time     Psychiatric:         Mood and Affect: Mood normal          Behavior: Behavior normal            Laboratory Results:        CBC with diff:   Results from last 7 days   Lab Units 04/30/23 0456 04/27/23  0516 04/26/23  0610 04/25/23  0456 04/24/23  1046   WBC Thousand/uL 8 56 8 87 9 82 9 04 9 72   HEMOGLOBIN g/dL 17 5* 16 5 15 3 16 0 15 8   HEMATOCRIT % 51 0* 47 8 46 1 46 9 46 3   MCV fL 94 96 98 96 95   PLATELETS Thousands/uL 218 189 184 176 186   MCH pg 32 3 33 0 32 6 32 9 32 5   MCHC g/dL 34 3 34 5 33 2 34 1 34 1   RDW % 12 1 12 3 12 5 12 3 12 4   MPV fL 10 6 10 6 11 4 11 2 11 2   NRBC AUTO /100 WBCs  --   --  0 0 0         CMP:  Results from last 7 days   Lab Units 04/30/23 0456 04/29/23 0452 04/28/23  0525 04/27/23  0516 04/26/23  0610 04/25/23  0456 04/24/23  1046   POTASSIUM mmol/L 3 4* 3 3* 3 4* 3 7 4 0 4 0 3 9   CHLORIDE mmol/L 100 105 104 105 107 106 107   CO2 mmol/L 34* 31 30 26 29 27 27   BUN mg/dL 29* 27* 25 22 23 22 25   CREATININE mg/dL 1 50* 1 34* 1 22 1 12 1 12 1 11 1 24   CALCIUM mg/dL 9 4 8 8 9 3 9 1 8 7 8 7 8 4   AST U/L  --   --   --   --   --  27 27   ALT U/L  --   --   --   --   --  27 31   ALK PHOS U/L  --   --   --   --   --  75 84   EGFR ml/min/1 73sq m 42 48 54 60 60 61 53         BMP:  Results from last 7 days   Lab Units 04/30/23 0456 04/29/23 0452 04/28/23  0525 04/27/23  0516 04/26/23  0610 04/25/23  0456 04/24/23  1046   POTASSIUM mmol/L 3 4* 3 3* 3 4* 3 7 4 0 4 0 3 9   CHLORIDE mmol/L 100 105 104 105 107 106 107   CO2 mmol/L 34* 31 30 26 29 27 27   BUN mg/dL 29* 27* 25 22 23 22 25   CREATININE mg/dL 1 50* 1 34* 1 22 1  12 1 12 1 11 1 24   CALCIUM mg/dL 9 4 8 8 9 3 9 1 8 7 8 7 8 4       BNP: No results for input(s): BNP in the last 72 hours  Magnesium:       Coags:   Results from last 7 days   Lab Units 23  0456 23  0452 23  0525 23  1417 23  0610 23  0456 23  1046   PTT seconds  --   --   --   --   --   --  45*   INR  2 39* 2 29* 2 02* 1 84* 2 05* 2 29* 2 17*       TSH: No results found for: TSH    Hemoglobin A1C       Lipid Profile:       Cardiac testing:   Results for orders placed during the hospital encounter of 20    Echo complete with contrast if indicated    Narrative  [address] [place] [address]    Transthoracic Echocardiogram  2D, M-mode, Doppler, and Color Doppler    Study date:  2020    Patient: Larisa Davila  MR number: [id number]  Account number: [de-identified]  : 1940  Age: 78 years  Gender: Male  Status: Outpatient  Location: [facility]  Height: 75 in  Weight: 264 4 lb  BP: 110/ 70 mmHg    Indications: Persistent Afib    Diagnoses: I48 0 - Atrial fibrillation    Sonographer:  JELENA Lynch  Primary Physician:  Brayton Caller, DO  Referring Physician:  Bobby Hermosillo MD  Group:  [facility]  Interpreting Physician:  Stef Richard MD    SUMMARY    LEFT VENTRICLE:  Systolic function was normal by visual assessment  Ejection fraction was estimated in the range of 60 % to 70 %  Atrial fibrillation  Although no diagnostic regional wall motion abnormality was identified, this possibility cannot be completely excluded on the basis of this study  Wall thickness was moderately increased  There was moderate concentric hypertrophy  LEFT ATRIUM:  The atrium was moderately dilated  RIGHT ATRIUM:  The atrium was moderately dilated  MITRAL VALVE:  There was mild annular calcification  There was trace regurgitation      TRICUSPID VALVE:  There was mild regurgitation  HISTORY: PRIOR HISTORY: ESTER; Aortic ectasia; HTN; HLD; PACs; PVCs; Afib; Vitamin D deficiency; Blood clot in vein; F  smoker    PROCEDURE: The study was performed in the [facility]  This was a routine study  The transthoracic approach was used  The study included complete 2D imaging, M-mode, complete spectral Doppler, and color Doppler  Echocardiographic views were limited due to decreased penetration and lung interference  This was a technically difficult study  LEFT VENTRICLE: Size was normal  Systolic function was normal by visual assessment  Ejection fraction was estimated in the range of 60 % to 70 %  Atrial fibrillation Although no diagnostic regional wall motion abnormality was identified,  this possibility cannot be completely excluded on the basis of this study  Wall thickness was moderately increased  There was moderate concentric hypertrophy  DOPPLER: Transmitral flow pattern: atrial fibrillation  RIGHT VENTRICLE: The size was normal  Systolic function was normal  Wall thickness was normal  Not well visualized  LEFT ATRIUM: The atrium was moderately dilated  RIGHT ATRIUM: The atrium was moderately dilated  MITRAL VALVE: There was mild annular calcification  There was mild thickening  Not well visualized  DOPPLER: There was no evidence for stenosis  There was trace regurgitation  AORTIC VALVE: The valve was trileaflet  Leaflets exhibited normal thickness, normal cuspal separation, and sclerosis  DOPPLER: Transaortic velocity was within the normal range  There was no evidence for stenosis  There was no  regurgitation  TRICUSPID VALVE: Not well visualized  DOPPLER: There was no evidence for stenosis  There was mild regurgitation  Pulmonary artery systolic pressure was mildly increased  PULMONIC VALVE: Not well visualized  DOPPLER: The transpulmonic velocity was within the normal range  There was no regurgitation      PERICARDIUM: There was no pericardial effusion  The pericardium was normal in appearance  AORTA: The root exhibited normal size  SYSTEMIC VEINS: IVC: The inferior vena cava was not well visualized  SYSTEM MEASUREMENT TABLES    2D  %FS: 32 53 %  Ao Diam: 3 35 cm  EDV(Teich): 69 79 ml  EF(Teich): 61 45 %  ESV(Cube): 19 58 ml  ESV(Teich): 26 91 ml  IVSd: 1 45 cm  LA Area: 29 24 cm2  LA Diam: 4 14 cm  LVEDV MOD A4C: 113 ml  LVEF MOD A4C: 72 56 %  LVESV MOD A4C: 31 ml  LVIDd: 4 cm  LVIDs: 2 7 cm  LVLd A4C: 9 08 cm  LVLs A4C: 6 35 cm  LVPWd: 1 51 cm  RA Area: 23 89 cm2  RV Diam : 3 88 cm  SI(Cube): 17 81 ml/m2  SI(Teich): 17 29 ml/m2  SV MOD A4C: 82 ml  SV(Cube): 44 18 ml  SV(Teich): 42 89 ml    CW  TR Vmax: 2 83 m/s  TR maxP 05 mmHg    MM  TAPSE: 1 99 cm    PW  E': 0 08 m/s  E/E': 11 95  MV A Dain: 0 22 m/s  MV Dec McNairy: 5 58 m/s2  MV DecT: 166 13 ms  MV E Dain: 0 93 m/s  MV E/A Ratio: 4 27    [employer] Accredited Echocardiography Laboratory    Prepared and electronically signed by    Deborah Sarabia MD  Signed 2020 15:02:12    No results found for this or any previous visit  No results found for this or any previous visit  No results found for this or any previous visit        Meds/Allergies   current meds:   Current Facility-Administered Medications   Medication Dose Route Frequency    ceFAZolin (ANCEF) IVPB (premix in dextrose) 2,000 mg 50 mL  2,000 mg Intravenous Q8H    colchicine (COLCRYS) tablet 0 6 mg  0 6 mg Oral Daily    folic acid (FOLVITE) tablet 2 mg  2 mg Oral Daily    lidocaine (LIDODERM) 5 % patch 1 patch  1 patch Topical Daily    metoprolol succinate (TOPROL-XL) 24 hr tablet 25 mg  25 mg Oral Daily    multivitamin-minerals (CENTRUM) tablet 1 tablet  1 tablet Oral Daily    pyridoxine (VITAMIN B6) tablet 50 mg  50 mg Oral Daily    warfarin (COUMADIN) tablet 5 mg  5 mg Oral Once per day on     warfarin (COUMADIN) tablet 7 5 mg  7 5 mg Oral Once per day on Thu Medications Prior to Admission   Medication    Cholecalciferol (VITAMIN D-3 PO)    folic acid (FOLVITE) 1 mg tablet    furosemide (LASIX) 20 mg tablet    losartan (COZAAR) 50 mg tablet    metoprolol succinate (TOPROL-XL) 25 mg 24 hr tablet    multivitamin-minerals (CENTRUM) tablet    pyridoxine (B-6) 25 MG tablet    warfarin (COUMADIN) 5 mg tablet    clobetasol (TEMOVATE) 0 05 % cream    dexamethasone sodium phosphate 0 1 % ophthalmic solution          Assessment:  Principal Problem:    Lower extremity cellulitis  Active Problems:    Hypertension    Persistent atrial fibrillation (HCC)    Morbid obesity (HCC)    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 (HCC)    Bilateral lower extremity edema    ESTER (obstructive sleep apnea)    Stage 3a chronic kidney disease (CKD) (HCC)    Hyperbilirubinemia    Plan:    Acute on Chronic Diastolic CHF: PYP scan pending to eval  For cardiac amyloid  Cr 1 5  today  GIve one dose of IV diuretic today and then see where Cr  Is tomorrow  He would benefit from further diuresis  Persistent AF: Continue rate control with metoprolol and continue AC with warfarin  Counseling / Coordination of Care  Total floor / unit time spent today 25 minutes  Greater than 50% of total time was spent with the patient and / or family counseling and / or coordination of care  A description of the counseling / coordination of care

## 2023-04-30 NOTE — PROGRESS NOTES
[address] [facility]  Progress Note  Name: Glenda Perez  MRN: [id number]  Unit/Bed#: [facility] 224-46 I Date of Admission: 4/24/2023   Date of Service: 4/30/2023 I Hospital Day: 6    Assessment/Plan   * Lower extremity cellulitis  Assessment & Plan  · Patient has a history of chronic venous insufficiency with lower extremity edema and occasional weeping  Patient presented with bubbling of skin layer, skin weeping, redness of bilateral lower extremities not improving on oral antibiotics prescribed outpatient by PCP (but him and wife do not know which one)  · Patient has a history of chronic DVT in RLE from 2014 which was treated with Xarelto  · Lower extremity Dopplers did not show any DVTs bilateral this admission  · BNP elevated to almost 2000  · Patient was on IV vancomycin  MRSA negative, de-escalate to IV cefazolin  · Cellulitis and wounds are improving on IV cefazolin  Will switch to PO keflex once ready to be discharged to complete 14 days  · As per podiatry recs, started Colcrys for acute gout flareup on right 1st toe x 2 weeks    Hyperbilirubinemia  Assessment & Plan  History of hyperbilirubinemia seen on labs  Total bilirubin elevated to 2 9 this admission from a baseline of 1 2-1 8  · No recent abdominal imaging  · No evidence of abdominal pain or hemolysis with normal hemoglobin    Stage 3a chronic kidney disease (CKD) [facility])  Assessment & Plan  Lab Results   Component Value Date    EGFR 42 04/30/2023    EGFR 48 04/29/2023    EGFR 54 04/28/2023    CREATININE 1 50 (H) 04/30/2023    CREATININE 1 34 (H) 04/29/2023    CREATININE 1 22 04/28/2023    Baseline creatinine 1 2 since 2020   Today creatinine 1 5   On IV lasix      Continue monitoring BMP    I's and O's/daily weights    ESTER (obstructive sleep apnea)  Assessment & Plan  · CPAP HS    Bilateral lower extremity edema  Assessment & Plan  Patient has chronic venous insufficiency for past 1 to 2 years, on PRN lasix at home  · As per admission note, patient has 2+ pitting edema, worsened to 4+ pitting edema  · started IV Lasix 40 mg twice daily  · Monitor intake and output, daily weight  · CXR negative  · Echo LVEF 65%, biatrial enlargement, severe right atrial enlargement, consider amyloidosis  · Cardio consult appreciated  · Pending NM cardiac amyloidosis, SPEP/UPEP, free light chain  · On exam, lower extremity edema improving, 2+ today  · Creatinine 1 5  I discussed with cardio Dr Fatemeh Phillips  Will administer one dose IV lasix today, then hold  F/U Creatinine tomorrow    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 [facility])  Assessment & Plan  Patient has hx of right lower extremity DVT in 2014 at which time he was seen by hematology & placed on Xarelto for 6 months  Patient had coagulability testing in the past; patient has MTHFR mutation   Lower extremity Dopplers this admission shows no signs of DVT bilaterally   Currently patient is on warfarin for persistent A-fib   Continue folate supplementation in setting of MTHFR mut   INR therapeutic, continue coumadin    Morbid obesity (HCC)  Assessment & Plan  Body mass index is 32 98 kg/m²  · Affects all aspects of care, encourage weight loss    Persistent atrial fibrillation (HCC)  Assessment & Plan  History of AF on beta blocker and warfarin  INR 2 2 on admission, at baseline  Currently RRR on exam    · Continue toprol xl 25 mg daily  · Warfarin 5 mg Mon, Tue, Wed, Fri; 7 5 mg on Thurs  · Follow INR-PT    Hypertension  Assessment & Plan  BP controlled  · Continue Toprol xl 25 mg daily  · Losartan Held to make room for IV Lasix           VTE Pharmacologic Prophylaxis:   Pharmacologic: Warfarin (Coumadin)  Mechanical VTE Prophylaxis in Place: Yes    Patient Centered Rounds: I have performed bedside rounds with nursing staff today      Discussions with Specialists or Other Care Team Provider: Cardiology Dr Fatemeh Phillips    Education and Discussions with Family / Patient: discussed with wife in person       Current Length of Stay: 6 day(s)    Current Patient Status: Inpatient   Certification Statement: The patient will continue to require additional inpatient hospital stay due to pendign progress    Discharge Plan: hopefully tomorrow to home    Code Status: Level 1 - Full Code      Subjective:   Patient examined at bedside  Leg edema much better  No dyspnea  Creatinine 1 5 today, uptrending    Objective:     Vitals:   Temp (24hrs), Av 8 °F (36 6 °C), Min:97 7 °F (36 5 °C), Max:97 9 °F (36 6 °C)    Temp:  [97 7 °F (36 5 °C)-97 9 °F (36 6 °C)] 97 8 °F (36 6 °C)  HR:  [] 112  Resp:  [16-20] 16  BP: (115-125)/(74-78) 125/78  SpO2:  [96 %-99 %] 97 %  Body mass index is 32 98 kg/m²  Input and Output Summary (last 24 hours): Intake/Output Summary (Last 24 hours) at 2023 1241  Last data filed at 2023 0900  Gross per 24 hour   Intake 1140 ml   Output 2350 ml   Net -1210 ml       Physical Exam:     Physical Exam  Vitals and nursing note reviewed  Constitutional:       General: He is not in acute distress  Appearance: Normal appearance  HENT:      Head: Normocephalic and atraumatic  Right Ear: External ear normal       Left Ear: External ear normal       Nose: Nose normal    Eyes:      Extraocular Movements: Extraocular movements intact  Pulmonary:      Effort: Pulmonary effort is normal    Musculoskeletal:      Cervical back: Normal range of motion  Right lower leg: Edema present  Left lower leg: Edema present  Neurological:      Mental Status: He is alert  Mental status is at baseline     Psychiatric:         Mood and Affect: Mood normal            Additional Data:     Labs:    Results from last 7 days   Lab Units 23  0456 23  0516 23  0610   WBC Thousand/uL 8 56   < > 9 82   HEMOGLOBIN g/dL 17 5*   < > 15 3   HEMATOCRIT % 51 0*   < > 46 1   PLATELETS Thousands/uL 218   < > 184 NEUTROS PCT %  --   --  66   LYMPHS PCT %  --   --  16   MONOS PCT %  --   --  14*   EOS PCT %  --   --  2    < > = values in this interval not displayed  Results from last 7 days   Lab Units 04/30/23  0456 04/26/23  0610 04/25/23  0456   SODIUM mmol/L 138   < > 138   POTASSIUM mmol/L 3 4*   < > 4 0   CHLORIDE mmol/L 100   < > 106   CO2 mmol/L 34*   < > 27   BUN mg/dL 29*   < > 22   CREATININE mg/dL 1 50*   < > 1 11   ANION GAP mmol/L 4   < > 5   CALCIUM mg/dL 9 4   < > 8 7   ALBUMIN g/dL  --   --  2 7*   TOTAL BILIRUBIN mg/dL  --   --  2 43*   ALK PHOS U/L  --   --  75   ALT U/L  --   --  27   AST U/L  --   --  27   GLUCOSE RANDOM mg/dL 119   < > 115    < > = values in this interval not displayed  Results from last 7 days   Lab Units 04/30/23  0456   INR  2 39*             Results from last 7 days   Lab Units 04/24/23  1046   PROCALCITONIN ng/ml 0 09          Recent Cultures (last 7 days):           Last 24 Hours Medication List:   Current Facility-Administered Medications   Medication Dose Route Frequency Provider Last Rate    cefazolin  2,000 mg Intravenous Q8H Kenisha Keith MD 2,000 mg (04/30/23 0332)    colchicine  0 6 mg Oral Daily Mitul Alvarado MD      folic acid  2 mg Oral Daily Cody Islas MD      lidocaine  1 patch Topical Daily LIVIA Grace      metoprolol succinate  25 mg Oral Daily Cody Islas MD      multivitamin-minerals  1 tablet Oral Daily Cody Islas MD      pyridoxine  50 mg Oral Daily Cody Islas MD      warfarin  5 mg Oral Once per day on Mon Tue Wed Fri Cody Islas MD      warfarin  7 5 mg Oral Once per day on Thu Cody Islas MD          Today, Patient Was Seen By: Mitul Alvarado MD    ** Please Note: Dictation voice to text software may have been used in the creation of this document   **

## 2023-05-01 LAB
ALBUMIN SERPL ELPH-MCNC: 3.97 G/DL (ref 3.5–5)
ALBUMIN SERPL ELPH-MCNC: 61.1 % (ref 52–65)
ALBUMIN UR ELPH-MCNC: 100 %
ALPHA1 GLOB MFR UR ELPH: 0 %
ALPHA1 GLOB SERPL ELPH-MCNC: 0.35 G/DL (ref 0.1–0.4)
ALPHA1 GLOB SERPL ELPH-MCNC: 5.4 % (ref 2.5–5)
ALPHA2 GLOB MFR UR ELPH: 0 %
ALPHA2 GLOB SERPL ELPH-MCNC: 0.8 G/DL (ref 0.4–1.2)
ALPHA2 GLOB SERPL ELPH-MCNC: 12.3 % (ref 7–13)
ANION GAP SERPL CALCULATED.3IONS-SCNC: 4 MMOL/L (ref 4–13)
B-GLOBULIN MFR UR ELPH: 0 %
BETA GLOB ABNORMAL SERPL ELPH-MCNC: 0.25 G/DL (ref 0.4–0.8)
BETA1 GLOB SERPL ELPH-MCNC: 3.9 % (ref 5–13)
BETA2 GLOB SERPL ELPH-MCNC: 3.6 % (ref 2–8)
BETA2+GAMMA GLOB SERPL ELPH-MCNC: 0.23 G/DL (ref 0.2–0.5)
BUN SERPL-MCNC: 30 MG/DL (ref 5–25)
CALCIUM SERPL-MCNC: 8.8 MG/DL (ref 8.3–10.1)
CHLORIDE SERPL-SCNC: 101 MMOL/L (ref 96–108)
CO2 SERPL-SCNC: 32 MMOL/L (ref 21–32)
CREAT SERPL-MCNC: 1.42 MG/DL (ref 0.6–1.3)
GAMMA GLOB ABNORMAL SERPL ELPH-MCNC: 0.89 G/DL (ref 0.5–1.6)
GAMMA GLOB MFR UR ELPH: 0 %
GAMMA GLOB SERPL ELPH-MCNC: 13.7 % (ref 12–22)
GFR SERPL CREATININE-BSD FRML MDRD: 45 ML/MIN/1.73SQ M
GLUCOSE SERPL-MCNC: 133 MG/DL (ref 65–140)
IGG/ALB SER: 1.57 {RATIO} (ref 1.1–1.8)
INR PPP: 2.15 (ref 0.84–1.19)
POTASSIUM SERPL-SCNC: 3.4 MMOL/L (ref 3.5–5.3)
PROT PATTERN SERPL ELPH-IMP: ABNORMAL
PROT PATTERN UR ELPH-IMP: NORMAL
PROT SERPL-MCNC: 6.5 G/DL (ref 6.4–8.2)
PROT UR-MCNC: 8 MG/DL
PROTHROMBIN TIME: 24.3 SECONDS (ref 11.6–14.5)
SODIUM SERPL-SCNC: 137 MMOL/L (ref 135–147)

## 2023-05-01 RX ORDER — FUROSEMIDE 10 MG/ML
40 INJECTION INTRAMUSCULAR; INTRAVENOUS
Status: DISCONTINUED | OUTPATIENT
Start: 2023-05-01 | End: 2023-05-03

## 2023-05-01 RX ORDER — FUROSEMIDE 40 MG/1
40 TABLET ORAL DAILY
Status: DISCONTINUED | OUTPATIENT
Start: 2023-05-01 | End: 2023-05-01

## 2023-05-01 RX ADMIN — Medication 50 MG: at 09:12

## 2023-05-01 RX ADMIN — CEFAZOLIN SODIUM 2000 MG: 2 SOLUTION INTRAVENOUS at 05:00

## 2023-05-01 RX ADMIN — COLCHICINE 0.6 MG: 0.6 TABLET ORAL at 09:12

## 2023-05-01 RX ADMIN — CEFAZOLIN SODIUM 2000 MG: 2 SOLUTION INTRAVENOUS at 11:40

## 2023-05-01 RX ADMIN — Medication 1 TABLET: at 09:12

## 2023-05-01 RX ADMIN — FUROSEMIDE 40 MG: 40 TABLET ORAL at 14:23

## 2023-05-01 RX ADMIN — FUROSEMIDE 40 MG: 10 INJECTION, SOLUTION INTRAVENOUS at 17:09

## 2023-05-01 RX ADMIN — CEFAZOLIN SODIUM 2000 MG: 2 SOLUTION INTRAVENOUS at 19:59

## 2023-05-01 RX ADMIN — METOPROLOL SUCCINATE 25 MG: 25 TABLET, EXTENDED RELEASE ORAL at 09:12

## 2023-05-01 RX ADMIN — WARFARIN SODIUM 5 MG: 5 TABLET ORAL at 17:09

## 2023-05-01 RX ADMIN — FOLIC ACID 2 MG: 1 TABLET ORAL at 09:12

## 2023-05-01 NOTE — PROGRESS NOTES
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 Follow-up free light chains  ? Does appear mildly fluid overload - continue IV lasix BID 40 mg   ? Given his atrial fibrillation we will keep him on beta-blocker for now  ? Low yield for CRISS, HIV, hemochromatosis  2   Permament atrial fibrillation  ? Cont  Warfarin  ? Cont  Metoprolol succinate  ? Monitor on tele  3  Chronic thromboembolism  ? On warfarin  4  ESTER  5  CKD    Subjective     Patient seen and examined at bedside, patient seen examined bedside, no acute events overnight, patient denies any acute complaints  No events on telemetry     Objective     Vitals: Temp (24hrs), Av 1 °F (36 7 °C), Min:98 °F (36 7 °C), Max:98 2 °F (36 8 °C)  Current: Temperature: 98 2 °F (36 8 °C)  Patient Vitals for the past 24 hrs:   BP Temp Pulse Resp SpO2 Weight   23 0736 123/78 -- 75 -- 99 % --   23 0600 -- -- -- -- -- 116 kg (254 lb 13 6 oz)   23 2159 125/82 98 2 °F (36 8 °C) 89 16 95 % --   23 1457 123/82 98 °F (36 7 °C) 93 16 98 % --    Body mass index is 32 72 kg/m²  Physical Exam:  Physical Exam  Constitutional:       Appearance: Normal appearance  Cardiovascular:      Rate and Rhythm: Normal rate  Rhythm irregular  Pulmonary:      Effort: Pulmonary effort is normal  No respiratory distress  Abdominal:      General: Abdomen is flat  Musculoskeletal:      Right lower leg: Edema present  Left lower leg: Edema present  Skin:     General: Skin is warm  Neurological:      Mental Status: He is alert  Invasive Devices     Peripheral Intravenous Line  Duration           Peripheral IV 04/27/23 Dorsal (posterior); Left Forearm 3 days    Peripheral IV 05/01/23 Dorsal (posterior); Right Forearm <1 day                    Labs:   Results from last 7 days   Lab Units 04/30/23  0456 04/27/23  0516 04/26/23  0610 04/25/23  0456   WBC Thousand/uL 8 56 8 87 9 82 9 04   HEMOGLOBIN g/dL 17 5* 16 5 15 3 16 0   HEMATOCRIT % 51 0* 47 8 46 1 46 9   PLATELETS Thousands/uL 218 189 184 176   NEUTROS PCT %  --   --  66 68   MONOS PCT %  --   --  14* 14*      Results from last 7 days   Lab Units 05/01/23  1043 05/01/23  0508 04/30/23  0456 04/29/23  0452 04/28/23  0525 04/27/23  1417 04/27/23  0516 04/26/23  0610 04/25/23  0456   SODIUM mmol/L 137  --  138 139 139  --  137 139 138   POTASSIUM mmol/L 3 4*  --  3 4* 3 3* 3 4*  --  3 7 4 0 4 0   CHLORIDE mmol/L 101  --  100 105 104  --  105 107 106   CO2 mmol/L 32  --  34* 31 30  --  26 29 27   BUN mg/dL 30*  --  29* 27* 25  --  22 23 22   CREATININE mg/dL 1 42*  --  1 50* 1 34* 1 22  --  1 12 1 12 1 11   CALCIUM mg/dL 8 8  --  9 4 8 8 9 3  --  9 1 8 7 8 7   ALK PHOS U/L  --   --   --   --   --   --   --   --  75   ALT U/L  --   --   --   --   --   --   --   --  27   AST U/L  --   --   --   --   --   --   --   --  27   INR   --  2 15* 2 39* 2 29* 2 02* 1 84*  --  2 05* 2 29*   EGFR ml/min/1 73sq m 45  --  42 48 54  --  60 60 61     Results from last 7 days   Lab Units 05/01/23  0508 04/30/23  0456 04/29/23  0452 04/28/23  0525 04/27/23  1417 04/26/23  0610 04/25/23  0456   INR  2 15* 2 39* 2 29* 2 02* 1 84* 2 05* 2 29*             No results found for: PHART, DUN2GSX, PO2ART, MST2WVI, T2TWZFOY, BEART, SOURCE  No components found for: HIV1X2  No results found for: HAV, HEPAIGM, HEPBIGM, HEPBCAB, HBEAG, HEPCAB  No results found for: SPEP, UPEP   Lab Results   Component Value Date    HGBA1C 5 6 12/01/2022    HGBA1C 5 8 (H) 05/23/2022    HGBA1C 5 8 (H) 11/30/2021     Lab Results   Component Value Date    CHOL 185 12/17/2015    CHOL 223 (H) 11/26/2013      Lab Results   Component Value Date    HDL 74 12/01/2022    HDL 70 05/23/2022    HDL 78 11/30/2021      Lab Results   Component Value Date    LDLCALC 94 12/01/2022    LDLCALC 99 05/23/2022    LDLCALC 94 11/30/2021      Lab Results   Component Value Date    TRIG 132 12/01/2022    TRIG 86 05/23/2022    TRIG 105 11/30/2021     No components found for: PROCAL      Micro:      Urinalysis:  No results found for: AMPHETUR, BDZUR, COCAINEUR, OPIATEUR, PCPUR, THCUR, ETOH, ACTMNPHEN, SALICYLATE       Invalid input(s): URIBILINOGEN        Intake and Outputs:  I/O       04/29 0701 04/30 0700 04/30 0701 05/01 0700 05/01 0701 05/02 0700    P  O  480 780     Total Intake(mL/kg) 480 (9 1) 780 (6 7)     Urine (mL/kg/hr) 2150 (1 7) 700 (0 3) 200 (0 3)    Stool       Total Output 2150 700 200    Net -1670 +80 -200               Nutrition:  Diet Cardiovascular; Sodium 2 GM; Fluid Restriction 1800 ML  Radiology Results:   NM cardiac amyloidosis   Final Result by Deann Govea MD (05/01 0701)      1  Strongly suggestive of TTR amyloidosis  The protocol and interpretation guidelines are based on the [facility] practice points, released February 2016  Workstation performed: [id number]         XR chest portable   Final Result by Ashley Gordon MD (04/28 1015)      No acute cardiopulmonary disease                    Workstation performed: [id number]         XR chest 2 views   Final Result by Sun Zamora MD (04/24 1419)      No acute cardiopulmonary disease  Workstation performed: [id number]         VAS lower limb venous duplex study, complete bilateral   Final Result by Jose R Dukes MD (04/24 1429)      XR foot 3+ views RIGHT   Final Result by Sun Zamora MD (04/24 1400)      Soft tissue swelling along the dorsum of the foot without an acute osseous abnormality        Workstation performed: [id number]           Scheduled Medications:  cefazolin, 2,000 mg, Q8H  colchicine, 0 6 mg, Daily  folic acid, 2 mg, Daily  lidocaine, 1 patch, Daily  metoprolol succinate, 25 mg, Daily  multivitamin-minerals, 1 tablet, Daily  pyridoxine, 50 mg, Daily  warfarin, 5 mg, Once per day on Mon Tue Wed Fri  warfarin, 7 5 mg, Once per day on Thu      PRN MEDS:     Last 24 Hour Meds: :   Medication Administration - last 24 hours from 04/30/2023 1304 to 05/01/2023 1304       Date/Time Order Dose Route Action Action by     05/01/2023 0912 EDT multivitamin-minerals (CENTRUM) tablet 1 tablet 1 tablet Oral Given Fern Kiran RN     25/03/9634 6865 EDT folic acid (FOLVITE) tablet 2 mg 2 mg Oral Given Fern Kiran RN     05/01/2023 8961 EDT pyridoxine (VITAMIN B6) tablet 50 mg 50 mg Oral Given Fern Kiran RN     05/01/2023 3664 EDT metoprolol succinate (TOPROL-XL) 24 hr tablet 25 mg 25 mg Oral Given Fern Kiran RN     05/01/2023 0912 EDT lidocaine (LIDODERM) 5 % patch 1 patch 1 patch Topical Not Given Fern Kiran RN     05/01/2023 1140 EDT ceFAZolin (ANCEF) IVPB (premix in dextrose) 2,000 mg 50 mL 2,000 mg Intravenous [address] Oskar Umana RN     05/01/2023 0500 EDT ceFAZolin (ANCEF) IVPB (premix in dextrose) 2,000 mg 50 mL 2,000 mg Intravenous [address] Celesta Snooks, [address] [place] [address]     04/30/2023 1929 EDT ceFAZolin (ANCEF) IVPB (premix in dextrose) 2,000 mg 50 mL 2,000 mg Intravenous New Bag Cristina Lobo, RN 04/30/2023 1348 EDT ceFAZolin (ANCEF) IVPB (premix in dextrose) 2,000 mg 50 mL 2,000 mg Intravenous New Bag Farideh Armijo RN     05/01/2023 3084 EDT colchicine (COLCRYS) tablet 0 6 mg 0 6 mg Oral Given Anuja Gallegos RN          PLEASE NOTE:  This encounter was completed utilizing the [employer] Direct Speech Voice Recognition Software  Grammatical errors, random word insertions, pronoun errors and incomplete sentences are occasional consequences of the system due to software limitations, ambient noise and hardware issues  These may be missed by proof reading prior to affixing electronic signature  Any questions or concerns about the content, text or information contained within the body of this dictation should be directly addressed to the physician for clarification  Please do not hesitate to call me directly if you have any any questions or concerns

## 2023-05-01 NOTE — PLAN OF CARE
Problem: MOBILITY - ADULT  Goal: Maintain or return to baseline ADL function  Description: INTERVENTIONS:  -  Assess patient's ability to carry out ADLs; assess patient's baseline for ADL function and identify physical deficits which impact ability to perform ADLs (bathing, care of mouth/teeth, toileting, grooming, dressing, etc )  - Assess/evaluate cause of self-care deficits   - Assess range of motion  - Assess patient's mobility; develop plan if impaired  - Assess patient's need for assistive devices and provide as appropriate  - Encourage maximum independence but intervene and supervise when necessary  - Involve family in performance of ADLs  - Assess for home care needs following discharge   - Consider OT consult to assist with ADL evaluation and planning for discharge  - Provide patient education as appropriate  Outcome: Progressing  Goal: Maintains/Returns to pre admission functional level  Description: INTERVENTIONS:  - Perform BMAT or MOVE assessment daily    - Set and communicate daily mobility goal to care team and patient/family/caregiver     - Collaborate with rehabilitation services on mobility goals if consulted  - Stand patient 3 times a day  - Ambulate patient 3 times a day  - Out of bed to chair 2  times a day   - Out of bed for toileting  - Record patient progress and toleration of activity level   Outcome: Progressing     Problem: PAIN - ADULT  Goal: Verbalizes/displays adequate comfort level or baseline comfort level  Description: Interventions:  - Encourage patient to monitor pain and request assistance  - Assess pain using appropriate pain scale  - Administer analgesics based on type and severity of pain and evaluate response  - Implement non-pharmacological measures as appropriate and evaluate response  - Consider cultural and social influences on pain and pain management  - Notify physician/advanced practitioner if interventions unsuccessful or patient reports new pain  Outcome: Progressing     Problem: INFECTION - ADULT  Goal: Absence or prevention of progression during hospitalization  Description: INTERVENTIONS:  - Assess and monitor for signs and symptoms of infection  - Monitor lab/diagnostic results  - Monitor all insertion sites, i e  indwelling lines, tubes, and drains  - Monitor endotracheal if appropriate and nasal secretions for changes in amount and color  - [place] appropriate cooling/warming therapies per order  - Administer medications as ordered  - Instruct and encourage patient and family to use good hand hygiene technique  - Identify and instruct in appropriate isolation precautions for identified infection/condition  Outcome: Progressing  Goal: Absence of fever/infection during neutropenic period  Description: INTERVENTIONS:  - Monitor WBC    Outcome: Progressing     Problem: SAFETY ADULT  Goal: Maintain or return to baseline ADL function  Description: INTERVENTIONS:  -  Assess patient's ability to carry out ADLs; assess patient's baseline for ADL function and identify physical deficits which impact ability to perform ADLs (bathing, care of mouth/teeth, toileting, grooming, dressing, etc )  - Assess/evaluate cause of self-care deficits   - Assess range of motion  - Assess patient's mobility; develop plan if impaired  - Assess patient's need for assistive devices and provide as appropriate  - Encourage maximum independence but intervene and supervise when necessary  - Involve family in performance of ADLs  - Assess for home care needs following discharge   - Consider OT consult to assist with ADL evaluation and planning for discharge  - Provide patient education as appropriate  Outcome: Progressing  Goal: Maintains/Returns to pre admission functional level  Description: INTERVENTIONS:  - Perform BMAT or MOVE assessment daily    - Set and communicate daily mobility goal to care team and patient/family/caregiver     - Collaborate with rehabilitation services on mobility goals if consulted  - Stand patient 3 times a day  - Ambulate patient 3 times a day  - Out of bed to chair 2  times a day   - Out of bed for toileting  - Record patient progress and toleration of activity level   Outcome: Progressing  Goal: Patient will remain free of falls  Description: INTERVENTIONS:  - Educate patient/family on patient safety including physical limitations  - Instruct patient to call for assistance with activity   - Consult OT/PT to assist with strengthening/mobility   - Keep Call bell within reach  - Keep bed low and locked with side rails adjusted as appropriate  - Keep care items and personal belongings within reach  - Initiate and maintain comfort rounds  - Make Fall Risk Sign visible to staff  - Apply yellow socks and bracelet for high fall risk patients  - Consider moving patient to room near nurses station  Outcome: Progressing     Problem: DISCHARGE PLANNING  Goal: Discharge to home or other facility with appropriate resources  Description: INTERVENTIONS:  - Identify barriers to discharge w/patient and caregiver  - Arrange for needed discharge resources and transportation as appropriate  - Identify discharge learning needs (meds, wound care, etc )  - Arrange for interpretive services to assist at discharge as needed  - Refer to Case Management Department for coordinating discharge planning if the patient needs post-hospital services based on physician/advanced practitioner order or complex needs related to functional status, cognitive ability, or social support system  Outcome: Progressing     Problem: Knowledge Deficit  Goal: Patient/family/caregiver demonstrates understanding of disease process, treatment plan, medications, and discharge instructions  Description: Complete learning assessment and assess knowledge base    Interventions:  - Provide teaching at level of understanding  - Provide teaching via preferred learning methods  Outcome: Progressing     Problem: Prexisting or High Potential for Compromised Skin Integrity  Goal: Skin integrity is maintained or improved  Description: INTERVENTIONS:  - Identify patients at risk for skin breakdown  - Assess and monitor skin integrity  - Assess and monitor nutrition and hydration status  - Monitor labs   - Assess for incontinence   - Turn and reposition patient  - Assist with mobility/ambulation  - Relieve pressure over bony prominences  - Avoid friction and shearing  - Provide appropriate hygiene as needed including keeping skin clean and dry  - Evaluate need for skin moisturizer/barrier cream  - Collaborate with interdisciplinary team   - Patient/family teaching  - Consider wound care consult   Outcome: Progressing     Problem: Nutrition/Hydration-ADULT  Goal: Nutrient/Hydration intake appropriate for improving, restoring or maintaining nutritional needs  Description: Monitor and assess patient's nutrition/hydration status for malnutrition  Collaborate with interdisciplinary team and initiate plan and interventions as ordered  Monitor patient's weight and dietary intake as ordered or per policy  Utilize nutrition screening tool and intervene as necessary  Determine patient's food preferences and provide high-protein, high-caloric foods as appropriate       INTERVENTIONS:  - Monitor oral intake, urinary output, labs, and treatment plans  - Assess nutrition and hydration status and recommend course of action  - Evaluate amount of meals eaten  - Assist patient with eating if necessary   - Allow adequate time for meals  - Recommend/ encourage appropriate diets, oral nutritional supplements, and vitamin/mineral supplements  - Order, calculate, and assess calorie counts as needed  - Recommend, monitor, and adjust tube feedings and TPN/PPN based on assessed needs  - Assess need for intravenous fluids  - Provide specific nutrition/hydration education as appropriate  - Include patient/family/caregiver in decisions related to nutrition  Outcome: Progressing

## 2023-05-01 NOTE — ASSESSMENT & PLAN NOTE
Lab Results   Component Value Date    EGFR 45 05/01/2023    EGFR 42 04/30/2023    EGFR 48 04/29/2023    CREATININE 1 42 (H) 05/01/2023    CREATININE 1 50 (H) 04/30/2023    CREATININE 1 34 (H) 04/29/2023    Baseline creatinine 1 2 since 2020   Today creatinine 1 5   On IV lasix      Continue monitoring BMP    I's and O's/daily weights

## 2023-05-01 NOTE — PROGRESS NOTES
Pastoral Care Progress Note    2023  Patient: Bre Santana : 1940  Admission Date & Time: 2023 8441  MRN: [id number] [facility]: [id number]         23 0900   Clinical Encounter Type   Visited With Patient and family together   Zoroastrianism Encounters   Zoroastrianism Needs Prayer     Asa Oneill met with the pt and the pt's wife and administered prayers and blessings  No further needs were expressed at this time  Chaplains still remain available

## 2023-05-01 NOTE — PLAN OF CARE
Problem: MOBILITY - ADULT  Goal: Maintain or return to baseline ADL function  Description: INTERVENTIONS:  -  Assess patient's ability to carry out ADLs; assess patient's baseline for ADL function and identify physical deficits which impact ability to perform ADLs (bathing, care of mouth/teeth, toileting, grooming, dressing, etc )  - Assess/evaluate cause of self-care deficits   - Assess range of motion  - Assess patient's mobility; develop plan if impaired  - Assess patient's need for assistive devices and provide as appropriate  - Encourage maximum independence but intervene and supervise when necessary  - Involve family in performance of ADLs  - Assess for home care needs following discharge   - Consider OT consult to assist with ADL evaluation and planning for discharge  - Provide patient education as appropriate  Outcome: Progressing  Goal: Maintains/Returns to pre admission functional level  Description: INTERVENTIONS:  - Perform BMAT or MOVE assessment daily    - Set and communicate daily mobility goal to care team and patient/family/caregiver     - Collaborate with rehabilitation services on mobility goals if consulted  - Stand patient 3 times a day  - Ambulate patient 3 times a day  - Out of bed to chair 2  times a day   - Out of bed for toileting  - Record patient progress and toleration of activity level   Outcome: Progressing     Problem: PAIN - ADULT  Goal: Verbalizes/displays adequate comfort level or baseline comfort level  Description: Interventions:  - Encourage patient to monitor pain and request assistance  - Assess pain using appropriate pain scale  - Administer analgesics based on type and severity of pain and evaluate response  - Implement non-pharmacological measures as appropriate and evaluate response  - Consider cultural and social influences on pain and pain management  - Notify physician/advanced practitioner if interventions unsuccessful or patient reports new pain  Outcome: Progressing     Problem: INFECTION - ADULT  Goal: Absence or prevention of progression during hospitalization  Description: INTERVENTIONS:  - Assess and monitor for signs and symptoms of infection  - Monitor lab/diagnostic results  - Monitor all insertion sites, i e  indwelling lines, tubes, and drains  - Monitor endotracheal if appropriate and nasal secretions for changes in amount and color  - [place] appropriate cooling/warming therapies per order  - Administer medications as ordered  - Instruct and encourage patient and family to use good hand hygiene technique  - Identify and instruct in appropriate isolation precautions for identified infection/condition  Outcome: Progressing  Goal: Absence of fever/infection during neutropenic period  Description: INTERVENTIONS:  - Monitor WBC    Outcome: Progressing     Problem: SAFETY ADULT  Goal: Maintain or return to baseline ADL function  Description: INTERVENTIONS:  -  Assess patient's ability to carry out ADLs; assess patient's baseline for ADL function and identify physical deficits which impact ability to perform ADLs (bathing, care of mouth/teeth, toileting, grooming, dressing, etc )  - Assess/evaluate cause of self-care deficits   - Assess range of motion  - Assess patient's mobility; develop plan if impaired  - Assess patient's need for assistive devices and provide as appropriate  - Encourage maximum independence but intervene and supervise when necessary  - Involve family in performance of ADLs  - Assess for home care needs following discharge   - Consider OT consult to assist with ADL evaluation and planning for discharge  - Provide patient education as appropriate  Outcome: Progressing  Goal: Maintains/Returns to pre admission functional level  Description: INTERVENTIONS:  - Perform BMAT or MOVE assessment daily    - Set and communicate daily mobility goal to care team and patient/family/caregiver     - Collaborate with rehabilitation services on mobility goals if consulted  - Stand patient 3 times a day  - Ambulate patient 3 times a day  - Out of bed to chair 2  times a day   - Out of bed for toileting  - Record patient progress and toleration of activity level   Outcome: Progressing  Goal: Patient will remain free of falls  Description: INTERVENTIONS:  - Educate patient/family on patient safety including physical limitations  - Instruct patient to call for assistance with activity   - Consult OT/PT to assist with strengthening/mobility   - Keep Call bell within reach  - Keep bed low and locked with side rails adjusted as appropriate  - Keep care items and personal belongings within reach  - Initiate and maintain comfort rounds  - Make Fall Risk Sign visible to staff  - Apply yellow socks and bracelet for high fall risk patients  - Consider moving patient to room near nurses station  Outcome: Progressing     Problem: DISCHARGE PLANNING  Goal: Discharge to home or other facility with appropriate resources  Description: INTERVENTIONS:  - Identify barriers to discharge w/patient and caregiver  - Arrange for needed discharge resources and transportation as appropriate  - Identify discharge learning needs (meds, wound care, etc )  - Arrange for interpretive services to assist at discharge as needed  - Refer to Case Management Department for coordinating discharge planning if the patient needs post-hospital services based on physician/advanced practitioner order or complex needs related to functional status, cognitive ability, or social support system  Outcome: Progressing     Problem: Knowledge Deficit  Goal: Patient/family/caregiver demonstrates understanding of disease process, treatment plan, medications, and discharge instructions  Description: Complete learning assessment and assess knowledge base    Interventions:  - Provide teaching at level of understanding  - Provide teaching via preferred learning methods  Outcome: Progressing     Problem: Prexisting or High Potential for Compromised Skin Integrity  Goal: Skin integrity is maintained or improved  Description: INTERVENTIONS:  - Identify patients at risk for skin breakdown  - Assess and monitor skin integrity  - Assess and monitor nutrition and hydration status  - Monitor labs   - Assess for incontinence   - Turn and reposition patient  - Assist with mobility/ambulation  - Relieve pressure over bony prominences  - Avoid friction and shearing  - Provide appropriate hygiene as needed including keeping skin clean and dry  - Evaluate need for skin moisturizer/barrier cream  - Collaborate with interdisciplinary team   - Patient/family teaching  - Consider wound care consult   Outcome: Progressing     Problem: Nutrition/Hydration-ADULT  Goal: Nutrient/Hydration intake appropriate for improving, restoring or maintaining nutritional needs  Description: Monitor and assess patient's nutrition/hydration status for malnutrition  Collaborate with interdisciplinary team and initiate plan and interventions as ordered  Monitor patient's weight and dietary intake as ordered or per policy  Utilize nutrition screening tool and intervene as necessary  Determine patient's food preferences and provide high-protein, high-caloric foods as appropriate       INTERVENTIONS:  - Monitor oral intake, urinary output, labs, and treatment plans  - Assess nutrition and hydration status and recommend course of action  - Evaluate amount of meals eaten  - Assist patient with eating if necessary   - Allow adequate time for meals  - Recommend/ encourage appropriate diets, oral nutritional supplements, and vitamin/mineral supplements  - Order, calculate, and assess calorie counts as needed  - Recommend, monitor, and adjust tube feedings and TPN/PPN based on assessed needs  - Assess need for intravenous fluids  - Provide specific nutrition/hydration education as appropriate  - Include patient/family/caregiver in decisions related to nutrition  Outcome: Progressing

## 2023-05-01 NOTE — ASSESSMENT & PLAN NOTE
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

## 2023-05-01 NOTE — PROGRESS NOTES
[address] [facility]  Progress Note  Name: Carlita Hsu  MRN: [id number]  Unit/Bed#: [facility] 893-77 I Date of Admission: 4/24/2023   Date of Service: 5/1/2023 I Hospital Day: 7    Assessment/Plan   * Lower extremity cellulitis  Assessment & Plan  · Patient has a history of chronic venous insufficiency with lower extremity edema and occasional weeping  Patient presented with bubbling of skin layer, skin weeping, redness of bilateral lower extremities not improving on oral antibiotics prescribed outpatient by PCP (but him and wife do not know which one)  · Patient has a history of chronic DVT in RLE from 2014 which was treated with Xarelto  · Lower extremity Dopplers did not show any DVTs bilateral this admission  · BNP elevated to almost 2000  · Patient was on IV vancomycin  MRSA negative, de-escalate to IV cefazolin  · Cellulitis and wounds are improving on IV cefazolin  Will switch to PO keflex once ready to be discharged to complete 14 days  · As per podiatry recs, started Colcrys for acute gout flareup on right 1st toe x 2 weeks    Hyperbilirubinemia  Assessment & Plan  History of hyperbilirubinemia seen on labs  Total bilirubin elevated to 2 9 this admission from a baseline of 1 2-1 8  · No recent abdominal imaging  · No evidence of abdominal pain or hemolysis with normal hemoglobin    Stage 3a chronic kidney disease (CKD) [facility])  Assessment & Plan  Lab Results   Component Value Date    EGFR 45 05/01/2023    EGFR 42 04/30/2023    EGFR 48 04/29/2023    CREATININE 1 42 (H) 05/01/2023    CREATININE 1 50 (H) 04/30/2023    CREATININE 1 34 (H) 04/29/2023    Baseline creatinine 1 2 since 2020   Today creatinine 1 5   On IV lasix      Continue monitoring BMP    I's and O's/daily weights    ESTER (obstructive sleep apnea)  Assessment & Plan  · CPAP HS    Bilateral lower extremity edema  Assessment & Plan  Patient has chronic venous insufficiency for past 1 to 2 years, on PRN lasix at home  · As per admission note, patient has 2+ pitting edema, worsened to 4+ pitting edema  · started IV Lasix 40 mg twice daily  · Echo LVEF 65%, biatrial enlargement, severe right atrial enlargement, consider amyloidosis  · Cardio consult appreciated  · 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  · Pending SPEP/UPEP, FLC  · On exam, lower extremity edema improving, 2+ today  · discussed with cardiology today  Recommend switching to PO lasix, and monitor overnight    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 [facility])  Assessment & Plan  Patient has hx of right lower extremity DVT in 2014 at which time he was seen by hematology & placed on Xarelto for 6 months  Patient had coagulability testing in the past; patient has MTHFR mutation   Lower extremity Dopplers this admission shows no signs of DVT bilaterally   Currently patient is on warfarin for persistent A-fib   Continue folate supplementation in setting of MTHFR mut   INR therapeutic, continue coumadin    Morbid obesity (HCC)  Assessment & Plan  Body mass index is 32 72 kg/m²  · Affects all aspects of care, encourage weight loss    Persistent atrial fibrillation (HCC)  Assessment & Plan  History of AF on beta blocker and warfarin  INR 2 2 on admission, at baseline  Currently RRR on exam    · Continue toprol xl 25 mg daily  · Warfarin 5 mg Mon, Tue, Wed, Fri; 7 5 mg on Thurs  · Follow INR-PT    Hypertension  Assessment & Plan  BP controlled  · Continue Toprol xl 25 mg daily  · Losartan Held to make room for IV Lasix              VTE Pharmacologic Prophylaxis:   Pharmacologic: Warfarin (Coumadin)  Mechanical VTE Prophylaxis in Place: Yes    Patient Centered Rounds: I have performed bedside rounds with nursing staff today      Discussions with Specialists or Other Care Team Provider: cardiology Jake Ganser     Current Length of Stay: 7 day(s)    Current Patient Status: Inpatient Certification Statement: The patient will continue to require additional inpatient hospital stay due to pending progress  Discharge Plan: 24-48 hours, pending cardiology clearance    Code Status: Level 1 - Full Code      Subjective:   No overnight event  Patient reports of improvement in lower extremity cellulitis  Edema is also improving  Objective:     Vitals:   Temp (24hrs), Av 1 °F (36 7 °C), Min:98 °F (36 7 °C), Max:98 2 °F (36 8 °C)    Temp:  [98 °F (36 7 °C)-98 2 °F (36 8 °C)] 98 2 °F (36 8 °C)  HR:  [75-93] 75  Resp:  [16] 16  BP: (123-125)/(78-82) 123/78  SpO2:  [95 %-99 %] 99 %  Body mass index is 32 72 kg/m²  Input and Output Summary (last 24 hours): Intake/Output Summary (Last 24 hours) at 2023 1421  Last data filed at 2023 1001  Gross per 24 hour   Intake --   Output 700 ml   Net -700 ml       Physical Exam:     Physical Exam  Vitals and nursing note reviewed  Constitutional:       General: He is not in acute distress  Appearance: Normal appearance  HENT:      Head: Normocephalic and atraumatic  Right Ear: External ear normal       Left Ear: External ear normal       Nose: Nose normal    Eyes:      Extraocular Movements: Extraocular movements intact  Pulmonary:      Effort: Pulmonary effort is normal    Musculoskeletal:      Cervical back: Normal range of motion  Right lower leg: Edema present  Left lower leg: Edema present  Neurological:      Mental Status: He is alert  Mental status is at baseline  Psychiatric:         Mood and Affect: Mood normal             Labs:    Results from last 7 days   Lab Units 23  0456 23  0516 23  0610   WBC Thousand/uL 8 56   < > 9 82   HEMOGLOBIN g/dL 17 5*   < > 15 3   HEMATOCRIT % 51 0*   < > 46 1   PLATELETS Thousands/uL 218   < > 184   NEUTROS PCT %  --   --  66   LYMPHS PCT %  --   --  16   MONOS PCT %  --   --  14*   EOS PCT %  --   --  2    < > = values in this interval not displayed  Results from last 7 days   Lab Units 05/01/23  1043 04/26/23  0610 04/25/23  0456   SODIUM mmol/L 137   < > 138   POTASSIUM mmol/L 3 4*   < > 4 0   CHLORIDE mmol/L 101   < > 106   CO2 mmol/L 32   < > 27   BUN mg/dL 30*   < > 22   CREATININE mg/dL 1 42*   < > 1 11   ANION GAP mmol/L 4   < > 5   CALCIUM mg/dL 8 8   < > 8 7   ALBUMIN g/dL  --   --  2 7*   TOTAL BILIRUBIN mg/dL  --   --  2 43*   ALK PHOS U/L  --   --  75   ALT U/L  --   --  27   AST U/L  --   --  27   GLUCOSE RANDOM mg/dL 133   < > 115    < > = values in this interval not displayed  Results from last 7 days   Lab Units 05/01/23  0508   INR  2 15*                        Recent Cultures (last 7 days):           Last 24 Hours Medication List:   Current Facility-Administered Medications   Medication Dose Route Frequency Provider Last Rate    cefazolin  2,000 mg Intravenous Q8H Kenisha Keith MD 2,000 mg (05/01/23 1140)    colchicine  0 6 mg Oral Daily Lauren Queen MD      folic acid  2 mg Oral Daily Steve Rayo MD      furosemide  40 mg Oral Daily Lauren Queen MD      lidocaine  1 patch Topical Daily LIVIA Villarreal      metoprolol succinate  25 mg Oral Daily Steve Rayo MD      multivitamin-minerals  1 tablet Oral Daily Steve Rayo MD      pyridoxine  50 mg Oral Daily Steve Rayo MD      warfarin  5 mg Oral Once per day on Mon Tue Wed Fri Steve Rayo MD      warfarin  7 5 mg Oral Once per day on Thu Steve Rayo MD          Today, Patient Was Seen By: Lauren Queen MD    ** Please Note: Dictation voice to text software may have been used in the creation of this document   **

## 2023-05-02 LAB
ANION GAP SERPL CALCULATED.3IONS-SCNC: 5 MMOL/L (ref 4–13)
BUN SERPL-MCNC: 32 MG/DL (ref 5–25)
CALCIUM SERPL-MCNC: 9 MG/DL (ref 8.3–10.1)
CHLORIDE SERPL-SCNC: 100 MMOL/L (ref 96–108)
CO2 SERPL-SCNC: 33 MMOL/L (ref 21–32)
CREAT SERPL-MCNC: 1.34 MG/DL (ref 0.6–1.3)
ERYTHROCYTE [DISTWIDTH] IN BLOOD BY AUTOMATED COUNT: 11.9 % (ref 11.6–15.1)
GFR SERPL CREATININE-BSD FRML MDRD: 48 ML/MIN/1.73SQ M
GLUCOSE SERPL-MCNC: 109 MG/DL (ref 65–140)
HCT VFR BLD AUTO: 50.4 % (ref 36.5–49.3)
HGB BLD-MCNC: 17.2 G/DL (ref 12–17)
INR PPP: 1.81 (ref 0.84–1.19)
KAPPA LC FREE SER-MCNC: 37.1 MG/L (ref 3.3–19.4)
KAPPA LC FREE/LAMBDA FREE SER: 1.49 {RATIO} (ref 0.26–1.65)
LAMBDA LC FREE SERPL-MCNC: 24.9 MG/L (ref 5.7–26.3)
MCH RBC QN AUTO: 32.1 PG (ref 26.8–34.3)
MCHC RBC AUTO-ENTMCNC: 34.1 G/DL (ref 31.4–37.4)
MCV RBC AUTO: 94 FL (ref 82–98)
PLATELET # BLD AUTO: 195 THOUSANDS/UL (ref 149–390)
PMV BLD AUTO: 10.8 FL (ref 8.9–12.7)
POTASSIUM SERPL-SCNC: 2.9 MMOL/L (ref 3.5–5.3)
PROTHROMBIN TIME: 21.2 SECONDS (ref 11.6–14.5)
RBC # BLD AUTO: 5.35 MILLION/UL (ref 3.88–5.62)
SODIUM SERPL-SCNC: 138 MMOL/L (ref 135–147)
WBC # BLD AUTO: 9.33 THOUSAND/UL (ref 4.31–10.16)

## 2023-05-02 RX ORDER — POTASSIUM CHLORIDE 20 MEQ/1
40 TABLET, EXTENDED RELEASE ORAL ONCE
Status: DISCONTINUED | OUTPATIENT
Start: 2023-05-02 | End: 2023-05-02

## 2023-05-02 RX ORDER — POTASSIUM CHLORIDE 20 MEQ/1
40 TABLET, EXTENDED RELEASE ORAL
Status: COMPLETED | OUTPATIENT
Start: 2023-05-02 | End: 2023-05-03

## 2023-05-02 RX ADMIN — CEFAZOLIN SODIUM 2000 MG: 2 SOLUTION INTRAVENOUS at 12:27

## 2023-05-02 RX ADMIN — COLCHICINE 0.6 MG: 0.6 TABLET ORAL at 08:33

## 2023-05-02 RX ADMIN — Medication 1 TABLET: at 08:29

## 2023-05-02 RX ADMIN — WARFARIN SODIUM 5 MG: 5 TABLET ORAL at 17:22

## 2023-05-02 RX ADMIN — Medication 50 MG: at 08:33

## 2023-05-02 RX ADMIN — FOLIC ACID 2 MG: 1 TABLET ORAL at 08:29

## 2023-05-02 RX ADMIN — FUROSEMIDE 40 MG: 10 INJECTION, SOLUTION INTRAVENOUS at 08:31

## 2023-05-02 RX ADMIN — CEFAZOLIN SODIUM 2000 MG: 2 SOLUTION INTRAVENOUS at 04:38

## 2023-05-02 RX ADMIN — POTASSIUM CHLORIDE 40 MEQ: 1500 TABLET, EXTENDED RELEASE ORAL at 22:34

## 2023-05-02 RX ADMIN — FUROSEMIDE 40 MG: 10 INJECTION, SOLUTION INTRAVENOUS at 17:22

## 2023-05-02 RX ADMIN — METOPROLOL SUCCINATE 25 MG: 25 TABLET, EXTENDED RELEASE ORAL at 08:31

## 2023-05-02 RX ADMIN — CEFAZOLIN SODIUM 2000 MG: 2 SOLUTION INTRAVENOUS at 20:36

## 2023-05-02 NOTE — RESTORATIVE TECHNICIAN NOTE
Restorative Technician Note      Patient Name: Jake Patel     Restorative Tech Visit Date: 05/02/23  Note Type: Mobility  Patient Position Upon Consult: Supine  Activity Performed: Ambulated; Stood; Other (Comment) (seated BLE therex)  Assistive Device: Other (Comment) (Pt carries SPC but does not use to ambulate\)  Education Provided: Yes  Patient Position at End of Consult: Bedside chair;  All needs within reach    Clarissa Polanco  DPT, Restorative Technician

## 2023-05-02 NOTE — PLAN OF CARE
Problem: PAIN - ADULT  Goal: Verbalizes/displays adequate comfort level or baseline comfort level  Description: Interventions:  - Encourage patient to monitor pain and request assistance  - Assess pain using appropriate pain scale  - Administer analgesics based on type and severity of pain and evaluate response  - Implement non-pharmacological measures as appropriate and evaluate response  - Consider cultural and social influences on pain and pain management  - Notify physician/advanced practitioner if interventions unsuccessful or patient reports new pain  Outcome: Progressing     Problem: INFECTION - ADULT  Goal: Absence or prevention of progression during hospitalization  Description: INTERVENTIONS:  - Assess and monitor for signs and symptoms of infection  - Monitor lab/diagnostic results  - Monitor all insertion sites, i e  indwelling lines, tubes, and drains  - Monitor endotracheal if appropriate and nasal secretions for changes in amount and color  - [place] appropriate cooling/warming therapies per order  - Administer medications as ordered  - Instruct and encourage patient and family to use good hand hygiene technique  - Identify and instruct in appropriate isolation precautions for identified infection/condition  Outcome: Progressing  Goal: Absence of fever/infection during neutropenic period  Description: INTERVENTIONS:  - Monitor WBC    Outcome: Progressing     Problem: DISCHARGE PLANNING  Goal: Discharge to home or other facility with appropriate resources  Description: INTERVENTIONS:  - Identify barriers to discharge w/patient and caregiver  - Arrange for needed discharge resources and transportation as appropriate  - Identify discharge learning needs (meds, wound care, etc )  - Arrange for interpretive services to assist at discharge as needed  - Refer to Case Management Department for coordinating discharge planning if the patient needs post-hospital services based on physician/advanced practitioner order or complex needs related to functional status, cognitive ability, or social support system  Outcome: Progressing     Problem: Knowledge Deficit  Goal: Patient/family/caregiver demonstrates understanding of disease process, treatment plan, medications, and discharge instructions  Description: Complete learning assessment and assess knowledge base  Interventions:  - Provide teaching at level of understanding  - Provide teaching via preferred learning methods  Outcome: Progressing     Problem: Prexisting or High Potential for Compromised Skin Integrity  Goal: Skin integrity is maintained or improved  Description: INTERVENTIONS:  - Identify patients at risk for skin breakdown  - Assess and monitor skin integrity  - Assess and monitor nutrition and hydration status  - Monitor labs   - Assess for incontinence   - Turn and reposition patient  - Assist with mobility/ambulation  - Relieve pressure over bony prominences  - Avoid friction and shearing  - Provide appropriate hygiene as needed including keeping skin clean and dry  - Evaluate need for skin moisturizer/barrier cream  - Collaborate with interdisciplinary team   - Patient/family teaching  - Consider wound care consult   Outcome: Progressing     Problem: Nutrition/Hydration-ADULT  Goal: Nutrient/Hydration intake appropriate for improving, restoring or maintaining nutritional needs  Description: Monitor and assess patient's nutrition/hydration status for malnutrition  Collaborate with interdisciplinary team and initiate plan and interventions as ordered  Monitor patient's weight and dietary intake as ordered or per policy  Utilize nutrition screening tool and intervene as necessary  Determine patient's food preferences and provide high-protein, high-caloric foods as appropriate       INTERVENTIONS:  - Monitor oral intake, urinary output, labs, and treatment plans  - Assess nutrition and hydration status and recommend course of action  - Evaluate amount of meals eaten  - Assist patient with eating if necessary   - Allow adequate time for meals  - Recommend/ encourage appropriate diets, oral nutritional supplements, and vitamin/mineral supplements  - Order, calculate, and assess calorie counts as needed  - Recommend, monitor, and adjust tube feedings and TPN/PPN based on assessed needs  - Assess need for intravenous fluids  - Provide specific nutrition/hydration education as appropriate  - Include patient/family/caregiver in decisions related to nutrition  Outcome: Progressing

## 2023-05-02 NOTE — WOUND OSTOMY CARE
Progress Note - Wound   Severo Lovett 80 y o  male MRN: [id number]  Unit/Bed#: [facility] 888-30 Encounter: [id number]        Assessment:   Patient is seen for wound care follow-up  Patient is a min assist for turning and repositioning  Patient seen sitting OOB in recliner  Patient refused assessment of sacro-buttocks - patient states no wounds or skin loss in area  Education provided to patient regarding not soaking legs in water and leaving wounds ROSARIO- patient stated understanding  Findings:  B/L heels are dry intact and glendy with no skin loss or wounds present  Recommend preventative Hydraguard Cream and proper offloading/ repositioning  Following wounds likely related to fluid overload/ edema and vascular disease  1  Right Pretibial Wounds: scattered areas of partial thickness skin loss measured together  Wound beds are mix of beefy red and pink epithelial tissue  Lory-wounds are intact and fragile  Small serosanguineous drainage noted  Recommend continuing with previous treatment  2  Left Pretibial Wound: Small area of partial thickness skin loss  Other areas of partial thickness skin loss are now scabbed over and intact  Wound bed is pink tissue  Lory-wound with yellow/brown hemosiderin staining  Small serosanguineous drainage noted  Recommend continuing with previous treatment  3  Right Posterior Tibial Wound: Irregular in shape area of partial thickness skin loss  Wound bed is beefy red and pink in color  Lory-wound is fragile  Scant/small sanguinous drainage noted  Area is smaller in size  Recommend continuing with previous treatment  No induration, fluctuance, odor, warmth/temperature differences, redness, or purulence noted to the above noted wounds and skin areas assessed  New dressings applied per orders listed below  Patient tolerated well- no s/s of non-verbal pain or discomfort observed during the encounter  Bedside nurse aware of plan of care   See flow sheets for more detailed assessment findings  Orders listed below and wound care will continue to follow, call or tiger text with questions  Skin Care Plan:  1-Cleanse b/l lower extremity wounds with NSS, pat dry, apply adaptic to the wound beds, and top with maxorb silver  Cover with ABD pads  Secure the dressings with kerlex  Change every other day and as needed for soilage/dislodgement  2-Turn/reposition q2h or when medically stable for pressure re-distribution on skin   3-Elevate heels to offload pressure  4-Moisturize skin daily with skin nourishing cream  5-Ehob cushion in chair when out of bed  6-Preventative Hydraguard to buttocks, sacrum, and bilateral heels BID and PRN  7-Ambulatory Referral to outpatient wound center for continued treatment of wounds  Referral placed  WOUNDS:  Wound 04/24/23 Pretibial Right (Active)   Wound Image   05/02/23 1009   Wound Description Beefy red;Epithelialization 05/02/23 1009   Lory-wound Assessment Clean;Dry; Intact 05/02/23 1009   Wound Length (cm) 9 cm 05/02/23 1009   Wound Width (cm) 7 cm 05/02/23 1009   Wound Depth (cm) 0 1 cm 05/02/23 1009   Wound Surface Area (cm^2) 63 cm^2 05/02/23 1009   Wound Volume (cm^3) 6 3 cm^3 05/02/23 1009   Calculated Wound Volume (cm^3) 6 3 cm^3 05/02/23 1009   Change in Wound Size % 73 75 05/02/23 1009   Tunneling 0 cm 05/02/23 1009   Tunneling in depth located at 0 05/02/23 1009   Undermining 0 05/02/23 1009   Undermining is depth extending from 0 05/02/23 1009   Wound Site Closure KARSTEN 05/02/23 1009   Drainage Amount Small 05/02/23 1009   Drainage Description Serosanguineous 05/02/23 1009   Non-staged Wound Description Partial thickness 05/02/23 1009   Treatments Cleansed 05/02/23 1009   Dressing ABD;Calcium Alginate;Dry dressing;Non adherent 05/02/23 1009   Wound packed?  No 05/02/23 1009   Packing- # removed 0 05/02/23 1009   Packing- # inserted 0 05/02/23 1009   Dressing Changed New 05/02/23 1009   Patient Tolerance Tolerated well 05/02/23 1009   Dressing Status Clean;Dry; Intact 05/02/23 1009       Wound 04/24/23 Pretibial Left (Active)   Wound Image   05/02/23 1008   Wound Description [place] 05/02/23 1008   Lory-wound Assessment Yellow-brown 05/02/23 1008   Wound Length (cm) 1 cm 05/02/23 1008   Wound Width (cm) 1 cm 05/02/23 1008   Wound Depth (cm) 0 1 cm 05/02/23 1008   Wound Surface Area (cm^2) 1 cm^2 05/02/23 1008   Wound Volume (cm^3) 0 1 cm^3 05/02/23 1008   Calculated Wound Volume (cm^3) 0 1 cm^3 05/02/23 1008   Change in Wound Size % 99 38 05/02/23 1008   Tunneling 0 cm 05/02/23 1008   Tunneling in depth located at 0 05/02/23 1008   Undermining 0 05/02/23 1008   Undermining is depth extending from 0 05/02/23 1008   Wound Site Closure KARSTEN 05/02/23 1008   Drainage Amount Small 05/02/23 1008   Drainage Description Serosanguineous 05/02/23 1008   Non-staged Wound Description Partial thickness 05/02/23 1008   Treatments Cleansed 05/02/23 1008   Dressing ABD;Calcium Alginate;Dry dressing;Non adherent 05/02/23 1008   Wound packed? No 05/02/23 1008   Packing- # removed 0 05/02/23 1008   Packing- # inserted 0 05/02/23 1008   Dressing Changed New 05/02/23 1008   Patient Tolerance Tolerated well 05/02/23 1008   Dressing Status Clean;Dry; Intact 05/02/23 1008       Wound 04/25/23 Tibial Posterior;Right (Active)   Wound Image   05/02/23 1009   Wound Description Beefy red;[place] 05/02/23 1009   Lory-wound Assessment Fragile 05/02/23 1009   Wound Length (cm) 1 2 cm 05/02/23 1009   Wound Width (cm) 1 cm 05/02/23 1009   Wound Depth (cm) 0 1 cm 05/02/23 1009   Wound Surface Area (cm^2) 1 2 cm^2 05/02/23 1009   Wound Volume (cm^3) 0 12 cm^3 05/02/23 1009   Calculated Wound Volume (cm^3) 0 12 cm^3 05/02/23 1009   Change in Wound Size % 86 67 05/02/23 1009   Drainage Amount Small 05/02/23 1009   Drainage Description Sanguineous 05/02/23 1009   Non-staged Wound Description Partial thickness 05/02/23 1009   Treatments Cleansed;Irrigation with NSS;Site care 05/02/23 1009   Dressing Non adherent;Calcium Alginate with Silver;ABD;Dry dressing 05/02/23 1009   Dressing Changed Changed 05/02/23 1009   Patient Tolerance Tolerated well 05/02/23 1009   Dressing Status Intact;Dry;Clean 05/02/23 1009                Rola Ortiz RN, BSN

## 2023-05-02 NOTE — PROGRESS NOTES
Pastoral Care Progress Note    2023  Patient: Marcus Gusman : 1940  Admission Date & Time: 2023 8862  MRN: [id number] [facility]: [id number]         23 1000   Clinical Encounter Type   Visited With Patient   Routine Visit Follow-up     Fr Jinny cJ followed-up with the pt and conducted a pastoral visit  No further needs were expressed at this time  Chaplains still remain available

## 2023-05-02 NOTE — PLAN OF CARE
Problem: MOBILITY - ADULT  Goal: Maintain or return to baseline ADL function  Description: INTERVENTIONS:  -  Assess patient's ability to carry out ADLs; assess patient's baseline for ADL function and identify physical deficits which impact ability to perform ADLs (bathing, care of mouth/teeth, toileting, grooming, dressing, etc )  - Assess/evaluate cause of self-care deficits   - Assess range of motion  - Assess patient's mobility; develop plan if impaired  - Assess patient's need for assistive devices and provide as appropriate  - Encourage maximum independence but intervene and supervise when necessary  - Involve family in performance of ADLs  - Assess for home care needs following discharge   - Consider OT consult to assist with ADL evaluation and planning for discharge  - Provide patient education as appropriate  Outcome: Progressing  Goal: Maintains/Returns to pre admission functional level  Description: INTERVENTIONS:  - Perform BMAT or MOVE assessment daily    - Set and communicate daily mobility goal to care team and patient/family/caregiver     - Collaborate with rehabilitation services on mobility goals if consulted  - Stand patient 3 times a day  - Ambulate patient 3 times a day  - Out of bed to chair 2  times a day   - Out of bed for toileting  - Record patient progress and toleration of activity level   Outcome: Progressing     Problem: PAIN - ADULT  Goal: Verbalizes/displays adequate comfort level or baseline comfort level  Description: Interventions:  - Encourage patient to monitor pain and request assistance  - Assess pain using appropriate pain scale  - Administer analgesics based on type and severity of pain and evaluate response  - Implement non-pharmacological measures as appropriate and evaluate response  - Consider cultural and social influences on pain and pain management  - Notify physician/advanced practitioner if interventions unsuccessful or patient reports new pain  Outcome: Progressing     Problem: INFECTION - ADULT  Goal: Absence or prevention of progression during hospitalization  Description: INTERVENTIONS:  - Assess and monitor for signs and symptoms of infection  - Monitor lab/diagnostic results  - Monitor all insertion sites, i e  indwelling lines, tubes, and drains  - Monitor endotracheal if appropriate and nasal secretions for changes in amount and color  - [place] appropriate cooling/warming therapies per order  - Administer medications as ordered  - Instruct and encourage patient and family to use good hand hygiene technique  - Identify and instruct in appropriate isolation precautions for identified infection/condition  Outcome: Progressing  Goal: Absence of fever/infection during neutropenic period  Description: INTERVENTIONS:  - Monitor WBC    Outcome: Progressing     Problem: SAFETY ADULT  Goal: Maintain or return to baseline ADL function  Description: INTERVENTIONS:  -  Assess patient's ability to carry out ADLs; assess patient's baseline for ADL function and identify physical deficits which impact ability to perform ADLs (bathing, care of mouth/teeth, toileting, grooming, dressing, etc )  - Assess/evaluate cause of self-care deficits   - Assess range of motion  - Assess patient's mobility; develop plan if impaired  - Assess patient's need for assistive devices and provide as appropriate  - Encourage maximum independence but intervene and supervise when necessary  - Involve family in performance of ADLs  - Assess for home care needs following discharge   - Consider OT consult to assist with ADL evaluation and planning for discharge  - Provide patient education as appropriate  Outcome: Progressing  Goal: Maintains/Returns to pre admission functional level  Description: INTERVENTIONS:  - Perform BMAT or MOVE assessment daily    - Set and communicate daily mobility goal to care team and patient/family/caregiver     - Collaborate with rehabilitation services on mobility goals if consulted  - Stand patient 3 times a day  - Ambulate patient 3 times a day  - Out of bed to chair 2  times a day   - Out of bed for toileting  - Record patient progress and toleration of activity level   Outcome: Progressing  Goal: Patient will remain free of falls  Description: INTERVENTIONS:  - Educate patient/family on patient safety including physical limitations  - Instruct patient to call for assistance with activity   - Consult OT/PT to assist with strengthening/mobility   - Keep Call bell within reach  - Keep bed low and locked with side rails adjusted as appropriate  - Keep care items and personal belongings within reach  - Initiate and maintain comfort rounds  - Make Fall Risk Sign visible to staff  - Apply yellow socks and bracelet for high fall risk patients  - Consider moving patient to room near nurses station  Outcome: Progressing     Problem: DISCHARGE PLANNING  Goal: Discharge to home or other facility with appropriate resources  Description: INTERVENTIONS:  - Identify barriers to discharge w/patient and caregiver  - Arrange for needed discharge resources and transportation as appropriate  - Identify discharge learning needs (meds, wound care, etc )  - Arrange for interpretive services to assist at discharge as needed  - Refer to Case Management Department for coordinating discharge planning if the patient needs post-hospital services based on physician/advanced practitioner order or complex needs related to functional status, cognitive ability, or social support system  Outcome: Progressing     Problem: Knowledge Deficit  Goal: Patient/family/caregiver demonstrates understanding of disease process, treatment plan, medications, and discharge instructions  Description: Complete learning assessment and assess knowledge base    Interventions:  - Provide teaching at level of understanding  - Provide teaching via preferred learning methods  Outcome: Progressing     Problem: Prexisting or High Potential for Compromised Skin Integrity  Goal: Skin integrity is maintained or improved  Description: INTERVENTIONS:  - Identify patients at risk for skin breakdown  - Assess and monitor skin integrity  - Assess and monitor nutrition and hydration status  - Monitor labs   - Assess for incontinence   - Turn and reposition patient  - Assist with mobility/ambulation  - Relieve pressure over bony prominences  - Avoid friction and shearing  - Provide appropriate hygiene as needed including keeping skin clean and dry  - Evaluate need for skin moisturizer/barrier cream  - Collaborate with interdisciplinary team   - Patient/family teaching  - Consider wound care consult   Outcome: Progressing     Problem: Nutrition/Hydration-ADULT  Goal: Nutrient/Hydration intake appropriate for improving, restoring or maintaining nutritional needs  Description: Monitor and assess patient's nutrition/hydration status for malnutrition  Collaborate with interdisciplinary team and initiate plan and interventions as ordered  Monitor patient's weight and dietary intake as ordered or per policy  Utilize nutrition screening tool and intervene as necessary  Determine patient's food preferences and provide high-protein, high-caloric foods as appropriate       INTERVENTIONS:  - Monitor oral intake, urinary output, labs, and treatment plans  - Assess nutrition and hydration status and recommend course of action  - Evaluate amount of meals eaten  - Assist patient with eating if necessary   - Allow adequate time for meals  - Recommend/ encourage appropriate diets, oral nutritional supplements, and vitamin/mineral supplements  - Order, calculate, and assess calorie counts as needed  - Recommend, monitor, and adjust tube feedings and TPN/PPN based on assessed needs  - Assess need for intravenous fluids  - Provide specific nutrition/hydration education as appropriate  - Include patient/family/caregiver in decisions related to nutrition  Outcome: Progressing

## 2023-05-02 NOTE — PROGRESS NOTES
[facility]  Progress Note  Name: Romina Fowler  MRN: [id number]  Unit/Bed#: [facility] 348-52 I Date of Admission: 4/24/2023   Date of Service: 5/2/2023 I Hospital Day: 8    Assessment/Plan   * Lower extremity cellulitis  Assessment & Plan  · Patient has a history of chronic venous insufficiency with lower extremity edema and occasional weeping  Patient presented with bubbling of skin layer, skin weeping, redness of bilateral lower extremities not improving on oral antibiotics prescribed outpatient by PCP (but him and wife do not know which one)  · Patient has a history of chronic DVT in RLE from 2014 which was treated with Xarelto  · Lower extremity Dopplers did not show any DVTs bilateral this admission  · BNP elevated to almost 2000  · Patient was on IV vancomycin  MRSA negative, de-escalate to IV cefazolin  · Cellulitis and wounds are improving on IV cefazolin  Will switch to PO keflex once ready to be discharged to complete 14 days  · As per podiatry recs, started Colcrys for acute gout flareup on right 1st toe x 2 weeks    Hyperbilirubinemia  Assessment & Plan  History of hyperbilirubinemia seen on labs  Total bilirubin elevated to 2 9 this admission from a baseline of 1 2-1 8  · No recent abdominal imaging  · No evidence of abdominal pain or hemolysis with normal hemoglobin    ESTER (obstructive sleep apnea)  Assessment & Plan  · CPAP HS    Bilateral lower extremity edema  Assessment & Plan  Patient has chronic venous insufficiency for past 1 to 2 years, on PRN lasix at home  · As per admission note, patient has 2+ pitting edema, worsened to 4+ pitting edema  · started IV Lasix 40 mg twice daily  · Echo LVEF 65%, biatrial enlargement, severe right atrial enlargement, consider amyloidosis  · Cardio consult appreciated     · 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  · Pending SPEP/UPEP, FLC  · On exam, lower extremity edema improving, 2+ today  · discussed with cardiology today  Recommend switching to PO lasix, and monitor overnight    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 [facility])  Assessment & Plan  Patient has hx of right lower extremity DVT in  at which time he was seen by hematology & placed on Xarelto for 6 months  Patient had coagulability testing in the past; patient has MTHFR mutation   Lower extremity Dopplers this admission shows no signs of DVT bilaterally   Currently patient is on warfarin for persistent A-fib   Continue folate supplementation in setting of MTHFR mut   INR therapeutic, continue coumadin    Morbid obesity ([address] )  Assessment & Plan  Body mass index is 32 64 kg/m²  · Affects all aspects of care, encourage weight loss    Persistent atrial fibrillation (HCC)  Assessment & Plan  History of AF on beta blocker and warfarin  INR 2 2 on admission, at baseline  Currently RRR on exam    · Continue toprol xl 25 mg daily  · Warfarin 5 mg Mon, Tue, Wed, Fri; 7 5 mg on Thurs  · Follow INR-PT    Hypertension  Assessment & Plan  BP controlled  · Continue Toprol xl 25 mg daily  · Losartan Held to make room for IV Lasix             VTE Pharmacologic Prophylaxis:   Pharmacologic: Warfarin (Coumadin)  Mechanical VTE Prophylaxis in Place: No    Patient Centered Rounds: I have performed bedside rounds with nursing staff today  Time Spent for Care: 15 minutes  More than 50% of total time spent on counseling and coordination of care as described above  Current Length of Stay: 8 day(s)    Current Patient Status: Inpatient       Code Status: Level 1 - Full Code      Subjective:   No acute distress    Objective:     Vitals:   Temp (24hrs), Av 3 °F (36 3 °C), Min:96 °F (35 6 °C), Max:98 3 °F (36 8 °C)    Temp:  [96 °F (35 6 °C)-98 3 °F (36 8 °C)] 98 3 °F (36 8 °C)  HR:  [82-91] 91  Resp:  [16] 16  BP: (117-142)/(75-90) 122/75  SpO2:  [92 %-98 %] 92 %  Body mass index is 32 64 kg/m²  Input and Output Summary (last 24 hours): Intake/Output Summary (Last 24 hours) at 5/2/2023 1004  Last data filed at 5/2/2023 0900  Gross per 24 hour   Intake 118 ml   Output 1700 ml   Net -1582 ml       Physical Exam:     Physical Exam  Constitutional:       Appearance: Normal appearance  Eyes:      Pupils: Pupils are equal, round, and reactive to light  Cardiovascular:      Rate and Rhythm: Normal rate and regular rhythm  Pulmonary:      Effort: Pulmonary effort is normal    Abdominal:      General: Abdomen is flat  Palpations: Abdomen is soft  Musculoskeletal:      Cervical back: Normal range of motion  Neurological:      General: No focal deficit present  Mental Status: He is alert and oriented to person, place, and time  Psychiatric:         Mood and Affect: Mood normal          Additional Data:     Labs:    Results from last 7 days   Lab Units 05/02/23  0445 04/27/23  0516 04/26/23  0610   WBC Thousand/uL 9 33   < > 9 82   HEMOGLOBIN g/dL 17 2*   < > 15 3   HEMATOCRIT % 50 4*   < > 46 1   PLATELETS Thousands/uL 195   < > 184   NEUTROS PCT %  --   --  66   LYMPHS PCT %  --   --  16   MONOS PCT %  --   --  14*   EOS PCT %  --   --  2    < > = values in this interval not displayed  Results from last 7 days   Lab Units 05/02/23  0445   POTASSIUM mmol/L 2 9*   CHLORIDE mmol/L 100   CO2 mmol/L 33*   BUN mg/dL 32*   CREATININE mg/dL 1 34*   CALCIUM mg/dL 9 0     Results from last 7 days   Lab Units 05/02/23  0445   INR  1 81*       * I Have Reviewed All Lab Data Listed Above  * Additional Pertinent Lab Tests Reviewed:  All Labs Within Last 24 Hours Reviewed    Recent Cultures (last 7 days):           Last 24 Hours Medication List:   Current Facility-Administered Medications   Medication Dose Route Frequency Provider Last Rate    cefazolin  2,000 mg Intravenous Q8H Elizabeth Velasquez MD 2,000 mg (05/02/23 0438)    colchicine  0 6 mg Oral Daily Elizabeth Velasquez MD      folic acid  2 mg Oral Daily Dayanara Huggins MD      furosemide  40 mg Intravenous BID (diuretic) Pablito Gunter DO      lidocaine  1 patch Topical Daily LIVIA Anton      metoprolol succinate  25 mg Oral Daily Dayanara Huggins MD      multivitamin-minerals  1 tablet Oral Daily Dayanara Huggins MD      pyridoxine  50 mg Oral Daily Dayanara Huggins MD      warfarin  5 mg Oral Once per day on Mon Tue Wed Fri Dayanara Huggins MD      warfarin  7 5 mg Oral Once per day on Thu Dayanara Huggins MD          Today, Patient Was Seen By: Flaquito Hutchison DO    ** Please Note: Dictation voice to text software may have been used in the creation of this document   **

## 2023-05-02 NOTE — DISCHARGE SUMMARY
[address] [facility]  Discharge- Neeru Thomas 1940, 80 y o  male MRN: [id number]  Unit/Bed#: [facility] 816-01 Encounter: [id number]  Primary Care Provider: Herrera Mahan DO   Date and time admitted to hospital: 4/24/2023  9:59 AM    * Lower extremity cellulitis  Assessment & Plan  · Patient has a history of chronic venous insufficiency with lower extremity edema and occasional weeping  Patient presented with bubbling of skin layer, skin weeping, redness of bilateral lower extremities not improving on oral antibiotics prescribed outpatient by PCP (but him and wife do not know which one)  · Patient has a history of chronic DVT in RLE from 2014 which was treated with Xarelto  · Lower extremity Dopplers did not show any DVTs bilateral this admission  · BNP elevated to almost 2000  · Patient was on IV vancomycin  MRSA negative, de-escalate to IV cefazolin  · Cellulitis and wounds are improving on IV cefazolin  Will switch to PO keflex once ready to be discharged to complete 14 days  · As per podiatry recs, started Colcrys for acute gout flareup on right 1st toe x 2 weeks    Hyperbilirubinemia  Assessment & Plan  History of hyperbilirubinemia seen on labs  Total bilirubin elevated to 2 9 this admission from a baseline of 1 2-1 8  · No recent abdominal imaging  · No evidence of abdominal pain or hemolysis with normal hemoglobin    ESTER (obstructive sleep apnea)  Assessment & Plan  · CPAP HS    Bilateral lower extremity edema  Assessment & Plan  Patient has chronic venous insufficiency for past 1 to 2 years, on PRN lasix at home  · As per admission note, patient has 2+ pitting edema, worsened to 4+ pitting edema  · started IV Lasix 40 mg twice daily  · Echo LVEF 65%, biatrial enlargement, severe right atrial enlargement, consider amyloidosis  · Cardio consult appreciated     · 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  · Follow-up on SPEP/UPEP, FLC  · On exam, lower extremity edema improving, 2+ today  · discussed with cardiology today  Recommend switching to PO lasix, and monitor overnight    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 [facility])  Assessment & Plan  Patient has hx of right lower extremity DVT in 2014 at which time he was seen by hematology & placed on Xarelto for 6 months  Patient had coagulability testing in the past; patient has MTHFR mutation   Lower extremity Dopplers this admission shows no signs of DVT bilaterally   Currently patient is on warfarin for persistent A-fib   Continue folate supplementation in setting of MTHFR mut   INR therapeutic, continue coumadin    Morbid obesity ([facility] [address] )  Assessment & Plan  Body mass index is 32 64 kg/m²  · Affects all aspects of care, encourage weight loss    Persistent atrial fibrillation (HCC)  Assessment & Plan  History of AF on beta blocker and warfarin  INR 2 2 on admission, at baseline   Currently RRR on exam    · Continue toprol xl 25 mg daily  · Warfarin 5 mg Mon, Tue, Wed, Fri; 7 5 mg on Thurs  · Follow INR-PT    Hypertension  Assessment & Plan  BP controlled  · Continue Toprol xl 25 mg daily  · Losartan Held to make room for IV Lasix              Medical Problems     Resolved Problems  Date Reviewed: 5/1/2023   None         Admission Date:   Admission Orders (From admission, onward)     Ordered        04/24/23 1354  INPATIENT ADMISSION  Once                        Admitting Diagnosis: Swelling [R60 9]  Cellulitis [L03 90]  Leg swelling [M79 89]    HPI: This is a very pleasant 80-year-old male with past medical history of chronic venous insufficiency of lower extremities, hypertension obstructive sleep apnea on CPAP, persistent A-fib and remote history of lower extremity DVT who presents to the hospital with lower extremity edema and lower extremity discomfort on admission    Procedures Performed: No orders of the defined types were placed in this encounter  Summary of Hospital Course: Patient was started on intravenous vancomycin  MRSA screen was noted to be negative and he subsequently was de-escalated to Ancef  Patient cellulitis has been improving while hospitalized  He was also followed by podiatry  He was treated for acute gout exacerbation while in the hospital setting  His presentation was also complicated by volume overloaded state requiring intravenous diuresis  He had extensive work-up by cardiology including a amyloid scan with nuclear medicine performed while hospitalized  Patient will need outpatient follow-up for amyloid work-up  SPEP and UPEP were ordered  Will need follow-up with cardiology/heart failure service  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  Patient is to follow-up with an outpatient for heart failure evaluation and continued management/treatment  Discussed with wife at length at bedside  Condition at Discharge: fair         Discharge instructions/Information to patient and family:   See after visit summary for information provided to patient and family  Provisions for Follow-Up Care:  See after visit summary for information related to follow-up care and any pertinent home health orders  PCP: Blanche Weaver DO    Disposition: Home    Planned Readmission: No    Discharge Statement   I spent 35 minutes discharging the patient  This time was spent on the day of discharge  I had direct contact with the patient on the day of discharge  Additional documentation is required if more than 30 minutes were spent on discharge  Discharge Medications:  See after visit summary for reconciled discharge medications provided to patient and family

## 2023-05-02 NOTE — PROGRESS NOTES
Cardiology Progress note  Unit/Bed#: [facility] 816-01 Encounter: [id number]        Janelle Amaya 80 y o  male [id number]  Hospital Stay Days: 8    Assessment and Plan      Current Problem List   Principal Problem:    Lower extremity cellulitis  Active Problems:    Hypertension    Persistent atrial fibrillation ([facility] [address] )    Morbid obesity ([facility] [address] )    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 (HCC)    Bilateral lower extremity edema    ESTER (obstructive sleep apnea)    Stage 3a chronic kidney disease (CKD) (HCC)    Hyperbilirubinemia    Assessment/Plan:    1  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 Follow-up free light chains  ? SPEP and UPEP negative have asked for immunofixation and lab states they will perform  ? Does appear mildly fluid overload - but improving continue IV lasix BID 40 mg  ? Given his atrial fibrillation we will keep him on beta-blocker for now  ? Low yield for CRISS, HIV, hemochromatosis  2   Permament atrial fibrillation  ? Cont  Warfarin  ? Cont  Metoprolol succinate  ? Monitor on tele  3  Chronic thromboembolism  ? On warfarin  4  ESTER  5  CKD    Subjective     Patient seen and examined at bedside, patient seen examined bedside, no acute events overnight, patient denies any acute complaints and states he feels well  He is -1 9 liters last 24 hours  Creatine improved  Weight slightly down from yesterday  He is 254 today came in 12     Objective     Vitals: Temp (24hrs), Av 3 °F (36 3 °C), Min:96 °F (35 6 °C), Max:98 3 °F (36 8 °C)  Current: Temperature: 98 3 °F (36 8 °C)  Patient Vitals for the past 24 hrs:   BP Temp Pulse Resp SpO2 Weight   05/02/23 0831 122/75 98 3 °F (36 8 °C) 91 16 92 % --   05/02/23 0600 -- -- -- -- -- 115 kg (254 lb 3 1 oz)   05/01/23 2247 142/90 (!) 96 °F (35 6 °C) 86 16 98 % --   05/01/23 1518 117/77 97 5 °F (36 4 °C) 82 16 96 % --    Body mass index is 32 64 kg/m²  Physical Exam:  Physical Exam  Constitutional:       Appearance: Normal appearance  Cardiovascular:      Rate and Rhythm: Normal rate  Rhythm irregular  Pulmonary:      Effort: Pulmonary effort is normal  No respiratory distress  Abdominal:      General: Abdomen is flat  Musculoskeletal:      Right lower leg: Edema present  Left lower leg: Edema present  Skin:     General: Skin is warm  Neurological:      Mental Status: He is alert  Invasive Devices     Peripheral Intravenous Line  Duration           Peripheral IV 05/01/23 Dorsal (posterior); Right Forearm 1 day                    Labs:   Results from last 7 days   Lab Units 05/02/23  0445 04/30/23  0456 04/27/23  0516 04/26/23  0610   WBC Thousand/uL 9 33 8 56 8 87 9 82   HEMOGLOBIN g/dL 17 2* 17 5* 16 5 15 3   HEMATOCRIT % 50 4* 51 0* 47 8 46 1   PLATELETS Thousands/uL 195 218 189 184   NEUTROS PCT %  --   --   --  66   MONOS PCT %  --   --   --  14*      Results from last 7 days   Lab Units 05/02/23  0445 05/01/23  1043 05/01/23  0508 04/30/23  0456 04/29/23  0452 04/28/23  0525 04/27/23  1417 04/27/23  0516 04/26/23  0610   SODIUM mmol/L 138 137  --  138 139 139  --  137 139   POTASSIUM mmol/L 2 9* 3 4*  --  3 4* 3 3* 3 4*  --  3 7 4 0   CHLORIDE mmol/L 100 101  --  100 105 104  --  105 107   CO2 mmol/L 33* 32  --  34* 31 30  --  26 29   BUN mg/dL 32* 30*  --  29* 27* 25  --  22 23   CREATININE mg/dL 1 34* 1 42*  --  1 50* 1 34* 1 22  --  1 12 1 12   CALCIUM mg/dL 9 0 8 8  --  9 4 8 8 9 3  --  9 1 8 7   INR  1 81*  --  2 15* 2 39* 2 29* 2 02* 1 84*  --  2 05*   EGFR ml/min/1 73sq m 48 45  --  42 48 54  -- 60 60     Results from last 7 days   Lab Units 05/02/23  0445 05/01/23  0508 04/30/23  0456 04/29/23  0452 04/28/23  0525 04/27/23  1417 04/26/23  0610   INR  1 81* 2 15* 2 39* 2 29* 2 02* 1 84* 2 05*             No results found for: PHART, PHC0ZSX, PO2ART, DHQ9TNE, G6AKOSGE, BEART, SOURCE  No components found for: HIV1X2  No results found for: HAV, HEPAIGM, HEPBIGM, HEPBCAB, HBEAG, HEPCAB  Lab Results   Component Value Date    SPEP See Comment 04/29/2023    UPEP See Comment 04/28/2023      Lab Results   Component Value Date    HGBA1C 5 6 12/01/2022    HGBA1C 5 8 (H) 05/23/2022    HGBA1C 5 8 (H) 11/30/2021     Lab Results   Component Value Date    CHOL 185 12/17/2015    CHOL 223 (H) 11/26/2013      Lab Results   Component Value Date    HDL 74 12/01/2022    HDL 70 05/23/2022    HDL 78 11/30/2021      Lab Results   Component Value Date    LDLCALC 94 12/01/2022    LDLCALC 99 05/23/2022    LDLCALC 94 11/30/2021      Lab Results   Component Value Date    TRIG 132 12/01/2022    TRIG 86 05/23/2022    TRIG 105 11/30/2021     No components found for: PROCAL      Micro:      Urinalysis:  No results found for: AMPHETUR, BDZUR, COCAINEUR, OPIATEUR, PCPUR, THCUR, ETOH, ACTMNPHEN, SALICYLATE       Invalid input(s): URIBILINOGEN        Intake and Outputs:  I/O       04/29 0701 04/30 0700 04/30 0701 05/01 0700 05/01 0701 05/02 0700    P  O  480 780     Total Intake(mL/kg) 480 (9 1) 780 (6 7)     Urine (mL/kg/hr) 2150 (1 7) 700 (0 3) 200 (0 3)    Stool       Total Output 2150 700 200    Net -1670 +80 -200               Nutrition:  Diet Cardiovascular; Sodium 2 GM; Fluid Restriction 1800 ML  Radiology Results:   NM cardiac amyloidosis   Final Result by Andrei Pryor MD (05/01 0701)      1  Strongly suggestive of TTR amyloidosis  The protocol and interpretation guidelines are based on the [facility] practice points, released February 2016              Workstation performed: [id number]         XR chest portable   Final Result by Elena Hannon MD (04/28 1015)      No acute cardiopulmonary disease  Workstation performed: [id number]         XR chest 2 views   Final Result by Chi Mcmanus MD (04/24 1419)      No acute cardiopulmonary disease  Workstation performed: [id number]         VAS lower limb venous duplex study, complete bilateral   Final Result by Vickie Munroe MD (04/24 1429)      XR foot 3+ views RIGHT   Final Result by Chi Mcmanus MD (04/24 1400)      Soft tissue swelling along the dorsum of the foot without an acute osseous abnormality        Workstation performed: [id number]           Scheduled Medications:  cefazolin, 2,000 mg, Q8H  colchicine, 0 6 mg, Daily  folic acid, 2 mg, Daily  furosemide, 40 mg, BID (diuretic)  lidocaine, 1 patch, Daily  metoprolol succinate, 25 mg, Daily  multivitamin-minerals, 1 tablet, Daily  pyridoxine, 50 mg, Daily  warfarin, 5 mg, Once per day on Mon Tue Wed Fri  warfarin, 7 5 mg, Once per day on Thu      PRN MEDS:     Last 24 Hour Meds: :   Medication Administration - last 24 hours from 05/01/2023 1242 to 05/02/2023 1242       Date/Time Order Dose Route Action Action by     05/02/2023 0829 EDT multivitamin-minerals (CENTRUM) tablet 1 tablet 1 tablet Oral Given Joby Walton, RN     85/63/2696 1626 EDT folic acid (FOLVITE) tablet 2 mg 2 mg Oral Given Joby Walton, DENNIS     05/02/2023 6027 EDT pyridoxine (VITAMIN B6) tablet 50 mg 50 mg Oral Given Joby Walton, RN     05/02/2023 0831 EDT metoprolol succinate (TOPROL-XL) 24 hr tablet 25 mg 25 mg Oral Given Joby Walton, DENNIS     05/01/2023 1709 EDT warfarin (COUMADIN) tablet 5 mg 5 mg Oral Given Alejandra Mann, RN     05/02/2023 0831 EDT lidocaine (LIDODERM) 5 % patch 1 patch 1 patch Topical Not Given Joby Walton, RN     05/02/2023 1227 EDT ceFAZolin (ANCEF) IVPB (premix in dextrose) 2,000 mg 50 mL 2,000 mg Intravenous [address] [place], [facility]     05/02/2023 6551 EDT ceFAZolin (ANCEF) IVPB (premix in dextrose) 2,000 mg 50 mL 2,000 mg Intravenous [address] [facility]     05/01/2023 1959 EDT ceFAZolin (ANCEF) IVPB (premix in dextrose) 2,000 mg 50 mL 2,000 mg Intravenous [address] [facility]     05/02/2023 6979 EDT colchicine (COLCRYS) tablet 0 6 mg 0 6 mg Oral Given Chi Bansal, DENNIS     05/01/2023 1423 EDT furosemide (LASIX) tablet 40 mg 40 mg Oral Given Terri Gleason RN     05/02/2023 0831 EDT furosemide (LASIX) injection 40 mg 40 mg Intravenous Given Chi Bansal RN     05/01/2023 1709 EDT furosemide (LASIX) injection 40 mg 40 mg Intravenous Given Terri Gleason RN          PLEASE NOTE:  This encounter was completed utilizing the M- Mod[employer]Fluency Direct Speech Voice Recognition Software  Grammatical errors, random word insertions, pronoun errors and incomplete sentences are occasional consequences of the system due to software limitations, ambient noise and hardware issues  These may be missed by proof reading prior to affixing electronic signature  Any questions or concerns about the content, text or information contained within the body of this dictation should be directly addressed to the physician for clarification  Please do not hesitate to call me directly if you have any any questions or concerns

## 2023-05-02 NOTE — PLAN OF CARE
Problem: MOBILITY - ADULT  Goal: Maintain or return to baseline ADL function  Description: INTERVENTIONS:  -  Assess patient's ability to carry out ADLs; assess patient's baseline for ADL function and identify physical deficits which impact ability to perform ADLs (bathing, care of mouth/teeth, toileting, grooming, dressing, etc )  - Assess/evaluate cause of self-care deficits   - Assess range of motion  - Assess patient's mobility; develop plan if impaired  - Assess patient's need for assistive devices and provide as appropriate  - Encourage maximum independence but intervene and supervise when necessary  - Involve family in performance of ADLs  - Assess for home care needs following discharge   - Consider OT consult to assist with ADL evaluation and planning for discharge  - Provide patient education as appropriate  Outcome: Progressing  Goal: Maintains/Returns to pre admission functional level  Description: INTERVENTIONS:  - Perform BMAT or MOVE assessment daily    - Set and communicate daily mobility goal to care team and patient/family/caregiver     - Collaborate with rehabilitation services on mobility goals if consulted  - Stand patient 3 times a day  - Ambulate patient 3 times a day  - Out of bed to chair 2  times a day   - Out of bed for toileting  - Record patient progress and toleration of activity level   Outcome: Progressing     Problem: PAIN - ADULT  Goal: Verbalizes/displays adequate comfort level or baseline comfort level  Description: Interventions:  - Encourage patient to monitor pain and request assistance  - Assess pain using appropriate pain scale  - Administer analgesics based on type and severity of pain and evaluate response  - Implement non-pharmacological measures as appropriate and evaluate response  - Consider cultural and social influences on pain and pain management  - Notify physician/advanced practitioner if interventions unsuccessful or patient reports new pain  Outcome: Progressing     Problem: INFECTION - ADULT  Goal: Absence or prevention of progression during hospitalization  Description: INTERVENTIONS:  - Assess and monitor for signs and symptoms of infection  - Monitor lab/diagnostic results  - Monitor all insertion sites, i e  indwelling lines, tubes, and drains  - Monitor endotracheal if appropriate and nasal secretions for changes in amount and color  - [place] appropriate cooling/warming therapies per order  - Administer medications as ordered  - Instruct and encourage patient and family to use good hand hygiene technique  - Identify and instruct in appropriate isolation precautions for identified infection/condition  Outcome: Progressing  Goal: Absence of fever/infection during neutropenic period  Description: INTERVENTIONS:  - Monitor WBC    Outcome: Progressing     Problem: SAFETY ADULT  Goal: Maintain or return to baseline ADL function  Description: INTERVENTIONS:  -  Assess patient's ability to carry out ADLs; assess patient's baseline for ADL function and identify physical deficits which impact ability to perform ADLs (bathing, care of mouth/teeth, toileting, grooming, dressing, etc )  - Assess/evaluate cause of self-care deficits   - Assess range of motion  - Assess patient's mobility; develop plan if impaired  - Assess patient's need for assistive devices and provide as appropriate  - Encourage maximum independence but intervene and supervise when necessary  - Involve family in performance of ADLs  - Assess for home care needs following discharge   - Consider OT consult to assist with ADL evaluation and planning for discharge  - Provide patient education as appropriate  Outcome: Progressing  Goal: Maintains/Returns to pre admission functional level  Description: INTERVENTIONS:  - Perform BMAT or MOVE assessment daily    - Set and communicate daily mobility goal to care team and patient/family/caregiver     - Collaborate with rehabilitation services on mobility goals if consulted  - Stand patient 3 times a day  - Ambulate patient 3 times a day  - Out of bed to chair 2  times a day   - Out of bed for toileting  - Record patient progress and toleration of activity level   Outcome: Progressing  Goal: Patient will remain free of falls  Description: INTERVENTIONS:  - Educate patient/family on patient safety including physical limitations  - Instruct patient to call for assistance with activity   - Consult OT/PT to assist with strengthening/mobility   - Keep Call bell within reach  - Keep bed low and locked with side rails adjusted as appropriate  - Keep care items and personal belongings within reach  - Initiate and maintain comfort rounds  - Make Fall Risk Sign visible to staff  - Apply yellow socks and bracelet for high fall risk patients  - Consider moving patient to room near nurses station  Outcome: Progressing

## 2023-05-03 VITALS
RESPIRATION RATE: 16 BRPM | HEART RATE: 86 BPM | TEMPERATURE: 97.6 F | OXYGEN SATURATION: 99 % | SYSTOLIC BLOOD PRESSURE: 122 MMHG | DIASTOLIC BLOOD PRESSURE: 78 MMHG | WEIGHT: 252.65 LBS | BODY MASS INDEX: 32.42 KG/M2 | HEIGHT: 74 IN

## 2023-05-03 LAB
ANION GAP SERPL CALCULATED.3IONS-SCNC: 4 MMOL/L (ref 4–13)
BASOPHILS # BLD AUTO: 0.09 THOUSANDS/ΜL (ref 0–0.1)
BASOPHILS NFR BLD AUTO: 1 % (ref 0–1)
BUN SERPL-MCNC: 30 MG/DL (ref 5–25)
CALCIUM SERPL-MCNC: 9.1 MG/DL (ref 8.3–10.1)
CHLORIDE SERPL-SCNC: 99 MMOL/L (ref 96–108)
CO2 SERPL-SCNC: 34 MMOL/L (ref 21–32)
CREAT SERPL-MCNC: 1.38 MG/DL (ref 0.6–1.3)
EOSINOPHIL # BLD AUTO: 0.1 THOUSAND/ΜL (ref 0–0.61)
EOSINOPHIL NFR BLD AUTO: 1 % (ref 0–6)
ERYTHROCYTE [DISTWIDTH] IN BLOOD BY AUTOMATED COUNT: 11.9 % (ref 11.6–15.1)
GFR SERPL CREATININE-BSD FRML MDRD: 47 ML/MIN/1.73SQ M
GLUCOSE SERPL-MCNC: 105 MG/DL (ref 65–140)
HCT VFR BLD AUTO: 52.8 % (ref 36.5–49.3)
HGB BLD-MCNC: 17.8 G/DL (ref 12–17)
IMM GRANULOCYTES # BLD AUTO: 0.03 THOUSAND/UL (ref 0–0.2)
IMM GRANULOCYTES NFR BLD AUTO: 0 % (ref 0–2)
INR PPP: 1.8 (ref 0.84–1.19)
LYMPHOCYTES # BLD AUTO: 1.39 THOUSANDS/ΜL (ref 0.6–4.47)
LYMPHOCYTES NFR BLD AUTO: 17 % (ref 14–44)
MCH RBC QN AUTO: 32.2 PG (ref 26.8–34.3)
MCHC RBC AUTO-ENTMCNC: 33.7 G/DL (ref 31.4–37.4)
MCV RBC AUTO: 96 FL (ref 82–98)
MONOCYTES # BLD AUTO: 0.96 THOUSAND/ΜL (ref 0.17–1.22)
MONOCYTES NFR BLD AUTO: 12 % (ref 4–12)
NEUTROPHILS # BLD AUTO: 5.7 THOUSANDS/ΜL (ref 1.85–7.62)
NEUTS SEG NFR BLD AUTO: 69 % (ref 43–75)
NRBC BLD AUTO-RTO: 0 /100 WBCS
PLATELET # BLD AUTO: 180 THOUSANDS/UL (ref 149–390)
PMV BLD AUTO: 11.3 FL (ref 8.9–12.7)
POTASSIUM SERPL-SCNC: 3.4 MMOL/L (ref 3.5–5.3)
PROTHROMBIN TIME: 21.2 SECONDS (ref 11.6–14.5)
RBC # BLD AUTO: 5.53 MILLION/UL (ref 3.88–5.62)
SODIUM SERPL-SCNC: 137 MMOL/L (ref 135–147)
WBC # BLD AUTO: 8.27 THOUSAND/UL (ref 4.31–10.16)

## 2023-05-03 RX ORDER — FUROSEMIDE 40 MG/1
40 TABLET ORAL DAILY
Qty: 30 TABLET | Refills: 0 | Status: SHIPPED | OUTPATIENT
Start: 2023-05-13

## 2023-05-03 RX ORDER — POTASSIUM CHLORIDE 20 MEQ/1
20 TABLET, EXTENDED RELEASE ORAL 2 TIMES DAILY
Qty: 20 TABLET | Refills: 0 | Status: SHIPPED | OUTPATIENT
Start: 2023-05-03 | End: 2023-05-13

## 2023-05-03 RX ORDER — FUROSEMIDE 40 MG/1
40 TABLET ORAL
Status: DISCONTINUED | OUTPATIENT
Start: 2023-05-03 | End: 2023-05-03 | Stop reason: HOSPADM

## 2023-05-03 RX ORDER — FUROSEMIDE 40 MG/1
40 TABLET ORAL DAILY
Status: DISCONTINUED | OUTPATIENT
Start: 2023-05-13 | End: 2023-05-03 | Stop reason: HOSPADM

## 2023-05-03 RX ORDER — POTASSIUM CHLORIDE 20 MEQ/1
40 TABLET, EXTENDED RELEASE ORAL
Status: DISCONTINUED | OUTPATIENT
Start: 2023-05-03 | End: 2023-05-03

## 2023-05-03 RX ORDER — POTASSIUM CHLORIDE 20 MEQ/1
20 TABLET, EXTENDED RELEASE ORAL 2 TIMES DAILY
Status: DISCONTINUED | OUTPATIENT
Start: 2023-05-03 | End: 2023-05-03 | Stop reason: HOSPADM

## 2023-05-03 RX ORDER — COLCHICINE 0.6 MG/1
0.6 TABLET ORAL DAILY
Qty: 30 TABLET | Refills: 0 | OUTPATIENT
Start: 2023-05-03

## 2023-05-03 RX ORDER — POTASSIUM CHLORIDE 20 MEQ/1
20 TABLET, EXTENDED RELEASE ORAL DAILY
Status: DISCONTINUED | OUTPATIENT
Start: 2023-05-13 | End: 2023-05-03 | Stop reason: HOSPADM

## 2023-05-03 RX ORDER — POTASSIUM CHLORIDE 20 MEQ/1
40 TABLET, EXTENDED RELEASE ORAL ONCE
Status: COMPLETED | OUTPATIENT
Start: 2023-05-03 | End: 2023-05-03

## 2023-05-03 RX ORDER — COLCHICINE 0.6 MG/1
0.6 TABLET ORAL DAILY
Qty: 30 TABLET | Refills: 0 | Status: SHIPPED | OUTPATIENT
Start: 2023-05-04

## 2023-05-03 RX ADMIN — CEFAZOLIN SODIUM 2000 MG: 2 SOLUTION INTRAVENOUS at 12:10

## 2023-05-03 RX ADMIN — Medication 50 MG: at 08:32

## 2023-05-03 RX ADMIN — CEFAZOLIN SODIUM 2000 MG: 2 SOLUTION INTRAVENOUS at 05:21

## 2023-05-03 RX ADMIN — POTASSIUM CHLORIDE 40 MEQ: 1500 TABLET, EXTENDED RELEASE ORAL at 10:18

## 2023-05-03 RX ADMIN — Medication 1 TABLET: at 08:32

## 2023-05-03 RX ADMIN — METOPROLOL SUCCINATE 25 MG: 25 TABLET, EXTENDED RELEASE ORAL at 08:32

## 2023-05-03 RX ADMIN — POTASSIUM CHLORIDE 40 MEQ: 1500 TABLET, EXTENDED RELEASE ORAL at 00:08

## 2023-05-03 RX ADMIN — COLCHICINE 0.6 MG: 0.6 TABLET ORAL at 08:43

## 2023-05-03 RX ADMIN — POTASSIUM CHLORIDE 40 MEQ: 1500 TABLET, EXTENDED RELEASE ORAL at 08:31

## 2023-05-03 RX ADMIN — FOLIC ACID 2 MG: 1 TABLET ORAL at 08:32

## 2023-05-03 RX ADMIN — FUROSEMIDE 40 MG: 10 INJECTION, SOLUTION INTRAVENOUS at 08:32

## 2023-05-03 NOTE — PROGRESS NOTES
Cardiology Progress note  Unit/Bed#: [facility] 816-01 Encounter: [id number]        Neeru Thomas 80 y o  male [id number]  Hospital Stay Days: 9    Assessment and Plan      Current Problem List   Principal Problem:    Lower extremity cellulitis  Active Problems:    Hypertension    Persistent atrial fibrillation ([facility] )    Morbid obesity ([facility] )    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 (HCC)    Bilateral lower extremity edema    ESTER (obstructive sleep apnea)    Stage 3a chronic kidney disease (CKD) (HCC)    Hyperbilirubinemia    Assessment/Plan:    1  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 Follow-up free light chains  ? SPEP and UPEP negative have asked for immunofixation and lab states they will perform  ? Does appear mildly fluid overload - but improving continue IV lasix BID 40 mg - likely transition to po today  ? Given his atrial fibrillation we will keep him on beta-blocker for now  ? Low yield for CRISS, HIV, hemochromatosis  2   Permament atrial fibrillation  ? Cont  Warfarin  ? Cont  Metoprolol succinate  ? Monitor on tele  3  Chronic thromboembolism  ? On warfarin  4  ESTER  5  CKD    Subjective     Patient seen and examined at bedside, patient seen examined bedside, no acute events overnight, patient denies any acute complaints and states he feels well  He is -1 3 liters last 24 hours  Weight is down 9 ounces from yesterday     Objective     Vitals: Temp (24hrs), Av 8 °F (36 6 °C), Min:97 6 °F (36 4 °C), Max:98 °F (36 7 °C)  Current: Temperature: 97 6 °F (36 4 °C)  Patient Vitals for the past 24 hrs:   BP Temp Pulse Resp SpO2   05/03/23 0835 122/78 -- 86 16 99 %   05/03/23 0832 122/78 -- 86 -- --   05/02/23 2238 125/75 97 6 °F (36 4 °C) 89 18 96 %   05/02/23 1508 126/75 98 °F (36 7 °C) 93 20 95 %    Body mass index is 32 64 kg/m²  Physical Exam:  Physical Exam  Constitutional:       Appearance: Normal appearance  Cardiovascular:      Rate and Rhythm: Normal rate  Rhythm irregular  Pulmonary:      Effort: Pulmonary effort is normal  No respiratory distress  Abdominal:      General: Abdomen is flat  Musculoskeletal:      Right lower leg: Edema present  Left lower leg: Edema present  Skin:     General: Skin is warm  Neurological:      Mental Status: He is alert  Invasive Devices     Peripheral Intravenous Line  Duration           Peripheral IV 05/01/23 Dorsal (posterior); Right Forearm 2 days                    Labs:   Results from last 7 days   Lab Units 05/03/23 0443 05/02/23  0445 04/30/23  0456 04/27/23  0516   WBC Thousand/uL 8 27 9 33 8 56 8 87   HEMOGLOBIN g/dL 17 8* 17 2* 17 5* 16 5   HEMATOCRIT % 52 8* 50 4* 51 0* 47 8   PLATELETS Thousands/uL 180 195 218 189   NEUTROS PCT % 69  --   --   --    MONOS PCT % 12  --   --   --       Results from last 7 days   Lab Units 05/03/23 0443 05/02/23  0445 05/01/23  1043 05/01/23  0508 04/30/23  0456 04/29/23  0452 04/28/23  0525 04/27/23  1417 04/27/23  0516   SODIUM mmol/L 137 138 137  --  138 139 139  --  137   POTASSIUM mmol/L 3 4* 2 9* 3 4*  --  3 4* 3 3* 3 4*  --  3 7   CHLORIDE mmol/L 99 100 101  --  100 105 104  --  105   CO2 mmol/L 34* 33* 32  --  34* 31 30  --  26   BUN mg/dL 30* 32* 30*  --  29* 27* 25  --  22   CREATININE mg/dL 1 38* 1 34* 1 42*  --  1 50* 1 34* 1 22  --  1 12   CALCIUM mg/dL 9 1 9 0 8 8  --  9 4 8 8 9 3  --  9 1   INR  1 80* 1 81*  --  2 15* 2 39* 2 29* 2 02* 1 84*  --    EGFR ml/min/1 73sq m 47 48 45  --  42 48 54  --  60     Results from last 7 days   Lab Units 05/03/23  0443 05/02/23  0445 05/01/23  0508 04/30/23  0456 04/29/23  0452 04/28/23  0525 04/27/23  1417   INR  1 80* 1 81* 2 15* 2 39* 2 29* 2 02* 1 84*             No results found for: PHART, VWU2ENP, PO2ART, ALG9UHN, U1DABVKL, BEART, SOURCE  No components found for: HIV1X2  No results found for: HAV, HEPAIGM, HEPBIGM, HEPBCAB, HBEAG, HEPCAB  Lab Results   Component Value Date    SPEP See Comment 04/29/2023    UPEP See Comment 04/28/2023      Lab Results   Component Value Date    HGBA1C 5 6 12/01/2022    HGBA1C 5 8 (H) 05/23/2022    HGBA1C 5 8 (H) 11/30/2021     Lab Results   Component Value Date    CHOL 185 12/17/2015    CHOL 223 (H) 11/26/2013      Lab Results   Component Value Date    HDL 74 12/01/2022    HDL 70 05/23/2022    HDL 78 11/30/2021      Lab Results   Component Value Date    LDLCALC 94 12/01/2022    LDLCALC 99 05/23/2022    LDLCALC 94 11/30/2021      Lab Results   Component Value Date    TRIG 132 12/01/2022    TRIG 86 05/23/2022    TRIG 105 11/30/2021     No components found for: PROCAL      Micro:      Urinalysis:  No results found for: AMPHETUR, BDZUR, COCAINEUR, OPIATEUR, PCPUR, THCUR, ETOH, ACTMNPHEN, SALICYLATE       Invalid input(s): URIBILINOGEN        Intake and Outputs:  I/O       04/29 0701 04/30 0700 04/30 0701 05/01 0700 05/01 0701 05/02 0700    P  O  480 780     Total Intake(mL/kg) 480 (9 1) 780 (6 7)     Urine (mL/kg/hr) 2150 (1 7) 700 (0 3) 200 (0 3)    Stool       Total Output 2150 700 200    Net -1670 +80 -200               Nutrition:  Diet Cardiovascular; Sodium 2 GM; Fluid Restriction 1800 ML  Radiology Results:   NM cardiac amyloidosis   Final Result by Jaison Linares MD (05/01 0701)      1  Strongly suggestive of TTR amyloidosis  The protocol and interpretation guidelines are based on the [facility] practice points, released February 2016              Workstation performed: [id number]         XR chest portable   Final Result by Yolanda Santillan MD (04/28 1015)      No acute cardiopulmonary disease  Workstation performed: [id number]         XR chest 2 views   Final Result by Alek Doyle MD (04/24 1419)      No acute cardiopulmonary disease  Workstation performed: [id number]         VAS lower limb venous duplex study, complete bilateral   Final Result by Leni Matthews MD (04/24 1429)      XR foot 3+ views RIGHT   Final Result by Alek Doyle MD (04/24 1400)      Soft tissue swelling along the dorsum of the foot without an acute osseous abnormality        Workstation performed: [id number]           Scheduled Medications:  cefazolin, 2,000 mg, Q8H  colchicine, 0 6 mg, Daily  folic acid, 2 mg, Daily  furosemide, 40 mg, BID (diuretic)  lidocaine, 1 patch, Daily  metoprolol succinate, 25 mg, Daily  multivitamin-minerals, 1 tablet, Daily  pyridoxine, 50 mg, Daily  warfarin, 5 mg, Once per day on Mon Tue Wed Fri  warfarin, 7 5 mg, Once per day on Thu      PRN MEDS:     Last 24 Hour Meds: :   Medication Administration - last 24 hours from 05/02/2023 0854 to 05/03/2023 6471       Date/Time Order Dose Route Action Action by     05/03/2023 0832 EDT multivitamin-minerals (CENTRUM) tablet 1 tablet 1 tablet Oral Given Edelmira Schwartz RN     45/88/0292 2316 EDT folic acid (FOLVITE) tablet 2 mg 2 mg Oral Given Edelmira Schwartz RN     05/03/2023 5076 EDT pyridoxine (VITAMIN B6) tablet 50 mg 50 mg Oral Given Edelmira Schwartz RN     05/03/2023 5887 EDT metoprolol succinate (TOPROL-XL) 24 hr tablet 25 mg 25 mg Oral Given Edelmira Schwartz RN     05/02/2023 1722 EDT warfarin (COUMADIN) tablet 5 mg 5 mg Oral Given Lyndsey Leo RN     05/03/2023 3698 EDT lidocaine (LIDODERM) 5 % patch 1 patch 1 patch Topical Not Given Edelmira Schwartz RN     05/03/2023 0178 EDT ceFAZolin (ANCEF) IVPB (premix in dextrose) 2,000 mg 50 mL 2,000 mg Intravenous [address] DENNIS Ferguson     05/02/2023 2036 EDT ceFAZolin (ANCEF) IVPB (premix in dextrose) 2,000 mg 50 mL 2,000 mg Intravenous [address] Jesus Hameed, [facility]     05/02/2023 1227 EDT ceFAZolin (ANCEF) IVPB (premix in dextrose) 2,000 mg 50 mL 2,000 mg Intravenous [address], [facility]     05/03/2023 7667 EDT colchicine (COLCRYS) tablet 0 6 mg 0 6 mg Oral Given Curry Jeffries, RN     05/03/2023 9478 EDT furosemide (LASIX) injection 40 mg 40 mg Intravenous Given Curry Jeffries, RN     05/02/2023 1722 EDT furosemide (LASIX) injection 40 mg 40 mg Intravenous Given Shaista Horton, DENNIS     05/03/2023 0008 EDT potassium chloride (K-DUR,KLOR-CON) CR tablet 40 mEq 40 mEq Oral Given Ellen Will, DENNIS     05/02/2023 2234 EDT potassium chloride (K-DUR,KLOR-CON) CR tablet 40 mEq 40 mEq Oral Given Jesus Hameed, DENNIS     05/03/2023 0831 EDT potassium chloride (K-DUR,KLOR-CON) CR tablet 40 mEq 40 mEq Oral Given Curry Jeffries, DENNIS          PLEASE NOTE:  This encounter was completed utilizing the [email or web address] Direct Speech Voice Recognition Software  Grammatical errors, random word insertions, pronoun errors and incomplete sentences are occasional consequences of the system due to software limitations, ambient noise and hardware issues  These may be missed by proof reading prior to affixing electronic signature  Any questions or concerns about the content, text or information contained within the body of this dictation should be directly addressed to the physician for clarification  Please do not hesitate to call me directly if you have any any questions or concerns

## 2023-05-03 NOTE — CASE MANAGEMENT
Case Management Discharge Planning Note    Patient name Lizzy Champagne  Location [facility] 816/[facility] 024-12 MRN [id number]  : 1940 Date 5/3/2023       Current Admission Date: 2023  Current Admission Diagnosis:Lower extremity cellulitis   Patient Active Problem List    Diagnosis Date Noted    Lower extremity cellulitis 2023    Stage 3a chronic kidney disease (CKD) ([email or web address] 2023    Hyperbilirubinemia 2023    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 ([email or web address] 2022    Bilateral lower extremity edema 2022    Morbid obesity ([facility] ) 10/13/2021    Localized edema 09/10/2020    Persistent atrial fibrillation ([facility] ) 2020    HTN (hypertension) 2018    Obesity 2018    ESTER (obstructive sleep apnea) 2018    Lumbar stenosis 2018    Abnormal glucose level 2018    Benign neoplasm of colon 2018    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    Diverticular disease of colon 2018    Internal hemorrhoids 2018    Pure hypercholesterolemia 2018    Sacroiliitis ([email or web address] 2018    Bilateral hip pain 2017    Acute back pain 10/02/2017    Fever 2017    FUO (fever of unknown origin) 2017    Adenomatous colon polyp 2015    Spinal stenosis of lumbar region 09/10/2015    Encounter for long-term (current) use of other medications 2015    DDD (degenerative disc disease), lumbar 2015    Lumbar back pain 2015    Lumbar disc herniation 2015    Radiculopathy, lumbar region 2015    MTHFR mutation 2015    Elevated hemoglobin A1c 2014    Premature atrial complexes 2014    Aortic ectasia ([facility] ) 2014    Hyperlipidemia 2013    Obstructive sleep apnea 2013    Premature ventricular contraction     Lichen planus     Hypertension 2012    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    Vitamin D deficiency 04/04/2012      LOS (days): 9  Geometric Mean LOS (GMLOS) (days): 4 60  Days to GMLOS:-4 4     OBJECTIVE:  Risk of Unplanned Readmission Score: 13 96         Current admission status: Inpatient   Preferred Pharmacy:   [facility],[address] [address]  Phone: [phone] Fax: [phone]    Primary Care Provider: Jose Neil DO    Primary Insurance: MEDICARE  Secondary Insurance: Selene Lisa DETAILS:    IMM Given (Date):: 05/03/23  IMM Given to[de-identified] Patient  Family notified[de-identified] IMM reviewed with pt and wife at bedside  Pt agrees with discharge plan  IMM placed in pt's chart

## 2023-05-03 NOTE — PLAN OF CARE
Problem: MOBILITY - ADULT  Goal: Maintain or return to baseline ADL function  Description: INTERVENTIONS:  -  Assess patient's ability to carry out ADLs; assess patient's baseline for ADL function and identify physical deficits which impact ability to perform ADLs (bathing, care of mouth/teeth, toileting, grooming, dressing, etc )  - Assess/evaluate cause of self-care deficits   - Assess range of motion  - Assess patient's mobility; develop plan if impaired  - Assess patient's need for assistive devices and provide as appropriate  - Encourage maximum independence but intervene and supervise when necessary  - Involve family in performance of ADLs  - Assess for home care needs following discharge   - Consider OT consult to assist with ADL evaluation and planning for discharge  - Provide patient education as appropriate  Outcome: Progressing  Goal: Maintains/Returns to pre admission functional level  Description: INTERVENTIONS:  - Perform BMAT or MOVE assessment daily    - Set and communicate daily mobility goal to care team and patient/family/caregiver     - Collaborate with rehabilitation services on mobility goals if consulted  - Stand patient 3 times a day  - Ambulate patient 3 times a day  - Out of bed to chair 2  times a day   - Out of bed for toileting  - Record patient progress and toleration of activity level   Outcome: Progressing     Problem: PAIN - ADULT  Goal: Verbalizes/displays adequate comfort level or baseline comfort level  Description: Interventions:  - Encourage patient to monitor pain and request assistance  - Assess pain using appropriate pain scale  - Administer analgesics based on type and severity of pain and evaluate response  - Implement non-pharmacological measures as appropriate and evaluate response  - Consider cultural and social influences on pain and pain management  - Notify physician/advanced practitioner if interventions unsuccessful or patient reports new pain  Outcome: Progressing     Problem: INFECTION - ADULT  Goal: Absence or prevention of progression during hospitalization  Description: INTERVENTIONS:  - Assess and monitor for signs and symptoms of infection  - Monitor lab/diagnostic results  - Monitor all insertion sites, i e  indwelling lines, tubes, and drains  - Monitor endotracheal if appropriate and nasal secretions for changes in amount and color  - [place] appropriate cooling/warming therapies per order  - Administer medications as ordered  - Instruct and encourage patient and family to use good hand hygiene technique  - Identify and instruct in appropriate isolation precautions for identified infection/condition  Outcome: Progressing  Goal: Absence of fever/infection during neutropenic period  Description: INTERVENTIONS:  - Monitor WBC    Outcome: Progressing

## 2023-05-04 ENCOUNTER — HOME CARE VISIT (OUTPATIENT)
Dept: HOME HEALTH SERVICES | Facility: HOME HEALTHCARE | Age: 83
End: 2023-05-04

## 2023-05-04 NOTE — PROGRESS NOTES
Heart Failure Outpatient Consult Note - Ronald Hirsch 80 y o  male MRN: [id number]    @ Encounter: [id number]      Assessment/Plan:    Patient Active Problem List    Diagnosis Date Noted   • Lower extremity cellulitis 04/24/2023   • Stage 3a chronic kidney disease (CKD) ([facility] ) 04/24/2023   • Hyperbilirubinemia 04/24/2023   • 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 ([facility] ) 02/14/2022   • Bilateral lower extremity edema 02/14/2022   • Morbid obesity ([facility] ) 10/13/2021   • Localized edema 09/10/2020   • Persistent atrial fibrillation ([facility] ) 05/14/2020   • HTN (hypertension) 03/07/2018   • Obesity 03/07/2018   • ESTER (obstructive sleep apnea) 03/07/2018   • Lumbar stenosis 03/07/2018   • Abnormal glucose level 02/07/2018   • Benign neoplasm of colon 02/07/2018   • 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[facility] ) 02/07/2018   • 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   • Internal hemorrhoids 02/07/2018   • Pure hypercholesterolemia 02/07/2018   • Sacroiliitis ([facility] ) 02/01/2018   • Bilateral hip pain 12/04/2017   • Acute back pain 10/02/2017   • Fever 09/08/2017   • FUO (fever of unknown origin) 09/08/2017   • Adenomatous colon polyp 12/29/2015   • Spinal stenosis of lumbar region 09/10/2015   • Encounter for long-term (current) use of other medications 09/01/2015   • DDD (degenerative disc disease), lumbar 07/07/2015   • Lumbar back pain 06/22/2015   • Lumbar disc herniation 06/22/2015   • Radiculopathy, lumbar region 06/22/2015   • MTHFR mutation 03/11/2015   • Elevated hemoglobin A1c 06/05/2014   • Premature atrial complexes 05/12/2014   • Aortic ectasia ([facility] ) 03/06/2014   • Hyperlipidemia 11/25/2013   • Obstructive sleep apnea 11/25/2013   • Premature ventricular contraction 92/39/7228   • Lichen planus 27/71/1228   • Hypertension 04/04/2012   • 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   • Vitamin D deficiency 04/04/2012     aTTR Cardiac amyloidosis   Warm and compensated on exam  Orders at discharge were to hold on diuretic until 5/13 for unclear reasons  Will have patient begin taking Lasix 40 mg once daily today with Kdur 20 meq BID  Will check labs before the end of the week  Start application process for Tafamidis  Genetic testing to determine wild type vs familial      Accompanying symptoms/conditions: spinal stenosis, neuropathy, atrial fib, low voltage EKG, carpal tunnel syndrome  Weight at discharge 254 lbs, today  257    SPEP-no monoclonal bands  UPEP- no monoclonal bands  Free Light chains  Ig kappa 37 1-slightly elevated  Ig lambda 24 9  Kappa/Lambda fluid ratio- 1 49- normal     PYP 4/29/23:   Visually, static images demonstrate increased cardiac activity  This would constitute grade 3/3 uptake       Heart to contralateral lung ratio is 1 77  This is greater than than 1 5 and would be considered positive for TTR amyloidosis      On SPECT, there increase  cardiac activity  The uptake is greater than  rib uptake  This is also consistent with grade 3 /3 uptake      IMPRESSION:  1  Strongly suggestive of TTR amyloidosis  TTE 4/25/23: LVEF 65%, LVIDd 3 4 cm dilated atria, mild MR, mild TR, Consider etiologies and differential of increased LV wall thickness  With the additional presence of low ECG voltage, biatrial enlargement, and atrial fibrillation - consider amyloidosis  Strain imaging was not performed  Lab Results   Component Value Date    NTBNP 1,939 (H) 04/24/2023      Chronic atrial fibrillation  Warfarin for [address]  Rate control with Metoprolol succinate 25 mg daily  ESTER  On CPAP    CKD  Baseline creat around 1 2-1 3  Chronic thromboembolism  With h/o RLE DVT in 2014  Also with MTHFR mutation  On Warfarin  Obesity  Chronic venous insufficiency  Recent cellulitis      HPI:   80year old with PMH as above recently admitted with volume overload and celllulitis  Echocardiogram  concerning for cardiac amyloidosis  Underwent workup   SPEP, UPEP showed no monoclonal banding and serum free light chains showing mild elevation in Ig Kappa with normal Kappa Lambda fluid ratio  PYP scan highly suggestive of aTTR amyloid  He presents today for hospital follow up  Feeling deconditioned and worn out  Questioning why he is not to start his diuretic until 5/13  Having a lot of neuropathy in the lower extremities  Past Medical History:   Diagnosis Date   • Blood clot in vein 2015    in right foot   • Hypertension        12 point ROS negative other than that stated in HPI    Allergies   Allergen Reactions   • Sildenafil Hives     Other reaction(s): severe congestion, cialis was OK       Current Outpatient Medications:   •  Cholecalciferol (VITAMIN D-3 PO), Take 4,000 Units by mouth daily  , Disp: , Rfl:   •  clobetasol (TEMOVATE) 0 05 % cream, Apply topically 2 (two) times a day, Disp: , Rfl:   •  colchicine (COLCRYS) 0 6 mg tablet, Take 1 tablet (0 6 mg total) by mouth daily Do not start before May 4, 2023 , Disp: 30 tablet, Rfl: 0  •  dexamethasone sodium phosphate 0 1 % ophthalmic solution, 4 drops into left ear twice daily x 10 days, Disp: 5 mL, Rfl: 0  •  folic acid (FOLVITE) 1 mg tablet, Take 2 mg by mouth daily  , Disp: , Rfl:   •  [START ON 5/13/2023] furosemide (LASIX) 40 mg tablet, Take 1 tablet (40 mg total) by mouth daily Do not start before May 13, 2023 , Disp: 30 tablet, Rfl: 0  •  metoprolol succinate (TOPROL-XL) 25 mg 24 hr tablet, TAKE 1 TABLET(25 MG) BY MOUTH DAILY, Disp: 90 tablet, Rfl: 3  •  multivitamin-minerals (CENTRUM) tablet, Take 1 tablet by mouth daily, Disp: , Rfl:   •  potassium chloride (K-DUR,KLOR-CON) 20 mEq tablet, Take 1 tablet (20 mEq total) by mouth 2 (two) times a day for 20 doses, Disp: 20 tablet, Rfl: 0  •  pyridoxine (B-6) 25 MG tablet, Take 25 mg by mouth daily, Disp: , Rfl:   •  warfarin (COUMADIN) 5 mg tablet, TAKE 1 TO 1 AND 1/2 TABLETS BY MOUTH DAILY AS DIRECTED BY PHYSICIAN, Disp: 135 tablet, Rfl: 3  No current facility-administered medications "for this visit  Social History     Socioeconomic History   • Marital status: /Civil Union     Spouse name: Not on file   • Number of children: Not on file   • Years of education: Not on file   • Highest education level: Not on file   Occupational History   • Not on file   Tobacco Use   • Smoking status: Former     Packs/day:      Years:  00     Pack years:      Types: Cigarettes     Quit date: 18     Years since quittin 3   • Smokeless tobacco: Never   Vaping Use   • Vaping Use: Never used   Substance and Sexual Activity   • Alcohol use: Yes     Comment: 4 drinks a week   • Drug use: No   • Sexual activity: Not on file   Other Topics Concern   • Not on file   Social History Narrative   • Not on file     Social Determinants of Health     Financial Resource Strain: Not on file   Food Insecurity: No Food Insecurity   • Worried About Running Out of Food in the Last Year: Never true   • Ran Out of Food in the Last Year: Never true   Transportation Needs: No Transportation Needs   • Lack of Transportation (Medical): No   • Lack of Transportation (Non-Medical):  No   Physical Activity: Not on file   Stress: Not on file   Social Connections: Not on file   Intimate Partner Violence: Not on file   Housing Stability: Low Risk    • Unable to Pay for Housing in the Last Year: No   • Number of Places Lived in the Last Year: 1   • Unstable Housing in the Last Year: No       Family History   Problem Relation Age of Onset   • Heart disease Other    • Cancer Other        Physical Exam:    Vitals:/70 (BP Location: Left arm, Patient Position: Sitting, Cuff Size: Standard)   Pulse 100   Ht 6' 2\" (1 88 m)   Wt 117 kg (257 lb)   SpO2 97%   BMI 33 00 kg/m²     Wt Readings from Last 3 Encounters:   23 115 kg (252 lb 10 4 oz)   23 123 kg (271 lb)   23 123 kg (271 lb)       Physical Exam:    GEN: Elizabeth Parker appears well, alert and oriented x 3, pleasant and cooperative   HEENT: " pupils equal, round, and reactive to light; extraocular muscles intact  NECK: supple, no carotid bruits   HEART: irregular rhythm, normal S1 and S2, no murmurs, clicks, gallops or rubs, JVP is  flat  LUNGS: clear to auscultation bilaterally; no wheezes, rales, or rhonchi   ABDOMEN: normal bowel sounds, soft, no tenderness, no distention  EXTREMITIES: peripheral pulses normal; no clubbing, cyanosis, trace edema  NEURO: no focal findings   SKIN: normal without suspicious lesions on exposed skin    Labs & Results:    Lab Results   Component Value Date    WBC 8 27 05/03/2023    HGB 17 8 (H) 05/03/2023    HCT 52 8 (H) 05/03/2023    MCV 96 05/03/2023     05/03/2023     Lab Results   Component Value Date    SODIUM 137 05/03/2023    K 3 4 (L) 05/03/2023    CL 99 05/03/2023    CO2 34 (H) 05/03/2023    BUN 30 (H) 05/03/2023    CREATININE 1 38 (H) 05/03/2023    GLUC 105 05/03/2023    CALCIUM 9 1 05/03/2023     Lab Results   Component Value Date    NTBNP 1,939 (H) 04/24/2023      Lab Results   Component Value Date    CHOLESTEROL 194 12/01/2022    CHOLESTEROL 186 05/23/2022    CHOLESTEROL 193 11/30/2021     Lab Results   Component Value Date    HDL 74 12/01/2022    HDL 70 05/23/2022    HDL 78 11/30/2021     Lab Results   Component Value Date    TRIG 132 12/01/2022    TRIG 86 05/23/2022    TRIG 105 11/30/2021     Lab Results   Component Value Date    NONHDLC 120 12/01/2022    [place] 116 05/23/2022    NONHDLC 103 01/22/2020       EKG personally reviewed by LIVIA Reed  No results found for this visit on 05/08/23  Counseling / Coordination of Care  Total floor / unit time spent today 40 minutes  Greater than 50% of total time was spent with the patient and / or family counseling and / or coordination of care  A description of the counseling / coordination of care: 25 min  Thank you for the opportunity to participate in the care of this patient  Tamara May

## 2023-05-04 NOTE — PROGRESS NOTES
Pastoral Care Progress Note    2023  Patient: Mikayla Poon : 1940  Admission Date & Time: 2023 4259  MRN: [id number] [facility]: [id number]         23 0900   Clinical Encounter Type   Visited With Patient and family together   Episcopal Encounters   Episcopal Needs Prayer   Sacramental Encounters   Communion Given Indicator Yes     Bill Peoples met with the pt and the pt's wife and administered prayers, blessings, and the Sacrament of [employer]  No further needs were expressed at this time  Chaplains still remain available

## 2023-05-05 ENCOUNTER — HOME CARE VISIT (OUTPATIENT)
Dept: HOME HEALTH SERVICES | Facility: HOME HEALTHCARE | Age: 83
End: 2023-05-05

## 2023-05-05 VITALS
HEART RATE: 87 BPM | DIASTOLIC BLOOD PRESSURE: 86 MMHG | TEMPERATURE: 97.1 F | OXYGEN SATURATION: 98 % | SYSTOLIC BLOOD PRESSURE: 124 MMHG | RESPIRATION RATE: 16 BRPM

## 2023-05-05 NOTE — CASE COMMUNICATION
Ship to -  [facility] -MEDICARE A AND B     #2 Venous Ulcer Pretibial Anterior;Right full  #3 Venous Ulcer Leg Left; Lower full  #4 Venous Ulcer Leg Lower; Posterior;Right full    Saline 100ml 373943 -1  Gauze 4x4 ST 042057 -5  Gauze Kerlix (fluff roll) 4inch sterile 616813 -2  ABD 5x9 616913 -4  Transpore white  2in 531685 -1  Alginate with Silver 027245 -1  Gauze oil emulsion 390017 -3

## 2023-05-05 NOTE — CASE COMMUNICATION
[facility] has admitted your patient to [address] [place] [address] service with the following disciplines:      SN  This report is informational only, no responses is needed  Primary focus of home health care: integumentary  Patient stated goals of care: Improve edema weight and hip pain  Anticipated visit pattern:1 EOD 8 2w2 1w5 and next visit date: 5/7/23 [facility] EOD Cardiac assess  See medication list - meds in home differ from AVS: Missing colchici ne TC to [facility] and medication is ready for   No longer using: clobetasol, dexamethasone sodium phosphate 0 1 % ophthalmic solution, pyridoxine (B-6) 25 MG tablet  Significant clinical findings: 253lb today  Patient reports decreased appitite  Plus 3 pitting edema BLE pedal ankle  SOB with minimal exertion ADL's, which patient denies but is observed  Other pertinent information: TC to PCP left message with Glory to requ est VO to DC clobetasol, dexamethasone sodium phosphate 0 1 % ophthalmic solution, pyridoxine (B-6) 25 MG tablet  Thank you for allowing us to participate in the care of your patient        National Axel Ni RN

## 2023-05-05 NOTE — CASE COMMUNICATION
Ship to - Pt  Home    Insurance -MEDICARE A AND B     #2 Venous Ulcer Pretibial Anterior;Right full  #3 Venous Ulcer Leg Left; Lower full  #4 Venous Ulcer Leg Lower; Posterior;Right full    Saline Spray  NOT STOCKED  [id number] -1  Gauze 4x4 N/S 200 4x4s per unit  [id number] -1   Gauze Kerlix (fluff roll) 4inch sterile 478708 -12  ABD 5x9 852198 -12  Alginate with Silver 954061 -1  Gauze oil emulsion 384888 -6  4in Ace 711002 -2

## 2023-05-05 NOTE — CASE COMMUNICATION
Please add PCP [facility]   [address] [facility], [address]  [phone]

## 2023-05-07 ENCOUNTER — HOME CARE VISIT (OUTPATIENT)
Dept: HOME HEALTH SERVICES | Facility: HOME HEALTHCARE | Age: 83
End: 2023-05-07

## 2023-05-08 ENCOUNTER — DOCUMENTATION (OUTPATIENT)
Dept: CARDIOLOGY CLINIC | Facility: CLINIC | Age: 83
End: 2023-05-08

## 2023-05-08 ENCOUNTER — TELEPHONE (OUTPATIENT)
Dept: CARDIOLOGY CLINIC | Facility: CLINIC | Age: 83
End: 2023-05-08

## 2023-05-08 ENCOUNTER — OFFICE VISIT (OUTPATIENT)
Dept: CARDIOLOGY CLINIC | Facility: CLINIC | Age: 83
End: 2023-05-08

## 2023-05-08 VITALS
OXYGEN SATURATION: 97 % | HEIGHT: 74 IN | WEIGHT: 257 LBS | HEART RATE: 100 BPM | BODY MASS INDEX: 32.98 KG/M2 | SYSTOLIC BLOOD PRESSURE: 124 MMHG | DIASTOLIC BLOOD PRESSURE: 70 MMHG

## 2023-05-08 DIAGNOSIS — I50.30 DIASTOLIC CONGESTIVE HEART FAILURE, UNSPECIFIED HF CHRONICITY (HCC): Primary | ICD-10-CM

## 2023-05-08 DIAGNOSIS — L03.90 CELLULITIS: ICD-10-CM

## 2023-05-08 RX ORDER — FUROSEMIDE 40 MG/1
40 TABLET ORAL DAILY
Qty: 30 TABLET | Refills: 3 | Status: SHIPPED | OUTPATIENT
Start: 2023-05-08

## 2023-05-08 RX ORDER — POTASSIUM CHLORIDE 20 MEQ/1
20 TABLET, EXTENDED RELEASE ORAL 2 TIMES DAILY
Qty: 60 TABLET | Refills: 3 | Status: SHIPPED | OUTPATIENT
Start: 2023-05-08

## 2023-05-08 NOTE — TELEPHONE ENCOUNTER
----- Message from Aníbal Bautista sent at 5/8/2023  1:54 PM EDT -----  Hi,     This patient above will be a new Tafamidis start  Please let me know if you have any specific questions  Thanks !     Mary Carmen Melgar

## 2023-05-08 NOTE — PATIENT INSTRUCTIONS
Weight yourself daily  If you gain 3 lbs in one day or 5 lbs in one week, please call the office at [phone] and ask for a nurse or the heart failure nurse  Keep your sodium intake to <2 grams, (2000 mg) per day, and fluids <2 Liters (2000 ml) per day  This is around 6-7, 8 oz glasses of fluid per day    Start Lasix 40 mg daily  Start Potassium 20 meq twice daily  Get lab work by this Friday to check kidney function and electrolytes  Have VNA nurse draw the labs  We will start the process to obtain a medication to treat your cardiac amyloidosis called Tafamidis

## 2023-05-08 NOTE — TELEPHONE ENCOUNTER
Jessica Vásquez is out today fyi, hopefully back tomorrow, does something need to be done today?     Please let me know   Jessica Tran

## 2023-05-09 ENCOUNTER — TELEPHONE (OUTPATIENT)
Dept: HOME HEALTH SERVICES | Facility: HOME HEALTHCARE | Age: 83
End: 2023-05-09

## 2023-05-09 ENCOUNTER — HOME CARE VISIT (OUTPATIENT)
Dept: HOME HEALTH SERVICES | Facility: HOME HEALTHCARE | Age: 83
End: 2023-05-09

## 2023-05-09 LAB — PROT PATTERN UR ELPH-IMP: NORMAL

## 2023-05-10 ENCOUNTER — HOME CARE VISIT (OUTPATIENT)
Dept: HOME HEALTH SERVICES | Facility: HOME HEALTHCARE | Age: 83
End: 2023-05-10

## 2023-05-10 ENCOUNTER — TELEPHONE (OUTPATIENT)
Dept: CARDIOLOGY CLINIC | Facility: CLINIC | Age: 83
End: 2023-05-10

## 2023-05-10 ENCOUNTER — ANTICOAG VISIT (OUTPATIENT)
Dept: CARDIOLOGY CLINIC | Facility: CLINIC | Age: 83
End: 2023-05-10

## 2023-05-10 ENCOUNTER — TELEMEDICINE (OUTPATIENT)
Dept: GERIATRICS | Facility: OTHER | Age: 83
End: 2023-05-10

## 2023-05-10 VITALS
DIASTOLIC BLOOD PRESSURE: 60 MMHG | HEART RATE: 90 BPM | SYSTOLIC BLOOD PRESSURE: 110 MMHG | WEIGHT: 253 LBS | RESPIRATION RATE: 16 BRPM | BODY MASS INDEX: 32.48 KG/M2 | TEMPERATURE: 98.2 F | OXYGEN SATURATION: 100 %

## 2023-05-10 DIAGNOSIS — I48.19 PERSISTENT ATRIAL FIBRILLATION (HCC): Primary | ICD-10-CM

## 2023-05-10 DIAGNOSIS — R54 FRAILTY SYNDROME IN GERIATRIC PATIENT: ICD-10-CM

## 2023-05-10 DIAGNOSIS — L03.119 CELLULITIS OF LOWER EXTREMITY, UNSPECIFIED LATERALITY: Primary | ICD-10-CM

## 2023-05-10 DIAGNOSIS — I82.561 CHRONIC DEEP VEIN THROMBOSIS (DVT) OF CALF MUSCLE VEIN OF RIGHT LOWER EXTREMITY (HCC): Chronic | ICD-10-CM

## 2023-05-10 DIAGNOSIS — R60.0 BILATERAL LOWER EXTREMITY EDEMA: ICD-10-CM

## 2023-05-10 LAB — INR PPP: 1.3 (ref 0.84–1.19)

## 2023-05-10 NOTE — PROGRESS NOTES
Virtual Regular Visit    Verification of patient location:    Patient is located at Home in the following state in which I hold an active license [place]      Assessment/Plan:    Problem List Items Addressed This Visit        Cardiovascular and Mediastinum    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 (HCC) (Chronic)     · Continue Warfarin  · VNA nurse instructed to contact Coumadin clinic for next check of PT/INR  · Continue folate supplement  · Monitor for s/s of bleeding  · Follow up with PCP/Cardiology            Other    Bilateral lower extremity edema     · Patient currently has ace wraps on BLE  · VNA nurse will order Tubigrips size F as ace wraps were wrapped too tight  · Instruct to loosely wrap legs today  · Patient encouraged to elevate BLE  · Continue to monitor for acute changes in condition         Frailty syndrome in geriatric patient     • Patient with multiple Comorbidities with recent hospital admission for cellulitis  • Patient continues to require assistance in which VNA/HH supportive services can be provided  • Maintain fall and safety precautions  • Continue PT/OT svcs  · Will assist in medication education and disease management  • Continue to monitor patient for acute changes in condition           Lower extremity cellulitis - Primary     · Patient with chronic edema  · Continue Keflex through 5/17/23 for cellulitis  · Patient is also to continue Colcrys for gout flare up through 5/17/23  · Continue to monitor wounds for decline  · Encourage elevation for edema  · Follow up with PCP                 Reason for visit is   Chief Complaint   Patient presents with   • Virtual Regular Visit        Encounter provider Sailaja Angulo    Provider located at [address] [address] [place] [address]  [phone]      Recent Visits  Date Type Provider Dept   05/10/23 Telemedicine [facility] Blanchard [facility]   Showing recent visits within past 7 days and meeting all other requirements  Future Appointments  No visits were found meeting these conditions  Showing future appointments within next 150 days and meeting all other requirements       The patient was identified by name and date of birth  Maykel Houser was informed that this is a telemedicine visit and that the visit is being conducted through the [email or web address]  He agrees to proceed  My office door was closed  No one else was in the room  He acknowledged consent and understanding of privacy and security of the video platform  The patient has agreed to participate and understands they can discontinue the visit at any time  Patient is aware this is a billable service  Subjective  Maykel Houser is a 80 y o  male being seen for [facility] program in conjuction with VNA nurse Mony Mc, who was in the home to perform physical assessment  The patient is being seen and examined today for follow-up on acute and chronic medical conditions s/p most recent hospitalization  Patient denies pain, sob, chest pain, abdominal pain, fever, chills, nausea/vomiting, diarrhea/constipation, or dysuria  The patient reports a good appetite and is drinking an adequate amount of fluids  The patient was recently discharged for the hospital due to bilateral lower extremity cellulitis  He currently is taking Keflex and reports no s/e  I educated patient on s/s of C-diff  He reports fatigue and weakness with trouble ambulating r/t edema in his bilateral lower extremities  I discussed proper elevation  VNA nurse reports that legs were improperly wrapped with ace bandages and I suggested that Tubigrips size F be ordered as they will be easier for patient and his wife to manage donning on/off      HPI     Past Medical History:   Diagnosis Date   • Blood clot in vein 2015    in right foot   • Frailty syndrome in geriatric patient 5/12/2023   • Hypertension        Past Surgical History:   Procedure Laterality Date   • BACK SURGERY  2018   • COLONOSCOPY     • COLONOSCOPY N/A 2/24/2016    Procedure: COLONOSCOPY;  Surgeon: Nik Weems MD;  Location: [facility] OR;  Service:    • HERNIA REPAIR         Current Outpatient Medications   Medication Sig Dispense Refill   • Cholecalciferol (VITAMIN D-3 PO) Take 4,000 Units by mouth daily  • colchicine (COLCRYS) 0 6 mg tablet Take 1 tablet (0 6 mg total) by mouth daily Do not start before May 4, 2023  (Patient taking differently: Take 0 6 mg by mouth daily Taking as needed ) 30 tablet 0   • folic acid (FOLVITE) 1 mg tablet Take 2 mg by mouth daily       • furosemide (LASIX) 40 mg tablet Take 1 tablet (40 mg total) by mouth daily 30 tablet 3   • metoprolol succinate (TOPROL-XL) 25 mg 24 hr tablet TAKE 1 TABLET(25 MG) BY MOUTH DAILY 90 tablet 3   • multivitamin-minerals (CENTRUM) tablet Take 1 tablet by mouth daily     • potassium chloride (K-DUR,KLOR-CON) 20 mEq tablet Take 1 tablet (20 mEq total) by mouth 2 (two) times a day 60 tablet 3   • pyridoxine (B-6) 25 MG tablet Take 25 mg by mouth daily     • warfarin (COUMADIN) 5 mg tablet TAKE 1 TO 1 AND 1/2 TABLETS BY MOUTH DAILY AS DIRECTED BY PHYSICIAN 135 tablet 3   • warfarin (COUMADIN) 5 mg tablet Take 10 mg by mouth daily  Take Coumadin 10 mg on 5/10/23 and 5/11/23  Recheck INR on Friday 5/12/23  Indications: Atrial Fibrillation       No current facility-administered medications for this visit          Allergies   Allergen Reactions   • Sildenafil Hives     Other reaction(s): severe congestion, cialis was OK       Review of Systems    Video Exam    /60   BP Location Left arm   Patient Position Sitting   Cuff Size Adult   Resp 16   Weight 115 kg (253 lb)   SpO2 100 %   Pulse 90   Pulse Source Apical   Cardiac Regularity Regular   Temp 98 2 °F (36 8 °C)   Stated Weight    115 kg (253 lb)        Physical Exam  Vitals reviewed  Constitutional:       Appearance: He is obese  HENT:      Head: Normocephalic  Cardiovascular:      Rate and Rhythm: Normal rate and regular rhythm  Heart sounds: Normal heart sounds  Pulmonary:      Breath sounds: Normal breath sounds  No wheezing, rhonchi or rales  Chest:      Chest wall: Tenderness present  Abdominal:      General: Bowel sounds are normal       Palpations: Abdomen is soft  Musculoskeletal:      Right lower leg: Edema present  Left lower leg: Edema present  Neurological:      Mental Status: He is oriented to person, place, and time  Motor: Weakness present  Coordination: Coordination abnormal       Gait: Gait abnormal    Psychiatric:         Mood and Affect: Mood normal          Behavior: Behavior normal          Thought Content: Thought content normal          Judgment: Judgment normal         Visit Time  Total Visit Duration: I spent 60 minutes today in which greater than 50% of the time was spent in counseling/coordination of care regarding acute and chronic medical medical conditions r/t most recent hospitalization  Norah Ellis

## 2023-05-10 NOTE — ASSESSMENT & PLAN NOTE
· Patient with chronic edema  · Continue Keflex through 5/17/23 for cellulitis  · Patient is also to continue Colcrys for gout flare up through 5/17/23  · Continue to monitor wounds for decline  · Encourage elevation for edema  · Follow up with PCP

## 2023-05-11 ENCOUNTER — TELEPHONE (OUTPATIENT)
Dept: CARDIOLOGY CLINIC | Facility: CLINIC | Age: 83
End: 2023-05-11

## 2023-05-11 NOTE — TELEPHONE ENCOUNTER
Vyndamax approved by [facility]  However the co-pay will be $3486 18/ mth  Called pt, no answer, LMOM for him to call me back so we can get the PAP started

## 2023-05-12 ENCOUNTER — ANTICOAG VISIT (OUTPATIENT)
Dept: CARDIOLOGY CLINIC | Facility: CLINIC | Age: 83
End: 2023-05-12

## 2023-05-12 ENCOUNTER — HOME CARE VISIT (OUTPATIENT)
Dept: HOME HEALTH SERVICES | Facility: HOME HEALTHCARE | Age: 83
End: 2023-05-12

## 2023-05-12 VITALS — TEMPERATURE: 97.9 F | WEIGHT: 253 LBS | OXYGEN SATURATION: 97 % | BODY MASS INDEX: 32.48 KG/M2 | HEART RATE: 67 BPM

## 2023-05-12 DIAGNOSIS — I48.19 PERSISTENT ATRIAL FIBRILLATION (HCC): Primary | ICD-10-CM

## 2023-05-12 PROBLEM — R54 FRAILTY SYNDROME IN GERIATRIC PATIENT: Status: ACTIVE | Noted: 2023-05-12

## 2023-05-12 LAB — INR PPP: 1.6 (ref 0.84–1.19)

## 2023-05-13 NOTE — ASSESSMENT & PLAN NOTE
· Continue Warfarin  · VNA nurse instructed to contact Coumadin clinic for next check of PT/INR  · Continue folate supplement  · Monitor for s/s of bleeding  · Follow up with PCP/Cardiology

## 2023-05-13 NOTE — ASSESSMENT & PLAN NOTE
• Patient with multiple Comorbidities with recent hospital admission for cellulitis  • Patient continues to require assistance in which VNA/HH supportive services can be provided  • Maintain fall and safety precautions  • Continue PT/OT svcs  · Will assist in medication education and disease management  • Continue to monitor patient for acute changes in condition

## 2023-05-13 NOTE — ASSESSMENT & PLAN NOTE
· Patient currently has ace wraps on BLE  · VNA nurse will order Tubigrips size F as ace wraps were wrapped too tight  · Instruct to loosely wrap legs today  · Patient encouraged to elevate BLE  · Continue to monitor for acute changes in condition

## 2023-05-14 ENCOUNTER — HOME CARE VISIT (OUTPATIENT)
Dept: HOME HEALTH SERVICES | Facility: HOME HEALTHCARE | Age: 83
End: 2023-05-14

## 2023-05-14 VITALS
TEMPERATURE: 97.5 F | DIASTOLIC BLOOD PRESSURE: 74 MMHG | WEIGHT: 253.44 LBS | BODY MASS INDEX: 32.54 KG/M2 | OXYGEN SATURATION: 100 % | SYSTOLIC BLOOD PRESSURE: 116 MMHG | HEART RATE: 89 BPM

## 2023-05-15 DIAGNOSIS — E85.82 WILD-TYPE TRANSTHYRETIN-RELATED (ATTR) AMYLOIDOSIS (HCC): Primary | ICD-10-CM

## 2023-05-16 ENCOUNTER — HOME CARE VISIT (OUTPATIENT)
Dept: HOME HEALTH SERVICES | Facility: HOME HEALTHCARE | Age: 83
End: 2023-05-16

## 2023-05-16 ENCOUNTER — ANTICOAG VISIT (OUTPATIENT)
Dept: CARDIOLOGY CLINIC | Facility: CLINIC | Age: 83
End: 2023-05-16

## 2023-05-16 ENCOUNTER — APPOINTMENT (OUTPATIENT)
Dept: LAB | Facility: HOSPITAL | Age: 83
End: 2023-05-16

## 2023-05-16 ENCOUNTER — HOSPITAL ENCOUNTER (OUTPATIENT)
Dept: RADIOLOGY | Facility: HOSPITAL | Age: 83
Discharge: HOME | End: 2023-05-16

## 2023-05-16 DIAGNOSIS — I50.30 DIASTOLIC CONGESTIVE HEART FAILURE, UNSPECIFIED HF CHRONICITY (HCC): ICD-10-CM

## 2023-05-16 DIAGNOSIS — M10.9 INTERVAL GOUT: ICD-10-CM

## 2023-05-16 DIAGNOSIS — I48.19 PERSISTENT ATRIAL FIBRILLATION (HCC): Primary | ICD-10-CM

## 2023-05-16 DIAGNOSIS — E66.01 SEVERE OBESITY (HCC): ICD-10-CM

## 2023-05-16 DIAGNOSIS — N18.31 CKD STAGE G3A/A2, GFR 45-59 AND ALBUMIN CREATININE RATIO 30-299 MG/G (HCC): ICD-10-CM

## 2023-05-16 DIAGNOSIS — R60.0 EXTREMITY EDEMA: ICD-10-CM

## 2023-05-16 DIAGNOSIS — M79.603 UPPER EXTREMITY PAIN, ANTERIOR, UNSPECIFIED LATERALITY: ICD-10-CM

## 2023-05-16 DIAGNOSIS — I48.19 PERSISTENT ATRIAL FIBRILLATION WITH RAPID VENTRICULAR RESPONSE (HCC): ICD-10-CM

## 2023-05-16 LAB
25(OH)D3 SERPL-MCNC: 52 NG/ML (ref 30–100)
ALBUMIN SERPL BCP-MCNC: 3.5 G/DL (ref 3.5–5)
ALP SERPL-CCNC: 80 U/L (ref 46–116)
ALT SERPL W P-5'-P-CCNC: 24 U/L (ref 12–78)
ANION GAP SERPL CALCULATED.3IONS-SCNC: 0 MMOL/L (ref 4–13)
AST SERPL W P-5'-P-CCNC: 30 U/L (ref 5–45)
BILIRUB SERPL-MCNC: 1.63 MG/DL (ref 0.2–1)
BUN SERPL-MCNC: 24 MG/DL (ref 5–25)
CALCIUM SERPL-MCNC: 9.3 MG/DL (ref 8.3–10.1)
CHLORIDE SERPL-SCNC: 105 MMOL/L (ref 96–108)
CHOLEST SERPL-MCNC: 183 MG/DL
CO2 SERPL-SCNC: 32 MMOL/L (ref 21–32)
CREAT SERPL-MCNC: 1.4 MG/DL (ref 0.6–1.3)
EST. AVERAGE GLUCOSE BLD GHB EST-MCNC: 117 MG/DL
GFR SERPL CREATININE-BSD FRML MDRD: 46 ML/MIN/1.73SQ M
GLUCOSE P FAST SERPL-MCNC: 105 MG/DL (ref 65–99)
HBA1C MFR BLD: 5.7 %
HDLC SERPL-MCNC: 69 MG/DL
LDLC SERPL CALC-MCNC: 98 MG/DL (ref 0–100)
NT-PROBNP SERPL-MCNC: 1904 PG/ML
POTASSIUM SERPL-SCNC: 3.9 MMOL/L (ref 3.5–5.3)
PROT SERPL-MCNC: 7.2 G/DL (ref 6.4–8.4)
SODIUM SERPL-SCNC: 137 MMOL/L (ref 135–147)
TRIGL SERPL-MCNC: 81 MG/DL
URATE SERPL-MCNC: 10.1 MG/DL (ref 3.5–8.5)

## 2023-05-18 ENCOUNTER — TELEMEDICINE (OUTPATIENT)
Dept: GERIATRICS | Facility: OTHER | Age: 83
End: 2023-05-18

## 2023-05-18 ENCOUNTER — HOME CARE VISIT (OUTPATIENT)
Dept: HOME HEALTH SERVICES | Facility: HOME HEALTHCARE | Age: 83
End: 2023-05-18

## 2023-05-18 VITALS
SYSTOLIC BLOOD PRESSURE: 122 MMHG | OXYGEN SATURATION: 100 % | DIASTOLIC BLOOD PRESSURE: 78 MMHG | TEMPERATURE: 97.2 F | BODY MASS INDEX: 32.48 KG/M2 | WEIGHT: 253 LBS | HEART RATE: 64 BPM

## 2023-05-18 DIAGNOSIS — I82.561 CHRONIC DEEP VEIN THROMBOSIS (DVT) OF CALF MUSCLE VEIN OF RIGHT LOWER EXTREMITY (HCC): Chronic | ICD-10-CM

## 2023-05-18 DIAGNOSIS — E66.01 MORBID OBESITY (HCC): ICD-10-CM

## 2023-05-18 DIAGNOSIS — I10 PRIMARY HYPERTENSION: ICD-10-CM

## 2023-05-18 DIAGNOSIS — N18.31 STAGE 3A CHRONIC KIDNEY DISEASE (CKD) (HCC): Primary | ICD-10-CM

## 2023-05-18 DIAGNOSIS — L03.119 CELLULITIS OF LOWER EXTREMITY, UNSPECIFIED LATERALITY: ICD-10-CM

## 2023-05-18 NOTE — TELEPHONE ENCOUNTER
Received call from Marivel Kemp, stating [place] was denied and he needs an appeal     Called [employer] [facility] and he was approved from 5/10/23-12/31/23  His copay [phone]  Called Claudell Orchard back and [facility] for her to call me  He has Medicare Part D    Called pt and let him know what is going on  I will keep him up to date on this PAP

## 2023-05-18 NOTE — Clinical Note
All wounds are healed  Patient instructed to continue to wear tubigrips at least 12 hours a day  Apply moisturizer to legs daily  Elevate legs when seated  Observe scabbed wounds on posterior ankles and report s and s infection to Dr Khurram Ferro immediately  Continue medications as prescribed  Continue renal/ cardiac prudent diet  Have PT INR at out patient lab on 5/23/23  Ed has been discharged from [facility]     Thank you,   Abi Hurley RN

## 2023-05-18 NOTE — PROGRESS NOTES
Virtual Regular Visit    Verification of patient location:    Patient is located at Home in the following state in which I hold an active license [place]      Assessment/Plan:    Problem List Items Addressed This Visit        Cardiovascular and Mediastinum    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 (HCC) (Chronic)     · Continue Coumadin  · Continue Folate  · Monitor for s/s of bleeding  · Follow up with PCP/Vascular         Hypertension     · BP today 122/78  · Continue  Metoprolol and Lasix  · Avoid hypotension  · Follow up with Cardiology/PCP              Genitourinary    Stage 3a chronic kidney disease (CKD) ([facility] ) - Primary     Lab Results   Component Value Date    CREATININE 1 40 (H) 05/16/2023    CREATININE 1 38 (H) 05/03/2023    CREATININE 1 34 (H) 05/02/2023    EGFR 46 05/16/2023    EGFR 47 05/03/2023    EGFR 48 05/02/2023     · Reviewed renal diet  · Avoidance of nephrotoxins and hypotension  · Follow up with pcp         Relevant Orders    Ambulatory Referral to Physical Therapy       Other    Lower extremity cellulitis     · Abx complete  · No further s/s of cellulits  · Wounds healed with scabs  · Reviewed s/s and when to contact MD with patient and wife to avoid hospital admission  · Follow up with PCP        Other Visit Diagnoses     Morbid obesity ([facility] )        Relevant Orders    Ambulatory Referral to Physical Therapy               Reason for visit is   Chief Complaint   Patient presents with   • Virtual Regular Visit        Encounter provider LIVIA Sylvester    Provider located at [address] [place] [address] [address]   [address] [facility]  [phone]      Recent Visits  No visits were found meeting these conditions  Showing recent visits within past 7 days and meeting all other requirements  Future Appointments  No visits were found meeting these conditions    Showing future appointments within next 150 days and meeting all other requirements       The patient was identified by name and date of birth  Chau Gaona was informed that this is a telemedicine visit and that the visit is being conducted through the [employer]  He agrees to proceed     My office door was closed  No one else was in the room  He acknowledged consent and understanding of privacy and security of the video platform  The patient has agreed to participate and understands they can discontinue the visit at any time  Patient is aware this is a billable service  Subjective  Chau Gaona is a 80 y o  male  being seen for [facility] in conjuction with [facility] nurse Gladys Liu, who was in the home to perform physical assessment  The patient is being seen and examined today for follow-up on acute and chronic medical conditions s/p most recent hospitalization  Patient denies pain, sob, chest pain, abdominal pain, fever, chills, nausea/vomiting, diarrhea/constipation, or dysuria  The patient reports a good appetite and is drinking an adequate amount of fluids  The patient reports that he has been wearing his Tubi  daily  His wounds are now healed with scabs  We discussed the importance of continued management of his  CKD with focus on diet  Patient verbalizes understanding and s/s of infection and when to contact MD CABRAL is d/c patient today        HPI     Past Medical History:   Diagnosis Date   • Abnormal glucose level 02/07/2018   • Acute back pain 10/02/2017   • Atrial fibrillation (HCC)    • Blood clot in vein 2015    in right foot   • 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   • Carpal tunnel syndrome    • Chronic kidney disease (CKD)    • Fever 09/08/2017   • Frailty syndrome in geriatric patient 05/12/2023   • FUO (fever of unknown origin) 09/08/2017   • History of DVT (deep vein thrombosis)    • History of tobacco abuse     quit in 1982   • History of transient cerebral ischemia    • Hypertension Past Surgical History:   Procedure Laterality Date   • BACK SURGERY  2018   • COLONOSCOPY     • COLONOSCOPY N/A 02/24/2016    Procedure: COLONOSCOPY;  Surgeon: Angela Holt MD;  Location: [facility];  Service:    • HERNIA REPAIR     • NEUROPLASTY / TRANSPOSITION MEDIAN NERVE AT CARPAL TUNNEL         Current Outpatient Medications   Medication Sig Dispense Refill   • Cholecalciferol (VITAMIN D-3 PO) Take 4,000 Units by mouth daily  • colchicine (COLCRYS) 0 6 mg tablet Take 1 tablet (0 6 mg total) by mouth daily Do not start before May 4, 2023  (Patient taking differently: Take 0 6 mg by mouth daily Taking as needed ) 30 tablet 0   • folic acid (FOLVITE) 1 mg tablet Take 2 mg by mouth daily       • furosemide (LASIX) 40 mg tablet Take 1 tablet (40 mg total) by mouth daily (Patient taking differently: Take 40 mg by mouth daily Taking 4-5 days weekly) 30 tablet 3   • metoprolol succinate (TOPROL-XL) 25 mg 24 hr tablet TAKE 1 TABLET(25 MG) BY MOUTH DAILY 90 tablet 3   • multivitamin-minerals (CENTRUM) tablet Take 1 tablet by mouth daily     • potassium chloride (K-DUR,KLOR-CON) 20 mEq tablet Take 1 tablet (20 mEq total) by mouth 2 (two) times a day (Patient taking differently: Take 20 mEq by mouth 2 (two) times a day Taking on days he takes Lasix (~4-5 days)) 60 tablet 3   • Tafamidis 61 MG CAPS Take 61 mg by mouth in the morning (Patient not taking: Reported on 5/23/2023) 30 capsule 11   • warfarin (COUMADIN) 5 mg tablet TAKE 1 TO 1 AND 1/2 TABLETS BY MOUTH DAILY AS DIRECTED BY PHYSICIAN 135 tablet 3     No current facility-administered medications for this visit          Allergies   Allergen Reactions   • Sildenafil Hives     Other reaction(s): severe congestion, cialis was OK       Review of Systems    Video Exam    /78   BP Location Left arm   Patient Position Sitting   Cuff Size Adult   Weight 115 kg (253 lb)   SpO2 100 %   Pulse 64   Cardiac Regularity Irregular   Temp (!) 97 2 °F (36 2 °C) Physical Exam  Vitals reviewed  Constitutional:       Appearance: He is obese  HENT:      Head: Normocephalic  Cardiovascular:      Rate and Rhythm: Normal rate and irregular rhythm  Heart sounds: Normal heart sounds  Pulmonary:      Breath sounds: Normal breath sounds  No wheezing, rhonchi or rales  Abdominal:      General: Bowel sounds are normal       Palpations: Abdomen is soft  Musculoskeletal:      Right lower leg: Edema present  Left lower leg: Edema present  Neurological:      Mental Status: He is oriented to person, place, and time  Motor: Weakness present  Coordination: Coordination abnormal       Gait: Gait abnormal    Psychiatric:         Mood and Affect: Mood normal          Behavior: Behavior normal          Thought Content: Thought content normal          Judgment: Judgment normal        Visit Time  Total Visit Duration: I have spent a total time of 30 minutes on 05/18/2023 in caring for this patient including Instructions for management, Patient and family education, Importance of tx compliance, Risk factor reductions and Documenting in the medical record

## 2023-05-19 ENCOUNTER — OFFICE VISIT (OUTPATIENT)
Dept: WOUND CARE | Facility: HOSPITAL | Age: 83
End: 2023-05-19

## 2023-05-19 VITALS
DIASTOLIC BLOOD PRESSURE: 81 MMHG | TEMPERATURE: 96.5 F | RESPIRATION RATE: 20 BRPM | SYSTOLIC BLOOD PRESSURE: 127 MMHG | HEART RATE: 78 BPM

## 2023-05-19 DIAGNOSIS — L97.919 CHRONIC VENOUS HYPERTENSION (IDIOPATHIC) WITH ULCER OF RIGHT LOWER EXTREMITY (HCC): Primary | ICD-10-CM

## 2023-05-19 DIAGNOSIS — I87.311 CHRONIC VENOUS HYPERTENSION (IDIOPATHIC) WITH ULCER OF RIGHT LOWER EXTREMITY (HCC): Primary | ICD-10-CM

## 2023-05-19 NOTE — PATIENT INSTRUCTIONS
Orders Placed This Encounter   Procedures    Wound cleansing and dressings     Wound location:  Right lateral foot blister, anterior and medial right LE intact blisters and left posterior ankle scabbed area  Change dressing:  Weekly at wound care center  The dressing must stay dry and intact  You may shower with dressing protected by cast cover or bag  Or you may sponge bathe  Call wound center or home health nurse if dressing becomes wet  Cleanse the wound with normal saline, pat dry  Apply foam to cover wounds  Secure with roll gauze and tape  This was applied [facility] at the [facility]  Standing Status:   Future     Standing Expiration Date:   5/19/2024    Wound compression and edema control     Multi-layer compression wrap Instructions:  Coflex lite to right leg    Keep compression wrap/wraps clean and dry  If wraps are too tight and you experience numbness/tingling, call the wound center  If after hours, remove wraps or proceed to nearest E R  and call wound center in AM     Tara Gaxiola will be changed weekly at wound care center  Avoid prolonged standing in one place  Elevate leg(s) above the level of the heart when sitting or as much as possible  Elastic Tubular Stocking:  Spanda  size F to left leg  Tubular elastic bandage: Apply from base of toes to behind the knee  Apply in AM, may remove for sleep  Avoid prolonged standing in one place  Elevate leg(s) above the level of the heart when sitting or as much as possible  These were applied today at [address] [place] [address]       Standing Status:   Future     Standing Expiration Date:   5/19/2024

## 2023-05-19 NOTE — PROGRESS NOTES
Cast Application    Date/Time: 5/19/2023 1:00 PM  Performed by: Jack Preciado RN  Authorized by: DO Mehreen Grey Protocol:  Consent: Verbal consent obtained  Risks and benefits: risks, benefits and alternatives were discussed  Consent given by: patient  Patient understanding: patient states understanding of the procedure being performed  Patient identity confirmed: verbally with patient      Pre-procedure details:     Sensation:  Normal  Procedure details:     Laterality:  Right    Location:  Leg    Leg:  R lower leg    Strapping: no  Cast type:  Multi-layer compression short leg      Supplies:  2 layer wrap  Post-procedure details:     Pain:  Improved    Sensation:  Normal    Patient tolerance of procedure: Tolerated well, no immediate complications  Comments:      Multilayer wrap procedure     Before application, SIMI and/or TBI determined to be adequate for healing and application of compression  Lower extremity washed prior to application of compression wrap  With the foot in dorsiflexed position, coflex lite was applied as per physician orders without complications or complaint of pain  The procedure was tolerated well  Toes warm & pink post application  Patient provided education & reinforced to observe toes for any discoloration, swelling or tingling and instructed when to report to the [address] or to remove compression  Wound care as per providers orders, patient tolerated well

## 2023-05-19 NOTE — PROGRESS NOTES
Patient ID: Kyree Reno is a 80 y o  male Date of Birth 1940     Chief Complaint  Chief Complaint   Patient presents with   • Follow Up Wound Care Visit     Bilat LE wounds  Allergies  Sildenafil    Assessment:    No problem-specific Assessment & Plan notes found for this encounter  Diagnoses and all orders for this visit:    Chronic venous hypertension (idiopathic) with ulcer of right lower extremity (HCC)  -     Wound cleansing and dressings; Future  -     Wound compression and edema control; Future    Other orders  -     Cast Application              Procedures    Plan:  Initial evaluation  Wound management with foam, see wound orders below  Extensively discussed the importance of edema reduction via compression and elevation  ABIs obtained today: RLE 1 2, LLE 1 05  Compression with Coflex lite  Keep legs elevated whenever seated and avoid prolonged standing  Follow-up in 1 week or call sooner with questions or concerns    Wound 05/19/23 Venous Ulcer Foot Right;Lateral (Active)   Wound Image Images linked 05/19/23 1353   Wound Description Pink; Intact (intact blister ) 05/19/23 1350   Lory-wound Assessment Hyperpigmented; Intact;Dry 05/19/23 1350   Wound Length (cm) 1 7 cm 05/19/23 1350   Wound Width (cm) 1 cm 05/19/23 1350   Wound Depth (cm) 0 cm 05/19/23 1350   Wound Surface Area (cm^2) 1 7 cm^2 05/19/23 1350   Wound Volume (cm^3) 0 cm^3 05/19/23 1350   Calculated Wound Volume (cm^3) 0 cm^3 05/19/23 1350   Drainage Amount None 05/19/23 1350   Non-staged Wound Description Not applicable 84/65/18 8376   Dressing Status -- (open to air upon arrival ) 05/19/23 1350       Wound 05/19/23 Venous Ulcer Foot Right;Lateral (Active)   Date First Assessed/Time First Assessed: 05/19/23 1348   Pre-Existing Wound: Yes  Primary Wound Type: Venous Ulcer  Location: Foot  Wound Location Orientation: Right;Lateral       [REMOVED] Wound 04/24/23 Venous Ulcer Pretibial Anterior;Right (Removed)   Resolved Date: 05/14/23  Date First Assessed/Time First Assessed: 04/24/23 1544   Pre-Existing Wound: Yes  Primary Wound Type: Venous Ulcer  Location: Pretibial  Wound Location Orientation: Anterior;Right       [REMOVED] Wound 04/24/23 Venous Ulcer Leg Left; Lower (Removed)   Resolved Date: 05/14/23  Date First Assessed/Time First Assessed: 04/24/23 1545   Pre-Existing Wound: Yes  Primary Wound Type: Venous Ulcer  Location: Leg  Wound Location Orientation: Left; Lower       [REMOVED] Wound 04/25/23 Venous Ulcer Leg Lower; Posterior;Right (Removed)   Resolved Date: 05/14/23  Date First Assessed/Time First Assessed: 04/25/23 0929   Primary Wound Type: Venous Ulcer  Location: Leg  Wound Location Orientation: Lower; Posterior;Right       Subjective:        Patient presents for initial evaluation of bilateral lower extremity wounds  Patient was recently hospitalized from 4/24/2023 to 5/2/2023 for lower extremity cellulitis  Patient has a known ongoing history of venous insufficiency bilaterally  Patient states that he typically wears compression stockings that are 20 to 30 mmHg in strength  He has been using silver alginate on the open areas and wearing Tubigrip's for compression for the past few days  He reports no current open and draining areas but has been dealing with this issue off and on for few years  No diabetes  No smoking  The following portions of the patient's history were reviewed and updated as appropriate:   He  has a past medical history of Blood clot in vein (2015), Frailty syndrome in geriatric patient (5/12/2023), and Hypertension    He   Patient Active Problem List    Diagnosis Date Noted   • Frailty syndrome in geriatric patient 05/12/2023   • Lower extremity cellulitis 04/24/2023   • Stage 3a chronic kidney disease (CKD) ([facility] ) 04/24/2023   • Hyperbilirubinemia 04/24/2023   • 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 ([facility] ) 02/14/2022   • Bilateral lower extremity edema 02/14/2022   • Morbid obesity ([place] [address] ) 10/13/2021   • Localized edema 09/10/2020   • Persistent atrial fibrillation ([place] [address] ) 05/14/2020   • HTN (hypertension) 03/07/2018   • Obesity 03/07/2018   • ESTER (obstructive sleep apnea) 03/07/2018   • Lumbar stenosis 03/07/2018   • Abnormal glucose level 02/07/2018   • Benign neoplasm of colon 02/07/2018   • 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[place] [address] ) 02/07/2018   • 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   • Internal hemorrhoids 02/07/2018   • Pure hypercholesterolemia 02/07/2018   • Sacroiliitis ([place] [address] ) 02/01/2018   • Bilateral hip pain 12/04/2017   • Acute back pain 10/02/2017   • Fever 09/08/2017   • FUO (fever of unknown origin) 09/08/2017   • Adenomatous colon polyp 12/29/2015   • Spinal stenosis of lumbar region 09/10/2015   • Encounter for long-term (current) use of other medications 09/01/2015   • DDD (degenerative disc disease), lumbar 07/07/2015   • Lumbar back pain 06/22/2015   • Lumbar disc herniation 06/22/2015   • Radiculopathy, lumbar region 06/22/2015   • MTHFR mutation 03/11/2015   • Elevated hemoglobin A1c 06/05/2014   • Premature atrial complexes 05/12/2014   • 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   • Hyperlipidemia 11/25/2013   • Obstructive sleep apnea 11/25/2013   • Premature ventricular contraction 53/04/9161   • Lichen planus 25/10/1327   • Hypertension 04/04/2012   • 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   • Vitamin D deficiency 04/04/2012     He  reports that he quit smoking about 41 years ago  His smoking use included cigarettes  He has a 26 00 pack-year smoking history  He has never used smokeless tobacco  He reports current alcohol use  He reports that he does not use drugs  Current Outpatient Medications   Medication Sig Dispense Refill   • Cholecalciferol (VITAMIN D-3 PO) Take 4,000 Units by mouth daily  • colchicine (COLCRYS) 0 6 mg tablet Take 1 tablet (0 6 mg total) by mouth daily Do not start before May 4, 2023   (Patient taking differently: Take 0 6 mg by mouth daily Taking as needed ) 30 tablet 0   • folic acid (FOLVITE) 1 mg tablet Take 2 mg by mouth daily       • furosemide (LASIX) 40 mg tablet Take 1 tablet (40 mg total) by mouth daily (Patient taking differently: Take 1 tablet by mouth daily  As needed for edema  Indications: Edema) 30 tablet 3   • metoprolol succinate (TOPROL-XL) 25 mg 24 hr tablet TAKE 1 TABLET(25 MG) BY MOUTH DAILY 90 tablet 3   • multivitamin-minerals (CENTRUM) tablet Take 1 tablet by mouth daily     • potassium chloride (K-DUR,KLOR-CON) 20 mEq tablet Take 1 tablet (20 mEq total) by mouth 2 (two) times a day (Patient taking differently: Take 1 tablet by mouth 2 (two) times a day  Takes Potassium when he takes Lasix as needed  Indications: diuretic) 60 tablet 3   • Tafamidis 61 MG CAPS Take 61 mg by mouth in the morning 30 capsule 11   • warfarin (COUMADIN) 5 mg tablet TAKE 1 TO 1 AND 1/2 TABLETS BY MOUTH DAILY AS DIRECTED BY PHYSICIAN 135 tablet 3   • warfarin (COUMADIN) 5 mg tablet Take 10 mg by mouth daily  Take Coumadin 10 mg on 5/10/23 and 5/11/23  Recheck INR on Friday 5/12/23  Indications: Atrial Fibrillation       No current facility-administered medications for this visit  He is allergic to sildenafil       Review of Systems   Constitutional: Negative for chills and fever  HENT: Negative for congestion and sneezing  Respiratory: Negative for cough  Cardiovascular: Positive for leg swelling  Skin: Positive for wound  Psychiatric/Behavioral: Negative for agitation  Objective:       Wound 05/19/23 Venous Ulcer Foot Right;Lateral (Active)   Wound Image Images linked 05/19/23 1353   Wound Description Pink; Intact (intact blister ) 05/19/23 1350   Lory-wound Assessment Hyperpigmented; Intact;Dry 05/19/23 1350   Wound Length (cm) 1 7 cm 05/19/23 1350   Wound Width (cm) 1 cm 05/19/23 1350   Wound Depth (cm) 0 cm 05/19/23 1350   Wound Surface Area (cm^2) 1 7 cm^2 05/19/23 1350   Wound Volume (cm^3) 0 cm^3 05/19/23 1350   Calculated Wound Volume (cm^3) 0 cm^3 05/19/23 1350   Drainage Amount None 05/19/23 1350   Non-staged Wound Description Not applicable 25/76/12 8200   Dressing Status -- (open to air upon arrival ) 05/19/23 1350       /81   Pulse 78   Temp (!) 96 5 °F (35 8 °C)   Resp 20     Physical Exam  Vitals reviewed  Constitutional:       General: He is not in acute distress  Appearance: Normal appearance  He is obese  He is not ill-appearing, toxic-appearing or diaphoretic  HENT:      Head: Normocephalic and atraumatic  Right Ear: External ear normal       Left Ear: External ear normal    Eyes:      Conjunctiva/sclera: Conjunctivae normal    Cardiovascular:      Pulses:           Dorsalis pedis pulses are 2+ on the right side and 2+ on the left side  Posterior tibial pulses are 2+ on the right side and 2+ on the left side  Pulmonary:      Effort: Pulmonary effort is normal  No respiratory distress  Musculoskeletal:      Cervical back: Neck supple  Right lower leg: Edema present  Left lower leg: Edema present  Skin:     Comments: See wound assessment   Neurological:      Mental Status: He is alert  Psychiatric:         Mood and Affect: Mood normal          Behavior: Behavior normal            Wound 05/19/23 Venous Ulcer Foot Right;Lateral (Active)   Wound Image   05/19/23 1353   Wound Description Pink; Intact 05/19/23 1350   Olry-wound Assessment Hyperpigmented; Intact;Dry 05/19/23 1350   Wound Length (cm) 1 7 cm 05/19/23 1350   Wound Width (cm) 1 cm 05/19/23 1350   Wound Depth (cm) 0 cm 05/19/23 1350   Wound Surface Area (cm^2) 1 7 cm^2 05/19/23 1350   Wound Volume (cm^3) 0 cm^3 05/19/23 1350   Calculated Wound Volume (cm^3) 0 cm^3 05/19/23 1350   Drainage Amount None 05/19/23 1350   Non-staged Wound Description Not applicable 37/54/21 4310                         Wound Instructions:  Orders Placed This Encounter   Procedures   • Wound cleansing and dressings     Wound location:  Right lateral foot blister, anterior and medial right LE intact blisters and left posterior ankle scabbed area  Change dressing:  Weekly at wound care center  The dressing must stay dry and intact  You may shower with dressing protected by cast cover or bag  Or you may sponge bathe  Call wound center or home health nurse if dressing becomes wet  Cleanse the wound with normal saline, pat dry  Apply foam to cover wounds  Secure with roll gauze and tape  This was applied today at the [facility]  Standing Status:   Future     Standing Expiration Date:   5/19/2024   • Wound compression and edema control     Multi-layer compression wrap Instructions:  Coflex lite to right leg    Keep compression wrap/wraps clean and dry  If wraps are too tight and you experience numbness/tingling, call the wound center  If after hours, remove wraps or proceed to nearest E R  and call wound center in AM     Lovena Milder will be changed weekly at wound care center  Avoid prolonged standing in one place  Elevate leg(s) above the level of the heart when sitting or as much as possible  Elastic Tubular Stocking:  Spanda  size F to left leg  Tubular elastic bandage: Apply from base of toes to behind the knee  Apply in AM, may remove for sleep  Avoid prolonged standing in one place  Elevate leg(s) above the level of the heart when sitting or as much as possible  These were applied today at [address]  [facility]  Standing Status:   Future     Standing Expiration Date:   2/82/5637   • Cast Application     This order was created via procedure documentation        Diagnosis ICD-10-CM Associated Orders   1   Chronic venous hypertension (idiopathic) with ulcer of right lower extremity (HCC)  I87 [id number] Wound cleansing and dressings    L97 919 Wound compression and edema control

## 2023-05-22 PROBLEM — M54.9 ACUTE BACK PAIN: Status: RESOLVED | Noted: 2017-10-02 | Resolved: 2023-05-22

## 2023-05-22 PROBLEM — R60.0 LOCALIZED EDEMA: Status: RESOLVED | Noted: 2020-09-10 | Resolved: 2023-05-22

## 2023-05-22 PROBLEM — R60.0 BILATERAL LOWER EXTREMITY EDEMA: Status: RESOLVED | Noted: 2022-02-14 | Resolved: 2023-05-22

## 2023-05-22 PROBLEM — R73.09 ABNORMAL GLUCOSE LEVEL: Status: RESOLVED | Noted: 2018-02-07 | Resolved: 2023-05-22

## 2023-05-22 PROBLEM — R50.9 FEVER: Status: RESOLVED | Noted: 2017-09-08 | Resolved: 2023-05-22

## 2023-05-22 PROBLEM — R50.9 FUO (FEVER OF UNKNOWN ORIGIN): Status: RESOLVED | Noted: 2017-09-08 | Resolved: 2023-05-22

## 2023-05-22 NOTE — PROGRESS NOTES
"Advanced Heart Failure / Pulmonary Hypertension Outpatient Progress Note    Gloria Duong 80 y o  male   MRN: [id number]  Encounter: [id number]    Assessment:  Patient Active Problem List    Diagnosis Date Noted   • Frailty syndrome in geriatric patient 05/12/2023   • Lower extremity cellulitis 04/24/2023   • Stage 3a chronic kidney disease (CKD) ([facility] [address] ) 04/24/2023   • Hyperbilirubinemia 04/24/2023   • 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 ([facility] [address] ) 02/14/2022   • Persistent [facility] fibrillation ([facility] ) 05/14/2020   • Obesity 03/07/2018   • Lumbar stenosis 03/07/2018   • Benign neoplasm of colon 02/07/2018   • 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[facility] ) 02/07/2018   • 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   • Internal hemorrhoids 02/07/2018   • Pure hypercholesterolemia 02/07/2018   • Sacroiliitis ([facility] [address] ) 02/01/2018   • Bilateral hip pain 12/04/2017   • Adenomatous colon polyp 12/29/2015   • Spinal stenosis of lumbar region 09/10/2015   • DDD (degenerative disc disease), lumbar 07/07/2015   • Lumbar disc herniation 06/22/2015   • Radiculopathy, lumbar region 06/22/2015   • MTHFR mutation 03/11/2015   • Elevated hemoglobin A1c 06/05/2014   • Premature atrial complexes 05/12/2014   • Aortic ectasia ([facility] [address] ) 03/06/2014   • Obstructive sleep apnea 11/25/2013   • Premature ventricular contraction 18/17/7497   • Lichen planus 64/35/5004   • Hypertension 04/04/2012   • 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   • Vitamin D deficiency 04/04/2012       Today's Plan:  • Continue current medications  Taking Lasix 40 mg 4-5 days a week with potassium  • Tafamidis prior authorization in process  • Genetic testing for [id number] mutation ordered  Patient reports having specimen collected last week  • If genetic testing reveals hereditary TTR, consider referral to neurology for peripheral neuropathy and consideration of starting Onpattro/Amvuttra    • Provided patient with \"Living With Heart " "Failure\" booklet and \"Don't Pass the Salt\" cookbook  Plan:  TTR amyloidosis; NYHA II; ACC/AHA Stage B/C   Associated symptoms: lumbar spinal stenosis, CTS, peripheral neuropathy  EKG to mass mismatch: yes  Tc-PYP scan 04/29/2023: H/CL ratio 1 77  Grade 3 uptake  \"Strongly suggestive of TTR amyloidosis  \"   Genetic testing: ordered  SPEP 04/2023: insufficient sample  Free light chains 04/2023: kappa/lambda ratio 1 49  Amyloid-specific Therapies:   --None  Tafamidis prior authorization in process  Chronic HFpEF; LVEF 65%; LVIDd 3 4 cm; NYHA II; ACC/AHA Stage C   Etiology: TTR amyloidosis  TTE 07/22/2020: LVEF 60-70%  LVIDd 4 cm  IVSd 1 45 cm  Moderate concentric hypertrophy  Normal RV  HESHAM  Trace MR  Mild TR  TTE 04/25/2023: LVEF 65%  LVIDd 3 4 cm  IVSd 1 6 cm  Normal RV size with low normal RVSF  HESHAM (R>L)  Small PFO  Mild MR and TR  Trace AI  Normal IVC  Weight of 257 lbs on 05/08  Today, weighs 254 lbs  Most recent BMP from 05/16/2023: sodium 137; potassium 3 9; BUN 24; creatinine 1 40; eGFR 46  Pharmacotherapies / Neurohormonal Blockade:  --Beta Blocker: metoprolol succinate 25 mg daily  --ARNi / ACEi / ARB: No    --Aldosterone Antagonist: No    --SGLT2 Inhibitor: No    --Diuretic: Lasix 40 mg daily with potassium 20 mEq BID  Atrial fibrillation   [id number] = 6 (age, HF, HTN, DVT/TIA)  Anticoagulation on Coumadin  Rate control: as above  Rhythm control: None  Hypertension   BP of 112/76 mmHg in office today  Continue on medications as above  Chronic kidney disease, stage IIIa   Baseline creatinine of 1 3-1 5  Most recent BMP from 05/16/2023: sodium 137; potassium 3 9; BUN 24; creatinine 1 40; eGFR 46  Hyperlipidemia  Obstructive sleep apnea: compliant with CPAP  Spinal stenosis, lumbar  Carpal tunnel syndrome  MTHFR mutation  History of DVT  History of TIA  History of tobacco abuse: quit in 1982      HPI:   Boone Sim is an 12-year-old man " "with a PMH as above who presents to the office for follow-up  Follows with Dr Giancarlo Mckeon  05/08/2023 with TH: \"80 year old with PMH as above recently admitted with volume overload and celllulitis  Echocardiogram  concerning for cardiac amyloidosis  Underwent workup  SPEP, UPEP showed no monoclonal banding and serum free light chains showing mild elevation in Ig Kappa with normal Kappa Lambda fluid ratio  PYP scan highly suggestive of aTTR amyloid  He presents today for hospital follow up  Feeling deconditioned and worn out  Questioning why he is not to start his diuretic until 5/13  Having a lot of neuropathy in the lower extremities  Will have patient begin taking Lasix 40 mg once daily today with Kdur 20 meq BID  Will check labs before the end of the week  Start application process for Tafamidis  Genetic testing to determine wild type vs familial \"    05/23/2023: Patient presents with his wife for follow-up  Down 3 lbs since last visit  Reports great improvement in LE swelling since starting Lasix  Due to his active lifestyle and significant urinary response to Lasix, has only been taking Lasix and potassium 4-5 days a week  Continues with daily weights  Provided patient with \"Living With Heart Failure\" booklet and \"Don't Pass the Salt\" cookbook      Past Medical History:   Diagnosis Date   • Abnormal glucose level 02/07/2018   • Acute back pain 10/02/2017   • Atrial fibrillation (HCC)    • Blood clot in vein 2015    in right foot   • 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[facility] ) 04/2023   • Carpal tunnel syndrome    • Chronic kidney disease (CKD)    • Fever 09/08/2017   • Frailty syndrome in geriatric patient 05/12/2023   • FUO (fever of unknown origin) 09/08/2017   • History of DVT (deep vein thrombosis)    • History of tobacco abuse     quit in 1982   • History of transient cerebral ischemia    • Hypertension        Review of Systems   Constitutional: Negative for activity change, appetite change, fatigue and unexpected " weight change  HENT: Negative for congestion, rhinorrhea and sneezing  Eyes: Negative  Respiratory: Negative for cough, chest tightness and shortness of breath  Cardiovascular: Positive for leg swelling  Negative for chest pain and palpitations  Gastrointestinal: Negative for abdominal distention, abdominal pain, diarrhea and nausea  Endocrine: Negative  Genitourinary: Negative for decreased urine volume, difficulty urinating, dysuria and urgency  Musculoskeletal: Negative  Skin: Negative  Allergic/Immunologic: Negative  Neurological: Negative for dizziness, syncope, weakness and light-headedness  Psychiatric/Behavioral: Negative for confusion and sleep disturbance  The patient is not nervous/anxious  12-point ROS completed and negative except as stated above and/or in the HPI  Allergies   Allergen Reactions   • Sildenafil Hives     Other reaction(s): severe congestion, cialis was OK         Current Outpatient Medications:   •  Cholecalciferol (VITAMIN D-3 PO), Take 4,000 Units by mouth daily  , Disp: , Rfl:   •  colchicine (COLCRYS) 0 6 mg tablet, Take 1 tablet (0 6 mg total) by mouth daily Do not start before May 4, 2023   (Patient taking differently: Take 0 6 mg by mouth daily Taking as needed ), Disp: 30 tablet, Rfl: 0  •  folic acid (FOLVITE) 1 mg tablet, Take 2 mg by mouth daily  , Disp: , Rfl:   •  furosemide (LASIX) 40 mg tablet, Take 1 tablet (40 mg total) by mouth daily, Disp: 30 tablet, Rfl: 3  •  metoprolol succinate (TOPROL-XL) 25 mg 24 hr tablet, TAKE 1 TABLET(25 MG) BY MOUTH DAILY, Disp: 90 tablet, Rfl: 3  •  multivitamin-minerals (CENTRUM) tablet, Take 1 tablet by mouth daily, Disp: , Rfl:   •  potassium chloride (K-DUR,KLOR-CON) 20 mEq tablet, Take 1 tablet (20 mEq total) by mouth 2 (two) times a day, Disp: 60 tablet, Rfl: 3  •  warfarin (COUMADIN) 5 mg tablet, TAKE 1 TO 1 AND 1/2 TABLETS BY MOUTH DAILY AS DIRECTED BY PHYSICIAN, Disp: 135 tablet, Rfl: 3  • "Tafamidis 61 MG CAPS, Take 61 mg by mouth in the morning (Patient not taking: Reported on 2023), Disp: 30 capsule, Rfl: 11  •  warfarin (COUMADIN) 5 mg tablet, Take 10 mg by mouth daily  Take Coumadin 10 mg on 5/10/23 and 23 Recheck INR on 23  Indications: Atrial Fibrillation, Disp: , Rfl:     Social History     Socioeconomic History   • Marital status: /Civil Union     Spouse name: Not on file   • Number of children: Not on file   • Years of education: Not on file   • Highest education level: Not on file   Occupational History   • Not on file   Tobacco Use   • Smoking status: Former     Packs/day:      Years:      Pack years:      Types: Cigarettes     Quit date: 18     Years since quittin 4   • Smokeless tobacco: Never   Vaping Use   • Vaping Use: Never used   Substance and Sexual Activity   • Alcohol use: Yes     Comment: 4 drinks a week   • Drug use: No   • Sexual activity: Not on file   Other Topics Concern   • Not on file   Social History Narrative   • Not on file     Social Determinants of Health     Financial Resource Strain: Not on file   Food Insecurity: No Food Insecurity   • Worried About Running Out of Food in the Last Year: Never true   • Ran Out of Food in the Last Year: Never true   Transportation Needs: No Transportation Needs   • Lack of Transportation (Medical): No   • Lack of Transportation (Non-Medical): No   Physical Activity: Not on file   Stress: Not on file   Social Connections: Not on file   Intimate Partner Violence: Not on file   Housing Stability: Low Risk    • Unable to Pay for Housing in the Last Year: No   • Number of Places Lived in the Last Year: 1   • Unstable Housing in the Last Year: No     Family History   Problem Relation Age of Onset   • Heart attack Father    • Heart disease Other    • Cancer Other        Vitals:   Blood pressure 112/76, pulse 70, height 6' 2\" (1 88 m), weight 115 kg (254 lb), SpO2 98 %      Body mass index is " "32 61 kg/m²  Wt Readings from Last 10 Encounters:   23 115 kg (254 lb)   23 115 kg (253 lb)   23 115 kg (253 lb 7 oz)   23 115 kg (253 lb)   05/10/23 115 kg (253 lb)   23 117 kg (257 lb)   23 115 kg (252 lb 10 4 oz)   23 123 kg (271 lb)   23 123 kg (271 lb)   22 123 kg (271 lb)     Vitals:    23 1044   BP: 112/76   BP Location: Left arm   Patient Position: Sitting   Cuff Size: Standard   Pulse: 70   SpO2: 98%   Weight: 115 kg (254 lb)   Height: 6' 2\" (1 88 m)       Physical Exam  Vitals reviewed  Constitutional:       General: He is awake  He is not in acute distress  Appearance: Normal appearance  He is well-developed and overweight  He is not toxic-appearing or diaphoretic  HENT:      Head: Normocephalic  Nose: Nose normal       Mouth/Throat:      Mouth: Mucous membranes are moist    Eyes:      General: No scleral icterus  Conjunctiva/sclera: Conjunctivae normal    Neck:      Vascular: JVD present  Trachea: No tracheal deviation  Cardiovascular:      Rate and Rhythm: Normal rate  Rhythm irregularly irregular  Heart sounds: Murmur heard  Pulmonary:      Effort: Pulmonary effort is normal  No tachypnea, bradypnea or respiratory distress  Breath sounds: Normal air entry  No decreased air movement  No decreased breath sounds or wheezing  Abdominal:      General: Bowel sounds are normal  There is distension  Palpations: Abdomen is soft  Tenderness: There is no abdominal tenderness  Musculoskeletal:      Cervical back: Neck supple  Right lower le+ Pitting Edema present  Left lower le+ Pitting Edema present  Skin:     General: Skin is warm and dry  Coloration: Skin is not jaundiced or pale  Neurological:      General: No focal deficit present  Mental Status: He is alert and oriented to person, place, and time     Psychiatric:         Attention and Perception: Attention normal     " Mood and Affect: Mood and affect normal          Speech: Speech normal          Behavior: Behavior normal  Behavior is cooperative  Thought Content:  Thought content normal        Labs & Results:  Lab Results   Component Value Date    WBC 8 27 05/03/2023    HGB 17 8 (H) 05/03/2023    HCT 52 8 (H) 05/03/2023    MCV 96 05/03/2023     05/03/2023     Lab Results   Component Value Date    SODIUM 137 05/16/2023    K 3 9 05/16/2023     05/16/2023    CO2 32 05/16/2023    BUN 24 05/16/2023    CREATININE 1 40 (H) 05/16/2023    GLUC 105 05/03/2023    CALCIUM 9 3 05/16/2023     Lab Results   Component Value Date    INR 2 02 (H) 05/16/2023    INR 1 60 (A) 05/12/2023    INR 1 30 (A) 05/10/2023    PROTIME 23 1 (H) 05/16/2023    PROTIME 21 2 (H) 05/03/2023    PROTIME 21 2 (H) 05/02/2023     Lab Results   Component Value Date    NTBNP 1,904 (H) 05/16/2023      No results found for: BNP     Margurite Essex, PA-C

## 2023-05-23 ENCOUNTER — OFFICE VISIT (OUTPATIENT)
Dept: CARDIOLOGY CLINIC | Facility: CLINIC | Age: 83
End: 2023-05-23

## 2023-05-23 VITALS
HEART RATE: 70 BPM | HEIGHT: 74 IN | DIASTOLIC BLOOD PRESSURE: 76 MMHG | WEIGHT: 254 LBS | SYSTOLIC BLOOD PRESSURE: 112 MMHG | BODY MASS INDEX: 32.6 KG/M2 | OXYGEN SATURATION: 98 %

## 2023-05-23 DIAGNOSIS — N18.31 STAGE 3A CHRONIC KIDNEY DISEASE (HCC): ICD-10-CM

## 2023-05-23 DIAGNOSIS — E85.4 CARDIAC AMYLOIDOSIS (HCC): ICD-10-CM

## 2023-05-23 DIAGNOSIS — I43 CARDIAC AMYLOIDOSIS (HCC): ICD-10-CM

## 2023-05-23 DIAGNOSIS — I48.91 ATRIAL FIBRILLATION, UNSPECIFIED TYPE (HCC): ICD-10-CM

## 2023-05-23 DIAGNOSIS — I50.32 CHRONIC HEART FAILURE WITH PRESERVED EJECTION FRACTION (HCC): Primary | ICD-10-CM

## 2023-05-23 NOTE — PATIENT INSTRUCTIONS
Please weigh yourself every day (after emptying your bladder) and keep a detailed log of weights  Contact the Heart Failure program at [phone] if you gain 3 lbs overnight or 5 lbs in 5-7 days  Limit daily sodium/salt intake to 2000 mg daily to prevent fluid retention  Avoid canned foods, fast food/Chinese food, and processed meats (hot dogs, lunch meat, and sausage etc )  Caution with condiments  Limit fluid intake to 2000 mL or 2 liters (about 60-65 ounces) daily  Avoid electrolyte replacement drinks (such as Gatorade, Pedialyte, Propel, Liquid IV, etc )  Bring complete list of medications and log of daily weights to your follow-up appointment

## 2023-05-24 ENCOUNTER — ANTICOAG VISIT (OUTPATIENT)
Dept: CARDIOLOGY CLINIC | Facility: CLINIC | Age: 83
End: 2023-05-24

## 2023-05-24 ENCOUNTER — APPOINTMENT (OUTPATIENT)
Dept: LAB | Facility: CLINIC | Age: 83
End: 2023-05-24

## 2023-05-24 ENCOUNTER — TELEPHONE (OUTPATIENT)
Dept: CARDIOLOGY CLINIC | Facility: CLINIC | Age: 83
End: 2023-05-24

## 2023-05-24 DIAGNOSIS — I48.19 PERSISTENT ATRIAL FIBRILLATION (HCC): Primary | ICD-10-CM

## 2023-05-24 NOTE — TELEPHONE ENCOUNTER
Called Blaine Kim from [employer]  All of that info is on the paperwork sent to them  VNot sure what she is looking at  Asked her to call me back

## 2023-05-24 NOTE — TELEPHONE ENCOUNTER
Application sent for Tafamidis is incomplete- They need Diagnosis  code's and insurance & doctor signature    Number to c/b [phone]

## 2023-05-24 NOTE — TELEPHONE ENCOUNTER
Called [facility] for the address zip code  Refaxed patient page with the [facility] address on it  # [phone]

## 2023-05-26 ENCOUNTER — OFFICE VISIT (OUTPATIENT)
Dept: WOUND CARE | Facility: HOSPITAL | Age: 83
End: 2023-05-26

## 2023-05-26 VITALS
RESPIRATION RATE: 12 BRPM | HEART RATE: 81 BPM | DIASTOLIC BLOOD PRESSURE: 72 MMHG | SYSTOLIC BLOOD PRESSURE: 125 MMHG | TEMPERATURE: 97 F

## 2023-05-26 DIAGNOSIS — L97.919 CHRONIC VENOUS HYPERTENSION (IDIOPATHIC) WITH ULCER OF RIGHT LOWER EXTREMITY (HCC): Primary | ICD-10-CM

## 2023-05-26 DIAGNOSIS — I87.311 CHRONIC VENOUS HYPERTENSION (IDIOPATHIC) WITH ULCER OF RIGHT LOWER EXTREMITY (HCC): Primary | ICD-10-CM

## 2023-05-26 NOTE — PATIENT INSTRUCTIONS
Orders Placed This Encounter   Procedures    Wound cleansing and dressings     Right foot wound is healed  No needed dressing  You are discharged from wound care center today  Shower OK  Apply moisturizing cream to the skin  Wear compression stocking most times and you may remove it at night  When you have reopen wound or new wound, please contact wound care center       Standing Status:   Future     Standing Expiration Date:   5/26/2024

## 2023-05-26 NOTE — PROGRESS NOTES
Patient ID: Evelyne Niece is a 80 y o  male Date of Birth 1940     Chief Complaint  Chief Complaint   Patient presents with   • Follow Up Wound Care Visit     RLE wound       Allergies  Sildenafil    Assessment:    No problem-specific Assessment & Plan notes found for this encounter  Diagnoses and all orders for this visit:    Chronic venous hypertension (idiopathic) with ulcer of right lower extremity (HCC)  -     Wound cleansing and dressings;  Future              Procedures    Plan:  Wound is closed  At length discussed the importance of edema control via compression and elevation  Patient to use compression stockings which are 20 to 30 mmHg for long-term edema control  Currently using as needed diuretics and advised to discuss with PCP for routine use of the diuretic only if the edema appears to be poorly controlled with compression and elevation alone as evidenced by increased edema and/or blisters/ulcer formation  Follow-up here in wound management center as needed or call with questions or concerns    Wound 05/19/23 Venous Ulcer Foot Right;Lateral (Active)   Wound Image Images linked 05/26/23 1326   Wound Description Brown;Dry 05/26/23 1327   Lory-wound Assessment Dry 05/26/23 1327   Wound Length (cm) 0 cm 05/26/23 1327   Wound Width (cm) 0 cm 05/26/23 1327   Wound Depth (cm) 0 cm 05/26/23 1327   Wound Surface Area (cm^2) 0 cm^2 05/26/23 1327   Wound Volume (cm^3) 0 cm^3 05/26/23 1327   Calculated Wound Volume (cm^3) 0 cm^3 05/26/23 1327   Drainage Amount None 05/26/23 1327   Drainage Description KARSTEN 05/26/23 1327   Non-staged Wound Description Not applicable 50/78/77 2457   Patient Tolerance Tolerated well 05/26/23 1327   Dressing Status Intact 05/26/23 1327       Wound 05/19/23 Venous Ulcer Foot Right;Lateral (Active)   Date First Assessed/Time First Assessed: 05/19/23 1348   Primary Wound Type: Venous Ulcer  Location: Foot  Wound Location Orientation: Right;Lateral  Wound Outcome: Healed [REMOVED] Wound 04/24/23 Venous Ulcer Pretibial Anterior;Right (Removed)   Resolved Date: 05/14/23  Date First Assessed/Time First Assessed: 04/24/23 1544   Pre-Existing Wound: Yes  Primary Wound Type: Venous Ulcer  Location: Pretibial  Wound Location Orientation: Anterior;Right       [REMOVED] Wound 04/24/23 Venous Ulcer Leg Left; Lower (Removed)   Resolved Date: 05/14/23  Date First Assessed/Time First Assessed: 04/24/23 1545   Pre-Existing Wound: Yes  Primary Wound Type: Venous Ulcer  Location: Leg  Wound Location Orientation: Left; Lower       [REMOVED] Wound 04/25/23 Venous Ulcer Leg Lower; Posterior;Right (Removed)   Resolved Date: 05/14/23  Date First Assessed/Time First Assessed: 04/25/23 0929   Primary Wound Type: Venous Ulcer  Location: Leg  Wound Location Orientation: Lower; Posterior;Right       Subjective:        Patient presents for follow-up of venous ulcer of the right foot  No increased pain or drainage  Has had foam on the wound and Coflex lite for compression  No new complaints  The following portions of the patient's history were reviewed and updated as appropriate:   He  has a past medical history of Abnormal glucose level (02/07/2018), Acute back pain (10/02/2017), Atrial fibrillation ([facility] [address] ), Blood clot in vein (2015), 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), Carpal tunnel syndrome, Chronic kidney disease (CKD), Fever (09/08/2017), Frailty syndrome in geriatric patient (05/12/2023), FUO (fever of unknown origin) (09/08/2017), History of DVT (deep vein thrombosis), History of tobacco abuse, History of transient cerebral ischemia, and Hypertension    He   Patient Active Problem List    Diagnosis Date Noted   • Frailty syndrome in geriatric patient 05/12/2023   • Lower extremity cellulitis 04/24/2023   • Stage 3a chronic kidney disease (CKD) ([address] ) 04/24/2023   • Hyperbilirubinemia 04/24/2023   • 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 ([address] ) 02/14/2022   • Persistent atrial fibrillation ([place] [id number] ) 05/14/2020   • Obesity 03/07/2018   • Lumbar stenosis 03/07/2018   • Benign neoplasm of colon 02/07/2018   • 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[place] [address] ) 02/07/2018   • 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   • Internal hemorrhoids 02/07/2018   • Pure hypercholesterolemia 02/07/2018   • Sacroiliitis ([place] [address] ) 02/01/2018   • Bilateral hip pain 12/04/2017   • Adenomatous colon polyp 12/29/2015   • Spinal stenosis of lumbar region 09/10/2015   • DDD (degenerative disc disease), lumbar 07/07/2015   • Lumbar disc herniation 06/22/2015   • Radiculopathy, lumbar region 06/22/2015   • MTHFR mutation 03/11/2015   • Elevated hemoglobin A1c 06/05/2014   • Premature atrial complexes 05/12/2014   • Aortic ectasia ([place] [address] ) 03/06/2014   • Obstructive sleep apnea 11/25/2013   • Premature ventricular contraction 28/49/3208   • Lichen planus 00/26/4249   • Hypertension 04/04/2012   • 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   • Vitamin D deficiency 04/04/2012     He  has a past surgical history that includes Back surgery (2018); Hernia repair; Colonoscopy; Colonoscopy (N/A, 02/24/2016); and Neuroplasty / transposition median nerve at carpal tunnel  He  reports that he quit smoking about 41 years ago  His smoking use included cigarettes  He has a 26 00 pack-year smoking history  He has never used smokeless tobacco  He reports current alcohol use  He reports that he does not use drugs  Current Outpatient Medications   Medication Sig Dispense Refill   • Cholecalciferol (VITAMIN D-3 PO) Take 4,000 Units by mouth daily  • colchicine (COLCRYS) 0 6 mg tablet Take 1 tablet (0 6 mg total) by mouth daily Do not start before May 4, 2023   (Patient taking differently: Take 0 6 mg by mouth daily Taking as needed ) 30 tablet 0   • folic acid (FOLVITE) 1 mg tablet Take 2 mg by mouth daily       • furosemide (LASIX) 40 mg tablet Take 1 tablet (40 mg total) by mouth daily (Patient taking differently: Take 40 mg by mouth daily Taking 4-5 days weekly) 30 tablet 3   • metoprolol succinate (TOPROL-XL) 25 mg 24 hr tablet TAKE 1 TABLET(25 MG) BY MOUTH DAILY 90 tablet 3   • multivitamin-minerals (CENTRUM) tablet Take 1 tablet by mouth daily     • potassium chloride (K-DUR,KLOR-CON) 20 mEq tablet Take 1 tablet (20 mEq total) by mouth 2 (two) times a day (Patient taking differently: Take 20 mEq by mouth 2 (two) times a day Taking on days he takes Lasix (~4-5 days)) 60 tablet 3   • Tafamidis 61 MG CAPS Take 61 mg by mouth in the morning (Patient not taking: Reported on 5/23/2023) 30 capsule 11   • warfarin (COUMADIN) 5 mg tablet TAKE 1 TO 1 AND 1/2 TABLETS BY MOUTH DAILY AS DIRECTED BY PHYSICIAN 135 tablet 3     No current facility-administered medications for this visit  He is allergic to sildenafil       Review of Systems   Constitutional: Negative for chills and fever  HENT: Negative for congestion and sneezing  Respiratory: Negative for cough  Cardiovascular: Positive for leg swelling  Skin: Positive for wound  Psychiatric/Behavioral: Negative for agitation  Objective:       Wound 05/19/23 Venous Ulcer Foot Right;Lateral (Active)   Wound Image Images linked 05/26/23 1326   Wound Description Brown;Dry 05/26/23 1327   Lory-wound Assessment Dry 05/26/23 1327   Wound Length (cm) 0 cm 05/26/23 1327   Wound Width (cm) 0 cm 05/26/23 1327   Wound Depth (cm) 0 cm 05/26/23 1327   Wound Surface Area (cm^2) 0 cm^2 05/26/23 1327   Wound Volume (cm^3) 0 cm^3 05/26/23 1327   Calculated Wound Volume (cm^3) 0 cm^3 05/26/23 1327   Drainage Amount None 05/26/23 1327   Drainage Description KARSTEN 05/26/23 1327   Non-staged Wound Description Not applicable 89/73/95 8246   Patient Tolerance Tolerated well 05/26/23 1327   Dressing Status Intact 05/26/23 1327       /72   Pulse 81   Temp (!) 97 °F (36 1 °C)   Resp 12     Physical Exam  Vitals reviewed  Constitutional:       General: He is not in acute distress  Appearance: Normal appearance  He is obese  He is not ill-appearing, toxic-appearing or diaphoretic  HENT:      Head: Normocephalic and atraumatic  Right Ear: External ear normal       Left Ear: External ear normal    Eyes:      Conjunctiva/sclera: Conjunctivae normal    Pulmonary:      Effort: Pulmonary effort is normal  No respiratory distress  Musculoskeletal:      Cervical back: Neck supple  Right lower leg: Edema present  Left lower leg: Edema present  Skin:     Comments: See wound assessment, wound appears closed   Neurological:      Mental Status: He is alert  Psychiatric:         Mood and Affect: Mood normal          Behavior: Behavior normal            Wound 05/19/23 Venous Ulcer Foot Right;Lateral (Active)   Wound Image   05/26/23 1326   Wound Description Brown;Dry 05/26/23 1327   Lory-wound Assessment Dry 05/26/23 1327   Wound Length (cm) 0 cm 05/26/23 1327   Wound Width (cm) 0 cm 05/26/23 1327   Wound Depth (cm) 0 cm 05/26/23 1327   Wound Surface Area (cm^2) 0 cm^2 05/26/23 1327   Wound Volume (cm^3) 0 cm^3 05/26/23 1327   Calculated Wound Volume (cm^3) 0 cm^3 05/26/23 1327   Drainage Amount None 05/26/23 1327   Drainage Description KARSTEN 05/26/23 1327   Non-staged Wound Description Not applicable 64/17/49 0396   Patient Tolerance Tolerated well 05/26/23 1327   Dressing Status Intact 05/26/23 1327                       Wound Instructions:  Orders Placed This Encounter   Procedures   • Wound cleansing and dressings     Right foot wound is healed  No needed dressing  You are discharged from wound care center today  Shower OK  Apply moisturizing cream to the skin  Wear compression stocking most times and you may remove it at night  When you have reopen wound or new wound, please contact wound care center  Standing Status:   Future     Standing Expiration Date:   5/26/2024        Diagnosis ICD-10-CM Associated Orders   1   Chronic venous hypertension (idiopathic) with ulcer of right lower extremity ([facility] )  I87 311 Wound cleansing and dressings    L93 140

## 2023-06-01 PROBLEM — E66.01 MORBID OBESITY (HCC): Status: ACTIVE | Noted: 2018-03-07

## 2023-06-01 NOTE — ASSESSMENT & PLAN NOTE
Lab Results   Component Value Date    CREATININE 1 40 (H) 05/16/2023    CREATININE 1 38 (H) 05/03/2023    CREATININE 1 34 (H) 05/02/2023    EGFR 46 05/16/2023    EGFR 47 05/03/2023    EGFR 48 05/02/2023     · Reviewed renal diet  · Avoidance of nephrotoxins and hypotension  · Follow up with pcp

## 2023-06-01 NOTE — ASSESSMENT & PLAN NOTE
· Abx complete  · No further s/s of cellulits  · Wounds healed with scabs  · Reviewed s/s and when to contact MD with patient and wife to avoid hospital admission  · Follow up with PCP

## 2023-06-01 NOTE — ASSESSMENT & PLAN NOTE
· Continue Coumadin  · Continue Folate  · Monitor for s/s of bleeding  · Follow up with PCP/Vascular

## 2023-06-01 NOTE — ASSESSMENT & PLAN NOTE
· BP today 122/78  · Continue  Metoprolol and Lasix  · Avoid hypotension  · Follow up with Cardiology/PCP

## 2023-06-05 ENCOUNTER — DOCUMENTATION (OUTPATIENT)
Dept: CARDIOLOGY CLINIC | Facility: CLINIC | Age: 83
End: 2023-06-05

## 2023-06-06 ENCOUNTER — OFFICE VISIT (OUTPATIENT)
Dept: WOUND CARE | Facility: HOSPITAL | Age: 83
End: 2023-06-06
Payer: MEDICARE

## 2023-06-06 ENCOUNTER — TELEPHONE (OUTPATIENT)
Dept: CARDIOLOGY CLINIC | Facility: CLINIC | Age: 83
End: 2023-06-06

## 2023-06-06 ENCOUNTER — EVALUATION (OUTPATIENT)
Dept: PHYSICAL THERAPY | Facility: CLINIC | Age: 83
End: 2023-06-06
Payer: MEDICARE

## 2023-06-06 VITALS
SYSTOLIC BLOOD PRESSURE: 118 MMHG | DIASTOLIC BLOOD PRESSURE: 78 MMHG | RESPIRATION RATE: 18 BRPM | TEMPERATURE: 97.4 F | HEART RATE: 88 BPM

## 2023-06-06 DIAGNOSIS — G89.29 CHRONIC PAIN OF RIGHT KNEE: ICD-10-CM

## 2023-06-06 DIAGNOSIS — M25.561 CHRONIC PAIN OF RIGHT KNEE: ICD-10-CM

## 2023-06-06 DIAGNOSIS — I87.311 CHRONIC VENOUS HYPERTENSION (IDIOPATHIC) WITH ULCER OF RIGHT LOWER EXTREMITY (HCC): Primary | ICD-10-CM

## 2023-06-06 DIAGNOSIS — M54.50 LUMBAR SPINE PAIN: Primary | ICD-10-CM

## 2023-06-06 DIAGNOSIS — L97.919 CHRONIC VENOUS HYPERTENSION (IDIOPATHIC) WITH ULCER OF RIGHT LOWER EXTREMITY (HCC): Primary | ICD-10-CM

## 2023-06-06 PROCEDURE — 99213 OFFICE O/P EST LOW 20 MIN: CPT | Performed by: FAMILY MEDICINE

## 2023-06-06 PROCEDURE — 29581 APPL MULTLAYER CMPRN SYS LEG: CPT

## 2023-06-06 PROCEDURE — 97161 PT EVAL LOW COMPLEX 20 MIN: CPT | Performed by: PHYSICAL THERAPIST

## 2023-06-06 RX ORDER — LIDOCAINE 40 MG/G
CREAM TOPICAL ONCE
Status: COMPLETED | OUTPATIENT
Start: 2023-06-06 | End: 2023-06-06

## 2023-06-06 RX ADMIN — LIDOCAINE: 40 CREAM TOPICAL at 15:30

## 2023-06-06 NOTE — PROGRESS NOTES
PT Evaluation     Today's date: 2023  Patient name: Brandee Pena  : 1940  MRN: [id number]  Referring provider: Elizabeth Smith DO  Dx:   Encounter Diagnosis     ICD-10-CM    1  Lumbar spine pain  M54 50       2  Chronic pain of right knee  M25 561     G89 29                      Assessment  Assessment details: Patient is a 80 y o  male who presents to outpatient PT with pain, decreased rom, decreased strength and decreased functional mobility  He will benefit from skilled PT to address these deficits in order to achieve his goals and maximize his functional mobility  Goals  Short Term Goals: Independent performance of initial hep  Decrease pain 2 points on VAS      Long Term Goals: Independent performance of comprehensive hep  Performance of IADL's is returned to max level of function  Performance in recreational activities is improved to max level of function  Able to walk community distances with min pain      Plan  Planned therapy interventions: abdominal trunk stabilization, IADL retraining, joint mobilization, manual therapy, strengthening, stretching, therapeutic activities, therapeutic exercise, therapeutic training, transfer training and home exercise program  Frequency: 2x week  Duration in weeks: 6        Subjective Evaluation    History of Present Illness  Mechanism of injury: 2018 had lumbar spine surgery  Reports that he has been having increased pain along his R lumbar spine and hip, reports that it feels likes a knot  Reports that he has increased pain when he sits for an  extended period of time or when sitting in the pews at [employer]  Reports that he has pain when walking for recreation and he has had to decrease the distance that he walks  Reports that he feels unsteady on his feet but denies falls  Denies pain to his knee or below  Reports that he also suffers from B/L knee pain  Reports recent hospitalizations of amyloidosis, in hospital for 9 days  "  Reports that he would like to return to walking for recreation and working in his garden         Pain  Current pain rating: 3  At best pain ratin  At worst pain ratin    Patient Goals  Patient goals for therapy: decreased pain, increased motion, increased strength, independence with ADLs/IADLs and return to sport/leisure activities          Objective     Lumbar spine:    ROM:   Flexion: mod limited, stiff   Extension: max limited, stiff   Right Rotation: mod limited, stiff   Left Rotation: mod limited, stiff   Right Lateral Flexion: mod limited   Left Lateral Flexion: mod limited, pain produce      TTP at origin of pirifirmis  Poor control of abdominals noted with TaA  Hypomobility noted with spring testing  Straight Leg Raise: neg  Cross SLR:  neg  Slump Test:  neg  Prone Knee Bend: tight quad but no adverse neural tension  Decreased flexibility: B/L quad, hip flexor, piriformis  Freedom's sign pos        Myotome testing: strong and symmetric B/L LE\"s      Dermatome testing: intact to light touch in B/L LE's        :     R knee      Tender to Palpation:  none    ROM   Flexion: 114   Extension: -9    Strength:   Flexion:  4/5   Extension:  4/5      Crepitus noted with arom  Pt unable to achieve TKE during stance             Precautions: amyloidosis, HTN, kidney disease, lumbar DJD, B/L chronic knee pain, prior lumbar surgery (2018)      Manuals             TPR R piriformis             Laser R knee                                       Neuro Re-Ed             TaA             TaA bridge             TaA SLR flexion                                                                 Ther Ex             Heel slides             Leg press             glute stretch             gastroc stretch             Cone taps                                                    Ther Activity             bike                          Gait Training                                       Modalities             prn                        "

## 2023-06-06 NOTE — PATIENT INSTRUCTIONS
Orders Placed This Encounter   Procedures    Wound cleansing and dressings     Wound location RLE  Change dressing weekly at the wound center  The dressing must stay dry and intact  You may shower with dressing protected by cast cover or bag  Or you may sponge bathe  Call wound center or home health nurse if dressing becomes wet  At wound center today  Cleansed with NSS, patted dry  Apply Exufiber Ag to wound  Cover with Rashaun Wiseman    This was applied today at the [facility]  Standing Status:   Future     Standing Expiration Date:   6/6/2024    Wound compression and edema control     CoFlex Lite Multi-layer compression wrap Instructions for RLE    Keep compression wrap/wraps clean and dry  If wraps are too tight and you experience numbness/tingling, call the wound center  If after hours, remove wraps or proceed to nearest E R  and call wound center in AM     Wilhemina Fossa will be changed 1 x weekly    Avoid prolonged standing in one place  Elevate leg(s) above the level of the heart when sitting or as much as possible  This was applied today       Standing Status:   Future     Standing Expiration Date:   6/6/2024

## 2023-06-06 NOTE — PROGRESS NOTES
Cast Application    Date/Time: 6/6/2023 3:00 PM    Performed by: Garry Zuniga RN  Authorized by: Beba Coats DO  Universal Protocol:  Consent: Verbal consent obtained  Risks and benefits: risks, benefits and alternatives were discussed  Consent given by: patient  Patient understanding: patient states understanding of the procedure being performed  Patient identity confirmed: verbally with patient      Pre-procedure details:     Sensation:  Normal  Procedure details:     Laterality:  Right    Location:  Leg    Leg:  R lower leg    Strapping: no  Cast type:  Multi-layer compression short leg      Supplies:  2 layer wrap  Post-procedure details:     Pain:  Improved    Sensation:  Normal    Patient tolerance of procedure: Tolerated well, no immediate complications  Comments:      Multilayer wrap procedure     Before application, SIMI and/or TBI determined to be adequate for healing and application of compression  Lower extremity washed prior to application of compression wrap  With the foot in dorsiflexed position, coflex was applied as per physician orders without complications or complaint of pain  The procedure was tolerated well  Toes warm & pink post application  Patient provided education & reinforced to observe toes for any discoloration, swelling or tingling and instructed when to report to the [address] [facility] or to remove compression  Wound care as per providers orders, patient tolerated well

## 2023-06-06 NOTE — PROGRESS NOTES
Advanced Heart Failure / Pulmonary Hypertension Outpatient Visit    Luz Elena Phan 80 y o  male   MRN: [id number]  Encounter: [id number]    Assessment:  Patient Active Problem List    Diagnosis Date Noted   • Frailty syndrome in geriatric patient 05/12/2023   • Lower extremity cellulitis 04/24/2023   • Stage 3a chronic kidney disease (CKD) ([facility] ) 04/24/2023   • Hyperbilirubinemia 04/24/2023   • 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 ([facility] ) 02/14/2022   • Persistent atrial fibrillation ([facility] ) 05/14/2020   • Morbid obesity ([facility] ) 03/07/2018   • Lumbar stenosis 03/07/2018   • Benign neoplasm of colon 02/07/2018   • 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[facility] ) 02/07/2018   • 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   • Internal hemorrhoids 02/07/2018   • Pure hypercholesterolemia 02/07/2018   • Sacroiliitis ([facility] ) 02/01/2018   • Bilateral hip pain 12/04/2017   • Adenomatous colon polyp 12/29/2015   • Spinal stenosis of lumbar region 09/10/2015   • DDD (degenerative disc disease), lumbar 07/07/2015   • Lumbar disc herniation 06/22/2015   • Radiculopathy, lumbar region 06/22/2015   • MTHFR mutation 03/11/2015   • Elevated hemoglobin A1c 06/05/2014   • Premature atrial complexes 05/12/2014   • Aortic ectasia ([facility] ) 03/06/2014   • Obstructive sleep apnea 11/25/2013   • Premature ventricular contraction 33/54/5760   • Lichen planus 81/37/6858   • Hypertension 04/04/2012   • 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   • Vitamin D deficiency 04/04/2012       Today's Plan:  • Warm, mildly volume up on exam today  Advised him to take his Lasix when he gets home today  Last took it Monday  • Genetic testing report print out reviewed, negative for hereditary TTR amyloid  • Tafamidis prior authorization completed  He started tafamidis last week with no issues thus far  • Daily weights have been stable  Continue with 2g sodium restriction, 2L fluid restriction     • Has follow up scheduled with "Dr Salud Martin next month  Plan:  TTR amyloidosis; NYHA II; ACC/AHA Stage B/C              Associated symptoms: lumbar spinal stenosis, CTS, peripheral neuropathy  EKG to mass mismatch: yes  Tc-PYP scan 04/29/2023: H/CL ratio 1 77  Grade 3 uptake  \"Strongly suggestive of TTR amyloidosis  \"              Genetic testing: ordered  SPEP 04/2023: insufficient sample  Free light chains 04/2023: kappa/lambda ratio 1 49                  Amyloid-specific Therapies:   --Tafamidis 61 mg daily  Chronic HFpEF; LVEF 65%; LVIDd 3 4 cm; NYHA II; ACC/AHA Stage C              Etiology: TTR amyloidosis  TTE 07/22/2020: LVEF 60-70%  LVIDd 4 cm  IVSd 1 45 cm  Moderate concentric hypertrophy  Normal RV  HESHAM  Trace MR  Mild TR  TTE 04/25/2023: LVEF 65%  LVIDd 3 4 cm  IVSd 1 6 cm  Normal RV size with low normal RVSF  HESHAM (R>L)  Small PFO  Mild MR and TR  Trace AI  Normal IVC  Weight of 257 lbs on 05/08  254 lbs 5/23  Today, 257 lbs  Most recent BMP from 05/16/2023: sodium 137; potassium 3 9; BUN 24; creatinine 1 40; eGFR 46      Pharmacotherapies / Neurohormonal Blockade:  --Beta Blocker: metoprolol succinate 25 mg daily  --ARNi / ACEi / ARB: No    --Aldosterone Antagonist: No    --SGLT2 Inhibitor: No    --Diuretic: Lasix 40 mg daily with potassium 20 mEq BID (takes approximately 3-4 times per week )     Atrial fibrillation              [id number] = 6 (age, HF, HTN, DVT/TIA)  Anticoagulation on Coumadin  Rate control: as above  Rhythm control: None      Hypertension              BP of 120/80 mmHg in office today  Continue on medications as above       Chronic kidney disease, stage IIIa              Baseline creatinine of 1 3-1 5                Most recent BMP from 05/16/2023: sodium 137; potassium 3 9; BUN 24; creatinine 1 40; eGFR " "46      Hyperlipidemia  Obstructive sleep apnea: compliant with CPAP  Spinal stenosis, lumbar  Carpal tunnel syndrome  MTHFR mutation  History of DVT  History of TIA  History of tobacco abuse: quit in 1982      HPI:   Yamini Dukes is an 54-year-old man with a PMH as above who presents to the office for follow-up  Follows with Dr Aidan Jain       05/08/2023 with TH: \"80 year old with PMH as above recently admitted with volume overload and celllulitis  Echocardiogram  concerning for cardiac amyloidosis  Underwent workup  SPEP, UPEP showed no monoclonal banding and serum free light chains showing mild elevation in Ig Kappa with normal Kappa Lambda fluid ratio  PYP scan highly suggestive of aTTR amyloid  He presents today for hospital follow up  Feeling deconditioned and worn out  Questioning why he is not to start his diuretic until 5/13  Having a lot of neuropathy in the lower extremities       Will have patient begin taking Lasix 40 mg once daily today with Kdur 20 meq BID  Will check labs before the end of the week  Start application process for Tafamidis  Genetic testing to determine wild type vs familial \"     05/23/2023: Patient presents with his wife for follow-up  Down 3 lbs since last visit  Reports great improvement in LE swelling since starting Lasix  Due to his active lifestyle and significant urinary response to Lasix, has only been taking Lasix and potassium 4-5 days a week  Continues with daily weights  Provided patient with \"Living With Heart Failure\" booklet and \"Don't Pass the Salt\" cookbook  6/7/23: presents today for follow up  Started tafamidis on Thursday  Having some fatigue, which he thinks may be related to deconditioning  Improving  Some MANLEY, improving  No SOB at rest  No PND, orthopnea  Swelling has improved  Did have a blister on his right leg, dressed by wound care and has an appointment with them next week  Takes lasix about 3 days a week on days when he is home   Eating minimal " salt, drinking under 2L  Urine output has been consistent  Weights stable at home         Past Medical History:   Diagnosis Date   • Abnormal glucose level 02/07/2018   • Acute back pain 10/02/2017   • Atrial fibrillation (HCC)    • Blood clot in vein 2015    in right foot   • 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[facility] ) 04/2023   • Carpal tunnel syndrome    • Chronic kidney disease (CKD)    • Fever 09/08/2017   • Frailty syndrome in geriatric patient 05/12/2023   • FUO (fever of unknown origin) 09/08/2017   • History of DVT (deep vein thrombosis)    • History of tobacco abuse     quit in 1982   • History of transient cerebral ischemia    • Hypertension      Review of Systems   Constitutional: Positive for fatigue  Negative for chills, fever and unexpected weight change  HENT: Negative for ear pain and sore throat  Eyes: Negative for pain and visual disturbance  Respiratory: Positive for shortness of breath (mild MANLEY, improving)  Negative for cough  Cardiovascular: Positive for leg swelling (improving)  Negative for chest pain and palpitations  Gastrointestinal: Negative for abdominal pain and vomiting  Genitourinary: Negative for dysuria and hematuria  Musculoskeletal: Negative for arthralgias and back pain  Skin: Negative for color change and rash  Neurological: Negative for seizures and syncope  All other systems reviewed and are negative  14-point ROS completed and negative except as stated above and/or in the HPI  Allergies   Allergen Reactions   • Sildenafil Hives     Other reaction(s): severe congestion, cialis was OK       Current Outpatient Medications:   •  Cholecalciferol (VITAMIN D-3 PO), Take 4,000 Units by mouth daily  , Disp: , Rfl:   •  colchicine (COLCRYS) 0 6 mg tablet, Take 1 tablet (0 6 mg total) by mouth daily Do not start before May 4, 2023   (Patient taking differently: Take 0 6 mg by mouth daily Taking as needed ), Disp: 30 tablet, Rfl: 0  •  folic acid (FOLVITE) 1 mg tablet, Take 2 mg by mouth daily  , Disp: , Rfl:   •  furosemide (LASIX) 40 mg tablet, Take 1 tablet (40 mg total) by mouth daily (Patient taking differently: Take 40 mg by mouth daily Taking 4-5 days weekly), Disp: 30 tablet, Rfl: 3  •  metoprolol succinate (TOPROL-XL) 25 mg 24 hr tablet, TAKE 1 TABLET(25 MG) BY MOUTH DAILY, Disp: 90 tablet, Rfl: 3  •  multivitamin-minerals (CENTRUM) tablet, Take 1 tablet by mouth daily, Disp: , Rfl:   •  potassium chloride (K-DUR,KLOR-CON) 20 mEq tablet, Take 1 tablet (20 mEq total) by mouth 2 (two) times a day (Patient taking differently: Take 20 mEq by mouth 2 (two) times a day Taking on days he takes Lasix (~4-5 days)), Disp: 60 tablet, Rfl: 3  •  Tafamidis 61 MG CAPS, Take 61 mg by mouth in the morning, Disp: 30 capsule, Rfl: 11  •  warfarin (COUMADIN) 5 mg tablet, TAKE 1 TO 1 AND 1/2 TABLETS BY MOUTH DAILY AS DIRECTED BY PHYSICIAN, Disp: 135 tablet, Rfl: 3  No current facility-administered medications for this visit      Social History     Socioeconomic History   • Marital status: /Civil Union     Spouse name: Not on file   • Number of children: Not on file   • Years of education: Not on file   • Highest education level: Not on file   Occupational History   • Not on file   Tobacco Use   • Smoking status: Former     Packs/day: 1 00     Years: 26 00     Total pack years: 26 00     Types: Cigarettes     Quit date: 18     Years since quittin 4   • Smokeless tobacco: Never   Vaping Use   • Vaping Use: Never used   Substance and Sexual Activity   • Alcohol use: Yes     Comment: 4 drinks a week   • Drug use: No   • Sexual activity: Not on file   Other Topics Concern   • Not on file   Social History Narrative   • Not on file     Social Determinants of Health     Financial Resource Strain: Not on file   Food Insecurity: No Food Insecurity (2023)    Hunger Vital Sign    • Worried About Running Out of Food in the Last Year: Never true    • Ran Out of Food in the Last Year: Never true "  Transportation Needs: No Transportation Needs (4/26/2023)    PRAPARE - Transportation    • Lack of Transportation (Medical): No    • Lack of Transportation (Non-Medical): No   Physical Activity: Not on file   Stress: Not on file   Social Connections: Not on file   Intimate Partner Violence: Not on file   Housing Stability: Low Risk  (4/26/2023)    Housing Stability Vital Sign    • Unable to Pay for Housing in the Last Year: No    • Number of Places Lived in the Last Year: 1    • Unstable Housing in the Last Year: No     Family History   Problem Relation Age of Onset   • Heart attack Father    • Heart disease Other    • Cancer Other        Vitals:  Blood pressure 120/80, pulse 88, height 6' 2\" (1 88 m), weight 117 kg (257 lb), SpO2 99 %  Body mass index is 33 kg/m²  Wt Readings from Last 10 Encounters:   06/07/23 117 kg (257 lb)   05/23/23 115 kg (254 lb)   05/18/23 115 kg (253 lb)   05/14/23 115 kg (253 lb 7 oz)   05/12/23 115 kg (253 lb)   05/10/23 115 kg (253 lb)   05/08/23 117 kg (257 lb)   05/03/23 115 kg (252 lb 10 4 oz)   02/09/23 123 kg (271 lb)   01/26/23 123 kg (271 lb)     Vitals:    06/07/23 0946   BP: 120/80   BP Location: Left arm   Patient Position: Sitting   Cuff Size: Standard   Pulse: 88   SpO2: 99%   Weight: 117 kg (257 lb)   Height: 6' 2\" (1 88 m)       Physical Exam  Constitutional:       Appearance: Normal appearance  HENT:      Head: Normocephalic  Nose: Nose normal       Mouth/Throat:      Mouth: Mucous membranes are moist    Eyes:      Conjunctiva/sclera: Conjunctivae normal    Neck:      Comments: JVP mildly up  Cardiovascular:      Rate and Rhythm: Normal rate and regular rhythm  Comments: Trace pitting edema BLLE, LE wrapped  Pulmonary:      Effort: Pulmonary effort is normal       Breath sounds: Normal breath sounds  Musculoskeletal:      Cervical back: Neck supple  Skin:     General: Skin is warm and dry  Neurological:      General: No focal deficit present        " "Mental Status: He is alert and oriented to person, place, and time  Psychiatric:         Mood and Affect: Mood normal          Behavior: Behavior normal          Labs & Results:  Lab Results   Component Value Date    HCT 52 8 (H) 05/03/2023    HGB 17 8 (H) 05/03/2023    MCV 96 05/03/2023     05/03/2023    WBC 8 27 05/03/2023     Lab Results   Component Value Date    BUN 24 05/16/2023    CALCIUM 9 3 05/16/2023     05/16/2023    CO2 32 05/16/2023    CREATININE 1 40 (H) 05/16/2023    GLUC 105 05/03/2023    K 3 9 05/16/2023    SODIUM 137 05/16/2023     Lab Results   Component Value Date    INR 2 62 (H) 05/24/2023    INR 2 02 (H) 05/16/2023    INR 1 60 (A) 05/12/2023    PROTIME 28 3 (H) 05/24/2023    PROTIME 23 1 (H) 05/16/2023    PROTIME 21 2 (H) 05/03/2023     No results found for: \"BNP\"   Lab Results   Component Value Date    NTBNP 1,904 (H) 05/16/2023          Thank you for the opportunity to participate in the care of this patient      Umer Moreno PA-C   "

## 2023-06-06 NOTE — PROGRESS NOTES
Patient ID: Humphrey Andersen is a 80 y o  male Date of Birth 1940     Chief Complaint  Chief Complaint   Patient presents with   • Follow Up Wound Care Visit     RLE re-opened, was healed on 5/26/23       Allergies  Sildenafil    Assessment:    No problem-specific Assessment & Plan notes found for this encounter  Diagnoses and all orders for this visit:    Chronic venous hypertension (idiopathic) with ulcer of right lower extremity (HCC)  -     lidocaine (LMX) 4 % cream  -     Wound cleansing and dressings; Future  -     Wound compression and edema control; Future  -     Cast Application              Procedures    Plan:  New wound on RLE  Mechanically debrided with dry guaze  Wound management with exufiber Ag and Mextra, see wound orders below  Compression with Coflex  Keep legs elevated whenever seated and avoid prolonged standing  Followup in 1 week or call sooner with questions/concerns    Wound 06/06/23 Venous Ulcer Leg Right (Active)   Wound Image Images linked 06/06/23 1525   Wound Description Granulation tissue;Pink;Yellow;Slough 06/06/23 1528   Lory-wound Assessment Edema; Erythema;Purple 06/06/23 1528   Wound Length (cm) 2 8 cm 06/06/23 1528   Wound Width (cm) 5 cm 06/06/23 1528   Wound Depth (cm) 0 1 cm 06/06/23 1528   Wound Surface Area (cm^2) 14 cm^2 06/06/23 1528   Wound Volume (cm^3) 1 4 cm^3 06/06/23 1528   Calculated Wound Volume (cm^3) 1 4 cm^3 06/06/23 1528   Drainage Amount Moderate 06/06/23 1528   Drainage Description Serosanguineous 06/06/23 1528   Non-staged Wound Description Full thickness 06/06/23 1528   Dressing Status Intact 06/06/23 1528       Wound 06/06/23 Venous Ulcer Leg Right (Active)   Date First Assessed/Time First Assessed: 06/06/23 1525   Primary Wound Type: Venous Ulcer  Location: Leg  Wound Location Orientation: Right       [REMOVED] Wound 04/24/23 Venous Ulcer Pretibial Anterior;Right (Removed)   Resolved Date: 05/14/23  Date First Assessed/Time First Assessed: 04/24/23 1544   Pre-Existing Wound: Yes  Primary Wound Type: Venous Ulcer  Location: Pretibial  Wound Location Orientation: Anterior;Right       [REMOVED] Wound 04/24/23 Venous Ulcer Leg Left; Lower (Removed)   Resolved Date: 05/14/23  Date First Assessed/Time First Assessed: 04/24/23 1545   Pre-Existing Wound: Yes  Primary Wound Type: Venous Ulcer  Location: Leg  Wound Location Orientation: Left; Lower       [REMOVED] Wound 04/25/23 Venous Ulcer Leg Lower; Posterior;Right (Removed)   Resolved Date: 05/14/23  Date First Assessed/Time First Assessed: 04/25/23 0929   Primary Wound Type: Venous Ulcer  Location: Leg  Wound Location Orientation: Lower; Posterior;Right       [REMOVED] Wound 05/19/23 Venous Ulcer Foot Right;Lateral (Removed)   Resolved Date: 05/26/23  Date First Assessed/Time First Assessed: 05/19/23 1348   Primary Wound Type: Venous Ulcer  Location: Foot  Wound Location Orientation: Right;Lateral  Wound Outcome: Healed       Subjective:        Patient presents for initial evaluation of RLE wound that started as a blister a few days after he was healed at last visit 5/26/23  Has been wearing his compression stockings  The following portions of the patient's history were reviewed and updated as appropriate:   He  has a past medical history of Abnormal glucose level (02/07/2018), Acute back pain (10/02/2017), Atrial fibrillation ([facility] ), Blood clot in vein (2015), 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[facility] ) (04/2023), Carpal tunnel syndrome, Chronic kidney disease (CKD), Fever (09/08/2017), Frailty syndrome in geriatric patient (05/12/2023), FUO (fever of unknown origin) (09/08/2017), History of DVT (deep vein thrombosis), History of tobacco abuse, History of transient cerebral ischemia, and Hypertension    He   Patient Active Problem List    Diagnosis Date Noted   • Frailty syndrome in geriatric patient 05/12/2023   • Lower extremity cellulitis 04/24/2023   • Stage 3a chronic kidney disease (CKD) ([facility] ) 04/24/2023   • Hyperbilirubinemia 04/24/2023   • 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 ([place] [address] ) 02/14/2022   • Persistent atrial fibrillation ([place] [address] ) 05/14/2020   • Morbid obesity ([place] [address] ) 03/07/2018   • Lumbar stenosis 03/07/2018   • Benign neoplasm of colon 02/07/2018   • 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[place] [address] ) 02/07/2018   • 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   • Internal hemorrhoids 02/07/2018   • Pure hypercholesterolemia 02/07/2018   • Sacroiliitis ([place] [address] ) 02/01/2018   • Bilateral hip pain 12/04/2017   • Adenomatous colon polyp 12/29/2015   • Spinal stenosis of lumbar region 09/10/2015   • DDD (degenerative disc disease), lumbar 07/07/2015   • Lumbar disc herniation 06/22/2015   • Radiculopathy, lumbar region 06/22/2015   • MTHFR mutation 03/11/2015   • Elevated hemoglobin A1c 06/05/2014   • Premature atrial complexes 05/12/2014   • Aortic ectasia ([place] [address] ) 03/06/2014   • Obstructive sleep apnea 11/25/2013   • Premature ventricular contraction 96/33/9222   • Lichen planus 11/56/5007   • Hypertension 04/04/2012   • 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   • Vitamin D deficiency 04/04/2012     He  reports that he quit smoking about 41 years ago  His smoking use included cigarettes  He has a 26 00 pack-year smoking history  He has never used smokeless tobacco  He reports current alcohol use  He reports that he does not use drugs  Current Outpatient Medications   Medication Sig Dispense Refill   • Cholecalciferol (VITAMIN D-3 PO) Take 4,000 Units by mouth daily  • colchicine (COLCRYS) 0 6 mg tablet Take 1 tablet (0 6 mg total) by mouth daily Do not start before May 4, 2023   (Patient taking differently: Take 0 6 mg by mouth daily Taking as needed ) 30 tablet 0   • folic acid (FOLVITE) 1 mg tablet Take 2 mg by mouth daily       • furosemide (LASIX) 40 mg tablet Take 1 tablet (40 mg total) by mouth daily (Patient taking differently: Take 40 mg by mouth daily Taking 4-5 days weekly) 30 tablet 3   • metoprolol succinate (TOPROL-XL) 25 mg 24 hr tablet TAKE 1 TABLET(25 MG) BY MOUTH DAILY 90 tablet 3   • multivitamin-minerals (CENTRUM) tablet Take 1 tablet by mouth daily     • potassium chloride (K-DUR,KLOR-CON) 20 mEq tablet Take 1 tablet (20 mEq total) by mouth 2 (two) times a day (Patient taking differently: Take 20 mEq by mouth 2 (two) times a day Taking on days he takes Lasix (~4-5 days)) 60 tablet 3   • Tafamidis 61 MG CAPS Take 61 mg by mouth in the morning (Patient not taking: Reported on 5/23/2023) 30 capsule 11   • warfarin (COUMADIN) 5 mg tablet TAKE 1 TO 1 AND 1/2 TABLETS BY MOUTH DAILY AS DIRECTED BY PHYSICIAN 135 tablet 3     No current facility-administered medications for this visit  He is allergic to sildenafil       Review of Systems   Constitutional: Negative for chills and fever  HENT: Negative for congestion and sneezing  Respiratory: Negative for cough  Cardiovascular: Positive for leg swelling  Skin: Positive for wound  Psychiatric/Behavioral: Negative for agitation  Objective:       Wound 06/06/23 Venous Ulcer Leg Right (Active)   Wound Image Images linked 06/06/23 1525   Wound Description Granulation tissue;Pink;Yellow;Slough 06/06/23 1528   Lory-wound Assessment Edema; Erythema;Purple 06/06/23 1528   Wound Length (cm) 2 8 cm 06/06/23 1528   Wound Width (cm) 5 cm 06/06/23 1528   Wound Depth (cm) 0 1 cm 06/06/23 1528   Wound Surface Area (cm^2) 14 cm^2 06/06/23 1528   Wound Volume (cm^3) 1 4 cm^3 06/06/23 1528   Calculated Wound Volume (cm^3) 1 4 cm^3 06/06/23 1528   Drainage Amount Moderate 06/06/23 1528   Drainage Description Serosanguineous 06/06/23 1528   Non-staged Wound Description Full thickness 06/06/23 1528   Dressing Status Intact 06/06/23 1528       /78   Pulse 88   Temp (!) 97 4 °F (36 3 °C)   Resp 18     Physical Exam  Vitals reviewed  Constitutional:       General: He is not in acute distress  Appearance: Normal appearance   He is obese  He is not ill-appearing, toxic-appearing or diaphoretic  HENT:      Head: Normocephalic and atraumatic  Right Ear: External ear normal       Left Ear: External ear normal    Eyes:      Conjunctiva/sclera: Conjunctivae normal    Pulmonary:      Effort: Pulmonary effort is normal  No respiratory distress  Musculoskeletal:      Cervical back: Neck supple  Right lower leg: Edema present  Left lower leg: Edema present  Skin:     Comments: See wound assessment   Neurological:      Mental Status: He is alert  Psychiatric:         Mood and Affect: Mood normal          Behavior: Behavior normal            Wound 06/06/23 Venous Ulcer Leg Right (Active)   Wound Image   06/06/23 1525   Wound Description Granulation tissue;Pink;Yellow;Slough 06/06/23 1528   Lory-wound Assessment Edema; Erythema;Purple 06/06/23 1528   Wound Length (cm) 2 8 cm 06/06/23 1528   Wound Width (cm) 5 cm 06/06/23 1528   Wound Depth (cm) 0 1 cm 06/06/23 1528   Wound Surface Area (cm^2) 14 cm^2 06/06/23 1528   Wound Volume (cm^3) 1 4 cm^3 06/06/23 1528   Calculated Wound Volume (cm^3) 1 4 cm^3 06/06/23 1528   Drainage Amount Moderate 06/06/23 1528   Drainage Description Serosanguineous 06/06/23 1528   Non-staged Wound Description Full thickness 06/06/23 1528   Dressing Status Intact 06/06/23 1528                         Wound Instructions:  Orders Placed This Encounter   Procedures   • Wound cleansing and dressings     Wound location RLE  Change dressing weekly at the wound center  The dressing must stay dry and intact  You may shower with dressing protected by cast cover or bag  Or you may sponge bathe  Call wound center or home health nurse if dressing becomes wet  At wound center today  Cleansed with NSS, patted dry  Apply Exufiber Ag to wound  Cover with Reginold Burow    This was applied today at the [facility]       Standing Status:   Future     Standing Expiration Date:   6/6/2024   • Wound compression and edema control CoFlex Lite Multi-layer compression wrap Instructions for RLE    Keep compression wrap/wraps clean and dry  If wraps are too tight and you experience numbness/tingling, call the wound center  If after hours, remove wraps or proceed to nearest E R  and call wound center in AM     Abdelrahman Hail will be changed 1 x weekly    Avoid prolonged standing in one place  Elevate leg(s) above the level of the heart when sitting or as much as possible  This was applied today  Standing Status:   Future     Standing Expiration Date:   7/8/7007   • Cast Application     This order was created via procedure documentation        Diagnosis ICD-10-CM Associated Orders   1   Chronic venous hypertension (idiopathic) with ulcer of right lower extremity (HCC)  I87 311 lidocaine (LMX) 4 % cream    L97 919 Wound cleansing and dressings     Wound compression and edema control     Cast Application

## 2023-06-07 ENCOUNTER — OFFICE VISIT (OUTPATIENT)
Dept: CARDIOLOGY CLINIC | Facility: CLINIC | Age: 83
End: 2023-06-07
Payer: MEDICARE

## 2023-06-07 VITALS
HEART RATE: 88 BPM | SYSTOLIC BLOOD PRESSURE: 120 MMHG | WEIGHT: 257 LBS | BODY MASS INDEX: 32.98 KG/M2 | DIASTOLIC BLOOD PRESSURE: 80 MMHG | OXYGEN SATURATION: 99 % | HEIGHT: 74 IN

## 2023-06-07 DIAGNOSIS — E85.4 CARDIAC AMYLOIDOSIS (HCC): ICD-10-CM

## 2023-06-07 DIAGNOSIS — I50.32 CHRONIC HEART FAILURE WITH PRESERVED EJECTION FRACTION (HCC): Primary | ICD-10-CM

## 2023-06-07 DIAGNOSIS — I43 CARDIAC AMYLOIDOSIS (HCC): ICD-10-CM

## 2023-06-07 PROCEDURE — 99214 OFFICE O/P EST MOD 30 MIN: CPT | Performed by: PHYSICIAN ASSISTANT

## 2023-06-07 NOTE — PATIENT INSTRUCTIONS
Take a dose of Lasix and potassium when you get home today  Continue current medications  Please weigh yourself every day (after emptying your bladder) and keep a detailed log of weights  Contact the Heart Failure program at [phone] if you gain 3 lbs overnight or 5 lbs in 5-7 days  Limit daily sodium/salt intake to 2000 mg daily to prevent fluid retention  Avoid canned foods, fast food/Chinese food, and processed meats (hot dogs, lunch meat, and sausage etc )  Caution with condiments  Limit fluid intake to 2000 mL or 2 liters (about 60-65 ounces) daily  Avoid electrolyte replacement drinks (such as Gatorade, Pedialyte, Propel, Liquid IV, etc )  Bring complete list of medications and log of daily weights to your follow-up appointment

## 2023-06-09 ENCOUNTER — OFFICE VISIT (OUTPATIENT)
Dept: PHYSICAL THERAPY | Facility: CLINIC | Age: 83
End: 2023-06-09
Payer: MEDICARE

## 2023-06-09 DIAGNOSIS — G89.29 CHRONIC PAIN OF RIGHT KNEE: ICD-10-CM

## 2023-06-09 DIAGNOSIS — M54.50 LUMBAR SPINE PAIN: Primary | ICD-10-CM

## 2023-06-09 DIAGNOSIS — M25.561 CHRONIC PAIN OF RIGHT KNEE: ICD-10-CM

## 2023-06-09 PROCEDURE — 97110 THERAPEUTIC EXERCISES: CPT | Performed by: PHYSICAL THERAPIST

## 2023-06-09 PROCEDURE — 97112 NEUROMUSCULAR REEDUCATION: CPT | Performed by: PHYSICAL THERAPIST

## 2023-06-09 NOTE — PROGRESS NOTES
"Daily Note     Today's date: 2023  Patient name: Sylvia Paiz  : 1940  MRN: [id number]  Referring provider: Mathew Hi DO  Dx:   Encounter Diagnosis     ICD-10-CM    1  Lumbar spine pain  M54 50       2  Chronic pain of right knee  M25 561     G89 29                      Subjective: pt reports compliance with his hep and that he is having no difficulty with it  Reports that he woke up yesterday with sig increase in cervical spine pain, pt is unable to identify a trigger for this  Objective: See treatment diary below      Assessment: Initated tx as listed  Pt has no complaints of knee or back pain  He is most comfortable in a reclined position secondary to neck pain  Instructed pt is self cervical prom and that he should keep this in a comfortable rom  Plan: Continue per plan of care        Precautions: amyloidosis, HTN, kidney disease, lumbar DJD, B/L chronic knee pain, prior lumbar surgery (2018)      Manuals             TPR R piriformis kl            Laser R knee 4'                                      Neuro Re-Ed             TaA 5\"x10            TaA bridge             TaA SLR flexion 5\"x20                                                                Ther Ex             Heel slides 5\"x20            Leg press             glute stretch manual            gastroc stretch 30\"x4            Cone taps x20                                                   Ther Activity             bike                          Gait Training                                       Modalities             prn                               "

## 2023-06-12 ENCOUNTER — OFFICE VISIT (OUTPATIENT)
Dept: PHYSICAL THERAPY | Facility: CLINIC | Age: 83
End: 2023-06-12
Payer: MEDICARE

## 2023-06-12 DIAGNOSIS — M25.561 CHRONIC PAIN OF RIGHT KNEE: ICD-10-CM

## 2023-06-12 DIAGNOSIS — M54.50 LUMBAR SPINE PAIN: Primary | ICD-10-CM

## 2023-06-12 DIAGNOSIS — G89.29 CHRONIC PAIN OF RIGHT KNEE: ICD-10-CM

## 2023-06-12 PROCEDURE — 97110 THERAPEUTIC EXERCISES: CPT | Performed by: PHYSICAL THERAPIST

## 2023-06-12 PROCEDURE — 97140 MANUAL THERAPY 1/> REGIONS: CPT | Performed by: PHYSICAL THERAPIST

## 2023-06-12 PROCEDURE — 97112 NEUROMUSCULAR REEDUCATION: CPT | Performed by: PHYSICAL THERAPIST

## 2023-06-12 NOTE — PROGRESS NOTES
"Daily Note     Today's date: 2023  Patient name: Frankey Lichtenstein  : 1940  MRN: [id number]  Referring provider: France Dodge DO  Dx:   Encounter Diagnosis     ICD-10-CM    1  Lumbar spine pain  M54 50       2  Chronic pain of right knee  M25 561     G89 29                      Subjective: pt reports that he is having slightly less knee and back pain since his LV but that it is still present  Objective: See treatment diary below      Assessment:  Progressed therex as listed  Pt remains TTP along R paraspinals but has good tolerance of manual tx  No pain reported with the progression  Continue to progress as meg NV  Plan: Continue per plan of care  Precautions: amyloidosis, HTN, kidney disease, lumbar DJD, B/L chronic knee pain, prior lumbar surgery (2018)      Manuals            TPR R piriformis kl Kl performed in L sidelying           Laser R knee 4' 4'                                     Neuro Re-Ed             TaA 5\"x10 hep           TaA bridge  5\"x20           TaA SLR flexion 5\"x20 x20                                                               Ther Ex             Heel slides 5\"x20                         glute stretch manual manual           gastroc stretch 30\"x4 30\"x4           Cone taps x20 nv           Leg press  60#x30           Lumbar extension   standing NV                           Ther Activity             bike  8'                        Gait Training                                       Modalities             prn                               "

## 2023-06-13 ENCOUNTER — OFFICE VISIT (OUTPATIENT)
Dept: WOUND CARE | Facility: HOSPITAL | Age: 83
End: 2023-06-13
Payer: MEDICARE

## 2023-06-13 VITALS
SYSTOLIC BLOOD PRESSURE: 140 MMHG | TEMPERATURE: 97.3 F | DIASTOLIC BLOOD PRESSURE: 85 MMHG | RESPIRATION RATE: 16 BRPM | HEART RATE: 74 BPM

## 2023-06-13 DIAGNOSIS — I87.311 CHRONIC VENOUS HYPERTENSION (IDIOPATHIC) WITH ULCER OF RIGHT LOWER EXTREMITY (HCC): Primary | ICD-10-CM

## 2023-06-13 DIAGNOSIS — L97.919 CHRONIC VENOUS HYPERTENSION (IDIOPATHIC) WITH ULCER OF RIGHT LOWER EXTREMITY (HCC): Primary | ICD-10-CM

## 2023-06-13 DIAGNOSIS — L97.911 NON-PRESSURE CHRONIC ULCER OF RIGHT LOWER LEG, LIMITED TO BREAKDOWN OF SKIN (HCC): ICD-10-CM

## 2023-06-13 PROCEDURE — 99213 OFFICE O/P EST LOW 20 MIN: CPT | Performed by: NURSE PRACTITIONER

## 2023-06-13 PROCEDURE — 29581 APPL MULTLAYER CMPRN SYS LEG: CPT

## 2023-06-13 NOTE — PROGRESS NOTES
Cast Application    Date/Time: 6/13/2023 2:45 PM    Performed by: Cristy Mullen RN  Authorized by: LIVIA Samuel  Universal Protocol:  Consent: Verbal consent obtained  Risks and benefits: risks, benefits and alternatives were discussed  Consent given by: patient  Patient understanding: patient states understanding of the procedure being performed  Patient identity confirmed: verbally with patient      Pre-procedure details:     Sensation:  Normal  Procedure details:     Laterality:  Right    Location:  Leg    Leg:  R lower leg    Strapping: no  Cast type:  Multi-layer compression short leg      Supplies:  2 layer wrap  Post-procedure details:     Pain:  Improved    Sensation:  Normal    Patient tolerance of procedure: Tolerated well, no immediate complications  Comments:      Multilayer wrap procedure     Before application, SIMI and/or TBI determined to be adequate for healing and application of compression  Lower extremity washed prior to application of compression wrap  With the foot in dorsiflexed position, coflex was applied as per physician orders without complications or complaint of pain  The procedure was tolerated well  Toes warm & pink post application  Patient provided education & reinforced to observe toes for any discoloration, swelling or tingling and instructed when to report to the [address] [facility] or to remove compression  Wound care as per providers orders, patient tolerated well

## 2023-06-13 NOTE — PROGRESS NOTES
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, limited to breakdown of skin (HCC)  -     Wound cleansing and dressings; Future  -     Wound compression and edema control; Future  -     Cast Application          Plan:  1  F/u visit  Wound cleansed with NSS and gauze  Wound almost healed  Will keep wound covered with dry gauze and reapply CoFlex Lite wrap  Patient will follow up in 1 week  Wound 06/06/23 Venous Ulcer Leg Right (Active)   Wound Image Images linked 06/13/23 1444   Wound Description Granulation tissue 06/13/23 1524   Lory-wound Assessment Intact 06/13/23 1524   Wound Length (cm) 0 1 cm 06/13/23 1524   Wound Width (cm) 0 1 cm 06/13/23 1524   Wound Depth (cm) 0 cm 06/13/23 1524   Wound Surface Area (cm^2) 0 01 cm^2 06/13/23 1524   Wound Volume (cm^3) 0 cm^3 06/13/23 1524   Calculated Wound Volume (cm^3) 0 cm^3 06/13/23 1524   Change in Wound Size % 100 06/13/23 1524   Drainage Amount Scant 06/13/23 1524   Drainage Description Serosanguineous 06/13/23 1524   Non-staged Wound Description Full thickness 06/13/23 1524   Dressing Status Intact 06/13/23 1524       Wound 06/06/23 Venous Ulcer Leg Right (Active)   Date First Assessed/Time First Assessed: 06/06/23 1525   Primary Wound Type: Venous Ulcer  Location: Leg  Wound Location Orientation: Right       Subjective:        F/u visit venous ulcer of RLE  No new complaints  He denies any pain, fevers, or chills  Tolerating CoFlex Lite wrap         The following portions of the patient's history were reviewed and updated as appropriate:   He  has a past medical history of Abnormal glucose level (02/07/2018), Acute back pain (10/02/2017), Atrial fibrillation (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 ) 04/24/2023   • Hyperbilirubinemia 04/24/2023   • 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 ([facility] ) 02/14/2022   • Persistent atrial fibrillation ([facility] ) 05/14/2020   • Morbid obesity (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 ) 02/01/2018   • Bilateral hip pain 12/04/2017   • Adenomatous colon polyp 12/29/2015   • Spinal stenosis of lumbar region 09/10/2015   • DDD (degenerative disc disease), lumbar 07/07/2015   • Lumbar disc herniation 06/22/2015   • Radiculopathy, lumbar region 06/22/2015   • MTHFR mutation 03/11/2015   • Elevated hemoglobin A1c 06/05/2014   • Premature atrial complexes 05/12/2014   • Aortic ectasia ([facility] ) 03/06/2014   • Obstructive sleep apnea 11/25/2013   • Premature ventricular contraction 18/34/0779   • Lichen planus 04/82/2210   • Hypertension 04/04/2012   • 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   • Vitamin D deficiency 04/04/2012     He  has a past surgical history that includes Back surgery (2018); Hernia repair; Colonoscopy; Colonoscopy (N/A, 02/24/2016); and Neuroplasty / transposition median nerve at carpal tunnel  His family history includes Cancer in his other; Heart attack in his father; Heart disease in his other  He  reports that he quit smoking about 41 years ago  His smoking use included cigarettes  He has a 26 00 pack-year smoking history  He has never used smokeless tobacco  He reports current alcohol use  He reports that he does not use drugs  Current Outpatient Medications   Medication Sig Dispense Refill   • Cholecalciferol (VITAMIN D-3 PO) Take 4,000 Units by mouth daily  • colchicine (COLCRYS) 0 6 mg tablet Take 1 tablet (0 6 mg total) by mouth daily Do not start before May 4, 2023  (Patient taking differently: Take 0 6 mg by mouth daily Taking as needed ) 30 tablet 0   • folic acid (FOLVITE) 1 mg tablet Take 2 mg by mouth daily       • furosemide (LASIX) 40 mg tablet Take 1 tablet (40 mg total) by mouth daily (Patient taking differently: Take 40 mg by mouth daily Taking 4-5 days weekly) 30 tablet 3   • metoprolol succinate (TOPROL-XL) 25 mg 24 hr tablet TAKE 1 TABLET(25 MG) BY MOUTH DAILY 90 tablet 3   • multivitamin-minerals (CENTRUM) tablet Take 1 tablet by mouth daily     • potassium chloride (K-DUR,KLOR-CON) 20 mEq tablet Take 1 tablet (20 mEq total) by mouth 2 (two) times a day (Patient taking differently: Take 20 mEq by mouth 2 (two) times a day Taking on days he takes Lasix (~4-5 days)) 60 tablet 3   • Tafamidis 61 MG CAPS Take 61 mg by mouth in the morning 30 capsule 11   • warfarin (COUMADIN) 5 mg tablet TAKE 1 TO 1 AND 1/2 TABLETS BY MOUTH DAILY AS DIRECTED BY PHYSICIAN 135 tablet 3     No current facility-administered medications for this visit  He is allergic to sildenafil       Review of Systems   Constitutional: Negative  HENT: Negative for ear pain and hearing loss  Eyes: Negative for pain     Respiratory: Negative for chest tightness and shortness of breath  Cardiovascular: Positive for leg swelling  Negative for chest pain and palpitations  Gastrointestinal: Negative for diarrhea, nausea and vomiting  Genitourinary: Negative for dysuria  Musculoskeletal: Negative for gait problem  Skin: Positive for wound  Neurological: Negative for tremors and weakness  Psychiatric/Behavioral: Negative for behavioral problems, confusion and suicidal ideas  Objective:       Wound 06/06/23 Venous Ulcer Leg Right (Active)   Wound Image Images linked 06/13/23 1444   Wound Description Granulation tissue 06/13/23 1524   Lory-wound Assessment Intact 06/13/23 1524   Wound Length (cm) 0 1 cm 06/13/23 1524   Wound Width (cm) 0 1 cm 06/13/23 1524   Wound Depth (cm) 0 cm 06/13/23 1524   Wound Surface Area (cm^2) 0 01 cm^2 06/13/23 1524   Wound Volume (cm^3) 0 cm^3 06/13/23 1524   Calculated Wound Volume (cm^3) 0 cm^3 06/13/23 1524   Change in Wound Size % 100 06/13/23 1524   Drainage Amount Scant 06/13/23 1524   Drainage Description Serosanguineous 06/13/23 1524   Non-staged Wound Description Full thickness 06/13/23 1524   Dressing Status Intact 06/13/23 1524       /85   Pulse 74   Temp (!) 97 3 °F (36 3 °C)   Resp 16     Physical Exam  Vitals and nursing note reviewed  Constitutional:       General: He is not in acute distress  Appearance: Normal appearance  He is obese  HENT:      Head: Normocephalic and atraumatic  Eyes:      General:         Right eye: No discharge  Left eye: No discharge  Pulmonary:      Effort: Pulmonary effort is normal  No respiratory distress  Musculoskeletal:         General: Normal range of motion  Cervical back: Normal range of motion  No rigidity  Right lower leg: No edema  Left lower leg: No edema  Skin:     General: Skin is warm and dry  Findings: Wound present  No erythema  Neurological:      General: No focal deficit present        Mental Status: He is alert and oriented to person, place, and time  Mental status is at baseline  Psychiatric:         Mood and Affect: Mood normal          Behavior: Behavior normal          Thought Content: Thought content normal          Judgment: Judgment normal                    Wound Instructions:  Orders Placed This Encounter   Procedures   • Wound cleansing and dressings     Wound location RLE  Change dressing weekly at the wound center  The dressing must stay dry and intact  You may shower with dressing protected by cast cover or bag  Or you may sponge bathe  Call wound center if dressing becomes wet  At the wound center today:  Leg washed with soap and water, rinsed and patted dry  Skin prep applied to fragile epithelium  Remedy skin repair to intact skin on leg    This was applied today at the [facility]  Standing Status:   Future     Standing Expiration Date:   6/13/2024   • Wound compression and edema control     RLE CoFlex Lite Multi-layer compression wrap Instructions    Keep compression wrap/wraps clean and dry  If wraps are too tight and you experience numbness/tingling, call the wound center  If after hours, remove wraps or proceed to nearest E R  and call wound center in AM     Jameson Schimke will be changed 1 x weekly    Avoid prolonged standing in one place  Elevate leg(s) above the level of the heart when sitting or as much as possible  This was applied today  Standing Status:   Future     Standing Expiration Date:   9/90/7116   • Cast Application     This order was created via procedure documentation        Diagnosis ICD-10-CM Associated Orders   1  Chronic venous hypertension (idiopathic) with ulcer of right lower extremity ([facility])  I87 311 Wound cleansing and dressings    L97 919 Wound compression and edema control     Cast Application      2   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

## 2023-06-13 NOTE — PATIENT INSTRUCTIONS
Orders Placed This Encounter   Procedures    Wound cleansing and dressings     Wound location RLE  Change dressing weekly at the wound center  The dressing must stay dry and intact  You may shower with dressing protected by cast cover or bag  Or you may sponge bathe  Call wound center if dressing becomes wet  At the wound center today:  Leg washed with soap and water, rinsed and patted dry  Skin prep applied to fragile epithelium  Remedy skin repair to intact skin on leg    This was applied today at the [facility]  Standing Status:   Future     Standing Expiration Date:   6/13/2024    Wound compression and edema control     RLE CoFlex Lite Multi-layer compression wrap Instructions    Keep compression wrap/wraps clean and dry  If wraps are too tight and you experience numbness/tingling, call the wound center  If after hours, remove wraps or proceed to nearest E R  and call wound center in AM     Mort Funk will be changed 1 x weekly    Avoid prolonged standing in one place  Elevate leg(s) above the level of the heart when sitting or as much as possible  This was applied today       Standing Status:   Future     Standing Expiration Date:   6/13/2024

## 2023-06-15 ENCOUNTER — OFFICE VISIT (OUTPATIENT)
Dept: PHYSICAL THERAPY | Facility: CLINIC | Age: 83
End: 2023-06-15
Payer: MEDICARE

## 2023-06-15 DIAGNOSIS — M25.561 CHRONIC PAIN OF RIGHT KNEE: ICD-10-CM

## 2023-06-15 DIAGNOSIS — G89.29 CHRONIC PAIN OF RIGHT KNEE: ICD-10-CM

## 2023-06-15 DIAGNOSIS — M54.50 LUMBAR SPINE PAIN: Primary | ICD-10-CM

## 2023-06-15 PROCEDURE — 97140 MANUAL THERAPY 1/> REGIONS: CPT | Performed by: PHYSICAL THERAPIST

## 2023-06-15 PROCEDURE — 97110 THERAPEUTIC EXERCISES: CPT | Performed by: PHYSICAL THERAPIST

## 2023-06-15 PROCEDURE — 97112 NEUROMUSCULAR REEDUCATION: CPT | Performed by: PHYSICAL THERAPIST

## 2023-06-15 NOTE — PROGRESS NOTES
"Daily Note     Today's date: 6/15/2023  Patient name: Elena Rivero  : 1940  MRN: [id number]  Referring provider: Jenny Guillaume DO  Dx:   Encounter Diagnosis     ICD-10-CM    1  Lumbar spine pain  M54 50       2  Chronic pain of right knee  M25 561     G89 29                      Subjective: pt reports that he is having slightly less knee and back pain since his LV but that it is still present  Objective: See treatment diary below      Assessment:  Progressed therex as listed with no complaints other then fatigue  R knee rom is improving  No pain reported with the progression  Continue to progress as meg NV  Plan: Continue per plan of care        Precautions: amyloidosis, HTN, kidney disease, lumbar DJD, B/L chronic knee pain, prior lumbar surgery (2018)      Manuals 6/9 6/12 6/15          TPR R piriformis kl Kl performed in L sidelying           Laser R knee 4' 4' 4'                                    Neuro Re-Ed             TaA 5\"x10 hep           TaA bridge  5\"x20 5\"x30          TaA SLR flexion 5\"x20 x20 x20                                                              Ther Ex             Heel slides 5\"x20                         glute stretch manual manual manual          gastroc stretch 30\"x4 30\"x4 30\"x4          Cone taps x20 nv x20          Leg press  60#x30 80#x20 90#x20          Lumbar extension   standing NV   5\"x20                       Ther Activity             bike  8' 8'                       Gait Training                                       Modalities             prn                               "

## 2023-06-20 ENCOUNTER — OFFICE VISIT (OUTPATIENT)
Dept: WOUND CARE | Facility: HOSPITAL | Age: 83
End: 2023-06-20
Payer: MEDICARE

## 2023-06-20 VITALS
TEMPERATURE: 97.6 F | DIASTOLIC BLOOD PRESSURE: 72 MMHG | RESPIRATION RATE: 16 BRPM | SYSTOLIC BLOOD PRESSURE: 128 MMHG | HEART RATE: 72 BPM

## 2023-06-20 DIAGNOSIS — L97.919 CHRONIC VENOUS HYPERTENSION (IDIOPATHIC) WITH ULCER OF RIGHT LOWER EXTREMITY (HCC): Primary | ICD-10-CM

## 2023-06-20 DIAGNOSIS — I87.311 CHRONIC VENOUS HYPERTENSION (IDIOPATHIC) WITH ULCER OF RIGHT LOWER EXTREMITY (HCC): Primary | ICD-10-CM

## 2023-06-20 DIAGNOSIS — L97.911 NON-PRESSURE CHRONIC ULCER OF RIGHT LOWER LEG, LIMITED TO BREAKDOWN OF SKIN (HCC): ICD-10-CM

## 2023-06-20 PROCEDURE — 99212 OFFICE O/P EST SF 10 MIN: CPT | Performed by: NURSE PRACTITIONER

## 2023-06-20 NOTE — PROGRESS NOTES
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, limited to breakdown of skin ([address] )          Plan:  1  F/U visit  Wound epithelialized  Counseled patient to moisturize skin daily and to wear his compression stockings daily  Patient verbalized agreement and understanding  Patient is discharged in the wound center today    He may follow-up as needed     Wound 06/06/23 Venous Ulcer Leg Right (Active)   Wound Image Images linked 06/20/23 0943   Wound Description Epithelialization 06/20/23 0943   Lory-wound Assessment Intact 06/20/23 0943   Wound Length (cm) 0 cm 06/20/23 0943   Wound Width (cm) 0 cm 06/20/23 0943   Wound Depth (cm) 0 cm 06/20/23 0943   Wound Surface Area (cm^2) 0 cm^2 06/20/23 0943   Wound Volume (cm^3) 0 cm^3 06/20/23 0943   Calculated Wound Volume (cm^3) 0 cm^3 06/20/23 0943   Change in Wound Size % 100 06/20/23 0943   Drainage Amount None 06/20/23 0943   Non-staged Wound Description Not applicable 40/39/30 6389   Dressing Status Intact 06/20/23 0943       Wound 06/06/23 Venous Ulcer Leg Right (Active)   Date First Assessed/Time First Assessed: 06/06/23 1525   Primary Wound Type: Venous Ulcer  Location: Leg  Wound Location Orientation: Right  Wound Outcome: Healed       [REMOVED] Wound 04/24/23 Venous Ulcer Pretibial Anterior;Right (Removed)   Resolved Date: 05/14/23  Date First Assessed/Time First Assessed: 04/24/23 1544   Pre-Existing Wound: Yes  Primary Wound Type: Venous Ulcer  Location: Pretibial  Wound Location Orientation: Anterior;Right [REMOVED] Wound 04/24/23 Venous Ulcer Leg Left; Lower (Removed)   Resolved Date: 05/14/23  Date First Assessed/Time First Assessed: 04/24/23 1545   Pre-Existing Wound: Yes  Primary Wound Type: Venous Ulcer  Location: Leg  Wound Location Orientation: Left; Lower       [REMOVED] Wound 04/25/23 Venous Ulcer Leg Lower; Posterior;Right (Removed)   Resolved Date: 05/14/23  Date First Assessed/Time First Assessed: 04/25/23 0929   Primary Wound Type: Venous Ulcer  Location: Leg  Wound Location Orientation: Lower; Posterior;Right       [REMOVED] Wound 05/19/23 Venous Ulcer Foot Right;Lateral (Removed)   Resolved Date: 05/26/23  Date First Assessed/Time First Assessed: 05/19/23 1348   Primary Wound Type: Venous Ulcer  Location: Foot  Wound Location Orientation: Right;Lateral  Wound Outcome: Healed       Subjective:        F/u visit for venous ulcer of his right lower leg  No new complaints  He denies any pain, fevers, or chills  Tolerating Coflex wrap  The following portions of the patient's history were reviewed and updated as appropriate:   He  has a past medical history of Abnormal glucose level (02/07/2018), Acute back pain (10/02/2017), Atrial fibrillation ([address] ), Blood clot in vein (2015), 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), Carpal tunnel syndrome, Chronic kidney disease (CKD), Fever (09/08/2017), Frailty syndrome in geriatric patient (05/12/2023), FUO (fever of unknown origin) (09/08/2017), History of DVT (deep vein thrombosis), History of tobacco abuse, History of transient cerebral ischemia, and Hypertension    He   Patient Active Problem List    Diagnosis Date Noted   • Frailty syndrome in geriatric patient 05/12/2023   • Lower extremity cellulitis 04/24/2023   • Stage 3a chronic kidney disease (CKD) ([address] ) 04/24/2023   • Hyperbilirubinemia 04/24/2023   • 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 ([address] ) 02/14/2022   • Persistent atrial fibrillation ([address] ) 05/14/2020   • Morbid obesity ([place] [address] ) 03/07/2018   • Lumbar stenosis 03/07/2018   • Benign neoplasm of colon 02/07/2018   • 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[place] [address] ) 02/07/2018   • 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   • Internal hemorrhoids 02/07/2018   • Pure hypercholesterolemia 02/07/2018   • Sacroiliitis ([place] [address] ) 02/01/2018   • Bilateral hip pain 12/04/2017   • Adenomatous colon polyp 12/29/2015   • Spinal stenosis of lumbar region 09/10/2015   • DDD (degenerative disc disease), lumbar 07/07/2015   • Lumbar disc herniation 06/22/2015   • Radiculopathy, lumbar region 06/22/2015   • MTHFR mutation 03/11/2015   • Elevated hemoglobin A1c 06/05/2014   • Premature atrial complexes 05/12/2014   • Aortic ectasia ([place] [address] ) 03/06/2014   • Obstructive sleep apnea 11/25/2013   • Premature ventricular contraction 82/87/0775   • Lichen planus 80/64/5151   • Hypertension 04/04/2012   • 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   • Vitamin D deficiency 04/04/2012     He  has a past surgical history that includes Back surgery (2018); Hernia repair; Colonoscopy; Colonoscopy (N/A, 02/24/2016); and Neuroplasty / transposition median nerve at carpal tunnel  His family history includes Cancer in his other; Heart attack in his father; Heart disease in his other  He  reports that he quit smoking about 41 years ago  His smoking use included cigarettes  He has a 26 00 pack-year smoking history  He has never used smokeless tobacco  He reports current alcohol use  He reports that he does not use drugs  Current Outpatient Medications   Medication Sig Dispense Refill   • Cholecalciferol (VITAMIN D-3 PO) Take 4,000 Units by mouth daily  • colchicine (COLCRYS) 0 6 mg tablet Take 1 tablet (0 6 mg total) by mouth daily Do not start before May 4, 2023   (Patient taking differently: Take 0 6 mg by mouth daily Taking as needed ) 30 tablet 0   • folic acid (FOLVITE) 1 mg tablet Take 2 mg by mouth daily       • furosemide (LASIX) 40 mg tablet Take 1 tablet (40 mg total) by mouth daily (Patient taking differently: Take 40 mg by mouth daily Taking 4-5 days weekly) 30 tablet 3   • metoprolol succinate (TOPROL-XL) 25 mg 24 hr tablet TAKE 1 TABLET(25 MG) BY MOUTH DAILY 90 tablet 3   • multivitamin-minerals (CENTRUM) tablet Take 1 tablet by mouth daily     • potassium chloride (K-DUR,KLOR-CON) 20 mEq tablet Take 1 tablet (20 mEq total) by mouth 2 (two) times a day (Patient taking differently: Take 20 mEq by mouth 2 (two) times a day Taking on days he takes Lasix (~4-5 days)) 60 tablet 3   • Tafamidis 61 MG CAPS Take 61 mg by mouth in the morning 30 capsule 11   • warfarin (COUMADIN) 5 mg tablet TAKE 1 TO 1 AND 1/2 TABLETS BY MOUTH DAILY AS DIRECTED BY PHYSICIAN 135 tablet 3     No current facility-administered medications for this visit  He is allergic to sildenafil       Review of Systems   Constitutional: Negative  HENT: Negative for ear pain and hearing loss  Eyes: Negative for pain  Respiratory: Negative for chest tightness and shortness of breath  Cardiovascular: Positive for leg swelling  Negative for chest pain and palpitations  Gastrointestinal: Negative for diarrhea, nausea and vomiting  Genitourinary: Negative for dysuria  Musculoskeletal: Negative for gait problem  Skin: Positive for wound  Neurological: Negative for tremors and weakness  Psychiatric/Behavioral: Negative for behavioral problems, confusion and suicidal ideas           Objective:       Wound 06/06/23 Venous Ulcer Leg Right (Active)   Wound Image Images linked 06/20/23 0943   Wound Description Epithelialization 06/20/23 0943   Lory-wound Assessment Intact 06/20/23 0943   Wound Length (cm) 0 cm 06/20/23 0943   Wound Width (cm) 0 cm 06/20/23 0943   Wound Depth (cm) 0 cm 06/20/23 0943   Wound Surface Area (cm^2) 0 cm^2 06/20/23 0943   Wound Volume (cm^3) 0 cm^3 06/20/23 0943   Calculated Wound Volume (cm^3) 0 cm^3 06/20/23 0943   Change in Wound Size % 100 06/20/23 0943   Drainage Amount None 06/20/23 0943   Non-staged Wound Description Not applicable 67/40/78 9457   Dressing Status Intact 06/20/23 0943       /72   Pulse 72   Temp 97 6 °F (36 4 °C)   Resp 16     Physical Exam  Vitals and nursing note reviewed  Constitutional:       General: He is not in acute distress  Appearance: Normal appearance  He is obese  HENT:      Head: Normocephalic and atraumatic  Eyes:      General:         Right eye: No discharge  Left eye: No discharge  Pulmonary:      Effort: Pulmonary effort is normal  No respiratory distress  Musculoskeletal:         General: Normal range of motion  Cervical back: Normal range of motion and neck supple  No rigidity  Right lower leg: No edema  Left lower leg: No edema  Skin:     General: Skin is warm and dry  Findings: No erythema or wound  Neurological:      General: No focal deficit present  Mental Status: He is alert and oriented to person, place, and time  Mental status is at baseline  Psychiatric:         Mood and Affect: Mood normal          Behavior: Behavior normal          Thought Content: Thought content normal          Judgment: Judgment normal                    Wound Instructions:  Orders Placed This Encounter   Procedures   • Wound cleansing and dressings     Standing Status:   Future     Standing Expiration Date:   6/20/2024     Scheduling Instructions: Your right leg wounds have now healed!         Continue to monitor areas for reopening  Call [facility] should wounds reopen  Continue good skin care to area including applying nonscented moisturizer to legs daily  Do NOT scratch areas! 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

## 2023-06-20 NOTE — PATIENT INSTRUCTIONS
Orders Placed This Encounter   Procedures    Wound cleansing and dressings     Standing Status:   Future     Standing Expiration Date:   6/20/2024     Scheduling Instructions: Your right leg wounds have now healed!         Continue to monitor areas for reopening  Call [facility] should wounds reopen  Continue good skin care to area including applying nonscented moisturizer to legs daily  Do NOT scratch areas! Wound compression and edema control     Continue to wear compression stockings to both legs to keep swelling under control  Spanda  E applied to with right leg during visit today       Standing Status:   Future     Standing Expiration Date:   6/20/2024

## 2023-06-21 ENCOUNTER — OFFICE VISIT (OUTPATIENT)
Dept: PHYSICAL THERAPY | Facility: CLINIC | Age: 83
End: 2023-06-21
Payer: MEDICARE

## 2023-06-21 DIAGNOSIS — G89.29 CHRONIC PAIN OF RIGHT KNEE: ICD-10-CM

## 2023-06-21 DIAGNOSIS — M25.561 CHRONIC PAIN OF RIGHT KNEE: ICD-10-CM

## 2023-06-21 DIAGNOSIS — M54.50 LUMBAR SPINE PAIN: Primary | ICD-10-CM

## 2023-06-21 PROCEDURE — 97110 THERAPEUTIC EXERCISES: CPT

## 2023-06-21 PROCEDURE — 97140 MANUAL THERAPY 1/> REGIONS: CPT

## 2023-06-21 PROCEDURE — 97112 NEUROMUSCULAR REEDUCATION: CPT

## 2023-06-21 NOTE — PROGRESS NOTES
"Daily Note     Today's date: 2023  Patient name: Jad Johnston  : 1940  MRN: [id number]  Referring provider: Isha Moran DO  Dx:   Encounter Diagnosis     ICD-10-CM    1  Lumbar spine pain  M54 50       2  Chronic pain of right knee  M25 561     G89 29                      Subjective: Pt states regarding his R knee, \"it's coming along slowly  \"  Notes tender area R pirimormis  Objective: See treatment diary below    Assessment: Performed exercise program w/o increasing symptoms  Comfortable during laser to R knee  Able to meg TPR as below  Will monitor  Plan: Cont per POC       Precautions: amyloidosis, HTN, kidney disease, lumbar DJD, B/L chronic knee pain, prior lumbar surgery (2018)      Manuals 6/9 6/12 6/15 6/21         TPR R piriformis kl Kl performed in L sidelying  MO- in L s/l         Laser R knee 4' 4' 4' 4'                                   Neuro Re-Ed             TaA 5\"x10 hep           TaA bridge  5\"x20 5\"x30 5\"x30         TaA SLR flexion 5\"x20 x20 x20 x20                                                             Ther Ex             Heel slides 5\"x20                         glute stretch manual manual manual manual         gastroc stretch 30\"x4 30\"x4 30\"x4 30\"x4         Cone taps x20 nv x20 x20         Leg press  60#x30 80#x20 90#x20 90# 2x15         Lumbar extension   standing NV   5\"x20 5\"x20                      Ther Activity             bike  8' 8' 8'                      Gait Training                                       Modalities             prn                               "

## 2023-06-23 ENCOUNTER — OFFICE VISIT (OUTPATIENT)
Dept: PHYSICAL THERAPY | Facility: CLINIC | Age: 83
End: 2023-06-23
Payer: MEDICARE

## 2023-06-23 DIAGNOSIS — M54.50 LUMBAR SPINE PAIN: Primary | ICD-10-CM

## 2023-06-23 DIAGNOSIS — G89.29 CHRONIC PAIN OF RIGHT KNEE: ICD-10-CM

## 2023-06-23 DIAGNOSIS — M25.561 CHRONIC PAIN OF RIGHT KNEE: ICD-10-CM

## 2023-06-23 PROCEDURE — 97140 MANUAL THERAPY 1/> REGIONS: CPT

## 2023-06-23 PROCEDURE — 97530 THERAPEUTIC ACTIVITIES: CPT

## 2023-06-23 PROCEDURE — 97110 THERAPEUTIC EXERCISES: CPT

## 2023-06-23 NOTE — PROGRESS NOTES
"Daily Note     Today's date: 2023  Patient name: Alie Valdez  : 1940  MRN: [id number]  Referring provider: Vanessa Suarez DO  Dx:   Encounter Diagnosis     ICD-10-CM    1  Lumbar spine pain  M54 50       2  Chronic pain of right knee  M25 561     G89 29           Start Time: 1305  Stop Time: 1345  Total time in clinic (min): 40 minutes       Subjective: Patient reports low back pain and right knee are bothersome at night  Objective: See treatment diary below  Assessment: Treatment is tolerated well  Encouraged patient to perform self trigger point release at home utilizing a tennis ball  Plan: Continue treatment as per PT plan of care         Precautions: amyloidosis, HTN, kidney disease, lumbar DJD, B/L chronic knee pain, prior lumbar surgery ()      Manuals 6/9 6/12 6/15 6/21 6/23        TPR R piriformis kl Kl performed in L sidelying  MO- in L s/l JLW        Laser R knee 4' 4' 4' 4' 4'                                  Neuro Re-Ed             TaA 5\"x10 hep           TaA bridge  5\"x20 5\"x30 5\"x30 5\"x20        TaA SLR flexion 5\"x20 x20 x20 x20 20 ea                                                            Ther Ex             Heel slides 5\"x20                         glute stretch manual manual manual manual manual        gastroc stretch 30\"x4 30\"x4 30\"x4 30\"x4 30\"x4        Cone taps x20 nv x20 x20 20 ea        Leg press  60#x30 80#x20 90#x20 90# 2x15 90#  2x15        lumbar extension   standing NV   5\"x20 5\"x20 5\"x20                     Ther Activity             bike  8' 8' 8' 8'                       Gait Training                                       Modalities             prn                                 "

## 2023-06-26 ENCOUNTER — OFFICE VISIT (OUTPATIENT)
Dept: PHYSICAL THERAPY | Facility: CLINIC | Age: 83
End: 2023-06-26
Payer: MEDICARE

## 2023-06-26 DIAGNOSIS — M54.50 LUMBAR SPINE PAIN: Primary | ICD-10-CM

## 2023-06-26 DIAGNOSIS — G89.29 CHRONIC PAIN OF RIGHT KNEE: ICD-10-CM

## 2023-06-26 DIAGNOSIS — M25.561 CHRONIC PAIN OF RIGHT KNEE: ICD-10-CM

## 2023-06-26 PROCEDURE — 97530 THERAPEUTIC ACTIVITIES: CPT | Performed by: PHYSICAL THERAPIST

## 2023-06-26 PROCEDURE — 97110 THERAPEUTIC EXERCISES: CPT | Performed by: PHYSICAL THERAPIST

## 2023-06-26 PROCEDURE — 97140 MANUAL THERAPY 1/> REGIONS: CPT | Performed by: PHYSICAL THERAPIST

## 2023-06-26 NOTE — PROGRESS NOTES
"Daily Note     Today's date: 2023  Patient name: Connor Anna  : 1940  MRN: [id number]  Referring provider: Maia Kong DO  Dx:   Encounter Diagnosis     ICD-10-CM    1  Lumbar spine pain  M54 50       2  Chronic pain of right knee  M25 561     G89 29                      Subjective: patient reports feeling \"so so\"      Objective: See treatment diary below      Assessment: Tolerated treatment well  Patient demonstrated fatigue post treatment, exhibited good technique with therapeutic exercises and would benefit from continued PT      Plan: Continue per plan of care        Precautions: amyloidosis, HTN, kidney disease, lumbar DJD, B/L chronic knee pain, prior lumbar surgery (2018)      Manuals 6/9 6/12 6/15 6/21 6/23 6/26       TPR R piriformis kl Kl performed in L sidelying  MO- in L s/l JLW        Laser R knee 4' 4' 4' 4' 4' 4'                                 Neuro Re-Ed             TaA 5\"x10 hep           TaA bridge  5\"x20 5\"x30 5\"x30 5\"x20 5\"x20       TaA SLR flexion 5\"x20 x20 x20 x20 20 ea 20 ea                                                           Ther Ex             Heel slides 5\"x20                         glute stretch manual manual manual manual manual manual       gastroc stretch 30\"x4 30\"x4 30\"x4 30\"x4 30\"x4 30\"x4       Cone taps x20 nv x20 x20 20 ea 20 ea       Leg press  60#x30 80#x20 90#x20 90# 2x15 90#  2x15 90#  2x15       lumbar extension   standing NV   5\"x20 5\"x20 5\"x20 x20                    Ther Activity             bike  8' 8' 8' 8'   8'                    Gait Training                                       Modalities             prn                                   "

## 2023-06-27 ENCOUNTER — OFFICE VISIT (OUTPATIENT)
Dept: WOUND CARE | Facility: HOSPITAL | Age: 83
End: 2023-06-27
Payer: MEDICARE

## 2023-06-27 DIAGNOSIS — L97.911 NON-PRESSURE CHRONIC ULCER OF RIGHT LOWER LEG, LIMITED TO BREAKDOWN OF SKIN (HCC): ICD-10-CM

## 2023-06-27 DIAGNOSIS — I87.311 CHRONIC VENOUS HYPERTENSION (IDIOPATHIC) WITH ULCER OF RIGHT LOWER EXTREMITY (HCC): Primary | ICD-10-CM

## 2023-06-27 DIAGNOSIS — L97.919 CHRONIC VENOUS HYPERTENSION (IDIOPATHIC) WITH ULCER OF RIGHT LOWER EXTREMITY (HCC): Primary | ICD-10-CM

## 2023-06-27 PROCEDURE — 99213 OFFICE O/P EST LOW 20 MIN: CPT | Performed by: NURSE PRACTITIONER

## 2023-06-27 PROCEDURE — 29581 APPL MULTLAYER CMPRN SYS LEG: CPT

## 2023-06-27 RX ORDER — LIDOCAINE 40 MG/G
CREAM TOPICAL ONCE
Status: COMPLETED | OUTPATIENT
Start: 2023-06-27 | End: 2023-06-27

## 2023-06-27 RX ADMIN — LIDOCAINE: 40 CREAM TOPICAL at 14:08

## 2023-06-27 NOTE — PROGRESS NOTES
Cast Application    Date/Time: 6/27/2023 2:00 PM    Performed by: Johanny Castillo RN  Authorized by: LIVIA Núñez  Universal Protocol:  Consent: Verbal consent obtained  Consent given by: patient  Patient understanding: patient states understanding of the procedure being performed  Patient identity confirmed: verbally with patient      Pre-procedure details:     Sensation:  Normal  Procedure details:     Laterality:  Right    Location:  Leg    Leg:  R lower leg    Strapping: no  Cast type:  Multi-layer compression short leg      Supplies:  2 layer wrap  Post-procedure details:     Pain:  Improved    Sensation:  Normal    Patient tolerance of procedure: Tolerated well, no immediate complications  Comments:      Multilayer wrap procedure Coflex TLE LITE    Before application, SIMI and/or TBI determined to be adequate for healing and application of compression  Lower extremity washed prior to application of compression wrap  With the foot in dorsiflexed position, coflex was applied as per physician orders without complications or complaint of pain  The procedure was tolerated well  Toes warm & pink post application  Patient provided education & reinforced to observe toes for any discoloration, swelling or tingling and instructed when to report to the [address] or to remove compression  Wound care as per providers orders, patient tolerated well

## 2023-06-27 NOTE — PATIENT INSTRUCTIONS
Orders Placed This Encounter   Procedures    Wound cleansing and dressings     Right Posterior Lower leg    Change dressing weekly at the wound center  The dressing must stay dry and intact  You may shower with dressing protected by cast cover or bag  Or you may sponge bathe  Call wound center or home health nurse if dressing becomes wet  At wound center today  Cleansed with NSS, patted dry  Apply Opticell Agto wound  Cover with ABD pad  Secure with Coflex TLE Lite  This was applied today at the [facility]  Standing Status:   Future     Standing Expiration Date:   6/27/2024    Wound compression and edema control     CoFlex Lite Multi-layer compression wrap Instructions for RLE     Keep compression wrap/wraps clean and dry  If wraps are too tight and you experience numbness/tingling, call the wound center  If after hours, remove wraps or proceed to nearest E R  and call wound center in AM      Roosevelt Garcia will be changed 1 x weekly     Avoid prolonged standing in one place  Elevate leg(s) above the level of the heart when sitting or as much as possible  This was applied today       Standing Status:   Future     Standing Expiration Date:   6/27/2024

## 2023-06-27 NOTE — PROGRESS NOTES
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, limited to breakdown of skin ([address] )          Plan:  1  F/u visit  Wound cleansed with NSS and gauze  Wound is a partial thickness wound not showing any s/s of infection  Will have Opticell Ag applied to wound covered with ABD and will reapply CoFlex Lite wrap  Patient will follow up in 1 week  Wound 06/27/23 Venous Ulcer Leg Posterior;Right; Lower (Active)   Wound Image Images linked 06/27/23 1403   Wound Description Epithelialization;Pink 06/27/23 1403   Lory-wound Assessment Edema; Intact 06/27/23 1403   Wound Length (cm) 1 4 cm 06/27/23 1403   Wound Width (cm) 4 1 cm 06/27/23 1403   Wound Depth (cm) 0 1 cm 06/27/23 1403   Wound Surface Area (cm^2) 5 74 cm^2 06/27/23 1403   Wound Volume (cm^3) 0 574 cm^3 06/27/23 1403   Calculated Wound Volume (cm^3) 0 57 cm^3 06/27/23 1403   Drainage Amount Small 06/27/23 1403   Drainage Description Serosanguineous 06/27/23 1403   Non-staged Wound Description Full thickness 06/27/23 1403   Dressing Status Intact 06/27/23 1403       Wound 06/27/23 Venous Ulcer Leg Posterior;Right; Lower (Active)   Date First Assessed/Time First Assessed: 06/27/23 1402   Primary Wound Type: Venous Ulcer  Location: Leg  Wound Location Orientation: Posterior;Right; Lower       [REMOVED] Wound 04/24/23 Venous Ulcer Pretibial Anterior;Right (Removed)   Resolved Date: 05/14/23  Date First Assessed/Time First Assessed: 04/24/23 7985 Pre-Existing Wound: Yes  Primary Wound Type: Venous Ulcer  Location: Pretibial  Wound Location Orientation: Anterior;Right       [REMOVED] Wound 04/24/23 Venous Ulcer Leg Left; Lower (Removed)   Resolved Date: 05/14/23  Date First Assessed/Time First Assessed: 04/24/23 1545   Pre-Existing Wound: Yes  Primary Wound Type: Venous Ulcer  Location: Leg  Wound Location Orientation: Left; Lower       [REMOVED] Wound 04/25/23 Venous Ulcer Leg Lower; Posterior;Right (Removed)   Resolved Date: 05/14/23  Date First Assessed/Time First Assessed: 04/25/23 0929   Primary Wound Type: Venous Ulcer  Location: Leg  Wound Location Orientation: Lower; Posterior;Right       [REMOVED] Wound 05/19/23 Venous Ulcer Foot Right;Lateral (Removed)   Resolved Date: 05/26/23  Date First Assessed/Time First Assessed: 05/19/23 1348   Primary Wound Type: Venous Ulcer  Location: Foot  Wound Location Orientation: Right;Lateral  Wound Outcome: Healed       [REMOVED] Wound 06/06/23 Venous Ulcer Leg Right (Removed)   Resolved Date: 06/20/23  Date First Assessed/Time First Assessed: 06/06/23 1525   Primary Wound Type: Venous Ulcer  Location: Leg  Wound Location Orientation: Right  Wound Outcome: Healed       Subjective:        Ed returns to the wound center today for reopening of his venous ulcer of his RLE  Patient reports he developed a blister which burst approximately 2 days ago  He has been wearing his compression stockings daily  He denies any pain, fevers, or chills         The following portions of the patient's history were reviewed and updated as appropriate:   He  has a past medical history of Abnormal glucose level (02/07/2018), Acute back pain (10/02/2017), Atrial fibrillation ([facility] [address] ), Blood clot in vein (2015), 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[facility] [address] ) (04/2023), Carpal tunnel syndrome, Chronic kidney disease (CKD), Fever (09/08/2017), Frailty syndrome in geriatric patient (05/12/2023), FUO (fever of unknown origin) (09/08/2017), History of DVT (deep vein thrombosis), History of tobacco abuse, History of transient cerebral ischemia, and Hypertension  He   Patient Active Problem List    Diagnosis Date Noted   • Frailty syndrome in geriatric patient 05/12/2023   • Lower extremity cellulitis 04/24/2023   • Stage 3a chronic kidney disease (CKD) ([facility] ) 04/24/2023   • Hyperbilirubinemia 04/24/2023   • 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 ([place] [address] ) 02/14/2022   • Persistent atrial fibrillation ([place] [address] ) 05/14/2020   • Morbid obesity ([place] [address] ) 03/07/2018   • Lumbar stenosis 03/07/2018   • Benign neoplasm of colon 02/07/2018   • 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[place] [address] ) 02/07/2018   • 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   • Internal hemorrhoids 02/07/2018   • Pure hypercholesterolemia 02/07/2018   • Sacroiliitis ([place] [address] ) 02/01/2018   • Bilateral hip pain 12/04/2017   • Adenomatous colon polyp 12/29/2015   • Spinal stenosis of lumbar region 09/10/2015   • DDD (degenerative disc disease), lumbar 07/07/2015   • Lumbar disc herniation 06/22/2015   • Radiculopathy, lumbar region 06/22/2015   • MTHFR mutation 03/11/2015   • Elevated hemoglobin A1c 06/05/2014   • Premature atrial complexes 05/12/2014   • Aortic ectasia ([place] [address] ) 03/06/2014   • Obstructive sleep apnea 11/25/2013   • Premature ventricular contraction 14/18/4814   • Lichen planus 52/19/1758   • Hypertension 04/04/2012   • 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   • Vitamin D deficiency 04/04/2012     He  has a past surgical history that includes Back surgery (2018); Hernia repair; Colonoscopy; Colonoscopy (N/A, 02/24/2016); and Neuroplasty / transposition median nerve at carpal tunnel  His family history includes Cancer in his other; Heart attack in his father; Heart disease in his other  He  reports that he quit smoking about 41 years ago  His smoking use included cigarettes  He has a 26 00 pack-year smoking history  He has never used smokeless tobacco  He reports current alcohol use  He reports that he does not use drugs  Current Outpatient Medications   Medication Sig Dispense Refill   • Cholecalciferol (VITAMIN D-3 PO) Take 4,000 Units by mouth daily  • colchicine (COLCRYS) 0 6 mg tablet Take 1 tablet (0 6 mg total) by mouth daily Do not start before May 4, 2023  (Patient taking differently: Take 0 6 mg by mouth daily Taking as needed ) 30 tablet 0   • folic acid (FOLVITE) 1 mg tablet Take 2 mg by mouth daily       • furosemide (LASIX) 40 mg tablet Take 1 tablet (40 mg total) by mouth daily (Patient taking differently: Take 40 mg by mouth daily Taking 4-5 days weekly) 30 tablet 3   • metoprolol succinate (TOPROL-XL) 25 mg 24 hr tablet TAKE 1 TABLET(25 MG) BY MOUTH DAILY 90 tablet 3   • multivitamin-minerals (CENTRUM) tablet Take 1 tablet by mouth daily     • potassium chloride (K-DUR,KLOR-CON) 20 mEq tablet Take 1 tablet (20 mEq total) by mouth 2 (two) times a day (Patient taking differently: Take 20 mEq by mouth 2 (two) times a day Taking on days he takes Lasix (~4-5 days)) 60 tablet 3   • Tafamidis 61 MG CAPS Take 61 mg by mouth in the morning 30 capsule 11   • warfarin (COUMADIN) 5 mg tablet TAKE 1 TO 1 AND 1/2 TABLETS BY MOUTH DAILY AS DIRECTED BY PHYSICIAN 135 tablet 3     No current facility-administered medications for this visit  He is allergic to sildenafil       Review of Systems   Constitutional: Negative  HENT: Negative for ear pain and hearing loss  Eyes: Negative for pain  Respiratory: Negative for chest tightness and shortness of breath  Cardiovascular: Positive for leg swelling  Negative for chest pain and palpitations  Gastrointestinal: Negative for diarrhea, nausea and vomiting  Genitourinary: Negative for dysuria  Musculoskeletal: Negative for gait problem  Skin: Positive for wound  Neurological: Negative for tremors and weakness  Psychiatric/Behavioral: Negative for behavioral problems, confusion and suicidal ideas           Objective: Wound 06/27/23 Venous Ulcer Leg Posterior;Right; Lower (Active)   Wound Image Images linked 06/27/23 1403   Wound Description Epithelialization;Pink 06/27/23 1403   Lory-wound Assessment Edema; Intact 06/27/23 1403   Wound Length (cm) 1 4 cm 06/27/23 1403   Wound Width (cm) 4 1 cm 06/27/23 1403   Wound Depth (cm) 0 1 cm 06/27/23 1403   Wound Surface Area (cm^2) 5 74 cm^2 06/27/23 1403   Wound Volume (cm^3) 0 574 cm^3 06/27/23 1403   Calculated Wound Volume (cm^3) 0 57 cm^3 06/27/23 1403   Drainage Amount Small 06/27/23 1403   Drainage Description Serosanguineous 06/27/23 1403   Non-staged Wound Description Full thickness 06/27/23 1403   Dressing Status Intact 06/27/23 1403       There were no vitals taken for this visit  Physical Exam  Vitals and nursing note reviewed  Constitutional:       General: He is not in acute distress  Appearance: Normal appearance  He is obese  HENT:      Head: Normocephalic and atraumatic  Eyes:      General:         Right eye: No discharge  Left eye: No discharge  Pulmonary:      Effort: Pulmonary effort is normal  No respiratory distress  Musculoskeletal:         General: Normal range of motion  Cervical back: Normal range of motion and neck supple  No rigidity  Right lower leg: Edema present  Left lower leg: No edema  Skin:     General: Skin is warm and dry  Findings: Wound present  No erythema  Neurological:      General: No focal deficit present  Mental Status: He is alert and oriented to person, place, and time  Mental status is at baseline  Psychiatric:         Mood and Affect: Mood normal          Behavior: Behavior normal          Thought Content:  Thought content normal          Judgment: Judgment normal                     Wound Instructions:  Orders Placed This Encounter   Procedures   • Wound cleansing and dressings     Right Posterior Lower leg    Change dressing weekly at the wound center  The dressing must stay dry and intact  You may shower with dressing protected by cast cover or bag  Or you may sponge bathe  Call wound center or home health nurse if dressing becomes wet  At wound center today  Cleansed with NSS, patted dry  Apply Opticell Agto wound  Cover with ABD pad  Secure with Coflex TLE Lite      This was applied today at [facility]  Standing Status:   Future     Standing Expiration Date:   6/27/2024   • Wound compression and edema control     CoFlex Lite Multi-layer compression wrap Instructions for RLE     Keep compression wrap/wraps clean and dry  If wraps are too tight and you experience numbness/tingling, call the wound center  If after hours, remove wraps or proceed to nearest E R  and call wound center in AM      Wrap will be changed 1 x weekly     Avoid prolonged standing in one place      Elevate leg(s) above the level of the heart when sitting or as much as possible       This was applied today  Standing Status:   Future     Standing Expiration Date:   4/12/9621   • Cast Application     This order was created via procedure documentation        Diagnosis ICD-10-CM Associated Orders   1  Chronic venous hypertension (idiopathic) with ulcer of right lower extremity (HCC)  I87 311 lidocaine (LMX) 4 % cream    L97 910 Wound cleansing and dressings     Wound compression and edema control     Cast Application      2   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

## 2023-06-28 ENCOUNTER — OFFICE VISIT (OUTPATIENT)
Dept: PHYSICAL THERAPY | Facility: CLINIC | Age: 83
End: 2023-06-28
Payer: MEDICARE

## 2023-06-28 DIAGNOSIS — G89.29 CHRONIC PAIN OF RIGHT KNEE: ICD-10-CM

## 2023-06-28 DIAGNOSIS — M54.50 LUMBAR SPINE PAIN: Primary | ICD-10-CM

## 2023-06-28 DIAGNOSIS — M25.561 CHRONIC PAIN OF RIGHT KNEE: ICD-10-CM

## 2023-06-28 PROCEDURE — 97110 THERAPEUTIC EXERCISES: CPT

## 2023-06-28 PROCEDURE — 97112 NEUROMUSCULAR REEDUCATION: CPT

## 2023-06-28 PROCEDURE — 97140 MANUAL THERAPY 1/> REGIONS: CPT

## 2023-06-28 NOTE — PROGRESS NOTES
"Daily Note     Today's date: 2023  Patient name: Maddison Morales  : 1940  MRN: [id number]  Referring provider: Gama Gonzales DO  Dx:   Encounter Diagnosis     ICD-10-CM    1  Lumbar spine pain  M54 50       2  Chronic pain of right knee  M25 561     G89 29                      Subjective: Pt reports he is doing well, but is a little sore today  Objective: See treatment diary below      Assessment: Tolerated treatment well  Pt performed all exercises without issues, continue to progress pt to tolerance  Pt demonstrated good form and had good control during exercises  Patient demonstrated fatigue post treatment, exhibited good technique with therapeutic exercises and would benefit from continued PT      Plan: Continue per plan of care        Precautions: amyloidosis, HTN, kidney disease, lumbar DJD, B/L chronic knee pain, prior lumbar surgery ()      Manuals 6/9 6/12 6/15 6/21 6/23 6/26 6/28      TPR R piriformis kl Kl performed in L sidelying  MO- in L s/l JLW        Laser R knee 4' 4' 4' 4' 4' 4' 4'                                Neuro Re-Ed             TaA 5\"x10 hep           TaA bridge  5\"x20 5\"x30 5\"x30 5\"x20 5\"x20 5\"x20      TaA SLR flexion 5\"x20 x20 x20 x20 20 ea 20 ea x20 ea                                                          Ther Ex             Heel slides 5\"x20                         glute stretch manual manual manual manual manual manual manual      gastroc stretch 30\"x4 30\"x4 30\"x4 30\"x4 30\"x4 30\"x4 30\"x4      Cone taps x20 nv x20 x20 20 ea 20 ea 20x ea      Leg press  60#x30 80#x20 90#x20 90# 2x15 90#  2x15 90#  2x15 90# 2x15      lumbar extension   standing NV   5\"x20 5\"x20 5\"x20 x20 x20                   Ther Activity             bike  8' 8' 8' 8'   8' 8'                   Gait Training                                       Modalities             prn                                     "

## 2023-07-03 ENCOUNTER — OFFICE VISIT (OUTPATIENT)
Dept: PHYSICAL THERAPY | Facility: CLINIC | Age: 83
End: 2023-07-03
Payer: MEDICARE

## 2023-07-03 DIAGNOSIS — M54.50 LUMBAR SPINE PAIN: Primary | ICD-10-CM

## 2023-07-03 DIAGNOSIS — G89.29 CHRONIC PAIN OF RIGHT KNEE: ICD-10-CM

## 2023-07-03 DIAGNOSIS — M25.561 CHRONIC PAIN OF RIGHT KNEE: ICD-10-CM

## 2023-07-03 PROCEDURE — 97140 MANUAL THERAPY 1/> REGIONS: CPT

## 2023-07-03 PROCEDURE — 97110 THERAPEUTIC EXERCISES: CPT

## 2023-07-03 PROCEDURE — 97112 NEUROMUSCULAR REEDUCATION: CPT

## 2023-07-03 NOTE — PROGRESS NOTES
Daily Note     Today's date: 7/3/2023  Patient name: Nathan Santa  : 1940  MRN: [id number]  Referring provider: Rafa Ching DO  Dx:   Encounter Diagnosis     ICD-10-CM    1. Lumbar spine pain  M54.50       2. Chronic pain of right knee  M25.561     G89.29                      Subjective: Patient offers no new complaints today. Objective: See treatment diary below      Assessment: Tolerated treatment well. Added LTR to improve hip mobility. Demonstrated good technique. Pt demonstrated good form and had good control during exercises. Patient demonstrated fatigue post treatment, exhibited good technique with therapeutic exercises and would benefit from continued PT      Plan: Continue per plan of care.       Precautions: amyloidosis, HTN, kidney disease, lumbar DJD, B/L chronic knee pain, prior lumbar surgery ()      Manuals 6/9 6/12 6/15 6/21 6/23 6/26 6/28 7/3     TPR R piriformis kl Kl performed in L sidelying  MO- in L s/l JLW        Laser R knee 4' 4' 4' 4' 4' 4' 4' 4'                               Neuro Re-Ed             TaA 5"x10 hep           TaA bridge  5"x20 5"x30 5"x30 5"x20 5"x20 5"x20 5" x20     TaA SLR flexion 5"x20 x20 x20 x20 20 ea 20 ea x20 ea x20 ea                                                         Ther Ex             Heel slides 5"x20                         glute stretch manual manual manual manual manual manual manual manual     gastroc stretch 30"x4 30"x4 30"x4 30"x4 30"x4 30"x4 30"x4 30"x4     Cone taps x20 nv x20 x20 20 ea 20 ea 20x ea 20x ea     Leg press  60#x30 80#x20 90#x20 90# 2x15 90#  2x15 90#  2x15 90# 2x15 90# 3x10     lumbar extension   standing NV.  5"x20 5"x20 5"x20 x20 x20 x20     LTR        x20     Ther Activity             bike  8' 8' 8' 8'   8' 8' 8'                  Gait Training                                       Modalities             prn

## 2023-07-05 ENCOUNTER — OFFICE VISIT (OUTPATIENT)
Dept: WOUND CARE | Facility: HOSPITAL | Age: 83
End: 2023-07-05
Payer: MEDICARE

## 2023-07-05 VITALS
SYSTOLIC BLOOD PRESSURE: 123 MMHG | DIASTOLIC BLOOD PRESSURE: 77 MMHG | RESPIRATION RATE: 18 BRPM | TEMPERATURE: 97.5 F | HEART RATE: 91 BPM

## 2023-07-05 DIAGNOSIS — L97.919 CHRONIC VENOUS HYPERTENSION (IDIOPATHIC) WITH ULCER OF RIGHT LOWER EXTREMITY (HCC): Primary | ICD-10-CM

## 2023-07-05 DIAGNOSIS — I87.311 CHRONIC VENOUS HYPERTENSION (IDIOPATHIC) WITH ULCER OF RIGHT LOWER EXTREMITY (HCC): Primary | ICD-10-CM

## 2023-07-05 DIAGNOSIS — L97.911 NON-PRESSURE CHRONIC ULCER OF RIGHT LOWER LEG, LIMITED TO BREAKDOWN OF SKIN (HCC): ICD-10-CM

## 2023-07-05 PROCEDURE — 99212 OFFICE O/P EST SF 10 MIN: CPT | Performed by: NURSE PRACTITIONER

## 2023-07-05 PROCEDURE — 99213 OFFICE O/P EST LOW 20 MIN: CPT | Performed by: NURSE PRACTITIONER

## 2023-07-05 NOTE — PROGRESS NOTES
Patient ID: Sugey Aguilera is a 80 y.o. 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 F/U visit. Wound epithelialized. We will keep area covered with silicone bordered foam dressing x3 days to protect fresh epithelium. Counseled patient to resume wearing his compression stockings daily. Patient is discharged from the wound center today. He may follow-up as needed. Wound 06/27/23 Venous Ulcer Leg Posterior;Right; Lower (Active)   Wound Image Images linked 07/05/23 1026   Wound Description Epithelialization 07/05/23 1046   Wound Length (cm) 0 cm 07/05/23 1046   Wound Width (cm) 0 cm 07/05/23 1046   Wound Depth (cm) 0 cm 07/05/23 1046   Wound Surface Area (cm^2) 0 cm^2 07/05/23 1046   Wound Volume (cm^3) 0 cm^3 07/05/23 1046   Calculated Wound Volume (cm^3) 0 cm^3 07/05/23 1046   Change in Wound Size % 100 07/05/23 1046   Drainage Amount None 07/05/23 1046   Dressing Status Intact 07/05/23 1046       Wound 06/27/23 Venous Ulcer Leg Posterior;Right; Lower (Active)   Date First Assessed/Time First Assessed: 06/27/23 1402   Primary Wound Type: Venous Ulcer  Location: Leg  Wound Location Orientation: Posterior;Right; Lower  Wound Outcome: Healed       [REMOVED] Wound 04/24/23 Venous Ulcer Pretibial Anterior;Right (Removed)   Resolved Date: 05/14/23  Date First Assessed/Time First Assessed: 04/24/23 1544   Pre-Existing Wound: Yes  Primary Wound Type: Venous Ulcer  Location: Pretibial  Wound Location Orientation: Anterior;Right       [REMOVED] Wound 04/24/23 Venous Ulcer Leg Left; Lower (Removed)   Resolved Date: 05/14/23  Date First Assessed/Time First Assessed: 04/24/23 1545   Pre-Existing Wound: Yes  Primary Wound Type: Venous Ulcer  Location: Leg  Wound Location Orientation: Left; Lower       [REMOVED] Wound 04/25/23 Venous Ulcer Leg Lower; Posterior;Right (Removed)   Resolved Date: 05/14/23  Date First Assessed/Time First Assessed: 04/25/23 0929   Primary Wound Type: Venous Ulcer  Location: Leg  Wound Location Orientation: Lower; Posterior;Right       [REMOVED] Wound 05/19/23 Venous Ulcer Foot Right;Lateral (Removed)   Resolved Date: 05/26/23  Date First Assessed/Time First Assessed: 05/19/23 1348   Primary Wound Type: Venous Ulcer  Location: Foot  Wound Location Orientation: Right;Lateral  Wound Outcome: Healed       [REMOVED] Wound 06/06/23 Venous Ulcer Leg Right (Removed)   Resolved Date: 06/20/23  Date First Assessed/Time First Assessed: 06/06/23 1525   Primary Wound Type: Venous Ulcer  Location: Leg  Wound Location Orientation: Right  Wound Outcome: Healed       Subjective:      . F/u visit for venous ulcer of his right lower leg. No new complaints. He denies any pain, fevers, or chills. Tolerating Coflex lite wrap. 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   • 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   • Internal hemorrhoids 02/07/2018   • Pure hypercholesterolemia 02/07/2018   • Sacroiliitis ([address]) 02/01/2018   • Bilateral hip pain 12/04/2017   • Adenomatous colon polyp 12/29/2015   • Spinal stenosis of lumbar region 09/10/2015   • DDD (degenerative disc disease), lumbar 07/07/2015   • Lumbar disc herniation 06/22/2015   • Radiculopathy, lumbar region 06/22/2015   • MTHFR mutation 03/11/2015   • Elevated hemoglobin A1c 06/05/2014   • Premature atrial complexes 05/12/2014   • 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 His family history includes Cancer in his other; Heart attack in his father; Heart disease in his other. He  reports that he quit smoking about 41 years ago. His smoking use included cigarettes. He has a 26.00 pack-year smoking history. He has never used smokeless tobacco. He reports current alcohol use. He reports that he does not use drugs. Current Outpatient Medications   Medication Sig Dispense Refill   • Cholecalciferol (VITAMIN D-3 PO) Take 4,000 Units by mouth daily.      • colchicine (COLCRYS) 0.6 mg tablet Take 1 tablet (0.6 mg total) by mouth daily Do not start before May 4, 2023. (Patient taking differently: Take 0.6 mg by mouth daily Taking as needed.) 30 tablet 0   • folic acid (FOLVITE) 1 mg tablet Take 2 mg by mouth daily       • furosemide (LASIX) 40 mg tablet Take 1 tablet (40 mg total) by mouth daily (Patient taking differently: Take 40 mg by mouth daily Taking 4-5 days weekly) 30 tablet 3   • metoprolol succinate (TOPROL-XL) 25 mg 24 hr tablet TAKE 1 TABLET(25 MG) BY MOUTH DAILY 90 tablet 3   • multivitamin-minerals (CENTRUM) tablet Take 1 tablet by mouth daily     • potassium chloride (K-DUR,KLOR-CON) 20 mEq tablet Take 1 tablet (20 mEq total) by mouth 2 (two) times a day (Patient taking differently: Take 20 mEq by mouth 2 (two) times a day Taking on days he takes Lasix (~4-5 days)) 60 tablet 3   • Tafamidis 61 MG CAPS Take 61 mg by mouth in the morning 30 capsule 11   • warfarin (COUMADIN) 5 mg tablet TAKE 1 TO 1 AND 1/2 TABLETS BY MOUTH DAILY AS DIRECTED BY PHYSICIAN 135 tablet 3     No current facility-administered medications for this visit. He is allergic to sildenafil. .    Review of Systems   Constitutional: Negative. HENT: Negative for ear pain and hearing loss. Eyes: Negative for pain. Respiratory: Negative for chest tightness and shortness of breath. Cardiovascular: Positive for leg swelling. Negative for chest pain and palpitations. Gastrointestinal: Negative for diarrhea, nausea and vomiting. Genitourinary: Negative for dysuria. Musculoskeletal: Negative for gait problem. Skin: Positive for wound. Neurological: Negative for tremors and weakness. Psychiatric/Behavioral: Negative for behavioral problems, confusion and suicidal ideas. Objective:       Wound 06/27/23 Venous Ulcer Leg Posterior;Right; Lower (Active)   Wound Image Images linked 07/05/23 1026   Wound Description Epithelialization 07/05/23 1046   Wound Length (cm) 0 cm 07/05/23 1046   Wound Width (cm) 0 cm 07/05/23 1046   Wound Depth (cm) 0 cm 07/05/23 1046   Wound Surface Area (cm^2) 0 cm^2 07/05/23 1046   Wound Volume (cm^3) 0 cm^3 07/05/23 1046   Calculated Wound Volume (cm^3) 0 cm^3 07/05/23 1046   Change in Wound Size % 100 07/05/23 1046   Drainage Amount None 07/05/23 1046   Dressing Status Intact 07/05/23 1046       /77   Pulse 91   Temp 97.5 °F (36.4 °C)   Resp 18     Physical Exam  Vitals and nursing note reviewed. Constitutional:       General: He is not in acute distress. Appearance: Normal appearance. He is obese. HENT:      Head: Normocephalic and atraumatic. Eyes:      General:         Right eye: No discharge. Left eye: No discharge. Pulmonary:      Effort: Pulmonary effort is normal. No respiratory distress. Musculoskeletal:         General: Normal range of motion. Cervical back: Normal range of motion and neck supple. No rigidity. Right lower leg: No edema. Left lower leg: No edema. Skin:     General: Skin is warm and dry. Findings: No erythema or wound. Neurological:      General: No focal deficit present. Mental Status: He is alert and oriented to person, place, and time. Mental status is at baseline. Psychiatric:         Mood and Affect: Mood normal.         Behavior: Behavior normal.         Thought Content: Thought content normal.         Judgment: Judgment normal.                    Wound Instructions:  Orders Placed This Encounter   Procedures   • Wound cleansing and dressings     RLE wound is healed. Today at the wound center the leg was washed with soap and water, rinsed and patted dry. Allevyn silicone bordered foam applied over fragile epithelium. Then spandagrip F from base of toes to base of knee. And Spandagrip E from base of toes to base of calf. LEAVE THIS DRESSING INTACT FOR 2 DAYS. Then you may remove everything and shower. Practice good skin care; wash you leg with soap and water, pat dry. Apply moisturizer and wear your compression stockings every day.    Thank you for coming to our center. Standing Status:   Future     Standing Expiration Date:   7/5/2024        Diagnosis ICD-10-CM Associated Orders   1. Chronic venous hypertension (idiopathic) with ulcer of right lower extremity ([facility])  I87.311 Wound cleansing and dressings    L97.919       2.  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

## 2023-07-05 NOTE — PATIENT INSTRUCTIONS
Orders Placed This Encounter   Procedures    Wound cleansing and dressings     RLE wound is healed. Today at the wound center the leg was washed with soap and water, rinsed and patted dry. Allevyn silicone bordered foam applied over fragile epithelium. Then spandagrip F from base of toes to base of knee. And Spandagrip E from base of toes to base of calf. LEAVE THIS DRESSING INTACT FOR 2 DAYS. Then you may remove everything and shower. Practice good skin care; wash you leg with soap and water, pat dry. Apply moisturizer and wear your compression stockings every day. Thank you for coming to our center.      Standing Status:   Future     Standing Expiration Date:   7/5/2024

## 2023-07-06 ENCOUNTER — OFFICE VISIT (OUTPATIENT)
Dept: PHYSICAL THERAPY | Facility: CLINIC | Age: 83
End: 2023-07-06
Payer: MEDICARE

## 2023-07-06 DIAGNOSIS — M54.50 LUMBAR SPINE PAIN: Primary | ICD-10-CM

## 2023-07-06 DIAGNOSIS — M25.561 CHRONIC PAIN OF RIGHT KNEE: ICD-10-CM

## 2023-07-06 DIAGNOSIS — G89.29 CHRONIC PAIN OF RIGHT KNEE: ICD-10-CM

## 2023-07-06 PROCEDURE — 97110 THERAPEUTIC EXERCISES: CPT

## 2023-07-06 PROCEDURE — 97530 THERAPEUTIC ACTIVITIES: CPT

## 2023-07-06 NOTE — PROGRESS NOTES
Daily Note     Today's date: 2023  Patient name: Lesvia Wise  : 1940  MRN: [id number]  Referring provider: Kayleen Kiser DO  Dx:   Encounter Diagnosis     ICD-10-CM    1. Lumbar spine pain  M54.50       2. Chronic pain of right knee  M25.561     G89.29           Start Time: 1100  Stop Time: 1141  Total time in clinic (min): 41 minutes      Subjective: Patient reports his right knee is stiff upon standing. Objective: See treatment diary below. Assessment: Therapeutic exercise program is tolerated well. Patient is able to perform glute stretch independently this session. Plan: Continue treatment as per PT plan of care.        Precautions: amyloidosis, HTN, kidney disease, lumbar DJD, B/L chronic knee pain, prior lumbar surgery ()      Manuals 6/9 6/12 6/15 6/21 6/23 6/26 6/28 7/3 7/6    TPR R piriformis kl Kl performed in L sidelying  MO- in L s/l JLW        Laser R knee 4' 4' 4' 4' 4' 4' 4' 4' 4'                              Neuro Re-Ed             TaA 5"x10 hep           TaA bridge  5"x20 5"x30 5"x30 5"x20 5"x20 5"x20 5" x20 5"x20    TaA SLR flexion 5"x20 x20 x20 x20 20 ea 20 ea x20 ea x20 ea 20 ea                                                        Ther Ex             Heel slides 5"x20                         glute stretch manual manual manual manual manual manual manual manual 30"x3     gastroc stretch 30"x4 30"x4 30"x4 30"x4 30"x4 30"x4 30"x4 30"x4 30"x4    Cone taps x20 nv x20 x20 20 ea 20 ea 20x ea 20x ea 20 ea    Leg press  60#x30 80#x20 90#x20 90# 2x15 90#  2x15 90#  2x15 90# 2x15 90# 3x10 90#  3x10    lumbar extension   standing NV.  5"x20 5"x20 5"x20 x20 x20 x20 20    LTR        x20 20                 Ther Activity             bike  8' 8' 8' 8'   8' 8' 8' 8'                 Gait Training                                       Modalities             prn

## 2023-07-12 ENCOUNTER — APPOINTMENT (OUTPATIENT)
Dept: PHYSICAL THERAPY | Facility: CLINIC | Age: 83
End: 2023-07-12
Payer: MEDICARE

## 2023-07-17 ENCOUNTER — APPOINTMENT (OUTPATIENT)
Dept: PHYSICAL THERAPY | Facility: CLINIC | Age: 83
End: 2023-07-17
Payer: MEDICARE

## 2023-07-18 DIAGNOSIS — I48.19 PERSISTENT ATRIAL FIBRILLATION (HCC): Primary | ICD-10-CM

## 2023-07-19 ENCOUNTER — APPOINTMENT (OUTPATIENT)
Dept: LAB | Facility: CLINIC | Age: 83
End: 2023-07-19
Payer: MEDICARE

## 2023-07-20 ENCOUNTER — APPOINTMENT (OUTPATIENT)
Dept: PHYSICAL THERAPY | Facility: CLINIC | Age: 83
End: 2023-07-20
Payer: MEDICARE

## 2023-07-20 ENCOUNTER — ANTICOAG VISIT (OUTPATIENT)
Dept: CARDIOLOGY CLINIC | Facility: CLINIC | Age: 83
End: 2023-07-20

## 2023-07-20 DIAGNOSIS — I48.19 PERSISTENT ATRIAL FIBRILLATION (HCC): Primary | ICD-10-CM

## 2023-07-27 DIAGNOSIS — I48.19 PERSISTENT ATRIAL FIBRILLATION (HCC): ICD-10-CM

## 2023-07-27 RX ORDER — WARFARIN SODIUM 5 MG/1
TABLET ORAL
Qty: 135 TABLET | Refills: 3 | Status: SHIPPED | OUTPATIENT
Start: 2023-07-27

## 2023-08-02 ENCOUNTER — APPOINTMENT (OUTPATIENT)
Dept: LAB | Facility: CLINIC | Age: 83
End: 2023-08-02
Payer: MEDICARE

## 2023-08-02 ENCOUNTER — APPOINTMENT (OUTPATIENT)
Dept: LAB | Facility: HOSPITAL | Age: 83
End: 2023-08-02
Payer: MEDICARE

## 2023-08-02 ENCOUNTER — ANTICOAG VISIT (OUTPATIENT)
Dept: CARDIOLOGY CLINIC | Facility: CLINIC | Age: 83
End: 2023-08-02

## 2023-08-02 DIAGNOSIS — I48.19 PERSISTENT ATRIAL FIBRILLATION (HCC): ICD-10-CM

## 2023-08-02 DIAGNOSIS — I48.19 PERSISTENT ATRIAL FIBRILLATION (HCC): Primary | ICD-10-CM

## 2023-08-02 LAB
INR PPP: 1.72 (ref 0.84–1.19)
PROTHROMBIN TIME: 20.4 SECONDS (ref 11.6–14.5)

## 2023-08-02 PROCEDURE — 36415 COLL VENOUS BLD VENIPUNCTURE: CPT

## 2023-08-02 PROCEDURE — 85610 PROTHROMBIN TIME: CPT

## 2023-08-03 ENCOUNTER — APPOINTMENT (OUTPATIENT)
Dept: LAB | Facility: HOSPITAL | Age: 83
End: 2023-08-03
Payer: MEDICARE

## 2023-08-03 ENCOUNTER — ANTICOAG VISIT (OUTPATIENT)
Dept: CARDIOLOGY CLINIC | Facility: CLINIC | Age: 83
End: 2023-08-03

## 2023-08-03 DIAGNOSIS — I48.19 PERSISTENT ATRIAL FIBRILLATION (HCC): ICD-10-CM

## 2023-08-03 DIAGNOSIS — E85.82 SENILE SYSTEMIC AMYLOIDOSIS (HCC): ICD-10-CM

## 2023-08-03 DIAGNOSIS — N18.31 CHRONIC KIDNEY DISEASE (CKD) STAGE G3A/A1, MODERATELY DECREASED GLOMERULAR FILTRATION RATE (GFR) BETWEEN 45-59 ML/MIN/1.73 SQUARE METER AND ALBUMINURIA CREATININE RATIO LESS THAN 30 MG/G (HCC): ICD-10-CM

## 2023-08-03 DIAGNOSIS — I48.19 PERSISTENT ATRIAL FIBRILLATION (HCC): Primary | ICD-10-CM

## 2023-08-03 LAB
ANION GAP SERPL CALCULATED.3IONS-SCNC: 7 MMOL/L
BUN SERPL-MCNC: 23 MG/DL (ref 5–25)
CALCIUM SERPL-MCNC: 9.3 MG/DL (ref 8.3–10.1)
CHLORIDE SERPL-SCNC: 106 MMOL/L (ref 96–108)
CO2 SERPL-SCNC: 29 MMOL/L (ref 21–32)
CREAT SERPL-MCNC: 1.31 MG/DL (ref 0.6–1.3)
GFR SERPL CREATININE-BSD FRML MDRD: 50 ML/MIN/1.73SQ M
GLUCOSE P FAST SERPL-MCNC: 101 MG/DL (ref 65–99)
POTASSIUM SERPL-SCNC: 3.8 MMOL/L (ref 3.5–5.3)
SODIUM SERPL-SCNC: 142 MMOL/L (ref 135–147)
URATE SERPL-MCNC: 9.3 MG/DL (ref 3.5–8.5)

## 2023-08-03 PROCEDURE — 84550 ASSAY OF BLOOD/URIC ACID: CPT

## 2023-08-03 PROCEDURE — 80048 BASIC METABOLIC PNL TOTAL CA: CPT

## 2023-08-17 ENCOUNTER — OFFICE VISIT (OUTPATIENT)
Dept: WOUND CARE | Facility: HOSPITAL | Age: 83
End: 2023-08-17
Payer: MEDICARE

## 2023-08-17 VITALS
RESPIRATION RATE: 16 BRPM | SYSTOLIC BLOOD PRESSURE: 137 MMHG | TEMPERATURE: 96.9 F | DIASTOLIC BLOOD PRESSURE: 75 MMHG | HEART RATE: 77 BPM

## 2023-08-17 DIAGNOSIS — L97.919 CHRONIC VENOUS HYPERTENSION (IDIOPATHIC) WITH ULCER OF RIGHT LOWER EXTREMITY (HCC): Primary | ICD-10-CM

## 2023-08-17 DIAGNOSIS — I87.311 CHRONIC VENOUS HYPERTENSION (IDIOPATHIC) WITH ULCER OF RIGHT LOWER EXTREMITY (HCC): Primary | ICD-10-CM

## 2023-08-17 DIAGNOSIS — I87.312 CHRONIC VENOUS HYPERTENSION (IDIOPATHIC) WITH ULCER OF LEFT LOWER EXTREMITY (CODE) (HCC): ICD-10-CM

## 2023-08-17 PROCEDURE — 99213 OFFICE O/P EST LOW 20 MIN: CPT | Performed by: PODIATRIST

## 2023-08-17 PROCEDURE — 29581 APPL MULTLAYER CMPRN SYS LEG: CPT

## 2023-08-17 PROCEDURE — NC001 PR NO CHARGE

## 2023-08-17 RX ORDER — LIDOCAINE 40 MG/G
CREAM TOPICAL ONCE
Status: COMPLETED | OUTPATIENT
Start: 2023-08-17 | End: 2023-08-17

## 2023-08-17 RX ADMIN — LIDOCAINE: 40 CREAM TOPICAL at 09:02

## 2023-08-17 NOTE — PATIENT INSTRUCTIONS
Orders Placed This Encounter   Procedures    Wound cleansing and dressings     BILATERAL LOWER LEG WOUNDS    Change dressing weekly at the wound center  The dressing must stay dry and intact. You may shower with dressing protected by CAST COVER. Or you may sponge bathe. Call wound center or home health nurse if dressing becomes wet. At wound center today  Cleansed with NSS, patted dry. Apply Opticell Ag to wound  Cover with ABD pad  Secure with Coflex TLE Lite. Next appt in 1 week. Standing Status:   Future     Standing Expiration Date:   8/17/2024    Wound compression and edema control     CoFlex Lite Multi-layer compression wrap Instructions for B/L LE     Keep compression wrap/wraps clean and dry. If wraps are too tight and you experience numbness/tingling, call the wound center. If after hours, remove wraps or proceed to nearest E.R. and call wound center in AM.     Verneita Kitty will be changed 1 x weekly     Avoid prolonged standing in one place. Elevate leg(s) above the level of the heart when sitting or as much as possible. This was applied today. Standing Status:   Future     Standing Expiration Date:   8/17/2024    Wound miscellaneous orders     Limit sodium intake.      Standing Status:   Future     Standing Expiration Date:   8/17/2024

## 2023-08-17 NOTE — PROGRESS NOTES
Cast Application    Date/Time: 8/17/2023 8:45 AM    Performed by: Claudia Garcia RN  Authorized by: Sharla Payne DPM  Universal Protocol:  Consent: Verbal consent obtained. Consent given by: patient  Patient understanding: patient states understanding of the procedure being performed  Patient identity confirmed: verbally with patient      Pre-procedure details:     Sensation:  Normal  Procedure details:     Laterality:  Bilateral    Location:  Leg    Leg:  L lower leg and R lower legCast type:  Multi-layer compression short leg      Supplies:  2 layer wrap  Post-procedure details:     Pain:  Improved    Sensation:  Normal    Patient tolerance of procedure: Tolerated well, no immediate complications  Comments:      Multilayer wrap procedure - Coflex Lite TLC    Before application, SIMI and/or TBI determined to be adequate for healing and application of compression. Lower extremity washed prior to application of compression wrap. With the foot in dorsiflexed position, Coflex was applied as per physician orders without complications or complaint of pain. The procedure was tolerated well. Toes warm & pink post application. Patient provided education & reinforced to observe toes for any discoloration, swelling or tingling and instructed when to report to the  [address] or to remove compression. Wound care as per providers orders, patient tolerated well.

## 2023-08-17 NOTE — PROGRESS NOTES
Patient ID: Krystle Cox is a 80 y.o. male Date of Birth 1940       Chief Complaint   Patient presents with   • New Patient Visit     Wounds to bilateral lower legs. Allergies:  Sildenafil    Diagnosis:      Diagnosis ICD-10-CM Associated Orders   1. Chronic venous hypertension (idiopathic) with ulcer of right lower extremity (HCC)  I87.311 lidocaine (LMX) 4 % cream    L97.919 Wound cleansing and dressings     Wound compression and edema control     Wound miscellaneous orders     Cast Application      2. Chronic venous hypertension (idiopathic) with ulcer of left lower extremity (CODE) (HCC)  I87.312 lidocaine (LMX) 4 % cream     Wound cleansing and dressings     Wound compression and edema control     Wound miscellaneous orders     Cast Application           Plan:  1. Reviewed medical records. Reviewed previous wound care notes. Patient was counseled and educated on the condition and the diagnosis. 2. The diagnosis, treatment options and prognosis were discussed with the patient. 3. The patient has recurring VSU. Treat with local care and compression. Start Coflex lite. 4. Educated management of LE edema with compression, elevation, and diet. 5. Patient will return in 1 week for re-evaluation. Imaging: I have personally reviewed pertinent films in PACS  Labs, pathology, and Other Studies: I have personally reviewed pertinent reports. Subjective:     HPI  The patient presents to our center for evaluation and treatment of recurring VLU. Started a couple of weeks. He has been wearing compression stockings. Pain is minimal.  No significant redness or signs of infection.         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)   • Diverticular disease of colon   • Elevated hemoglobin A1c   • Hypertension   • Internal hemorrhoids   • Lichen planus   • Lumbar disc herniation   • MTHFR mutation   • Obstructive sleep apnea   • Organic impotence   • Premature atrial complexes   • Premature ventricular contraction   • Pure hypercholesterolemia   • Radiculopathy, lumbar region   • Spinal stenosis of lumbar region   • Vitamin D deficiency   • Persistent atrial fibrillation (HCC)   • 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 (HCC)   • Morbid obesity (HCC)   • Lumbar stenosis   • Lower extremity cellulitis   • Stage 3a chronic kidney disease (CKD) (HCC)   • Hyperbilirubinemia   • Frailty syndrome in geriatric patient     Past Medical History:   Diagnosis Date   • Abnormal glucose level 02/07/2018   • Acute back pain 10/02/2017   • Atrial fibrillation (HCC)    • Blood clot in vein 2015    in right foot   • 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   • Carpal tunnel syndrome    • Chronic kidney disease (CKD)    • Fever 09/08/2017   • Frailty syndrome in geriatric patient 05/12/2023   • FUO (fever of unknown origin) 09/08/2017   • History of DVT (deep vein thrombosis)    • History of tobacco abuse     quit in 1982   • History of transient cerebral ischemia    • Hypertension      Past Surgical History:   Procedure Laterality Date   • BACK SURGERY  2018   • COLONOSCOPY     • COLONOSCOPY N/A 02/24/2016    Procedure: COLONOSCOPY;  Surgeon: Bob Medrano MD;  Location: [facility] OR;  Service:    • HERNIA REPAIR     • NEUROPLASTY / TRANSPOSITION MEDIAN NERVE AT CARPAL TUNNEL       Family History   Problem Relation Age of Onset   • Heart attack Father    • Heart disease Other    • Cancer Other       Social History     Socioeconomic History   • Marital status: /Civil Union     Spouse name: None   • Number of children: None   • Years of education: None   • Highest education level: None   Occupational History   • None   Tobacco Use   • Smoking status: Former     Packs/day: 1.00     Years: 26.00     Total pack years: 26.00     Types: Cigarettes     Quit date: 18     Years since quittin.6   • Smokeless tobacco: Never   Vaping Use   • Vaping Use: Never used   Substance and Sexual Activity   • Alcohol use: Yes     Comment: 4 drinks a week   • Drug use: No   • Sexual activity: None   Other Topics Concern   • None   Social History Narrative   • None     Social Determinants of Health     Financial Resource Strain: Not on file   Food Insecurity: No Food Insecurity (2023)    Hunger Vital Sign    • Worried About Running Out of Food in the Last Year: Never true    • Ran Out of Food in the Last Year: Never true   Transportation Needs: No Transportation Needs (2023)    PRAPARE - Transportation    • Lack of Transportation (Medical): No    • Lack of Transportation (Non-Medical): No   Physical Activity: Not on file   Stress: Not on file   Social Connections: Not on file   Intimate Partner Violence: Not on file   Housing Stability: Low Risk  (2023)    Housing Stability Vital Sign    • Unable to Pay for Housing in the Last Year: No    • Number of Places Lived in the Last Year: 1    • Unstable Housing in the Last Year: No        Current Outpatient Medications:   •  Cholecalciferol (VITAMIN D-3 PO), Take 4,000 Units by mouth daily. , Disp: , Rfl:   •  colchicine (COLCRYS) 0.6 mg tablet, Take 1 tablet (0.6 mg total) by mouth daily Do not start before May 4, 2023.  (Patient taking differently: Take 0.6 mg by mouth daily Taking as needed.), Disp: 30 tablet, Rfl: 0  •  folic acid (FOLVITE) 1 mg tablet, Take 2 mg by mouth daily  , Disp: , Rfl:   •  furosemide (LASIX) 40 mg tablet, Take 1 tablet (40 mg total) by mouth daily (Patient taking differently: Take 40 mg by mouth daily Taking 4-5 days weekly), Disp: 30 tablet, Rfl: 3  •  metoprolol succinate (TOPROL-XL) 25 mg 24 hr tablet, TAKE 1 TABLET(25 MG) BY MOUTH DAILY, Disp: 90 tablet, Rfl: 3  •  multivitamin-minerals (CENTRUM) tablet, Take 1 tablet by mouth daily, Disp: , Rfl:   •  potassium chloride (K-DUR,KLOR-CON) 20 mEq tablet, Take 1 tablet (20 mEq total) by mouth 2 (two) times a day (Patient taking differently: Take 20 mEq by mouth 2 (two) times a day Taking on days he takes Lasix (~4-5 days)), Disp: 60 tablet, Rfl: 3  •  Tafamidis 61 MG CAPS, Take 61 mg by mouth in the morning, Disp: 30 capsule, Rfl: 11  •  warfarin (COUMADIN) 5 mg tablet, TAKE 1 TO 1 AND 1/2 TABLETS BY MOUTH DAILY AS DIRECTED BY PRESCRIBER, Disp: 135 tablet, Rfl: 3  No current facility-administered medications for this visit. Review of Systems   Constitutional: Negative for chills and fever. Respiratory: Negative for cough and shortness of breath. Cardiovascular: Positive for leg swelling. Negative for chest pain. Gastrointestinal: Negative for nausea and vomiting. Musculoskeletal: Negative for gait problem. Skin: Positive for wound. Objective:  /75   Pulse 77   Temp (!) 96.9 °F (36.1 °C)   Resp 16   Pain Score:   5     Physical Exam  Vitals reviewed. Constitutional:       General: He is not in acute distress. Appearance: He is not toxic-appearing or diaphoretic. Cardiovascular:      Rate and Rhythm: Normal rate and regular rhythm. Pulses:           Dorsalis pedis pulses are 0 on the right side and 0 on the left side. Posterior tibial pulses are 1+ on the right side and 1+ on the left side. Pulmonary:      Effort: Pulmonary effort is normal. No respiratory distress. Musculoskeletal:         General: No tenderness or signs of injury. Right lower leg: Edema present. Left lower leg: Edema present. Right foot: No foot drop. Left foot: No foot drop. Skin:     General: Skin is warm. Capillary Refill: Capillary refill takes less than 2 seconds.       Coloration: Skin is not cyanotic or mottled. Findings: No abscess. Nails: There is no clubbing. Comments: Multiple partial thickness ulcers in both legs. No infection or purulence. See wound assessment and photo. Neurological:      General: No focal deficit present. Mental Status: He is alert and oriented to person, place, and time. Cranial Nerves: No cranial nerve deficit. Sensory: No sensory deficit. Motor: No weakness. Coordination: Coordination normal.   Psychiatric:         Mood and Affect: Mood normal.         Behavior: Behavior normal.         Thought Content: Thought content normal.         Judgment: Judgment normal.              Procedures           Lab Results   Component Value Date    HGBA1C 5.7 (H) 05/16/2023       Wound Instructions:  Orders Placed This Encounter   Procedures   • Wound cleansing and dressings     BILATERAL LOWER LEG WOUNDS    Change dressing weekly at the wound center  The dressing must stay dry and intact. You may shower with dressing protected by CAST COVER. Or you may sponge bathe. Call wound center or home health nurse if dressing becomes wet. At wound center today  Cleansed with NSS, patted dry. Apply Opticell Ag to wound  Cover with ABD pad  Secure with Coflex TLE Lite. Next appt in 1 week. Standing Status:   Future     Standing Expiration Date:   8/17/2024   • Wound compression and edema control     CoFlex Lite Multi-layer compression wrap Instructions for B/L LE     Keep compression wrap/wraps clean and dry. If wraps are too tight and you experience numbness/tingling, call the wound center. If after hours, remove wraps or proceed to nearest E.R. and call wound center in AM.     Wrap will be changed 1 x weekly     Avoid prolonged standing in one place.     Elevate leg(s) above the level of the heart when sitting or as much as possible.      This was applied today.      Standing Status:   Future     Standing Expiration Date:   8/17/2024   • Wound miscellaneous orders     Limit sodium intake. Standing Status:   Future     Standing Expiration Date:   9/73/5877   • Cast Application     This order was created via procedure documentation             Jacek Clark DPM     Portions of the record may have been created with voice recognition software. Occasional wrong word or "sound alike" substitutions may have occurred due to the inherent limitations of voice recognition software. Read the chart carefully and recognize, using context, where substitutions have occurred.

## 2023-08-21 ENCOUNTER — APPOINTMENT (OUTPATIENT)
Dept: LAB | Facility: CLINIC | Age: 83
End: 2023-08-21
Payer: MEDICARE

## 2023-08-21 ENCOUNTER — ANTICOAG VISIT (OUTPATIENT)
Dept: CARDIOLOGY CLINIC | Facility: CLINIC | Age: 83
End: 2023-08-21

## 2023-08-21 ENCOUNTER — OFFICE VISIT (OUTPATIENT)
Dept: CARDIOLOGY CLINIC | Facility: CLINIC | Age: 83
End: 2023-08-21
Payer: MEDICARE

## 2023-08-21 VITALS
HEART RATE: 82 BPM | SYSTOLIC BLOOD PRESSURE: 136 MMHG | WEIGHT: 256 LBS | BODY MASS INDEX: 31.83 KG/M2 | DIASTOLIC BLOOD PRESSURE: 86 MMHG | HEIGHT: 75 IN | OXYGEN SATURATION: 99 %

## 2023-08-21 DIAGNOSIS — E85.4 CARDIAC AMYLOIDOSIS (HCC): ICD-10-CM

## 2023-08-21 DIAGNOSIS — I48.91 ATRIAL FIBRILLATION, UNSPECIFIED TYPE (HCC): ICD-10-CM

## 2023-08-21 DIAGNOSIS — N18.31 STAGE 3A CHRONIC KIDNEY DISEASE (HCC): ICD-10-CM

## 2023-08-21 DIAGNOSIS — I50.32 CHRONIC HEART FAILURE WITH PRESERVED EJECTION FRACTION (HCC): Primary | ICD-10-CM

## 2023-08-21 DIAGNOSIS — I48.19 PERSISTENT ATRIAL FIBRILLATION (HCC): Primary | ICD-10-CM

## 2023-08-21 DIAGNOSIS — I43 CARDIAC AMYLOIDOSIS (HCC): ICD-10-CM

## 2023-08-21 PROCEDURE — 99214 OFFICE O/P EST MOD 30 MIN: CPT | Performed by: INTERNAL MEDICINE

## 2023-08-21 RX ORDER — POTASSIUM CHLORIDE 20 MEQ/1
40 TABLET, EXTENDED RELEASE ORAL DAILY
Qty: 60 TABLET | Refills: 3
Start: 2023-08-21 | End: 2023-08-22 | Stop reason: SDUPTHER

## 2023-08-21 NOTE — PROGRESS NOTES
Advanced Heart Failure Outpatient Progress Note - Jg Mayes 80 y.o. male MRN: [id number]    Encounter: [id number]      Assessment/Plan:    Patient Active Problem List    Diagnosis Date Noted   • Frailty syndrome in geriatric patient 05/12/2023   • Lower extremity cellulitis 04/24/2023   • Stage 3a chronic kidney disease (CKD) ([address]) 04/24/2023   • Hyperbilirubinemia 04/24/2023   • 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 ([address]) 02/14/2022   • Persistent atrial fibrillation ([address]) 05/14/2020   • Morbid obesity ([address]) 03/07/2018   • Lumbar stenosis 03/07/2018   • Benign neoplasm of colon 02/07/2018   • 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   • 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   • Internal hemorrhoids 02/07/2018   • Pure hypercholesterolemia 02/07/2018   • Sacroiliitis ([address]) 02/01/2018   • Bilateral hip pain 12/04/2017   • Adenomatous colon polyp 12/29/2015   • Spinal stenosis of lumbar region 09/10/2015   • DDD (degenerative disc disease), lumbar 07/07/2015   • Lumbar disc herniation 06/22/2015   • Radiculopathy, lumbar region 06/22/2015   • MTHFR mutation 03/11/2015   • Elevated hemoglobin A1c 06/05/2014   • Premature atrial complexes 05/12/2014   • Aortic ectasia ([address]) 03/06/2014   • Obstructive sleep apnea 11/25/2013   • Premature ventricular contraction 49/19/1051   • Lichen planus 94/28/8308   • Hypertension 04/04/2012   • 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   • Vitamin D deficiency 04/04/2012     # TTR amyloidosis; NYHA II; ACC/AHA Stage B/C  Associated symptoms: lumbar spinal stenosis, CTS, peripheral neuropathy. EKG to mass mismatch: yes.   Tc-PYP scan 04/29/2023: H/CL ratio 1.77. Grade 3 uptake. "Strongly suggestive of TTR amyloidosis."  Genetic testing: ordered. SPEP 04/2023: insufficient sample. Free light chains 04/2023: kappa/lambda ratio 1.49.     Amyloid-specific Therapies:   --Tafamidis 61 mg daily.                 # Chronic HFpEF; LVEF 65%; LVIDd 3.4 cm; NYHA II; ACC/AHA Stage C  Etiology: TTR amyloidosis.   Diuretic: Lasix 40 mg daily with potassium 20 mEq BID - currently taking 2-3 times a week  Volume up on exam, deconditioning also contributing to dyspnea    TTE 07/22/2020: LVEF 60-70%. LVIDd 4 cm. IVSd 1.45 cm. Moderate concentric hypertrophy. Normal RV. HESHAM. Trace MR. Mild TR. TTE 04/25/2023: LVEF 65%. LVIDd 3.4 cm. IVSd 1.6 cm. Normal RV size with low normal RVSF. HESHAM (R>L). Small PFO. Mild MR and TR. Trace AI. Normal IVC.    # Atrial fibrillation  [id number] = 6 (age, HF, HTN, DVT/TIA). Anticoagulation on Coumadin. Rate control: metoprolol succinate 25mg daily  Rhythm control: None.     # Hypertension    # Chronic kidney disease, stage IIIa  Baseline creatinine of 1.3-1.5. 1.31 8/3/23     # Hyperlipidemia  # Obstructive sleep apnea: compliant with CPAP.   # Spinal stenosis, lumbar - s/p surgery in 2018  # Carpal tunnel syndrome  # MTHFR mutation  # History of DVT  # History of TIA  # History of tobacco abuse: quit in 1982.     TODAY'S PLAN:  Take lasix as prescribed at 40mg daily and potassium 40meq daily  Obtain BMP in 1 week  2g sodium diet   2L fluid restriction  Daily weights  Physical activities/walking as tolerated      HPI:   Kinsey France is an 80year-old man with a PMH as above who presents to the office for follow-up. Follows with Dr. TENA Marte.      05/08/2023 with TH: "80year old with PMH as above recently admitted with volume overload and celllulitis. Echocardiogram  concerning for cardiac amyloidosis. Underwent workup. SPEP, UPEP showed no monoclonal banding and serum free light chains showing mild elevation in Ig Kappa with normal Kappa Lambda fluid ratio. PYP scan highly suggestive of aTTR amyloid. He presents today for hospital follow up. Feeling deconditioned and worn out. Questioning why he is not to start his diuretic until 5/13.  Having a lot of neuropathy in the lower extremities.      Will have patient begin taking Lasix 40 mg once daily today with Kdur 20 meq BID. Will check labs before the end of the week. Start application process for Tafamidis. Genetic testing to determine wild type vs familial."     05/23/2023: Patient presents with his wife for follow-up. Down 3 lbs since last visit. Reports great improvement in LE swelling since starting Lasix. Due to his active lifestyle and significant urinary response to Lasix, has only been taking Lasix and potassium 4-5 days a week. Continues with daily weights. Provided patient with "Living With Heart Failure" booklet and "Don't Pass the Salt" cookbook.     6/7/23: presents today for follow up. Started tafamidis on Thursday. Having some fatigue, which he thinks may be related to deconditioning. Improving. Some MANLEY, improving. No SOB at rest. No PND, orthopnea. Swelling has improved. Did have a blister on his right leg, dressed by wound care and has an appointment with them next week. Takes lasix about 3 days a week on days when he is home. Eating minimal salt, drinking under 2L. Urine output has been consistent. Weights stable at home. Interval History:  8/21/23: Presents for follow up. Having shortness of breath on exertion. Overall leg swelling stable. Denies abdominal distension. Has only been taking lasix and potassium 2-3 times a depending on his schedule for the day. Mostly sedentary per wife. Does not weight himself daily, last 2 weights 247 and 250 lbs on home scale. Wife reports occasional dizziness. Last dose of lasix yesterday. Review of Systems   Constitutional: Negative for chills and fever. HENT: Negative for ear pain and sore throat. Eyes: Negative for pain and visual disturbance. Respiratory: Positive for shortness of breath. Negative for cough. Cardiovascular: Positive for leg swelling. Negative for chest pain and palpitations.    Gastrointestinal: Negative for abdominal distention, abdominal pain and vomiting. Genitourinary: Negative for dysuria and hematuria. Musculoskeletal: Negative for arthralgias and back pain. Skin: Negative for color change and rash. Neurological: Negative for dizziness, seizures, syncope and light-headedness. All other systems reviewed and are negative. 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 Current Outpatient Medications:   •  Cholecalciferol (VITAMIN D-3 PO), Take 4,000 Units by mouth daily. , Disp: , Rfl:   •  colchicine (COLCRYS) 0.6 mg tablet, Take 1 tablet (0.6 mg total) by mouth daily Do not start before May 4, 2023.  (Patient taking differently: Take 0.6 mg by mouth daily Taking as needed.), Disp: 30 tablet, Rfl: 0  •  folic acid (FOLVITE) 1 mg tablet, Take 2 mg by mouth daily  , Disp: , Rfl:   •  furosemide (LASIX) 40 mg tablet, Take 1 tablet (40 mg total) by mouth daily (Patient taking differently: Take 40 mg by mouth daily Taking 4-5 days weekly), Disp: 30 tablet, Rfl: 3  •  metoprolol succinate (TOPROL-XL) 25 mg 24 hr tablet, TAKE 1 TABLET(25 MG) BY MOUTH DAILY, Disp: 90 tablet, Rfl: 3  •  multivitamin-minerals (CENTRUM) tablet, Take 1 tablet by mouth daily, Disp: , Rfl:   •  potassium chloride (K-DUR,KLOR-CON) 20 mEq tablet, Take 2 tablets (40 mEq total) by mouth daily, Disp: 60 tablet, Rfl: 3  •  Tafamidis 61 MG CAPS, Take 61 mg by mouth in the morning, Disp: 30 capsule, Rfl: 11  •  warfarin (COUMADIN) 5 mg tablet, TAKE 1 TO 1 AND 1/2 TABLETS BY MOUTH DAILY AS DIRECTED BY PRESCRIBER, Disp: 135 tablet, Rfl: 3    Social History     Socioeconomic History   • Marital status: /Civil Union     Spouse name: Not on file   • Number of children: Not on file   • Years of education: Not on file   • Highest education level: Not on file   Occupational History   • Not on file   Tobacco Use   • Smoking status: Former     Packs/day: 1.00     Years: 26.00     Total pack years: 26.00     Types: Cigarettes     Quit date: 18     Years since quittin.6   • Smokeless tobacco: Never   Vaping Use   • Vaping Use: Never used   Substance and Sexual Activity   • Alcohol use: Yes     Comment: 4 drinks a week   • Drug use: No   • Sexual activity: Not on file   Other Topics Concern   • Not on file   Social History Narrative   • Not on file     Social Determinants of Health     Financial Resource Strain: Not on file   Food Insecurity: No Food Insecurity (2023)    Hunger Vital Sign    • Worried About Running Out of Food in the Last Year: Never true    • Ran Out of Food in the Last Year: Never true   Transportation Needs: No Transportation Needs (2023)    PRAPARE - Transportation    • Lack of Transportation (Medical): No    • Lack of Transportation (Non-Medical):  No   Physical Activity: Not on file   Stress: Not on file   Social Connections: Not on file   Intimate Partner Violence: Not on file   Housing Stability: Low Risk  (2023)    Housing Stability Vital Sign    • Unable to Pay for Housing in the Last Year: No    • Number of Places Lived in the Last Year: 1    • Unstable Housing in the Last Year: No       Family History   Problem Relation Age of Onset   • Heart attack Father    • Heart disease Other    • Cancer Other        Physical Exam:    Vitals:   Vitals:    23 1411   BP: 136/86   Pulse: 82   SpO2: 99%       Physical Exam  Constitutional:       General: He is not in acute distress. Appearance: Normal appearance. HENT:      Head: Normocephalic and atraumatic. Mouth/Throat:      Mouth: Mucous membranes are moist.   Eyes:      General: No scleral icterus. Extraocular Movements: Extraocular movements intact. Cardiovascular:      Rate and Rhythm: Normal rate. Rhythm irregularly irregular. Pulses: Normal pulses. Heart sounds: S1 normal and S2 normal. No murmur heard. No friction rub. No gallop. Comments: Elevated JVP. Trace to 1+ pitting edema  Pulmonary:      Breath sounds: Normal breath sounds. Abdominal:      General: There is no distension. Palpations: Abdomen is soft. Tenderness: There is no abdominal tenderness. There is no guarding or rebound. Musculoskeletal:         General: Normal range of motion. Cervical back: Neck supple. Skin:     General: Skin is warm and dry. Capillary Refill: Capillary refill takes less than 2 seconds. Neurological:      General: No focal deficit present. Mental Status: He is alert and oriented to person, place, and time.    Psychiatric:         Mood and Affect: Mood normal.         Labs & Results:    Lab Results   Component Value Date    SODIUM 142 08/03/2023    K 3.8 08/03/2023     08/03/2023    CO2 29 08/03/2023    BUN 23 08/03/2023    CREATININE 1.31 (H) 08/03/2023    GLUC 105 05/03/2023    CALCIUM 9.3 08/03/2023     Lab Results   Component Value Date    WBC 8.27 05/03/2023    HGB 17.8 (H) 05/03/2023    HCT 52.8 (H) 05/03/2023    MCV 96 05/03/2023     05/03/2023     Lab Results   Component Value Date    NTBNP 1,904 (H) 05/16/2023      Lab Results   Component Value Date    CHOLESTEROL 183 05/16/2023    CHOLESTEROL 194 12/01/2022    CHOLESTEROL 186 05/23/2022     Lab Results   Component Value Date    HDL 69 05/16/2023    HDL 74 12/01/2022    HDL 70 05/23/2022     Lab Results   Component Value Date    TRIG 81 05/16/2023 TRIG 132 12/01/2022    TRIG 86 05/23/2022     Lab Results   Component Value Date    NONHDLC 120 12/01/2022    [address] 116 05/23/2022    [address] 103 01/22/2020       EKG personally reviewed by Uma Hughes MD.     Counseling / Coordination of Care  Time spent today 25 minutes. Greater than 50% of total time was spent with the patient and / or family counseling and / or coordination of care. We went over current diagnosis, most recent studies and any changes in treatment. Thank you for the opportunity to participate in the care of this patient.     Uma Hughes MD  ADVANCED HEART FAILURE [facility] MECHANICAL CIRCULATORY SUPPORT  [address]

## 2023-08-21 NOTE — PATIENT INSTRUCTIONS
Take lasix as prescribed at 40mg daily and potassium 40meq daily  Obtain BMP in 1 week  2g sodium diet   2L fluid restriction  Daily weights  Physical activities/walking as tolerated

## 2023-08-22 DIAGNOSIS — I50.32 CHRONIC HEART FAILURE WITH PRESERVED EJECTION FRACTION (HCC): ICD-10-CM

## 2023-08-23 RX ORDER — POTASSIUM CHLORIDE 20 MEQ/1
40 TABLET, EXTENDED RELEASE ORAL DAILY
Qty: 60 TABLET | Refills: 3 | Status: SHIPPED | OUTPATIENT
Start: 2023-08-23

## 2023-08-24 ENCOUNTER — OFFICE VISIT (OUTPATIENT)
Dept: WOUND CARE | Facility: HOSPITAL | Age: 83
End: 2023-08-24
Payer: MEDICARE

## 2023-08-24 VITALS
TEMPERATURE: 96.3 F | DIASTOLIC BLOOD PRESSURE: 86 MMHG | RESPIRATION RATE: 14 BRPM | SYSTOLIC BLOOD PRESSURE: 143 MMHG | HEART RATE: 83 BPM

## 2023-08-24 DIAGNOSIS — I87.312 CHRONIC VENOUS HYPERTENSION (IDIOPATHIC) WITH ULCER OF LEFT LOWER EXTREMITY (CODE) (HCC): ICD-10-CM

## 2023-08-24 DIAGNOSIS — L97.919 CHRONIC VENOUS HYPERTENSION (IDIOPATHIC) WITH ULCER OF RIGHT LOWER EXTREMITY (HCC): Primary | ICD-10-CM

## 2023-08-24 DIAGNOSIS — I87.311 CHRONIC VENOUS HYPERTENSION (IDIOPATHIC) WITH ULCER OF RIGHT LOWER EXTREMITY (HCC): Primary | ICD-10-CM

## 2023-08-24 PROCEDURE — 99213 OFFICE O/P EST LOW 20 MIN: CPT | Performed by: PODIATRIST

## 2023-08-24 PROCEDURE — 29581 APPL MULTLAYER CMPRN SYS LEG: CPT

## 2023-08-24 RX ORDER — LIDOCAINE 40 MG/G
CREAM TOPICAL ONCE
Status: COMPLETED | OUTPATIENT
Start: 2023-08-24 | End: 2023-08-24

## 2023-08-24 RX ADMIN — LIDOCAINE 1 APPLICATION: 40 CREAM TOPICAL at 10:14

## 2023-08-24 NOTE — PATIENT INSTRUCTIONS
Orders Placed This Encounter   Procedures    Wound compression and edema control     CoFlex Lite Multi-layer compression wrap Instructions for B/L LE     Keep compression wrap/wraps clean and dry. If wraps are too tight and you experience numbness/tingling, call the wound center. If after hours, remove wraps or proceed to nearest E.R. and call wound center in AM.     Evelena Pih will be changed 1 x weekly     Avoid prolonged standing in one place. Elevate leg(s) above the level of the heart when sitting or as much as possible. Standing Status:   Future     Standing Expiration Date:   8/24/2024    Wound cleansing and dressings     BILATERAL LOWER LEG WOUNDS     Change dressing weekly at the wound center  The dressing must stay dry and intact. You may shower with dressing protected by CAST COVER. Or you may sponge bathe. Call wound center or home health nurse if dressing becomes wet. At wound center today  Cleansed with mild soap and water, patted dry. Applied moisturizer to intact skin  Applied Adaptic to wound  Covered with gauze     Applied Coflex Lite on both lower legs.      Standing Status:   Future     Standing Expiration Date:   8/24/2024

## 2023-08-24 NOTE — PROGRESS NOTES
Patient ID: Shahida Loyd is a 80 y.o. male Date of Birth 1940       Chief Complaint   Patient presents with   • Follow Up Wound Care Visit     BLE wound       Allergies:  Sildenafil    Diagnosis:      Diagnosis ICD-10-CM Associated Orders   1. Chronic venous hypertension (idiopathic) with ulcer of right lower extremity (HCC)  I87.311 lidocaine (LMX) 4 % cream    L97.919 Wound compression and edema control     Wound cleansing and dressings     Cast Application      2. Chronic venous hypertension (idiopathic) with ulcer of left lower extremity (CODE) (HCC)  I87.312 lidocaine (LMX) 4 % cream     Wound compression and edema control     Wound cleansing and dressings     Cast Application           Plan:  1. Reviewed medical records. Reviewed previous wound care notes and photo. Patient was counseled and educated on the condition and the diagnosis. 2. The diagnosis, treatment options and prognosis were discussed with the patient. 3. Continue to treat with local care and compression using Coflex lite. 4. Educated management of LE edema with compression, elevation, and diet. 5. Patient will return in 1 week for re-evaluation. Imaging: I have personally reviewed pertinent films in PACS  Labs, pathology, and Other Studies: I have personally reviewed pertinent reports. I have spent a total time of 20 minutes on 08/24/23 in caring for this patient including Prognosis, Risks and benefits of tx options, Patient and family education, Importance of tx compliance and Risk factor reductions. Subjective:     HPI  The patient presents to our center for evaluation and treatment of VLU. He feels well with no pain. Tolerated compression well. No new complaint.       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)   • Diverticular disease of colon   • Elevated hemoglobin A1c   • Hypertension   • Internal hemorrhoids   • Lichen planus   • Lumbar disc herniation   • MTHFR mutation   • Obstructive sleep apnea   • Organic impotence   • Premature atrial complexes   • Premature ventricular contraction   • Pure hypercholesterolemia   • Radiculopathy, lumbar region   • Spinal stenosis of lumbar region   • Vitamin D deficiency   • Persistent atrial fibrillation (HCC)   • 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 (HCC)   • Morbid obesity (HCC)   • Lumbar stenosis   • Lower extremity cellulitis   • Stage 3a chronic kidney disease (CKD) (HCC)   • Hyperbilirubinemia   • Frailty syndrome in geriatric patient     Past Medical History:   Diagnosis Date   • Abnormal glucose level 02/07/2018   • Acute back pain 10/02/2017   • Atrial fibrillation (HCC)    • Blood clot in vein 2015    in right foot   • 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   • Carpal tunnel syndrome    • Chronic kidney disease (CKD)    • Fever 09/08/2017   • Frailty syndrome in geriatric patient 05/12/2023   • FUO (fever of unknown origin) 09/08/2017   • History of DVT (deep vein thrombosis)    • History of tobacco abuse     quit in 1982   • History of transient cerebral ischemia    • Hypertension      Past Surgical History:   Procedure Laterality Date   • BACK SURGERY  2018   • COLONOSCOPY     • COLONOSCOPY N/A 02/24/2016    Procedure: COLONOSCOPY;  Surgeon: Iesha Hunter MD;  Location: [facility];  Service:    • HERNIA REPAIR     • NEUROPLASTY / TRANSPOSITION MEDIAN NERVE AT CARPAL TUNNEL       Family History   Problem Relation Age of Onset   • Heart attack Father    • Heart disease Other    • Cancer Other       Social History     Socioeconomic History   • Marital status: /Civil Union     Spouse name: None   • Number of children: None   • Years of education: None   • Highest education level: None   Occupational History   • None   Tobacco Use   • Smoking status: Former     Packs/day: 1.00     Years: 26.00     Total pack years: 26.00     Types: Cigarettes     Quit date: 18     Years since quittin.6   • Smokeless tobacco: Never   Vaping Use   • Vaping Use: Never used   Substance and Sexual Activity   • Alcohol use: Yes     Comment: 4 drinks a week   • Drug use: No   • Sexual activity: None   Other Topics Concern   • None   Social History Narrative   • None     Social Determinants of Health     Financial Resource Strain: Not on file   Food Insecurity: No Food Insecurity (2023)    Hunger Vital Sign    • Worried About Running Out of Food in the Last Year: Never true    • Ran Out of Food in the Last Year: Never true   Transportation Needs: No Transportation Needs (2023)    PRAPARE - Transportation    • Lack of Transportation (Medical): No    • Lack of Transportation (Non-Medical): No   Physical Activity: Not on file   Stress: Not on file   Social Connections: Not on file   Intimate Partner Violence: Not on file   Housing Stability: Low Risk  (2023)    Housing Stability Vital Sign    • Unable to Pay for Housing in the Last Year: No    • Number of Places Lived in the Last Year: 1    • Unstable Housing in the Last Year: No        Current Outpatient Medications:   •  Cholecalciferol (VITAMIN D-3 PO), Take 4,000 Units by mouth daily. , Disp: , Rfl:   •  colchicine (COLCRYS) 0.6 mg tablet, Take 1 tablet (0.6 mg total) by mouth daily Do not start before May 4, 2023.  (Patient taking differently: Take 0.6 mg by mouth daily Taking as needed.), Disp: 30 tablet, Rfl: 0  •  folic acid (FOLVITE) 1 mg tablet, Take 2 mg by mouth daily  , Disp: , Rfl:   •  furosemide (LASIX) 40 mg tablet, Take 1 tablet (40 mg total) by mouth daily (Patient taking differently: Take 40 mg by mouth daily Taking 4-5 days weekly), Disp: 30 tablet, Rfl: 3  •  metoprolol succinate (TOPROL-XL) 25 mg 24 hr tablet, TAKE 1 TABLET(25 MG) BY MOUTH DAILY, Disp: 90 tablet, Rfl: 3  •  multivitamin-minerals (CENTRUM) tablet, Take 1 tablet by mouth daily, Disp: , Rfl:   •  potassium chloride (K-DUR,KLOR-CON) 20 mEq tablet, Take 2 tablets (40 mEq total) by mouth daily, Disp: 60 tablet, Rfl: 3  •  Tafamidis 61 MG CAPS, Take 61 mg by mouth in the morning, Disp: 30 capsule, Rfl: 11  •  warfarin (COUMADIN) 5 mg tablet, TAKE 1 TO 1 AND 1/2 TABLETS BY MOUTH DAILY AS DIRECTED BY PRESCRIBER, Disp: 135 tablet, Rfl: 3  No current facility-administered medications for this visit. Review of Systems   Constitutional: Negative for chills and fever. Respiratory: Negative for cough and shortness of breath. Cardiovascular: Positive for leg swelling. Negative for chest pain. Gastrointestinal: Negative for nausea and vomiting. Musculoskeletal: Negative for gait problem. Objective:  /86   Pulse 83   Temp (!) 96.3 °F (35.7 °C)   Resp 14   Pain Score: 0-No pain     Physical Exam  Vitals reviewed. Constitutional:       General: He is not in acute distress. Appearance: He is not toxic-appearing or diaphoretic. Cardiovascular:      Rate and Rhythm: Normal rate and regular rhythm. Pulses:           Dorsalis pedis pulses are 0 on the right side and 0 on the left side. Posterior tibial pulses are 1+ on the right side and 1+ on the left side. Pulmonary:      Effort: Pulmonary effort is normal. No respiratory distress. Musculoskeletal:         General: No tenderness or signs of injury. Right lower leg: Edema present. Left lower leg: Edema present. Right foot: No foot drop. Left foot: No foot drop. Skin:     General: Skin is warm. Capillary Refill: Capillary refill takes less than 2 seconds. Coloration: Skin is not cyanotic or mottled. Findings: No abscess. Nails: There is no clubbing.       Comments: Wounds are all dried up with eschar BLE. No infection or purulence. See wound assessment and photo. Neurological:      General: No focal deficit present. Mental Status: He is alert and oriented to person, place, and time. Cranial Nerves: No cranial nerve deficit. Sensory: No sensory deficit. Motor: No weakness. Coordination: Coordination normal.   Psychiatric:         Mood and Affect: Mood normal.         Behavior: Behavior normal.         Thought Content: Thought content normal.         Judgment: Judgment normal.         Wound 08/17/23 Venous Ulcer Leg Left; Lower (Active)   Wound Image Images linked 08/24/23 1011   Wound Description Dry;Epithelialization;Eschar;Brown 08/24/23 1009   Lory-wound Assessment Intact 08/24/23 1009   Wound Length (cm) 1.5 cm 08/24/23 1009   Wound Width (cm) 0.5 cm 08/24/23 1009   Wound Depth (cm) 0.1 cm 08/24/23 1009   Wound Surface Area (cm^2) 0.75 cm^2 08/24/23 1009   Wound Volume (cm^3) 0.075 cm^3 08/24/23 1009   Calculated Wound Volume (cm^3) 0.08 cm^3 08/24/23 1009   Change in Wound Size % 80 08/24/23 1009   Drainage Amount Scant 08/24/23 1009   Drainage Description Serosanguineous 08/24/23 1009   Non-staged Wound Description Full thickness 08/24/23 1009   Patient Tolerance Tolerated well 08/24/23 1009   Dressing Status Intact 08/24/23 1009       Wound 08/17/23 Venous Ulcer Leg Posterior;Right;Medial;Lower (Active)   Wound Image Images linked 08/24/23 1011   Wound Description Dry;Epithelialization;Eschar;Brown 08/24/23 1010   Lory-wound Assessment Dry 08/24/23 1010   Wound Length (cm) 1.5 cm 08/24/23 1010   Wound Width (cm) 4 cm 08/24/23 1010   Wound Depth (cm) 0.1 cm 08/24/23 1010   Wound Surface Area (cm^2) 6 cm^2 08/24/23 1010   Wound Volume (cm^3) 0.6 cm^3 08/24/23 1010   Calculated Wound Volume (cm^3) 0.6 cm^3 08/24/23 1010   Change in Wound Size % 44.44 08/24/23 1010   Drainage Amount Small 08/24/23 1010   Drainage Description Serosanguineous 08/24/23 1010   Non-staged Wound Description Full thickness 08/24/23 1010   Patient Tolerance Tolerated well 08/24/23 1010   Dressing Status Intact 08/24/23 1010       Procedures           Lab Results   Component Value Date    HGBA1C 5.7 (H) 05/16/2023       Wound Instructions:  Orders Placed This Encounter   Procedures   • Wound compression and edema control     CoFlex Lite Multi-layer compression wrap Instructions for B/L LE     Keep compression wrap/wraps clean and dry. If wraps are too tight and you experience numbness/tingling, call the wound center. If after hours, remove wraps or proceed to nearest E.R. and call wound center in AM.     Wrap will be changed 1 x weekly     Avoid prolonged standing in one place.     Elevate leg(s) above the level of the heart when sitting or as much as possible. Standing Status:   Future     Standing Expiration Date:   8/24/2024   • Wound cleansing and dressings     BILATERAL LOWER LEG WOUNDS     Change dressing weekly at the wound center  The dressing must stay dry and intact. You may shower with dressing protected by CAST COVER. Or you may sponge bathe. Call wound center or home health nurse if dressing becomes wet. At wound center today  Cleansed with mild soap and water, patted dry. Applied moisturizer to intact skin  Applied Adaptic to wound  Covered with gauze     Applied Coflex Lite on both lower legs.      Standing Status:   Future     Standing Expiration Date:   4/33/4083   • Cast Application     This order was created via procedure documentation             Cristy Cervantes DPM

## 2023-08-24 NOTE — PROGRESS NOTES
Cast Application    Date/Time: 8/24/2023 10:00 AM    Performed by: Swathi Huerta RN  Authorized by: Cassandra Alonso DPM  Universal Protocol:  Consent: Verbal consent obtained. Consent given by: patient  Patient understanding: patient states understanding of the procedure being performed  Patient identity confirmed: verbally with patient      Pre-procedure details:     Sensation:  Normal  Procedure details:     Laterality:  Bilateral    Location:  Leg    Leg:  L lower leg and R lower legCast type:  Multi-layer compression short leg      Supplies:  2 layer wrap  Post-procedure details:     Pain:  Improved    Sensation:  Normal    Patient tolerance of procedure: Tolerated well, no immediate complications  Comments:      Multilayer wrap procedure     Before application, SIMI and/or TBI determined to be adequate for healing and application of compression. Lower extremity washed prior to application of compression wrap. With the foot in dorsiflexed position, Coflex lite was applied as per physician orders without complications or complaint of pain. The procedure was tolerated well. Toes warm & pink post application. Patient provided education & reinforced to observe toes for any discoloration, swelling or tingling and instructed when to report to the  [address] or to remove compression. Wound care as per providers orders, patient tolerated well.

## 2023-08-31 ENCOUNTER — OFFICE VISIT (OUTPATIENT)
Dept: WOUND CARE | Facility: HOSPITAL | Age: 83
End: 2023-08-31
Payer: MEDICARE

## 2023-08-31 VITALS
HEART RATE: 72 BPM | TEMPERATURE: 96.1 F | RESPIRATION RATE: 12 BRPM | DIASTOLIC BLOOD PRESSURE: 83 MMHG | SYSTOLIC BLOOD PRESSURE: 135 MMHG

## 2023-08-31 DIAGNOSIS — L97.919 CHRONIC VENOUS HYPERTENSION (IDIOPATHIC) WITH ULCER OF RIGHT LOWER EXTREMITY (HCC): Primary | ICD-10-CM

## 2023-08-31 DIAGNOSIS — I87.311 CHRONIC VENOUS HYPERTENSION (IDIOPATHIC) WITH ULCER OF RIGHT LOWER EXTREMITY (HCC): Primary | ICD-10-CM

## 2023-08-31 DIAGNOSIS — I87.312 CHRONIC VENOUS HYPERTENSION (IDIOPATHIC) WITH ULCER OF LEFT LOWER EXTREMITY (CODE) (HCC): ICD-10-CM

## 2023-08-31 PROCEDURE — 99212 OFFICE O/P EST SF 10 MIN: CPT | Performed by: PODIATRIST

## 2023-08-31 NOTE — PROGRESS NOTES
Patient ID: Jg Mayes is a 80 y.o. male Date of Birth 1940       Chief Complaint   Patient presents with   • Follow Up Wound Care Visit     BLE wound       Allergies:  Sildenafil    Diagnosis:      Diagnosis ICD-10-CM Associated Orders   1. Chronic venous hypertension (idiopathic) with ulcer of right lower extremity (HCC)  I87.311 Wound cleansing and dressings    L97.919 Wound compression and edema control      2. Chronic venous hypertension (idiopathic) with ulcer of left lower extremity (CODE) (HCC)  I87.312 Wound cleansing and dressings     Wound compression and edema control           Plan:  1. Patient was counseled and educated on the condition and the diagnosis. 2.  Wounds healed. Instructed skin care and protection. Educated management of LE edema with compression, elevation, and diet. Recommended compression stockings. 3. D/C him from [facility]. Imaging: I have personally reviewed pertinent films in PACS  Labs, pathology, and Other Studies: I have personally reviewed pertinent reports. Subjective:     HPI  The patient presents to our center for evaluation and treatment of VLU. He feels well with no pain. Tolerated compression well.       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)   • Diverticular disease of colon   • Elevated hemoglobin A1c   • Hypertension   • Internal hemorrhoids   • Lichen planus   • Lumbar disc herniation   • MTHFR mutation   • Obstructive sleep apnea   • Organic impotence   • Premature atrial complexes   • Premature ventricular contraction   • Pure hypercholesterolemia   • Radiculopathy, lumbar region   • Spinal stenosis of lumbar region   • Vitamin D deficiency   • Persistent atrial fibrillation (HCC)   • 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 (HCC)   • Morbid obesity (HCC)   • Lumbar stenosis   • Lower extremity cellulitis   • Stage 3a chronic kidney disease (CKD) (HCC)   • Hyperbilirubinemia   • Frailty syndrome in geriatric patient     Past Medical History:   Diagnosis Date   • Abnormal glucose level 2018   • Acute back pain 10/02/2017   • Atrial fibrillation (HCC)    • Blood clot in vein 2015    in right foot   • 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[address]) 2023   • Carpal tunnel syndrome    • Chronic kidney disease (CKD)    • Fever 2017   • Frailty syndrome in geriatric patient 2023   • FUO (fever of unknown origin) 2017   • History of DVT (deep vein thrombosis)    • History of tobacco abuse     quit in    • History of transient cerebral ischemia    • Hypertension      Past Surgical History:   Procedure Laterality Date   • BACK SURGERY     • COLONOSCOPY     • COLONOSCOPY N/A 2016    Procedure: COLONOSCOPY;  Surgeon: Allyson Munoz MD;  Location: [facility] OR;  Service:    • HERNIA REPAIR     • NEUROPLASTY / TRANSPOSITION MEDIAN NERVE AT CARPAL TUNNEL       Family History   Problem Relation Age of Onset   • Heart attack Father    • Heart disease Other    • Cancer Other       Social History     Socioeconomic History   • Marital status: /Civil Union     Spouse name: Not on file   • Number of children: Not on file   • Years of education: Not on file   • Highest education level: Not on file   Occupational History   • Not on file   Tobacco Use   • Smoking status: Former     Packs/day: 1.00     Years: 26.00     Total pack years: 26.00     Types: Cigarettes     Quit date: 18     Years since quittin.6   • Smokeless tobacco: Never   Vaping Use   • Vaping Use: Never used   Substance and Sexual Activity   • Alcohol use: Yes     Comment: 4 drinks a week   • Drug use: No   • Sexual activity: Not on file   Other Topics Concern   • Not on file   Social History Narrative   • Not on file     Social Determinants of Health     Financial Resource Strain: Not on file   Food Insecurity: No Food Insecurity (4/26/2023)    Hunger Vital Sign    • Worried About Running Out of Food in the Last Year: Never true    • Ran Out of Food in the Last Year: Never true   Transportation Needs: No Transportation Needs (4/26/2023)    PRAPARE - Transportation    • Lack of Transportation (Medical): No    • Lack of Transportation (Non-Medical): No   Physical Activity: Not on file   Stress: Not on file   Social Connections: Not on file   Intimate Partner Violence: Not on file   Housing Stability: Low Risk  (4/26/2023)    Housing Stability Vital Sign    • Unable to Pay for Housing in the Last Year: No    • Number of Places Lived in the Last Year: 1    • Unstable Housing in the Last Year: No        Current Outpatient Medications:   •  Cholecalciferol (VITAMIN D-3 PO), Take 4,000 Units by mouth daily. , Disp: , Rfl:   •  colchicine (COLCRYS) 0.6 mg tablet, Take 1 tablet (0.6 mg total) by mouth daily Do not start before May 4, 2023.  (Patient taking differently: Take 0.6 mg by mouth daily Taking as needed.), Disp: 30 tablet, Rfl: 0  •  folic acid (FOLVITE) 1 mg tablet, Take 2 mg by mouth daily  , Disp: , Rfl:   •  furosemide (LASIX) 40 mg tablet, Take 1 tablet (40 mg total) by mouth daily (Patient taking differently: Take 40 mg by mouth daily Taking 4-5 days weekly), Disp: 30 tablet, Rfl: 3  •  metoprolol succinate (TOPROL-XL) 25 mg 24 hr tablet, TAKE 1 TABLET(25 MG) BY MOUTH DAILY, Disp: 90 tablet, Rfl: 3  •  multivitamin-minerals (CENTRUM) tablet, Take 1 tablet by mouth daily, Disp: , Rfl:   •  potassium chloride (K-DUR,KLOR-CON) 20 mEq tablet, Take 2 tablets (40 mEq total) by mouth daily, Disp: 60 tablet, Rfl: 3  •  Tafamidis 61 MG CAPS, Take 61 mg by mouth in the morning, Disp: 30 capsule, Rfl: 11  •  warfarin (COUMADIN) 5 mg tablet, TAKE 1 TO 1 AND 1/2 TABLETS BY MOUTH DAILY AS DIRECTED BY PRESCRIBER, Disp: 135 tablet, Rfl: 3    Review of Systems   Constitutional: Negative for chills and fever. Respiratory: Negative for cough and shortness of breath. Cardiovascular: Positive for leg swelling. Negative for chest pain. Gastrointestinal: Negative for nausea and vomiting. Musculoskeletal: Negative for gait problem. Objective:  /83   Pulse 72   Temp (!) 96.1 °F (35.6 °C)   Resp 12   Pain Score: 0-No pain     Physical Exam  Vitals reviewed. Constitutional:       General: He is not in acute distress. Appearance: He is not toxic-appearing or diaphoretic. Cardiovascular:      Rate and Rhythm: Normal rate and regular rhythm. Pulses:           Dorsalis pedis pulses are 0 on the right side and 0 on the left side. Posterior tibial pulses are 1+ on the right side and 1+ on the left side. Pulmonary:      Effort: Pulmonary effort is normal. No respiratory distress. Musculoskeletal:         General: No tenderness or signs of injury. Right lower leg: Edema present. Left lower leg: Edema present. Right foot: No foot drop. Left foot: No foot drop. Skin:     General: Skin is warm. Capillary Refill: Capillary refill takes less than 2 seconds. Coloration: Skin is not cyanotic or mottled. Findings: No abscess. Nails: There is no clubbing. Comments: Wounds are healed BLE. No infection. See wound assessment and photo. Neurological:      General: No focal deficit present. Mental Status: He is alert and oriented to person, place, and time. Cranial Nerves: No cranial nerve deficit. Sensory: No sensory deficit. Motor: No weakness. Coordination: Coordination normal.   Psychiatric:         Mood and Affect: Mood normal.         Behavior: Behavior normal.         Thought Content:  Thought content normal.         Judgment: Judgment normal. Procedures           Lab Results   Component Value Date    HGBA1C 5.7 (H) 05/16/2023       Wound Instructions:  Orders Placed This Encounter   Procedures   • Wound cleansing and dressings     Right lower leg and Left lower leg wounds are healed. Apply Allevyn border for a few days for protection, then no needed dressing  Apply moisturizer to intact skin  Apply Spandagrip F compression stocking on both lower legs. You are discharged from wound care center. Cortisone cream applied to skin rash area for one time at wound care center today. Standing Status:   Future     Standing Expiration Date:   8/31/2024   • Wound compression and edema control     Elastic Tubular Stocking Spandagrip F on both lower legs    Tubular elastic bandage: Apply from base of toes to behind the knee. Apply in AM, may remove for sleep. Avoid prolonged standing in one place. Elevate leg(s) above the level of the heart when sitting or as much as possible.      Standing Status:   Future     Standing Expiration Date:   8/31/2024             Mckinley Luna DPM

## 2023-08-31 NOTE — PATIENT INSTRUCTIONS
Orders Placed This Encounter   Procedures    Wound cleansing and dressings     Right lower leg and Left lower leg wounds are healed. Apply Allevyn border for a few days for protection, then no needed dressing  Apply moisturizer to intact skin  Apply Spandagrip F compression stocking on both lower legs. You are discharged from wound care center. Cortisone cream applied to skin rash area for one time at wound care center today. Standing Status:   Future     Standing Expiration Date:   8/31/2024    Wound compression and edema control     Elastic Tubular Stocking Spandagrip F on both lower legs    Tubular elastic bandage: Apply from base of toes to behind the knee. Apply in AM, may remove for sleep. Avoid prolonged standing in one place. Elevate leg(s) above the level of the heart when sitting or as much as possible.      Standing Status:   Future     Standing Expiration Date:   8/31/2024

## 2023-09-08 ENCOUNTER — APPOINTMENT (OUTPATIENT)
Dept: LAB | Facility: CLINIC | Age: 83
End: 2023-09-08
Payer: MEDICARE

## 2023-09-08 DIAGNOSIS — I50.32 CHRONIC HEART FAILURE WITH PRESERVED EJECTION FRACTION (HCC): ICD-10-CM

## 2023-09-08 LAB
ANION GAP SERPL CALCULATED.3IONS-SCNC: 9 MMOL/L
BUN SERPL-MCNC: 21 MG/DL (ref 5–25)
CALCIUM SERPL-MCNC: 9.1 MG/DL (ref 8.4–10.2)
CHLORIDE SERPL-SCNC: 103 MMOL/L (ref 96–108)
CO2 SERPL-SCNC: 28 MMOL/L (ref 21–32)
CREAT SERPL-MCNC: 1.02 MG/DL (ref 0.6–1.3)
GFR SERPL CREATININE-BSD FRML MDRD: 68 ML/MIN/1.73SQ M
GLUCOSE P FAST SERPL-MCNC: 97 MG/DL (ref 65–99)
POTASSIUM SERPL-SCNC: 4.2 MMOL/L (ref 3.5–5.3)
SODIUM SERPL-SCNC: 140 MMOL/L (ref 135–147)

## 2023-09-08 PROCEDURE — 80048 BASIC METABOLIC PNL TOTAL CA: CPT

## 2023-09-11 ENCOUNTER — TELEPHONE (OUTPATIENT)
Dept: CARDIOLOGY CLINIC | Facility: CLINIC | Age: 83
End: 2023-09-11

## 2023-09-11 ENCOUNTER — ANTICOAG VISIT (OUTPATIENT)
Dept: CARDIOLOGY CLINIC | Facility: CLINIC | Age: 83
End: 2023-09-11

## 2023-09-11 DIAGNOSIS — I48.19 PERSISTENT ATRIAL FIBRILLATION (HCC): Primary | ICD-10-CM

## 2023-09-11 NOTE — TELEPHONE ENCOUNTER
Spoke with pt. He is only taking the lasix/ potassium 4 X's a week. He does not take it when he has somewhere to go. Advised he needs to take it daily. It is hard to tell if the tiredness is long covid or CHF. Verbally understood, but I think he will continue doing his thing. He c/o of being tired and run down, sob only when  going up the stairs, and has stomach pain in center of stomach at times, not often. Also has aches in knees and back joints. When he is sitting he feels pretty good. Wt is stable at 215-213 lbs. He has decreased the amount he is eating due to being more sedentary. Advised he needs to get up and walk around and do exercises while sitting in the chair or he will get more deconditioned and have other issues. Should be taking Lasix 40 mg qd/ potassium 40 meq qd    Any changes?     Please advise

## 2023-09-11 NOTE — TELEPHONE ENCOUNTER
MD John Dinh, RN  Please check how he is doing. On last visit, advised to take lasix as prescribed at 40mg daily and potassium 40meq daily instead of sporadically. Kidney function better and potassium normal. Thanks.

## 2023-09-12 ENCOUNTER — APPOINTMENT (EMERGENCY)
Dept: RADIOLOGY | Facility: HOSPITAL | Age: 83
DRG: 292 | End: 2023-09-12
Payer: MEDICARE

## 2023-09-12 ENCOUNTER — HOSPITAL ENCOUNTER (INPATIENT)
Facility: HOSPITAL | Age: 83
LOS: 3 days | Discharge: NURSING, REHAB OR LONG-TERM CARE | DRG: 292 | End: 2023-09-15
Attending: SURGERY | Admitting: INTERNAL MEDICINE
Payer: MEDICARE

## 2023-09-12 DIAGNOSIS — Z86.79 HISTORY OF CONGESTIVE HEART FAILURE: ICD-10-CM

## 2023-09-12 DIAGNOSIS — L97.919 CHRONIC VENOUS HYPERTENSION (IDIOPATHIC) WITH ULCER OF RIGHT LOWER EXTREMITY (HCC): ICD-10-CM

## 2023-09-12 DIAGNOSIS — R29.6 MULTIPLE FALLS: Primary | ICD-10-CM

## 2023-09-12 DIAGNOSIS — N18.9 CKD (CHRONIC KIDNEY DISEASE): ICD-10-CM

## 2023-09-12 DIAGNOSIS — I87.311 CHRONIC VENOUS HYPERTENSION (IDIOPATHIC) WITH ULCER OF RIGHT LOWER EXTREMITY (HCC): ICD-10-CM

## 2023-09-12 DIAGNOSIS — I48.91 ATRIAL FIBRILLATION (HCC): ICD-10-CM

## 2023-09-12 DIAGNOSIS — I50.33 ACUTE ON CHRONIC DIASTOLIC CONGESTIVE HEART FAILURE (HCC): ICD-10-CM

## 2023-09-12 PROBLEM — R79.89 ELEVATED TROPONIN: Status: ACTIVE | Noted: 2023-09-12

## 2023-09-12 PROBLEM — I50.9 CONGESTIVE HEART FAILURE (CHF) (HCC): Status: ACTIVE | Noted: 2023-09-12

## 2023-09-12 PROBLEM — G47.33 OSA (OBSTRUCTIVE SLEEP APNEA): Status: ACTIVE | Noted: 2023-09-12

## 2023-09-12 PROBLEM — T07.XXXA MULTIPLE ABRASIONS: Status: ACTIVE | Noted: 2023-09-12

## 2023-09-12 PROBLEM — R53.1 GENERALIZED WEAKNESS: Status: ACTIVE | Noted: 2023-09-12

## 2023-09-12 PROBLEM — Z86.718 HISTORY OF DVT (DEEP VEIN THROMBOSIS): Status: ACTIVE | Noted: 2023-09-12

## 2023-09-12 PROBLEM — R77.8 ELEVATED TROPONIN: Status: ACTIVE | Noted: 2023-09-12

## 2023-09-12 LAB
2HR DELTA HS TROPONIN: 49 NG/L
4HR DELTA HS TROPONIN: 67 NG/L
ABO GROUP BLD: NORMAL
ABO GROUP BLD: NORMAL
ALBUMIN SERPL BCP-MCNC: 3.5 G/DL (ref 3.5–5)
ALP SERPL-CCNC: 87 U/L (ref 34–104)
ALT SERPL W P-5'-P-CCNC: 21 U/L (ref 7–52)
ANION GAP SERPL CALCULATED.3IONS-SCNC: 16 MMOL/L
APTT PPP: 39 SECONDS (ref 23–37)
AST SERPL W P-5'-P-CCNC: 40 U/L (ref 13–39)
BACTERIA UR QL AUTO: ABNORMAL /HPF
BASE EXCESS BLDA CALC-SCNC: 0 MMOL/L (ref -2–3)
BASOPHILS # BLD AUTO: 0.03 THOUSANDS/ÂΜL (ref 0–0.1)
BASOPHILS NFR BLD AUTO: 1 % (ref 0–1)
BILIRUB SERPL-MCNC: 1.53 MG/DL (ref 0.2–1)
BILIRUB UR QL STRIP: NEGATIVE
BLD GP AB SCN SERPL QL: NEGATIVE
BNP SERPL-MCNC: 217 PG/ML (ref 0–100)
BUN SERPL-MCNC: 26 MG/DL (ref 5–25)
CA-I BLD-SCNC: 0.96 MMOL/L (ref 1.12–1.32)
CALCIUM SERPL-MCNC: 8.1 MG/DL (ref 8.4–10.2)
CARDIAC TROPONIN I PNL SERPL HS: 116 NG/L
CARDIAC TROPONIN I PNL SERPL HS: 165 NG/L
CARDIAC TROPONIN I PNL SERPL HS: 183 NG/L
CHLORIDE SERPL-SCNC: 101 MMOL/L (ref 96–108)
CK SERPL-CCNC: 117 U/L (ref 39–308)
CLARITY UR: CLEAR
CO2 SERPL-SCNC: 16 MMOL/L (ref 21–32)
COLOR UR: ABNORMAL
CREAT SERPL-MCNC: 1.17 MG/DL (ref 0.6–1.3)
EOSINOPHIL # BLD AUTO: 0 THOUSAND/ÂΜL (ref 0–0.61)
EOSINOPHIL NFR BLD AUTO: 0 % (ref 0–6)
ERYTHROCYTE [DISTWIDTH] IN BLOOD BY AUTOMATED COUNT: 13.5 % (ref 11.6–15.1)
ERYTHROCYTE [DISTWIDTH] IN BLOOD BY AUTOMATED COUNT: 13.5 % (ref 11.6–15.1)
GFR SERPL CREATININE-BSD FRML MDRD: 57 ML/MIN/1.73SQ M
GLUCOSE SERPL-MCNC: 126 MG/DL (ref 65–140)
GLUCOSE SERPL-MCNC: 133 MG/DL (ref 65–140)
GLUCOSE UR STRIP-MCNC: NEGATIVE MG/DL
HCO3 BLDA-SCNC: 20.9 MMOL/L (ref 24–30)
HCT VFR BLD AUTO: 48.1 % (ref 36.5–49.3)
HCT VFR BLD AUTO: 48.4 % (ref 36.5–49.3)
HCT VFR BLD CALC: 48 % (ref 36.5–49.3)
HGB BLD-MCNC: 16.5 G/DL (ref 12–17)
HGB BLD-MCNC: 16.9 G/DL (ref 12–17)
HGB BLDA-MCNC: 16.3 G/DL (ref 12–17)
HGB UR QL STRIP.AUTO: ABNORMAL
IMM GRANULOCYTES # BLD AUTO: 0.08 THOUSAND/UL (ref 0–0.2)
IMM GRANULOCYTES NFR BLD AUTO: 2 % (ref 0–2)
INR PPP: 1.66 (ref 0.84–1.19)
INR PPP: 1.68 (ref 0.84–1.19)
KETONES UR STRIP-MCNC: NEGATIVE MG/DL
LACTATE SERPL-SCNC: 1.7 MMOL/L (ref 0.5–2)
LACTATE SERPL-SCNC: 3.1 MMOL/L (ref 0.5–2)
LEUKOCYTE ESTERASE UR QL STRIP: NEGATIVE
LYMPHOCYTES # BLD AUTO: 0.68 THOUSANDS/ÂΜL (ref 0.6–4.47)
LYMPHOCYTES NFR BLD AUTO: 16 % (ref 14–44)
MCH RBC QN AUTO: 31.4 PG (ref 26.8–34.3)
MCH RBC QN AUTO: 32.6 PG (ref 26.8–34.3)
MCHC RBC AUTO-ENTMCNC: 34.1 G/DL (ref 31.4–37.4)
MCHC RBC AUTO-ENTMCNC: 35.1 G/DL (ref 31.4–37.4)
MCV RBC AUTO: 92 FL (ref 82–98)
MCV RBC AUTO: 93 FL (ref 82–98)
MONOCYTES # BLD AUTO: 0.61 THOUSAND/ÂΜL (ref 0.17–1.22)
MONOCYTES NFR BLD AUTO: 14 % (ref 4–12)
MUCOUS THREADS UR QL AUTO: ABNORMAL
NEUTROPHILS # BLD AUTO: 2.98 THOUSANDS/ÂΜL (ref 1.85–7.62)
NEUTS SEG NFR BLD AUTO: 67 % (ref 43–75)
NITRITE UR QL STRIP: NEGATIVE
NON-SQ EPI CELLS URNS QL MICRO: ABNORMAL /HPF
NRBC BLD AUTO-RTO: 0 /100 WBCS
PCO2 BLD: 22 MMOL/L (ref 21–32)
PCO2 BLD: 25.2 MM HG (ref 42–50)
PH BLD: 7.53 [PH] (ref 7.3–7.4)
PH UR STRIP.AUTO: 5.5 [PH]
PLATELET # BLD AUTO: 101 THOUSANDS/UL (ref 149–390)
PLATELET # BLD AUTO: 92 THOUSANDS/UL (ref 149–390)
PMV BLD AUTO: 11.2 FL (ref 8.9–12.7)
PMV BLD AUTO: 11.4 FL (ref 8.9–12.7)
PO2 BLD: 50 MM HG (ref 35–45)
POTASSIUM BLD-SCNC: 3.9 MMOL/L (ref 3.5–5.3)
POTASSIUM SERPL-SCNC: 4 MMOL/L (ref 3.5–5.3)
PROT SERPL-MCNC: 6.7 G/DL (ref 6.4–8.4)
PROT UR STRIP-MCNC: ABNORMAL MG/DL
PROTHROMBIN TIME: 19.8 SECONDS (ref 11.6–14.5)
PROTHROMBIN TIME: 20 SECONDS (ref 11.6–14.5)
RBC # BLD AUTO: 5.18 MILLION/UL (ref 3.88–5.62)
RBC # BLD AUTO: 5.25 MILLION/UL (ref 3.88–5.62)
RBC #/AREA URNS AUTO: ABNORMAL /HPF
RH BLD: POSITIVE
RH BLD: POSITIVE
SAO2 % BLD FROM PO2: 90 % (ref 60–85)
SODIUM BLD-SCNC: 136 MMOL/L (ref 136–145)
SODIUM SERPL-SCNC: 133 MMOL/L (ref 135–147)
SP GR UR STRIP.AUTO: 1.03 (ref 1–1.03)
SPECIMEN EXPIRATION DATE: NORMAL
SPECIMEN SOURCE: ABNORMAL
UROBILINOGEN UR STRIP-ACNC: <2 MG/DL
WBC # BLD AUTO: 4.38 THOUSAND/UL (ref 4.31–10.16)
WBC # BLD AUTO: 4.39 THOUSAND/UL (ref 4.31–10.16)
WBC #/AREA URNS AUTO: ABNORMAL /HPF

## 2023-09-12 PROCEDURE — 82803 BLOOD GASES ANY COMBINATION: CPT

## 2023-09-12 PROCEDURE — 85610 PROTHROMBIN TIME: CPT | Performed by: INTERNAL MEDICINE

## 2023-09-12 PROCEDURE — 85025 COMPLETE CBC W/AUTO DIFF WBC: CPT | Performed by: SURGERY

## 2023-09-12 PROCEDURE — 90715 TDAP VACCINE 7 YRS/> IM: CPT | Performed by: NURSE PRACTITIONER

## 2023-09-12 PROCEDURE — 82550 ASSAY OF CK (CPK): CPT | Performed by: SURGERY

## 2023-09-12 PROCEDURE — 36415 COLL VENOUS BLD VENIPUNCTURE: CPT | Performed by: STUDENT IN AN ORGANIZED HEALTH CARE EDUCATION/TRAINING PROGRAM

## 2023-09-12 PROCEDURE — 99223 1ST HOSP IP/OBS HIGH 75: CPT | Performed by: INTERNAL MEDICINE

## 2023-09-12 PROCEDURE — 99285 EMERGENCY DEPT VISIT HI MDM: CPT

## 2023-09-12 PROCEDURE — RECHECK: Performed by: INTERNAL MEDICINE

## 2023-09-12 PROCEDURE — 83605 ASSAY OF LACTIC ACID: CPT | Performed by: SURGERY

## 2023-09-12 PROCEDURE — 99222 1ST HOSP IP/OBS MODERATE 55: CPT | Performed by: PODIATRIST

## 2023-09-12 PROCEDURE — 99223 1ST HOSP IP/OBS HIGH 75: CPT | Performed by: STUDENT IN AN ORGANIZED HEALTH CARE EDUCATION/TRAINING PROGRAM

## 2023-09-12 PROCEDURE — 96365 THER/PROPH/DIAG IV INF INIT: CPT

## 2023-09-12 PROCEDURE — 82330 ASSAY OF CALCIUM: CPT

## 2023-09-12 PROCEDURE — 85027 COMPLETE CBC AUTOMATED: CPT | Performed by: INTERNAL MEDICINE

## 2023-09-12 PROCEDURE — 86900 BLOOD TYPING SEROLOGIC ABO: CPT | Performed by: SURGERY

## 2023-09-12 PROCEDURE — 74177 CT ABD & PELVIS W/CONTRAST: CPT

## 2023-09-12 PROCEDURE — 90471 IMMUNIZATION ADMIN: CPT

## 2023-09-12 PROCEDURE — 99285 EMERGENCY DEPT VISIT HI MDM: CPT | Performed by: SURGERY

## 2023-09-12 PROCEDURE — 85730 THROMBOPLASTIN TIME PARTIAL: CPT | Performed by: SURGERY

## 2023-09-12 PROCEDURE — 86901 BLOOD TYPING SEROLOGIC RH(D): CPT | Performed by: SURGERY

## 2023-09-12 PROCEDURE — 71260 CT THORAX DX C+: CPT

## 2023-09-12 PROCEDURE — 93308 TTE F-UP OR LMTD: CPT | Performed by: SURGERY

## 2023-09-12 PROCEDURE — 84295 ASSAY OF SERUM SODIUM: CPT

## 2023-09-12 PROCEDURE — EDAIR PR ED AIR: Performed by: EMERGENCY MEDICINE

## 2023-09-12 PROCEDURE — 86850 RBC ANTIBODY SCREEN: CPT | Performed by: SURGERY

## 2023-09-12 PROCEDURE — 84132 ASSAY OF SERUM POTASSIUM: CPT

## 2023-09-12 PROCEDURE — 83880 ASSAY OF NATRIURETIC PEPTIDE: CPT | Performed by: PHYSICIAN ASSISTANT

## 2023-09-12 PROCEDURE — 70450 CT HEAD/BRAIN W/O DYE: CPT

## 2023-09-12 PROCEDURE — 76705 ECHO EXAM OF ABDOMEN: CPT | Performed by: SURGERY

## 2023-09-12 PROCEDURE — NC001 PR NO CHARGE: Performed by: NURSE PRACTITIONER

## 2023-09-12 PROCEDURE — 85014 HEMATOCRIT: CPT

## 2023-09-12 PROCEDURE — 80053 COMPREHEN METABOLIC PANEL: CPT | Performed by: SURGERY

## 2023-09-12 PROCEDURE — 82947 ASSAY GLUCOSE BLOOD QUANT: CPT

## 2023-09-12 PROCEDURE — 81001 URINALYSIS AUTO W/SCOPE: CPT | Performed by: SURGERY

## 2023-09-12 PROCEDURE — 72125 CT NECK SPINE W/O DYE: CPT

## 2023-09-12 PROCEDURE — 84484 ASSAY OF TROPONIN QUANT: CPT | Performed by: SURGERY

## 2023-09-12 PROCEDURE — 85610 PROTHROMBIN TIME: CPT | Performed by: SURGERY

## 2023-09-12 RX ORDER — COLCHICINE 0.6 MG/1
0.6 TABLET ORAL DAILY
COMMUNITY
End: 2023-09-15

## 2023-09-12 RX ORDER — BACITRACIN, NEOMYCIN, POLYMYXIN B 400; 3.5; 5 [USP'U]/G; MG/G; [USP'U]/G
1 OINTMENT TOPICAL AS NEEDED
Status: DISCONTINUED | OUTPATIENT
Start: 2023-09-12 | End: 2023-09-15 | Stop reason: HOSPADM

## 2023-09-12 RX ORDER — FUROSEMIDE 40 MG/1
40 TABLET ORAL DAILY
Status: DISCONTINUED | OUTPATIENT
Start: 2023-09-12 | End: 2023-09-12

## 2023-09-12 RX ORDER — WARFARIN SODIUM 7.5 MG/1
7.5 TABLET ORAL
Status: COMPLETED | OUTPATIENT
Start: 2023-09-12 | End: 2023-09-12

## 2023-09-12 RX ORDER — WARFARIN SODIUM 5 MG/1
TABLET ORAL
COMMUNITY

## 2023-09-12 RX ORDER — SODIUM CHLORIDE, SODIUM GLUCONATE, SODIUM ACETATE, POTASSIUM CHLORIDE, MAGNESIUM CHLORIDE, SODIUM PHOSPHATE, DIBASIC, AND POTASSIUM PHOSPHATE .53; .5; .37; .037; .03; .012; .00082 G/100ML; G/100ML; G/100ML; G/100ML; G/100ML; G/100ML; G/100ML
500 INJECTION, SOLUTION INTRAVENOUS ONCE
Status: COMPLETED | OUTPATIENT
Start: 2023-09-12 | End: 2023-09-12

## 2023-09-12 RX ORDER — METOPROLOL SUCCINATE 25 MG/1
25 TABLET, EXTENDED RELEASE ORAL DAILY
Status: DISCONTINUED | OUTPATIENT
Start: 2023-09-12 | End: 2023-09-15 | Stop reason: HOSPADM

## 2023-09-12 RX ORDER — FUROSEMIDE 10 MG/ML
40 INJECTION INTRAMUSCULAR; INTRAVENOUS ONCE
Status: COMPLETED | OUTPATIENT
Start: 2023-09-12 | End: 2023-09-12

## 2023-09-12 RX ORDER — FOLIC ACID 1 MG/1
2 TABLET ORAL DAILY
COMMUNITY

## 2023-09-12 RX ORDER — METOPROLOL SUCCINATE 25 MG/1
25 TABLET, EXTENDED RELEASE ORAL DAILY
COMMUNITY

## 2023-09-12 RX ORDER — FOLIC ACID 1 MG/1
2 TABLET ORAL DAILY
Status: DISCONTINUED | OUTPATIENT
Start: 2023-09-12 | End: 2023-09-15 | Stop reason: HOSPADM

## 2023-09-12 RX ORDER — ONDANSETRON 2 MG/ML
4 INJECTION INTRAMUSCULAR; INTRAVENOUS EVERY 6 HOURS PRN
Status: DISCONTINUED | OUTPATIENT
Start: 2023-09-12 | End: 2023-09-15 | Stop reason: HOSPADM

## 2023-09-12 RX ORDER — FUROSEMIDE 40 MG/1
40 TABLET ORAL DAILY
COMMUNITY

## 2023-09-12 RX ADMIN — FOLIC ACID 2 MG: 1 TABLET ORAL at 09:45

## 2023-09-12 RX ADMIN — SODIUM CHLORIDE, SODIUM GLUCONATE, SODIUM ACETATE, POTASSIUM CHLORIDE, MAGNESIUM CHLORIDE, SODIUM PHOSPHATE, DIBASIC, AND POTASSIUM PHOSPHATE 500 ML: .53; .5; .37; .037; .03; .012; .00082 INJECTION, SOLUTION INTRAVENOUS at 02:07

## 2023-09-12 RX ADMIN — WARFARIN SODIUM 7.5 MG: 7.5 TABLET ORAL at 18:14

## 2023-09-12 RX ADMIN — IOHEXOL 100 ML: 350 INJECTION, SOLUTION INTRAVENOUS at 01:09

## 2023-09-12 RX ADMIN — TETANUS TOXOID, REDUCED DIPHTHERIA TOXOID AND ACELLULAR PERTUSSIS VACCINE, ADSORBED 0.5 ML: 5; 2.5; 8; 8; 2.5 SUSPENSION INTRAMUSCULAR at 12:44

## 2023-09-12 RX ADMIN — FUROSEMIDE 40 MG: 10 INJECTION, SOLUTION INTRAMUSCULAR; INTRAVENOUS at 03:34

## 2023-09-12 NOTE — CONSULTS
Podiatry - Consultation    Patient Information:   Fito Baker 80 y.o. male MRN: [id number]  Unit/Bed#: ED 18 Encounter: [id number]  PCP: Lolita Bonner DO  Date of Admission:  9/12/2023  Date of Consultation: 09/12/23  Requesting Physician: Shae Beltrán MD      ASSESSMENT:    Fito Baker is a 80 y.o. male with:    1. Superficial wound s/p spontaneous bulla eruption, posterior medial right leg  2. Superficial abrasion, knees bilaterally  3. History of venous stasis ulcerations, B/L  4. History of DVT  5. Congestive heart failure  6. Generalized weakness    PLAN:    · Patient was evaluated at bedside. Stable superficial wound noted to the posterior medial aspect of right lower leg, bulla base after shear/trauma de-ivet. No acute clinical signs of infection. · Stable superficial wounds noted to anterior aspect of knees bilaterally. No acute clinical signs of infection. · Dressings applied at bedside after gentle manual cleansing of wounds using saline soaked gauze and wound cleanser. · Plan for local wound care consisting of adaptic, DSD to all wounds bilaterally. Wound care instructions placed. · Elevation on green foam wedges or pillows when non-ambulatory  · Rest of care per primary team.  · Will discuss this plan with my attending and update as needed. Weightbearing status: Weightbearing as tolerated    SUBJECTIVE:    History of Present Illness:    Fito Baker is a 80 y.o. male who is originally admitted 9/12/2023 due to generalized weakness with multiple falls and shortness of breath. Patient has a past medical history of heart failure, cardiac amyloidosis, Afib, obstructive sleep apnea, history of DVT, chronic venous stasis ulcerations. We are consulted for wounds located to the right posterior medial aspect of the lower leg and abrasion wounds to the kneecaps bilaterally.   The patient states that he noticed the blister formation on the right lower leg within a week ago and the wound appeared about 2 days ago. He states that the blister roof peeled off and expose this wound, but he reports no purulent drainage or any surrounding erythema or pain to the wound. The abrasions noted to his anterior knees bilaterally occurred when he fell and he was trying to get up. He said that he scraped his knees on the floor and noticed that these wounds started to appear. He did notice some slightly pink drainage that he believed to be slightly bloody appearing from the wounds, but no purulent drainage and no pain or significant erythema noted. Of note, the patient was recently discharged from wound care on 8/31/23 for healed bilateral venous stasis wounds. Patient denies nausea/vomiting/fever/chills/chest pain, does present with SOB    Review of Systems:    Constitutional: Negative. HENT: Negative. Eyes: Negative. Respiratory: Negative. Cardiovascular: Negative. Gastrointestinal: Negative. Musculoskeletal: generalized weakness in LE B/L  Skin: superficial wounds to knees B/L, right lower leg   Neurological: Negative  Psych: Negative. Past Medical and Surgical History:     Past Medical History:   Diagnosis Date   • Atrial fibrillation [facility])    • 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[address])    • Heart failure (HCC)        Past Surgical History:   Procedure Laterality Date   • HERNIA REPAIR         Meds/Allergies:    (Not in a hospital admission)      Allergies   Allergen Reactions   • Sildenafil Other (See Comments)     _       Social History:     Marital Status: /Civil Union    Substance Use History:   Social History     Substance and Sexual Activity   Alcohol Use None     Social History     Tobacco Use   Smoking Status Not on file   Smokeless Tobacco Not on file     Social History     Substance and Sexual Activity   Drug Use Not on file       Family History:    No family history on file.       OBJECTIVE:    Vitals:   Blood Pressure: 97/69 (09/12/23 0954)  Pulse: 62 (09/12/23 0700)  Temperature: 97.9 °F (36.6 °C) (09/12/23 0059)  Temp Source: Tympanic (09/12/23 0059)  Respirations: 16 (09/12/23 0700)  Weight - Scale: 116 kg (255 lb 11.7 oz) (09/12/23 0056)  SpO2: 97 % (09/12/23 0700)    Physical Exam:    General Appearance: Alert, cooperative, no distress. HEENT: Head normocephalic, atraumatic, without obvious abnormality. Heart: Normal rate and rhythm. Lungs: Non-labored breathing. No respiratory distress. Abdomen: Without distension. Psychiatric: AAOx3  Lower Extremity:  Vascular:   Right DP and PT pulses are biphasic by Doppler. Left DP and PT pulses are biphasic by Doppler. CRT < 3 seconds at the digits. +2/4 edema noted at bilateral lower extremities. Pedal hair is absent. Skin temperature is WNL bilaterally. Musculoskeletal:  MMT is 4/5 in all muscle compartments bilaterally. ROM at the 1st MPJ and ankle joint are limited bilaterally with the leg extended. No Pain on global palpation of lower extremities. No gross deformities noted.      Dermatological:  Lower extremity wound(s) as noted below:    Wound #: 1  Location: right posterior medial lower leg  Length 3.5cm: Width 2cm: Depth 0.1cm:   Deepest Tissue Noted in Base: dermis  Probe to Bone: No  Peripheral Skin Description: Attached  Granulation: 100% Fibrotic Tissue: 0% Necrotic Tissue: 0%   Drainage Amount: minimal, serous  Signs of Infection: No      Wound #: 2  Location: right anterior knee  Length 4cm: Width 4cm: Depth 0.1cm:   Deepest Tissue Noted in Base: dermis  Probe to Bone: No  Peripheral Skin Description: Attached  Granulation: 100% Fibrotic Tissue: 0% Necrotic Tissue: 0%   Drainage Amount: minimal, serosanguinous  Signs of Infection: No      Wound #: 3  Location: left anterior knee  Length 2cm: Width 1.5cm: Depth 0.1cm:   Deepest Tissue Noted in Base: dermis  Probe to Bone: No  Peripheral Skin Description: Attached  Granulation: 100% Fibrotic Tissue: 0% Necrotic Tissue: 0%   Drainage Amount: minimal, serosanguinous  Signs of Infection: No      Neurological:  Gross sensation is intact. Protective sensation is intact. Patient Denies numbness and/or paresthesias. Clinical Images 09/12/23:                          Additional data:     Lab Results: I have personally reviewed pertinent labs including:    Results from last 7 days   Lab Units 09/12/23  0421 09/12/23  0059   WBC Thousand/uL 4.39 4.38   HEMOGLOBIN g/dL 16.9 16.5   I STAT HEMOGLOBIN g/dl  --  16.3   HEMATOCRIT % 48.1 48.4   HEMATOCRIT, ISTAT %  --  48   PLATELETS Thousands/uL 92* 101*   NEUTROS PCT %  --  67   LYMPHS PCT %  --  16   MONOS PCT %  --  14*   EOS PCT %  --  0     Results from last 7 days   Lab Units 09/12/23  0059   POTASSIUM mmol/L 4.0   CHLORIDE mmol/L 101   CO2 mmol/L 16*   CO2, I-STAT mmol/L 22   BUN mg/dL 26*   CREATININE mg/dL 1.17   CALCIUM mg/dL 8.1*   ALK PHOS U/L 87   ALT U/L 21   AST U/L 40*   GLUCOSE, ISTAT mg/dl 133     Results from last 7 days   Lab Units 09/12/23  0421   INR  1.68*                   Imaging: I have personally reviewed pertinent reports in PACS. EKG, Pathology, and Other Studies: I have personally reviewed pertinent reports. ** Please Note: Portions of the record may have been created with voice recognition software. Occasional wrong word or "sound a like" substitutions may have occurred due to the inherent limitations of voice recognition software. Read the chart carefully and recognize, using context, where substitutions have occurred.  **

## 2023-09-12 NOTE — ASSESSMENT & PLAN NOTE
· Anticoagulated on Coumadin, subtherapeutic on presentation  · Give Coumadin 7.5 mg tonight, recheck INR in a.m.

## 2023-09-12 NOTE — ASSESSMENT & PLAN NOTE
· Wife reporting multiple falls at home described primarily as mechanical in nature, including apparent fall while ambulating upstairs this evening  · Trauma service following, imaging negative for acute traumatic injuries  · PT/OT evaluation  · Monitor on telemetry and check orthostatics for now given question of syncope

## 2023-09-12 NOTE — ASSESSMENT & PLAN NOTE
Wt Readings from Last 3 Encounters:   09/12/23 116 kg (255 lb 11.7 oz)     -TTE 04/25/2023: LVEF 65%. LVIDd 3.4 cm. IVSd 1.6 cm. Normal RV size with low normal RVSF. GEOVANNI (R>L). Small PFO. Mild MR and TR. Trace AI. Normal IVC.   - Lasix 40 mg daily with potassium 20 mEq BID

## 2023-09-12 NOTE — ASSESSMENT & PLAN NOTE
- report of multiple falls at home today  - patient does not recall falls  - found unresponsive face down in bathroom this evening by wife  - takes Coumadin for Afib  - GCS 15  - CT head and C-spine negative  - CT CAP no acute injuries  - Admit to [facility] for medical workup considering multiple falls with suspicion of syncope  - elevated lactic acid on admission; give IVF and recheck; afebrile on presentation

## 2023-09-12 NOTE — ASSESSMENT & PLAN NOTE
Generalized weakness for the past several weeks, with reported multiple falls per wife including this evening while attempting to ambulate upstairs  · Patient did report some increased shortness of breath while ascending stairs. Possible component of heart failure playing role versus deconditioning? · Work-up of all associated problems as below.     · PT/OT evaluation will be appreciated

## 2023-09-12 NOTE — ASSESSMENT & PLAN NOTE
Wife reporting multiple falls at home described primarily as mechanical in nature, including apparent fall while ambulating upstairs prior to admission  · Trauma service following, imaging negative for acute traumatic injuries  · PT/OT evaluations pending   · Monitor on telemetry and check orthostatics for now given question of syncope

## 2023-09-12 NOTE — PROGRESS NOTES
[address] [place] [address]    Name: Mina Collazo  MRN: [id number]  Unit/Bed#: ED 18 I Date of Admission: 9/12/2023   Date of Service: 9/12/2023 I Hospital Day: 0    [facility] Internal Medicine Post Admit Check:     Date of visit: 09/12/23    The patient was admitted earlier to the [facility] Internal Medicine Service. Please see initial intake history and physical for detailed admission history. This is a supplemental update following a post admit checkup. Subjective: seen in ER. Feels okay. Denies any complaints presently other than generalized weakness. He states his knees gave out. Exam:   Gen: awake, alert, oriented. Mildly [place]. On nasal cannula  Skin: multiple areas of bruising/abrasions noted, venous stasis dermatitis   CV: irregularly irregular  Lungs: dec at bases  MSK: 1+ pitting edema, 2+ pedal    Assessment/Plan   * Generalized weakness  Assessment & Plan  Generalized weakness for the past several weeks, with reported multiple falls per wife including this evening while attempting to ambulate upstairs  · Patient did report some increased shortness of breath while ascending stairs. Possible component of heart failure playing role versus deconditioning? · Work-up of all associated problems as below. · PT/OT evaluation will be appreciated    Multiple falls  Assessment & Plan  Wife reporting multiple falls at home described primarily as mechanical in nature, including apparent fall while ambulating upstairs prior to admission  · Trauma service following, imaging negative for acute traumatic injuries  · PT/OT evaluations pending   · Monitor on telemetry and check orthostatics for now given question of syncope    Multiple abrasions  Assessment & Plan  · S/p trauma evaluation     Elevated troponin  Assessment & Plan  Troponin 116->165 on presentation. Patient without complaints of chest pain although reports some dyspnea while ascending stairs.   EKG without acute ischemic change  · Possibly secondary to fall, brief episode of rapid atrial fibrillation, possible acute on chronic heart failure. Heart failure service to follow    Chronic venous hypertension (idiopathic) with ulcer of right lower extremity (HCC)  Assessment & Plan  · POA, wounds do not appear acutely infected. Follows with [facility] podiatry for wound care, appreciate evaluation while inpatient    Congestive heart failure (CHF) ([address])  Assessment & Plan  Wt Readings from Last 3 Encounters:   09/12/23 116 kg (255 lb 11.7 oz)     Follows with [facility] advanced heart failure cardiology (Dr. Deidra Hurd), echocardiogram 4/2023 EF 65%, severely increased left ventricular wall thickness, moderately dilated LA, severely dilated RA, mild MR/TR; history of amyloidosis noted  · Patient and wife reporting some increased lower extremity from baseline and patient reporting some increased shortness of breath with exertion. · Admits he only takes his Lasix approximately 4 times weekly. Actually reports weight loss stating he has been attempting to eat healthier  · S/P IV Lasix 40 mg x 1 and appreciate heart failure service evaluation  · Monitor daily weights, I's/O, volume status  · Low-sodium, fluid restricted diet  · Repeat BMP in a.m., replace electrolytes as needed and monitor renal function      Atrial fibrillation (HCC)  Assessment & Plan  Briefly in RVR on presentation here, however now rate controlled without intervention  · Continue metoprolol succinate 25 mg daily  · Continue anticoagulation with Coumadin    History of DVT (deep vein thrombosis)  Assessment & Plan  Anticoagulated on Coumadin, subtherapeutic on presentation.  Appears home dose warfarin is 5 mg daily  · Giving 7.5 mg warfarin on 9/12  · F/u AM INR     OLGA (obstructive sleep apnea)  Assessment & Plan  · Continue nightly CPAP           Jose Zamudio PA-C

## 2023-09-12 NOTE — ASSESSMENT & PLAN NOTE
Anticoagulated on Coumadin, subtherapeutic on presentation.  Appears home dose warfarin is 5 mg daily  · Giving 7.5 mg warfarin on 9/12  · F/u AM INR

## 2023-09-12 NOTE — ED PROVIDER NOTES
Emergency Department Airway Evaluation and Management Form    History  Obtained from: EMS and the patient  Patient has no allergy information on record. Chief Complaint   Patient presents with   • Trauma     See trauma doc     HPI   80-year-old male presenting as a level B trauma after being found facedown on the tile floor in his bathroom. Patient's wife called 46, since patient has been unsteady on his feet and has sustained several falls over the past several days. Patient is on Coumadin for atrial fibrillation. At present, patient denies pain anywhere, he also denies falling down. He has scrapes, abrasions, as well as possible swelling of his nose. He is awake, alert, and at present, his GCS is 15, though again, he is amnestic to falling down. No past medical history on file. No past surgical history on file. No family history on file. I have reviewed and agree with the history as documented.     Review of Systems    Physical Exam  /56   Pulse 104   Temp 97.9 °F (36.6 °C) (Tympanic)   Resp 18   Wt 116 kg (255 lb 11.7 oz)   SpO2 92%     Physical Exam    ED Medications  Medications   iohexol (OMNIPAQUE) 350 MG/ML injection (SINGLE-DOSE) 100 mL (100 mL Intravenous Given 9/12/23 0109)       Intubation  Procedures    Notes      Final Diagnosis  Final diagnoses:   Multiple falls       ED Provider  Electronically Signed by     Griffith Brunner, MD  09/12/23 0324

## 2023-09-12 NOTE — ASSESSMENT & PLAN NOTE
Briefly in RVR on presentation here, however now rate controlled without intervention  · Continue metoprolol succinate 25 mg daily  · Continue anticoagulation with Coumadin

## 2023-09-12 NOTE — ASSESSMENT & PLAN NOTE
- Patient has hx of right lower extremity DVT in 2014 at which time he was seen by hematology & placed on Xarelto for 6 months.  Patient had coagulability testing in the past; patient has MTHFR mutation.  - Anticoagulation with Coumadin

## 2023-09-12 NOTE — ASSESSMENT & PLAN NOTE
· POA, wounds do not appear acutely infected.   Follows with [place] podiatry for wound care, appreciate evaluation while inpatient

## 2023-09-12 NOTE — H&P
[address] [facility]  H&P  Name: Kam Mccoy 80 y.o. male I MRN: [id number]  Unit/Bed#: ED 25 I Date of Admission: 9/12/2023   Date of Service: 9/12/2023 I Hospital Day: 0      Assessment/Plan   Multiple falls  Assessment & Plan  - report of multiple falls at home today  - patient does not recall falls  - found unresponsive face down in bathroom this evening by wife  - takes Coumadin for Afib  - GCS 15  - CT head and C-spine negative  - CT CAP no acute injuries  - Admit to [facility] for medical workup considering multiple falls with suspicion of syncope  - elevated lactic acid on admission; give IVF and recheck; afebrile on presentation    Congestive heart failure (CHF) (HCC)  Assessment & Plan  Wt Readings from Last 3 Encounters:   09/12/23 116 kg (255 lb 11.7 oz)     -TTE 04/25/2023: LVEF 65%. LVIDd 3.4 cm. IVSd 1.6 cm. Normal RV size with low normal RVSF. GEOVANNI (R>L). Small PFO. Mild MR and TR. Trace AI. Normal IVC.   - Lasix 40 mg daily with potassium 20 mEq BID         Atrial fibrillation (HCC)  Assessment & Plan  - currently anticoagulated with Coumadin  - metoprolol succinate 25mg daily  - Telemetry  - Currently Af in the 110s, no STEMI    History of DVT (deep vein thrombosis)  Assessment & Plan  - Patient has hx of right lower extremity DVT in 2014 at which time he was seen by hematology & placed on Xarelto for 6 months.  Patient had coagulability testing in the past; patient has MTHFR mutation.  - Anticoagulation with Coumadin    OLGA (obstructive sleep apnea)  Assessment & Plan  - CPAP HS           Trauma Alert: Level B   Model of Arrival: Ambulance    Trauma Team: Attending Eunice Levin, Residents Pito Up and Fellow Satish Burleson  Consultants: [facility]    History of Present Illness     Chief Complaint: I didn't fall  Mechanism:Fall     HPI:    Kam Mccoy is a 80 y.o. male with PMHx CKD3, OLGA, MTHFR mutation, CHF, DVT, Amyloidosis, Afib on Coumadin, history of recurrent falls, presenting to the ED as a Level B Trauma Alert for evaluation s/p multiple falls this evening in context of anticoagulant use. Per EMS, patient has had multiple falls today, unclear if related to ambulatory dysfunction or syncope. Tonight, patient was found in the bathroom face down after wife found him unresponsive in the bathroom. Patient noted to be in Afib with -120s during transport. Patient does not have any recollection of falls, states that he did not fall. Patient denies headache, neck pain, chest pain, abdominal pain, vomiting, back pain, hip pain, chest pain. Review of Systems   All other systems reviewed and are negative. 12-point, complete review of systems was reviewed and negative except as stated above. Historical Information     No past medical history on file. No past surgical history on file. There is no immunization history on file for this patient. Family History: Non-contributory    1. Before the illness or injury that brought you to the Emergency, did you need someone to help you on a regular basis? 1=Yes   2. Since the illness or injury that brought you to the Emergency, have you needed more help than usual to take care of yourself? 1=Yes   3. Have you been hospitalized for one or more nights during the past 6 months (excluding a stay in the Emergency Department)? 1=Yes   4. In general, do you see well? 0=Yes   5. In general, do you have serious problems with your memory? 1=Yes   6. Do you take more than three different medications everyday? 1=Yes   TOTAL   5     Did you order a geriatric consult if the score was 2 or greater?: yes     Meds/Allergies   all current active meds have been reviewed  Allergies have not been reviewed;   Not on File    Objective   Initial Vitals:   Temperature: 97.9 °F (36.6 °C) (09/12/23 0059)  Pulse: (!) 120 (09/12/23 0056)  Respirations: 18 (09/12/23 0056)  Blood Pressure: 111/60 (09/12/23 0056)    Primary Survey:   Airway:        Status: patent; Pre-hospital Interventions: none        Hospital Interventions: none  Breathing:        Pre-hospital Interventions: none       Effort: normal       Right breath sounds: normal       Left breath sounds: normal  Circulation:        Rhythm: regular       Rate: regular   Right Pulses Left Pulses    R radial: 2+  R femoral: 2+  R pedal: 2+     L radial: 2+  L femoral: 2+  L pedal: 2+       Disability:        GCS: Eye: 4; Verbal: 5 Motor: 6 Total: 15       Right Pupil: 3 mm;  round;  reactive         Left Pupil:  3 mm;  round;  reactive      R Motor Strength L Motor Strength    R : 5/5  R dorsiflex: 5/5  R plantarflex: 5/5 L : 5/5  L dorsiflex: 5/5  L plantarflex: 5/5        Sensory:  No sensory deficit  Exposure:       Completed: Yes      Secondary Survey:  Physical Exam  Vitals and nursing note reviewed. Constitutional:       General: He is not in acute distress. Appearance: Normal appearance. He is normal weight. He is diaphoretic. He is not ill-appearing or toxic-appearing. HENT:      Head: Normocephalic and atraumatic. Right Ear: Tympanic membrane and external ear normal.      Left Ear: Tympanic membrane and external ear normal.      Nose: Nose normal. No congestion. Comments: No septal hematoma       Mouth/Throat:      Mouth: Mucous membranes are moist.      Pharynx: Oropharynx is clear. Comments: + toothpaste around lips    Eyes:      General: No scleral icterus. Extraocular Movements: Extraocular movements intact. Pupils: Pupils are equal, round, and reactive to light. Cardiovascular:      Rate and Rhythm: Tachycardia present. Rhythm irregular. Pulses: Normal pulses. Heart sounds: Normal heart sounds. Pulmonary:      Effort: Pulmonary effort is normal. No respiratory distress. Breath sounds: No wheezing, rhonchi or rales. Comments: No chest wall bruising     Chest:      Chest wall: No tenderness. Abdominal:      General: Abdomen is flat.  Bowel sounds are normal. There is no distension. Tenderness: There is no abdominal tenderness. There is no guarding or rebound. Comments: No abdominal bruising     Musculoskeletal:         General: No swelling, tenderness, deformity or signs of injury. Normal range of motion. Cervical back: Normal range of motion. No tenderness. Right lower leg: No edema. Left lower leg: No edema. Comments: Pelvis is stable     Skin:     General: Skin is warm. Capillary Refill: Capillary refill takes less than 2 seconds. Comments: Bilateral abrasions to the knees     Neurological:      General: No focal deficit present. Mental Status: He is alert and oriented to person, place, and time. Cranial Nerves: No cranial nerve deficit. Sensory: No sensory deficit. Motor: No weakness. Psychiatric:         Mood and Affect: Mood normal.         Invasive Devices     Peripheral Intravenous Line  Duration           Peripheral IV 09/12/23 Left;Ventral (anterior) Forearm <1 day    Peripheral IV 09/12/23 Right Antecubital <1 day              Lab Results: I have personally reviewed all pertinent laboratory/test results from 09/12/23, including the preceding 24 hours. Recent Labs     09/12/23  0059   WBC 4.38   HGB 16.5  16.3   HCT 48.4  48   *   SODIUM 133*   K 4.0      CO2 16*  22   BUN 26*   CREATININE 1.17   GLUC 126   CAIONIZED 0.96*   AST 40*   ALT 21   ALB 3.5   TBILI 1.53*   ALKPHOS 87   PTT 39*   INR 1.66*   HSTNI0 116*   LACTICACID 3.1*       Imaging Results: I have personally reviewed pertinent images saved in PACS. CT scan findings (and other pertinent positive findings on images) were discussed with radiology.  My interpretation of the images/reports are as follows:  Chest Xray(s): N/A, see imaging   FAST exam(s): see imaging   CT Scan(s): see imaging   Additional Xray(s): see imaging       Code Status: No Order  Advance Directive and Living Will:      Power of :    POLST:    I have spent 30 minutes with Patient  today in which greater than 50% of this time was spent in counseling/coordination of care regarding Diagnostic results.

## 2023-09-12 NOTE — ASSESSMENT & PLAN NOTE
- report of multiple falls--unclear etiology  -Patient takes Coumadin for history of atrial fibrillation  -CT imaging was negative for acute traumatic injury.  -Based on tertiary evaluation no further imaging is required. Trauma team will sign off. Continue medical work-up, in the setting of multiple falls and possible syncope.

## 2023-09-12 NOTE — ASSESSMENT & PLAN NOTE
- currently anticoagulated with Coumadin  - metoprolol succinate 25mg daily  - Telemetry  - Currently Af in the 110s, no STEMI

## 2023-09-12 NOTE — ASSESSMENT & PLAN NOTE
· Briefly in RVR on presentation here, however now rate controlled without intervention  · Continue metoprolol succinate 25 mg daily  · Continue anticoagulation with Coumadin

## 2023-09-12 NOTE — CONSULTS
Advanced Heart Failure / Pulmonary Hypertension Service Consultation    Parminder Paris 80 y.o. male  MRN: [id number]  Unit/Bed#: [facility] 528-01; Encounter: [id number]    Assessment:  Principal Problem:    Generalized weakness  Active Problems:    Multiple falls    OLGA (obstructive sleep apnea)    History of DVT (deep vein thrombosis)    Atrial fibrillation (HCC)    Congestive heart failure (CHF) (HCC)    Chronic venous hypertension (idiopathic) with ulcer of right lower extremity (HCC)    Elevated troponin    Multiple abrasions      HPI:   Parminder Paris is an 80-year-old man with TTR amyloidosis, chronic HFpEF, A-fib (AC on Coumadin), HTN, HLD, OLGA (compliant with CPAP), CKD III, and history of TIA and DVT who presented to [facility] on 09/12/2023 s/p fall and being found "facedown on the tile floor in his bathroom." In preceding days had been having more frequent falls at home per wife/family. That same day, had near fall backwards onto his wife when walking up stairs (live in split level). Patient unable to recall fall onto bathroom floor; unclear how long patient down before wife found patient. Received 500 mL IV fluids in ED. Trauma XR with "Mild pulmonary vascular congestion." CT head and C-spine without acute abnormalities. CT c/a/p without acute injuries but did note "mild patchy groundglass opacity scattered in the lungs with interlobular septal thickening suggestive of mild interstitial edema." BNP not checked. Intermittently noted to be in Afib with RVR on telemetry per ED notes. Given one dose IV Lasix, and HF team consulted for "acute on chronic diastolic congestive heart failure. Candace Bobo Follows with Dr. Robert Armstrong, presenting after multiple falls at home. Admits to some increased LE edema and dyspnea climbing stairs." Patient follows with Dr. Robert Armstrong for outpatient cardiology. Patient seen and examined. Wife, son, and daughter-in-law at bedside. Agree with above history.  With recent falls/near falls, patient denies LH, dizziness, CP, SOB prior to falling. Reports falls are preceded by sensation of one or both knees buckling underneath him. Does endorse mild ARREGUIN and LE swelling; denies any acute worsening in past few weeks. Often only taking PO Lasix 40 mg 4-5 days a week. Does not take Lasix on days he needs to leave the house; often has some urinary incontinence s/p Lasix. Reports last PO Lasix dose was on 09/10. Weight 256 lbs in cardiology office during visit on 08/21. Has been checking weights at home; reports weights in low 250s since last cardiology visit. Denies rapid gains/losses. He reports intentionally eating healthier/eating less to prevent fat/caloric weight gain. Separate from patient and his wife, son and daughter-in-law expressed safety concerns about their parents continuing to live independently in [address] [place] [address]. Objective: Intake/ Output: 740 mL / 825 mL. Weight: 244 lbs. Telemetry: Afib, rates in 90-100s. MAP:  mmHg. Today's Plan:  • Volume mildly up on exam today. S/p IV Lasix this AM.   o Will hold off on additional doses today. Will reassess volume status in AM and dose diuretics at that time. • Did not receive metoprolol succinate this AM. Okay to receive next dose tomorrow. • Check BNP and orthostatic vitals. • Chronic mildly elevated troponins per chart review (likely in part related to known cardiac amyloidosis). • Wean oxygen as able. Plan:  Multiple falls at home / generalized weakness   Management per primary team.     Chronic HFpEF; LVEF 65%; LVIDd 3.4 cm; NYHA III; ACC/AHA Stage C              Etiology: TTR amyloidosis. TTE 07/22/2020: LVEF 60-70%. LVIDd 4 cm. IVSd 1.45 cm. Moderate concentric hypertrophy. Normal RV. GEOVANNI. Trace MR. Mild TR. TTE 04/25/2023: LVEF 65%. LVIDd 3.4 cm. IVSd 1.6 cm. Normal RV size with low normal RVSF. GEOVANNI (R>L). Small PFO. Mild MR and TR. Trace AI. Normal IVC. Pharmacotherapies:  --Aldosterone Antagonist: No.   --SGLT2 Inhibitor: No.   --Home Diuretic: Lasix 40 mg daily with potassium 40 mEq daily. --Inpatient Diuretic: S/p IV Lasix 40 mg x1 today.       Atrial fibrillation              [id number] = 6 (age, HF, HTN, DVT/TIA). Anticoagulation on Coumadin. Rate control: metoprolol succinate 25 mg daily. Rhythm control: None. Chronic kidney disease, stage IIIa              Baseline creatinine of 1.1-1.3. Today, creatinine of 1.17. TTR amyloidosis              Associated symptoms: lumbar spinal stenosis, CTS, peripheral neuropathy. EKG to mass mismatch: yes. Tc-PYP scan 04/29/2023: H/CL ratio 1.77. Grade 3 uptake. "Strongly suggestive of TTR amyloidosis."              Genetic testing: negative. SPEP 04/2023: no monoclonal banding. Insufficient sample for immunofixation. UPEP 04/2023: no M spike noted. Free light chains 04/2023: kappa/lambda ratio 1.49.                 Amyloid-specific Therapies:   --Tafamidis 61 mg daily. Hypertension  Hyperlipidemia  Obstructive sleep apnea: compliant with CPAP. Spinal stenosis, lumbar  Carpal tunnel syndrome  MTHFR mutation  History of DVT  History of TIA  History of tobacco abuse: quit in 1982. Past Medical History:   Diagnosis Date   • Atrial fibrillation [facility])    • 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[facility])    • Heart failure ([facility])        Review of Systems   Constitutional: Negative for activity change, appetite change, fatigue and unexpected weight change. Eyes: Negative. Respiratory: Positive for shortness of breath. Negative for cough and chest tightness. Cardiovascular: Positive for leg swelling. Negative for chest pain and palpitations. Gastrointestinal: Negative for abdominal distention, abdominal pain, diarrhea and nausea. Endocrine: Negative.     Genitourinary: Negative for decreased urine volume, difficulty urinating, dysuria and urgency. Musculoskeletal: Positive for gait problem. Skin: Negative. Allergic/Immunologic: Negative. Neurological: Negative for dizziness, syncope, weakness and light-headedness. Psychiatric/Behavioral: Negative for confusion and sleep disturbance. The patient is not nervous/anxious. Current Facility-Administered Medications:   •  folic acid (FOLVITE) tablet 2 mg, 2 mg, Oral, Daily, Brianne Slipper, DO, 2 mg at 09/12/23 0945  •  metoprolol succinate (TOPROL-XL) 24 hr tablet 25 mg, 25 mg, Oral, Daily, Brianne Slipper, DO  •  neomycin-bacitracin-polymyxin b (NEOSPORIN) ointment 1 small application, 1 small application, Topical, PRN, Fermin Cheek, DPM  •  ondansetron (ZOFRAN) injection 4 mg, 4 mg, Intravenous, Q6H PRN, Brianne Slipper, DO  •  warfarin (COUMADIN) tablet 7.5 mg, 7.5 mg, Oral, Once (warfarin), Brianne Slipper, DO    Allergies   Allergen Reactions   • Sildenafil Other (See Comments)     _     Social History     Socioeconomic History   • Marital status: /Civil Union     Spouse name: Not on file   • Number of children: Not on file   • Years of education: Not on file   • Highest education level: Not on file   Occupational History   • Not on file   Tobacco Use   • Smoking status: Not on file   • Smokeless tobacco: Not on file   Substance and Sexual Activity   • Alcohol use: Not on file   • Drug use: Not on file   • Sexual activity: Not on file   Other Topics Concern   • Not on file   Social History Narrative   • Not on file     Social Determinants of Health     Financial Resource Strain: Not on file   Food Insecurity: Not on file   Transportation Needs: Not on file   Physical Activity: Not on file   Stress: Not on file   Social Connections: Not on file   Intimate Partner Violence: Not on file   Housing Stability: Not on file     No family history on file.     Vitals:  Blood pressure 126/82, pulse 98, temperature 98.4 °F (36.9 °C), temperature source Oral, resp. rate 18, height 6' 2" (1.88 m), weight 111 kg (244 lb 4.3 oz), SpO2 97 %. I/O last 3 completed shifts: In: 740 [P.O.:240; I.V.:500]  Out: 825 [Urine:825]    Weight (last 2 days)     Date/Time Weight    23 1539 111 (244.27)    23 00:56:50 116 (255.73)        Wt Readings from Last 10 Encounters:   23 111 kg (244 lb 4.3 oz)       Vitals:    23 1430 23 1539 23 1542 23 1547   BP: 142/73 138/85 140/85 126/82   BP Location: Right arm Left arm Left arm Left arm   Pulse: 82 89 97 98   Resp: 18 18     Temp:  98.4 °F (36.9 °C)     TempSrc:  Oral     SpO2: 99%  97%    Weight:  111 kg (244 lb 4.3 oz)     Height:  6' 2" (1.88 m)         Physical Exam  Vitals reviewed. Constitutional:       General: He is awake. He is not in acute distress. Appearance: Normal appearance. He is well-developed. He is ill-appearing. He is not toxic-appearing or diaphoretic. Interventions: Nasal cannula in place. HENT:      Head: Normocephalic. Nose: Nose normal.      Mouth/Throat:      Mouth: Mucous membranes are moist.   Eyes:      General: No scleral icterus. Conjunctiva/sclera: Conjunctivae normal.   Neck:      Vascular: JVD present. Trachea: No tracheal deviation. Cardiovascular:      Rate and Rhythm: Normal rate. Rhythm irregularly irregular. Heart sounds: Murmur heard. Pulmonary:      Effort: Pulmonary effort is normal. No tachypnea, bradypnea or respiratory distress. Breath sounds: Normal air entry. No wheezing. Comments: Faint crackles   Abdominal:      General: Bowel sounds are normal. There is no distension. Palpations: Abdomen is soft. Tenderness: There is no abdominal tenderness. Musculoskeletal:      Cervical back: Neck supple. Right lower le+ Pitting Edema present. Left lower le+ Pitting Edema present. Skin:     General: Skin is warm and dry. Coloration: Skin is not jaundiced or pale. Neurological:      General: No focal deficit present. Mental Status: He is alert and oriented to person, place, and time. Psychiatric:         Attention and Perception: Attention normal.         Mood and Affect: Mood and affect normal.         Speech: Speech normal.         Behavior: Behavior normal. Behavior is cooperative. Thought Content:  Thought content normal.       Lines/Drains/Airways     Active Status     None                Labs & Results:  Results from last 7 days   Lab Units 09/12/23 0059   CK TOTAL U/L 117     Results from last 7 days   Lab Units 09/12/23 0421 09/12/23 0059   WBC Thousand/uL 4.39 4.38   HEMOGLOBIN g/dL 16.9 16.5   I STAT HEMOGLOBIN g/dl  --  16.3   HEMATOCRIT % 48.1 48.4   HEMATOCRIT, ISTAT %  --  48   PLATELETS Thousands/uL 92* 101*         Results from last 7 days   Lab Units 09/12/23 0059   POTASSIUM mmol/L 4.0   CHLORIDE mmol/L 101   CO2 mmol/L 16*   CO2, I-STAT mmol/L 22   BUN mg/dL 26*   CREATININE mg/dL 1.17   CALCIUM mg/dL 8.1*   ALK PHOS U/L 87   ALT U/L 21   AST U/L 40*   GLUCOSE, ISTAT mg/dl 133     Results from last 7 days   Lab Units 09/12/23 0421 09/12/23 0059   INR  1.68* 1.66*     Gurpreet Lopez PA-C

## 2023-09-12 NOTE — ASSESSMENT & PLAN NOTE
Wt Readings from Last 3 Encounters:   09/12/23 116 kg (255 lb 11.7 oz)     Follows with [facility] advanced heart failure cardiology (Dr. Rodriguez Hernandez), echocardiogram 4/2023 EF 65%, severely increased left ventricular wall thickness, moderately dilated LA, severely dilated RA, mild MR/TR; history of amyloidosis noted  · Patient and wife reporting some increased lower extremity from baseline and patient reporting some increased shortness of breath with exertion. · Admits he only takes his Lasix approximately 4 times weekly.   Actually reports weight loss stating he has been attempting to eat healthier  · S/P IV Lasix 40 mg x 1 and appreciate heart failure service evaluation  · Monitor daily weights, I's/O, volume status  · Low-sodium, fluid restricted diet  · Repeat BMP in a.m., replace electrolytes as needed and monitor renal function

## 2023-09-12 NOTE — H&P
[address] [facility]  H&P  Name: Jackson Nuñez 80 y.o. male I MRN: [id number]  Unit/Bed#: ED 25 I Date of Admission: 9/12/2023   Date of Service: 9/12/2023 I Hospital Day: 0      Assessment/Plan   * Generalized weakness  Assessment & Plan  · Generalized weakness for the past several weeks, with reported multiple falls per wife including this evening while attempting to ambulate upstairs  · Patient did report some increased shortness of breath while ascending stairs. Possible component of heart failure playing role versus deconditioning  · Work-up of all associated problems as below. PT/OT evaluation will be appreciated    Congestive heart failure (CHF) ([address])  Assessment & Plan  Wt Readings from Last 3 Encounters:   09/12/23 116 kg (255 lb 11.7 oz)     · Follows with [facility] advanced heart failure cardiology (Dr. Holli Chilel), echocardiogram 4/2023 EF 65%, severely increased left ventricular wall thickness, moderately dilated LA, severely dilated RA, mild MR/TR; history of amyloidosis noted  · Patient and wife reporting some increased lower extremity from baseline and patient reporting some increased shortness of breath with exertion. Admits he only takes his Lasix approximately 4 times weekly. Actually reports weight loss stating he has been attempting to eat healthier  · Trial IV Lasix 40 mg x 1 and appreciate heart failure service evaluation  · Monitor daily weights, I's/O, volume status  · Low-sodium, fluid restricted diet  · Repeat BMP in a.m., replace electrolytes as needed and monitor renal function      Atrial fibrillation (HCC)  Assessment & Plan  · Briefly in RVR on presentation here, however now rate controlled without intervention  · Continue metoprolol succinate 25 mg daily  · Continue anticoagulation with Coumadin    Elevated troponin  Assessment & Plan  · Troponin 116->165 on presentation.   Patient without complaints of chest pain although reports some dyspnea while ascending stairs. EKG without acute ischemic change  · Possibly secondary to fall, brief episode of rapid atrial fibrillation, possible acute on chronic heart failure. Heart failure service to follow    Chronic venous hypertension (idiopathic) with ulcer of right lower extremity (HCC)  Assessment & Plan  · POA, wounds do not appear acutely infected. Follows with [place] podiat[facility] for wound care, appreciate evaluation while inpatient    History of DVT (deep vein thrombosis)  Assessment & Plan  · Anticoagulated on Coumadin, subtherapeutic on presentation  · Give Coumadin 7.5 mg tonight, recheck INR in a.m.    OLGA (obstructive sleep apnea)  Assessment & Plan  · Continue nightly CPAP    Multiple falls  Assessment & Plan  · Wife reporting multiple falls at home described primarily as mechanical in nature, including apparent fall while ambulating upstairs this evening  · Trauma service following, imaging negative for acute traumatic injuries  · PT/OT evaluation  · Monitor on telemetry and check orthostatics for now given question of syncope         VTE Prophylaxis: Warfarin (Coumadin)  / sequential compression device   Code Status: Level 1 - Full Code  POLST: POLST form is not discussed and not completed at this time. Discussion with family: Wife at bedside    Anticipated Length of Stay:  Patient will be admitted on an Inpatient basis with an anticipated length of stay of greater than 2 midnights. Justification for Hospital Stay: Please see detailed plans noted above. Chief Complaint:     Multiple falls  History of Present Illness:  Terra Theodore is a 80 y.o. male who has a past medical history seen for cardiac amyloidosis, chronic HFpEF 65%, OLGA on CPAP, atrial fibrillation on Coumadin, DVT presenting initially as a level B trauma alert following multiple falls this evening.   Patient's wife reported that for the past several weeks he has appeared with generalized weakness and some shortness of breath while ambulating up stairs. For the past several days she reported several falls described primarily as mechanical in nature where he would lose balance, including this evening where he fell while ambulating up the stairs and additionally reported additional episode where he apparently found the bathroom facedown briefly unresponsive which  prompted call to EMS. During transit he was reported atrial fibrillation with RVR -120, which resolved spontaneously without intervention during the trauma evaluation. CT imaging and trauma work-up were negative for acute traumatic injuries, however labs revealing elevated troponin, lactic acidosis resolving with IV fluids, and given nature of fall is admitted under medicine service for further work-up and management. Currently, patient is lying in bed in no acute distress, sleeping on my arrival and waking to voice. Does endorse the increased shortness of breath while ambulating stairs as well as believing his lower extremity swelling has increased somewhat compared to baseline. Denies fever/chills, cough, weight gain, or  apparent dizziness//lightheadedness. He is adamant that he did not fall at any point, although wife confirms that he falls and states she is unsure if patient is safe at home at this time.     Review of Systems:    Constitutional:  Denies fever or chills reports generalized weakness  Eyes:  Denies change in visual acuity   HENT:  Denies nasal congestion or sore throat   Respiratory:  Denies cough but reports dyspnea with exertion  Cardiovascular:  Denies chest pain but reports edema  GI:  Denies abdominal pain, nausea, vomiting, bloody stools or diarrhea   :  Denies dysuria   Musculoskeletal:  Denies back pain or joint pain   Integument:  Denies rash   Neurologic:  Denies headache, focal weakness or sensory changes   Endocrine:  Denies polyuria or polydipsia   Lymphatic:  Denies swollen glands   Psychiatric:  Denies depression or anxiety     Past Medical and Surgical History:   Past Medical History:   Diagnosis Date   • Atrial fibrillation [facility])    • 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HCC)    • Heart failure (HCC)      Past Surgical History:   Procedure Laterality Date   • HERNIA REPAIR         Meds/Allergies:    Current Facility-Administered Medications:   •  folic acid (FOLVITE) tablet 2 mg, 2 mg, Oral, Daily, Eviee Board, DO  •  metoprolol succinate (TOPROL-XL) 24 hr tablet 25 mg, 25 mg, Oral, Daily, Eviee Board, DO  •  ondansetron (ZOFRAN) injection 4 mg, 4 mg, Intravenous, Q6H PRN, Eviee Board, DO  •  warfarin (COUMADIN) tablet 7.5 mg, 7.5 mg, Oral, Once (warfarin), Reggie Buitrago, DO    Current Outpatient Medications:   •  colchicine (COLCRYS) 0.6 mg tablet, Take 0.6 mg by mouth daily, Disp: , Rfl:   •  folic acid (FOLVITE) 1 mg tablet, Take 2 mg by mouth daily, Disp: , Rfl:   •  furosemide (LASIX) 40 mg tablet, Take 40 mg by mouth daily, Disp: , Rfl:   •  metoprolol succinate (TOPROL-XL) 25 mg 24 hr tablet, Take 25 mg by mouth daily, Disp: , Rfl:   •  Tafamidis 61 MG CAPS, Take 1 tablet by mouth in the morning, Disp: , Rfl:   •  warfarin (COUMADIN) 5 mg tablet, Take by mouth daily 1 to 1.5 tablet daily per physician, Disp: , Rfl:     Allergies:    Allergies   Allergen Reactions   • Sildenafil Other (See Comments)     _     History:  Marital Status: /Civil Union     Substance Use History:   Social History     Substance and Sexual Activity   Alcohol Use None     Social History     Tobacco Use   Smoking Status Not on file   Smokeless Tobacco Not on file     Social History     Substance and Sexual Activity   Drug Use Not on file       Family History:  Noncontributory  Physical Exam:     Vitals:   Blood Pressure: 123/76 (09/12/23 0400)  Pulse: 82 (09/12/23 0400)  Temperature: 97.9 °F (36.6 °C) (09/12/23 0059)  Temp Source: Tympanic (09/12/23 0059)  Respirations: 12 (09/12/23 0400)  Weight - Scale: 116 kg (255 lb 11.7 oz) (09/12/23 0056)  SpO2: 99 % (09/12/23 0400)    Constitutional:  Well developed, well nourished, no acute distress, non-toxic appearance   Eyes:  PERRL, conjunctiva normal   HENT:  Atraumatic, external ears normal, nose normal, oropharynx moist, no pharyngeal exudates. Neck- normal range of motion, no tenderness, supple   Respiratory:  No respiratory distress, scant bilateral rails, no wheezing   Cardiovascular: Irregularly irregular rate and rhythm, no murmurs, no gallops, no rubs   GI:  Soft, nondistended, normal bowel sounds, nontender, no organomegaly, no mass, no rebound, no guarding   :  No costovertebral angle tenderness   Musculoskeletal: Bilateral 1+ edema, no tenderness, no deformities. Back- no tenderness  Integument:  Well hydrated, chronic venous stasis dermatitis with right lower extremity ulcer without surrounding erythema or warmth  Lymphatic:  No lymphadenopathy noted   Neurologic:  Alert &awake, communicative, CN 2-12 normal, normal motor function, normal sensory function, no focal deficits noted   Psychiatric:  Speech and behavior appropriate       Lab Results: I have personally reviewed pertinent reports. Results from last 7 days   Lab Units 09/12/23  0059   WBC Thousand/uL 4.38   HEMOGLOBIN g/dL 16.5   I STAT HEMOGLOBIN g/dl 16.3   HEMATOCRIT % 48.4   HEMATOCRIT, ISTAT % 48   PLATELETS Thousands/uL 101*   NEUTROS PCT % 67   LYMPHS PCT % 16   MONOS PCT % 14*   EOS PCT % 0     Results from last 7 days   Lab Units 09/12/23  0059   POTASSIUM mmol/L 4.0   CHLORIDE mmol/L 101   CO2 mmol/L 16*   CO2, I-STAT mmol/L 22   BUN mg/dL 26*   CREATININE mg/dL 1.17   CALCIUM mg/dL 8.1*   ALK PHOS U/L 87   ALT U/L 21   AST U/L 40*   GLUCOSE, ISTAT mg/dl 133     Results from last 7 days   Lab Units 09/12/23  0059   INR  1.66*       EKG: Atrial fibrillation HR 92    Imaging: I have personally reviewed pertinent reports.       TRAUMA - CT chest abdomen pelvis w contrast    Result Date: 9/12/2023  Narrative: CT CHEST, ABDOMEN AND PELVIS WITH IV CONTRAST INDICATION:   TRAUMA. COMPARISON:  None. TECHNIQUE: CT examination of the chest, abdomen and pelvis was performed. Multiplanar 2D reformatted images were created from the source data. This examination, like all CT scans performed in the [facility], was performed utilizing techniques to minimize radiation dose exposure, including the use of iterative reconstruction and automated exposure control. Radiation dose length product (DLP) for this visit:  1784.01 mGy-cm IV Contrast:  100 mL of iohexol (OMNIPAQUE) Enteric Contrast: Enteric contrast was not administered. FINDINGS: CHEST LUNGS: Mild patchy groundglass opacity scattered in the lungs. Interlobular septal thickening is noted. Mild bibasilar atelectasis. There is no tracheal or endobronchial lesion. PLEURA:  Unremarkable. HEART/GREAT VESSELS: Cardiomegaly. No thoracic aortic aneurysm. MEDIASTINUM AND NANCY:  Unremarkable. CHEST WALL AND LOWER NECK: Incidental discovery of one or more thyroid nodule(s) measuring less than 1.5 cm and without suspicious features is noted in this patient who is above 28years old; according to guidelines published in the February 2015 white paper on incidental thyroid nodules in the Journal of the [employer] of Radiology [facility]), no further evaluation is recommended. ABDOMEN LIVER/BILIARY TREE:  Unremarkable. GALLBLADDER:  No calcified gallstones. No pericholecystic inflammatory change. SPLEEN:  Unremarkable. PANCREAS:  Unremarkable. ADRENAL GLANDS:  Unremarkable. KIDNEYS/URETERS: No hydronephrosis. Punctate nonobstructing bilateral renal calculi. Small left renal cysts. One or more sharply circumscribed subcentimeter renal hypodensities are present, too small to accurately characterize, and statistically most likely benign findings. According to recent literature (Radiology 2019) no further workup of these findings is recommended.  STOMACH AND BOWEL: There is colonic diverticulosis without evidence of acute diverticulitis. APPENDIX:  No findings to suggest appendicitis. ABDOMINOPELVIC CAVITY:  No ascites. No pneumoperitoneum. No lymphadenopathy. VESSELS: Atherosclerotic changes are present. No evidence of aneurysm. PELVIS REPRODUCTIVE ORGANS:  Unremarkable for patient's age. URINARY BLADDER:  Unremarkable. ABDOMINAL WALL/INGUINAL REGIONS:  Unremarkable. OSSEOUS STRUCTURES: No acute fracture or destructive osseous lesion. Old bilateral rib fractures. Chronic-appearing mild compression deformity of T11. Spinal degenerative changes are noted. Posterior spinal fusion at L3-L4. Impression: No evidence of traumatic injury in the chest, abdomen or pelvis. Cardiomegaly. There is mild patchy groundglass opacity scattered in the lungs with interlobular septal thickening suggestive of mild interstitial edema. I personally discussed this study with Celio Penn on 9/12/2023 1:50 AM. Workstation performed: [id number]     TRAUMA - CT spine cervical wo contrast    Result Date: 9/12/2023  Narrative: CT CERVICAL SPINE - WITHOUT CONTRAST INDICATION:   TRAUMA. COMPARISON:  None. TECHNIQUE:  CT examination of the cervical spine was performed without intravenous contrast.  Contiguous axial images were obtained. Multiplanar 2D reformatted images were created from the source data. Radiation dose length product (DLP) for this visit:  537.36 mGy-cm . This examination, like all CT scans performed in the [facility], was performed utilizing techniques to minimize radiation dose exposure, including the use of iterative  reconstruction and automated exposure control. IMAGE QUALITY:  Diagnostic. FINDINGS: ALIGNMENT: Grade 1 anterolisthesis of C3 on C4 and C7 on T1 VERTEBRAE:  No fracture. DEGENERATIVE CHANGES: Severe multilevel cervical degenerative changes are noted. No critical central canal stenosis. The bones are demineralized.  PREVERTEBRAL AND PARASPINAL SOFT TISSUES: Incidental discovery of one or more thyroid nodule(s) measuring less than 1.5 cm and without suspicious features is noted in this patient who is above 28years old; according to guidelines published in the February 2015 white paper on incidental thyroid nodules in the Journal of the [employer] of Radiology [facility]), no further evaluation is recommended. THORACIC INLET:  Normal.     Impression: No cervical spine fracture or traumatic malalignment. I personally discussed this study with Steven Voss on 9/12/2023 1:50 AM. Workstation performed: [id number]     TRAUMA - CT head wo contrast    Result Date: 9/12/2023  Narrative: CT BRAIN - WITHOUT CONTRAST INDICATION:   TRAUMA. COMPARISON:  None. TECHNIQUE:  CT examination of the brain was performed. Multiplanar 2D reformatted images were created from the source data. Radiation dose length product (DLP) for this visit:  1028.23 mGy-cm . This examination, like all CT scans performed in the [facility], was performed utilizing techniques to minimize radiation dose exposure, including the use of iterative reconstruction and automated exposure control. IMAGE QUALITY:  Diagnostic. FINDINGS: PARENCHYMA: Decreased attenuation is noted in periventricular and subcortical white matter demonstrating an appearance that is statistically most likely to represent mild microangiopathic change. No CT signs of acute infarction. No intracranial mass, mass effect or midline shift. No acute parenchymal hemorrhage. VENTRICLES AND EXTRA-AXIAL SPACES: Ventricles and extra-axial CSF spaces are prominent commensurate with the degree of volume loss. No hydrocephalus. No acute extra-axial hemorrhage. VISUALIZED ORBITS: Bilateral lens implants. PARANASAL SINUSES: Moderate left mastoid effusion. CALVARIUM AND EXTRACRANIAL SOFT TISSUES:  Normal.     Impression: No acute intracranial abnormality. Moderate left mastoid effusion.  I personally discussed this study with Steven Voss on 9/12/2023 1:50 AM. Workstation performed: [id number]         ** Please Note: Dragon 360 Dictation voice to text software was used in the creation of this document.  **

## 2023-09-12 NOTE — PLAN OF CARE
Problem: Potential for Falls  Goal: Patient will remain free of falls  Description: INTERVENTIONS:  - Educate patient/family on patient safety including physical limitations  - Instruct patient to call for assistance with activity   - Consult OT/PT to assist with strengthening/mobility   - Keep Call bell within reach  - Keep bed low and locked with side rails adjusted as appropriate  - Keep care items and personal belongings within reach  - Initiate and maintain comfort rounds  - Make Fall Risk Sign visible to staff  - Offer Toileting every  Hours, in advance of need  - Initiate/Maintain alarm  - Obtain necessary fall risk management equipment:   - Apply yellow socks and bracelet for high fall risk patients  - Consider moving patient to room near nurses station  Outcome: Progressing     Problem: MOBILITY - ADULT  Goal: Maintain or return to baseline ADL function  Description: INTERVENTIONS:  -  Assess patient's ability to carry out ADLs; assess patient's baseline for ADL function and identify physical deficits which impact ability to perform ADLs (bathing, care of mouth/teeth, toileting, grooming, dressing, etc.)  - Assess/evaluate cause of self-care deficits   - Assess range of motion  - Assess patient's mobility; develop plan if impaired  - Assess patient's need for assistive devices and provide as appropriate  - Encourage maximum independence but intervene and supervise when necessary  - Involve family in performance of ADLs  - Assess for home care needs following discharge   - Consider OT consult to assist with ADL evaluation and planning for discharge  - Provide patient education as appropriate  Outcome: Progressing  Goal: Maintains/Returns to pre admission functional level  Description: INTERVENTIONS:  - Perform BMAT or MOVE assessment daily.   - Set and communicate daily mobility goal to care team and patient/family/caregiver.    - Collaborate with rehabilitation services on mobility goals if consulted  - Perform Range of Motion  times a day. - Reposition patient every  hours.   - Dangle patient  times a day  - Stand patient  times a day  - Ambulate patient  times a day  - Out of bed to chair  times a day   - Out of bed for meals  times a day  - Out of bed for toileting  - Record patient progress and toleration of activity level   Outcome: Progressing     Problem: PAIN - ADULT  Goal: Verbalizes/displays adequate comfort level or baseline comfort level  Description: Interventions:  - Encourage patient to monitor pain and request assistance  - Assess pain using appropriate pain scale  - Administer analgesics based on type and severity of pain and evaluate response  - Implement non-pharmacological measures as appropriate and evaluate response  - Consider cultural and social influences on pain and pain management  - Notify physician/advanced practitioner if interventions unsuccessful or patient reports new pain  Outcome: Progressing     Problem: INFECTION - ADULT  Goal: Absence or prevention of progression during hospitalization  Description: INTERVENTIONS:  - Assess and monitor for signs and symptoms of infection  - Monitor lab/diagnostic results  - Monitor all insertion sites, i.e. indwelling lines, tubes, and drains  - Monitor endotracheal if appropriate and nasal secretions for changes in amount and color  - [place] appropriate cooling/warming therapies per order  - Administer medications as ordered  - Instruct and encourage patient and family to use good hand hygiene technique  - Identify and instruct in appropriate isolation precautions for identified infection/condition  Outcome: Progressing     Problem: SAFETY ADULT  Goal: Patient will remain free of falls  Description: INTERVENTIONS:  - Educate patient/family on patient safety including physical limitations  - Instruct patient to call for assistance with activity   - Consult OT/PT to assist with strengthening/mobility   - Keep Call bell within reach  - Keep bed low and locked with side rails adjusted as appropriate  - Keep care items and personal belongings within reach  - Initiate and maintain comfort rounds  - Make Fall Risk Sign visible to staff  - Offer Toileting every  Hours, in advance of need  - Initiate/Maintain alarm  - Obtain necessary fall risk management equipment:  - Apply yellow socks and bracelet for high fall risk patients  - Consider moving patient to room near nurses station  Outcome: Progressing     Problem: DISCHARGE PLANNING  Goal: Discharge to home or other facility with appropriate resources  Description: INTERVENTIONS:  - Identify barriers to discharge w/patient and caregiver  - Arrange for needed discharge resources and transportation as appropriate  - Identify discharge learning needs (meds, wound care, etc.)  - Arrange for interpretive services to assist at discharge as needed  - Refer to Case Management Department for coordinating discharge planning if the patient needs post-hospital services based on physician/advanced practitioner order or complex needs related to functional status, cognitive ability, or social support system  Outcome: Progressing     Problem: CARDIOVASCULAR - ADULT  Goal: Absence of cardiac dysrhythmias or at baseline rhythm  Description: INTERVENTIONS:  - Continuous cardiac monitoring, vital signs, obtain 12 lead EKG if ordered  - Administer antiarrhythmic and heart rate control medications as ordered  - Monitor electrolytes and administer replacement therapy as ordered  Outcome: Progressing     Problem: METABOLIC, FLUID AND ELECTROLYTES - ADULT  Goal: Fluid balance maintained  Description: INTERVENTIONS:  - Monitor labs   - Monitor I/O and WT  - Instruct patient on fluid and nutrition as appropriate  - Assess for signs & symptoms of volume excess or deficit  Outcome: Progressing     Problem: SKIN/TISSUE INTEGRITY - ADULT  Goal: Skin Integrity remains intact(Skin Breakdown Prevention)  Description: Assess:  -Perform Doe assessment every   -Clean and moisturize skin every   -Inspect skin when repositioning, toileting, and assisting with ADLS  -Assess under medical devices such as every   -Assess extremities for adequate circulation and sensation     Bed Management:  -Have minimal linens on bed & keep smooth, unwrinkled  -Change linens as needed when moist or perspiring  -Avoid sitting or lying in one position for more than  hours while in bed  -Keep HOB at degrees     Toileting:  -Offer bedside commode  -Assess for incontinence every   -Use incontinent care products after each incontinent episode such as     Activity:  -Mobilize patient  times a day  -Encourage activity and walks on unit  -Encourage or provide ROM exercises   -Turn and reposition patient every  Hours  -Use appropriate equipment to lift or move patient in bed  -Instruct/ Assist with weight shifting every  when out of bed in chair  -Consider limitation of chair time  hour intervals    Skin Care:  -Avoid use of baby powder, tape, friction and shearing, hot water or constrictive clothing  -Relieve pressure over bony prominences usin  -Do not massage red bony areas    Next Steps:  -Teach patient strategies to minimize risks such as    -Consider consults to  interdisciplinary teams such as   Outcome: Progressing     Problem: MUSCULOSKELETAL - ADULT  Goal: Maintain or return mobility to safest level of function  Description: INTERVENTIONS:  - Assess patient's ability to carry out ADLs; assess patient's baseline for ADL function and identify physical deficits which impact ability to perform ADLs (bathing, care of mouth/teeth, toileting, grooming, dressing, etc.)  - Assess/evaluate cause of self-care deficits   - Assess range of motion  - Assess patient's mobility  - Assess patient's need for assistive devices and provide as appropriate  - Encourage maximum independence but intervene and supervise when necessary  - Involve family in performance of ADLs  - Assess for home care needs following discharge   - Consider OT consult to assist with ADL evaluation and planning for discharge  - Provide patient education as appropriate  Outcome: Progressing

## 2023-09-12 NOTE — ASSESSMENT & PLAN NOTE
Wt Readings from Last 3 Encounters:   09/12/23 116 kg (255 lb 11.7 oz)     · Follows with [facility] advanced heart failure cardiology (Dr. Delta Lundy), echocardiogram 4/2023 EF 65%, severely increased left ventricular wall thickness, moderately dilated LA, severely dilated RA, mild MR/TR; history of amyloidosis noted  · Patient and wife reporting some increased lower extremity from baseline and patient reporting some increased shortness of breath with exertion. Admits he only takes his Lasix approximately 4 times weekly.   Actually reports weight loss stating he has been attempting to eat healthier  · Trial IV Lasix 40 mg x 1 and appreciate heart failure service evaluation  · Monitor daily weights, I's/O, volume status  · Low-sodium, fluid restricted diet  · Repeat BMP in a.m., replace electrolytes as needed and monitor renal function

## 2023-09-12 NOTE — PROCEDURES
POC FAST US    Date/Time: 9/12/2023 1:07 AM    Performed by: Aleksandra Arevalo DO  Authorized by: Aleksandra Arevalo DO    Patient location:  ED  Other Assisting Provider: No    Procedure details:     Exam Type:  Diagnostic    Indications comment:  Fall    Assess for:  Intra-abdominal fluid and pericardial effusion    Technique: FAST      Views obtained:  Heart - Pericardial sac, RUQ - Domínguez's Pouch, LUQ - Splenorenal space and Suprapubic - Pouch of Rafi    Image quality: diagnostic      Image availability:  Images available in PACS  FAST Findings:     RUQ (Hepatorenal) free fluid: absent      LUQ (Splenorenal) free fluid: absent      Suprapubic free fluid: absent      Cardiac wall motion: identified      Pericardial effusion: absent    Interpretation:     Impressions: negative

## 2023-09-12 NOTE — ASSESSMENT & PLAN NOTE
· Generalized weakness for the past several weeks, with reported multiple falls per wife including this evening while attempting to ambulate upstairs  · Patient did report some increased shortness of breath while ascending stairs. Possible component of heart failure playing role versus deconditioning  · Work-up of all associated problems as below.   PT/OT evaluation will be appreciated

## 2023-09-12 NOTE — PROGRESS NOTES
[id number]  Progress Note  Name: Beth Max  MRN: [id number]  Unit/Bed#: ED 18 I Date of Admission: 9/12/2023   Date of Service: 9/12/2023 I Hospital Day: 0    Assessment/Plan   Multiple abrasions  Assessment & Plan  · Multiple abrasions noted on bilateral knees. · Local wound care  · Tetanus updated    Multiple falls  Assessment & Plan  - report of multiple falls--unclear etiology  -Patient takes Coumadin for history of atrial fibrillation  -CT imaging was negative for acute traumatic injury.  -Based on tertiary evaluation no further imaging is required. Trauma team will sign off. Continue medical work-up, in the setting of multiple falls and possible syncope. TRAUMA TERTIARY SURVEY NOTE    VTE Prophylaxis:Sequential compression device (Venodyne)  and Warfarin (Coumadin)     Disposition: Continue medical work-up. Trauma team will sign off. Code status:  Level 1 - Full Code    Consultants: IP CONSULT TO NUTRITION SERVICES  IP CONSULT TO HEART FAILURE SERVICE  IP CONSULT TO CASE MANAGEMENT  IP CONSULT TO PODIATRY    Subjective     Mechanism of Injury:Fall     Chief Complaint: "I feel okay"    HPI/Last 24 hour events: This an 80-year-old male who suffered multiple falls over the past 24 hours. He was found to have multiple abrasions on bilateral knees. He states that he understands why he needs to stay in the hospital for further evaluation of his falling. He confirms he is able to fully range all of his extremities. He denies any dizziness, lightheadedness. He states that he has been up and ambulatory this morning and has had no difficulty. No other complaints offered. Objective   Vitals:   Temp:  [97.9 °F (36.6 °C)] 97.9 °F (36.6 °C)  HR:  [] 70  Resp:  [12-22] 18  BP: ()/(56-76) 101/75    I/O       09/10 0701 09/11 0700 09/11 0701 09/12 0700 09/12 0701 09/13 0700    P. O.  240     Total Intake(mL/kg)  240 (2.1)     Urine (mL/kg/hr)  825 775 (1.2)    Total Output  825 775    Net  -[phone]                  Physical Exam:   GENERAL APPEARANCE: No acute distress  NEURO: GCS is 15. Light touch sensation intact throughout. No lateralizing weakness. HEENT: Normocephalic, atraumatic. Neck supple. CV: Irregularly irregular  +2 radial and dorsalis pedis pulses, bilaterally. LUNGS: Clear to auscultation, bilaterally. Chest wall is nontender on palpation. No orthopnea. No tachypnea. GI: Abdomen is soft nontender. : Pelvis stable. MSK: Moving all extremities. No deformities. No appreciable joint effusions or hematomas. No C, T, L-spine tenderness. No palpable step-offs. SKIN: Warm, dry. Abrasions on bilateral knees. Invasive Devices     Peripheral Intravenous Line  Duration           Peripheral IV 09/12/23 Left;Ventral (anterior) Forearm <1 day    Peripheral IV 09/12/23 Right Antecubital <1 day                   Lab Results:   Results: I have personally reviewed all pertinent laboratory/tests results, BMP/CMP:   Lab Results   Component Value Date    SODIUM 133 (L) 09/12/2023    K 4.0 09/12/2023     09/12/2023    CO2 16 (L) 09/12/2023    CO2 22 09/12/2023    BUN 26 (H) 09/12/2023    CREATININE 1.17 09/12/2023    GLUCOSE 133 09/12/2023    CALCIUM 8.1 (L) 09/12/2023    AST 40 (H) 09/12/2023    ALT 21 09/12/2023    ALKPHOS 87 09/12/2023    EGFR 57 09/12/2023   , CBC:   Lab Results   Component Value Date    WBC 4.39 09/12/2023    HGB 16.9 09/12/2023    HCT 48.1 09/12/2023    MCV 93 09/12/2023    PLT 92 (L) 09/12/2023    RBC 5.18 09/12/2023    MCH 32.6 09/12/2023    MCHC 35.1 09/12/2023    RDW 13.5 09/12/2023    MPV 11.4 09/12/2023    NRBC 0 09/12/2023    and Coagulation:   Lab Results   Component Value Date    INR 1.68 (H) 09/12/2023       Imaging Results: I have personally reviewed pertinent reports.     Chest Xray(s): negative for acute findings   FAST exam(s): negative for acute findings   CT Scan(s): negative for acute findings   Additional Xray(s): N/A     Other Studies: none

## 2023-09-12 NOTE — ASSESSMENT & PLAN NOTE
· Troponin 116->165 on presentation. Patient without complaints of chest pain although reports some dyspnea while ascending stairs. EKG without acute ischemic change  · Possibly secondary to fall, brief episode of rapid atrial fibrillation, possible acute on chronic heart failure.   Heart failure service to follow

## 2023-09-12 NOTE — ASSESSMENT & PLAN NOTE
Troponin 116->165 on presentation. Patient without complaints of chest pain although reports some dyspnea while ascending stairs. EKG without acute ischemic change  · Possibly secondary to fall, brief episode of rapid atrial fibrillation, possible acute on chronic heart failure.   Heart failure service to follow

## 2023-09-13 PROBLEM — R50.9 FEVER: Status: ACTIVE | Noted: 2023-09-13

## 2023-09-13 LAB
ANION GAP SERPL CALCULATED.3IONS-SCNC: 13 MMOL/L
BASOPHILS # BLD MANUAL: 0.05 THOUSAND/UL (ref 0–0.1)
BASOPHILS NFR MAR MANUAL: 1 % (ref 0–1)
BUN SERPL-MCNC: 25 MG/DL (ref 5–25)
CALCIUM SERPL-MCNC: 8.2 MG/DL (ref 8.4–10.2)
CHLORIDE SERPL-SCNC: 96 MMOL/L (ref 96–108)
CO2 SERPL-SCNC: 26 MMOL/L (ref 21–32)
CREAT SERPL-MCNC: 1.18 MG/DL (ref 0.6–1.3)
EOSINOPHIL # BLD MANUAL: 0 THOUSAND/UL (ref 0–0.4)
EOSINOPHIL NFR BLD MANUAL: 0 % (ref 0–6)
ERYTHROCYTE [DISTWIDTH] IN BLOOD BY AUTOMATED COUNT: 13.4 % (ref 11.6–15.1)
FLUAV RNA RESP QL NAA+PROBE: NEGATIVE
FLUBV RNA RESP QL NAA+PROBE: NEGATIVE
GFR SERPL CREATININE-BSD FRML MDRD: 57 ML/MIN/1.73SQ M
GLUCOSE SERPL-MCNC: 107 MG/DL (ref 65–140)
HCT VFR BLD AUTO: 51.5 % (ref 36.5–49.3)
HGB BLD-MCNC: 17.6 G/DL (ref 12–17)
INR PPP: 1.51 (ref 0.84–1.19)
LYMPHOCYTES # BLD AUTO: 0.4 THOUSAND/UL (ref 0.6–4.47)
LYMPHOCYTES # BLD AUTO: 8 % (ref 14–44)
MAGNESIUM SERPL-MCNC: 1.3 MG/DL (ref 1.9–2.7)
MCH RBC QN AUTO: 31.5 PG (ref 26.8–34.3)
MCHC RBC AUTO-ENTMCNC: 34.2 G/DL (ref 31.4–37.4)
MCV RBC AUTO: 92 FL (ref 82–98)
METAMYELOCYTES NFR BLD MANUAL: 2 % (ref 0–1)
MONOCYTES # BLD AUTO: 0.66 THOUSAND/UL (ref 0–1.22)
MONOCYTES NFR BLD: 13 % (ref 4–12)
NEUTROPHILS # BLD MANUAL: 3.85 THOUSAND/UL (ref 1.85–7.62)
NEUTS BAND NFR BLD MANUAL: 18 % (ref 0–8)
NEUTS SEG NFR BLD AUTO: 58 % (ref 43–75)
PLATELET # BLD AUTO: 74 THOUSANDS/UL (ref 149–390)
PLATELET BLD QL SMEAR: ABNORMAL
PMV BLD AUTO: 11.4 FL (ref 8.9–12.7)
POTASSIUM SERPL-SCNC: 3.2 MMOL/L (ref 3.5–5.3)
PROCALCITONIN SERPL-MCNC: 0.44 NG/ML
PROTHROMBIN TIME: 18.4 SECONDS (ref 11.6–14.5)
RBC # BLD AUTO: 5.59 MILLION/UL (ref 3.88–5.62)
RSV RNA RESP QL NAA+PROBE: NEGATIVE
SARS-COV-2 RNA RESP QL NAA+PROBE: NEGATIVE
SODIUM SERPL-SCNC: 135 MMOL/L (ref 135–147)
WBC # BLD AUTO: 5.06 THOUSAND/UL (ref 4.31–10.16)

## 2023-09-13 PROCEDURE — 83735 ASSAY OF MAGNESIUM: CPT | Performed by: INTERNAL MEDICINE

## 2023-09-13 PROCEDURE — 87040 BLOOD CULTURE FOR BACTERIA: CPT | Performed by: PHYSICIAN ASSISTANT

## 2023-09-13 PROCEDURE — 85027 COMPLETE CBC AUTOMATED: CPT | Performed by: INTERNAL MEDICINE

## 2023-09-13 PROCEDURE — 85007 BL SMEAR W/DIFF WBC COUNT: CPT | Performed by: INTERNAL MEDICINE

## 2023-09-13 PROCEDURE — 85610 PROTHROMBIN TIME: CPT | Performed by: INTERNAL MEDICINE

## 2023-09-13 PROCEDURE — 97535 SELF CARE MNGMENT TRAINING: CPT

## 2023-09-13 PROCEDURE — 97167 OT EVAL HIGH COMPLEX 60 MIN: CPT

## 2023-09-13 PROCEDURE — 99232 SBSQ HOSP IP/OBS MODERATE 35: CPT | Performed by: INTERNAL MEDICINE

## 2023-09-13 PROCEDURE — 97163 PT EVAL HIGH COMPLEX 45 MIN: CPT

## 2023-09-13 PROCEDURE — 80048 BASIC METABOLIC PNL TOTAL CA: CPT | Performed by: INTERNAL MEDICINE

## 2023-09-13 PROCEDURE — 84145 PROCALCITONIN (PCT): CPT | Performed by: PHYSICIAN ASSISTANT

## 2023-09-13 PROCEDURE — 0241U HB NFCT DS VIR RESP RNA 4 TRGT: CPT | Performed by: PHYSICIAN ASSISTANT

## 2023-09-13 RX ORDER — FUROSEMIDE 40 MG/1
40 TABLET ORAL DAILY
Status: DISCONTINUED | OUTPATIENT
Start: 2023-09-13 | End: 2023-09-15 | Stop reason: HOSPADM

## 2023-09-13 RX ORDER — POTASSIUM CHLORIDE 20 MEQ/1
40 TABLET, EXTENDED RELEASE ORAL ONCE
Status: COMPLETED | OUTPATIENT
Start: 2023-09-13 | End: 2023-09-13

## 2023-09-13 RX ORDER — POTASSIUM CHLORIDE 20 MEQ/1
40 TABLET, EXTENDED RELEASE ORAL 2 TIMES DAILY
Status: DISCONTINUED | OUTPATIENT
Start: 2023-09-13 | End: 2023-09-14

## 2023-09-13 RX ORDER — PANTOPRAZOLE SODIUM 40 MG/1
40 TABLET, DELAYED RELEASE ORAL
Status: DISCONTINUED | OUTPATIENT
Start: 2023-09-13 | End: 2023-09-15 | Stop reason: HOSPADM

## 2023-09-13 RX ORDER — MAGNESIUM HYDROXIDE/ALUMINUM HYDROXICE/SIMETHICONE 120; 1200; 1200 MG/30ML; MG/30ML; MG/30ML
30 SUSPENSION ORAL ONCE
Status: COMPLETED | OUTPATIENT
Start: 2023-09-13 | End: 2023-09-13

## 2023-09-13 RX ORDER — OXYCODONE HYDROCHLORIDE 5 MG/1
5 TABLET ORAL EVERY 4 HOURS PRN
Status: DISCONTINUED | OUTPATIENT
Start: 2023-09-13 | End: 2023-09-15 | Stop reason: HOSPADM

## 2023-09-13 RX ORDER — WARFARIN SODIUM 7.5 MG/1
7.5 TABLET ORAL
Status: DISCONTINUED | OUTPATIENT
Start: 2023-09-13 | End: 2023-09-15

## 2023-09-13 RX ORDER — MAGNESIUM SULFATE HEPTAHYDRATE 40 MG/ML
4 INJECTION, SOLUTION INTRAVENOUS ONCE
Status: COMPLETED | OUTPATIENT
Start: 2023-09-13 | End: 2023-09-13

## 2023-09-13 RX ORDER — ACETAMINOPHEN 325 MG/1
650 TABLET ORAL EVERY 6 HOURS PRN
Status: DISCONTINUED | OUTPATIENT
Start: 2023-09-13 | End: 2023-09-15 | Stop reason: HOSPADM

## 2023-09-13 RX ADMIN — PANTOPRAZOLE SODIUM 40 MG: 40 TABLET, DELAYED RELEASE ORAL at 17:17

## 2023-09-13 RX ADMIN — ALUMINUM HYDROXIDE, MAGNESIUM HYDROXIDE, AND SIMETHICONE 30 ML: 200; 200; 20 SUSPENSION ORAL at 17:17

## 2023-09-13 RX ADMIN — POTASSIUM CHLORIDE 40 MEQ: 1500 TABLET, EXTENDED RELEASE ORAL at 17:17

## 2023-09-13 RX ADMIN — FOLIC ACID 2 MG: 1 TABLET ORAL at 08:18

## 2023-09-13 RX ADMIN — FUROSEMIDE 40 MG: 40 TABLET ORAL at 12:50

## 2023-09-13 RX ADMIN — MAGNESIUM SULFATE HEPTAHYDRATE 4 G: 40 INJECTION, SOLUTION INTRAVENOUS at 07:44

## 2023-09-13 RX ADMIN — POTASSIUM CHLORIDE 40 MEQ: 1500 TABLET, EXTENDED RELEASE ORAL at 07:44

## 2023-09-13 RX ADMIN — METOPROLOL SUCCINATE 25 MG: 25 TABLET, EXTENDED RELEASE ORAL at 08:18

## 2023-09-13 RX ADMIN — WARFARIN SODIUM 7.5 MG: 7.5 TABLET ORAL at 17:17

## 2023-09-13 RX ADMIN — ACETAMINOPHEN 650 MG: 325 TABLET, FILM COATED ORAL at 17:18

## 2023-09-13 NOTE — RESTORATIVE TECHNICIAN NOTE
Restorative Technician Note      Patient Name: Mele Tsai     Note Type: Mobility  Patient Position Upon Consult: Supine  Activity Performed: Repositioned  Patient Position at End of Consult: Other (comment); Bed/Chair alarm activated;  All needs within reach (Sitting up in bed)

## 2023-09-13 NOTE — ASSESSMENT & PLAN NOTE
Troponin 116->165 on presentation. Patient without complaints of chest pain although reports some dyspnea while ascending stairs.   EKG without acute ischemic change  · Possibly secondary to fall, brief episode of rapid atrial fibrillation  · Likely non MI troponin elevation

## 2023-09-13 NOTE — ASSESSMENT & PLAN NOTE
Anticoagulated on Coumadin, subtherapeutic on presentation.  Appears home dose warfarin is 5 mg daily  · Giving 7.5 mg warfarin until reaches therapeutic level  · F/u AM INR

## 2023-09-13 NOTE — ASSESSMENT & PLAN NOTE
Generalized weakness for the past several weeks, with reported multiple falls per wife including this evening while attempting to ambulate upstairs.   · Likely due to chronic deconditioning with mild volume overload and electrolyte deficiency  · Plan for discharge to inpatient rehabilitation when medically stable, possibly tomorrow

## 2023-09-13 NOTE — PLAN OF CARE
Problem: OCCUPATIONAL THERAPY ADULT  Goal: Performs self-care activities at highest level of function for planned discharge setting. See evaluation for individualized goals. Description: Treatment Interventions: ADL retraining, Functional transfer training, Endurance training, Cognitive reorientation, Patient/family training, Equipment evaluation/education, Compensatory technique education, Energy conservation, Activityengagement          See flowsheet documentation for full assessment, interventions and recommendations. Note: Limitation: Decreased ADL status, Decreased endurance, Decreased self-care trans, Decreased Safe judgement during ADL, Decreased cognition, Decreased high-level ADLs  Prognosis: Fair  Assessment: Pt is a 80 y.o. male admitted 9/12/23 w generalized weakness, multiple falls and some SOB. Pt w active OT eval and treat orders at this time. Head CT clear at this time. PMH includes  has a past medical history of Atrial fibrillation [facility]), 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[address] [facility]), and Heart failure ([address] [facility]). Pt lives w spouse in a split level home with 0 juan francisco, 6+8 steps to bed/bath which is tub shower with shower chair, standard toilet. Pta, pt was independent w/ ADL/IADL and used rw vs spc for functional mobility, was driving. Currently, pt is Min Ax1 for UB ADL, Max Ax1 for LB ADL, and completed transfers/FM w Mod Ax2. Pt is limited at this time 2* decreased endurance/activtiy tolerance, decreased cognition, decreased ADL/High-level ADL status, decreased self-care trans, decreased safety awareness, limited home support and is a fall risk. This impacts pt's ability to complete UB and LB dressing and bathing, toileting, transfers, functional mobility, community mobility, home and health maintenance, and safe engagement in typical daily routine. The patient's raw score on the AM-PAC Daily Activity inpatient short form is 16, standardized score is 35.96, less than 39.4.  Patients at this level are likely to benefit from discharge to post-acute rehabilitation services. Please refer to the recommendation of the Occupational Therapist for safe discharge planning. From OT standpoint, pt should D/C to STR when medically stable. Pt will benefit from continued acute OT services 2-3 x/wk for 10-14 days to meet goals.      OT Discharge Recommendation: Post acute rehabilitation services

## 2023-09-13 NOTE — ASSESSMENT & PLAN NOTE
· Fever of 100.9 9/12, denies any new symptoms  · Blood cultures sent, await results  · Monitor off antibiotics at this time  · No infections signs at this time  · Greater then 24h fever free  · Possibly due to some tissue damage from the fall and prolonged time on the ground

## 2023-09-13 NOTE — PHYSICAL THERAPY NOTE
PHYSICAL THERAPY EVALUATION  NAME:  Jerel Turner  DATE: 09/13/23    AGE:   80 y.o. Mrn:   [id number]  ADMIT DX:  Atrial fibrillation (HCC) [I48.91]  CKD (chronic kidney disease) [N18.9]  History of congestive heart failure [Z86.79]  Acute on chronic diastolic congestive heart failure (HCC) [I50.33]  Multiple falls [R29.6]  Chronic venous hypertension (idiopathic) with ulcer of right lower extremity ([address]) [I87.311, L97.919]  Unspecified multiple injuries, initial encounter [T07. XXXA]  Problem List:   Patient Active Problem List   Diagnosis    Multiple falls    OLGA (obstructive sleep apnea)    History of DVT (deep vein thrombosis)    Atrial fibrillation (HCC)    Congestive heart failure (CHF) (HCC)    Generalized weakness    Chronic venous hypertension (idiopathic) with ulcer of right lower extremity (HCC)    Elevated troponin    Multiple abrasions       Past Medical History  Past Medical History:   Diagnosis Date    Atrial fibrillation ([address])     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[address])     Heart failure ([address])        Past Surgical History  Past Surgical History:   Procedure Laterality Date    HERNIA REPAIR         Length Of Stay: 1  Performed at least 2 patient identifiers during session: Name and ID bracelet       09/13/23 0956   PT Last Visit   PT Visit Date 09/13/23   Note Type   Note type Evaluation   Pain Assessment   Pain Score No Pain   Restrictions/Precautions   Weight Bearing Precautions Per Order No   Other Precautions Fall Risk;Fluid restriction;Cognitive;Telemetry;Multiple lines; Chair Alarm; Impulsive  ([place])   Home Living   Type of Home House  (bilevel; 0 LORI; 6+8 steps to main)   Home Layout Two level   Bathroom Shower/Tub Tub/shower unit   Bathroom Toilet Standard   Bathroom Equipment Shower chair   Home Equipment Walker;Cane  (use both RW and SPC occ at baseline)   Prior Function   Level of [place] Independent with ADLs; Independent with functional mobility; Independent with IADLS   Lives With Spouse Falls in the last 6 months 0   Vocational Retired   General   Family/Caregiver Present No   Cognition   Overall Cognitive Status Impaired   Arousal/Participation Alert   Orientation Level Oriented to person;Disoriented to place; Disoriented to time;Disoriented to situation   Following Commands Follows one step commands with increased time or repetition   RLE Assessment   RLE Assessment WFL   LLE Assessment   LLE Assessment WFL   Vision-Basic Assessment   Current Vision Wears glasses all the time   Coordination   Sensation WFL   Bed Mobility   Supine to Sit 3  Moderate assistance   Additional items Assist x 2;Verbal cues; Increased time required;LE management;HOB elevated   Additional Comments pt denied dizziness with movement   Transfers   Sit to Stand 3  Moderate assistance   Additional items Assist x 2;Verbal cues; Increased time required   Stand to Sit 3  Moderate assistance   Additional items Assist x 2;Verbal cues; Increased time required   Toilet transfer 3  Moderate assistance   Additional items Assist x 2;Standard toilet;Verbal cues; Increased time required  (grab bar; Max A with pericare)   Additional Comments cues for direction and proper hand placement   Ambulation/Elevation   Gait pattern Decreased foot clearance; Improper Weight shift; Short stride; Excessively slow   Gait Assistance 3  Moderate assist   Additional items Assist x 1;Verbal cues   Assistive Device Rolling walker   Distance 6 ft x 2   Ambulation/Elevation Additional Comments cues to keep feet inside RW   Balance   Static Sitting Fair   Dynamic Sitting Fair -   Static Standing Poor +   Dynamic Standing Poor   Ambulatory Poor   Endurance Deficit   Endurance Deficit Yes   Endurance Deficit Description fatigue noted with activity   Activity Tolerance   Activity Tolerance Patient limited by fatigue   Assessment   Prognosis Fair   Assessment Pt is 80 y.o. male seen for PT evaluation s/p admit to [facility] on 9/12/2023 w/ Generalized weakness. PT consulted to assess pt's functional mobility and d/c needs. Order placed for PT eval and tx, w/ up as tolerated order. Pt agreeable to PT  session upon arrival, pt found supine in bed. PTA, pt was independent w/ all functional mobility w/ SPC and RW, lives w/  in 2 level home and retired. Pt to benefit from continued PT tx to address deficits and maximize level of functional independent mobility and consistency. From PT/mobility standpoint, recommendation at time of d/c would be post acute rehabilitation services pending progress in order to facilitate return to PLOF. Upon conclusion pt  seated in recliner. Complexity: Comorbidities affecting pt's physical performance at time of assessment include: CHF, a fib and multiple falls. Personal factors affecting pt at time of IE include: advanced age, history of falls, lives in [facility], ambulating with assistive device, inability to ambulate household distances, decreased cognition and impulsivity. Please find objective findings from PT assessment regarding body systems outlined above with impairments and limitations including impaired balance, decreased endurance, gait deviations, decreased activity tolerance, decreased functional mobility tolerance, decreased safety awareness, impaired judgement, fall risk and decreased cognition. Pt's clinical presentation is currently unstable/unpredictable seen in pt's presentation of tachycardia, abnormal H&H, abnormal potassium levels, abnormal calcium levels, confusion and wounds. The patient's AM-PAC Basic Mobility Inpatient Short Form Raw Score is 7. A Raw score of less than or equal to 16 suggests the patient may benefit from discharge to post-acute rehabilitation services. Please also refer to the recommendation of the Physical Therapist for safe discharge planning. RN verbalized pt appropriate for PT session.  Pt seen as a co-eval with OT due to the patient's co-morbidities, clinically unstable presentation, and present impairments which are a regression from the patient's baseline. Goals   Patient Goals to go to the bathroom   LTG Expiration Date 09/23/23   Long Term Goal #1 Pt will: Perform bed mobility tasks to modified I to improve ease of bed mobility. Perform transfers to modified I to improve ease of transfers. Perform ambulation with MI and RW for 250 feet to  increase Indep in home environment. Increase dynamic standing balance to F+ to decrease fall risk. Increase OOB activity tolerance to 10 minutes without s/s of exertion to decrease fall risk. Navigate up and down 14 steps with MI so patient can enter and exit main floor of home. Plan   Treatment/Interventions Functional transfer training;Elevations; Therapeutic exercise; Endurance training;Gait training;Bed mobility; Equipment eval/education   PT Frequency 3-5x/wk   Recommendation   PT Discharge Recommendation Post acute rehabilitation services   Equipment Recommended   (pt owns a RW)   AM-PAC Basic Mobility Inpatient   Turning in Flat Bed Without Bedrails 2   Lying on Back to Sitting on Edge of Flat Bed Without Bedrails 1   Moving Bed to Chair 1   Standing Up From Chair Using Arms 1   Walk in Room 1   Climb 3-5 Stairs With Railing 1   Basic Mobility Inpatient Raw Score 7   Turning Head Towards Sound 4   Follow Simple Instructions 3   Low Function Basic Mobility Raw Score  14   Low Function Basic Mobility Standardized Score  22.01   Highest Level Of Mobility   JH-HLM Goal 2: Bed activities/Dependent transfer   JH-HLM Achieved 6: Walk 10 steps or more       Time In: 0928  Time Out: 0956  Total Evaluation Minutes: 28    Jessi Kirby, PT

## 2023-09-13 NOTE — ASSESSMENT & PLAN NOTE
Wife reporting multiple falls at home described primarily as mechanical in nature, including apparent fall while ambulating upstairs prior to admission  · Trauma service following, imaging negative for acute traumatic injuries  · PT/OT evaluations pending   · No events on telemetry  · Plan for inpatient rehabilitation

## 2023-09-13 NOTE — CASE MANAGEMENT
Case Management Assessment    Patient name Brook Zhao  Location [facility] [id number] MRN [id number]  : 1940 Date 2023       Current Admission Date: 2023  Current Admission Diagnosis:Generalized weakness   Patient Active Problem List    Diagnosis Date Noted   • Fever 2023   • Multiple falls 2023   • OLGA (obstructive sleep apnea) 2023   • History of DVT (deep vein thrombosis) 2023   • Atrial fibrillation ([address]) 2023   • Congestive heart failure (CHF) ([address]) 2023   • Generalized weakness 2023   • Chronic venous hypertension (idiopathic) with ulcer of right lower extremity ([address]) 2023   • Elevated troponin 2023   • Multiple abrasions 2023      LOS (days): 1  Geometric Mean LOS (GMLOS) (days): 2.50  Days to GMLOS:1.1     OBJECTIVE:  Risk of Unplanned Readmission Score: 10.24   Current admission status: Inpatient    Preferred Pharmacy:   [address] [address]  Phone: [phone] Fax: [phone]    Primary Care Provider: Ballard Phalen, DO    Primary Insurance: MEDICARE  [facility]    ASSESSMENT:  Mayra Proxies    There are no active Health Care Proxies on file. Advance Directives  Does patient have a [address]?: No  Does patient have Advance Directives?: No  Primary Contact: Pt's wife Syed Ferguson / phone# [phone]    Patient Information  Admitted from[de-identified] Home  Mental Status: Alert  Assessment information provided by[de-identified] Patient  Primary Caregiver: Self  Support Systems: Spouse/significant other  [facility]: [address] [place] [address] do you live in?: [place] entry access options.  Select all that apply.: No steps to enter home  Type of Current Residence: Other (Comment), 2 story home (split-level house)  Upon entering residence, is there a bedroom on the main floor (no further steps)?: No  A bedroom is located on the following floor levels of residence (select all that apply):: 2nd Floor  Upon entering residence, is there a bathroom on the main floor (no further steps)?: No  Number of steps to 2nd floor from main floor: One Flight  In the last 12 months, was there a time when you were not able to pay the mortgage or rent on time?: No  In the last 12 months, how many places have you lived?: 1  In the last 12 months, was there a time when you did not have a steady place to sleep or slept in a shelter (including now)?: No  Homeless/housing insecurity resource given?: N/A  Living Arrangements: Lives w/ Spouse/significant other  Is patient a ?: Yes    Activities of Daily Living Prior to Admission  Functional Status: Independent  Completes ADLs independently?: Yes  Ambulates independently?: Yes  Does patient use assisted devices?: Yes  Assisted Devices (DME) used: CPAP  Does patient currently own DME?: Yes  What DME does the patient currently own?: Straight [employer]ane  Does the patient have a history of Short-Term Rehab?: No  Does patient have a history of HHC?: No    Patient Information Continued  Income Source: Pension/[employer]  Does patient have prescription coverage?: Yes  Within the past 12 months, you worried that your food would run out before you got the money to buy more.: Never true  Within the past 12 months, the food you bought just didn't last and you didn't have money to get more.: Never true  Food insecurity resource given?: N/A  Does patient receive dialysis treatments?: No  Does patient have a history of substance abuse?: No  Does patient have a history of Mental Health Diagnosis?: No    Means of Transportation  Means of Transport to Appts[de-identified] Drives Self  In the past 12 months, has lack of transportation kept you from medical appointments or from getting medications?: No  In the past 12 months, has lack of transportation kept you from meetings, work, or from getting things needed for daily living?: No     Additional Comments: CM reviewed d/c planning process including the following: identifying help at home, patient preference for d/c planning needs, availability of treatment team to discuss questions or concerns patient and/or family may have regarding understanding medications and recognizing signs and symptoms once discharged. CM also encouraged patient to follow up with all recommended appointments after discharge. Patient advised of importance for patient and family to participate in managing patient’s medical well being. Patient/caregiver received discharge checklist. Content reviewed. Patient/caregiver encouraged to participate in discharge plan of care prior to discharge home.

## 2023-09-13 NOTE — H&P
[address] [facility]  H&P  Name: Jamila Obregon 80 y.o. male I MRN: [id number]  [facility] I Date of Admission: 9/12/2023   Date of Service: 9/13/2023 I Hospital Day: 1      Assessment/Plan   * Generalized weakness  Assessment & Plan  Generalized weakness for the past several weeks, with reported multiple falls per wife including this evening while attempting to ambulate upstairs  · Patient did report some increased shortness of breath while ascending stairs. Possible component of heart failure playing role versus deconditioning? · Work-up of all associated problems as below. · PT/OT evaluation will be appreciated    Fever  Assessment & Plan  · Fever of 100.9 yesterday, denies any new symptoms  · Blood cultures sent, await results  · Monitor off antibiotics at this time    Multiple abrasions  Assessment & Plan  · S/p trauma evaluation   · Podiatry following for wound care    Elevated troponin  Assessment & Plan  Troponin 116->165 on presentation. Patient without complaints of chest pain although reports some dyspnea while ascending stairs. EKG without acute ischemic change  · Possibly secondary to fall, brief episode of rapid atrial fibrillation, possible acute on chronic heart failure. Heart failure service to follow    Chronic venous hypertension (idiopathic) with ulcer of right lower extremity (HCC)  Assessment & Plan  · POA, wounds do not appear acutely infected.   Follows with [facility] podiatry for wound care, appreciate evaluation while inpatient    Congestive heart failure (CHF) ([address])  Assessment & Plan  Wt Readings from Last 3 Encounters:   09/12/23 111 kg (244 lb 4.3 oz)     Follows with [facility] advanced heart failure cardiology (Dr. Lana Martinez), echocardiogram 4/2023 EF 65%, severely increased left ventricular wall thickness, moderately dilated LA, severely dilated RA, mild MR/TR; history of amyloidosis noted  · Patient and wife reporting some increased lower extremity from baseline and patient reporting some increased shortness of breath with exertion. · Admits he only takes his Lasix approximately 4 times weekly. Actually reports weight loss stating he has been attempting to eat healthier  · S/P IV Lasix 40 mg x 1 and appreciate heart failure service evaluation  · Monitor daily weights, I's/O, volume status  · Low-sodium, fluid restricted diet  · Repeat BMP in a.m., replace electrolytes as needed and monitor renal function      Atrial fibrillation (HCC)  Assessment & Plan  Briefly in RVR on presentation here, however now rate controlled without intervention  · Continue metoprolol succinate 25 mg daily  · Continue anticoagulation with Coumadin    History of DVT (deep vein thrombosis)  Assessment & Plan  Anticoagulated on Coumadin, subtherapeutic on presentation. Appears home dose warfarin is 5 mg daily  · Giving 7.5 mg warfarin on 9/13  · F/u AM INR     OLGA (obstructive sleep apnea)  Assessment & Plan  · Continue nightly CPAP    Multiple falls  Assessment & Plan  Wife reporting multiple falls at home described primarily as mechanical in nature, including apparent fall while ambulating upstairs prior to admission  · Trauma service following, imaging negative for acute traumatic injuries  · PT/OT evaluations pending   · Monitor on telemetry and check orthostatics for now given question of syncope           VTE Pharmacologic Prophylaxis: VTE Score: 4 Moderate Risk (Score 3-4) - Pharmacological DVT Prophylaxis Ordered: heparin. Patient Centered Rounds: I performed bedside rounds with nursing staff today. Discussions with Specialists or Other Care Team Provider: nursePILY    Education and Discussions with Family / Patient: Patient declined call to . Total Time Spent on Date of Encounter in care of patient: 40 mins.  This time was spent on one or more of the following: performing physical exam; counseling and coordination of care; obtaining or reviewing history; documenting in the medical record; reviewing/ordering tests, medications or procedures; communicating with other healthcare professionals and discussing with patient's family/caregivers. Current Length of Stay: 1 day(s)  Current Patient Status: Inpatient   Certification Statement: The patient will continue to require additional inpatient hospital stay due to montior temps  Discharge Plan: Anticipate discharge in 48-72 hrs to rehab facility. Code Status: Level 1 - Full Code    Subjective:   Denies any new complaints. Feels tired and fatigued. Denies cough, sore throat, diarrhea, dysuria, pain    Objective:     Vitals:   Temp (24hrs), Av.2 °F (37.3 °C), Min:98.4 °F (36.9 °C), Max:100.9 °F (38.3 °C)    Temp:  [98.4 °F (36.9 °C)-100.9 °F (38.3 °C)] 98.6 °F (37 °C)  HR:  [] 87  Resp:  [18] 18  BP: (115-142)/(68-97) 115/79  SpO2:  [97 %-99 %] 98 %  Body mass index is 31.36 kg/m². Input and Output Summary (last 24 hours): Intake/Output Summary (Last 24 hours) at 2023 1313  Last data filed at 2023 0818  Gross per 24 hour   Intake 420 ml   Output 397 ml   Net 23 ml       Physical Exam:   Physical Exam  Constitutional:       Appearance: Normal appearance. HENT:      Head: Normocephalic and atraumatic. Nose: Nose normal.      Mouth/Throat:      Mouth: Mucous membranes are moist.      Pharynx: Oropharynx is clear. No oropharyngeal exudate or posterior oropharyngeal erythema. Eyes:      Extraocular Movements: Extraocular movements intact. Cardiovascular:      Rate and Rhythm: Normal rate and regular rhythm. Heart sounds: No murmur heard. No gallop. Pulmonary:      Effort: Pulmonary effort is normal.      Breath sounds: No wheezing or rales. Abdominal:      General: There is no distension. Palpations: Abdomen is soft. Musculoskeletal:      Right lower leg: Edema present. Left lower leg: Edema present.    Skin:     Comments: Multiple abrasions and skin tears, no visibly infected wounds   Neurological:      General: No focal deficit present. Mental Status: He is alert and oriented to person, place, and time. Psychiatric:         Mood and Affect: Mood normal.         Behavior: Behavior normal.          Additional Data:     Labs:  Results from last 7 days   Lab Units 09/13/23  0529 09/12/23  0421 09/12/23  0059   WBC Thousand/uL 5.06   < > 4.38   HEMOGLOBIN g/dL 17.6*   < > 16.5   I STAT HEMOGLOBIN g/dl  --   --  16.3   HEMATOCRIT % 51.5*   < > 48.4   HEMATOCRIT, ISTAT %  --   --  48   PLATELETS Thousands/uL 74*   < > 101*   BANDS PCT % 18*  --   --    NEUTROS PCT %  --   --  67   LYMPHS PCT %  --   --  16   LYMPHO PCT % 8*  --   --    MONOS PCT %  --   --  14*   MONO PCT % 13*  --   --    EOS PCT % 0  --  0    < > = values in this interval not displayed.      Results from last 7 days   Lab Units 09/13/23 0529 09/12/23  0059   SODIUM mmol/L 135 133*   POTASSIUM mmol/L 3.2* 4.0   CHLORIDE mmol/L 96 101   CO2 mmol/L 26 16*   CO2, I-STAT mmol/L  --  22   BUN mg/dL 25 26*   CREATININE mg/dL 1.18 1.17   ANION GAP mmol/L 13 16   CALCIUM mg/dL 8.2* 8.1*   ALBUMIN g/dL  --  3.5   TOTAL BILIRUBIN mg/dL  --  1.53*   ALK PHOS U/L  --  87   ALT U/L  --  21   AST U/L  --  40*   GLUCOSE RANDOM mg/dL 107 126     Results from last 7 days   Lab Units 09/13/23  0529   INR  1.51*             Results from last 7 days   Lab Units 09/13/23  0823 09/12/23  0309 09/12/23  0059   LACTIC ACID mmol/L  --  1.7 3.1*   PROCALCITONIN ng/ml 0.44*  --   --        Lines/Drains:  Invasive Devices     Peripheral Intravenous Line  Duration           Peripheral IV 09/12/23 Right Antecubital 1 day          Drain  Duration           External Urinary Catheter <1 day                      Imaging: Reviewed radiology reports from this admission including: chest CT scan, abdominal/pelvic CT, CT head and ultrasound(s)    Recent Cultures (last 7 days):         Last 24 Hours Medication List: Current Facility-Administered Medications   Medication Dose Route Frequency Provider Last Rate   • folic acid  2 mg Oral Daily Hakeem Vasquez DO     • furosemide  40 mg Oral Daily Nancy Butler PA-C     • metoprolol succinate  25 mg Oral Daily Hakeem Vasquez DO     • neomycin-bacitracin-polymyxin b  1 small application Topical PRN Nidia Castellanos DPM     • ondansetron  4 mg Intravenous Q6H PRN Hakeem Vasquez DO     • potassium chloride  40 mEq Oral BID Nancy Butler PA-C     • warfarin  7.5 mg Oral Daily (warfarin) Chito Monae MD          Today, Patient Was Seen By: Zack Diane MD    **Please Note: This note may have been constructed using a voice recognition system. **

## 2023-09-13 NOTE — WOUND OSTOMY CARE
Wound care consulted for patient for B/L leg blisters, podiatry has seen patient and placed orders. Wound care will sign off at this time.        Larissa PRAJAPATIN, RN, [facility]

## 2023-09-13 NOTE — PLAN OF CARE
Problem: PHYSICAL THERAPY ADULT  Goal: Performs mobility at highest level of function for planned discharge setting. See evaluation for individualized goals. Description: Treatment/Interventions: Functional transfer training, Elevations, Therapeutic exercise, Endurance training, Gait training, Bed mobility, Equipment eval/education  Equipment Recommended:  (pt owns a RW)       See flowsheet documentation for full assessment, interventions and recommendations. Outcome: Progressing  Note: Prognosis: Fair     Assessment: Pt is 80 y.o. male seen for PT evaluation s/p admit to [address] [place] [address] on 9/12/2023 w/ Generalized weakness. PT consulted to assess pt's functional mobility and d/c needs. Order placed for PT eval and tx, w/ up as tolerated order. Pt agreeable to PT  session upon arrival, pt found supine in bed. PTA, pt was independent w/ all functional mobility w/ SPC and RW, lives w/  in 2 level home and retired. Pt to benefit from continued PT tx to address deficits and maximize level of functional independent mobility and consistency. From PT/mobility standpoint, recommendation at time of d/c would be post acute rehabilitation services pending progress in order to facilitate return to PLOF. Upon conclusion pt  seated in recliner. Complexity: Comorbidities affecting pt's physical performance at time of assessment include: CHF, a fib and multiple falls. Personal factors affecting pt at time of IE include: advanced age, history of falls, lives in [facility], ambulating with assistive device, inability to ambulate household distances, decreased cognition and impulsivity.  Please find objective findings from PT assessment regarding body systems outlined above with impairments and limitations including impaired balance, decreased endurance, gait deviations, decreased activity tolerance, decreased functional mobility tolerance, decreased safety awareness, impaired judgement, fall risk and decreased cognition. Pt's clinical presentation is currently unstable/unpredictable seen in pt's presentation of tachycardia, abnormal H&H, abnormal potassium levels, abnormal calcium levels, confusion and wounds. The patient's AM-PAC Basic Mobility Inpatient Short Form Raw Score is 7. A Raw score of less than or equal to 16 suggests the patient may benefit from discharge to post-acute rehabilitation services. Please also refer to the recommendation of the Physical Therapist for safe discharge planning. RN verbalized pt appropriate for PT session. Pt seen as a co-eval with OT due to the patient's co-morbidities, clinically unstable presentation, and present impairments which are a regression from the patient's baseline. PT Discharge Recommendation: Post acute rehabilitation services    See flowsheet documentation for full assessment.

## 2023-09-13 NOTE — PROGRESS NOTES
[address] [facility]  Progress note  Name: Brook Zhao 80 y.o. male I MRN: [id number]  Unit/Bed#: [facility] 528-01 I Date of Admission: 9/12/2023   Date of Service: 9/13/2023 I Hospital Day: 1           Assessment/Plan   * Generalized weakness  Assessment & Plan  Generalized weakness for the past several weeks, with reported multiple falls per wife including this evening while attempting to ambulate upstairs  • Patient did report some increased shortness of breath while ascending stairs. Possible component of heart failure playing role versus deconditioning? • Work-up of all associated problems as below. • PT/OT evaluation will be appreciated     Fever  Assessment & Plan  • Fever of 100.9 yesterday, denies any new symptoms  • Blood cultures sent, await results  • Monitor off antibiotics at this time     Multiple abrasions  Assessment & Plan  • S/p trauma evaluation   • Podiatry following for wound care     Elevated troponin  Assessment & Plan  Troponin 116->165 on presentation. Patient without complaints of chest pain although reports some dyspnea while ascending stairs. EKG without acute ischemic change  • Possibly secondary to fall, brief episode of rapid atrial fibrillation, possible acute on chronic heart failure. 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   [facility]s [facility] for [facility] care, appreciate evaluation while inpatient     Congestive heart failure (CHF) ([address])  Assessment & Plan      Wt Readings from Last 3 Encounters:   09/12/23 111 kg (244 lb 4.3 oz)      Follows with [facility]'s advanced heart failure cardiology (Dr. Yoko Esteban), echocardiogram 4/2023 EF 65%, severely increased left ventricular wall thickness, moderately dilated LA, severely dilated RA, mild MR/TR; history of amyloidosis noted  • Patient and wife reporting some increased lower extremity from baseline and patient reporting some increased shortness of breath with exertion. ? Admits he only takes his Lasix approximately 4 times weekly. Actually reports weight loss stating he has been attempting to eat healthier  • S/P IV Lasix 40 mg x 1 and appreciate heart failure service evaluation  • Monitor daily weights, I's/O, volume status  • Low-sodium, fluid restricted diet  • Repeat BMP in a.m., replace electrolytes as needed and monitor renal function        Atrial fibrillation (HCC)  Assessment & Plan  Briefly in RVR on presentation here, however now rate controlled without intervention  • Continue metoprolol succinate 25 mg daily  • Continue anticoagulation with Coumadin     History of DVT (deep vein thrombosis)  Assessment & Plan  Anticoagulated on Coumadin, subtherapeutic on presentation. Appears home dose warfarin is 5 mg daily  • Giving 7.5 mg warfarin on 9/13  • F/u AM INR      OLGA (obstructive sleep apnea)  Assessment & Plan  • Continue nightly CPAP     Multiple falls  Assessment & Plan  Wife reporting multiple falls at home described primarily as mechanical in nature, including apparent fall while ambulating upstairs prior to admission  • Trauma service following, imaging negative for acute traumatic injuries  • PT/OT evaluations pending   • Monitor on telemetry and check orthostatics for now given question of syncope                 VTE Pharmacologic Prophylaxis: VTE Score: 4 Moderate Risk (Score 3-4) - Pharmacological DVT Prophylaxis Ordered: heparin.     Patient Centered Rounds: I performed bedside rounds with nursing staff today. Discussions with Specialists or Other Care Team Provider: nursePILY     Education and Discussions with Family / Patient: Patient declined call to .      Total Time Spent on Date of Encounter in care of patient: 40 mins.  This time was spent on one or more of the following: performing physical exam; counseling and coordination of care; obtaining or reviewing history; documenting in the medical record; reviewing/ordering tests, medications or procedures; communicating with other healthcare professionals and discussing with patient's family/caregivers.     Current Length of Stay: 1 day(s)  Current Patient Status: Inpatient   Certification Statement: The patient will continue to require additional inpatient hospital stay due to montior temps  Discharge Plan: Anticipate discharge in 48-72 hrs to rehab facility.     Code Status: Level 1 - Full Code     Subjective:   Denies any new complaints. Feels tired and fatigued. Denies cough, sore throat, diarrhea, dysuria, pain     Objective:      Vitals:   Temp (24hrs), Av.2 °F (37.3 °C), Min:98.4 °F (36.9 °C), Max:100.9 °F (38.3 °C)     Temp:  [98.4 °F (36.9 °C)-100.9 °F (38.3 °C)] 98.6 °F (37 °C)  HR:  [] 87  Resp:  [18] 18  BP: (115-142)/(68-97) 115/79  SpO2:  [97 %-99 %] 98 %  Body mass index is 31.36 kg/m².      Input and Output Summary (last 24 hours):      Intake/Output Summary (Last 24 hours) at 2023 1313  Last data filed at 2023 0818      Gross per 24 hour   Intake 420 ml   Output 397 ml   Net 23 ml         Physical Exam:   Physical Exam  Constitutional:       Appearance: Normal appearance. HENT:      Head: Normocephalic and atraumatic. Nose: Nose normal.      Mouth/Throat:      Mouth: Mucous membranes are moist.      Pharynx: Oropharynx is clear. No oropharyngeal exudate or posterior oropharyngeal erythema. Eyes:      Extraocular Movements: Extraocular movements intact. Cardiovascular:      Rate and Rhythm: Normal rate and regular rhythm. Heart sounds: No murmur heard. No gallop. Pulmonary:      Effort: Pulmonary effort is normal.      Breath sounds: No wheezing or rales. Abdominal:      General: There is no distension. Palpations: Abdomen is soft. Musculoskeletal:      Right lower leg: Edema present.       Left lower leg: Edema present. Skin:     Comments: Multiple abrasions and skin tears, no visibly infected wounds   Neurological:      General: No focal deficit present. Mental Status: He is alert and oriented to person, place, and time.    Psychiatric:         Mood and Affect: Mood normal.         Behavior: Behavior normal.            Additional Data:      Labs:         Results from last 7 days   Lab Units 09/13/23  0529 09/12/23  0421 09/12/23  0059   WBC Thousand/uL 5.06   < > 4.38   HEMOGLOBIN g/dL 17.6*   < > 16.5   I STAT HEMOGLOBIN g/dl  --   --  16.3   HEMATOCRIT % 51.5*   < > 48.4   HEMATOCRIT, ISTAT %  --   --  48   PLATELETS Thousands/uL 74*   < > 101*   BANDS PCT % 18*  --   --    NEUTROS PCT %  --   --  67   LYMPHS PCT %  --   --  16   LYMPHO PCT % 8*  --   --    MONOS PCT %  --   --  14*   MONO PCT % 13*  --   --    EOS PCT % 0  --  0    < > = values in this interval not displayed.            Results from last 7 days   Lab Units 09/13/23  0529 09/12/23  0059   SODIUM mmol/L 135 133*   POTASSIUM mmol/L 3.2* 4.0   CHLORIDE mmol/L 96 101   CO2 mmol/L 26 16*   CO2, I-STAT mmol/L  --  22   BUN mg/dL 25 26*   CREATININE mg/dL 1.18 1.17   ANION GAP mmol/L 13 16   CALCIUM mg/dL 8.2* 8.1*   ALBUMIN g/dL  --  3.5   TOTAL BILIRUBIN mg/dL  --  1.53*   ALK PHOS U/L  --  87   ALT U/L  --  21   AST U/L  --  40*   GLUCOSE RANDOM mg/dL 107 126           Results from last 7 days   Lab Units 09/13/23  0529   INR   1.51*                     Results from last 7 days   Lab Units 09/13/23  0823 09/12/23  0309 09/12/23  0059   LACTIC ACID mmol/L  --  1.7 3.1*   PROCALCITONIN ng/ml 0.44*  --   --          Lines/Drains:      Invasive Devices            Peripheral Intravenous Line  Duration             Peripheral IV 09/12/23 Right Antecubital 1 day                  Drain  Duration             External Urinary Catheter <1 day                             Imaging: Reviewed radiology reports from this admission including: chest CT scan, abdominal/pelvic CT, CT head and ultrasound(s)     Recent Cultures (last 7 days):          Last 24 Hours Medication List:            Current Facility-Administered Medications   Medication Dose Route Frequency Provider Last Rate   • folic acid  2 mg Oral Daily Mario Frazier DO     • furosemide  40 mg Oral Daily Devon Ashford PA-C     • metoprolol succinate  25 mg Oral Daily Mario Frazier DO     • neomycin-bacitracin-polymyxin b  1 small application Topical PRN Mak Fuller DPM     • ondansetron  4 mg Intravenous Q6H PRN Mario Frazier DO     • potassium chloride  40 mEq Oral BID Devon Ashford PA-C     • warfarin  7.5 mg Oral Daily (warfarin) Mary Garcia MD           Today, Patient Was Seen By: Sandra Willard MD     **Please Note: This note may have been constructed using a voice recognition system. **

## 2023-09-13 NOTE — ASSESSMENT & PLAN NOTE
Wt Readings from Last 3 Encounters:   09/12/23 111 kg (244 lb 4.3 oz)     Follows with [facility] advanced heart failure cardiology (Dr. Rachael Erickson), echocardiogram 4/2023 EF 65%, severely increased left ventricular wall thickness, moderately dilated LA, severely dilated RA, mild MR/TR; history of amyloidosis noted  · Patient and wife reporting some increased lower extremity from baseline and patient reporting some increased shortness of breath with exertion. · Admits he only takes his Lasix approximately 4 times weekly.   Actually reports weight loss stating he has been attempting to eat healthier  · S/P IV Lasix 40 mg x 1 and appreciate heart failure service evaluation  · Monitor daily weights, I's/O, volume status  · Low-sodium, fluid restricted diet  · Continue home diuretic regimen, appears euvolemic

## 2023-09-13 NOTE — OCCUPATIONAL THERAPY NOTE
Occupational Therapy Evaluation/treat     Patient Name: Kam Mccoy  SLHKZ'O Date: 9/13/2023  Problem List  Principal Problem:    Generalized weakness  Active Problems:    Multiple falls    OLGA (obstructive sleep apnea)    History of DVT (deep vein thrombosis)    Atrial fibrillation (HCC)    Congestive heart failure (CHF) (HCC)    Chronic venous hypertension (idiopathic) with ulcer of right lower extremity (HCC)    Elevated troponin    Multiple abrasions    Fever    Past Medical History  Past Medical History:   Diagnosis Date    Atrial fibrillation ([address])     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HCC)     Heart failure [facility])      Past Surgical History  Past Surgical History:   Procedure Laterality Date    HERNIA REPAIR             09/13/23 0957   OT Last Visit   OT Visit Date 09/13/23   Note Type   Note type Evaluation   Pain Assessment   Pain Assessment Tool 0-10   Pain Score No Pain   Restrictions/Precautions   Weight Bearing Precautions Per Order No   Other Precautions Cognitive; Chair Alarm; Bed Alarm;Multiple lines; Fall Risk   Home Living   Type of [address] [place] [address] Two level;Bed/bath upstairs  (0 juan francisco, 6+8 steps to bed/bath)   Bathroom Shower/Tub Tub/shower unit   Bathroom Toilet Standard   Bathroom Equipment Shower chair   Bathroom Accessibility Accessible   Home Equipment Walker;Cane  (rw and spc both used at baseline.)   Prior Function   Level of [place] Independent with ADLs; Independent with functional mobility; Independent with IADLS   Lives With Spouse   Receives Help From Family   IADLs Independent with driving; Independent with meal prep; Independent with medication management   Falls in the last 6 months (S)    (pt reports 0 falls, but per chart review has had several)   Vocational Retired   Lifestyle   Autonomy Pta, pt was I W ADL/IADL, reports spc vs rw as needed.  +    Reciprocal Relationships supportive spouse   Service to Others retired    Intrinsic Gratification gardening and making suzette gennaro   General   Additional Pertinent History pt questionable historian at this time, only oriented to self   Subjective   Subjective "I didn't fall, I don't know what they're talking about"   ADL   Where Assessed Edge of bed   Eating Assistance 6  Modified independent   Grooming Assistance 5  Supervision/Setup   UB Bathing Assistance 4  Minimal Assistance   LB Bathing Assistance 2  Maximal Assistance   UB Dressing Assistance 4  Minimal Assistance   LB Dressing Assistance 2  Maximal [address]  2  Maximal Assistance   Functional Assistance 3  Moderate Assistance   Bed Mobility   Supine to Sit 3  Moderate assistance   Additional items Assist x 2; Increased time required;Verbal cues   Additional Comments found in bed, left in chair all needs in reach and alarm on. requires vc for inc overall safety awareness and sequencing. Transfers   Sit to Stand 3  Moderate assistance   Additional items Assist x 2; Increased time required;Verbal cues   Stand to Sit 3  Moderate assistance   Additional items Assist x 2; Increased time required;Verbal cues   Toilet transfer 3  Moderate assistance   Additional items Assist x 2; Increased time required;Verbal cues;Standard toilet   Additional Comments c rw, vc for hand placement. Functional Mobility   Functional Mobility 3  Moderate assistance   Additional Comments ax1   Additional items Rolling walker   Balance   Static Sitting Fair   Dynamic Sitting Fair -   Static Standing Poor +   Dynamic Standing Poor   Ambulatory Poor   Activity Tolerance   Activity Tolerance Patient limited by fatigue  (cognition)   Medical Staff Made Aware PT 2' pts med complexity, comorbidities and regression from baseline.    Nurse Made Aware ok per RN   RUE Assessment   RUE Assessment WFL   LUE Assessment   LUE Assessment WFL   Hand Function   Gross Motor Coordination Functional   Fine Motor Coordination Functional   Cognition   Overall Cognitive Status Impaired Arousal/Participation Responsive   Attention Attends with cues to redirect   Orientation Level Oriented to person;Disoriented to place; Disoriented to time;Disoriented to situation   Memory Decreased recall of precautions;Decreased recall of recent events   Following Commands Follows one step commands with increased time or repetition   Comments (S)  pt cooperative, requires inc time to follow one step commands, has overall dec safety awareness and insight to condition t/o session. resistant to edu, does not recall falling at home and denies any limitations at this time. Assessment   Limitation Decreased ADL status; Decreased endurance;Decreased self-care trans;Decreased Safe judgement during ADL;Decreased cognition;Decreased high-level ADLs   Prognosis Fair   Assessment Pt is a 80 y.o. male admitted 9/12/23 w generalized weakness, multiple falls and some SOB. Pt w active OT eval and treat orders at this time. Head CT clear at this time. PMH includes  has a past medical history of Atrial fibrillation [facility]), 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[address] [facility]), and Heart failure ([address] [facility]). Pt lives w spouse in a split level home with 0 juan francisco, 6+8 steps to bed/bath which is tub shower with shower chair, standard toilet. Pta, pt was independent w/ ADL/IADL and used rw vs spc for functional mobility, was driving. Currently, pt is Min Ax1 for UB ADL, Max Ax1 for LB ADL, and completed transfers/FM w Mod Ax2. Pt is limited at this time 2* decreased endurance/activtiy tolerance, decreased cognition, decreased ADL/High-level ADL status, decreased self-care trans, decreased safety awareness, limited home support and is a fall risk. This impacts pt's ability to complete UB and LB dressing and bathing, toileting, transfers, functional mobility, community mobility, home and health maintenance, and safe engagement in typical daily routine.  The patient's raw score on the AM-PAC Daily Activity inpatient short form is 16, standardized score is 35.96, less than 39.4. Patients at this level are likely to benefit from discharge to post-acute rehabilitation services. Please refer to the recommendation of the Occupational Therapist for safe discharge planning. From OT standpoint, pt should D/C to STR when medically stable. Pt will benefit from continued acute OT services 2-3 x/wk for 10-14 days to meet goals. Goals   Patient Goals go to the bathroom   LTG Time Frame 10-14   Long Term Goal #1 see below   Plan   Treatment Interventions ADL retraining;Functional transfer training; Endurance training;Cognitive reorientation;Patient/family training;Equipment evaluation/education; Compensatory technique education; Energy conservation; Activityengagement   Goal Expiration Date 09/27/23   OT Treatment Day 1   OT Frequency 2-3x/wk   Recommendation   OT Discharge Recommendation Post acute rehabilitation services   AM-PAC Daily Activity Inpatient   Lower Body Dressing 2   Bathing 2   Toileting 2   Upper Body Dressing 3   Grooming 3   Eating 4   Daily Activity Raw Score 16   Daily Activity Standardized Score (Calc for Raw Score >=11) 35.96   AM-PAC Applied Cognition Inpatient   Following a Speech/Presentation 4   Understanding Ordinary Conversation 3   Taking Medications 2   Remembering Where Things Are Placed or Put Away 2   Remembering List of 4-5 Errands 2   Taking Care of Complicated Tasks 1   Applied Cognition Raw Score 14   Applied Cognition Standardized Score 32.02   Modified Humphreys Scale   Modified Humphreys Scale 4   Additional Treatment Session   Start Time 0945   End Time 0957   Treatment Assessment follow up tx session focusing on toileting. pt requires mod Ax2 to complete toilet transfers from standard commode, max A perineal hygiene at this time. appears to have difficulty with sequencing tasks needed for toileting completion. End of Consult   Education Provided Yes   Patient Position at End of Consult Bedside chair; All needs within reach;Bed/Chair alarm activated   Nurse Communication Nurse aware of consult     Pt will complete functional mobility with Mod I using appropriate DME as needed. Pt will complete UB dressing and bathing with Mod I using appropriate DME as needed. Pt will complete LB dressing and bathing with Mod I using appropriate DME as needed. Pt will complete transfers with Mod I using appropriate DME as needed. Pt will complete toileting with Mod I using appropriate DME as needed. Pt will complete home maintenance task with Mod I using appropriate DME as needed. Pt will utilize energy conservation techniques throughout functional activity/ADL s/p skilled education. Pt will demonstrate increased safety awareness during functional tasks/ADL's s/p skilled education. Pt will increase activity tolerance to 30 minutes in order to complete ADL's/ functional tasks, using appropriate DME as needed. Pt will engage in ongoing cog assessment w/ G participation to assist with safe d/c planning.      Sunday FRANCHESCA Vora, OTR/L

## 2023-09-14 PROBLEM — D69.6 THROMBOCYTOPENIA (HCC): Status: ACTIVE | Noted: 2023-09-14

## 2023-09-14 LAB
ALBUMIN SERPL BCP-MCNC: 3.3 G/DL (ref 3.5–5)
ALP SERPL-CCNC: 79 U/L (ref 34–104)
ALT SERPL W P-5'-P-CCNC: 36 U/L (ref 7–52)
ANION GAP SERPL CALCULATED.3IONS-SCNC: 7 MMOL/L
AST SERPL W P-5'-P-CCNC: 124 U/L (ref 13–39)
BILIRUB DIRECT SERPL-MCNC: 0.51 MG/DL (ref 0–0.2)
BILIRUB SERPL-MCNC: 1.95 MG/DL (ref 0.2–1)
BUN SERPL-MCNC: 21 MG/DL (ref 5–25)
CALCIUM SERPL-MCNC: 8 MG/DL (ref 8.4–10.2)
CHLORIDE SERPL-SCNC: 100 MMOL/L (ref 96–108)
CO2 SERPL-SCNC: 31 MMOL/L (ref 21–32)
CREAT SERPL-MCNC: 1.03 MG/DL (ref 0.6–1.3)
ERYTHROCYTE [DISTWIDTH] IN BLOOD BY AUTOMATED COUNT: 13.7 % (ref 11.6–15.1)
FLUAV RNA RESP QL NAA+PROBE: NEGATIVE
FLUBV RNA RESP QL NAA+PROBE: NEGATIVE
GFR SERPL CREATININE-BSD FRML MDRD: 67 ML/MIN/1.73SQ M
GLUCOSE SERPL-MCNC: 103 MG/DL (ref 65–140)
GLUCOSE SERPL-MCNC: 88 MG/DL (ref 65–140)
HCT VFR BLD AUTO: 49.6 % (ref 36.5–49.3)
HGB BLD-MCNC: 17.2 G/DL (ref 12–17)
INR PPP: 1.92 (ref 0.84–1.19)
MAGNESIUM SERPL-MCNC: 2.1 MG/DL (ref 1.9–2.7)
MCH RBC QN AUTO: 32.4 PG (ref 26.8–34.3)
MCHC RBC AUTO-ENTMCNC: 34.7 G/DL (ref 31.4–37.4)
MCV RBC AUTO: 93 FL (ref 82–98)
PLATELET # BLD AUTO: 64 THOUSANDS/UL (ref 149–390)
PMV BLD AUTO: 11.7 FL (ref 8.9–12.7)
POTASSIUM SERPL-SCNC: 4.2 MMOL/L (ref 3.5–5.3)
PROCALCITONIN SERPL-MCNC: 0.37 NG/ML
PROT SERPL-MCNC: 6.3 G/DL (ref 6.4–8.4)
PROTHROMBIN TIME: 22.2 SECONDS (ref 11.6–14.5)
RBC # BLD AUTO: 5.31 MILLION/UL (ref 3.88–5.62)
RSV RNA RESP QL NAA+PROBE: NEGATIVE
SARS-COV-2 RNA RESP QL NAA+PROBE: NEGATIVE
SODIUM SERPL-SCNC: 138 MMOL/L (ref 135–147)
WBC # BLD AUTO: 4.89 THOUSAND/UL (ref 4.31–10.16)

## 2023-09-14 PROCEDURE — 80076 HEPATIC FUNCTION PANEL: CPT | Performed by: INTERNAL MEDICINE

## 2023-09-14 PROCEDURE — 85027 COMPLETE CBC AUTOMATED: CPT | Performed by: INTERNAL MEDICINE

## 2023-09-14 PROCEDURE — 99233 SBSQ HOSP IP/OBS HIGH 50: CPT | Performed by: INTERNAL MEDICINE

## 2023-09-14 PROCEDURE — 80048 BASIC METABOLIC PNL TOTAL CA: CPT | Performed by: PHYSICIAN ASSISTANT

## 2023-09-14 PROCEDURE — 84145 PROCALCITONIN (PCT): CPT | Performed by: INTERNAL MEDICINE

## 2023-09-14 PROCEDURE — 82948 REAGENT STRIP/BLOOD GLUCOSE: CPT

## 2023-09-14 PROCEDURE — 0241U HB NFCT DS VIR RESP RNA 4 TRGT: CPT | Performed by: INTERNAL MEDICINE

## 2023-09-14 PROCEDURE — 83735 ASSAY OF MAGNESIUM: CPT | Performed by: PHYSICIAN ASSISTANT

## 2023-09-14 PROCEDURE — 85610 PROTHROMBIN TIME: CPT | Performed by: INTERNAL MEDICINE

## 2023-09-14 RX ADMIN — FOLIC ACID 2 MG: 1 TABLET ORAL at 08:38

## 2023-09-14 RX ADMIN — TAFAMIDIS 1 CAPSULE: 61 CAPSULE, LIQUID FILLED ORAL at 08:38

## 2023-09-14 RX ADMIN — PANTOPRAZOLE SODIUM 40 MG: 40 TABLET, DELAYED RELEASE ORAL at 05:11

## 2023-09-14 RX ADMIN — POTASSIUM CHLORIDE 40 MEQ: 1500 TABLET, EXTENDED RELEASE ORAL at 08:38

## 2023-09-14 RX ADMIN — FUROSEMIDE 40 MG: 40 TABLET ORAL at 08:38

## 2023-09-14 RX ADMIN — METOPROLOL SUCCINATE 25 MG: 25 TABLET, EXTENDED RELEASE ORAL at 08:38

## 2023-09-14 RX ADMIN — WARFARIN SODIUM 7.5 MG: 7.5 TABLET ORAL at 17:30

## 2023-09-14 NOTE — CASE MANAGEMENT
Case Management Note    Patient name Varinder Caceres  Location [facility] 739-01 MRN [id number]  : 1940 Date 2023       Current Admission Date: 2023  Current Admission Diagnosis:Generalized weakness      LOS (days): 2  Geometric Mean LOS (GMLOS) (days): 2.50  Days to GMLOS:0.1     OBJECTIVE:  Risk of Unplanned Readmission Score: 10.92   Current admission status: Inpatient   Primary Insurance: MEDICARE  Secondary Insurance: [facility]    DISCHARGE DETAILS:  Discharge planning discussed with[de-identified] Patient  Freedom of Choice: Yes  Were Treatment Team discharge recommendations reviewed with patient/caregiver?: Yes  Did patient/caregiver verbalize understanding of patient care needs?: Yes  Were patient/caregiver advised of the risks associated with not following Treatment Team discharge recommendations?: Yes    Treatment Team Recommendation: Short Term Rehab    Additional Comments: Pt is recommended for short-term skilled rehab placement for his aftercare plan. CM spoke to pt about this aftercare recommendation. Pt is agreeable w/ this recommendation. CM provided pt w/ freedom of choice for SNF referrals. Pt granted permission for CM to perform a provider search by sending referrals to local area SNFs that can provide pt w/ the rehab he requires. CM made A[facility] referrals to same. CM to follow.

## 2023-09-14 NOTE — RESTORATIVE TECHNICIAN NOTE
Restorative Technician Note      Patient Name: Fina Emery     Note Type: Mobility  Patient Position Upon Consult: Supine  Activity Performed: Transferred; Stood  Assistive Device: Roller walker  Patient Position at [employer] of Consult: Bedside chair;  All needs within reach; Bed/Chair alarm activated

## 2023-09-14 NOTE — RESTORATIVE TECHNICIAN NOTE
Restorative Technician Note      Patient Name: Belton Severin     Note Type: Mobility  Patient Position Upon Consult: Bedside chair  Activity Performed: Ambulated  Assistive Device: Roller walker  Patient Position at End of Consult: Bedside chair;  All needs within reach; Bed/Chair alarm activated

## 2023-09-14 NOTE — PLAN OF CARE
Problem: Potential for Falls  Goal: Patient will remain free of falls  Description: INTERVENTIONS:  - Educate patient/family on patient safety including physical limitations  - Instruct patient to call for assistance with activity   - Consult OT/PT to assist with strengthening/mobility   - Keep Call bell within reach  - Keep bed low and locked with side rails adjusted as appropriate  - Keep care items and personal belongings within reach  - Initiate and maintain comfort rounds  - Make Fall Risk Sign visible to staff  - Offer Toileting every  Hours, in advance of need  - Initiate/Maintain alarm  - Obtain necessary fall risk management equipment:   - Apply yellow socks and bracelet for high fall risk patients  - Consider moving patient to room near nurses station  Outcome: Progressing     Problem: MOBILITY - ADULT  Goal: Maintain or return to baseline ADL function  Description: INTERVENTIONS:  -  Assess patient's ability to carry out ADLs; assess patient's baseline for ADL function and identify physical deficits which impact ability to perform ADLs (bathing, care of mouth/teeth, toileting, grooming, dressing, etc.)  - Assess/evaluate cause of self-care deficits   - Assess range of motion  - Assess patient's mobility; develop plan if impaired  - Assess patient's need for assistive devices and provide as appropriate  - Encourage maximum independence but intervene and supervise when necessary  - Involve family in performance of ADLs  - Assess for home care needs following discharge   - Consider OT consult to assist with ADL evaluation and planning for discharge  - Provide patient education as appropriate  Outcome: Progressing  Goal: Maintains/Returns to pre admission functional level  Description: INTERVENTIONS:  - Perform BMAT or MOVE assessment daily.   - Set and communicate daily mobility goal to care team and patient/family/caregiver.    - Collaborate with rehabilitation services on mobility goals if consulted  - Perform Range of Motion times a day. - Reposition patient every  hours.   - Dangle patient  times a day  - Stand patient  times a day  - Ambulate patient times a day  - Out of bed to chair  times a day   - Out of bed for meals  times a day  - Out of bed for toileting  - Record patient progress and toleration of activity level   Outcome: Progressing     Problem: PAIN - ADULT  Goal: Verbalizes/displays adequate comfort level or baseline comfort level  Description: Interventions:  - Encourage patient to monitor pain and request assistance  - Assess pain using appropriate pain scale  - Administer analgesics based on type and severity of pain and evaluate response  - Implement non-pharmacological measures as appropriate and evaluate response  - Consider cultural and social influences on pain and pain management  - Notify physician/advanced practitioner if interventions unsuccessful or patient reports new pain  Outcome: Progressing     Problem: INFECTION - ADULT  Goal: Absence or prevention of progression during hospitalization  Description: INTERVENTIONS:  - Assess and monitor for signs and symptoms of infection  - Monitor lab/diagnostic results  - Monitor all insertion sites, i.e. indwelling lines, tubes, and drains  - Monitor endotracheal if appropriate and nasal secretions for changes in amount and color  - [place] appropriate cooling/warming therapies per order  - Administer medications as ordered  - Instruct and encourage patient and family to use good hand hygiene technique  - Identify and instruct in appropriate isolation precautions for identified infection/condition  Outcome: Progressing     Problem: SAFETY ADULT  Goal: Patient will remain free of falls  Description: INTERVENTIONS:  - Educate patient/family on patient safety including physical limitations  - Instruct patient to call for assistance with activity   - Consult OT/PT to assist with strengthening/mobility   - Keep Call bell within reach  - Keep bed low and locked with side rails adjusted as appropriate  - Keep care items and personal belongings within reach  - Initiate and maintain comfort rounds  - Make Fall Risk Sign visible to staff  - Offer Toileting every  Hours, in advance of need  - Initiate/Maintain alarm  - Obtain necessary fall risk management equipment:   - Apply yellow socks and bracelet for high fall risk patients  - Consider moving patient to room near nurses station  Outcome: Progressing     Problem: DISCHARGE PLANNING  Goal: Discharge to home or other facility with appropriate resources  Description: INTERVENTIONS:  - Identify barriers to discharge w/patient and caregiver  - Arrange for needed discharge resources and transportation as appropriate  - Identify discharge learning needs (meds, wound care, etc.)  - Arrange for interpretive services to assist at discharge as needed  - Refer to Case Management Department for coordinating discharge planning if the patient needs post-hospital services based on physician/advanced practitioner order or complex needs related to functional status, cognitive ability, or social support system  Outcome: Progressing     Problem: CARDIOVASCULAR - ADULT  Goal: Absence of cardiac dysrhythmias or at baseline rhythm  Description: INTERVENTIONS:  - Continuous cardiac monitoring, vital signs, obtain 12 lead EKG if ordered  - Administer antiarrhythmic and heart rate control medications as ordered  - Monitor electrolytes and administer replacement therapy as ordered  Outcome: Progressing     Problem: METABOLIC, FLUID AND ELECTROLYTES - ADULT  Goal: Fluid balance maintained  Description: INTERVENTIONS:  - Monitor labs   - Monitor I/O and WT  - Instruct patient on fluid and nutrition as appropriate  - Assess for signs & symptoms of volume excess or deficit  Outcome: Progressing     Problem: SKIN/TISSUE INTEGRITY - ADULT  Goal: Skin Integrity remains intact(Skin Breakdown Prevention)  Description: Assess:  -Perform Doe assessment every   -Clean and moisturize skin every   -Inspect skin when repositioning, toileting, and assisting with ADLS  -Assess under medical devices such as  every  -Assess extremities for adequate circulation and sensation     Bed Management:  -Have minimal linens on bed & keep smooth, unwrinkled  -Change linens as needed when moist or perspiring  -Avoid sitting or lying in one position for more than  hours while in bed  -Keep HOB at degrees     Toileting:  -Offer bedside commode  -Assess for incontinence every   -Use incontinent care products after each incontinent episode such as     Activity:  -Mobilize patient  times a day  -Encourage activity and walks on unit  -Encourage or provide ROM exercises   -Turn and reposition patient every  Hours  -Use appropriate equipment to lift or move patient in bed  -Instruct/ Assist with weight shifting every  when out of bed in chair  -Consider limitation of chair time  hour intervals    Skin Care:  -Avoid use of baby powder, tape, friction and shearing, hot water or constrictive clothing  -Relieve pressure over bony prominences using   -Do not massage red bony areas    Next Steps:  -Teach patient strategies to minimize risks such as   -Consider consults to  interdisciplinary teams such as  Outcome: Progressing     Problem: MUSCULOSKELETAL - ADULT  Goal: Maintain or return mobility to safest level of function  Description: INTERVENTIONS:  - Assess patient's ability to carry out ADLs; assess patient's baseline for ADL function and identify physical deficits which impact ability to perform ADLs (bathing, care of mouth/teeth, toileting, grooming, dressing, etc.)  - Assess/evaluate cause of self-care deficits   - Assess range of motion  - Assess patient's mobility  - Assess patient's need for assistive devices and provide as appropriate  - Encourage maximum independence but intervene and supervise when necessary  - Involve family in performance of ADLs  - Assess for home care needs following discharge   - Consider OT consult to assist with ADL evaluation and planning for discharge  - Provide patient education as appropriate  Outcome: Progressing     Problem: Nutrition/Hydration-ADULT  Goal: Nutrient/Hydration intake appropriate for improving, restoring or maintaining nutritional needs  Description: Monitor and assess patient's nutrition/hydration status for malnutrition. Collaborate with interdisciplinary team and initiate plan and interventions as ordered. Monitor patient's weight and dietary intake as ordered or per policy. Utilize nutrition screening tool and intervene as necessary. Determine patient's food preferences and provide high-protein, high-caloric foods as appropriate.      INTERVENTIONS:  - Monitor oral intake, urinary output, labs, and treatment plans  - Assess nutrition and hydration status and recommend course of action  - Evaluate amount of meals eaten  - Assist patient with eating if necessary   - Allow adequate time for meals  - Recommend/ encourage appropriate diets, oral nutritional supplements, and vitamin/mineral supplements  - Order, calculate, and assess calorie counts as needed  - Recommend, monitor, and adjust tube feedings and TPN/PPN based on assessed needs  - Assess need for intravenous fluids  - Provide specific nutrition/hydration education as appropriate  - Include patient/family/caregiver in decisions related to nutrition  Outcome: Progressing

## 2023-09-14 NOTE — ASSESSMENT & PLAN NOTE
· Presented with thrombocytopenia which has been gradually decreasing since admission. · No history of low platelets in recent outpatient labs.   · Possible due to consumption from his trauma +- decreased production from amyloidosis or medication side effect (colchicine)  · No active bleeding  · Mild petechia of the RUE and some scattered ecchymosis  · Monitor platelet counts for now

## 2023-09-14 NOTE — PROGRESS NOTES
[address] [facility]  Progress Note  Name: Jean Pierre Sheppard  MRN: [id number]  Unit/Bed#: [facility] 007-75 I Date of Admission: 9/12/2023   Date of Service: 9/14/2023 I Hospital Day: 2    Assessment/Plan   * Generalized weakness  Assessment & Plan  Generalized weakness for the past several weeks, with reported multiple falls per wife including this evening while attempting to ambulate upstairs. · Likely due to chronic deconditioning with mild volume overload and electrolyte deficiency  · Plan for discharge to inpatient rehabilitation when medically stable, possibly tomorrow    Fever  Assessment & Plan  · Fever of 100.9 9/12, denies any new symptoms  · Blood cultures sent, await results  · Monitor off antibiotics at this time  · No infections signs at this time  · Greater then 24h fever free  · Possibly due to some tissue damage from the fall and prolonged time on the ground    Multiple abrasions  Assessment & Plan  · S/p trauma evaluation   · Podiatry following for wound care    Elevated troponin  Assessment & Plan  Troponin 116->165 on presentation. Patient without complaints of chest pain although reports some dyspnea while ascending stairs. 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   Follows with [facility] podiatry for wound care, appreciate evaluation while inpatient    Congestive heart failure (CHF) ([address])  Assessment & Plan  Wt Readings from Last 3 Encounters:   09/12/23 111 kg (244 lb 4.3 oz)     Follows with [facility] advanced heart failure cardiology (Dr. Deidra Hurd), echocardiogram 4/2023 EF 65%, severely increased left ventricular wall thickness, moderately dilated LA, severely dilated RA, mild MR/TR; history of amyloidosis noted  · Patient and wife reporting some increased lower extremity from baseline and patient reporting some increased shortness of breath with exertion. · Admits he only takes his Lasix approximately 4 times weekly. Actually reports weight loss stating he has been attempting to eat healthier  · S/P IV Lasix 40 mg x 1 and appreciate heart failure service evaluation  · Monitor daily weights, I's/O, volume status  · Low-sodium, fluid restricted diet  · Continue home diuretic regimen, appears euvolemic      Atrial fibrillation ([address])  Assessment & Plan  Briefly in RVR on presentation here, however now rate controlled without intervention  · Continue metoprolol succinate 25 mg daily  · Continue anticoagulation with Coumadin    History of DVT (deep vein thrombosis)  Assessment & Plan  Anticoagulated on Coumadin, subtherapeutic on presentation. Appears home dose warfarin is 5 mg daily  · Giving 7.5 mg warfarin until reaches therapeutic level  · F/u AM INR     OLGA (obstructive sleep apnea)  Assessment & Plan  · Continue nightly CPAP    Multiple falls  Assessment & Plan  Wife reporting multiple falls at home described primarily as mechanical in nature, including apparent fall while ambulating upstairs prior to admission  · Trauma service following, imaging negative for acute traumatic injuries  · PT/OT evaluations pending   · No events on telemetry  · Plan for inpatient rehabilitation               VTE Pharmacologic Prophylaxis: VTE Score: 4 Moderate Risk (Score 3-4) - Pharmacological DVT Prophylaxis Ordered: heparin. Patient Centered Rounds: I performed bedside rounds with nursing staff today. Discussions with Specialists or Other Care Team Provider: nurse, CM    Education and Discussions with Family / Patient: Updated  (wife and daughter) at bedside. Total Time Spent on Date of Encounter in care of patient: 40 mins.  This time was spent on one or more of the following: performing physical exam; counseling and coordination of care; obtaining or reviewing history; documenting in the medical record; reviewing/ordering tests, medications or procedures; communicating with other healthcare professionals and discussing with patient's family/caregivers. Current Length of Stay: 2 day(s)  Current Patient Status: Inpatient   Certification Statement: The patient will continue to require additional inpatient hospital stay due to monitor platelet count  Discharge Plan: Anticipate discharge in 24-48 hrs to rehab facility. Code Status: Level 1 - Full Code    Subjective:   Denies any new complaints. Feeling better. Family at bedside, reports that he appears improved and edema is improved. Objective:     Vitals:   Temp (24hrs), Av.9 °F (36.6 °C), Min:97.2 °F (36.2 °C), Max:98.5 °F (36.9 °C)    Temp:  [97.2 °F (36.2 °C)-98.5 °F (36.9 °C)] 98.5 °F (36.9 °C)  HR:  [72-83] 78  Resp:  [19-22] 19  BP: (110-127)/(71-86) 110/81  SpO2:  [94 %-99 %] 95 %  Body mass index is 31.46 kg/m². Input and Output Summary (last 24 hours): Intake/Output Summary (Last 24 hours) at 2023 1607  Last data filed at 2023 1458  Gross per 24 hour   Intake 480 ml   Output 800 ml   Net -320 ml       Physical Exam:   Physical Exam  Constitutional:       Appearance: Normal appearance. HENT:      Head: Normocephalic and atraumatic. Nose: Nose normal.      Mouth/Throat:      Mouth: Mucous membranes are moist.      Pharynx: Oropharynx is clear. Eyes:      Extraocular Movements: Extraocular movements intact. Cardiovascular:      Rate and Rhythm: Normal rate and regular rhythm. Pulmonary:      Effort: Pulmonary effort is normal.      Breath sounds: No wheezing or rales. Abdominal:      Palpations: Abdomen is soft. Musculoskeletal:      Right lower leg: No edema. Left lower leg: No edema. Skin:     General: Skin is warm and dry.       Comments: Multiple abrasions  Ecchymosis and petechia of the arms Neurological:      General: No focal deficit present. Mental Status: He is alert and oriented to person, place, and time. Psychiatric:         Mood and Affect: Mood normal.         Behavior: Behavior normal.          Additional Data:     Labs:  Results from last 7 days   Lab Units 09/14/23  0509 09/13/23  0529 09/12/23  0421 09/12/23  0059   WBC Thousand/uL 4.89 5.06   < > 4.38   HEMOGLOBIN g/dL 17.2* 17.6*   < > 16.5   I STAT HEMOGLOBIN g/dl  --   --   --  16.3   HEMATOCRIT % 49.6* 51.5*   < > 48.4   HEMATOCRIT, ISTAT %  --   --   --  48   PLATELETS Thousands/uL 64* 74*   < > 101*   BANDS PCT %  --  18*  --   --    NEUTROS PCT %  --   --   --  67   LYMPHS PCT %  --   --   --  16   LYMPHO PCT %  --  8*  --   --    MONOS PCT %  --   --   --  14*   MONO PCT %  --  13*  --   --    EOS PCT %  --  0  --  0    < > = values in this interval not displayed. Results from last 7 days   Lab Units 09/14/23  0509   SODIUM mmol/L 138   POTASSIUM mmol/L 4.2   CHLORIDE mmol/L 100   CO2 mmol/L 31   BUN mg/dL 21   CREATININE mg/dL 1.03   ANION GAP mmol/L 7   CALCIUM mg/dL 8.0*   ALBUMIN g/dL 3.3*   TOTAL BILIRUBIN mg/dL 1.95*   ALK PHOS U/L 79   ALT U/L 36   AST U/L 124*   GLUCOSE RANDOM mg/dL 88     Results from last 7 days   Lab Units 09/14/23  0509   INR  1.92*     Results from last 7 days   Lab Units 09/14/23  1137   POC GLUCOSE mg/dl 103         Results from last 7 days   Lab Units 09/14/23  0509 09/13/23  0823 09/12/23  0309 09/12/23  0059   LACTIC ACID mmol/L  --   --  1.7 3.1*   PROCALCITONIN ng/ml 0.37* 0.44*  --   --        Lines/Drains:  Invasive Devices     Peripheral Intravenous Line  Duration           Peripheral IV 09/13/23 Distal;Left;Upper;Ventral (anterior) Arm 1 day          Drain  Duration           External Urinary Catheter 1 day                      Imaging: No pertinent imaging reviewed.     Recent Cultures (last 7 days):   Results from last 7 days   Lab Units 09/13/23  0855   BLOOD CULTURE  No Growth at 24 hrs. No Growth at 24 hrs. Last 24 Hours Medication List:   Current Facility-Administered Medications   Medication Dose Route Frequency Provider Last Rate   • acetaminophen  650 mg Oral Q6H PRN Britney Bruno MD     • folic acid  2 mg Oral Daily Elvan Pounds, DO     • furosemide  40 mg Oral Daily Carlitos Conn PA-C     • metoprolol succinate  25 mg Oral Daily Elvan Pounds, DO     • neomycin-bacitracin-polymyxin b  1 small application Topical PRN [facility] Mark, DPM     • ondansetron  4 mg Intravenous Q6H PRN Johnathan Moralesnds, DO     • oxyCODONE  5 mg Oral Q4H PRN Britney Bruno MD     • oxyCODONE  2.5 mg Oral Q4H PRN Britney Bruno MD     • pantoprazole  40 mg Oral Early Morning Britney Bruno MD     • Tafamidis  1 capsule Oral Daily Britney Bruno MD     • warfarin  7.5 mg Oral Daily (warfarin) Britney Bruno MD          Today, Patient Was Seen By: Lonny Corrales MD    **Please Note: This note may have been constructed using a voice recognition system. **

## 2023-09-15 VITALS
SYSTOLIC BLOOD PRESSURE: 133 MMHG | HEART RATE: 72 BPM | BODY MASS INDEX: 30.98 KG/M2 | RESPIRATION RATE: 20 BRPM | HEIGHT: 74 IN | TEMPERATURE: 97.7 F | WEIGHT: 241.4 LBS | OXYGEN SATURATION: 97 % | DIASTOLIC BLOOD PRESSURE: 106 MMHG

## 2023-09-15 LAB
ALBUMIN SERPL BCP-MCNC: 3.5 G/DL (ref 3.5–5)
ALP SERPL-CCNC: 87 U/L (ref 34–104)
ALT SERPL W P-5'-P-CCNC: 42 U/L (ref 7–52)
ANION GAP SERPL CALCULATED.3IONS-SCNC: 8 MMOL/L
AST SERPL W P-5'-P-CCNC: 101 U/L (ref 13–39)
BILIRUB SERPL-MCNC: 2.1 MG/DL (ref 0.2–1)
BUN SERPL-MCNC: 23 MG/DL (ref 5–25)
CALCIUM SERPL-MCNC: 8.3 MG/DL (ref 8.4–10.2)
CHLORIDE SERPL-SCNC: 100 MMOL/L (ref 96–108)
CO2 SERPL-SCNC: 29 MMOL/L (ref 21–32)
CREAT SERPL-MCNC: 0.99 MG/DL (ref 0.6–1.3)
ERYTHROCYTE [DISTWIDTH] IN BLOOD BY AUTOMATED COUNT: 13.6 % (ref 11.6–15.1)
GFR SERPL CREATININE-BSD FRML MDRD: 70 ML/MIN/1.73SQ M
GLUCOSE SERPL-MCNC: 90 MG/DL (ref 65–140)
HCT VFR BLD AUTO: 52.6 % (ref 36.5–49.3)
HGB BLD-MCNC: 18.1 G/DL (ref 12–17)
INR PPP: 2.55 (ref 0.84–1.19)
MCH RBC QN AUTO: 32.3 PG (ref 26.8–34.3)
MCHC RBC AUTO-ENTMCNC: 34.4 G/DL (ref 31.4–37.4)
MCV RBC AUTO: 94 FL (ref 82–98)
PLATELET # BLD AUTO: 79 THOUSANDS/UL (ref 149–390)
PMV BLD AUTO: 12.1 FL (ref 8.9–12.7)
POTASSIUM SERPL-SCNC: 3.8 MMOL/L (ref 3.5–5.3)
PROT SERPL-MCNC: 6.9 G/DL (ref 6.4–8.4)
PROTHROMBIN TIME: 27.7 SECONDS (ref 11.6–14.5)
RBC # BLD AUTO: 5.61 MILLION/UL (ref 3.88–5.62)
SODIUM SERPL-SCNC: 137 MMOL/L (ref 135–147)
WBC # BLD AUTO: 5.37 THOUSAND/UL (ref 4.31–10.16)

## 2023-09-15 PROCEDURE — 99232 SBSQ HOSP IP/OBS MODERATE 35: CPT | Performed by: STUDENT IN AN ORGANIZED HEALTH CARE EDUCATION/TRAINING PROGRAM

## 2023-09-15 PROCEDURE — 97112 NEUROMUSCULAR REEDUCATION: CPT

## 2023-09-15 PROCEDURE — 80053 COMPREHEN METABOLIC PANEL: CPT | Performed by: INTERNAL MEDICINE

## 2023-09-15 PROCEDURE — 97110 THERAPEUTIC EXERCISES: CPT

## 2023-09-15 PROCEDURE — 85027 COMPLETE CBC AUTOMATED: CPT | Performed by: INTERNAL MEDICINE

## 2023-09-15 PROCEDURE — 85610 PROTHROMBIN TIME: CPT | Performed by: INTERNAL MEDICINE

## 2023-09-15 PROCEDURE — 99239 HOSP IP/OBS DSCHRG MGMT >30: CPT | Performed by: INTERNAL MEDICINE

## 2023-09-15 RX ORDER — WARFARIN SODIUM 5 MG/1
5 TABLET ORAL
Status: DISCONTINUED | OUTPATIENT
Start: 2023-09-15 | End: 2023-09-15 | Stop reason: HOSPADM

## 2023-09-15 RX ADMIN — PANTOPRAZOLE SODIUM 40 MG: 40 TABLET, DELAYED RELEASE ORAL at 06:15

## 2023-09-15 RX ADMIN — FOLIC ACID 2 MG: 1 TABLET ORAL at 09:01

## 2023-09-15 RX ADMIN — TAFAMIDIS 1 CAPSULE: 61 CAPSULE, LIQUID FILLED ORAL at 09:02

## 2023-09-15 RX ADMIN — FUROSEMIDE 40 MG: 40 TABLET ORAL at 09:01

## 2023-09-15 RX ADMIN — METOPROLOL SUCCINATE 25 MG: 25 TABLET, EXTENDED RELEASE ORAL at 09:02

## 2023-09-15 NOTE — ASSESSMENT & PLAN NOTE
Generalized weakness for the past several weeks, with reported multiple falls per wife including this evening while attempting to ambulate upstairs.   · Likely due to chronic deconditioning with mild volume overload and electrolyte deficiency  · Plan for discharge to inpatient rehabilitation today mario emery

## 2023-09-15 NOTE — ASSESSMENT & PLAN NOTE
· Presented with thrombocytopenia which has been gradually decreasing since admission. · No history of low platelets in recent outpatient labs.   · Possible due to consumption from his trauma +- decreased production from amyloidosis or medication side effect (colchicine)  · No active bleeding  · Mild petechia of the RUE and some scattered ecchymosis  · Platelet count is improved today, follow up CBC outpatient in 3-4 days

## 2023-09-15 NOTE — PROGRESS NOTES
Pt's CPap from home not here in hospital.  S/W Daughter, Jose Peña who is aware he will need it at [facility]. Stated she will also bring his hearing aides from home. Waiting for pharmacy to bring Pt's home dose of Tafamidis to send with him.

## 2023-09-15 NOTE — ASSESSMENT & PLAN NOTE
Briefly in RVR on presentation here, however now rate controlled without intervention  · Continue metoprolol succinate 25 mg daily  · INR is therapeutic  · Continue anticoagulation with Coumadin

## 2023-09-15 NOTE — PLAN OF CARE
Problem: PAIN - ADULT  Goal: Verbalizes/displays adequate comfort level or baseline comfort level  Description: Interventions:  - Encourage patient to monitor pain and request assistance  - Assess pain using appropriate pain scale  - Administer analgesics based on type and severity of pain and evaluate response  - Implement non-pharmacological measures as appropriate and evaluate response  - Consider cultural and social influences on pain and pain management  - Notify physician/advanced practitioner if interventions unsuccessful or patient reports new pain  Outcome: Progressing Winlevi Counseling:  I discussed with the patient the risks of topical clascoterone including but not limited to erythema, scaling, itching, and stinging. Patient voiced their understanding.

## 2023-09-15 NOTE — RESTORATIVE TECHNICIAN NOTE
Restorative Technician Note      Patient Name: Taylor Bowen     Note Type: Mobility  Patient Position Upon Consult: Bedside chair  Activity Performed: Ambulated  Assistive Device: Roller walker  Patient Position at End of Consult: Bedside chair;  All needs within reach

## 2023-09-15 NOTE — PHYSICAL THERAPY NOTE
PT Treatment       09/15/23 1145   PT Last Visit   PT Visit Date 09/15/23   Note Type   Note Type Treatment   Pain Assessment   Pain Assessment Tool 0-10   Pain Score No Pain   Restrictions/Precautions   Other Precautions Fall Risk;Telemetry   General   Chart Reviewed Yes   Response to Previous Treatment Patient with no complaints from previous session. Family/Caregiver Present No   Cognition   Arousal/Participation Alert   Attention Within functional limits   Orientation Level Oriented to person;Oriented to place;Oriented to time   Memory Decreased recall of recent events   Following Commands Follows one step commands without difficulty   Subjective   Subjective "I just want to go home"   Bed Mobility   Supine to Sit Unable to assess   Sit to Supine Unable to assess   Additional Comments patient in chair pre and post treatment   Transfers   Sit to Stand 4  Minimal assistance   Additional items Assist x 1; Increased time required;Verbal cues  (mod-AX1 progressing to min-AX1)   Stand to Sit 4  Minimal assistance   Additional items Assist x 1; Increased time required;Verbal cues   Additional Comments mod-AX1 for initial sit-->stand from chair with use of RW. patient participated in education and therapist demonstrated efficient sequencing and mechanics of sit to stand (scoot to edge of chair, proper position of feet, rock to gain momentum). patient performed additional 4 sit<-->stand transfers with min-AX1   Ambulation/Elevation   Gait pattern Excessively slow; Short stride;Decreased foot clearance;Narrow MATY   Gait Assistance 4  Minimal assist   Additional items Assist x 1   Assistive Device Rolling walker   Distance 5 feet x 2 (patient has recenlty ambulated in hallway with restorative therapist)   Ambulation/Elevation Additional Comments VC to widen MATY while ambulating   Balance   Static Sitting Fair   Static Standing Fair -   Ambulatory Poor +   Endurance Deficit   Endurance Deficit Yes   Endurance Deficit Your urine culture is negative.  Description LE weakness, expresses fatigue   Activity Tolerance   Activity Tolerance Patient tolerated treatment well   Nurse Made Aware milla to see per RN   Exercises   Hip Extension Standing;10 reps;Bilateral;AROM  (standing with use of RW)   Knee AROM Long Arc Quad Sitting;15 reps;Bilateral;AROM   Ankle Pumps Sitting;20 reps;Bilateral;AROM  (+ 20 heel raises)   Marching Sitting;Bilateral;AROM;15 reps   Neuro re-ed 5 sit<-->stand transfers as described above   Assessment   Prognosis Fair   Problem List Decreased strength;Decreased endurance; Impaired balance;Decreased mobility   Assessment PT initiated treatment session in order to assist patient in achieving goals to improve transfers, gait training, LE strength, and overall activity tolerance. Patient demonstrated progress toward achieving functional mobility goals as evidenced by requiring less physical assistance. He requires mod/min-AX1 for sit<-->stand transfers and min-AX1 for ambulation with RW (using SPC at baseline). Focus of treatment session sit<-->stand transfers as this is more challenging for patient than ambulation. PT d/c recommendation remains for rehab. Patient will continue to benefit from continued skilled PT this admission to achieve maximal function and safety. Goals   Patient Goals to get stronger to go home   LTG Expiration Date 09/23/23   Plan   Treatment/Interventions Functional transfer training;LE strengthening/ROM; Therapeutic exercise; Endurance training;Patient/family training;Equipment eval/education; Bed mobility;Gait training;OT;Spoke to nursing;Elevations   Progress Progressing toward goals   PT Frequency 3-5x/wk   Recommendation   PT Discharge Recommendation Post acute rehabilitation services   Equipment Recommended Walker   AM-PAC Basic Mobility Inpatient   Turning in Flat Bed Without Bedrails 2   Lying on Back to Sitting on Edge of Flat Bed Without Bedrails 2   Moving Bed to Chair 3   Standing Up From Chair Using Arms 3   Walk in Room 3   Climb 3-5 Stairs With Railing 3   Basic Mobility Inpatient Raw Score 16   Basic Mobility Standardized Score 38.32   Highest Level Of Mobility   JH-HLM Goal 5: Stand one or more mins   JH-HLM Achieved 6: Walk 10 steps or more     The patient's AM-PAC Basic Mobility Inpatient Standardized Score is less than 42.9, suggesting this patient may benefit from discharge to post-acute rehabilitation services. Please also refer to the recommendation of the Physical Therapist for safe discharge planning.     CHENCHO BEJARANO PT, DPT

## 2023-09-15 NOTE — PLAN OF CARE
Problem: PHYSICAL THERAPY ADULT  Goal: Performs mobility at highest level of function for planned discharge setting. See evaluation for individualized goals. Description: Treatment/Interventions: Functional transfer training, Elevations, Therapeutic exercise, Endurance training, Gait training, Bed mobility, Equipment eval/education  Equipment Recommended:  (pt owns a RW)       See flowsheet documentation for full assessment, interventions and recommendations. Outcome: Progressing  Note: Prognosis: Fair  Problem List: Decreased strength, Decreased endurance, Impaired balance, Decreased mobility  Assessment: PT initiated treatment session in order to assist patient in achieving goals to improve transfers, gait training, LE strength, and overall activity tolerance. Patient demonstrated progress toward achieving functional mobility goals as evidenced by requiring less physical assistance. He requires mod/min-AX1 for sit<-->stand transfers and min-AX1 for ambulation with RW (using SPC at baseline). Focus of treatment session sit<-->stand transfers as this is more challenging for patient than ambulation. PT d/c recommendation remains for rehab. Patient will continue to benefit from continued skilled PT this admission to achieve maximal function and safety. PT Discharge Recommendation: Post acute rehabilitation services    See flowsheet documentation for full assessment.

## 2023-09-15 NOTE — ASSESSMENT & PLAN NOTE
Wt Readings from Last 3 Encounters:   09/15/23 110 kg (241 lb 6.5 oz)     Follows with [facility] advanced heart failure cardiology (Dr. Nanette Marie), echocardiogram 4/2023 EF 65%, severely increased left ventricular wall thickness, moderately dilated LA, severely dilated RA, mild MR/TR; history of amyloidosis noted  · Patient and wife reporting some increased lower extremity from baseline and patient reporting some increased shortness of breath with exertion. · Admits he only takes his Lasix approximately 4 times weekly.   Actually reports weight loss stating he has been attempting to eat healthier  · S/P IV Lasix 40 mg x 1 and appreciate heart failure service evaluation  · Monitor daily weights, I's/O, volume status  · Low-sodium, fluid restricted diet  · Continue home diuretic regimen, appears euvolemic

## 2023-09-15 NOTE — ASSESSMENT & PLAN NOTE
· Fever of 100.9 on 9/12, denies any new symptoms  · Blood cultures sent, await results  · Monitor off antibiotics at this time  · No infections signs at this time  · Greater then 24h fever free  · Possibly due to some tissue damage from the fall and prolonged time on the ground

## 2023-09-15 NOTE — PROGRESS NOTES
Advanced Heart Failure / Pulmonary Hypertension Service Progress Note    Roberta Harvey 80 y.o. male   MRN: [id number]  Unit/Bed#: [facility] 528-01; Encounter: [id number]    Assessment:  Principal Problem:    Generalized weakness  Active Problems:    Multiple falls    OLGA (obstructive sleep apnea)    History of DVT (deep vein thrombosis)    Atrial fibrillation (HCC)    Congestive heart failure (CHF) (HCC)    Chronic venous hypertension (idiopathic) with ulcer of right lower extremity (HCC)    Elevated troponin    Multiple abrasions    Fever    Thrombocytopenia (HCC)      Subjective:   Roberta Harvey is an 26-year-old man with TTR amyloidosis, chronic HFpEF, A-fib (AC on Coumadin), HTN, HLD, OLGA (compliant with CPAP), CKD III, and history of TIA and DVT who presented to [facility] on 09/12/2023 s/p fall and being found "facedown on the tile floor in his bathroom." In preceding days had been having more frequent falls at home per wife/family. That same day, had near fall backwards onto his wife when walking up stairs (live in split level). Patient unable to recall fall onto bathroom floor; unclear how long patient down before wife found patient. Received 500 mL IV fluids in ED. Trauma XR with "Mild pulmonary vascular congestion." CT head and C-spine without acute abnormalities. CT c/a/p without acute injuries but did note "mild patchy groundglass opacity scattered in the lungs with interlobular septal thickening suggestive of mild interstitial edema." BNP not checked. Intermittently noted to be in Afib with RVR on telemetry per ED notes. Given one dose IV Lasix, and HF team consulted for "acute on chronic diastolic congestive heart failure. Sonali Inch Sonali Inch Follows with Dr. Jaret Moore, presenting after multiple falls at home. Admits to some increased LE edema and dyspnea climbing stairs."     Patient seen and examined. No significant events overnight. No current cardiac complaints.  Denies ARREGUIN and feels LE swelling has improved some.     Objective: Intake/ Output: 660 mL / 400 mL. Not accurate. Weight: 241 lbs, standing (245 lbs, bed scale, on 09/14). Telemetry: Afib, rates in 70-90s. Occasional NSVT. Today's Plan:  • Continue current medications. Stable for discharge from cardiac standpoint. • Plan for outpatient cardiac event monitor for completeness to rule out arrhythmias contributing to recent falls. Plan:  Multiple falls at home / generalized weakness              Management per primary team.      Chronic HFpEF; LVEF 65%; LVIDd 3.4 cm; Erica Guard; ACC/AHA Stage C              Etiology: TTR amyloidosis.                TTE 07/22/2020: LVEF 60-70%. LVIDd 4 cm. IVSd 1.45 cm. Moderate concentric hypertrophy. Normal RV. GEOVANNI. Trace MR. Mild TR.             TTE 04/25/2023: LVEF 65%. LVIDd 3.4 cm. IVSd 1.6 cm. Normal RV size with low normal RVSF. GEOVANNI (R>L). Small PFO. Mild MR and TR. Trace AI. Normal IVC.      Pharmacotherapies:  --Aldosterone Antagonist: No.   --SGLT2 Inhibitor: No.   --Home Diuretic: Lasix 40 mg daily with potassium 40 mEq daily.   --Inpatient Diuretic: PO Lasix 40 mg daily.      Atrial fibrillation              [id number] = 6 (age, HF, HTN, DVT/TIA).             Anticoagulation on Coumadin.               Rate control: metoprolol succinate 25 mg daily.                 Rhythm control: None.     Chronic kidney disease, stage IIIa              Baseline creatinine of 1.1-1.3.              Yesterday, creatinine of 1.03.     TTR amyloidosis              Associated symptoms: lumbar spinal stenosis, CTS, peripheral neuropathy.               EKG to mass mismatch: yes.                Tc-PYP scan 04/29/2023: H/CL ratio 1.77. Grade 3 uptake. "Strongly suggestive of TTR amyloidosis. "              Genetic testing: negative.               SPEP 04/2023: no monoclonal banding. Insufficient sample for immunofixation.                UPEP 04/2023: no M spike noted.               Free light chains 04/2023: kappa/lambda ratio 1.49.                 Amyloid-specific Therapies:   --Tafamidis 61 mg daily.               Hypertension  Hyperlipidemia  Obstructive sleep apnea: compliant with CPAP.   Spinal stenosis, lumbar  Carpal tunnel syndrome  MTHFR mutation  History of DVT  History of TIA  History of tobacco abuse: quit in 1982.     Vitals:   Blood pressure (!) 133/106, pulse 72, temperature 97.7 °F (36.5 °C), temperature source Oral, resp. rate 20, height 6' 2" (1.88 m), weight 110 kg (241 lb 6.5 oz), SpO2 97 %. I/O last 3 completed shifts: In: 720 [P.O.:720]  Out: 1150 [Urine:1150]    Weight (last 2 days)     Date/Time Weight    09/15/23 0600 110 (241.4)    09/14/23 0600 111 (245)        Wt Readings from Last 10 Encounters:   09/15/23 110 kg (241 lb 6.5 oz)     Vitals:    09/15/23 0253 09/15/23 0600 09/15/23 0700 09/15/23 1057   BP: 123/77  122/78 (!) 133/106   BP Location:   Left arm Right arm   Pulse: 74  72    Resp: 19  18 20   Temp: (!) 97.2 °F (36.2 °C)  (!) 97 °F (36.1 °C) 97.7 °F (36.5 °C)   TempSrc:   Oral Oral   SpO2: 96%  97%    Weight:  110 kg (241 lb 6.5 oz)     Height:           Physical Exam  Vitals reviewed. Constitutional:       General: He is awake. He is not in acute distress. Appearance: Normal appearance. He is well-developed. He is not toxic-appearing or diaphoretic. HENT:      Head: Normocephalic. Nose: Nose normal.      Mouth/Throat:      Mouth: Mucous membranes are moist.   Eyes:      General: No scleral icterus. Conjunctiva/sclera: Conjunctivae normal.   Neck:      Vascular: No JVD. Trachea: No tracheal deviation. Cardiovascular:      Rate and Rhythm: Normal rate. Rhythm irregularly irregular. Heart sounds: Murmur heard. Pulmonary:      Effort: Pulmonary effort is normal. No tachypnea, bradypnea or respiratory distress. Breath sounds: Normal air entry. No decreased air movement. No decreased breath sounds, wheezing or rales.    Abdominal:      General: Bowel sounds are normal. There is no distension. Palpations: Abdomen is soft. Tenderness: There is no abdominal tenderness. Musculoskeletal:      Cervical back: Neck supple. Right lower leg: Edema present. Left lower leg: Edema present. Skin:     General: Skin is warm and dry. Coloration: Skin is not jaundiced or pale. Neurological:      General: No focal deficit present. Mental Status: He is alert and oriented to person, place, and time. Psychiatric:         Attention and Perception: Attention normal.         Mood and Affect: Mood and affect normal.         Speech: Speech normal.         Behavior: Behavior normal. Behavior is cooperative. Thought Content:  Thought content normal.       Lines/Drains/Airways     Active Status     Name Placement date Placement time Site Days    External Urinary Catheter 09/13/23  0622  -- 2                Current Facility-Administered Medications:   •  acetaminophen (TYLENOL) tablet 650 mg, 650 mg, Oral, Q6H PRN, Ericka Noel MD, 650 mg at 85/45/56 9481  •  folic acid (FOLVITE) tablet 2 mg, 2 mg, Oral, Daily, Sixto Gonzalez DO, 2 mg at 09/15/23 0901  •  furosemide (LASIX) tablet 40 mg, 40 mg, Oral, Daily, Jermaine Bartholomew PA-C, 40 mg at 09/15/23 0901  •  metoprolol succinate (TOPROL-XL) 24 hr tablet 25 mg, 25 mg, Oral, Daily, Sixto Gonzalez DO, 25 mg at 09/15/23 0902  •  neomycin-bacitracin-polymyxin b (NEOSPORIN) ointment 1 small application, 1 small application, Topical, PRN, Isa Mooney DPM  •  ondansetron (ZOFRAN) injection 4 mg, 4 mg, Intravenous, Q6H PRN, Sixto Gonzalez DO  •  oxyCODONE (ROXICODONE) IR tablet 5 mg, 5 mg, Oral, Q4H PRN, Ericka Noel MD  •  oxyCODONE (ROXICODONE) split tablet 2.5 mg, 2.5 mg, Oral, Q4H PRN, Ericka Noel MD  •  pantoprazole (PROTONIX) EC tablet 40 mg, 40 mg, Oral, Early Morning, Ericka Noel MD, 40 mg at 09/15/23 0615  •  Tafamidis CAPS 1 capsule, 1 capsule, Oral, Daily, [facility], MD, 1 capsule at 09/15/23 0902  •  warfarin (COUMADIN) tablet 5 mg, 5 mg, Oral, Daily (warfarin), Norman Case MD    Labs & Results:  Results from last 7 days   Lab Units 09/12/23  0059   CK TOTAL U/L 117     Results from last 7 days   Lab Units 09/15/23  0927 09/14/23  0509 09/13/23  0529   WBC Thousand/uL 5.37 4.89 5.06   HEMOGLOBIN g/dL 18.1* 17.2* 17.6*   HEMATOCRIT % 52.6* 49.6* 51.5*   PLATELETS Thousands/uL 79* 64* 74*         Results from last 7 days   Lab Units 09/15/23  0927 09/14/23  0509 09/13/23  0529 09/12/23  0059   POTASSIUM mmol/L 3.8 4.2 3.2* 4.0   CHLORIDE mmol/L 100 100 96 101   CO2 mmol/L 29 31 26 16*   CO2, I-STAT mmol/L  --   --   --  22   BUN mg/dL 23 21 25 26*   CREATININE mg/dL 0.99 1.03 1.18 1.17   CALCIUM mg/dL 8.3* 8.0* 8.2* 8.1*   ALK PHOS U/L 87 79  --  87   ALT U/L 42 36  --  21   AST U/L 101* 124*  --  40*   GLUCOSE, ISTAT mg/dl  --   --   --  133     Results from last 7 days   Lab Units 09/15/23  0612 09/14/23  0509 09/13/23  0529   INR  2.55* 1.92* 1.51*     Wing Darcy PA-C

## 2023-09-15 NOTE — ASSESSMENT & PLAN NOTE
Anticoagulated on Coumadin, subtherapeutic on presentation.  Appears home dose warfarin is 5 mg daily  · INR is therapeutic, revert warfarin dose back to his 5mg dose

## 2023-09-15 NOTE — ASSESSMENT & PLAN NOTE
· POA, wounds do not appear acutely infected.   Follows with [facility] podiatry for wound care

## 2023-09-15 NOTE — CASE MANAGEMENT
Case Management Discharge Note    Patient name Sol Black  Location [facility] 528/[facility] 090-41 MRN [id number]  : 1940 Date 9/15/2023       Current Admission Date: 2023  Current Admission Diagnosis:Generalized weakness      LOS (days): 3  Geometric Mean LOS (GMLOS) (days): 2.50  Days to GMLOS:-0.9     OBJECTIVE:  Risk of Unplanned Readmission Score: 10.97   Current admission status: Inpatient   Primary Insurance: MEDICARE  Secondary Insurance: AARP    DISCHARGE DETAILS:  Discharge planning discussed with[de-identified] Patient  Freedom of Choice: Yes  CM contacted family/caregiver?: Yes  Were Treatment Team discharge recommendations reviewed with patient/caregiver?: Yes  Did patient/caregiver verbalize understanding of patient care needs?: Yes  Were patient/caregiver advised of the risks associated with not following Treatment Team discharge recommendations?: Yes    Contacts  Patient Contacts: Angelito Hsieh (pt's daughter)  Relationship to Patient[de-identified] Family  Contact Method: Phone  Phone Number: [phone] (mobile)  Reason/Outcome: Emergency Contact    Treatment Team Recommendation: Short Term Rehab  Discharge Destination Plan[de-identified] Short Term Rehab  Transport at Discharge : BLS Ambulance  Dispatcher Contacted: Yes  Number/Name of Dispatcher: [phone]  [facility]  ETA of Transport (Date): 09/15/23  ETA of Transport (Time): 1600 (4:00PM)  Transfer Mode: Stretcher  Accompanied by: EMS personnel  Transfer Equipment: BLS devices    Additional Comments: Pt is cleared for d/c by [facility] Dr Houston Bunn. APURVA Hutchinson was notified of pt's d/c order. Pt is accepted for short-term skilled rehab placement at [facility] located in [facility]. CM arranged BLS ambulance via [facility] for 4:00PM pickup this day to transport pt to SNF. The pt and his daughter Angelito Hsieh were both informed of d/c. IMM signed by pt. PCS for transport completed by CM.  Chart copy for SNF requested from RN. No further CM needs.     Accepting Facility Name, [address] [place] [address] : [facility] / [place], [place]  Receiving Facility/Agency Phone Number: [phone]  Facility/Agency Fax Number: [phone]

## 2023-09-15 NOTE — DISCHARGE SUMMARY
[address] [facility]  Discharge- Ritta Anusha 1940, 80 y.o. male MRN: [id number]  Unit/Bed#: [facility] 528-01 Encounter: [id number]  Primary Care Provider: Dayan Gonzalez DO   Date and time admitted to hospital: 9/12/2023 12:52 AM    * Generalized weakness  Assessment & Plan  Generalized weakness for the past several weeks, with reported multiple falls per wife including this evening while attempting to ambulate upstairs. · Likely due to chronic deconditioning with mild volume overload and electrolyte deficiency  · Plan for discharge to inpatient rehabilitation today [place]    Thrombocytopenia [facility])  Assessment & Plan  · Presented with thrombocytopenia which has been gradually decreasing since admission. · No history of low platelets in recent outpatient labs. · Possible due to consumption from his trauma +- decreased production from amyloidosis or medication side effect (colchicine)  · No active bleeding  · Mild petechia of the RUE and some scattered ecchymosis  · Platelet count is improved today, follow up CBC outpatient in 3-4 days    Fever  Assessment & Plan  · Fever of 100.9 on 9/12, denies any new symptoms  · Blood cultures sent, await results  · Monitor off antibiotics at this time  · No infections signs at this time  · Greater then 24h fever free  · Possibly due to some tissue damage from the fall and prolonged time on the ground    Multiple abrasions  Assessment & Plan  · S/p trauma evaluation   · Podiatry following for wound care    Elevated troponin  Assessment & Plan  Troponin 116->165 on presentation. Patient without complaints of chest pain although reports some dyspnea while ascending stairs.   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 Follows with [facility] podiatry for wound care    Congestive heart failure (CHF) ([address])  Assessment & Plan  Wt Readings from Last 3 Encounters:   09/15/23 110 kg (241 lb 6.5 oz)     Follows with [facility] advanced heart failure cardiology (Dr. Dayanara Morales), echocardiogram 4/2023 EF 65%, severely increased left ventricular wall thickness, moderately dilated LA, severely dilated RA, mild MR/TR; history of amyloidosis noted  · Patient and wife reporting some increased lower extremity from baseline and patient reporting some increased shortness of breath with exertion. · Admits he only takes his Lasix approximately 4 times weekly. Actually reports weight loss stating he has been attempting to eat healthier  · S/P IV Lasix 40 mg x 1 and appreciate heart failure service evaluation  · Monitor daily weights, I's/O, volume status  · Low-sodium, fluid restricted diet  · Continue home diuretic regimen, appears euvolemic      Atrial fibrillation ([address])  Assessment & Plan  Briefly in RVR on presentation here, however now rate controlled without intervention  · Continue metoprolol succinate 25 mg daily  · INR is therapeutic  · Continue anticoagulation with Coumadin    History of DVT (deep vein thrombosis)  Assessment & Plan  Anticoagulated on Coumadin, subtherapeutic on presentation.  Appears home dose warfarin is 5 mg daily  · INR is therapeutic, revert warfarin dose back to his 5mg dose    OLGA (obstructive sleep apnea)  Assessment & Plan  · Continue nightly CPAP    Multiple falls  Assessment & Plan  Wife reporting multiple falls at home described primarily as mechanical in nature, including apparent fall while ambulating upstairs prior to admission  · Trauma service following, imaging negative for acute traumatic injuries  · PT/OT evaluations pending   · No events on telemetry  · Plan for inpatient rehabilitation        Medical Problems     Resolved Problems  Date Reviewed: 9/12/2023   None Discharging Physician / Practitioner: Shashank Andrade MD  PCP: Lauren Botello DO  Admission Date:   Admission Orders (From admission, onward)     Ordered        09/12/23 0315  Inpatient Admission  Once                      Discharge Date: 09/15/23    Disposition:    Other [address] at [place]    Reason for Admission: fall    Discharge Diagnoses:   Please see assessment and plan section above for further details regarding discharge diagnoses. Consultations During Hospital Stay:  · Podiatry  · Heart failure service    Procedures Performed:   · None     Significant Findings / Test Results:   CT head: No acute intracranial abnormality. Moderate left mastoid effusion. CT cervical No cervical spine fracture or traumatic malalignmentspine:     Ct chest abdomen and pelvis: No evidence of traumatic injury in the chest, abdomen or pelvis. Cardiomegaly. There is mild patchy groundglass opacity scattered in the lungs with interlobular septal thickening suggestive of mild interstitial edema. Incidental Findings:   · As above       Test Results Pending at Discharge (will require follow up): · None     Outpatient Tests Requested:  · CBC and platelet count in 3-4 days  · Monitor INR and adjust warfarin to an INR between 2-3    Complications:  None    Hospital Course:      Phyllis Gonzáles is a 80 y.o. male patient who originally presented to the hospital on 9/12/2023 due to fall and weakness. He was noted to be slightly volume overloaded in the ED and given one dose furosemide. He was evaluated by cardiology and his home regimen was resumed. During his hospital course he had one isolated fever. Cultures were negative and fever did not reoccur. Due to weakness he was transferred to [address] [place] [address] for short term rehabilitation. During his hospital course he did have thrombocytopenia which spontaneously improved prior to discharge. He will need a CBC in 3-4 days to evaluate for resolution.     Condition at Discharge: stable    Discharge Day Visit / Exam:   Subjective:  Denies any new complaints. Vitals: Blood Pressure: (!) 133/106 (09/15/23 1057)  Pulse: 72 (09/15/23 0700)  Temperature: 97.7 °F (36.5 °C) (09/15/23 1057)  Temp Source: Oral (09/15/23 1057)  Respirations: 20 (09/15/23 1057)  Height: 6' 2" (188 cm) (09/12/23 1539)  Weight - Scale: 110 kg (241 lb 6.5 oz) (09/15/23 0600)  SpO2: 97 % (09/15/23 0700)  Exam:   Physical Exam  Constitutional:       Appearance: He is obese. HENT:      Head: Normocephalic and atraumatic. Nose: Nose normal.   Eyes:      Extraocular Movements: Extraocular movements intact. Cardiovascular:      Rate and Rhythm: Normal rate and regular rhythm. Pulmonary:      Effort: Pulmonary effort is normal.      Breath sounds: No wheezing or rales. Abdominal:      General: There is no distension. Palpations: Abdomen is soft. Tenderness: There is no abdominal tenderness. Musculoskeletal:      Right lower leg: No edema. Left lower leg: No edema. Skin:     General: Skin is warm and dry. Comments: Scattered abrasions and ecchymoses   Neurological:      General: No focal deficit present. Mental Status: He is alert and oriented to person, place, and time. Psychiatric:         Mood and Affect: Mood normal.         Behavior: Behavior normal.          Discussion with Family: Daughter    Medication Adjustments and Discharge Medications:  · Discharge Medication List: See after visit summary for reconciled discharge medications. · Medication Dosing Tapers - Please refer to Discharge Medication List for details on any medication dosing tapers (if applicable to patient). · Summary of Medication Adjustments made as a result of this hospitalization: None  · Medications being temporarily held (include recommended restart time): None    Wound Care Recommendations:  When applicable, please see wound care section of After Visit Summary.     Instructions for any Catheters / Lines Present at Discharge (including removal date, if applicable): None    Diet Recommendations at Discharge:  Diet -        Diet Orders   (From admission, onward)             Start     Ordered    09/14/23 0842  Diet Cardiovascular; Cardiac; Fluid Restriction 2000 ML  Diet effective now        References:    Adult Nutrition Support Algorithm    RD Therapeutic Diet Order Protocol   Question Answer Comment   Diet Type Cardiovascular    Cardiac Cardiac    Other Restriction(s): Fluid Restriction 2000 ML    RD to adjust diet per protocol? Yes        09/14/23 0841    09/12/23 1520  Dietary nutrition supplements  Once        Question Answer Comment   Select Supplement: Magic Cup Chocolate    Frequency Lunch, Dinner        09/12/23 1519                Goals of Care Discussions:  · Code Status at Discharge: Level 1 - Full Code  · Goals of care were not discussed during this admission. Discharge instructions/Information to patient and family:   See after visit summary section titled Discharge Instructions for information provided to patient and family. Planned Readmission: No      Discharge Statement:  I spent 45 minutes discharging the patient. This time was spent on the day of discharge. I had direct contact with the patient on the day of discharge. Greater than 50% of the total time was spent examining patient, answering all patient questions, arranging and discussing plan of care with patient as well as directly providing post-discharge instructions. Additional time then spent on discharge activities. **Please Note: This note may have been constructed using a voice recognition system. **

## 2023-09-17 LAB
BACTERIA BLD CULT: NORMAL
BACTERIA BLD CULT: NORMAL

## 2023-09-18 LAB
BACTERIA BLD CULT: NORMAL
BACTERIA BLD CULT: NORMAL

## 2023-09-30 ENCOUNTER — HOME HEALTH ADMISSION (OUTPATIENT)
Dept: HOME HEALTH SERVICES | Facility: HOME HEALTHCARE | Age: 83
End: 2023-09-30

## 2023-10-03 ENCOUNTER — HOME CARE VISIT (OUTPATIENT)
Dept: HOME HEALTH SERVICES | Facility: HOME HEALTHCARE | Age: 83
End: 2023-10-03

## 2023-10-03 ENCOUNTER — TELEPHONE (OUTPATIENT)
Dept: CARDIOLOGY CLINIC | Facility: CLINIC | Age: 83
End: 2023-10-03

## 2023-10-03 NOTE — TELEPHONE ENCOUNTER
[facility]oke with pt and wife. [facility] He does have a little sob with exertion and increased fatigue. He is urinate better and better each day. Denies any other CHF symptoms. He has dry mouth, but only drinking 1200 ml/ daily. Advised he can do 2000 ml/daily and that should help the dry mouth. Verbally understood. Is following low salt healthy diet. Did not take lasix yet today . advised he should be taking daily. He will call back with his BP/HR reading. Gave dates, time and location for next two cardiac appts. Wife wrote them down. Has appt at PCP office tomorrow and [facility] will be in on Thursday. INR was around 4.0 and randa lees told him not to take warafarin for 2 days  Get INR before next appt. They will do that today.

## 2023-10-04 ENCOUNTER — ANTICOAG VISIT (OUTPATIENT)
Dept: CARDIOLOGY CLINIC | Facility: CLINIC | Age: 83
End: 2023-10-04

## 2023-10-04 ENCOUNTER — APPOINTMENT (OUTPATIENT)
Dept: LAB | Facility: HOSPITAL | Age: 83
End: 2023-10-04
Payer: MEDICARE

## 2023-10-04 DIAGNOSIS — D69.6 PLATELETS DECREASED (HCC): ICD-10-CM

## 2023-10-04 DIAGNOSIS — I48.19 PERSISTENT ATRIAL FIBRILLATION (HCC): Primary | ICD-10-CM

## 2023-10-04 DIAGNOSIS — D68.69: ICD-10-CM

## 2023-10-04 LAB
BASOPHILS # BLD AUTO: 0.04 THOUSANDS/ÂΜL (ref 0–0.1)
BASOPHILS NFR BLD AUTO: 1 % (ref 0–1)
EOSINOPHIL # BLD AUTO: 0.1 THOUSAND/ÂΜL (ref 0–0.61)
EOSINOPHIL NFR BLD AUTO: 1 % (ref 0–6)
ERYTHROCYTE [DISTWIDTH] IN BLOOD BY AUTOMATED COUNT: 13 % (ref 11.6–15.1)
HCT VFR BLD AUTO: 51.2 % (ref 36.5–49.3)
HGB BLD-MCNC: 17.4 G/DL (ref 12–17)
IMM GRANULOCYTES # BLD AUTO: 0.03 THOUSAND/UL (ref 0–0.2)
IMM GRANULOCYTES NFR BLD AUTO: 0 % (ref 0–2)
LYMPHOCYTES # BLD AUTO: 1.41 THOUSANDS/ÂΜL (ref 0.6–4.47)
LYMPHOCYTES NFR BLD AUTO: 18 % (ref 14–44)
MCH RBC QN AUTO: 31.4 PG (ref 26.8–34.3)
MCHC RBC AUTO-ENTMCNC: 34 G/DL (ref 31.4–37.4)
MCV RBC AUTO: 92 FL (ref 82–98)
MONOCYTES # BLD AUTO: 1.23 THOUSAND/ÂΜL (ref 0.17–1.22)
MONOCYTES NFR BLD AUTO: 15 % (ref 4–12)
NEUTROPHILS # BLD AUTO: 5.16 THOUSANDS/ÂΜL (ref 1.85–7.62)
NEUTS SEG NFR BLD AUTO: 65 % (ref 43–75)
NRBC BLD AUTO-RTO: 0 /100 WBCS
PLATELET # BLD AUTO: 155 THOUSANDS/UL (ref 149–390)
PMV BLD AUTO: 13 FL (ref 8.9–12.7)
RBC # BLD AUTO: 5.54 MILLION/UL (ref 3.88–5.62)
WBC # BLD AUTO: 7.97 THOUSAND/UL (ref 4.31–10.16)

## 2023-10-04 PROCEDURE — 85025 COMPLETE CBC W/AUTO DIFF WBC: CPT

## 2023-10-05 ENCOUNTER — HOME CARE VISIT (OUTPATIENT)
Dept: HOME HEALTH SERVICES | Facility: HOME HEALTHCARE | Age: 83
End: 2023-10-05

## 2023-10-05 NOTE — CASE COMMUNICATION
TC to PCP left message with Roxana Whitley reporting [facility] has assessed your patient for Home Health services and has determined the patient is not eligible for service due to the following: No longer homebound   Patient and daughter report patient has no taxing effort to leave the home. Patient is not using cane and able to walk without shortness of breath or fatigue. Patient would like a referral to outpatient PT and OT. [facility] wound care is ordered daily. Scant drainage. Would be ok to change dressing three times per week instead of daily. Needs referral for the wound care center to facilitate this. A referral for an MSW to explain advanced directives to patient and wife so daughter can get a POA. Patient would like a referral to a dietitian. Patient has a soft fluid filled mass on left posterior elbow. Patient needs Flu and Covid shot.

## 2023-10-05 NOTE — PROGRESS NOTES
Heart Failure Outpatient Visit    Stormy Funes 80 y.o. male   MRN: [id number]  Encounter: [id number]    Assessment:  Patient Active Problem List    Diagnosis Date Noted   • Frailty syndrome in geriatric patient 05/12/2023   • Lower extremity cellulitis 04/24/2023   • Stage 3a chronic kidney disease (CKD) ([address]) 04/24/2023   • Hyperbilirubinemia 04/24/2023   • 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 ([address]) 02/14/2022   • Persistent atrial fibrillation ([address]) 05/14/2020   • Morbid obesity ([address]) 03/07/2018   • Lumbar stenosis 03/07/2018   • Benign neoplasm of colon 02/07/2018   • 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   • 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   • Internal hemorrhoids 02/07/2018   • Pure hypercholesterolemia 02/07/2018   • Sacroiliitis ([address]) 02/01/2018   • Bilateral hip pain 12/04/2017   • Adenomatous colon polyp 12/29/2015   • Spinal stenosis of lumbar region 09/10/2015   • DDD (degenerative disc disease), lumbar 07/07/2015   • Lumbar disc herniation 06/22/2015   • Radiculopathy, lumbar region 06/22/2015   • MTHFR mutation 03/11/2015   • Elevated hemoglobin A1c 06/05/2014   • Premature atrial complexes 05/12/2014   • Aortic ectasia ([address]) 03/06/2014   • Obstructive sleep apnea 11/25/2013   • Premature ventricular contraction 82/68/9754   • Lichen planus 68/56/4652   • Hypertension 04/04/2012   • 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   • Vitamin D deficiency 04/04/2012   • Thrombocytopenia ([address]) 09/14/2023   • Fever 09/13/2023   • Multiple falls 09/12/2023   • ESTER (obstructive sleep apnea) 09/12/2023   • History of DVT (deep vein thrombosis) 09/12/2023   • Atrial fibrillation ([address]) 09/12/2023   • Congestive heart failure (CHF) ([address]) 09/12/2023   • Generalized weakness 09/12/2023   • Chronic venous hypertension (idiopathic) with ulcer of right lower extremity ([address]) 09/12/2023   • Elevated troponin 09/12/2023   • Multiple abrasions 09/12/2023 Today's Plan:  • Mildly volume up, hasn't taken Lasix in 3 days. Advised to take his Lasix when he returns home and do his best to take Lasix regularly as prescribed. • Zio patch ordered today. • Scheduled to see Dr. Maria Eugneia Bird 11/21. • 2g sodium restriction, 2L fluid restriction, daily weights emphasized. Plan:  Chronic HFpEF; LVEF 65%; LVIDd 3.4 cm; NYHA II; ACC/AHA Stage C              EMXBSRDK: IAL amyloidosis.                TTE 07/22/2020: LVEF 60-70%. LVIDd 4 cm. IVSd 1.45 cm. Moderate concentric hypertrophy. Normal RV. HESHAM. Trace MR. Mild TR.             TTE 04/25/2023: LVEF 65%. LVIDd 3.4 cm. IVSd 1.6 cm. Normal RV size with low normal RVSF. HESHAM (R>L). Small PFO. Mild MR and TR. Trace AI. Normal IVC.      Pharmacotherapies:  --Aldosterone Antagonist: No.   --SGLT2 Inhibitor: No.   --Home Diuretic: Lasix 40 mg daily with potassium 40 mEq daily.   --Inpatient Diuretic: PO Lasix 40 mg daily (takes about 3-4 times per week)    Weighed 241 lbs 9/15. Today, weighs 238 lbs. Multiple falls at home / generalized weakness    Atrial fibrillation              [id number] = 6 (age, HF, HTN, DVT/TIA).             Anticoagulation on Coumadin.               Rate control: metoprolol succinate 25 mg daily.                 Rhythm control: None.     Chronic kidney disease, stage IIIa              Baseline creatinine of 1.1-1.3.     TTR amyloidosis              Associated symptoms: lumbar spinal stenosis, CTS, peripheral neuropathy.               EKG to mass mismatch: yes.                Tc-PYP scan 04/29/2023: H/CL ratio 1.77. Grade 3 uptake. "Strongly suggestive of TTR amyloidosis. "              Genetic testing: negative.               SPEP 04/2023: no monoclonal banding.  Insufficient sample for immunofixation.               UPEP 04/2023: no M spike noted.               Free light chains 04/2023: kappa/lambda ratio 1.49.                 Amyloid-specific Therapies:   --Tafamidis 61 mg daily.                 Hypertension  Hyperlipidemia  Obstructive sleep apnea: compliant with CPAP.   Spinal stenosis, lumbar  Carpal tunnel syndrome  MTHFR mutation  History of DVT  History of TIA  History of tobacco abuse: quit in 1982.        HPI:   Zahraa France is an 80year-old man with a PMH as above who presents to the office for follow-up. Follows with Dr. TENA Truong and Dr. Brittney Padron.      05/08/2023 with TH: "80year old with PMH as above recently admitted with volume overload and celllulitis. Echocardiogram  concerning for cardiac amyloidosis. Underwent workup. SPEP, UPEP showed no monoclonal banding and serum free light chains showing mild elevation in Ig Kappa with normal Kappa Lambda fluid ratio. PYP scan highly suggestive of aTTR amyloid. He presents today for hospital follow up. Feeling deconditioned and worn out. Questioning why he is not to start his diuretic until 5/13. Having a lot of neuropathy in the lower extremities.      Will have patient begin taking Lasix 40 mg once daily today with Kdur 20 meq BID. Will check labs before the end of the week. Start application process for Tafamidis. Genetic testing to determine wild type vs familial."     05/23/2023: Patient presents with his wife for follow-up. Down 3 lbs since last visit. Reports great improvement in LE swelling since starting Lasix. Due to his active lifestyle and significant urinary response to Lasix, has only been taking Lasix and potassium 4-5 days a week. Continues with daily weights. Provided patient with "Living With Heart Failure" booklet and "Don't Pass the Salt" cookbook.     6/7/23: presents today for follow up. Started tafamidis on Thursday. Having some fatigue, which he thinks may be related to deconditioning. Improving. Some MANLEY, improving. No SOB at rest. No PND, orthopnea. Swelling has improved. Did have a blister on his right leg, dressed by wound care and has an appointment with them next week.  Takes lasix about 3 days a week on days when he is home. Eating minimal salt, drinking under 2L. Urine output has been consistent. Weights stable at home.     8/21/23: Presents for follow up. Having shortness of breath on exertion. Overall leg swelling stable. Denies abdominal distension. Has only been taking lasix and potassium 2-3 times a depending on his schedule for the day. Mostly sedentary per wife. Does not weight himself daily, last 2 weights 247 and 250 lbs on home scale. Wife reports occasional dizziness. Last dose of lasix yesterday. "[facility] s/p fall and being found "facedown on the tile floor in his bathroom." In preceding days had been having more frequent falls at home per wife/family. That same day, had near fall backwards onto his wife when walking up stairs (live in split level). Patient unable to recall fall onto bathroom floor; unclear how long patient down before wife found patient. Received 500 mL IV fluids in ED. Trauma XR with "Mild pulmonary vascular congestion." CT head and C-spine without acute abnormalities. CT c/a/p without acute injuries but did note "mild patchy groundglass opacity scattered in the lungs with interlobular septal thickening suggestive of mild interstitial edema." BNP not checked. Intermittently noted to be in Afib with RVR on telemetry per ED notes. Given one dose IV Lasix, and HF team consulted. Felt to be euvolemic when examined, and recommended to take his outpatient Lasix more regularly as prescribed. Outpatient cardiac event monitor could be considered to evaluate for arrhythmogenic cause for falls. 10/6/23: Presents today for follow up. Feeling better after rehab, denies MANLEY, SOB, PND, orthopnea. Has been using his CPAP. Denies chest pain, dizziness, palpitations. Reports prior to fall, he felt generalized weakness and couldn't stand, but denies CP/dizziness/lightheadedness. Takes his Lasix 3-4 days a week when he's home.  Urinates very well with current dose. Doesn't weigh himself every day; will start this again. Some leg swelling noted today, last took Lasix 2-3 days ago. Past Medical History:   Diagnosis Date   • Abnormal glucose level 02/07/2018   • Acute back pain 10/02/2017   • Atrial fibrillation (HCC)    • Blood clot in vein 2015    in right foot   • 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   • 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[address])    • Carpal tunnel syndrome    • Chronic kidney disease (CKD)    • Fever 09/08/2017   • Frailty syndrome in geriatric patient 05/12/2023   • FUO (fever of unknown origin) 09/08/2017   • Heart failure (HCC)    • History of DVT (deep vein thrombosis)    • History of tobacco abuse     quit in 1982   • History of transient cerebral ischemia    • Hypertension      Review of Systems   Constitutional: Negative for chills, fever and unexpected weight change. HENT: Negative for ear pain and sore throat. Eyes: Negative for pain and visual disturbance. Respiratory: Negative for cough and shortness of breath. Cardiovascular: Positive for leg swelling. Negative for chest pain and palpitations. Gastrointestinal: Negative for abdominal pain and vomiting. Genitourinary: Negative for dysuria and hematuria. Musculoskeletal: Negative for arthralgias and back pain. Skin: Negative for color change and rash. Neurological: Negative for seizures and syncope. All other systems reviewed and are negative. 14-point ROS completed and negative except as stated above and/or in the HPI. Allergies   Allergen Reactions   • Sildenafil Hives     Other reaction(s): severe congestion, cialis was OK   • Sildenafil Other (See Comments)     _       Current Outpatient Medications:   •  Cholecalciferol (VITAMIN D-3 PO), Take 4,000 Units by mouth daily. , Disp: , Rfl:   •  folic acid (FOLVITE) 1 mg tablet, Take 2 mg by mouth daily  , Disp: , Rfl:   •  folic acid (FOLVITE) 1 mg tablet, Take 2 mg by mouth daily, Disp: , Rfl:   •  furosemide (LASIX) 40 mg tablet, Take 1 tablet (40 mg total) by mouth daily (Patient taking differently: Take 40 mg by mouth daily Taking 4-5 days weekly), Disp: 30 tablet, Rfl: 3  •  furosemide (LASIX) 40 mg tablet, Take 40 mg by mouth daily, Disp: , Rfl:   •  metoprolol succinate (TOPROL-XL) 25 mg 24 hr tablet, TAKE 1 TABLET(25 MG) BY MOUTH DAILY, Disp: 90 tablet, Rfl: 3  •  metoprolol succinate (TOPROL-XL) 25 mg 24 hr tablet, Take 25 mg by mouth daily, Disp: , Rfl:   •  Multiple Vitamins-Minerals (MULTIVITAMIN & MINERAL PO), Take 1 tablet by mouth daily. Per Tarun Bauman MD [facility] 10/2/23  Indications: supplement, Disp: , Rfl:   •  multivitamin-minerals (CENTRUM) tablet, Take 1 tablet by mouth daily, Disp: , Rfl:   •  potassium chloride (K-DUR,KLOR-CON) 20 mEq tablet, Take 2 tablets (40 mEq total) by mouth daily, Disp: 60 tablet, Rfl: 3  •  Tafamidis 61 MG CAPS, Take 61 mg by mouth in the morning, Disp: 30 capsule, Rfl: 11  •  Tafamidis 61 MG CAPS, Take 1 tablet by mouth in the morning, Disp: , Rfl:   •  warfarin (COUMADIN) 5 mg tablet, TAKE 1 TO 1 AND 1/2 TABLETS BY MOUTH DAILY AS DIRECTED BY PRESCRIBER, Disp: 135 tablet, Rfl: 3  •  warfarin (COUMADIN) 5 mg tablet, Take by mouth daily 1 to 1.5 tablet daily per physician, Disp: , Rfl:   •  colchicine (COLCRYS) 0.6 mg tablet, Take 1 tablet (0.6 mg total) by mouth daily Do not start before May 4, 2023.  (Patient not taking: Reported on 10/6/2023), Disp: 30 tablet, Rfl: 0    Social History     Socioeconomic History   • Marital status: /Civil Union     Spouse name: Not on file   • Number of children: Not on file   • Years of education: Not on file   • Highest education level: Not on file   Occupational History   • Not on file   Tobacco Use   • Smoking status: Former     Packs/day: 1.00     Years: 26.00     Total pack years: 26.00     Types: Cigarettes     Quit date: 18     Years since quittin.7   • Smokeless tobacco: Never   Vaping Use   • Vaping Use: Never used Substance and Sexual Activity   • Alcohol use: Never     Comment: 4 drinks a week   • Drug use: Never   • Sexual activity: Not on file   Other Topics Concern   • Not on file   Social History Narrative    ** Merged History Encounter **          Social Determinants of Health     Financial Resource Strain: Not on file   Food Insecurity: No Food Insecurity (9/13/2023)    Hunger Vital Sign    • Worried About Running Out of Food in the Last Year: Never true    • Ran Out of Food in the Last Year: Never true   Transportation Needs: No Transportation Needs (9/13/2023)    PRAPARE - Transportation    • Lack of Transportation (Medical): No    • Lack of Transportation (Non-Medical): No   Physical Activity: Not on file   Stress: Not on file   Social Connections: Not on file   Intimate Partner Violence: Not on file   Housing Stability: Low Risk  (9/13/2023)    Housing Stability Vital Sign    • Unable to Pay for Housing in the Last Year: No    • Number of Places Lived in the Last Year: 1    • Unstable Housing in the Last Year: No     Family History   Problem Relation Age of Onset   • Heart attack Father    • Heart disease Other    • Cancer Other        Vitals:  Blood pressure 130/76, pulse 66, height 6' 2" (1.88 m), weight 108 kg (238 lb 12.8 oz), SpO2 100 %. Body mass index is 30.66 kg/m². Wt Readings from Last 10 Encounters:   10/06/23 108 kg (238 lb 12.8 oz)   09/15/23 110 kg (241 lb 6.5 oz)   08/21/23 116 kg (256 lb)   08/08/23 112 kg (247 lb)   06/07/23 117 kg (257 lb)   05/23/23 115 kg (254 lb)   05/18/23 115 kg (253 lb)   05/14/23 115 kg (253 lb 7 oz)   05/12/23 115 kg (253 lb)   05/10/23 115 kg (253 lb)     Vitals:    10/06/23 0945   BP: 130/76   BP Location: Left arm   Patient Position: Sitting   Cuff Size: Standard   Pulse: 66   SpO2: 100%   Weight: 108 kg (238 lb 12.8 oz)   Height: 6' 2" (1.88 m)       Physical Exam  Constitutional:       Appearance: Normal appearance. HENT:      Head: Normocephalic.       Nose: Nose normal.      Mouth/Throat:      Mouth: Mucous membranes are moist.   Eyes:      Conjunctiva/sclera: Conjunctivae normal.   Neck:      Comments: JVP at clavicle   Cardiovascular:      Rate and Rhythm: Normal rate and regular rhythm. Comments: Compression socks on. +BLLE edema, trace pitting   Pulmonary:      Effort: Pulmonary effort is normal.      Breath sounds: Normal breath sounds. Musculoskeletal:      Cervical back: Neck supple. Skin:     General: Skin is warm and dry. Neurological:      General: No focal deficit present. Mental Status: He is alert and oriented to person, place, and time. Psychiatric:         Mood and Affect: Mood normal.         Behavior: Behavior normal.         Labs & Results:  Lab Results   Component Value Date    WBC 7.97 10/04/2023    HGB 17.4 (H) 10/04/2023    HCT 51.2 (H) 10/04/2023    MCV 92 10/04/2023     10/04/2023     Lab Results   Component Value Date    SODIUM 137 09/15/2023    K 3.8 09/15/2023     09/15/2023    CO2 29 09/15/2023    BUN 23 09/15/2023    CREATININE 0.99 09/15/2023    GLUC 90 09/15/2023    CALCIUM 8.3 (L) 09/15/2023     Lab Results   Component Value Date    INR 2.72 (H) 10/04/2023    INR 2.55 (H) 09/15/2023    INR 1.92 (H) 09/14/2023    PROTIME 28.3 (H) 10/04/2023    PROTIME 27.7 (H) 09/15/2023    PROTIME 22.2 (H) 09/14/2023     Lab Results   Component Value Date     (H) 09/12/2023      Lab Results   Component Value Date    NTBNP 1,904 (H) 05/16/2023          Thank you for the opportunity to participate in the care of this patient.     Sarah Gutierrez PA-C

## 2023-10-05 NOTE — CASE COMMUNICATION
Back office please fax to ordering LIVIA Avendano, FAX: Fax: [phone]   and PCP Misael WILSON Fax: [phone]     Notification of Assess not Admit    [facility] has assessed your patient for Home Health services and has determined the patient is not eligible for service due to the following: No longer homebound  Patient and daughter report patient has no taxing effort to leave the home. Patient is not using cane and  able to walk without shortness of breath or fatigue. Patient would like a referral to outpatient PT and OT. Patients wound care is ordered daily. Scant drainage. Would be ok to change dressing three times per week instead of daily. Needs referral for the wound care center to facilitate this. A referral for an MSW to explain advanced directives to patient and wife so daughter can get a POA. Patient would like a referral to a diet itian. Patient has a soft fluid filled mass on left posterior elbow. Patient needs Flu and Covid shot.      National Axel Ni RN

## 2023-10-06 ENCOUNTER — OFFICE VISIT (OUTPATIENT)
Dept: CARDIOLOGY CLINIC | Facility: CLINIC | Age: 83
End: 2023-10-06
Payer: MEDICARE

## 2023-10-06 ENCOUNTER — ESTABLISHED COMPREHENSIVE EXAM (OUTPATIENT)
Dept: URBAN - METROPOLITAN AREA CLINIC 6 | Facility: CLINIC | Age: 83
End: 2023-10-06

## 2023-10-06 VITALS
HEART RATE: 66 BPM | HEIGHT: 74 IN | SYSTOLIC BLOOD PRESSURE: 130 MMHG | WEIGHT: 238.8 LBS | BODY MASS INDEX: 30.65 KG/M2 | DIASTOLIC BLOOD PRESSURE: 76 MMHG | OXYGEN SATURATION: 100 %

## 2023-10-06 DIAGNOSIS — E85.82 WILD-TYPE TRANSTHYRETIN-RELATED (ATTR) AMYLOIDOSIS (HCC): ICD-10-CM

## 2023-10-06 DIAGNOSIS — I50.32 CHRONIC HEART FAILURE WITH PRESERVED EJECTION FRACTION (HCC): ICD-10-CM

## 2023-10-06 DIAGNOSIS — H35.371: ICD-10-CM

## 2023-10-06 DIAGNOSIS — Z09 HOSPITAL DISCHARGE FOLLOW-UP: ICD-10-CM

## 2023-10-06 DIAGNOSIS — I43 CARDIAC AMYLOIDOSIS (HCC): ICD-10-CM

## 2023-10-06 DIAGNOSIS — Z96.1: ICD-10-CM

## 2023-10-06 DIAGNOSIS — E85.4 CARDIAC AMYLOIDOSIS (HCC): ICD-10-CM

## 2023-10-06 DIAGNOSIS — H02.413: ICD-10-CM

## 2023-10-06 DIAGNOSIS — R53.1 GENERALIZED WEAKNESS: Primary | ICD-10-CM

## 2023-10-06 PROCEDURE — 92134 CPTRZ OPH DX IMG PST SGM RTA: CPT

## 2023-10-06 PROCEDURE — 99214 OFFICE O/P EST MOD 30 MIN: CPT | Performed by: PHYSICIAN ASSISTANT

## 2023-10-06 PROCEDURE — 92014 COMPRE OPH EXAM EST PT 1/>: CPT

## 2023-10-06 ASSESSMENT — VISUAL ACUITY
OU_CC: 20/25-2
OD_CC: 20/25-2
OU_CC: J2
OS_CC: 20/30

## 2023-10-06 ASSESSMENT — TONOMETRY
OD_IOP_MMHG: 19
OS_IOP_MMHG: 12

## 2023-10-06 NOTE — PATIENT INSTRUCTIONS
Continue your current medications. Take your Lasix today. Please weigh yourself every day (after emptying your bladder) and keep a detailed log of weights. Contact the Heart Failure program at [phone] if you gain 3+ lbs overnight or 5+ lbs in 5-7 days. Limit daily sodium/salt intake to 2000 mg daily to prevent fluid retention. Avoid canned foods, fast food/Chinese food, and processed meats (hot dogs, lunch meat, and sausage etc.). Caution with condiments. Limit fluid intake to 2000 mL or 2 liters (about 60-65 ounces) daily. Avoid electrolyte replacement drinks (such as Gatorade, Pedialyte, Propel, Liquid IV, etc.). Bring complete list of medications and log of daily weights to your follow-up appointment.

## 2023-10-13 ENCOUNTER — APPOINTMENT (OUTPATIENT)
Dept: LAB | Facility: CLINIC | Age: 83
End: 2023-10-13
Payer: MEDICARE

## 2023-10-16 ENCOUNTER — ANTICOAG VISIT (OUTPATIENT)
Dept: CARDIOLOGY CLINIC | Facility: CLINIC | Age: 83
End: 2023-10-16

## 2023-10-16 DIAGNOSIS — I48.19 PERSISTENT ATRIAL FIBRILLATION (HCC): Primary | ICD-10-CM

## 2023-11-07 ENCOUNTER — CLINICAL SUPPORT (OUTPATIENT)
Dept: CARDIOLOGY CLINIC | Facility: CLINIC | Age: 83
End: 2023-11-07
Payer: MEDICARE

## 2023-11-07 ENCOUNTER — TELEPHONE (OUTPATIENT)
Dept: CARDIOLOGY CLINIC | Facility: CLINIC | Age: 83
End: 2023-11-07

## 2023-11-07 DIAGNOSIS — E85.4 CARDIAC AMYLOIDOSIS (HCC): ICD-10-CM

## 2023-11-07 DIAGNOSIS — I43 CARDIAC AMYLOIDOSIS (HCC): ICD-10-CM

## 2023-11-07 DIAGNOSIS — R53.1 GENERALIZED WEAKNESS: ICD-10-CM

## 2023-11-07 PROCEDURE — 93248 EXT ECG>7D<15D REV&INTERPJ: CPT | Performed by: INTERNAL MEDICINE

## 2023-11-08 ENCOUNTER — TELEPHONE (OUTPATIENT)
Dept: CARDIOLOGY CLINIC | Facility: CLINIC | Age: 83
End: 2023-11-08

## 2023-11-08 RX ORDER — METOPROLOL SUCCINATE 25 MG/1
25 TABLET, EXTENDED RELEASE ORAL 2 TIMES DAILY
COMMUNITY

## 2023-11-08 NOTE — TELEPHONE ENCOUNTER
I spoke with patient about Zio result. He said he has been feeling well, going to PT /OT 3 times a week. He will increase the metoprolol succinate to 25 mg BID. He does have a follow up scheduled with you on 11/21.

## 2023-11-08 NOTE — TELEPHONE ENCOUNTER
----- Message from Uma Hughes MD sent at 11/8/2023  2:51 PM EST -----  Please let mallika Ortega showed VT up to 15 seconds and afib with episodes of fast heart rates. Symptoms correlated mostly with afib with fast heart rates. Increase metoprolol to 25mg BID. Phone call follow up next week to see how he is doing. Thanks        31 Ventricular Tachycardia runs occurred, the run with the fastest interval lasting 13 beats with a max rate of 226 bpm, the longest lasting 15.1 secs with an avg rate of 107 bpm. Atrial Fibrillation occurred continuously (100% burden), ranging from    bpm (avg of 83 bpm). Isolated VEs were occasional (1.8%, 54238), VE Couplets were rare (<1.0%, 1028), and VE Triplets were rare (<1.0%, 203). Ventricular Bigeminy and Trigeminy were present.  MD notification criteria for Rapid Atrial Fibrillati  on met - report posted prior to notification per account request (CP)

## 2023-11-12 PROBLEM — R50.9 FEVER: Status: RESOLVED | Noted: 2023-09-13 | Resolved: 2023-11-12

## 2023-11-21 ENCOUNTER — OFFICE VISIT (OUTPATIENT)
Dept: CARDIOLOGY CLINIC | Facility: CLINIC | Age: 83
End: 2023-11-21
Payer: MEDICARE

## 2023-11-21 VITALS
SYSTOLIC BLOOD PRESSURE: 132 MMHG | OXYGEN SATURATION: 99 % | HEIGHT: 74 IN | DIASTOLIC BLOOD PRESSURE: 60 MMHG | WEIGHT: 253 LBS | HEART RATE: 83 BPM | BODY MASS INDEX: 32.47 KG/M2

## 2023-11-21 DIAGNOSIS — E85.82 WILD-TYPE TRANSTHYRETIN-RELATED (ATTR) AMYLOIDOSIS (HCC): Primary | ICD-10-CM

## 2023-11-21 DIAGNOSIS — N18.31 STAGE 3A CHRONIC KIDNEY DISEASE (HCC): ICD-10-CM

## 2023-11-21 DIAGNOSIS — E85.4 CARDIAC AMYLOIDOSIS (HCC): ICD-10-CM

## 2023-11-21 DIAGNOSIS — I48.19 PERSISTENT ATRIAL FIBRILLATION (HCC): ICD-10-CM

## 2023-11-21 DIAGNOSIS — I43 CARDIAC AMYLOIDOSIS (HCC): ICD-10-CM

## 2023-11-21 PROCEDURE — 99214 OFFICE O/P EST MOD 30 MIN: CPT | Performed by: INTERNAL MEDICINE

## 2023-11-21 NOTE — PROGRESS NOTES
Advanced Heart Failure Outpatient Progress Note - Candice Moore 80 y.o. male MRN: [id number]    Encounter: [id number]      Assessment/Plan:    Patient Active Problem List    Diagnosis Date Noted    Thrombocytopenia ([address]) 09/14/2023    Multiple falls 09/12/2023    ESTER (obstructive sleep apnea) 09/12/2023    History of DVT (deep vein thrombosis) 09/12/2023    Atrial fibrillation ([address]) 09/12/2023    Congestive heart failure (CHF) ([address]) 09/12/2023    Generalized weakness 09/12/2023    Chronic venous hypertension (idiopathic) with ulcer of right lower extremity ([address]) 09/12/2023    Elevated troponin 09/12/2023    Multiple abrasions 09/12/2023    Frailty syndrome in geriatric patient 05/12/2023    Lower extremity cellulitis 04/24/2023    Stage 3a chronic kidney disease (CKD) ([address]) 04/24/2023    Hyperbilirubinemia 04/24/2023    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 ([address]) 02/14/2022    Persistent atrial fibrillation ([address]) 05/14/2020    Morbid obesity ([address]) 03/07/2018    Lumbar stenosis 03/07/2018    Benign neoplasm of colon 02/07/2018    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    Internal hemorrhoids 02/07/2018    Pure hypercholesterolemia 02/07/2018    Sacroiliitis ([address]) 02/01/2018    Bilateral hip pain 12/04/2017    Adenomatous colon polyp 12/29/2015    Spinal stenosis of lumbar region 09/10/2015    DDD (degenerative disc disease), lumbar 07/07/2015    Lumbar disc herniation 06/22/2015    Radiculopathy, lumbar region 06/22/2015    MTHFR mutation 03/11/2015    Elevated hemoglobin A1c 06/05/2014    Premature atrial complexes 05/12/2014    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 EKG to mass mismatch: yes. Tc-PYP scan 04/29/2023: H/CL ratio 1.77. Grade 3 uptake. "Strongly suggestive of TTR amyloidosis."  Genetic testing: negative  SPEP 04/2023: insufficient sample. Free light chains 04/2023: kappa/lambda ratio 1.49. Amyloid-specific Therapies:   --Tafamidis 61 mg daily. # Chronic HFpEF; LVEF 65%; LVIDd 3.4 cm; NYHA II; ACC/AHA Stage C  Etiology: TTR amyloidosis. Diuretic: Lasix 40 mg daily with potassium 40meq daily   Volume up, deconditioning also contributing to dyspnea    Studies:  TTE 07/22/2020: LVEF 60-70%. LVIDd 4 cm. IVSd 1.45 cm. Moderate concentric hypertrophy. Normal RV. HESHAM. Trace MR. Mild TR. TTE 04/25/2023: LVEF 65%. LVIDd 3.4 cm. IVSd 1.6 cm. Normal RV size with low normal RVSF. HESHAM (R>L). Small PFO. Mild MR and TR. Trace AI. Normal IVC. # Atrial fibrillation, persistent  [id number] = 6 (age, HF, HTN, DVT/TIA). Anticoagulation on Coumadin. Rate control: metoprolol succinate 25mg BID  Rhythm control: None. # VT   Zio 11/7/23: 31 Ventricular Tachycardia runs occurred, the run with the fastest interval lasting 13 beats with a max rate of 226 bpm, the longest lasting 15.1 secs with an avg rate of 107 bpm. Atrial Fibrillation occurred continuously (100% burden), ranging from  bpm (avg of 83 bpm). Isolated VEs were occasional (1.8%, 46460), VE Couplets were rare (<1.0%, 1028), and VE Triplets were rare (<1.0%, 203). Ventricular Bigeminy and Trigeminy were present. MD notification criteria for Rapid Atrial Fibrillation met - report posted prior to notification per account request (CP)   Increased metoprolol with Vts on Zio    # Hypertension    # Chronic kidney disease, stage IIIa  Baseline creatinine of 1.3-1.5. 1.06 9/16/23     # Hyperlipidemia  # Obstructive sleep apnea: compliant with CPAP.    # Spinal stenosis, lumbar - s/p surgery in 2018  # Carpal tunnel syndrome  # MTHFR mutation  # History of DVT  # History of TIA  # History of tobacco abuse: quit in 1982. TODAY'S PLAN:  Continue current medications  Take lasix as prescribed  2g sodium diet   2L fluid restriction  Daily weights  Physical activities/walking as tolerated      HPI:   David Shepherd is an 77-year-old man with a PMH as above who presents to the office for follow-up. Follows with Dr. Ricci Rosen. 05/08/2023 with TH: "80year old with PMH as above recently admitted with volume overload and celllulitis. Echocardiogram  concerning for cardiac amyloidosis. Underwent workup. SPEP, UPEP showed no monoclonal banding and serum free light chains showing mild elevation in Ig Kappa with normal Kappa Lambda fluid ratio. PYP scan highly suggestive of aTTR amyloid. He presents today for hospital follow up. Feeling deconditioned and worn out. Questioning why he is not to start his diuretic until 5/13. Having a lot of neuropathy in the lower extremities. Will have patient begin taking Lasix 40 mg once daily today with Kdur 20 meq BID. Will check labs before the end of the week. Start application process for Tafamidis. Genetic testing to determine wild type vs familial."     05/23/2023: Patient presents with his wife for follow-up. Down 3 lbs since last visit. Reports great improvement in LE swelling since starting Lasix. Due to his active lifestyle and significant urinary response to Lasix, has only been taking Lasix and potassium 4-5 days a week. Continues with daily weights. Provided patient with "Living With Heart Failure" booklet and "Don't Pass the Salt" cookbook. 6/7/23: presents today for follow up. Started tafamidis on Thursday. Having some fatigue, which he thinks may be related to deconditioning. Improving. Some MANLEY, improving. No SOB at rest. No PND, orthopnea. Swelling has improved.  Did have a blister on his right leg, dressed by wound care and has an appointment with them next week. Takes lasix about 3 days a week on days when he is home. Eating minimal salt, drinking under 2L. Urine output has been consistent. Weights stable at home. 8/21/23: Presents for follow up. Having shortness of breath on exertion. Overall leg swelling stable. Denies abdominal distension. Has only been taking lasix and potassium 2-3 times a depending on his schedule for the day. Mostly sedentary per wife. Does not weight himself daily, last 2 weights 247 and 250 lbs on home scale. Wife reports occasional dizziness. Last dose of lasix yesterday. "Presented to [facility] on 09/12/2023 s/p fall and being found "facedown on the tile floor in his bathroom." In preceding days had been having more frequent falls at home per wife/family. That same day, had near fall backwards onto his wife when walking up stairs (live in split level). Patient unable to recall fall onto bathroom floor; unclear how long patient down before wife found patient. Received 500 mL IV fluids in ED. Trauma XR with "Mild pulmonary vascular congestion." CT head and C-spine without acute abnormalities. CT c/a/p without acute injuries but did note "mild patchy groundglass opacity scattered in the lungs with interlobular septal thickening suggestive of mild interstitial edema." BNP not checked. Intermittently noted to be in Afib with RVR on telemetry per ED notes. Given one dose IV Lasix, and HF team consulted. Felt to be euvolemic when examined, and recommended to take his outpatient Lasix more regularly as prescribed. Outpatient cardiac event monitor could be considered to evaluate for arrhythmogenic cause for falls. 10/6/23: Presents today for follow up. Feeling better after rehab, denies MALNEY, SOB, PND, orthopnea. Has been using his CPAP. Denies chest pain, dizziness, palpitations. Reports prior to fall, he felt generalized weakness and couldn't stand, but denies CP/dizziness/lightheadedness.  Takes his Lasix 3-4 days a week when he's home. Urinates very well with current dose. Doesn't weigh himself every day; will start this again. Some leg swelling noted today, last took Lasix 2-3 days ago."    Interval History:  11/21/23: here for follow up. Zio showed 100% afib burden and episodes of VT up to 15 seconds. Metoprolol increased to 25mg BID. Denies palpitations. Occasional dizziness with a full flight of steps. Weight 242 lbs on home scale. Denies PND or orthopnea. Has been taking lasix 3x/week on average instead of daily. Review of Systems   Constitutional:  Negative for chills and fever. HENT:  Negative for ear pain and sore throat. Eyes:  Negative for pain and visual disturbance. Respiratory:  Positive for shortness of breath. Negative for cough. Cardiovascular:  Positive for leg swelling. Negative for chest pain and palpitations. Gastrointestinal:  Negative for abdominal distention, abdominal pain and vomiting. Genitourinary:  Negative for dysuria and hematuria. Musculoskeletal:  Negative for arthralgias and back pain. Skin:  Negative for color change and rash. Neurological:  Negative for dizziness, seizures, syncope and light-headedness. All other systems reviewed and are negative.       Past Medical History:   Diagnosis Date    Abnormal glucose level 02/07/2018    Acute back pain 10/02/2017    Atrial fibrillation (HCC)     Blood clot in vein 2015    in right foot    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HCC)     Carpal tunnel syndrome     Chronic kidney disease (CKD)     Fever 09/08/2017    Frailty syndrome in geriatric patient 05/12/2023    FUO (fever of unknown origin) 09/08/2017    Heart failure ([address])     History of DVT (deep vein thrombosis)     History of tobacco abuse     quit in 1982    History of transient cerebral ischemia     Hypertension          Allergies   Allergen Reactions    Sildenafil Hives     Other reaction(s): severe congestion, cialis was OK    Sildenafil Other (See Comments)     _     . Current Outpatient Medications:     Cholecalciferol (VITAMIN D-3 PO), Take 4,000 Units by mouth daily. , Disp: , Rfl:     folic acid (FOLVITE) 1 mg tablet, Take 2 mg by mouth daily  , Disp: , Rfl:     furosemide (LASIX) 40 mg tablet, Take 1 tablet (40 mg total) by mouth daily (Patient taking differently: Take 40 mg by mouth 3 (three) times a week), Disp: 30 tablet, Rfl: 3    metoprolol succinate (TOPROL-XL) 25 mg 24 hr tablet, TAKE 1 TABLET(25 MG) BY MOUTH DAILY (Patient taking differently: 25 mg 2 (two) times a day), Disp: 90 tablet, Rfl: 3    Multiple Vitamins-Minerals (MULTIVITAMIN & MINERAL PO), Take 1 tablet by mouth daily. Per Alina Browne MD [facility] 10/2/23  Indications: supplement, Disp: , Rfl:     potassium chloride (K-DUR,KLOR-CON) 20 mEq tablet, Take 2 tablets (40 mEq total) by mouth daily (Patient taking differently: Take 40 mEq by mouth daily With lasix- 3x weekly), Disp: 60 tablet, Rfl: 3    Tafamidis 61 MG CAPS, Take 61 mg by mouth in the morning, Disp: 30 capsule, Rfl: 11    warfarin (COUMADIN) 5 mg tablet, TAKE 1 TO 1 AND 1/2 TABLETS BY MOUTH DAILY AS DIRECTED BY PRESCRIBER, Disp: 135 tablet, Rfl: 3    warfarin (COUMADIN) 5 mg tablet, Take by mouth daily 1 to 1.5 tablet daily per physician, Disp: , Rfl:     colchicine (COLCRYS) 0.6 mg tablet, Take 1 tablet (0.6 mg total) by mouth daily Do not start before May 4, 2023.  (Patient not taking: Reported on 10/6/2023), Disp: 30 tablet, Rfl: 0    folic acid (FOLVITE) 1 mg tablet, Take 2 mg by mouth daily, Disp: , Rfl:     Social History     Socioeconomic History    Marital status: /Civil Union     Spouse name: Not on file    Number of children: Not on file    Years of education: Not on file    Highest education level: Not on file   Occupational History    Not on file   Tobacco Use    Smoking status: Former     Packs/day: 1.00     Years: 26.00     Total pack years: 26.00     Types: Cigarettes     Quit date: 1982     Years since quittin.9    Smokeless tobacco: Never   Vaping Use    Vaping Use: Never used   Substance and Sexual Activity    Alcohol use: Never     Comment: 4 drinks a week    Drug use: Never    Sexual activity: Not on file   Other Topics Concern    Not on file   Social History Narrative    ** Merged History Encounter **          Social Determinants of Health     Financial Resource Strain: Not on file   Food Insecurity: No Food Insecurity (2023)    Hunger Vital Sign     Worried About Running Out of Food in the Last Year: Never true     Ran Out of Food in the Last Year: Never true   Transportation Needs: No Transportation Needs (2023)    PRAPARE - Transportation     Lack of Transportation (Medical): No     Lack of Transportation (Non-Medical): No   Physical Activity: Not on file   Stress: Not on file   Social Connections: Not on file   Intimate Partner Violence: Not on file   Housing Stability: Low Risk  (2023)    Housing Stability Vital Sign     Unable to Pay for Housing in the Last Year: No     Number of Places Lived in the Last Year: 1     Unstable Housing in the Last Year: No       Family History   Problem Relation Age of Onset    Heart attack Father     Heart disease Other     Cancer Other        Physical Exam:    Vitals:   Vitals:    23 1409   BP: 132/60   Pulse: 83   SpO2: 99%       Physical Exam  Constitutional:       General: He is not in acute distress. Appearance: Normal appearance. HENT:      Head: Normocephalic and atraumatic. Mouth/Throat:      Mouth: Mucous membranes are moist.   Eyes:      General: No scleral icterus. Extraocular Movements: Extraocular movements intact. Cardiovascular:      Rate and Rhythm: Normal rate. Rhythm irregularly irregular. Pulses: Normal pulses. Heart sounds: S1 normal and S2 normal. No murmur heard. No friction rub. No gallop. Comments: Elevated JVP.  1+ pitting edema bilaterally  Pulmonary:      Breath sounds: Normal breath sounds. Abdominal:      General: There is no distension. Palpations: Abdomen is soft. Tenderness: There is no abdominal tenderness. There is no guarding or rebound. Musculoskeletal:         General: Normal range of motion. Cervical back: Neck supple. Skin:     General: Skin is warm and dry. Capillary Refill: Capillary refill takes less than 2 seconds. Neurological:      General: No focal deficit present. Mental Status: He is alert and oriented to person, place, and time. Psychiatric:         Mood and Affect: Mood normal.         Labs & Results:    Lab Results   Component Value Date    SODIUM 137 09/15/2023    K 3.8 09/15/2023     09/15/2023    CO2 29 09/15/2023    BUN 23 09/15/2023    CREATININE 0.99 09/15/2023    GLUC 90 09/15/2023    CALCIUM 8.3 (L) 09/15/2023     Lab Results   Component Value Date    WBC 7.97 10/04/2023    HGB 17.4 (H) 10/04/2023    HCT 51.2 (H) 10/04/2023    MCV 92 10/04/2023     10/04/2023     Lab Results   Component Value Date    NTBNP 1,904 (H) 05/16/2023      Lab Results   Component Value Date    CHOLESTEROL 183 05/16/2023    CHOLESTEROL 194 12/01/2022    CHOLESTEROL 186 05/23/2022     Lab Results   Component Value Date    HDL 69 05/16/2023    HDL 74 12/01/2022    HDL 70 05/23/2022     Lab Results   Component Value Date    TRIG 81 05/16/2023    TRIG 132 12/01/2022    TRIG 86 05/23/2022     Lab Results   Component Value Date    NONHDLC 120 12/01/2022    [address] 116 05/23/2022    [address] 103 01/22/2020       EKG personally reviewed by Luly Phillips MD.     Counseling / Coordination of Care  Time spent today 25 minutes. Greater than 50% of total time was spent with the patient and / or family counseling and / or coordination of care. We went over current diagnosis, most recent studies and any changes in treatment. Thank you for the opportunity to participate in the care of this patient.     Luly Phillips MD  ADVANCED HEART FAILURE [facility]

## 2023-11-21 NOTE — PATIENT INSTRUCTIONS
Continue current medications  Take lasix as prescribed  2g sodium diet   2L fluid restriction  Daily weights  Physical activities/walking as tolerated

## 2023-11-27 ENCOUNTER — APPOINTMENT (OUTPATIENT)
Dept: LAB | Facility: CLINIC | Age: 83
End: 2023-11-27
Payer: MEDICARE

## 2023-11-27 ENCOUNTER — TELEPHONE (OUTPATIENT)
Dept: CARDIOLOGY CLINIC | Facility: CLINIC | Age: 83
End: 2023-11-27

## 2023-11-27 NOTE — TELEPHONE ENCOUNTER
CALLED PT AND lmom FOR THEM TO CALL BACK TO LET ME KNOW WHAT THEY DID WITH THE PT PART OF [employer] FORMS.

## 2023-11-28 ENCOUNTER — ANTICOAG VISIT (OUTPATIENT)
Dept: CARDIOLOGY CLINIC | Facility: CLINIC | Age: 83
End: 2023-11-28

## 2023-11-28 DIAGNOSIS — I48.19 PERSISTENT ATRIAL FIBRILLATION (HCC): Primary | ICD-10-CM

## 2023-12-05 ENCOUNTER — TELEPHONE (OUTPATIENT)
Dept: CARDIOLOGY CLINIC | Facility: CLINIC | Age: 83
End: 2023-12-05

## 2023-12-05 DIAGNOSIS — E85.82 WILD-TYPE TRANSTHYRETIN-RELATED (ATTR) AMYLOIDOSIS (HCC): ICD-10-CM

## 2023-12-05 NOTE — TELEPHONE ENCOUNTER
P/c'd, he will not be able to come in today to sign papers. He will come in on Thursday this week for [employer] forms.

## 2023-12-07 ENCOUNTER — TELEPHONE (OUTPATIENT)
Dept: CARDIOLOGY CLINIC | Facility: CLINIC | Age: 83
End: 2023-12-07

## 2023-12-07 NOTE — TELEPHONE ENCOUNTER
Pt and I are playing phone tag. Called him back and LMOM that is fine to come in tomorrow at [address] to do the [place] forms.

## 2023-12-12 ENCOUNTER — TELEPHONE (OUTPATIENT)
Dept: CARDIOLOGY CLINIC | Facility: CLINIC | Age: 83
End: 2023-12-12

## 2023-12-12 NOTE — TELEPHONE ENCOUNTER
Completed forms, signed script, med list, and [phone] sent to [facility] [phone].

## 2023-12-14 ENCOUNTER — APPOINTMENT (OUTPATIENT)
Dept: LAB | Facility: CLINIC | Age: 83
End: 2023-12-14
Payer: MEDICARE

## 2023-12-14 ENCOUNTER — TRANSCRIBE ORDERS (OUTPATIENT)
Dept: LAB | Facility: CLINIC | Age: 83
End: 2023-12-14

## 2023-12-14 DIAGNOSIS — G47.33 OBSTRUCTIVE SLEEP APNEA (ADULT) (PEDIATRIC): ICD-10-CM

## 2023-12-14 DIAGNOSIS — I50.9 HEART FAILURE, UNSPECIFIED HF CHRONICITY, UNSPECIFIED HEART FAILURE TYPE (HCC): ICD-10-CM

## 2023-12-14 DIAGNOSIS — N18.31 CHRONIC KIDNEY DISEASE (CKD) STAGE G3A/A1, MODERATELY DECREASED GLOMERULAR FILTRATION RATE (GFR) BETWEEN 45-59 ML/MIN/1.73 SQUARE METER AND ALBUMINURIA CREATININE RATIO LESS THAN 30 MG/G (HCC): ICD-10-CM

## 2023-12-14 DIAGNOSIS — I42.9 PRIMARY CARDIOMYOPATHY (HCC): ICD-10-CM

## 2023-12-14 DIAGNOSIS — R73.09 IMPAIRED GLUCOSE TOLERANCE TEST: ICD-10-CM

## 2023-12-14 DIAGNOSIS — D68.69 SECONDARY HYPERCOAGULABLE STATE (HCC): ICD-10-CM

## 2023-12-14 DIAGNOSIS — E55.9 AVITAMINOSIS D: ICD-10-CM

## 2023-12-14 DIAGNOSIS — N40.1 BENIGN PROSTATIC HYPERPLASIA WITH LOWER URINARY TRACT SYMPTOMS, SYMPTOM DETAILS UNSPECIFIED: ICD-10-CM

## 2023-12-14 DIAGNOSIS — N40.1 BENIGN PROSTATIC HYPERPLASIA WITH LOWER URINARY TRACT SYMPTOMS, SYMPTOM DETAILS UNSPECIFIED: Primary | ICD-10-CM

## 2023-12-14 DIAGNOSIS — M10.9 GOUT, UNSPECIFIED CAUSE, UNSPECIFIED CHRONICITY, UNSPECIFIED SITE: ICD-10-CM

## 2023-12-14 LAB
ALBUMIN SERPL BCP-MCNC: 3.7 G/DL (ref 3.5–5)
ALP SERPL-CCNC: 88 U/L (ref 34–104)
ALT SERPL W P-5'-P-CCNC: 18 U/L (ref 7–52)
ANION GAP SERPL CALCULATED.3IONS-SCNC: 8 MMOL/L
AST SERPL W P-5'-P-CCNC: 30 U/L (ref 13–39)
BILIRUB SERPL-MCNC: 1.79 MG/DL (ref 0.2–1)
BUN SERPL-MCNC: 26 MG/DL (ref 5–25)
CALCIUM SERPL-MCNC: 9 MG/DL (ref 8.4–10.2)
CHLORIDE SERPL-SCNC: 105 MMOL/L (ref 96–108)
CHOLEST SERPL-MCNC: 167 MG/DL
CO2 SERPL-SCNC: 28 MMOL/L (ref 21–32)
CREAT SERPL-MCNC: 1.3 MG/DL (ref 0.6–1.3)
EST. AVERAGE GLUCOSE BLD GHB EST-MCNC: 123 MG/DL
GFR SERPL CREATININE-BSD FRML MDRD: 50 ML/MIN/1.73SQ M
GLUCOSE P FAST SERPL-MCNC: 105 MG/DL (ref 65–99)
HBA1C MFR BLD: 5.9 %
HDLC SERPL-MCNC: 70 MG/DL
LDLC SERPL CALC-MCNC: 78 MG/DL (ref 0–100)
POTASSIUM SERPL-SCNC: 4.4 MMOL/L (ref 3.5–5.3)
PROT SERPL-MCNC: 6.6 G/DL (ref 6.4–8.4)
SODIUM SERPL-SCNC: 141 MMOL/L (ref 135–147)
TRIGL SERPL-MCNC: 97 MG/DL
URATE SERPL-MCNC: 10.2 MG/DL (ref 3.5–8.5)

## 2023-12-14 PROCEDURE — 80053 COMPREHEN METABOLIC PANEL: CPT

## 2023-12-14 PROCEDURE — 84550 ASSAY OF BLOOD/URIC ACID: CPT

## 2023-12-14 PROCEDURE — 83036 HEMOGLOBIN GLYCOSYLATED A1C: CPT

## 2023-12-14 PROCEDURE — 80061 LIPID PANEL: CPT

## 2023-12-15 ENCOUNTER — ANTICOAG VISIT (OUTPATIENT)
Dept: CARDIOLOGY CLINIC | Facility: CLINIC | Age: 83
End: 2023-12-15

## 2023-12-15 DIAGNOSIS — I48.19 PERSISTENT ATRIAL FIBRILLATION (HCC): Primary | ICD-10-CM

## 2024-01-25 ENCOUNTER — APPOINTMENT (OUTPATIENT)
Dept: LAB | Facility: CLINIC | Age: 84
End: 2024-01-25
Payer: MEDICARE

## 2024-01-26 ENCOUNTER — ANTICOAG VISIT (OUTPATIENT)
Dept: CARDIOLOGY CLINIC | Facility: CLINIC | Age: 84
End: 2024-01-26

## 2024-01-26 DIAGNOSIS — I48.19 PERSISTENT ATRIAL FIBRILLATION (HCC): Primary | ICD-10-CM

## 2024-02-12 ENCOUNTER — TELEPHONE (OUTPATIENT)
Dept: CARDIOLOGY CLINIC | Facility: CLINIC | Age: 84
End: 2024-02-12

## 2024-03-05 ENCOUNTER — APPOINTMENT (OUTPATIENT)
Dept: LAB | Facility: CLINIC | Age: 84
End: 2024-03-05
Payer: MEDICARE

## 2024-03-06 ENCOUNTER — ANTICOAG VISIT (OUTPATIENT)
Dept: CARDIOLOGY CLINIC | Facility: CLINIC | Age: 84
End: 2024-03-06

## 2024-03-06 DIAGNOSIS — I48.19 PERSISTENT ATRIAL FIBRILLATION (HCC): Primary | ICD-10-CM

## 2024-03-21 ENCOUNTER — OFFICE VISIT (OUTPATIENT)
Dept: CARDIOLOGY CLINIC | Facility: CLINIC | Age: 84
End: 2024-03-21
Payer: MEDICARE

## 2024-03-21 VITALS
HEART RATE: 84 BPM | SYSTOLIC BLOOD PRESSURE: 124 MMHG | DIASTOLIC BLOOD PRESSURE: 86 MMHG | HEIGHT: 74 IN | BODY MASS INDEX: 31.57 KG/M2 | OXYGEN SATURATION: 98 % | WEIGHT: 246 LBS

## 2024-03-21 DIAGNOSIS — N18.31 STAGE 3A CHRONIC KIDNEY DISEASE (CKD) (HCC): ICD-10-CM

## 2024-03-21 DIAGNOSIS — I50.32 CHRONIC HEART FAILURE WITH PRESERVED EJECTION FRACTION (HCC): ICD-10-CM

## 2024-03-21 DIAGNOSIS — I87.311 CHRONIC VENOUS HYPERTENSION (IDIOPATHIC) WITH ULCER OF RIGHT LOWER EXTREMITY (HCC): ICD-10-CM

## 2024-03-21 DIAGNOSIS — I43 CARDIAC AMYLOIDOSIS (HCC): Primary | ICD-10-CM

## 2024-03-21 DIAGNOSIS — I50.32 CHRONIC DIASTOLIC CONGESTIVE HEART FAILURE (HCC): ICD-10-CM

## 2024-03-21 DIAGNOSIS — I48.19 PERSISTENT ATRIAL FIBRILLATION (HCC): ICD-10-CM

## 2024-03-21 DIAGNOSIS — E66.01 MORBID OBESITY (HCC): ICD-10-CM

## 2024-03-21 DIAGNOSIS — E85.4 CARDIAC AMYLOIDOSIS (HCC): Primary | ICD-10-CM

## 2024-03-21 DIAGNOSIS — L97.919 CHRONIC VENOUS HYPERTENSION (IDIOPATHIC) WITH ULCER OF RIGHT LOWER EXTREMITY (HCC): ICD-10-CM

## 2024-03-21 PROCEDURE — 99214 OFFICE O/P EST MOD 30 MIN: CPT | Performed by: INTERNAL MEDICINE

## 2024-03-21 PROCEDURE — G2211 COMPLEX E/M VISIT ADD ON: HCPCS | Performed by: INTERNAL MEDICINE

## 2024-03-21 RX ORDER — METOPROLOL SUCCINATE 25 MG/1
25 TABLET, EXTENDED RELEASE ORAL DAILY
Start: 2024-03-21

## 2024-03-21 RX ORDER — POTASSIUM CHLORIDE 20 MEQ/1
40 TABLET, EXTENDED RELEASE ORAL AS NEEDED
Qty: 60 TABLET | Refills: 3 | Status: SHIPPED | OUTPATIENT
Start: 2024-03-21

## 2024-03-21 RX ORDER — FUROSEMIDE 40 MG/1
40 TABLET ORAL AS NEEDED
Start: 2024-03-21

## 2024-03-21 NOTE — PROGRESS NOTES
Advanced Heart Failure Outpatient Progress Note - Jesse Laresuli 83 y.o. male MRN: [id number]    Encounter: [id number]      Assessment/Plan:    Patient Active Problem List    Diagnosis Date Noted    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HCC) 03/21/2024    Chronic diastolic congestive heart failure (HCC) 03/21/2024    Thrombocytopenia (HCC) 09/14/2023    Multiple falls 09/12/2023    ESTER (obstructive sleep apnea) 09/12/2023    History of DVT (deep vein thrombosis) 09/12/2023    Atrial fibrillation (HCC) 09/12/2023    Congestive heart failure (CHF) (HCC) 09/12/2023    Generalized weakness 09/12/2023    Chronic venous hypertension (idiopathic) with ulcer of right lower extremity ([facility]) 09/12/2023    Elevated troponin 09/12/2023    Multiple abrasions 09/12/2023    Frailty syndrome in geriatric patient 05/12/2023    Lower extremity cellulitis 04/24/2023    Stage 3a chronic kidney disease (CKD) ([facility]) 04/24/2023    Hyperbilirubinemia 04/24/2023    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 ([facility]) 02/14/2022    Persistent atrial fibrillation (HCC) 05/14/2020    Morbid obesity ([facility]) 03/07/2018    Lumbar stenosis 03/07/2018    Benign neoplasm of colon 02/07/2018    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    Internal hemorrhoids 02/07/2018    Pure hypercholesterolemia 02/07/2018    Sacroiliitis ([facility]) 02/01/2018    Bilateral hip pain 12/04/2017    Adenomatous colon polyp 12/29/2015    Spinal stenosis of lumbar region 09/10/2015    DDD (degenerative disc disease), lumbar 07/07/2015    Lumbar disc herniation 06/22/2015    Radiculopathy, lumbar region 06/22/2015    MTHFR mutation 03/11/2015    Elevated hemoglobin A1c 06/05/2014    Premature atrial complexes 05/12/2014    Aortic ectasia (HCC) 03/06/2014    Obstructive sleep apnea 11/25/2013    Premature ventricular contraction 11/25/2013    Lichen planus 05/17/2012    Hypertension 04/04/2012  "   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  EKG to mass mismatch: yes.   Tc-PYP scan 04/29/2023: H/CL ratio 1.77. Grade 3 uptake. \"Strongly suggestive of TTR amyloidosis.\"  Genetic testing: negative  SPEP 04/2023: insufficient sample.   Free light chains 04/2023: kappa/lambda ratio 1.49.     Amyloid-specific Therapies:   --Tafamidis 61 mg daily.                # Chronic HFpEF; LVEF 65%; LVIDd 3.4 cm; NYHA II; ACC/AHA Stage C  Etiology: TTR amyloidosis.   Diuretic: Lasix 40 mg daily with potassium 40meq daily   Euvolemic    Weight: 253 lbs 11/21/23; 246 3/21/24    Studies:  TTE 07/22/2020: LVEF 60-70%. LVIDd 4 cm. IVSd 1.45 cm. Moderate concentric hypertrophy. Normal RV. HSEHAM. Trace MR. Mild TR.   TTE 04/25/2023: LVEF 65%. LVIDd 3.4 cm. IVSd 1.6 cm. Normal RV size with low normal RVSF. HESHAM (R>L). Small PFO. Mild MR and TR. Trace AI. Normal IVC.      # Atrial fibrillation, persistent  [id number] = 6 (age, HF, HTN, DVT/TIA).  Anticoagulation on Coumadin.   Rate control: metoprolol succinate 25mg BID  Rhythm control: None.     # VT   Zio 11/7/23: 31 Ventricular Tachycardia runs occurred, the run with the fastest interval lasting 13 beats with a max rate of 226 bpm, the longest lasting 15.1 secs with an avg rate of 107 bpm. Atrial Fibrillation occurred continuously (100% burden), ranging from  bpm (avg of 83 bpm). Isolated VEs were occasional (1.8%, 05334), VE Couplets were rare (<1.0%, 1028), and VE Triplets were rare (<1.0%, 203). Ventricular Bigeminy and Trigeminy were present. MD notification criteria for Rapid Atrial Fibrillation met - report posted prior to notification per account request (CP)   Increased metoprolol with Vts on Zio    # Hypertension    # Chronic kidney disease, stage IIIa  Baseline creatinine of 1.3-1.5.  1.3 12/14/2023  # " "Hyperlipidemia  # Obstructive sleep apnea: compliant with CPAP.   # Spinal stenosis, lumbar - s/p surgery in 2018  # Carpal tunnel syndrome  # MTHFR mutation  # History of DVT  # History of TIA  # History of tobacco abuse: quit in 1982.     TODAY'S PLAN:  Take furosemide 40mg with potassium 40 meq as needed for weight gain of 3 lbs in a day or 5 lbs in 5-7 days.  Continue rest of cardiac medications  2g sodium diet   2L fluid restriction  Daily weights  Physical activities/walking as tolerated      HPI:   Jesse France is an 83 y.o. male with PMH as above who is here for follow up.     05/08/2023 with TH: \"82 year old with PMH as above recently admitted with volume overload and celllulitis. Echocardiogram  concerning for cardiac amyloidosis. Underwent workup. SPEP, UPEP showed no monoclonal banding and serum free light chains showing mild elevation in Ig Kappa with normal Kappa Lambda fluid ratio. PYP scan highly suggestive of aTTR amyloid. He presents today for hospital follow up. Feeling deconditioned and worn out. Questioning why he is not to start his diuretic until 5/13. Having a lot of neuropathy in the lower extremities.      Will have patient begin taking Lasix 40 mg once daily today with Kdur 20 meq BID. Will check labs before the end of the week. Start application process for Tafamidis. Genetic testing to determine wild type vs familial.\"     05/23/2023: Patient presents with his wife for follow-up. Down 3 lbs since last visit. Reports great improvement in LE swelling since starting Lasix. Due to his active lifestyle and significant urinary response to Lasix, has only been taking Lasix and potassium 4-5 days a week. Continues with daily weights. Provided patient with \"Living With Heart Failure\" booklet and \"Don't Pass the Salt\" cookbook.     6/7/23: presents today for follow up. Started tafamidis on Thursday. Having some fatigue, which he thinks may be related to deconditioning. Improving. Some MANLEY, " "improving. No SOB at rest. No PND, orthopnea. Swelling has improved. Did have a blister on his right leg, dressed by wound care and has an appointment with them next week. Takes lasix about 3 days a week on days when he is home. Eating minimal salt, drinking under 2L. Urine output has been consistent. Weights stable at home.     8/21/23: Presents for follow up. Having shortness of breath on exertion. Overall leg swelling stable. Denies abdominal distension. Has only been taking lasix and potassium 2-3 times a depending on his schedule for the day. Mostly sedentary per wife. Does not weight himself daily, last 2 weights 247 and 250 lbs on home scale. Wife reports occasional dizziness. Last dose of lasix yesterday.    \"Presented to [facility] on 09/12/2023 s/p fall and being found \"facedown on the tile floor in his bathroom.\" In preceding days had been having more frequent falls at home per wife/family. That same day, had near fall backwards onto his wife when walking up stairs (live in split level). Patient unable to recall fall onto bathroom floor; unclear how long patient down before wife found patient. Received 500 mL IV fluids in ED. Trauma XR with \"Mild pulmonary vascular congestion.\" CT head and C-spine without acute abnormalities. CT c/a/p without acute injuries but did note \"mild patchy groundglass opacity scattered in the lungs with interlobular septal thickening suggestive of mild interstitial edema.\" BNP not checked. Intermittently noted to be in Afib with RVR on telemetry per ED notes. Given one dose IV Lasix, and HF team consulted. Felt to be euvolemic when examined, and recommended to take his outpatient Lasix more regularly as prescribed. Outpatient cardiac event monitor could be considered to evaluate for arrhythmogenic cause for falls.     10/6/23: Presents today for follow up. Feeling better after rehab, denies MANLEY, SOB, PND, orthopnea. Has been using his CPAP. Denies chest pain, dizziness, " "palpitations. Reports prior to fall, he felt generalized weakness and couldn't stand, but denies CP/dizziness/lightheadedness. Takes his Lasix 3-4 days a week when he's home. Urinates very well with current dose. Doesn't weigh himself every day; will start this again. Some leg swelling noted today, last took Lasix 2-3 days ago.\"    11/21/23: here for follow up. Zio showed 100% afib burden and episodes of VT up to 15 seconds. Metoprolol increased to 25mg BID. Denies palpitations. Occasional dizziness with a full flight of steps. Weight 242 lbs on home scale. Denies PND or orthopnea. Has been taking lasix 3x/week on average instead of daily.      3/21/24: Here for follow up.  12/14/2023 sodium 141 potassium 4.4 creatinine 1.3. Reporting mild shortness of breat. Leg swelling overall better. Denies chest pain, PND or orthopnea. No abdominal distension. Weight down, he is actively trying to lose weight. He is not taking lasix daily.      Review of Systems   Constitutional:  Negative for chills and fever.   HENT:  Negative for ear pain and sore throat.    Eyes:  Negative for pain and visual disturbance.   Respiratory:  Positive for shortness of breath. Negative for cough.    Cardiovascular:  Positive for leg swelling. Negative for chest pain and palpitations.   Gastrointestinal:  Negative for abdominal distention, abdominal pain and vomiting.   Genitourinary:  Negative for dysuria and hematuria.   Musculoskeletal:  Negative for arthralgias and back pain.   Skin:  Negative for color change and rash.   Neurological:  Negative for dizziness, seizures, syncope and light-headedness.   All other systems reviewed and are negative.      Past Medical History:   Diagnosis Date    Abnormal glucose level 02/07/2018    Acute back pain 10/02/2017    Atrial fibrillation (HCC)     Blood clot in vein 2015    in right foot    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HCC) 04/2023    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HCC)     Carpal tunnel syndrome     Chronic kidney " disease (CKD)     Fever 09/08/2017    Frailty syndrome in geriatric patient 05/12/2023    FUO (fever of unknown origin) 09/08/2017    Heart failure (HCC)     History of DVT (deep vein thrombosis)     History of tobacco abuse     quit in 1982    History of transient cerebral ischemia     Hypertension          Allergies   Allergen Reactions    Sildenafil Hives     Other reaction(s): severe congestion, cialis was OK    Sildenafil Other (See Comments)     _     .    Current Outpatient Medications:     Cholecalciferol (VITAMIN D-3 PO), Take 4,000 Units by mouth daily., Disp: , Rfl:     folic acid (FOLVITE) 1 mg tablet, Take 2 mg by mouth daily  , Disp: , Rfl:     furosemide (LASIX) 40 mg tablet, Take 1 tablet (40 mg total) by mouth if needed (for weight gain of 3 lbs in a day or 5 lbs in 5-7 days), Disp: , Rfl:     metoprolol succinate (TOPROL-XL) 25 mg 24 hr tablet, Take 1 tablet (25 mg total) by mouth daily, Disp: , Rfl:     Multiple Vitamins-Minerals (MULTIVITAMIN & MINERAL PO), Take 1 tablet by mouth daily. Per Felipa Moore MD [facility] 10/2/23  Indications: supplement, Disp: , Rfl:     potassium chloride (Klor-Con M20) 20 mEq tablet, Take 2 tablets (40 mEq total) by mouth if needed (when taking lasix), Disp: 60 tablet, Rfl: 3    Tafamidis 61 MG CAPS, Take 61 mg by mouth in the morning Do not start before January 1, 2024., Disp: 30 capsule, Rfl: 12    warfarin (COUMADIN) 5 mg tablet, TAKE 1 TO 1 AND 1/2 TABLETS BY MOUTH DAILY AS DIRECTED BY PRESCRIBER, Disp: 135 tablet, Rfl: 3    colchicine (COLCRYS) 0.6 mg tablet, Take 1 tablet (0.6 mg total) by mouth daily Do not start before May 4, 2023. (Patient not taking: Reported on 10/6/2023), Disp: 30 tablet, Rfl: 0    folic acid (FOLVITE) 1 mg tablet, Take 2 mg by mouth daily, Disp: , Rfl:     warfarin (COUMADIN) 5 mg tablet, Take by mouth daily 1 to 1.5 tablet daily per physician, Disp: , Rfl:     Social History     Socioeconomic History    Marital status:  /Civil Union     Spouse name: Not on file    Number of children: Not on file    Years of education: Not on file    Highest education level: Not on file   Occupational History    Not on file   Tobacco Use    Smoking status: Former     Current packs/day: 0.00     Average packs/day: 1 pack/day for 26.0 years (26.0 ttl pk-yrs)     Types: Cigarettes     Start date:      Quit date:      Years since quittin.2    Smokeless tobacco: Never   Vaping Use    Vaping status: Never Used   Substance and Sexual Activity    Alcohol use: Never     Comment: 4 drinks a week    Drug use: Never    Sexual activity: Not on file   Other Topics Concern    Not on file   Social History Narrative    ** Merged History Encounter **          Social Determinants of Health     Financial Resource Strain: Not on file   Food Insecurity: No Food Insecurity (2023)    Hunger Vital Sign     Worried About Running Out of Food in the Last Year: Never true     Ran Out of Food in the Last Year: Never true   Transportation Needs: No Transportation Needs (2023)    PRAPARE - Transportation     Lack of Transportation (Medical): No     Lack of Transportation (Non-Medical): No   Physical Activity: Not on file   Stress: Not on file   Social Connections: Not on file   Intimate Partner Violence: Not on file   Housing Stability: Low Risk  (2023)    Housing Stability Vital Sign     Unable to Pay for Housing in the Last Year: No     Number of Places Lived in the Last Year: 1     Unstable Housing in the Last Year: No       Family History   Problem Relation Age of Onset    Heart attack Father     Heart disease Other     Cancer Other        Physical Exam:    Vitals:   Vitals:    24 1516   BP: 124/86   Pulse: 84   SpO2: 98%         Physical Exam  Constitutional:       General: He is not in acute distress.     Appearance: Normal appearance.   HENT:      Head: Normocephalic and atraumatic.      Mouth/Throat:      Mouth: Mucous membranes are  moist.   Eyes:      General: No scleral icterus.     Extraocular Movements: Extraocular movements intact.   Cardiovascular:      Rate and Rhythm: Normal rate. Rhythm irregularly irregular.      Pulses: Normal pulses.      Heart sounds: S1 normal and S2 normal. No murmur heard.     No friction rub. No gallop.      Comments: Elevated JVP. 1+ pitting edema bilaterally  Pulmonary:      Breath sounds: Normal breath sounds.   Abdominal:      General: There is no distension.      Palpations: Abdomen is soft.      Tenderness: There is no abdominal tenderness. There is no guarding or rebound.   Musculoskeletal:         General: Normal range of motion.      Cervical back: Neck supple.   Skin:     General: Skin is warm and dry.      Capillary Refill: Capillary refill takes less than 2 seconds.   Neurological:      General: No focal deficit present.      Mental Status: He is alert and oriented to person, place, and time.   Psychiatric:         Mood and Affect: Mood normal.         Labs & Results:    Lab Results   Component Value Date    SODIUM 141 12/14/2023    K 4.4 12/14/2023     12/14/2023    CO2 28 12/14/2023    BUN 26 (H) 12/14/2023    CREATININE 1.30 12/14/2023    GLUC 100 (H) 09/16/2023    CALCIUM 9.0 12/14/2023     Lab Results   Component Value Date    WBC 7.97 10/04/2023    HGB 17.4 (H) 10/04/2023    HCT 51.2 (H) 10/04/2023    MCV 92 10/04/2023     10/04/2023     Lab Results   Component Value Date    NTBNP 1,904 (H) 05/16/2023      Lab Results   Component Value Date    CHOLESTEROL 167 12/14/2023    CHOLESTEROL 183 05/16/2023    CHOLESTEROL 194 12/01/2022     Lab Results   Component Value Date    HDL 70 12/14/2023    HDL 69 05/16/2023    HDL 74 12/01/2022     Lab Results   Component Value Date    TRIG 97 12/14/2023    TRIG 81 05/16/2023    TRIG 132 12/01/2022     Lab Results   Component Value Date    NONHDLC 120 12/01/2022    NONHDLC 116 05/23/2022    NONHDLC 103 01/22/2020       EKG personally reviewed by  Cathy Woods MD.     Counseling / Coordination of Care  Time spent today 25 minutes.  Greater than 50% of total time was spent with the patient and / or family counseling and / or coordination of care. We went over current diagnosis, most recent studies and any changes in treatment.    Thank you for the opportunity to participate in the care of this patient.    CATHY WOODS MD  ADVANCED HEART FAILURE AND MECHANICAL CIRCULATORY SUPPORT  [facility]

## 2024-03-21 NOTE — PATIENT INSTRUCTIONS
Take furosemide 40mg with potassium 40 meq as needed for weight gain of 3 lbs in a day or 5 lbs in 5-7 days.  Continue rest of cardiac medications  2g sodium diet   2L fluid restriction  Daily weights  Physical activities/walking as tolerated

## 2024-04-11 ENCOUNTER — ANTICOAG VISIT (OUTPATIENT)
Dept: CARDIOLOGY CLINIC | Facility: CLINIC | Age: 84
End: 2024-04-11

## 2024-04-11 ENCOUNTER — APPOINTMENT (OUTPATIENT)
Dept: LAB | Facility: CLINIC | Age: 84
End: 2024-04-11
Payer: MEDICARE

## 2024-04-11 DIAGNOSIS — I48.19 PERSISTENT ATRIAL FIBRILLATION (HCC): Primary | ICD-10-CM

## 2024-05-08 DIAGNOSIS — I50.32 CHRONIC HEART FAILURE WITH PRESERVED EJECTION FRACTION (HCC): ICD-10-CM

## 2024-05-08 RX ORDER — FUROSEMIDE 40 MG/1
40 TABLET ORAL DAILY
Qty: 30 TABLET | Refills: 5 | Status: SHIPPED | OUTPATIENT
Start: 2024-05-08

## 2024-05-17 ENCOUNTER — ANTICOAG VISIT (OUTPATIENT)
Dept: CARDIOLOGY CLINIC | Facility: CLINIC | Age: 84
End: 2024-05-17

## 2024-05-17 ENCOUNTER — APPOINTMENT (OUTPATIENT)
Dept: LAB | Facility: CLINIC | Age: 84
End: 2024-05-17
Payer: MEDICARE

## 2024-05-17 DIAGNOSIS — I48.19 PERSISTENT ATRIAL FIBRILLATION (HCC): Primary | ICD-10-CM

## 2024-06-10 ENCOUNTER — APPOINTMENT (OUTPATIENT)
Dept: LAB | Facility: CLINIC | Age: 84
End: 2024-06-10
Payer: MEDICARE

## 2024-06-11 ENCOUNTER — ANTICOAG VISIT (OUTPATIENT)
Dept: CARDIOLOGY CLINIC | Facility: CLINIC | Age: 84
End: 2024-06-11

## 2024-06-11 DIAGNOSIS — I48.19 PERSISTENT ATRIAL FIBRILLATION (HCC): Primary | ICD-10-CM

## 2024-07-03 DIAGNOSIS — I48.19 PERSISTENT ATRIAL FIBRILLATION (HCC): Primary | ICD-10-CM

## 2024-07-18 ENCOUNTER — APPOINTMENT (OUTPATIENT)
Dept: LAB | Facility: CLINIC | Age: 84
End: 2024-07-18
Payer: MEDICARE

## 2024-07-18 DIAGNOSIS — I48.19 PERSISTENT ATRIAL FIBRILLATION (HCC): ICD-10-CM

## 2024-07-18 LAB
INR PPP: 3.18 (ref 0.84–1.19)
PROTHROMBIN TIME: 31.9 SECONDS (ref 11.6–14.5)

## 2024-07-18 PROCEDURE — 85610 PROTHROMBIN TIME: CPT

## 2024-07-18 PROCEDURE — 36415 COLL VENOUS BLD VENIPUNCTURE: CPT

## 2024-07-19 ENCOUNTER — ANTICOAG VISIT (OUTPATIENT)
Dept: CARDIOLOGY CLINIC | Facility: CLINIC | Age: 84
End: 2024-07-19

## 2024-07-19 DIAGNOSIS — I48.19 PERSISTENT ATRIAL FIBRILLATION (HCC): Primary | ICD-10-CM

## 2024-08-20 ENCOUNTER — EVALUATION (OUTPATIENT)
Dept: PHYSICAL THERAPY | Facility: CLINIC | Age: 84
End: 2024-08-20
Payer: MEDICARE

## 2024-08-20 DIAGNOSIS — M17.0 PRIMARY OSTEOARTHRITIS OF BOTH KNEES: Primary | ICD-10-CM

## 2024-08-20 PROCEDURE — 97161 PT EVAL LOW COMPLEX 20 MIN: CPT | Performed by: PHYSICAL THERAPIST

## 2024-08-20 PROCEDURE — 97110 THERAPEUTIC EXERCISES: CPT | Performed by: PHYSICAL THERAPIST

## 2024-08-20 NOTE — PROGRESS NOTES
PT Evaluation     Today's date: 2024  Patient name: Jesse France  : 1940  MRN: [id number]  Referring provider: Zaki Kaye MD  Dx: No diagnosis found.               Assessment  Impairments: abnormal or restricted ROM, activity intolerance, impaired physical strength, lacks appropriate home exercise program and pain with function  Functional limitations: walking  Symptom irritability: low    Assessment details: Pt is 82 yo male presenting to therapy with chronic knee pain. Pt does have new pitting edema in the Rt knee, continues to show good rom, but decreased LE strength, and some pain with patellar mobility. Pt would benefit from PT services to improve strength and ease with ADL's and walking.  Understanding of Dx/Px/POC: good     Prognosis: good    Goals  1. Pt will be independent with HEP upon discharge.  2. Pt will improve LE strength 4/5 to improve ascend steps.  3. Pt will improve standing from sitting in a chair with less difficulty.  4. Pt will improve ability to do some yard work with less difficulty.    Plan  Patient would benefit from: skilled physical therapy    Planned therapy interventions: functional ROM exercises, therapeutic activities, therapeutic exercise, therapeutic training, stretching, strengthening, home exercise program, neuromuscular re-education, manual therapy and patient education    Frequency: 2x week  Duration in weeks: 8  Treatment plan discussed with: patient    Subjective Evaluation    History of Present Illness  Mechanism of injury: Pt reports seeing Dr Kaye for his knee OA b/l about 3 months ago. Rt knee started bothering him more recently. Lt knee is more chronic. Pt reports unable to twist on his knees. Enjoys sitting with his legs out.   Quality of life: good    Patient Goals  Patient goals for therapy: increased strength and decreased pain    Pain  Current pain ratin  At worst pain ratin  Location: medial aspect of knees  Quality: sharp and dull  ache  Relieving factors: rest  Aggravating factors: walking and standing    Treatments  Previous treatment: physical therapy    Objective     Tenderness   Left Knee   Tenderness in the medial joint line.     Right Knee   Tenderness in the medial joint line.     Additional Tenderness Details  Rt knee, pitting edema present    Passive Range of Motion   Left Knee   Flexion: 132 degrees   Extension: -4 degrees     Right Knee   Flexion: 126 degrees   Extension: -7 degrees     Mobility     Additional Mobility Details  Pain with medial glides on Rt>Lt    Strength/Myotome Testing     Left Knee   Flexion: 5  Extension: 4    Right Knee   Flexion: 5  Extension: 4    Additional Strength Details  Hip Flexion 3+/5 b/l ABD 3/5 Ext 4/5 b/l           Precautions: fall risk      Manuals 8/20            Retrograde Rt knee                                                    Neuro Re-Ed                                                                                                        Ther Ex             Edu on HEP 8'            Bike             LAQ             Bridges             SLR             Leg Press                                       Ther Activity             Sit to stands             Step ups             Gait Training                                       Modalities

## 2024-08-20 NOTE — LETTER
2024    Zaki Kaye MD  [address] [place] [id number]    Patient: Jesse France   YOB: 1940   Date of Visit: 2024   No diagnosis found.    Dear Dr. Kaye:    Thank you for your recent referral of Jesse France. Please review the attached evaluation summary from Jesse's recent visit.     Please verify that you agree with the plan of care by signing the attached order.     If you have any questions or concerns, please do not hesitate to call.     I sincerely appreciate the opportunity to share in the care of one of your patients and hope to have another opportunity to work with you in the near future.       Sincerely,    Tara Castillo, PT      Referring Provider:      I certify that I have read the below Plan of Care and certify the need for these services furnished under this plan of treatment while under my care.                    Zaki Kaye MD  [address] [place] [id number]  Via Fax: [phone]          PT Evaluation     Today's date: 2024  Patient name: Jesse France  : 1940  MRN: [id number]  Referring provider: Zaki Kaye MD  Dx: No diagnosis found.               Assessment  Impairments: abnormal or restricted ROM, activity intolerance, impaired physical strength, lacks appropriate home exercise program and pain with function  Functional limitations: walking  Symptom irritability: low    Assessment details: Pt is 84 yo male presenting to therapy with chronic knee pain. Pt does have new pitting edema in the Rt knee, continues to show good rom, but decreased LE strength, and some pain with patellar mobility. Pt would benefit from PT services to improve strength and ease with ADL's and walking.  Understanding of Dx/Px/POC: good     Prognosis: good    Goals  1. Pt will be independent with HEP upon discharge.  2. Pt will improve LE strength 4/5 to improve ascend steps.  3. Pt will improve standing from sitting in a chair with less  difficulty.  4. Pt will improve ability to do some yard work with less difficulty.    Plan  Patient would benefit from: skilled physical therapy    Planned therapy interventions: functional ROM exercises, therapeutic activities, therapeutic exercise, therapeutic training, stretching, strengthening, home exercise program, neuromuscular re-education, manual therapy and patient education    Frequency: 2x week  Duration in weeks: 8  Treatment plan discussed with: patient    Subjective Evaluation    History of Present Illness  Mechanism of injury: Pt reports seeing Dr Kaye for his knee OA b/l about 3 months ago. Rt knee started bothering him more recently. Lt knee is more chronic. Pt reports unable to twist on his knees. Enjoys sitting with his legs out.   Quality of life: good    Patient Goals  Patient goals for therapy: increased strength and decreased pain    Pain  Current pain ratin  At worst pain ratin  Location: medial aspect of knees  Quality: sharp and dull ache  Relieving factors: rest  Aggravating factors: walking and standing    Treatments  Previous treatment: physical therapy    Objective     Tenderness   Left Knee   Tenderness in the medial joint line.     Right Knee   Tenderness in the medial joint line.     Additional Tenderness Details  Rt knee, pitting edema present    Passive Range of Motion   Left Knee   Flexion: 132 degrees   Extension: -4 degrees     Right Knee   Flexion: 126 degrees   Extension: -7 degrees     Mobility     Additional Mobility Details  Pain with medial glides on Rt>Lt    Strength/Myotome Testing     Left Knee   Flexion: 5  Extension: 4    Right Knee   Flexion: 5  Extension: 4    Additional Strength Details  Hip Flexion 3+/5 b/l ABD 3/5 Ext 4/5 b/l           Precautions: fall risk      Manuals             Retrograde Rt knee                                                    Neuro Re-Ed                                                                                                         Ther Ex             Edu on HEP 8'            Bike             LAQ             Bridges             SLR             Leg Press                                       Ther Activity             Sit to stands             Step ups             Gait Training                                       Modalities

## 2024-08-27 ENCOUNTER — OFFICE VISIT (OUTPATIENT)
Dept: PHYSICAL THERAPY | Facility: CLINIC | Age: 84
End: 2024-08-27
Payer: MEDICARE

## 2024-08-27 DIAGNOSIS — M17.0 PRIMARY OSTEOARTHRITIS OF BOTH KNEES: Primary | ICD-10-CM

## 2024-08-27 PROCEDURE — 97110 THERAPEUTIC EXERCISES: CPT

## 2024-08-27 PROCEDURE — 97530 THERAPEUTIC ACTIVITIES: CPT

## 2024-08-27 NOTE — PROGRESS NOTES
Daily Note     Today's date: 2024  Patient name: Jesse France  : 1940  MRN: [id number]  Referring provider: Zaki Kaye MD  Dx:   Encounter Diagnosis     ICD-10-CM    1. Primary osteoarthritis of both knees  M17.0           Start Time: 1445  Stop Time: 1525  Total time in clinic (min): 40 minutes    Subjective: Patient reports 4-5/10 L knee pain and 3-4/10 R knee pain. Notes being non-compliant with HEP.       Objective: See treatment diary below    Access Code: [email or web address]  URL: [email or web address]  Date: 2024  Prepared by: Faye Sherwood    Exercises  - Seated Long Arc Quad  - 1 x daily - 7 x weekly - 2 sets - 10 reps  - Supine Bridge  - 1 x daily - 7 x weekly - 2 sets - 10 reps  - Hooklying Clamshell with Resistance  - 1 x daily - 7 x weekly - 2 sets - 10 reps  - Seated March  - 1 x daily - 7 x weekly - 2 sets - 10 reps  - Mini Squat with Counter Support  - 1 x daily - 7 x weekly - 2 sets - 10 reps      Assessment: Patient being seen for first PT treatment following IE. Treatment session consisted of initiating POC as outlined below. Patient tolerated treatment session well. He exhibited difficulty with functional exercises like step ups and STS as he relies on recliner and chair lift at home. Discussed using ice as a modality to modulate soreness with application time of approximately 10 min on and 60 min off. Updated HEP and encouraged further compliance. Patient demonstrated fatigue post-tx and would benefit from continued PT to improve level of function.         Plan: Continue per POC. Increase reps/resistance as tolerated.      Precautions: fall risk      Manuals            Retrograde Rt knee                                                    Neuro Re-Ed                                                                                                        Ther Ex             Edu on HEP 8'            Bike  6'           LAQ  15x ea           Bridges  10x          "  SLR  10x ea           Leg Press             Hip 3-way  10x ea           Mini squats   10x           Cone taps  10x ea           Clamshells   RTB 5\" x 15           Ther Activity             Sit to stands  1 foam 10x            Step ups  4\" x 10 ea           Gait Training                                       Modalities                                            "

## 2024-08-29 ENCOUNTER — OFFICE VISIT (OUTPATIENT)
Dept: PHYSICAL THERAPY | Facility: CLINIC | Age: 84
End: 2024-08-29
Payer: MEDICARE

## 2024-08-29 DIAGNOSIS — M17.0 PRIMARY OSTEOARTHRITIS OF BOTH KNEES: Primary | ICD-10-CM

## 2024-08-29 PROCEDURE — 97110 THERAPEUTIC EXERCISES: CPT

## 2024-08-29 NOTE — PROGRESS NOTES
"Daily Note     Today's date: 2024  Patient name: Jesse France  : 1940  MRN: [id number]  Referring provider: Zaki Kaye MD  Dx:   Encounter Diagnosis     ICD-10-CM    1. Primary osteoarthritis of both knees  M17.0                      Subjective: Pt reports his knees bother him the most when negotiating stairs, L>R knee, med aspect.      Objective: See treatment diary below      Assessment: Performed exercise program w/o increasing discomfort. Required minimal vc'ing during same.Tolerated treatment well. Will monitor. Progress NV.      Plan: Continue per plan of care.      Precautions: fall risk      Manuals           Retrograde Rt knee                                                    Neuro Re-Ed                                                                                                        Ther Ex             Edu on HEP 8'            Bike  6' 6'          LAQ  15x ea 15x ea          Bridges  10x 10x          SLR  10x ea 10x ea          Leg Press             Hip 3-way  10x ea 10x ea B          Mini squats   10x 10x          Cone taps  10x ea 10x ea          Clamshells   RTB 5\" x 15 RTB 5\"x15          Ther Activity             Sit to stands  1 foam 10x  1 foam 10x  W/UE          Step ups  4\" x 10 ea 4\"x10 ea          Gait Training                                       Modalities                                              "

## 2024-09-03 ENCOUNTER — OFFICE VISIT (OUTPATIENT)
Dept: PHYSICAL THERAPY | Facility: CLINIC | Age: 84
End: 2024-09-03
Payer: MEDICARE

## 2024-09-03 DIAGNOSIS — M17.0 PRIMARY OSTEOARTHRITIS OF BOTH KNEES: Primary | ICD-10-CM

## 2024-09-03 PROCEDURE — 97110 THERAPEUTIC EXERCISES: CPT | Performed by: PHYSICAL THERAPIST

## 2024-09-03 NOTE — PROGRESS NOTES
"Daily Note     Today's date: 9/3/2024  Patient name: Jesse France  : 1940  MRN: [id number]  Referring provider: Zaki Kaye MD  Dx:   Encounter Diagnosis     ICD-10-CM    1. Primary osteoarthritis of both knees  M17.0           Start Time: 1445  Stop Time: 1535  Total time in clinic (min): 50 minutes    Subjective: Patient reports that knees felt fine over the weekend, but the right knee had been \"puffing up\".      Objective: See treatment diary below      Assessment: Ed tolerated treatment well today. Patient tolerated progressions to SLRs, LAQs, and step ups. Patient tested (+) on right sciatic nerve tension SLR. Patient was educated on sciatic nerve glides. Sit to stands are still challenging to do with less than 2x UE assist. Pt., had some discomfort at the left medial knee at the end of the session. Patient would benefit from continued PT to improve LE strength and endurance as well as improve overall function.      Plan: Continue per plan of care.      Precautions: fall risk      Manuals 8/20 8/27 8/29 9/3         Retrograde Rt knee                                                    Neuro Re-Ed                                                                                                        Ther Ex             Edu on HEP 8'            Bike  6' 6' 8'         LAQ  15x ea 15x ea 15x ea, 2#         Bridges  10x 10x 15x         SLR  10x ea 10x ea 2x10x ea, 2#         Leg Press             Hip 3-way  10x ea 10x ea B 10x ea, 2#         Mini squats   10x 10x          Cone taps  10x ea 10x ea          Clamshells   RTB 5\" x 15 RTB 5\"x15          Sciatic Nerve Glides    2 x 10ea         Ther Activity             Sit to stands  1 foam 10x  1 foam 10x  W/UE 1 foam 10x W/UE         Step ups  4\" x 10 ea 4\"x10 ea 6\" x 10ea         Gait Training                                       Modalities                                                "

## 2024-09-05 ENCOUNTER — OFFICE VISIT (OUTPATIENT)
Dept: PHYSICAL THERAPY | Facility: CLINIC | Age: 84
End: 2024-09-05
Payer: MEDICARE

## 2024-09-05 DIAGNOSIS — M17.0 PRIMARY OSTEOARTHRITIS OF BOTH KNEES: Primary | ICD-10-CM

## 2024-09-05 PROCEDURE — 97110 THERAPEUTIC EXERCISES: CPT

## 2024-09-05 NOTE — PROGRESS NOTES
"Daily Note     Today's date: 2024  Patient name: Jesse France  : 1940  MRN: [id number]  Referring provider: Zaki Kaye MD  Dx:   Encounter Diagnosis     ICD-10-CM    1. Primary osteoarthritis of both knees  M17.0           Start Time: 1445  Stop Time: 1527  Total time in clinic (min): 42 minutes    Subjective: Patient reports his knees felt good after last session and notes compliance with HEP.       Objective: See treatment diary below      Assessment: Patient tolerated treatment session well. STS are the most challenging exercise and requires tactile cues to maintain upright posture once fatigue sets in. He was able to complete most reps pushing off with 1 UE. Lateral stabilizers fatigued with addition of lateral step ups. Patient demonstrated fatigue post-tx and would benefit from continued Pt to improve level of function.       Plan: Continue per POC. Increase reps/resistance as tolerated.      Precautions: fall risk      Manuals 8/20 8/27 8/29 9/3 9/5        Retrograde Rt knee                                                    Neuro Re-Ed                                                                                                        Ther Ex             Edu on HEP 8'            Bike  6' 6' 8' 8'        LAQ  15x ea 15x ea 15x ea, 2# 15x ea, 2#        Bridges  10x 10x 15x 15x        SLR  10x ea 10x ea 2x10x ea, 2# 2x10x ea, 2#        Leg Press             Hip 3-way  10x ea 10x ea B 10x ea, 2# 10x ea, 2#        Mini squats   10x 10x  15x         Cone taps  10x ea 10x ea          Clamshells   RTB 5\" x 15 RTB 5\"x15          Sciatic Nerve Glides    2 x 10ea 2x10 ea         Ther Activity             Sit to stands  1 foam 10x  1 foam 10x  W/UE 1 foam 10x W/UE 1 foam 10x W 1 /UE        Step ups  4\" x 10 ea 4\"x10 ea 6\" x 10ea Fwd/lat 6\" x 10ea        Gait Training                                       Modalities                                                  "

## 2024-09-10 ENCOUNTER — OFFICE VISIT (OUTPATIENT)
Dept: PHYSICAL THERAPY | Facility: CLINIC | Age: 84
End: 2024-09-10
Payer: MEDICARE

## 2024-09-10 DIAGNOSIS — M17.0 PRIMARY OSTEOARTHRITIS OF BOTH KNEES: Primary | ICD-10-CM

## 2024-09-10 PROCEDURE — 97110 THERAPEUTIC EXERCISES: CPT

## 2024-09-10 NOTE — PROGRESS NOTES
"Daily Note     Today's date: 9/10/2024  Patient name: Jesse France  : 1940  MRN: [id number]  Referring provider: Zaki Kaye MD  Dx:   Encounter Diagnosis     ICD-10-CM    1. Primary osteoarthritis of both knees  M17.0           Start Time: 1445  Stop Time: 1523  Total time in clinic (min): 38 minutes    Subjective: Patient reports B/L 4-5/10 knee pain pre-tx. Notes HEP is going well but performs it sporadically.       Objective: See treatment diary below      Assessment: Patient tolerated treatment session fair. Onset of increased pain reported with most upright Wbing exercises. Patient attempted to complete instructed dosage although, requested to stop 2* localized medial knee pain and discomfort of wounds on knees. Session was transitioned to mat exercises for patient comfort. He seemed to tolerate these exercises well and noted no reproduction of pain. Patient demonstrated fatigue post-tx and would benefit from continued PT to improve level of function.       Plan: Continue per POC. Increase reps/resistance as tolerated.      Precautions: fall risk      Manuals 8/20 8/27 8/29 9/3 9/5 9/10       Retrograde Rt knee                                                    Neuro Re-Ed                                                                                                        Ther Ex             Edu on HEP 8'            Bike  6' 6' 8' 8' 6'       LAQ  15x ea 15x ea 15x ea, 2# 15x ea, 2# 20x ea 2#       Bridges  10x 10x 15x 15x 15x       SLR  10x ea 10x ea 2x10x ea, 2# 2x10x ea, 2# 2x10x ea, 2#       Leg Press             Hip 3-way  10x ea 10x ea B 10x ea, 2# 10x ea, 2# 15x ea 2#       Mini squats   10x 10x  15x         Cone taps  10x ea 10x ea          Clamshells   RTB 5\" x 15 RTB 5\"x15          Sciatic Nerve Glides    2 x 10ea 2x10 ea  2x10 ea        Supine march      15x ea 2#       Ther Activity             Sit to stands  1 foam 10x  1 foam 10x  W/UE 1 foam 10x W/UE 1 foam 10x W 1 /UE Pt " "deferred        Step ups  4\" x 10 ea 4\"x10 ea 6\" x 10ea Fwd/lat 6\" x 10ea 5x p!       Gait Training                                       Modalities                                                    "

## 2024-09-12 ENCOUNTER — OFFICE VISIT (OUTPATIENT)
Dept: PHYSICAL THERAPY | Facility: CLINIC | Age: 84
End: 2024-09-12
Payer: MEDICARE

## 2024-09-12 DIAGNOSIS — M17.0 PRIMARY OSTEOARTHRITIS OF BOTH KNEES: Primary | ICD-10-CM

## 2024-09-12 PROCEDURE — 97530 THERAPEUTIC ACTIVITIES: CPT

## 2024-09-12 PROCEDURE — 97110 THERAPEUTIC EXERCISES: CPT

## 2024-09-12 NOTE — PROGRESS NOTES
"Daily Note     Today's date: 2024  Patient name: Jesse France  : 1940  MRN: [id number]  Referring provider: Zaki Kaye MD  Dx:   Encounter Diagnosis     ICD-10-CM    1. Primary osteoarthritis of both knees  M17.0           Start Time: 170  Stop Time: 1745  Total time in clinic (min): 43 minutes    Subjective: Patient reports both his knees are hurting and are stiff today from going to three appointments today.       Objective: See treatment diary below      Assessment: Patient tolerated treatment session well. Patient continues to have B/L knee pain with standing exercises although, demonstrated a better performance today compared to last session. Greater UE assistance utilized with upright Wbing exercises 2* reports of feeling unsteady. Patient demonstrated fatigue post-tx and would benefit from continued PT to improve level of function.       Plan: Continue per POC. Increase reps/resistance as tolerated.      Precautions: fall risk      Manuals 8/20 8/27 8/29 9/3 9/5 9/10 9/12      Retrograde Rt knee                                                    Neuro Re-Ed                                                                                                        Ther Ex             Edu on HEP 8'            Bike  6' 6' 8' 8' 6' 8'      LAQ  15x ea 15x ea 15x ea, 2# 15x ea, 2# 20x ea 2# 20x ea 2#      Bridges  10x 10x 15x 15x 15x 15x      SLR  10x ea 10x ea 2x10x ea, 2# 2x10x ea, 2# 2x10x ea, 2# 2x10x ea, 2#      Leg Press             Hip 3-way  10x ea 10x ea B 10x ea, 2# 10x ea, 2# 15x ea 2# 20x ea 2#      Mini squats   10x 10x  15x         Cone taps  10x ea 10x ea          Clamshells   RTB 5\" x 15 RTB 5\"x15          Sciatic Nerve Glides    2 x 10ea 2x10 ea  2x10 ea  2x10 ea      Supine march      15x ea 2#       Ther Activity             Sit to stands  1 foam 10x  1 foam 10x  W/UE 1 foam 10x W/UE 1 foam 10x W 1 /UE Pt deferred  1 foam 5x w 1 UE      Step ups  4\" x 10 ea 4\"x10 ea 6\" x 10ea " "Fwd/lat 6\" x 10ea 5x p! Fwd 6\" x 5x ea  2 UE      Gait Training                                       Modalities                                                      "

## 2024-09-17 ENCOUNTER — OFFICE VISIT (OUTPATIENT)
Dept: PHYSICAL THERAPY | Facility: CLINIC | Age: 84
End: 2024-09-17
Payer: MEDICARE

## 2024-09-17 DIAGNOSIS — M17.0 PRIMARY OSTEOARTHRITIS OF BOTH KNEES: Primary | ICD-10-CM

## 2024-09-17 PROCEDURE — 97530 THERAPEUTIC ACTIVITIES: CPT

## 2024-09-17 PROCEDURE — 97110 THERAPEUTIC EXERCISES: CPT

## 2024-09-17 NOTE — PROGRESS NOTES
"Daily Note     Today's date: 2024  Patient name: Jesse France  : 1940  MRN: [id number]  Referring provider: Zaki Kaye MD  Dx:   Encounter Diagnosis     ICD-10-CM    1. Primary osteoarthritis of both knees  M17.0           Start Time: 1442  Stop Time: 1520  Total time in clinic (min): 38 minutes    Subjective: Patient reports his knees are doing \"pretty good\" today.       Objective: See treatment diary below      Assessment: Patient tolerated treatment session well. Able to increase resistance for some exercises which demonstrates good progress in multi-planar quad and hip girdle endurance. More ease with step ups and STS this session but continues to have an increase in pain on the medial aspect of knee with these activities. Informed patient to anticipate soreness with progress. Patient demonstrated fatigue post-tx and would benefit from continued PT to improve level of function.       Plan: Continue per POC. Increase reps/resistance as tolerated.      Precautions: fall risk      Manuals 8/20 8/27 8/29 9/3 9/5 9/10 9/12 9/17     Retrograde Rt knee                                                    Neuro Re-Ed                                                                                                        Ther Ex             Edu on HEP 8'            Bike  6' 6' 8' 8' 6' 8' 8'     LAQ  15x ea 15x ea 15x ea, 2# 15x ea, 2# 20x ea 2# 20x ea 2# 15x ea 3#     Bridges  10x 10x 15x 15x 15x 15x W/ ball sq 15x     SLR  10x ea 10x ea 2x10x ea, 2# 2x10x ea, 2# 2x10x ea, 2# 2x10x ea, 2# 15x ea 3#     Leg Press             Hip 3-way  10x ea 10x ea B 10x ea, 2# 10x ea, 2# 15x ea 2# 20x ea 2# 15x ea 3#     Mini squats   10x 10x  15x         Cone taps  10x ea 10x ea          Clamshells   RTB 5\" x 15 RTB 5\"x15     RTB 3\" 2x10     Sciatic Nerve Glides    2 x 10ea 2x10 ea  2x10 ea  2x10 ea 2x10 ea     Supine march      15x ea 2#  15x ea, 3#     Ther Activity             Sit to stands  1 foam 10x  1 foam " "10x  W/UE 1 foam 10x W/UE 1 foam 10x W 1 /UE Pt deferred  1 foam 5x w 1 UE 1 foam 2x5 w 1 UE     Step ups  4\" x 10 ea 4\"x10 ea 6\" x 10ea Fwd/lat 6\" x 10ea 5x p! Fwd 6\" x 5x ea  2 UE Fwd 6\" x 10x ea  2 UE     Gait Training                                       Modalities                                                        "

## 2024-09-19 ENCOUNTER — OFFICE VISIT (OUTPATIENT)
Dept: PHYSICAL THERAPY | Facility: CLINIC | Age: 84
End: 2024-09-19
Payer: MEDICARE

## 2024-09-19 DIAGNOSIS — M17.0 PRIMARY OSTEOARTHRITIS OF BOTH KNEES: Primary | ICD-10-CM

## 2024-09-19 PROCEDURE — 97530 THERAPEUTIC ACTIVITIES: CPT

## 2024-09-19 PROCEDURE — 97110 THERAPEUTIC EXERCISES: CPT

## 2024-09-19 NOTE — PROGRESS NOTES
"Daily Note     Today's date: 2024  Patient name: Jesse Farnce  : 1940  MRN: [id number]  Referring provider: Zaki Kaye MD  Dx:   Encounter Diagnosis     ICD-10-CM    1. Primary osteoarthritis of both knees  M17.0           Start Time: 1450  Stop Time: 1530  Total time in clinic (min): 40 minutes    Subjective: Patient states his knee are doing okay.       Objective: See treatment diary below      Assessment: Patient tolerated treatment session well. Patient demonstrated a better performance and tolerance to strengthening program. Required less UE assistance with step ups which demonstrates good progress towards goals. Continues with discomfort on the medial aspect of knee with upright Wbing activities. Patient demonstrated fatigue post-tx and would benefit from continued PT to improve level of function.       Plan: Continue per POC. Increase reps/resistance as tolerated.      Precautions: fall risk      Manuals 8/20 8/27 8/29 9/3 9/5 9/10 9/12 9/17 9/19    Retrograde Rt knee                                                    Neuro Re-Ed                                                                                                        Ther Ex             Edu on HEP 8'            Bike  6' 6' 8' 8' 6' 8' 8' 8'    LAQ  15x ea 15x ea 15x ea, 2# 15x ea, 2# 20x ea 2# 20x ea 2# 15x ea 3# 15x ea 3#    Bridges  10x 10x 15x 15x 15x 15x W/ ball sq 15x W/ ball sq 15x    SLR  10x ea 10x ea 2x10x ea, 2# 2x10x ea, 2# 2x10x ea, 2# 2x10x ea, 2# 15x ea 3# 15x ea 3#    Leg Press             Hip 3-way  10x ea 10x ea B 10x ea, 2# 10x ea, 2# 15x ea 2# 20x ea 2# 15x ea 3# 15x ea 3#    Mini squats   10x 10x  15x         Cone taps  10x ea 10x ea          Clamshells   RTB 5\" x 15 RTB 5\"x15     RTB 3\" 2x10 RTB 5\" 2x10    Sciatic Nerve Glides    2 x 10ea 2x10 ea  2x10 ea  2x10 ea 2x10 ea     Supine march      15x ea 2#  15x ea, 3# 15x ea, 3#                 Ther Activity             Sit to stands  1 foam 10x  1 foam " "10x  W/UE 1 foam 10x W/UE 1 foam 10x W 1 /UE Pt deferred  1 foam 5x w 1 UE 1 foam 2x5 w 1 UE 1 foam 2x5 w 1 UE    Step ups  4\" x 10 ea 4\"x10 ea 6\" x 10ea Fwd/lat 6\" x 10ea 5x p! Fwd 6\" x 5x ea  2 UE Fwd 6\" x 10x ea  2 UE Fwd 6\" x 15x ea  1 UE    Gait Training                                       Modalities                                                          "

## 2024-09-20 ENCOUNTER — APPOINTMENT (OUTPATIENT)
Dept: LAB | Facility: CLINIC | Age: 84
End: 2024-09-20
Payer: MEDICARE

## 2024-09-20 DIAGNOSIS — I48.19 PERSISTENT ATRIAL FIBRILLATION (HCC): ICD-10-CM

## 2024-09-20 LAB
INR PPP: 1.7 (ref 0.85–1.19)
PROTHROMBIN TIME: 20.2 SECONDS (ref 12.3–15)

## 2024-09-20 PROCEDURE — 36415 COLL VENOUS BLD VENIPUNCTURE: CPT

## 2024-09-20 PROCEDURE — 85610 PROTHROMBIN TIME: CPT

## 2024-09-23 ENCOUNTER — ANTICOAG VISIT (OUTPATIENT)
Dept: CARDIOLOGY CLINIC | Facility: CLINIC | Age: 84
End: 2024-09-23

## 2024-09-23 DIAGNOSIS — I48.19 PERSISTENT ATRIAL FIBRILLATION (HCC): Primary | ICD-10-CM

## 2024-09-24 ENCOUNTER — OFFICE VISIT (OUTPATIENT)
Dept: CARDIOLOGY CLINIC | Facility: CLINIC | Age: 84
End: 2024-09-24
Payer: MEDICARE

## 2024-09-24 ENCOUNTER — OFFICE VISIT (OUTPATIENT)
Dept: PHYSICAL THERAPY | Facility: CLINIC | Age: 84
End: 2024-09-24
Payer: MEDICARE

## 2024-09-24 VITALS
SYSTOLIC BLOOD PRESSURE: 120 MMHG | HEART RATE: 80 BPM | BODY MASS INDEX: 31.44 KG/M2 | HEIGHT: 74 IN | DIASTOLIC BLOOD PRESSURE: 80 MMHG | WEIGHT: 245 LBS | OXYGEN SATURATION: 97 %

## 2024-09-24 DIAGNOSIS — I50.32 CHRONIC HEART FAILURE WITH PRESERVED EJECTION FRACTION (HCC): ICD-10-CM

## 2024-09-24 DIAGNOSIS — I43 CARDIAC AMYLOIDOSIS (HCC): Primary | ICD-10-CM

## 2024-09-24 DIAGNOSIS — N18.31 STAGE 3A CHRONIC KIDNEY DISEASE (CKD) (HCC): ICD-10-CM

## 2024-09-24 DIAGNOSIS — G47.33 OSA (OBSTRUCTIVE SLEEP APNEA): ICD-10-CM

## 2024-09-24 DIAGNOSIS — M17.0 PRIMARY OSTEOARTHRITIS OF BOTH KNEES: Primary | ICD-10-CM

## 2024-09-24 DIAGNOSIS — E85.4 CARDIAC AMYLOIDOSIS (HCC): Primary | ICD-10-CM

## 2024-09-24 PROCEDURE — 97110 THERAPEUTIC EXERCISES: CPT

## 2024-09-24 PROCEDURE — 97530 THERAPEUTIC ACTIVITIES: CPT

## 2024-09-24 PROCEDURE — G2211 COMPLEX E/M VISIT ADD ON: HCPCS | Performed by: INTERNAL MEDICINE

## 2024-09-24 PROCEDURE — 99214 OFFICE O/P EST MOD 30 MIN: CPT | Performed by: INTERNAL MEDICINE

## 2024-09-24 RX ORDER — FUROSEMIDE 40 MG
40 TABLET ORAL AS NEEDED
Start: 2024-09-24

## 2024-09-24 NOTE — PROGRESS NOTES
"Daily Note     Today's date: 2024  Patient name: Jesse France  : 1940  MRN: [id number]  Referring provider: Zaki Kaye MD  Dx:   Encounter Diagnosis     ICD-10-CM    1. Primary osteoarthritis of both knees  M17.0           Start Time: 1530  Stop Time: 1612  Total time in clinic (min): 42 minutes    Subjective: Patient reports his knees are aching more today which is likely attributed to the rainy weather       Objective: See treatment diary below      Assessment: Patient tolerated treatment session well. Patient demonstrated a good performance with slow progressions made to POC. Continues to rely heavily on UE assistance to ascend from chair as this activity increases medial knee pain. Introduced neuro re-ed in order to challenge joint stability and proprioception. L knee crepitus while performing tandem stance. Patient demonstrated fatigue post-tx and would benefit from continued PT to improve level of function.       Plan: Continue per POC. Increase reps/resistance as tolerated.      Precautions: fall risk      Manuals 8/20 8/27 8/29 9/3 9/5 9/10 9/12 9/17 9/19 9/24   Retrograde Rt knee                                                    Neuro Re-Ed             Tandem stance           30\"x2 ea                                                                                 Ther Ex             Edu on HEP 8'            Bike  6' 6' 8' 8' 6' 8' 8' 8' 8'   LAQ  15x ea 15x ea 15x ea, 2# 15x ea, 2# 20x ea 2# 20x ea 2# 15x ea 3# 15x ea 3# 15x ea 3#   Bridges  10x 10x 15x 15x 15x 15x W/ ball sq 15x W/ ball sq 15x W/ ball sq 15x   SLR  10x ea 10x ea 2x10x ea, 2# 2x10x ea, 2# 2x10x ea, 2# 2x10x ea, 2# 15x ea 3# 15x ea 3# 15x ea 3#   Leg Press             Hip 3-way  10x ea 10x ea B 10x ea, 2# 10x ea, 2# 15x ea 2# 20x ea 2# 15x ea 3# 15x ea 3# 15x ea 3#   Mini squats   10x 10x  15x         Cone taps  10x ea 10x ea          Clamshells   RTB 5\" x 15 RTB 5\"x15     RTB 3\" 2x10 RTB 5\" 2x10    Sciatic Nerve Glides " "   2 x 10ea 2x10 ea  2x10 ea  2x10 ea 2x10 ea     Supine march      15x ea 2#  15x ea, 3# 15x ea, 3# Standing alt 3# x15 ea                Ther Activity             Sit to stands  1 foam 10x  1 foam 10x  W/UE 1 foam 10x W/UE 1 foam 10x W 1 /UE Pt deferred  1 foam 5x w 1 UE 1 foam 2x5 w 1 UE 1 foam 2x5 w 1 UE 1 foam 2x5 w 1 UE   Step ups  4\" x 10 ea 4\"x10 ea 6\" x 10ea Fwd/lat 6\" x 10ea 5x p! Fwd 6\" x 5x ea  2 UE Fwd 6\" x 10x ea  2 UE Fwd 6\" x 15x ea  1 UE Fwd 6\" x 15x ea  1 UE   Gait Training                                       Modalities                                                            "

## 2024-09-24 NOTE — PATIENT INSTRUCTIONS
Continue current medications  Continue physical therapy  2g sodium diet   2L fluid restriction  Daily weights  Physical activities/walking as tolerated

## 2024-09-24 NOTE — PROGRESS NOTES
Advanced Heart Failure Outpatient Progress Note - Jesse Laresuli 83 y.o. male MRN: [id number]    Encounter: [id number]      Assessment/Plan:    Patient Active Problem List    Diagnosis Date Noted    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HCC) 03/21/2024    Chronic diastolic congestive heart failure (HCC) 03/21/2024    Thrombocytopenia (HCC) 09/14/2023    Multiple falls 09/12/2023    ESTER (obstructive sleep apnea) 09/12/2023    History of DVT (deep vein thrombosis) 09/12/2023    Atrial fibrillation (HCC) 09/12/2023    Congestive heart failure (CHF) (HCC) 09/12/2023    Generalized weakness 09/12/2023    Chronic venous hypertension (idiopathic) with ulcer of right lower extremity ([facility]) 09/12/2023    Elevated troponin 09/12/2023    Multiple abrasions 09/12/2023    Frailty syndrome in geriatric patient 05/12/2023    Lower extremity cellulitis 04/24/2023    Stage 3a chronic kidney disease (CKD) ([facility]) 04/24/2023    Hyperbilirubinemia 04/24/2023    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 ([facility]) 02/14/2022    Persistent atrial fibrillation (HCC) 05/14/2020    Morbid obesity ([facility]) 03/07/2018    Lumbar stenosis 03/07/2018    Benign neoplasm of colon 02/07/2018    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    Internal hemorrhoids 02/07/2018    Pure hypercholesterolemia 02/07/2018    Sacroiliitis ([facility]) 02/01/2018    Bilateral hip pain 12/04/2017    Adenomatous colon polyp 12/29/2015    Spinal stenosis of lumbar region 09/10/2015    DDD (degenerative disc disease), lumbar 07/07/2015    Lumbar disc herniation 06/22/2015    Radiculopathy, lumbar region 06/22/2015    MTHFR mutation 03/11/2015    Elevated hemoglobin A1c 06/05/2014    Premature atrial complexes 05/12/2014    Aortic ectasia (HCC) 03/06/2014    Obstructive sleep apnea 11/25/2013    Premature ventricular contraction 11/25/2013    Lichen planus 05/17/2012    Hypertension 04/04/2012  "   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  EKG to mass mismatch: yes.   Tc-PYP scan 04/29/2023: H/CL ratio 1.77. Grade 3 uptake. \"Strongly suggestive of TTR amyloidosis.\"  Genetic testing: negative  SPEP 04/2023: insufficient sample.   Free light chains 04/2023: kappa/lambda ratio 1.49.     Amyloid-specific Therapies:   --Tafamidis 61 mg daily.                # Chronic HFpEF; LVEF 65%; LVIDd 3.4 cm; NYHA II; ACC/AHA Stage C  Etiology: TTR amyloidosis.   Diuretic: Lasix 40 mg with potassium 40meq as needed  Euvolemic    Weight: 253 lbs 11/21/23; 246 3/21/24; 245 lbs 9/24/24    Studies:  TTE 07/22/2020: LVEF 60-70%. LVIDd 4 cm. IVSd 1.45 cm. Moderate concentric hypertrophy. Normal RV. HESHAM. Trace MR. Mild TR.   TTE 04/25/2023: LVEF 65%. LVIDd 3.4 cm. IVSd 1.6 cm. Normal RV size with low normal RVSF. HESHAM (R>L). Small PFO. Mild MR and TR. Trace AI. Normal IVC.      # Atrial fibrillation, persistent  [id number] = 6 (age, HF, HTN, DVT/TIA).  Anticoagulation on Coumadin.   Rate control: metoprolol succinate 25mg BID  Rhythm control: None.     # VT   Zio 11/7/23: 31 Ventricular Tachycardia runs occurred, the run with the fastest interval lasting 13 beats with a max rate of 226 bpm, the longest lasting 15.1 secs with an avg rate of 107 bpm. Atrial Fibrillation occurred continuously (100% burden), ranging from  bpm (avg of 83 bpm). Isolated VEs were occasional (1.8%, 20805), VE Couplets were rare (<1.0%, 1028), and VE Triplets were rare (<1.0%, 203). Ventricular Bigeminy and Trigeminy were present. MD notification criteria for Rapid Atrial Fibrillation met - report posted prior to notification per account request (CP)   Increased metoprolol with VTs on Zio  Denies episodes of palpitaitons    # Hypertension, controlled    # Chronic kidney disease, stage " "IIIa  Baseline creatinine of 1.3-1.5.  1.3 12/14/2023  Routine labs with PCP  # Hyperlipidemia  # Obstructive sleep apnea: compliant with CPAP.   # Spinal stenosis, lumbar - s/p surgery in 2018  # Carpal tunnel syndrome  # MTHFR mutation  # History of DVT  # History of TIA  # History of tobacco abuse: quit in 1982.     TODAY'S PLAN:  Continue current medications  Continue physical therapy  2g sodium diet   2L fluid restriction  Daily weights, take Lasix 40 mg with potassium 40meq as needed for weight gain of 3 lbs in a day or 5 lbs in 5-7 days.  Physical activities/walking as tolerated      HPI:   Jesse France is an 83 y.o. male with PMH as above who is here for follow up.     05/08/2023 with TH: \"82 year old with PMH as above recently admitted with volume overload and celllulitis. Echocardiogram  concerning for cardiac amyloidosis. Underwent workup. SPEP, UPEP showed no monoclonal banding and serum free light chains showing mild elevation in Ig Kappa with normal Kappa Lambda fluid ratio. PYP scan highly suggestive of aTTR amyloid. He presents today for hospital follow up. Feeling deconditioned and worn out. Questioning why he is not to start his diuretic until 5/13. Having a lot of neuropathy in the lower extremities.      Will have patient begin taking Lasix 40 mg once daily today with Kdur 20 meq BID. Will check labs before the end of the week. Start application process for Tafamidis. Genetic testing to determine wild type vs familial.\"     05/23/2023: Patient presents with his wife for follow-up. Down 3 lbs since last visit. Reports great improvement in LE swelling since starting Lasix. Due to his active lifestyle and significant urinary response to Lasix, has only been taking Lasix and potassium 4-5 days a week. Continues with daily weights. Provided patient with \"Living With Heart Failure\" booklet and \"Don't Pass the Salt\" cookbook.     6/7/23: presents today for follow up. Started tafamidis on Thursday. " "Having some fatigue, which he thinks may be related to deconditioning. Improving. Some MANLEY, improving. No SOB at rest. No PND, orthopnea. Swelling has improved. Did have a blister on his right leg, dressed by wound care and has an appointment with them next week. Takes lasix about 3 days a week on days when he is home. Eating minimal salt, drinking under 2L. Urine output has been consistent. Weights stable at home.     8/21/23: Presents for follow up. Having shortness of breath on exertion. Overall leg swelling stable. Denies abdominal distension. Has only been taking lasix and potassium 2-3 times a depending on his schedule for the day. Mostly sedentary per wife. Does not weight himself daily, last 2 weights 247 and 250 lbs on home scale. Wife reports occasional dizziness. Last dose of lasix yesterday.    \"Presented to [facility] on 09/12/2023 s/p fall and being found \"facedown on the tile floor in his bathroom.\" In preceding days had been having more frequent falls at home per wife/family. That same day, had near fall backwards onto his wife when walking up stairs (live in split level). Patient unable to recall fall onto bathroom floor; unclear how long patient down before wife found patient. Received 500 mL IV fluids in ED. Trauma XR with \"Mild pulmonary vascular congestion.\" CT head and C-spine without acute abnormalities. CT c/a/p without acute injuries but did note \"mild patchy groundglass opacity scattered in the lungs with interlobular septal thickening suggestive of mild interstitial edema.\" BNP not checked. Intermittently noted to be in Afib with RVR on telemetry per ED notes. Given one dose IV Lasix, and HF team consulted. Felt to be euvolemic when examined, and recommended to take his outpatient Lasix more regularly as prescribed. Outpatient cardiac event monitor could be considered to evaluate for arrhythmogenic cause for falls.     10/6/23: Presents today for follow up. Feeling better after rehab, " "denies MANLEY, SOB, PND, orthopnea. Has been using his CPAP. Denies chest pain, dizziness, palpitations. Reports prior to fall, he felt generalized weakness and couldn't stand, but denies CP/dizziness/lightheadedness. Takes his Lasix 3-4 days a week when he's home. Urinates very well with current dose. Doesn't weigh himself every day; will start this again. Some leg swelling noted today, last took Lasix 2-3 days ago.\"    11/21/23: here for follow up. Zio showed 100% afib burden and episodes of VT up to 15 seconds. Metoprolol increased to 25mg BID. Denies palpitations. Occasional dizziness with a full flight of steps. Weight 242 lbs on home scale. Denies PND or orthopnea. Has been taking lasix 3x/week on average instead of daily.      3/21/24: Here for follow up.  12/14/2023 sodium 141 potassium 4.4 creatinine 1.3. Reporting mild shortness of breat. Leg swelling overall better. Denies chest pain, PND or orthopnea. No abdominal distension. Weight down, he is actively trying to lose weight. He is not taking lasix daily.    9/24/2024: Patient is here for follow-up.  He is mated impacted by left knee pain.  Considering left knee surgery has skin lesions that needs to heal per Ortho.  Denies leg swelling or abdominal distention weight overall has been stable.  He is on Lasix as needed.  Denies shortness of breath or chest pain on current level of exertion.  No PND orthopnea.  He is taking medications as prescribed.  He is only active during physical therapy but mostly inactive at home due to fear of moving the wrong way and inducing knee pain.      Review of Systems   Constitutional:  Positive for fatigue. Negative for chills and fever.   HENT:  Negative for ear pain and sore throat.    Eyes:  Negative for pain and visual disturbance.   Respiratory:  Negative for cough and shortness of breath.    Cardiovascular:  Negative for chest pain, palpitations and leg swelling.   Gastrointestinal:  Negative for abdominal distention, " abdominal pain and vomiting.   Genitourinary:  Negative for dysuria and hematuria.   Musculoskeletal:  Positive for arthralgias. Negative for back pain.   Skin:  Negative for color change and rash.   Neurological:  Negative for dizziness, seizures, syncope and light-headedness.   All other systems reviewed and are negative.      Past Medical History:   Diagnosis Date    Abnormal glucose level 02/07/2018    Acute back pain 10/02/2017    Atrial fibrillation (HCC)     Blood clot in vein 2015    in right foot    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HCC) 04/2023    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HCC)     Carpal tunnel syndrome     Chronic kidney disease (CKD)     Fever 09/08/2017    Frailty syndrome in geriatric patient 05/12/2023    FUO (fever of unknown origin) 09/08/2017    Heart failure (HCC)     History of DVT (deep vein thrombosis)     History of tobacco abuse     quit in 1982    History of transient cerebral ischemia     Hypertension          Allergies   Allergen Reactions    Sildenafil Hives     Other reaction(s): severe congestion, cialis was OK    Sildenafil Other (See Comments)     _     .    Current Outpatient Medications:     Cholecalciferol (VITAMIN D-3 PO), Take 4,000 Units by mouth daily., Disp: , Rfl:     folic acid (FOLVITE) 1 mg tablet, Take 2 mg by mouth daily  , Disp: , Rfl:     furosemide (LASIX) 40 mg tablet, Take 1 tablet (40 mg total) by mouth as needed (for weight gain of 3 lbs in a day or 5 lbs in 5-7 days), Disp: , Rfl:     metoprolol succinate (TOPROL-XL) 25 mg 24 hr tablet, Take 1 tablet (25 mg total) by mouth daily, Disp: , Rfl:     Multiple Vitamins-Minerals (MULTIVITAMIN & MINERAL PO), Take 1 tablet by mouth daily. Per Felipa Moore MD [facility] 10/2/23  Indications: supplement, Disp: , Rfl:     potassium chloride (Klor-Con M20) 20 mEq tablet, Take 2 tablets (40 mEq total) by mouth if needed (when taking lasix), Disp: 60 tablet, Rfl: 3    warfarin (COUMADIN) 5 mg tablet, TAKE 1 TO 1 AND 1/2 TABLETS BY  MOUTH DAILY AS DIRECTED BY PRESCRIBER, Disp: 135 tablet, Rfl: 3    warfarin (COUMADIN) 5 mg tablet, Take by mouth daily 1 to 1.5 tablet daily per physician, Disp: , Rfl:     colchicine (COLCRYS) 0.6 mg tablet, Take 1 tablet (0.6 mg total) by mouth daily Do not start before May 4, 2023., Disp: 30 tablet, Rfl: 0    folic acid (FOLVITE) 1 mg tablet, Take 2 mg by mouth daily, Disp: , Rfl:     Tafamidis 61 MG CAPS, Take 61 mg by mouth in the morning Do not start before 2024., Disp: 30 capsule, Rfl: 12    Social History     Socioeconomic History    Marital status: /Civil Union     Spouse name: Not on file    Number of children: Not on file    Years of education: Not on file    Highest education level: Not on file   Occupational History    Not on file   Tobacco Use    Smoking status: Former     Current packs/day: 0.00     Average packs/day: 1 pack/day for 26.0 years (26.0 ttl pk-yrs)     Types: Cigarettes     Start date:      Quit date:      Years since quittin.7    Smokeless tobacco: Never   Vaping Use    Vaping status: Never Used   Substance and Sexual Activity    Alcohol use: Never     Comment: 4 drinks a week    Drug use: Never    Sexual activity: Not on file   Other Topics Concern    Not on file   Social History Narrative    ** Merged History Encounter **          Social Determinants of Health     Financial Resource Strain: Not on file   Food Insecurity: No Food Insecurity (2023)    Hunger Vital Sign     Worried About Running Out of Food in the Last Year: Never true     Ran Out of Food in the Last Year: Never true   Transportation Needs: No Transportation Needs (2023)    PRAPARE - Transportation     Lack of Transportation (Medical): No     Lack of Transportation (Non-Medical): No   Physical Activity: Not on file   Stress: Not on file   Social Connections: Not on file   Intimate Partner Violence: Not on file   Housing Stability: Low Risk  (2023)    Housing Stability Vital  Sign     Unable to Pay for Housing in the Last Year: No     Number of Times Moved in the Last Year: 1     Homeless in the Last Year: No       Family History   Problem Relation Age of Onset    Heart attack Father     Heart disease Other     Cancer Other        Physical Exam:    Vitals:   Vitals:    09/24/24 1154   BP: 120/80   Pulse: 80   SpO2:            Physical Exam  Constitutional:       General: He is not in acute distress.     Appearance: Normal appearance.   HENT:      Head: Normocephalic and atraumatic.      Mouth/Throat:      Mouth: Mucous membranes are moist.   Eyes:      General: No scleral icterus.     Extraocular Movements: Extraocular movements intact.   Neck:      Vascular: No JVD.   Cardiovascular:      Rate and Rhythm: Normal rate. Rhythm irregularly irregular.      Pulses: Normal pulses.      Heart sounds: S1 normal and S2 normal. No murmur heard.     No friction rub. No gallop.      Comments: Trace edema  Pulmonary:      Breath sounds: Normal breath sounds.   Abdominal:      General: There is no distension.      Palpations: Abdomen is soft.      Tenderness: There is no abdominal tenderness. There is no guarding or rebound.   Musculoskeletal:         General: Normal range of motion.      Cervical back: Neck supple.   Skin:     General: Skin is warm and dry.      Capillary Refill: Capillary refill takes less than 2 seconds.   Neurological:      General: No focal deficit present.      Mental Status: He is alert and oriented to person, place, and time.   Psychiatric:         Mood and Affect: Mood normal.         Labs & Results:    Lab Results   Component Value Date    SODIUM 141 12/14/2023    K 4.4 12/14/2023     12/14/2023    CO2 28 12/14/2023    BUN 26 (H) 12/14/2023    CREATININE 1.30 12/14/2023    GLUC 100 (H) 09/16/2023    CALCIUM 9.0 12/14/2023     Lab Results   Component Value Date    WBC 7.97 10/04/2023    HGB 17.4 (H) 10/04/2023    HCT 51.2 (H) 10/04/2023    MCV 92 10/04/2023      10/04/2023     Lab Results   Component Value Date    NTBNP 1,904 (H) 05/16/2023      Lab Results   Component Value Date    CHOLESTEROL 167 12/14/2023    CHOLESTEROL 183 05/16/2023    CHOLESTEROL 194 12/01/2022     Lab Results   Component Value Date    HDL 70 12/14/2023    HDL 69 05/16/2023    HDL 74 12/01/2022     Lab Results   Component Value Date    TRIG 97 12/14/2023    TRIG 81 05/16/2023    TRIG 132 12/01/2022     Lab Results   Component Value Date    NONHDLC 120 12/01/2022    NONHDLC 116 05/23/2022    NONHDLC 103 01/22/2020       EKG personally reviewed by Cathy Woods MD.     Counseling / Coordination of Care  I have spent a total time of 32 minutes in caring for this patient on the day of the visit/encounter including Instructions for management, Patient and family education, Impressions, Counseling / Coordination of care, Documenting in the medical record, Reviewing / ordering tests, medicine, procedures  , and Obtaining or reviewing history  .      Thank you for the opportunity to participate in the care of this patient.    CATHY WOODS MD  ADVANCED HEART FAILURE AND MECHANICAL CIRCULATORY SUPPORT  [facility]

## 2024-09-26 ENCOUNTER — EVALUATION (OUTPATIENT)
Dept: PHYSICAL THERAPY | Facility: CLINIC | Age: 84
End: 2024-09-26
Payer: MEDICARE

## 2024-09-26 DIAGNOSIS — M17.0 PRIMARY OSTEOARTHRITIS OF BOTH KNEES: Primary | ICD-10-CM

## 2024-09-26 PROCEDURE — 97110 THERAPEUTIC EXERCISES: CPT

## 2024-09-26 NOTE — PROGRESS NOTES
PT Re-Evaluation     Today's date: 2024  Patient name: Jesse France  : 1940  MRN: [id number]  Referring provider: Zaki Kaye MD  Dx:   Encounter Diagnosis     ICD-10-CM    1. Primary osteoarthritis of both knees  M17.0           Start Time: 1545  Stop Time: 1625  Total time in clinic (min): 40 minutes    Assessment  Impairments: abnormal or restricted ROM, activity intolerance, impaired physical strength, lacks appropriate home exercise program and pain with function  Functional limitations: walking  Symptom irritability: low    Assessment details: Pt is 84 yo male presenting to therapy with chronic knee pain. Pt is showing improved LE strength as well as improved knee extension rom and improved functional strength with sit to stands and stairs. Pt would continue benefit from PT services to improve strength and ease with ADL's and walking.  Understanding of Dx/Px/POC: good     Prognosis: good    Goals  1. Pt will be independent with HEP upon discharge. Progressing  2. Pt will improve LE strength 4/5 to improve ascend steps. Progressing  3. Pt will improve standing from sitting in a chair with less difficulty. Progressing  4. Pt will improve ability to do some yard work with less difficulty. Progressing    Plan  Patient would benefit from: skilled physical therapy    Planned therapy interventions: functional ROM exercises, therapeutic activities, therapeutic exercise, therapeutic training, stretching, strengthening, home exercise program, neuromuscular re-education, manual therapy and patient education    Frequency: 2x week  Duration in weeks: 8  Treatment plan discussed with: patient      Subjective Evaluation    History of Present Illness  Mechanism of injury: Pt reports seeing Dr Kaye for his knee OA b/l about 3 months ago. Rt knee started bothering him more recently. Lt knee is more chronic. Pt reports unable to twist on his knees. Enjoys sitting with his legs out.   Quality of life:  "good    Patient Goals  Patient goals for therapy: increased strength and decreased pain    Pain  Current pain ratin  At worst pain ratin  Location: medial aspect of knees  Quality: sharp and dull ache  Relieving factors: rest  Aggravating factors: walking and standing    Treatments  Previous treatment: physical therapy      Objective     Tenderness   Left Knee   Tenderness in the medial joint line.     Right Knee   Tenderness in the medial joint line.     Additional Tenderness Details  Rt knee, pitting edema present    Passive Range of Motion   Left Knee   Flexion: 132 degrees   Extension: -2 degrees     Right Knee   Flexion: 126 degrees   Extension: -3 degrees     Mobility     Additional Mobility Details  Pain with medial glides on Rt>Lt    Strength/Myotome Testing     Left Knee   Flexion: 5  Extension: 4+    Right Knee   Flexion: 4  Extension: 4    Additional Strength Details  Hip Flexion 3+/5 b/l ABD 3+/5 Ext 4/5 b/l          Precautions: fall risk      Manuals 8/20 8/27 8/29 9/3 9/5 9/10 9/12 9/17 9/19 9/24 9/26   Retrograde Rt knee                                                        Neuro Re-Ed              Tandem stance           30\"x2 ea                                                                                        Ther Ex              Edu on HEP 8'             Bike  6' 6' 8' 8' 6' 8' 8' 8' 8' 6'   LAQ  15x ea 15x ea 15x ea, 2# 15x ea, 2# 20x ea 2# 20x ea 2# 15x ea 3# 15x ea 3# 15x ea 3# 15x ea 3#   Bridges  10x 10x 15x 15x 15x 15x W/ ball sq 15x W/ ball sq 15x W/ ball sq 15x W/ ball sq 15x   SLR  10x ea 10x ea 2x10x ea, 2# 2x10x ea, 2# 2x10x ea, 2# 2x10x ea, 2# 15x ea 3# 15x ea 3# 15x ea 3# 15x ea 3#   Leg Press              Hip 3-way  10x ea 10x ea B 10x ea, 2# 10x ea, 2# 15x ea 2# 20x ea 2# 15x ea 3# 15x ea 3# 15x ea 3#    Mini squats   10x 10x  15x          Cone taps  10x ea 10x ea           Clamshells   RTB 5\" x 15 RTB 5\"x15     RTB 3\" 2x10 RTB 5\" 2x10  RTB 5\" 2x10   Sciatic Nerve " "Glides    2 x 10ea 2x10 ea  2x10 ea  2x10 ea 2x10 ea      Supine march      15x ea 2#  15x ea, 3# 15x ea, 3# Standing alt 3# x15 ea    Modified split stance lunge               Tband rotations              Ther Activity              Sit to stands  1 foam 10x  1 foam 10x  W/UE 1 foam 10x W/UE 1 foam 10x W 1 /UE Pt deferred  1 foam 5x w 1 UE 1 foam 2x5 w 1 UE 1 foam 2x5 w 1 UE 1 foam 2x5 w 1 UE    Step ups  4\" x 10 ea 4\"x10 ea 6\" x 10ea Fwd/lat 6\" x 10ea 5x p! Fwd 6\" x 5x ea  2 UE Fwd 6\" x 10x ea  2 UE Fwd 6\" x 15x ea  1 UE Fwd 6\" x 15x ea  1 UE    Gait Training                                          Modalities                                                                 "

## 2024-10-01 ENCOUNTER — OFFICE VISIT (OUTPATIENT)
Dept: PHYSICAL THERAPY | Facility: CLINIC | Age: 84
End: 2024-10-01
Payer: MEDICARE

## 2024-10-01 DIAGNOSIS — M17.0 PRIMARY OSTEOARTHRITIS OF BOTH KNEES: Primary | ICD-10-CM

## 2024-10-01 PROCEDURE — 97530 THERAPEUTIC ACTIVITIES: CPT | Performed by: PHYSICAL THERAPIST

## 2024-10-01 PROCEDURE — 97110 THERAPEUTIC EXERCISES: CPT | Performed by: PHYSICAL THERAPIST

## 2024-10-01 NOTE — PROGRESS NOTES
"Daily Note     Today's date: 10/1/2024  Patient name: Jesse France  : 1940  MRN: [id number]  Referring provider: Zaki Kaye MD  Dx:   Encounter Diagnosis     ICD-10-CM    1. Primary osteoarthritis of both knees  M17.0                      Subjective: Pt reports he is looking to schedule surgery in November.      Objective: See treatment diary below      Assessment: Pt tolerating strengthening exercises well. Step ups do increase some discomfort in his knees. Will continue to progress strengthening as able.      Plan: Continue per plan of care.      Precautions: fall risk      Manuals 10/1   9/3 9/5 9/10 9/12 9/17 9/19 9/24 9/26   Retrograde Rt knee                                                        Neuro Re-Ed              Tandem stance           30\"x2 ea                                                                                        Ther Ex              Edu on HEP              Bike 8'   8' 8' 6' 8' 8' 8' 8' 6'   LAQ    15x ea, 2# 15x ea, 2# 20x ea 2# 20x ea 2# 15x ea 3# 15x ea 3# 15x ea 3# 15x ea 3#   Bridges    15x 15x 15x 15x W/ ball sq 15x W/ ball sq 15x W/ ball sq 15x W/ ball sq 15x   SLR    2x10x ea, 2# 2x10x ea, 2# 2x10x ea, 2# 2x10x ea, 2# 15x ea 3# 15x ea 3# 15x ea 3# 15x ea 3#   Leg Press              Hip 3-way 15xea 3#   10x ea, 2# 10x ea, 2# 15x ea 2# 20x ea 2# 15x ea 3# 15x ea 3# 15x ea 3#    Mini squats      15x          Cone taps              Clamshells         RTB 3\" 2x10 RTB 5\" 2x10  RTB 5\" 2x10   Sciatic Nerve Glides    2 x 10ea 2x10 ea  2x10 ea  2x10 ea 2x10 ea      Supine march Standing alt 3# x15 ea     15x ea 2#  15x ea, 3# 15x ea, 3# Standing alt 3# x15 ea    Modified split stance lunge               Tband rotations              Ther Activity              Sit to stands 1 foam 2x5 w 1 UE   1 foam 10x W/UE 1 foam 10x W 1 /UE Pt deferred  1 foam 5x w 1 UE 1 foam 2x5 w 1 UE 1 foam 2x5 w 1 UE 1 foam 2x5 w 1 UE    Step ups Fwd 6\" x 15x ea  1 UE   6\" x 10ea Fwd/lat 6\" x " "10ea 5x p! Fwd 6\" x 5x ea  2 UE Fwd 6\" x 10x ea  2 UE Fwd 6\" x 15x ea  1 UE Fwd 6\" x 15x ea  1 UE    Gait Training                                          Modalities                                                                   "

## 2024-10-03 ENCOUNTER — APPOINTMENT (OUTPATIENT)
Dept: PHYSICAL THERAPY | Facility: CLINIC | Age: 84
End: 2024-10-03
Payer: MEDICARE

## 2024-10-03 NOTE — PROGRESS NOTES
"Daily Note     Today's date: 10/3/2024  Patient name: Jesse France  : 1940  MRN: [id number]  Referring provider: Zaki Kaye MD  Dx: No diagnosis found.               Subjective: ***      Objective: See treatment diary below      Assessment: Patient tolerated treatment session well.       Plan: Continue per POC. Increase reps/resistance as tolerated.      Precautions: fall risk      Manuals 10/1 10/3  9/3 9/5 9/10 9/12 9/17 9/19 9/24 9/26   Retrograde Rt knee                                                        Neuro Re-Ed              Tandem stance           30\"x2 ea                                                                                        Ther Ex              Edu on HEP              Bike 8'   8' 8' 6' 8' 8' 8' 8' 6'   LAQ    15x ea, 2# 15x ea, 2# 20x ea 2# 20x ea 2# 15x ea 3# 15x ea 3# 15x ea 3# 15x ea 3#   Bridges    15x 15x 15x 15x W/ ball sq 15x W/ ball sq 15x W/ ball sq 15x W/ ball sq 15x   SLR    2x10x ea, 2# 2x10x ea, 2# 2x10x ea, 2# 2x10x ea, 2# 15x ea 3# 15x ea 3# 15x ea 3# 15x ea 3#   Leg Press              Hip 3-way 15xea 3#   10x ea, 2# 10x ea, 2# 15x ea 2# 20x ea 2# 15x ea 3# 15x ea 3# 15x ea 3#    Mini squats      15x          Cone taps              Clamshells         RTB 3\" 2x10 RTB 5\" 2x10  RTB 5\" 2x10   Sciatic Nerve Glides    2 x 10ea 2x10 ea  2x10 ea  2x10 ea 2x10 ea      Supine march Standing alt 3# x15 ea     15x ea 2#  15x ea, 3# 15x ea, 3# Standing alt 3# x15 ea    Modified split stance lunge               Tband rotations              Ther Activity              Sit to stands 1 foam 2x5 w 1 UE   1 foam 10x W/UE 1 foam 10x W 1 /UE Pt deferred  1 foam 5x w 1 UE 1 foam 2x5 w 1 UE 1 foam 2x5 w 1 UE 1 foam 2x5 w 1 UE    Step ups Fwd 6\" x 15x ea  1 UE   6\" x 10ea Fwd/lat 6\" x 10ea 5x p! Fwd 6\" x 5x ea  2 UE Fwd 6\" x 10x ea  2 UE Fwd 6\" x 15x ea  1 UE Fwd 6\" x 15x ea  1 UE    Gait Training                                          Modalities                                "

## 2024-10-08 ENCOUNTER — OFFICE VISIT (OUTPATIENT)
Dept: PHYSICAL THERAPY | Facility: CLINIC | Age: 84
End: 2024-10-08
Payer: MEDICARE

## 2024-10-08 ENCOUNTER — TELEPHONE (OUTPATIENT)
Age: 84
End: 2024-10-08

## 2024-10-08 DIAGNOSIS — M17.0 PRIMARY OSTEOARTHRITIS OF BOTH KNEES: Primary | ICD-10-CM

## 2024-10-08 PROCEDURE — 97530 THERAPEUTIC ACTIVITIES: CPT

## 2024-10-08 PROCEDURE — 97110 THERAPEUTIC EXERCISES: CPT

## 2024-10-08 NOTE — PROGRESS NOTES
"Daily Note     Today's date: 10/8/2024  Patient name: Jesse France  : 1940  MRN: [id number]  Referring provider: Zaki Kaye MD  Dx:   Encounter Diagnosis     ICD-10-CM    1. Primary osteoarthritis of both knees  M17.0           Start Time: 1442  Stop Time: 1523  Total time in clinic (min): 41 minutes    Subjective: Patient reports scheduling L TKR on 11/15/24. Reports his knees are always in pain.       Objective: See treatment diary below      Assessment: Patient tolerated treatment session well. Unable to complete step ups on L d/t medial knee pain. Otherwise, demonstrated better tolerance to strengthening exercises and required fewer rest breaks. Patient left clinic in good condition and would benefit from continued PT to improve level of function.       Plan: Continue per POC. Increase reps/resistance as tolerated.      Precautions: fall risk      Manuals 10/1 10/3 10/8 9/3 9/5 9/10 9/12 9/17 9/19 9/24 9/26   Retrograde Rt knee                                                        Neuro Re-Ed              Tandem stance           30\"x2 ea                                                                                        Ther Ex              Edu on HEP              Bike 8'  8' 8' 8' 6' 8' 8' 8' 8' 6'   LAQ    15x ea, 2# 15x ea, 2# 20x ea 2# 20x ea 2# 15x ea 3# 15x ea 3# 15x ea 3# 15x ea 3#   Bridges   W/ ball sq 15x 15x 15x 15x 15x W/ ball sq 15x W/ ball sq 15x W/ ball sq 15x W/ ball sq 15x   SLR   15x ea 3# 2x10x ea, 2# 2x10x ea, 2# 2x10x ea, 2# 2x10x ea, 2# 15x ea 3# 15x ea 3# 15x ea 3# 15x ea 3#   Leg Press              Hip 3-way 15xea 3#  20x ea 3# 10x ea, 2# 10x ea, 2# 15x ea 2# 20x ea 2# 15x ea 3# 15x ea 3# 15x ea 3#    Mini squats    15x  15x          Cone taps              Clamshells    Red 5\" 20x     RTB 3\" 2x10 RTB 5\" 2x10  RTB 5\" 2x10   Sciatic Nerve Glides    2 x 10ea 2x10 ea  2x10 ea  2x10 ea 2x10 ea      Supine march Standing alt 3# x15 ea  Standing alt 3# x15 ea   15x ea 2#  15x " "ea, 3# 15x ea, 3# Standing alt 3# x15 ea    Modified split stance lunge               Tband rotations   Green 10x ea           Ther Activity              Sit to stands 1 foam 2x5 w 1 UE  1 foam 10x w 1 UE 1 foam 10x W/UE 1 foam 10x W 1 /UE Pt deferred  1 foam 5x w 1 UE 1 foam 2x5 w 1 UE 1 foam 2x5 w 1 UE 1 foam 2x5 w 1 UE    Step ups Fwd 6\" x 15x ea  1 UE  Fwd 6\" x10 R   1 UE 6\" x 10ea Fwd/lat 6\" x 10ea 5x p! Fwd 6\" x 5x ea  2 UE Fwd 6\" x 10x ea  2 UE Fwd 6\" x 15x ea  1 UE Fwd 6\" x 15x ea  1 UE    Gait Training                                          Modalities                                                                       "

## 2024-10-08 NOTE — TELEPHONE ENCOUNTER
Caller: Dewey France     Doctor: Dr. Woods     Reason for call: Patient is having knee replacement surgery on 11/15/24 with [facility] and will need cardiac clearance. He was seen by Dr. Woods on 9/24/24.     Call back#: [phone]

## 2024-10-08 NOTE — TELEPHONE ENCOUNTER
Called patient but was unable to leave message. Called for pedro specifics regarding his message for scheduled Left TKR on 11/15/24.

## 2024-10-10 ENCOUNTER — APPOINTMENT (OUTPATIENT)
Dept: PHYSICAL THERAPY | Facility: CLINIC | Age: 84
End: 2024-10-10
Payer: MEDICARE

## 2024-10-11 DIAGNOSIS — I48.19 PERSISTENT ATRIAL FIBRILLATION (HCC): ICD-10-CM

## 2024-10-11 RX ORDER — WARFARIN SODIUM 5 MG/1
TABLET ORAL
Qty: 125 TABLET | Refills: 2 | Status: SHIPPED | OUTPATIENT
Start: 2024-10-11

## 2024-10-11 NOTE — TELEPHONE ENCOUNTER
Reason for call:   [x] Refill   [] Prior Auth  [] Other:     Office:   [x] Specialty/Provider - card / Chuck    Medication: warfarin    Dose/Frequency: 5mg (1-1.5 tabs qd as directed)    Quantity: 135    Pharmacy: [facility] - [place] - [address]      Does the patient have enough for 3 days?   [] Yes   [x] No - Send as HP to POD

## 2024-10-31 ENCOUNTER — APPOINTMENT (OUTPATIENT)
Dept: LAB | Facility: CLINIC | Age: 84
End: 2024-10-31
Payer: MEDICARE

## 2024-10-31 DIAGNOSIS — I48.19 PERSISTENT ATRIAL FIBRILLATION (HCC): ICD-10-CM

## 2024-10-31 LAB
INR PPP: 2.09 (ref 0.85–1.19)
PROTHROMBIN TIME: 23.5 SECONDS (ref 12.3–15)

## 2024-10-31 PROCEDURE — 36415 COLL VENOUS BLD VENIPUNCTURE: CPT

## 2024-10-31 PROCEDURE — 85610 PROTHROMBIN TIME: CPT

## 2024-11-01 ENCOUNTER — ANTICOAG VISIT (OUTPATIENT)
Dept: CARDIOLOGY CLINIC | Facility: CLINIC | Age: 84
End: 2024-11-01

## 2024-11-01 DIAGNOSIS — I48.19 PERSISTENT ATRIAL FIBRILLATION (HCC): Primary | ICD-10-CM

## 2024-11-26 ENCOUNTER — TELEPHONE (OUTPATIENT)
Dept: CARDIOLOGY CLINIC | Facility: CLINIC | Age: 84
End: 2024-11-26

## 2024-11-26 DIAGNOSIS — E85.82 WILD-TYPE TRANSTHYRETIN-RELATED (ATTR) AMYLOIDOSIS (HCC): ICD-10-CM

## 2024-11-26 NOTE — TELEPHONE ENCOUNTER
Received fax from [facility] regarding pt Tafamidis medication.    Scanned paperwork in pt chart.    Please review In Media.     Please advise

## 2024-12-02 NOTE — ASSESSMENT & PLAN NOTE
Daily Call Note: [facility] daily call complete  Denies CP/SOB/ edema  Did not obtain weight/ VS  Is smoking, he reports 1.5 cigarettes a few days ago.  Pt would lis Lozenges to help him quite, secure chat sent to [place]  All questions/ concerns addressed  Verified upcoming weekly [facility] call    Pt Education:  POC  Barriers:   Topics for Daily Review:  VS/ weight  Pt demonstrates clear understanding: Yes    Daily Weight:  There were no vitals filed for this visit.   Last 3 Weights:  Wt Readings from Last 7 Encounters:   11/20/24 59 kg (130 lb)   11/19/24 59.8 kg (131 lb 12.8 oz)   11/18/24 60.1 kg (132 lb 9.6 oz)   11/15/24 59.3 kg (130 lb 12.8 oz)   11/11/24 59 kg (130 lb)   11/09/24 59 kg (130 lb)   11/07/24 60.8 kg (134 lb)       Masimo Device: No   Masimo Clinical Impression:     Virtual Visits--Scheduled (Most Recent Date at Top)  Follow up Appointments  Recent Visits  Date Type Provider Dept   11/04/24 Office Visit Claudio Maldonado DO [id number]   Showing recent visits within past 30 days and meeting all other requirements  Future Appointments  No visits were found meeting these conditions.  Showing future appointments within next 90 days and meeting all other requirements       Frequency of RN Calls & Virtual Visits per Team Agreement: Healthy at Home Frequency: Daily    Medication issues Addressed (what was done):     Follow up appointments scheduled by [facility] Staff:   Referrals made by [facility] staff:               Patient has hx of right lower extremity DVT in 2014 at which time he was seen by hematology & placed on Xarelto for 6 months  Patient had coagulability testing in the past; patient has MTHFR mutation     Lower extremity Dopplers this admission shows no signs of DVT bilaterally   Currently patient is on warfarin for persistent A-fib   Continue folate supplementation in setting of MTHFR mut   INR therapeutic, continue coumadin

## 2024-12-24 ENCOUNTER — TELEPHONE (OUTPATIENT)
Dept: CARDIOLOGY CLINIC | Facility: CLINIC | Age: 84
End: 2024-12-24

## 2024-12-24 NOTE — TELEPHONE ENCOUNTER
PA for Vyndamax 61 mg [facility]     via    [x]CMM-KEY: [id number]  []Surescripts-Case ID #   []Availity-Auth ID # NDC #   []Faxed to plan   []Other website   []Phone call Case ID #     [x]PA sent as URGENT    All office notes, labs and other pertaining documents and studies sent. Clinical questions answered. Awaiting determination from insurance company.     Turnaround time for your insurance to make a decision on your Prior Authorization can take 7-21 business days.

## 2024-12-24 NOTE — TELEPHONE ENCOUNTER
PA for Vyndaqel 61 mg  DENIED    Reason:(Screenshot if applicable)        Message sent to office clinical pool Yes    Denial letter scanned into Media Yes    Appeal started No (Provider will need to decide if appeal is warranted and send clinical documentation to Prior Authorization Team for initiation.)    **Please follow up with your patient regarding denial and next steps**

## 2024-12-26 DIAGNOSIS — E85.82 WILD-TYPE TRANSTHYRETIN-RELATED (ATTR) AMYLOIDOSIS (HCC): Primary | ICD-10-CM

## 2024-12-26 NOTE — TELEPHONE ENCOUNTER
Do you want to appeal the denial for Vyndamax?  If so, please provide reason.  I can send PA for Vyndaqel if you prefer?

## 2024-12-30 NOTE — TELEPHONE ENCOUNTER
PA for vyndaqel 20mg SUBMITTED to [facility]     via    [x]CMM-KEY: [id number]  []Surescripts-Case ID #   []Availity-Auth ID # NDC #   []Faxed to plan   []Other website   []Phone call Case ID #     [x]PA sent as URGENT    All office notes, labs and other pertaining documents and studies sent. Clinical questions answered. Awaiting determination from insurance company.     Turnaround time for your insurance to make a decision on your Prior Authorization can take 7-21 business days.

## 2024-12-31 ENCOUNTER — TELEPHONE (OUTPATIENT)
Dept: CARDIOLOGY CLINIC | Facility: CLINIC | Age: 84
End: 2024-12-31

## 2024-12-31 NOTE — TELEPHONE ENCOUNTER
PA for VYNDAQEL 20MG APPROVED     Date(s) approved 12/30/2024 - UNTIL FURTHER NOTICE    Case #[id number]    Patient advised by          []MyChart Message  []Phone call    Patient not called due to not being informed by Provider of new prescription  []LMOM  []L/M to call office as no active Communication consent on file  []Unable to leave detailed message as VM not approved on Communication consent       Pharmacy advised by : Script was never sent to pharmacy.    []Fax  []Phone call    Approval letter scanned into Media Yes

## 2024-12-31 NOTE — TELEPHONE ENCOUNTER
Left message for patient to call office to discuss approval of Vyndaqel vs Vyndamax. Also want to see if patient already completed and sent his portion of the PAP paperwork to [employer].

## 2025-01-06 DIAGNOSIS — E85.4 CARDIAC AMYLOIDOSIS (HCC): Primary | ICD-10-CM

## 2025-01-06 DIAGNOSIS — I43 CARDIAC AMYLOIDOSIS (HCC): Primary | ICD-10-CM

## 2025-01-06 RX ORDER — TAFAMIDIS 61 MG/1
61 CAPSULE, LIQUID FILLED ORAL DAILY
Qty: 30 CAPSULE | Refills: 12 | Status: SHIPPED | OUTPATIENT
Start: 2025-01-06

## 2025-01-06 NOTE — TELEPHONE ENCOUNTER
Please sign for Dr. Woods, need to get auth for 2025 so needed to place in chart, Vyndaqel was approved initially but we are trying for Vyndamax with new insurance coverage.    Thank you

## 2025-01-06 NOTE — TELEPHONE ENCOUNTER
Patient stopped by office to sign VyndaLink forms. I also provided phone number to [employer] and he actually did qualify for $8,000.00 from that foundation.  We will not be completing PAP at this time.      However patient's Rx coverage changed for 2025 and he now has [employer] which I will ask PA team to authorize as we had initially auth'd under his previous Rx plan.

## 2025-01-06 NOTE — TELEPHONE ENCOUNTER
PA for vyndamax 61 mg SUBMITTED to [employer]    via    [x]CMM-KEY: [id number]  []Surescripts-Case ID #   []Availity-Auth ID # NDC #   []Faxed to plan   []Other website   []Phone call Case ID #     []PA sent as URGENT    All office notes, labs and other pertaining documents and studies sent. Clinical questions answered. Awaiting determination from insurance company.     Turnaround time for your insurance to make a decision on your Prior Authorization can take 7-21 business days.

## 2025-01-06 NOTE — TELEPHONE ENCOUNTER
Patient has new Rx coverage for 2025. I will have card scanned in.  It is [employer]:    Customer ID: [id number]  Health Plan:  [id number]  Rx BIN:  [id number]  Rx PCN:  [facility]  Rx Group:  [id number]     Coverage determination:  [phone]    Requesting new PA for Vyndamax 61mg daily.     Thanks

## 2025-01-06 NOTE — TELEPHONE ENCOUNTER
Vyndamax is not currently on medication list. Please add if we are doing the  prior auth on  Vyndamax and not vyndaqel

## 2025-01-07 NOTE — TELEPHONE ENCOUNTER
PA for Vyndamax 61 mg  DENIED    Reason:(Screenshot if applicable)        Message sent to office clinical pool Yes    Denial letter scanned into Media Yes    Appeal started No (Provider will need to decide if appeal is warranted and send clinical documentation to Prior Authorization Team for initiation.)    **Please follow up with your patient regarding denial and next steps**

## 2025-01-09 ENCOUNTER — TELEPHONE (OUTPATIENT)
Age: 85
End: 2025-01-09

## 2025-01-09 NOTE — TELEPHONE ENCOUNTER
Spoke with patient, states he believes he has an appointment scheduled for cardiac clearance but he can't find the paper he wrote it down on.    Reviewed chart, advised he has a pre-op clearance appointment tomorrow at 3pm with Dr Woods at [place] office.

## 2025-01-10 ENCOUNTER — OFFICE VISIT (OUTPATIENT)
Dept: CARDIOLOGY CLINIC | Facility: CLINIC | Age: 85
End: 2025-01-10
Payer: MEDICARE

## 2025-01-10 VITALS
SYSTOLIC BLOOD PRESSURE: 124 MMHG | HEIGHT: 74 IN | HEART RATE: 98 BPM | OXYGEN SATURATION: 96 % | DIASTOLIC BLOOD PRESSURE: 60 MMHG | BODY MASS INDEX: 31.32 KG/M2 | WEIGHT: 244 LBS

## 2025-01-10 DIAGNOSIS — E85.4 CARDIAC AMYLOIDOSIS (HCC): ICD-10-CM

## 2025-01-10 DIAGNOSIS — Z01.810 PREOPERATIVE CARDIOVASCULAR EXAMINATION: ICD-10-CM

## 2025-01-10 DIAGNOSIS — L97.919 CHRONIC VENOUS HYPERTENSION (IDIOPATHIC) WITH ULCER OF RIGHT LOWER EXTREMITY (HCC): ICD-10-CM

## 2025-01-10 DIAGNOSIS — I48.19 PERSISTENT ATRIAL FIBRILLATION (HCC): Primary | ICD-10-CM

## 2025-01-10 DIAGNOSIS — I43 CARDIAC AMYLOIDOSIS (HCC): ICD-10-CM

## 2025-01-10 DIAGNOSIS — I87.311 CHRONIC VENOUS HYPERTENSION (IDIOPATHIC) WITH ULCER OF RIGHT LOWER EXTREMITY (HCC): ICD-10-CM

## 2025-01-10 DIAGNOSIS — E66.01 MORBID OBESITY (HCC): ICD-10-CM

## 2025-01-10 DIAGNOSIS — I50.32 CHRONIC HEART FAILURE WITH PRESERVED EJECTION FRACTION (HCC): ICD-10-CM

## 2025-01-10 DIAGNOSIS — N18.31 STAGE 3A CHRONIC KIDNEY DISEASE (CKD) (HCC): ICD-10-CM

## 2025-01-10 PROCEDURE — 99214 OFFICE O/P EST MOD 30 MIN: CPT | Performed by: INTERNAL MEDICINE

## 2025-01-10 PROCEDURE — 93000 ELECTROCARDIOGRAM COMPLETE: CPT | Performed by: INTERNAL MEDICINE

## 2025-01-10 NOTE — PROGRESS NOTES
Advanced Heart Failure Outpatient Progress Note - Jesse JOAQUIN Figuli 84 y.o. male MRN: [id number]    Encounter: [id number]      Assessment/Plan:    Patient Active Problem List    Diagnosis Date Noted    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HCC) 03/21/2024    Chronic diastolic congestive heart failure (HCC) 03/21/2024    Thrombocytopenia (HCC) 09/14/2023    Multiple falls 09/12/2023    ESTER (obstructive sleep apnea) 09/12/2023    History of DVT (deep vein thrombosis) 09/12/2023    Atrial fibrillation (HCC) 09/12/2023    Congestive heart failure (CHF) (HCC) 09/12/2023    Generalized weakness 09/12/2023    Chronic venous hypertension (idiopathic) with ulcer of right lower extremity ([facility]) 09/12/2023    Elevated troponin 09/12/2023    Multiple abrasions 09/12/2023    Frailty syndrome in geriatric patient 05/12/2023    Lower extremity cellulitis 04/24/2023    Stage 3a chronic kidney disease (CKD) ([facility]) 04/24/2023    Hyperbilirubinemia 04/24/2023    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 ([facility]) 02/14/2022    Persistent atrial fibrillation (HCC) 05/14/2020    Morbid obesity ([facility]) 03/07/2018    Lumbar stenosis 03/07/2018    Benign neoplasm of colon 02/07/2018    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    Internal hemorrhoids 02/07/2018    Pure hypercholesterolemia 02/07/2018    Sacroiliitis ([facility]) 02/01/2018    Bilateral hip pain 12/04/2017    Adenomatous colon polyp 12/29/2015    Spinal stenosis of lumbar region 09/10/2015    DDD (degenerative disc disease), lumbar 07/07/2015    Lumbar disc herniation 06/22/2015    Radiculopathy, lumbar region 06/22/2015    MTHFR mutation 03/11/2015    Elevated hemoglobin A1c 06/05/2014    Premature atrial complexes 05/12/2014    Aortic ectasia (HCC) 03/06/2014    Obstructive sleep apnea 11/25/2013    Premature ventricular contraction 11/25/2013    Lichen planus 05/17/2012    Hypertension 04/04/2012  "   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  EKG to mass mismatch: yes.   Tc-PYP scan 04/29/2023: H/CL ratio 1.77. Grade 3 uptake. \"Strongly suggestive of TTR amyloidosis.\"  Genetic testing: negative  SPEP 04/2023: insufficient sample.   Free light chains 04/2023: kappa/lambda ratio 1.49.     Amyloid-specific Therapies:   --Tafamidis 61 mg daily.                # Chronic HFpEF; LVEF 65%; LVIDd 3.4 cm; NYHA II; ACC/AHA Stage C  Etiology: TTR amyloidosis.   Diuretic: Lasix 40 mg with potassium 40meq as needed - has not used for a while  Intravascularly euvolemic on exam    Weight: 253 lbs 11/21/23; 246 3/21/24; 245 lbs 9/24/24; 244 lbs 1/10/25    Studies:  TTE 07/22/2020: LVEF 60-70%. LVIDd 4 cm. IVSd 1.45 cm. Moderate concentric hypertrophy. Normal RV. HESHAM. Trace MR. Mild TR.   TTE 04/25/2023: LVEF 65%. LVIDd 3.4 cm. IVSd 1.6 cm. Normal RV size with low normal RVSF. HESHAM (R>L). Small PFO. Mild MR and TR. Trace AI. Normal IVC.      # Atrial fibrillation, persistent  [id number] = 6 (age, HF, HTN, DVT/TIA).  Anticoagulation on Coumadin.   Rate control: metoprolol succinate 25mg daily  Rhythm control: None.     # VT   Zio 11/7/23: 31 Ventricular Tachycardia runs occurred, the run with the fastest interval lasting 13 beats with a max rate of 226 bpm, the longest lasting 15.1 secs with an avg rate of 107 bpm. Atrial Fibrillation occurred continuously (100% burden), ranging from  bpm (avg of 83 bpm). Isolated VEs were occasional (1.8%, 08071), VE Couplets were rare (<1.0%, 1028), and VE Triplets were rare (<1.0%, 203). Ventricular Bigeminy and Trigeminy were present. MD notification criteria for Rapid Atrial Fibrillation met - report posted prior to notification per account request (CP)   Increased metoprolol with VTs on Zio  Denies episodes of " "palpitations  Continue metoprolol as above    # Hypertension, controlled    # Chronic kidney disease, stage IIIa  Baseline creatinine of 1.3-1.5.  1.3 12/14/2023  He gets routine labs with PCP  # Hyperlipidemia  # Obstructive sleep apnea: compliant with CPAP.   # Spinal stenosis, lumbar - s/p surgery in 2018  # Carpal tunnel syndrome  # MTHFR mutation  # History of DVT  # History of TIA  # History of tobacco abuse: quit in 1982.    # Preoperative cardiovascular examination prior to left total knee replacement  Will obtain nuclear stress test for risk stratification given limited exertion due to knee pain  Hold warfarin 5 days prior and resume after procedure      TODAY'S PLAN:  No medication changes today  We will obtain nuclear stress test as above  2g sodium diet   2L fluid restriction  Daily weights, take Lasix 40 mg with potassium 40meq as needed for weight gain of 3 lbs in a day or 5 lbs in 5-7 days.  Physical activities/walking as tolerated      HPI:   Jesse France is an 84 y.o. male with PMH as above who is here for follow up.     05/08/2023 with TH: \"82 year old with PMH as above recently admitted with volume overload and celllulitis. Echocardiogram  concerning for cardiac amyloidosis. Underwent workup. SPEP, UPEP showed no monoclonal banding and serum free light chains showing mild elevation in Ig Kappa with normal Kappa Lambda fluid ratio. PYP scan highly suggestive of aTTR amyloid. He presents today for hospital follow up. Feeling deconditioned and worn out. Questioning why he is not to start his diuretic until 5/13. Having a lot of neuropathy in the lower extremities.      Will have patient begin taking Lasix 40 mg once daily today with Kdur 20 meq BID. Will check labs before the end of the week. Start application process for Tafamidis. Genetic testing to determine wild type vs familial.\"     05/23/2023: Patient presents with his wife for follow-up. Down 3 lbs since last visit. Reports great " "improvement in LE swelling since starting Lasix. Due to his active lifestyle and significant urinary response to Lasix, has only been taking Lasix and potassium 4-5 days a week. Continues with daily weights. Provided patient with \"Living With Heart Failure\" booklet and \"Don't Pass the Salt\" cookbook.     6/7/23: presents today for follow up. Started tafamidis on Thursday. Having some fatigue, which he thinks may be related to deconditioning. Improving. Some MANLEY, improving. No SOB at rest. No PND, orthopnea. Swelling has improved. Did have a blister on his right leg, dressed by wound care and has an appointment with them next week. Takes lasix about 3 days a week on days when he is home. Eating minimal salt, drinking under 2L. Urine output has been consistent. Weights stable at home.     8/21/23: Presents for follow up. Having shortness of breath on exertion. Overall leg swelling stable. Denies abdominal distension. Has only been taking lasix and potassium 2-3 times a depending on his schedule for the day. Mostly sedentary per wife. Does not weight himself daily, last 2 weights 247 and 250 lbs on home scale. Wife reports occasional dizziness. Last dose of lasix yesterday.    \"Presented to [facility] on 09/12/2023 s/p fall and being found \"facedown on the tile floor in his bathroom.\" In preceding days had been having more frequent falls at home per wife/family. That same day, had near fall backwards onto his wife when walking up stairs (live in split level). Patient unable to recall fall onto bathroom floor; unclear how long patient down before wife found patient. Received 500 mL IV fluids in ED. Trauma XR with \"Mild pulmonary vascular congestion.\" CT head and C-spine without acute abnormalities. CT c/a/p without acute injuries but did note \"mild patchy groundglass opacity scattered in the lungs with interlobular septal thickening suggestive of mild interstitial edema.\" BNP not checked. Intermittently noted to be " "in Afib with RVR on telemetry per ED notes. Given one dose IV Lasix, and HF team consulted. Felt to be euvolemic when examined, and recommended to take his outpatient Lasix more regularly as prescribed. Outpatient cardiac event monitor could be considered to evaluate for arrhythmogenic cause for falls.     10/6/23: Presents today for follow up. Feeling better after rehab, denies MANLEY, SOB, PND, orthopnea. Has been using his CPAP. Denies chest pain, dizziness, palpitations. Reports prior to fall, he felt generalized weakness and couldn't stand, but denies CP/dizziness/lightheadedness. Takes his Lasix 3-4 days a week when he's home. Urinates very well with current dose. Doesn't weigh himself every day; will start this again. Some leg swelling noted today, last took Lasix 2-3 days ago.\"    11/21/23: here for follow up. Zio showed 100% afib burden and episodes of VT up to 15 seconds. Metoprolol increased to 25mg BID. Denies palpitations. Occasional dizziness with a full flight of steps. Weight 242 lbs on home scale. Denies PND or orthopnea. Has been taking lasix 3x/week on average instead of daily.      3/21/24: Here for follow up.  12/14/2023 sodium 141 potassium 4.4 creatinine 1.3. Reporting mild shortness of breat. Leg swelling overall better. Denies chest pain, PND or orthopnea. No abdominal distension. Weight down, he is actively trying to lose weight. He is not taking lasix daily.    9/24/2024: Patient is here for follow-up.  He is mated impacted by left knee pain.  Considering left knee surgery has skin lesions that needs to heal per Ortho.  Denies leg swelling or abdominal distention weight overall has been stable.  He is on Lasix as needed.  Denies shortness of breath or chest pain on current level of exertion.  No PND orthopnea.  He is taking medications as prescribed.  He is only active during physical therapy but mostly inactive at home due to fear of moving the wrong way and inducing knee pain.    1/10/25: " Here for preoperative cardiovascular examination.  He denies shortness of breath or chest pain on current level of exertion.  Exertion limited by knee pain and has only been walking short distances.  Stable chronic bilateral lower extremity swelling. He denies palpitations,  dizziness or lightheadedness.    Review of Systems   Constitutional:  Positive for fatigue. Negative for chills and fever.   HENT:  Negative for ear pain and sore throat.    Eyes:  Negative for pain and visual disturbance.   Respiratory:  Negative for cough and shortness of breath.    Cardiovascular:  Negative for chest pain, palpitations and leg swelling.   Gastrointestinal:  Negative for abdominal distention, abdominal pain and vomiting.   Genitourinary:  Negative for dysuria and hematuria.   Musculoskeletal:  Positive for arthralgias. Negative for back pain.   Skin:  Negative for color change and rash.   Neurological:  Negative for dizziness, seizures, syncope and light-headedness.   All other systems reviewed and are negative.      Past Medical History:   Diagnosis Date    Abnormal glucose level 02/07/2018    Acute back pain 10/02/2017    Atrial fibrillation (HCC)     Blood clot in vein 2015    in right foot    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HCC) 04/2023    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HCC)     Carpal tunnel syndrome     Chronic kidney disease (CKD)     Fever 09/08/2017    Frailty syndrome in geriatric patient 05/12/2023    FUO (fever of unknown origin) 09/08/2017    Heart failure (HCC)     History of DVT (deep vein thrombosis)     History of tobacco abuse     quit in 1982    History of transient cerebral ischemia     Hypertension          Allergies   Allergen Reactions    Sildenafil Hives     Other reaction(s): severe congestion, cialis was OK    Sildenafil Other (See Comments)     _     .    Current Outpatient Medications:     Cholecalciferol (VITAMIN D-3 PO), Take 4,000 Units by mouth daily., Disp: , Rfl:     colchicine (COLCRYS) 0.6 mg tablet, Take 1  tablet (0.6 mg total) by mouth daily Do not start before May 4, 2023., Disp: 30 tablet, Rfl: 0    folic acid (FOLVITE) 1 mg tablet, Take 2 mg by mouth daily  , Disp: , Rfl:     folic acid (FOLVITE) 1 mg tablet, Take 2 mg by mouth daily, Disp: , Rfl:     furosemide (LASIX) 40 mg tablet, Take 1 tablet (40 mg total) by mouth as needed (for weight gain of 3 lbs in a day or 5 lbs in 5-7 days), Disp: , Rfl:     metoprolol succinate (TOPROL-XL) 25 mg 24 hr tablet, Take 1 tablet (25 mg total) by mouth daily, Disp: , Rfl:     Multiple Vitamins-Minerals (MULTIVITAMIN & MINERAL PO), Take 1 tablet by mouth daily. Per Felipa Moore MD [facility] 10/2/23  Indications: supplement, Disp: , Rfl:     potassium chloride (Klor-Con M20) 20 mEq tablet, Take 2 tablets (40 mEq total) by mouth if needed (when taking lasix), Disp: 60 tablet, Rfl: 3    Tafamidis (Vyndamax) 61 MG CAPS, Take 61 mg by mouth daily, Disp: 30 capsule, Rfl: 12    warfarin (COUMADIN) 5 mg tablet, Take by mouth daily 1 to 1.5 tablet daily per physician, Disp: , Rfl:     warfarin (COUMADIN) 5 mg tablet, TAKE 1 TO 1 AND 1/2 TABLETS BY MOUTH DAILY AS DIRECTED BY PRESCRIBER, Disp: 125 tablet, Rfl: 2    Social History     Socioeconomic History    Marital status: /Civil Union     Spouse name: Not on file    Number of children: Not on file    Years of education: Not on file    Highest education level: Not on file   Occupational History    Not on file   Tobacco Use    Smoking status: Former     Current packs/day: 0.00     Average packs/day: 1 pack/day for 26.0 years (26.0 ttl pk-yrs)     Types: Cigarettes     Start date:      Quit date:      Years since quittin.0    Smokeless tobacco: Never   Vaping Use    Vaping status: Never Used   Substance and Sexual Activity    Alcohol use: Never     Comment: 4 drinks a week    Drug use: Never    Sexual activity: Not on file   Other Topics Concern    Not on file   Social History Narrative    ** Merged History  Encounter **          Social Drivers of Health     Financial Resource Strain: Not on file   Food Insecurity: No Food Insecurity (9/13/2023)    Hunger Vital Sign     Worried About Running Out of Food in the Last Year: Never true     Ran Out of Food in the Last Year: Never true   Transportation Needs: No Transportation Needs (9/13/2023)    PRAPARE - Transportation     Lack of Transportation (Medical): No     Lack of Transportation (Non-Medical): No   Physical Activity: Not on file   Stress: Not on file   Social Connections: Not on file   Intimate Partner Violence: Not on file   Housing Stability: Low Risk  (9/13/2023)    Housing Stability Vital Sign     Unable to Pay for Housing in the Last Year: No     Number of Times Moved in the Last Year: 1     Homeless in the Last Year: No       Family History   Problem Relation Age of Onset    Heart attack Father     Heart disease Other     Cancer Other        Physical Exam:    Vitals:   Vitals:    01/10/25 1438   BP: 124/60   Pulse: 98   SpO2: 96%       Physical Exam  Constitutional:       General: He is not in acute distress.     Appearance: Normal appearance.   HENT:      Head: Normocephalic and atraumatic.      Mouth/Throat:      Mouth: Mucous membranes are moist.   Eyes:      General: No scleral icterus.     Extraocular Movements: Extraocular movements intact.   Neck:      Vascular: No JVD.   Cardiovascular:      Rate and Rhythm: Normal rate. Rhythm irregularly irregular.      Pulses: Normal pulses.      Heart sounds: S1 normal and S2 normal. No murmur heard.     No friction rub. No gallop.   Pulmonary:      Breath sounds: Normal breath sounds.   Abdominal:      General: There is no distension.      Palpations: Abdomen is soft.      Tenderness: There is no abdominal tenderness. There is no guarding or rebound.   Musculoskeletal:         General: Normal range of motion.      Cervical back: Neck supple.      Right lower leg: Edema present.      Left lower leg: Edema present.    Skin:     General: Skin is warm and dry.      Capillary Refill: Capillary refill takes less than 2 seconds.   Neurological:      General: No focal deficit present.      Mental Status: He is alert and oriented to person, place, and time.   Psychiatric:         Mood and Affect: Mood normal.         Labs & Results:    Lab Results   Component Value Date    SODIUM 141 12/14/2023    K 4.4 12/14/2023     12/14/2023    CO2 28 12/14/2023    BUN 26 (H) 12/14/2023    CREATININE 1.30 12/14/2023    GLUC 100 (H) 09/16/2023    CALCIUM 9.0 12/14/2023     Lab Results   Component Value Date    WBC 7.97 10/04/2023    HGB 17.4 (H) 10/04/2023    HCT 51.2 (H) 10/04/2023    MCV 92 10/04/2023     10/04/2023     Lab Results   Component Value Date    NTBNP 1,904 (H) 05/16/2023      Lab Results   Component Value Date    CHOLESTEROL 167 12/14/2023    CHOLESTEROL 183 05/16/2023    CHOLESTEROL 194 12/01/2022     Lab Results   Component Value Date    HDL 70 12/14/2023    HDL 69 05/16/2023    HDL 74 12/01/2022     Lab Results   Component Value Date    TRIG 97 12/14/2023    TRIG 81 05/16/2023    TRIG 132 12/01/2022     Lab Results   Component Value Date    NONHDLC 120 12/01/2022    NONHDLC 116 05/23/2022    NONHDLC 103 01/22/2020       EKG personally reviewed by Cathy Woods MD.       Thank you for the opportunity to participate in the care of this patient.    CATHY WOODS MD  ADVANCED HEART FAILURE AND MECHANICAL CIRCULATORY SUPPORT  [facility]

## 2025-01-10 NOTE — PATIENT INSTRUCTIONS
No medication changes today  We will obtain nuclear stress test  2g sodium diet   2L fluid restriction  Daily weights, take Lasix 40 mg with potassium 40meq as needed for weight gain of 3 lbs in a day or 5 lbs in 5-7 days.  Physical activities/walking as tolerated

## 2025-01-10 NOTE — LETTER
Cardiology Pre Operative Clearance      PRE OPERATIVE CARDIAC RISK ASSESSMENT    01/10/25    Jesse JOAQUIN Figuli  1940  [id number]    Date of Surgery: 1/28/2025    Type of Surgery: Total Knee replacement (L)    Surgeon: Jayden Ruff MD    {SLA AMB CARDIAC CONTRAINDICATIONS:28005}    Anticoagulation: {RESPONSE; YES/NO/NA:19980}    Physician Comment: Warfarin    Electronically Signed: ***

## 2025-01-13 ENCOUNTER — APPOINTMENT (OUTPATIENT)
Dept: LAB | Facility: CLINIC | Age: 85
End: 2025-01-13
Payer: MEDICARE

## 2025-01-13 DIAGNOSIS — I48.19 PERSISTENT ATRIAL FIBRILLATION (HCC): ICD-10-CM

## 2025-01-13 LAB
INR PPP: 1.27 (ref 0.85–1.19)
PROTHROMBIN TIME: 16.1 SECONDS (ref 12.3–15)

## 2025-01-13 PROCEDURE — 85610 PROTHROMBIN TIME: CPT

## 2025-01-13 PROCEDURE — 36415 COLL VENOUS BLD VENIPUNCTURE: CPT

## 2025-01-14 ENCOUNTER — ANTICOAG VISIT (OUTPATIENT)
Dept: CARDIOLOGY CLINIC | Facility: CLINIC | Age: 85
End: 2025-01-14

## 2025-01-14 DIAGNOSIS — I48.19 PERSISTENT ATRIAL FIBRILLATION (HCC): Primary | ICD-10-CM

## 2025-01-16 ENCOUNTER — RESULTS FOLLOW-UP (OUTPATIENT)
Dept: CARDIOLOGY CLINIC | Facility: CLINIC | Age: 85
End: 2025-01-16

## 2025-01-16 ENCOUNTER — HOSPITAL ENCOUNTER (OUTPATIENT)
Dept: NON INVASIVE DIAGNOSTICS | Facility: CLINIC | Age: 85
Discharge: HOME | End: 2025-01-16
Payer: MEDICARE

## 2025-01-16 VITALS — HEIGHT: 74 IN | WEIGHT: 244 LBS | BODY MASS INDEX: 31.32 KG/M2

## 2025-01-16 DIAGNOSIS — Z01.810 PREOPERATIVE CARDIOVASCULAR EXAMINATION: ICD-10-CM

## 2025-01-16 DIAGNOSIS — I48.19 PERSISTENT ATRIAL FIBRILLATION (HCC): ICD-10-CM

## 2025-01-16 LAB
CHEST PAIN STATEMENT: NORMAL
MAX DIASTOLIC BP: 90 MMHG
MAX HR PERCENT: 81 %
MAX HR: 111 BPM
MAX PREDICTED HEART RATE: 136 BPM
PROTOCOL NAME: NORMAL
RATE PRESSURE PRODUCT: NORMAL
REASON FOR TERMINATION: NORMAL
SL CV REST NUCLEAR ISOTOPE DOSE: 10.5 MCI
SL CV STRESS NUCLEAR ISOTOPE DOSE: 32.1 MCI
SL CV STRESS RECOVERY BP: NORMAL MMHG
SL CV STRESS RECOVERY HR: 103 BPM
SL CV STRESS RECOVERY O2 SAT: 99 %
SPECT HRT GATED+EF W RNC IV: 54 %
STRESS ANGINA INDEX: 0
STRESS BASELINE BP: NORMAL MMHG
STRESS BASELINE HR: 82 BPM
STRESS O2 SAT REST: 96 %
STRESS PEAK HR: 99 BPM
STRESS POST ESTIMATED WORKLOAD: 1 METS
STRESS POST EXERCISE DUR MIN: 3 MIN
STRESS POST EXERCISE DUR SEC: 0 SEC
STRESS POST O2 SAT PEAK: 99 %
STRESS POST PEAK BP: 118 MMHG
STRESS POST PEAK HR: 111 BPM
STRESS POST PEAK SYSTOLIC BP: 140 MMHG
STRESS/REST PERFUSION RATIO: 1.18
TARGET HR FORMULA: NORMAL
TEST INDICATION: NORMAL

## 2025-01-16 PROCEDURE — 93017 CV STRESS TEST TRACING ONLY: CPT

## 2025-01-16 PROCEDURE — 93016 CV STRESS TEST SUPVJ ONLY: CPT | Performed by: STUDENT IN AN ORGANIZED HEALTH CARE EDUCATION/TRAINING PROGRAM

## 2025-01-16 PROCEDURE — A9502 TC99M TETROFOSMIN: HCPCS

## 2025-01-16 PROCEDURE — 78452 HT MUSCLE IMAGE SPECT MULT: CPT | Performed by: STUDENT IN AN ORGANIZED HEALTH CARE EDUCATION/TRAINING PROGRAM

## 2025-01-16 PROCEDURE — 93018 CV STRESS TEST I&R ONLY: CPT | Performed by: STUDENT IN AN ORGANIZED HEALTH CARE EDUCATION/TRAINING PROGRAM

## 2025-01-16 PROCEDURE — 78452 HT MUSCLE IMAGE SPECT MULT: CPT

## 2025-01-16 RX ORDER — REGADENOSON 0.08 MG/ML
0.4 INJECTION, SOLUTION INTRAVENOUS ONCE
Status: COMPLETED | OUTPATIENT
Start: 2025-01-16 | End: 2025-01-16

## 2025-01-16 RX ADMIN — REGADENOSON 0.4 MG: 0.08 INJECTION, SOLUTION INTRAVENOUS at 13:52

## 2025-01-28 ENCOUNTER — TELEPHONE (OUTPATIENT)
Age: 85
End: 2025-01-28

## 2025-01-28 NOTE — TELEPHONE ENCOUNTER
Spoke with patient, they have not received any paperwork from [employer]. I provided # so they can call to see when this will be sent out.

## 2025-01-28 NOTE — TELEPHONE ENCOUNTER
Caller: Marlyn Roa ( Spouse)     Doctor: Dr. Woods     Reason for call: Pt's wife called retuning call missed from [facility] and would like her to please give her a call back.     Call back#: [phone]

## 2025-02-02 ENCOUNTER — APPOINTMENT (INPATIENT)
Dept: RADIOLOGY | Facility: HOSPITAL | Age: 85
End: 2025-02-02
Payer: MEDICARE

## 2025-02-02 ENCOUNTER — HOSPITAL ENCOUNTER (INPATIENT)
Facility: HOSPITAL | Age: 85
LOS: 5 days | Discharge: NURSING, REHAB OR LONG-TERM CARE | End: 2025-02-07
Attending: SURGERY | Admitting: SURGERY
Payer: MEDICARE

## 2025-02-02 ENCOUNTER — APPOINTMENT (EMERGENCY)
Dept: RADIOLOGY | Facility: HOSPITAL | Age: 85
End: 2025-02-02
Payer: MEDICARE

## 2025-02-02 DIAGNOSIS — R41.0 DELIRIUM: ICD-10-CM

## 2025-02-02 DIAGNOSIS — Z96.652 HISTORY OF TOTAL LEFT KNEE REPLACEMENT: ICD-10-CM

## 2025-02-02 DIAGNOSIS — R60.0 BILATERAL LEG EDEMA: ICD-10-CM

## 2025-02-02 DIAGNOSIS — I43 CARDIAC AMYLOIDOSIS (HCC): ICD-10-CM

## 2025-02-02 DIAGNOSIS — I48.91 ATRIAL FIBRILLATION, UNSPECIFIED TYPE (HCC): ICD-10-CM

## 2025-02-02 DIAGNOSIS — R26.2 AMBULATORY DYSFUNCTION: ICD-10-CM

## 2025-02-02 DIAGNOSIS — E85.4 CARDIAC AMYLOIDOSIS (HCC): ICD-10-CM
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
## 2025-02-02 DIAGNOSIS — R68.89 FORGETFULNESS: ICD-10-CM

## 2025-02-02 DIAGNOSIS — Z91.89 AT RISK FOR DELIRIUM: ICD-10-CM

## 2025-02-02 DIAGNOSIS — Z86.718 HISTORY OF DVT (DEEP VEIN THROMBOSIS): ICD-10-CM

## 2025-02-02 DIAGNOSIS — T14.8XXA MULTIPLE SKIN TEARS: ICD-10-CM

## 2025-02-02 PROBLEM — Z96.659 HISTORY OF TOTAL KNEE ARTHROPLASTY: Status: ACTIVE | Noted: 2025-02-02

## 2025-02-02 PROBLEM — N18.9 CKD (CHRONIC KIDNEY DISEASE): Status: ACTIVE | Noted: 2025-02-02

## 2025-02-02 PROBLEM — I82.501 CHRONIC DEEP VEIN THROMBOSIS (DVT) OF RIGHT LOWER EXTREMITY (HCC): Status: ACTIVE | Noted: 2025-02-02

## 2025-02-02 LAB
ABO GROUP BLD: NORMAL
BASE EXCESS BLDA CALC-SCNC: 1 MMOL/L (ref -2–3)
BLD GP AB SCN SERPL QL: NEGATIVE
CA-I BLD-SCNC: 1.16 MMOL/L (ref 1.12–1.32)
GLUCOSE SERPL-MCNC: 134 MG/DL (ref 65–140)
HCO3 BLDA-SCNC: 27.8 MMOL/L (ref 24–30)
HCT VFR BLD CALC: 49 % (ref 36.5–49.3)
HGB BLDA-MCNC: 16.7 G/DL (ref 12–17)
INR PPP: 1.66 (ref 0.85–1.19)
PCO2 BLD: 29 MMOL/L (ref 21–32)
PCO2 BLD: 50.7 MM HG (ref 42–50)
PH BLD: 7.35 [PH] (ref 7.3–7.4)
PO2 BLD: 21 MM HG (ref 35–45)
POTASSIUM BLD-SCNC: 4.1 MMOL/L (ref 3.5–5.3)
PROTHROMBIN TIME: 19.8 SECONDS (ref 12.3–15)
RH BLD: POSITIVE
SAO2 % BLD FROM PO2: 31 % (ref 60–85)
SODIUM BLD-SCNC: 139 MMOL/L (ref 136–145)
SPECIMEN EXPIRATION DATE: NORMAL
SPECIMEN SOURCE: ABNORMAL

## 2025-02-02 PROCEDURE — 93308 TTE F-UP OR LMTD: CPT | Performed by: SURGERY

## 2025-02-02 PROCEDURE — 73560 X-RAY EXAM OF KNEE 1 OR 2: CPT

## 2025-02-02 PROCEDURE — 76705 ECHO EXAM OF ABDOMEN: CPT | Performed by: SURGERY

## 2025-02-02 PROCEDURE — 86900 BLOOD TYPING SEROLOGIC ABO: CPT | Performed by: SURGERY

## 2025-02-02 PROCEDURE — 71045 X-RAY EXAM CHEST 1 VIEW: CPT

## 2025-02-02 PROCEDURE — 82947 ASSAY GLUCOSE BLOOD QUANT: CPT

## 2025-02-02 PROCEDURE — 85610 PROTHROMBIN TIME: CPT

## 2025-02-02 PROCEDURE — EDAIR PR ED AIR: Performed by: EMERGENCY MEDICINE

## 2025-02-02 PROCEDURE — 82330 ASSAY OF CALCIUM: CPT

## 2025-02-02 PROCEDURE — 82803 BLOOD GASES ANY COMBINATION: CPT

## 2025-02-02 PROCEDURE — 99223 1ST HOSP IP/OBS HIGH 75: CPT | Performed by: SURGERY

## 2025-02-02 PROCEDURE — 99285 EMERGENCY DEPT VISIT HI MDM: CPT

## 2025-02-02 PROCEDURE — 86901 BLOOD TYPING SEROLOGIC RH(D): CPT | Performed by: SURGERY

## 2025-02-02 PROCEDURE — 86850 RBC ANTIBODY SCREEN: CPT | Performed by: SURGERY

## 2025-02-02 PROCEDURE — 84295 ASSAY OF SERUM SODIUM: CPT

## 2025-02-02 PROCEDURE — 70450 CT HEAD/BRAIN W/O DYE: CPT

## 2025-02-02 PROCEDURE — 36415 COLL VENOUS BLD VENIPUNCTURE: CPT

## 2025-02-02 PROCEDURE — 84132 ASSAY OF SERUM POTASSIUM: CPT

## 2025-02-02 PROCEDURE — 72125 CT NECK SPINE W/O DYE: CPT

## 2025-02-02 PROCEDURE — 85014 HEMATOCRIT: CPT

## 2025-02-02 RX ORDER — WARFARIN SODIUM 7.5 MG/1
7.5 TABLET ORAL
Status: DISCONTINUED | OUTPATIENT
Start: 2025-02-03 | End: 2025-02-03

## 2025-02-02 RX ORDER — FUROSEMIDE 40 MG/1
40 TABLET ORAL DAILY PRN
Status: DISCONTINUED | OUTPATIENT
Start: 2025-02-02 | End: 2025-02-07 | Stop reason: HOSPADM

## 2025-02-02 RX ORDER — FOLIC ACID 1 MG/1
2 TABLET ORAL DAILY
Status: DISCONTINUED | OUTPATIENT
Start: 2025-02-03 | End: 2025-02-07 | Stop reason: HOSPADM

## 2025-02-02 RX ORDER — HYDROMORPHONE HCL IN WATER/PF 6 MG/30 ML
0.2 PATIENT CONTROLLED ANALGESIA SYRINGE INTRAVENOUS EVERY 4 HOURS PRN
Status: DISCONTINUED | OUTPATIENT
Start: 2025-02-02 | End: 2025-02-07 | Stop reason: HOSPADM

## 2025-02-02 RX ORDER — CEFADROXIL 500 MG/1
500 CAPSULE ORAL EVERY 12 HOURS SCHEDULED
Status: COMPLETED | OUTPATIENT
Start: 2025-02-02 | End: 2025-02-07

## 2025-02-02 RX ORDER — ONDANSETRON 2 MG/ML
4 INJECTION INTRAMUSCULAR; INTRAVENOUS EVERY 4 HOURS PRN
Status: DISCONTINUED | OUTPATIENT
Start: 2025-02-02 | End: 2025-02-07 | Stop reason: HOSPADM

## 2025-02-02 RX ORDER — OXYCODONE HYDROCHLORIDE 5 MG/1
5 TABLET ORAL EVERY 4 HOURS PRN
Refills: 0 | Status: DISCONTINUED | OUTPATIENT
Start: 2025-02-02 | End: 2025-02-07 | Stop reason: HOSPADM

## 2025-02-02 RX ORDER — WARFARIN SODIUM 5 MG/1
5 TABLET ORAL
Status: DISCONTINUED | OUTPATIENT
Start: 2025-02-03 | End: 2025-02-07 | Stop reason: HOSPADM

## 2025-02-02 RX ORDER — METOPROLOL TARTRATE 1 MG/ML
5 INJECTION, SOLUTION INTRAVENOUS ONCE
Status: COMPLETED | OUTPATIENT
Start: 2025-02-02 | End: 2025-02-02

## 2025-02-02 RX ORDER — AMOXICILLIN 250 MG
1 CAPSULE ORAL
Status: DISCONTINUED | OUTPATIENT
Start: 2025-02-02 | End: 2025-02-06

## 2025-02-02 RX ORDER — METHOCARBAMOL 500 MG/1
500 TABLET, FILM COATED ORAL EVERY 6 HOURS PRN
Status: DISCONTINUED | OUTPATIENT
Start: 2025-02-02 | End: 2025-02-07 | Stop reason: HOSPADM

## 2025-02-02 RX ORDER — ACETAMINOPHEN 325 MG/1
650 TABLET ORAL EVERY 4 HOURS PRN
Status: DISCONTINUED | OUTPATIENT
Start: 2025-02-02 | End: 2025-02-03

## 2025-02-02 RX ORDER — METOPROLOL SUCCINATE 25 MG/1
25 TABLET, EXTENDED RELEASE ORAL DAILY
Status: DISCONTINUED | OUTPATIENT
Start: 2025-02-03 | End: 2025-02-07 | Stop reason: HOSPADM

## 2025-02-02 RX ORDER — WARFARIN SODIUM 5 MG/1
5 TABLET ORAL
Status: DISCONTINUED | OUTPATIENT
Start: 2025-02-02 | End: 2025-02-02

## 2025-02-02 RX ADMIN — METOPROLOL TARTRATE 5 MG: 1 INJECTION, SOLUTION INTRAVENOUS at 22:58

## 2025-02-02 RX ADMIN — SENNOSIDES AND DOCUSATE SODIUM 1 TABLET: 50; 8.6 TABLET ORAL at 22:34

## 2025-02-02 RX ADMIN — CEFADROXIL 500 MG: 500 CAPSULE ORAL at 22:44

## 2025-02-02 NOTE — ED PROVIDER NOTES
Emergency Department Airway Evaluation and Management Form    History  Obtained from: Patient and friend      Chief Complaint:  Trauma Alert    HPI: Pt is a 84 y.o. male presents s/p fall several times at home in bathroom, positive head strike, on warfarin, no loss of consciousness      I have reviewed and agree with the history as documented.    Allergies  Allergies: see nurses notes    Physical Exam    Vitals:    02/02/25 1820   BP: 165/82   Pulse: 105   Resp: 18   Temp:    SpO2: 95%     Supplemental Oxygen: None    GCS: 15    Neuro: Alert and oriented x 3  Psych: not combative, not anxious, cooperative for exam  Neck: In collar, No JVD, No midline tenderness  Respiratory: Clear to auscultation  Mouth: No signs of trauma  Pharynx: Patent        ED Medications given  See nursing notes    Intubation    No intubation required    Final Diagnosis:  Fall, acute closed head injury, chronic anticoagulation    ED Provider  Electronically Signed by       Dheeraj Berger DO  02/02/25 [id number]

## 2025-02-03 ENCOUNTER — APPOINTMENT (INPATIENT)
Dept: NON INVASIVE DIAGNOSTICS | Facility: HOSPITAL | Age: 85
End: 2025-02-03
Payer: MEDICARE

## 2025-02-03 PROBLEM — R68.89 FORGETFULNESS: Status: ACTIVE | Noted: 2025-02-03

## 2025-02-03 PROBLEM — R52 ACUTE PAIN: Status: ACTIVE | Noted: 2025-02-03

## 2025-02-03 PROBLEM — R33.9 URINARY RETENTION: Status: ACTIVE | Noted: 2025-02-03

## 2025-02-03 PROBLEM — R26.2 AMBULATORY DYSFUNCTION: Status: ACTIVE | Noted: 2025-02-03

## 2025-02-03 PROBLEM — M19.90 OSTEOARTHRITIS: Status: ACTIVE | Noted: 2025-02-03

## 2025-02-03 PROBLEM — Z91.89 AT RISK FOR DELIRIUM: Status: ACTIVE | Noted: 2025-02-03

## 2025-02-03 PROBLEM — H91.90 HEARING LOSS: Status: ACTIVE | Noted: 2025-02-03

## 2025-02-03 LAB
ANION GAP SERPL CALCULATED.3IONS-SCNC: 8 MMOL/L (ref 4–13)
BACTERIA UR QL AUTO: ABNORMAL /HPF
BASOPHILS # BLD AUTO: 0.06 THOUSANDS/ΜL (ref 0–0.1)
BASOPHILS NFR BLD AUTO: 1 % (ref 0–1)
BILIRUB UR QL STRIP: NEGATIVE
BUN SERPL-MCNC: 23 MG/DL (ref 5–25)
CALCIUM SERPL-MCNC: 8.6 MG/DL (ref 8.4–10.2)
CHLORIDE SERPL-SCNC: 100 MMOL/L (ref 96–108)
CLARITY UR: CLEAR
CO2 SERPL-SCNC: 28 MMOL/L (ref 21–32)
COLOR UR: YELLOW
CREAT SERPL-MCNC: 0.98 MG/DL (ref 0.6–1.3)
EOSINOPHIL # BLD AUTO: 0.06 THOUSAND/ΜL (ref 0–0.61)
EOSINOPHIL NFR BLD AUTO: 1 % (ref 0–6)
ERYTHROCYTE [DISTWIDTH] IN BLOOD BY AUTOMATED COUNT: 12.5 % (ref 11.6–15.1)
GFR SERPL CREATININE-BSD FRML MDRD: 70 ML/MIN/1.73SQ M
GLUCOSE SERPL-MCNC: 107 MG/DL (ref 65–140)
GLUCOSE UR STRIP-MCNC: NEGATIVE MG/DL
HCT VFR BLD AUTO: 45.3 % (ref 36.5–49.3)
HGB BLD-MCNC: 15.4 G/DL (ref 12–17)
HGB UR QL STRIP.AUTO: NEGATIVE
IMM GRANULOCYTES # BLD AUTO: 0.04 THOUSAND/UL (ref 0–0.2)
IMM GRANULOCYTES NFR BLD AUTO: 0 % (ref 0–2)
INR PPP: 1.69 (ref 0.85–1.19)
KETONES UR STRIP-MCNC: NEGATIVE MG/DL
LEUKOCYTE ESTERASE UR QL STRIP: NEGATIVE
LYMPHOCYTES # BLD AUTO: 1.86 THOUSANDS/ΜL (ref 0.6–4.47)
LYMPHOCYTES NFR BLD AUTO: 15 % (ref 14–44)
MCH RBC QN AUTO: 32.8 PG (ref 26.8–34.3)
MCHC RBC AUTO-ENTMCNC: 34 G/DL (ref 31.4–37.4)
MCV RBC AUTO: 97 FL (ref 82–98)
MONOCYTES # BLD AUTO: 1.68 THOUSAND/ΜL (ref 0.17–1.22)
MONOCYTES NFR BLD AUTO: 14 % (ref 4–12)
NEUTROPHILS # BLD AUTO: 8.55 THOUSANDS/ΜL (ref 1.85–7.62)
NEUTS SEG NFR BLD AUTO: 69 % (ref 43–75)
NITRITE UR QL STRIP: NEGATIVE
NON-SQ EPI CELLS URNS QL MICRO: ABNORMAL /HPF
NRBC BLD AUTO-RTO: 0 /100 WBCS
PH UR STRIP.AUTO: 6 [PH]
PLATELET # BLD AUTO: 157 THOUSANDS/UL (ref 149–390)
PMV BLD AUTO: 11.1 FL (ref 8.9–12.7)
POTASSIUM SERPL-SCNC: 3.8 MMOL/L (ref 3.5–5.3)
PROT UR STRIP-MCNC: ABNORMAL MG/DL
PROTHROMBIN TIME: 20 SECONDS (ref 12.3–15)
RBC # BLD AUTO: 4.69 MILLION/UL (ref 3.88–5.62)
RBC #/AREA URNS AUTO: ABNORMAL /HPF
SODIUM SERPL-SCNC: 136 MMOL/L (ref 135–147)
SP GR UR STRIP.AUTO: 1.02 (ref 1–1.03)
UROBILINOGEN UR STRIP-ACNC: 3 MG/DL
WBC # BLD AUTO: 12.25 THOUSAND/UL (ref 4.31–10.16)
WBC #/AREA URNS AUTO: ABNORMAL /HPF

## 2025-02-03 PROCEDURE — 81001 URINALYSIS AUTO W/SCOPE: CPT | Performed by: STUDENT IN AN ORGANIZED HEALTH CARE EDUCATION/TRAINING PROGRAM

## 2025-02-03 PROCEDURE — 36415 COLL VENOUS BLD VENIPUNCTURE: CPT

## 2025-02-03 PROCEDURE — 85025 COMPLETE CBC W/AUTO DIFF WBC: CPT

## 2025-02-03 PROCEDURE — 93970 EXTREMITY STUDY: CPT | Performed by: SURGERY

## 2025-02-03 PROCEDURE — 90715 TDAP VACCINE 7 YRS/> IM: CPT | Performed by: NURSE PRACTITIONER

## 2025-02-03 PROCEDURE — 85610 PROTHROMBIN TIME: CPT

## 2025-02-03 PROCEDURE — 99232 SBSQ HOSP IP/OBS MODERATE 35: CPT | Performed by: NURSE PRACTITIONER

## 2025-02-03 PROCEDURE — 93970 EXTREMITY STUDY: CPT

## 2025-02-03 PROCEDURE — 99223 1ST HOSP IP/OBS HIGH 75: CPT | Performed by: NURSE PRACTITIONER

## 2025-02-03 PROCEDURE — 80048 BASIC METABOLIC PNL TOTAL CA: CPT

## 2025-02-03 RX ORDER — POLYETHYLENE GLYCOL 3350 17 G/17G
17 POWDER, FOR SOLUTION ORAL DAILY PRN
Status: DISCONTINUED | OUTPATIENT
Start: 2025-02-03 | End: 2025-02-07 | Stop reason: HOSPADM

## 2025-02-03 RX ORDER — WARFARIN SODIUM 7.5 MG/1
7.5 TABLET ORAL
Status: DISCONTINUED | OUTPATIENT
Start: 2025-02-04 | End: 2025-02-07 | Stop reason: HOSPADM

## 2025-02-03 RX ORDER — FUROSEMIDE 40 MG/1
40 TABLET ORAL ONCE
Status: COMPLETED | OUTPATIENT
Start: 2025-02-03 | End: 2025-02-03

## 2025-02-03 RX ORDER — LIDOCAINE 50 MG/G
1 PATCH TOPICAL DAILY
Status: DISCONTINUED | OUTPATIENT
Start: 2025-02-03 | End: 2025-02-07 | Stop reason: HOSPADM

## 2025-02-03 RX ORDER — OLANZAPINE 5 MG/1
5 TABLET, ORALLY DISINTEGRATING ORAL
Qty: 30 TABLET | Refills: 0 | Status: SHIPPED | OUTPATIENT
Start: 2025-02-03 | End: 2025-02-14

## 2025-02-03 RX ORDER — ACETAMINOPHEN 325 MG/1
975 TABLET ORAL EVERY 8 HOURS SCHEDULED
Status: DISCONTINUED | OUTPATIENT
Start: 2025-02-03 | End: 2025-02-07 | Stop reason: HOSPADM

## 2025-02-03 RX ADMIN — FUROSEMIDE 40 MG: 40 TABLET ORAL at 13:19

## 2025-02-03 RX ADMIN — SENNOSIDES AND DOCUSATE SODIUM 1 TABLET: 50; 8.6 TABLET ORAL at 21:03

## 2025-02-03 RX ADMIN — WARFARIN SODIUM 7.5 MG: 7.5 TABLET ORAL at 17:15

## 2025-02-03 RX ADMIN — Medication 1 TABLET: at 09:58

## 2025-02-03 RX ADMIN — METHOCARBAMOL 500 MG: 500 TABLET ORAL at 16:38

## 2025-02-03 RX ADMIN — ACETAMINOPHEN 975 MG: 325 TABLET, FILM COATED ORAL at 21:03

## 2025-02-03 RX ADMIN — CEFADROXIL 500 MG: 500 CAPSULE ORAL at 09:58

## 2025-02-03 RX ADMIN — CEFADROXIL 500 MG: 500 CAPSULE ORAL at 21:04

## 2025-02-03 RX ADMIN — TETANUS TOXOID, REDUCED DIPHTHERIA TOXOID AND ACELLULAR PERTUSSIS VACCINE, ADSORBED 0.5 ML: 5; 2.5; 8; 8; 2.5 SUSPENSION INTRAMUSCULAR at 12:12

## 2025-02-03 RX ADMIN — METOPROLOL SUCCINATE 25 MG: 25 TABLET, EXTENDED RELEASE ORAL at 09:58

## 2025-02-03 RX ADMIN — Medication 2.5 MG: at 06:17

## 2025-02-03 RX ADMIN — FOLIC ACID 2 MG: 1 TABLET ORAL at 09:58

## 2025-02-03 RX ADMIN — Medication 2.5 MG: at 22:00

## 2025-02-03 NOTE — ASSESSMENT & PLAN NOTE
Lab Results   Component Value Date    EGFR 70 02/03/2025    EGFR 67 01/13/2025    EGFR 50 12/14/2023    CREATININE 0.98 02/03/2025    CREATININE 1.09 01/13/2025    CREATININE 1.30 12/14/2023

## 2025-02-03 NOTE — ASSESSMENT & PLAN NOTE
Oriented x 4  Maintain delirium precautions  Provide frequent redirection, reorientation, distraction techniques  Avoid deliriogenic medications such as tramadol, benzodiazepines, anticholinergics,  Benadryl  Treat pain, See geriatric pain protocol  Monitor for constipation and urinary retention  Encourage early and frequent moblization, OOB  Encourage Hydration/ Nutrition  Implement sleep hygiene, limit night time interuptions, group activities

## 2025-02-03 NOTE — ASSESSMENT & PLAN NOTE
Hearing impairment strongly correlated with depression, cognitive impairment, delirium and falls in the older adult  Use hearing aids or sound amplifier  Speaking face to face  Use clear dictation, enunciation of words

## 2025-02-03 NOTE — ED NOTES
Assisted to BR via WC maximum assist with 2 staff members. Pt very unsteady, legs weak and trembling     Janina Hernandez, DENNIS  02/03/25 2842

## 2025-02-03 NOTE — ASSESSMENT & PLAN NOTE
Lab Results   Component Value Date    EGFR 67 01/13/2025    EGFR 50 12/14/2023    EGFR 70 09/16/2023    CREATININE 1.09 01/13/2025    CREATININE 1.30 12/14/2023    CREATININE 1.06 09/16/2023   -Monitor renal function

## 2025-02-03 NOTE — ASSESSMENT & PLAN NOTE
Patient reports multiple falls since surgery on his left knee last week.  Described as mechanical  No injuries found  Admitted for ambulatory dysfunction

## 2025-02-03 NOTE — ASSESSMENT & PLAN NOTE
-Status post total knee arthroplasty on 1/30/2025  -Continue home cefadroxil for postop prophylaxis  -Monitor incision site  -PT/OT eval  -Follow-up left knee x-ray

## 2025-02-03 NOTE — ASSESSMENT & PLAN NOTE
S/p total knee arthroplasty on 1/30/2025  Continue on home cefadroxil for postop prophylaxis  Left knee XR negative for acute traumatic injury  Continue to monitor wound site-currently appears healthy  PT/OT

## 2025-02-03 NOTE — ASSESSMENT & PLAN NOTE
At high risk for falls secondary to age, medications, hx of prior fall  Fall precautions  PT/OT  Rehab post hospitalization

## 2025-02-03 NOTE — PROGRESS NOTES
"Progress Note - Trauma   Name: Jesse France 84 y.o. male I MRN: [id number]  Unit/Bed#: Z5HB I Date of Admission: 2/2/2025   Date of Service: 2/3/2025 I Hospital Day: 1    Assessment & Plan  Fall, initial encounter  Patient reports multiple falls since surgery on his left knee last week.  Described as mechanical  No injuries found  Admitted for ambulatory dysfunction  Ambulatory dysfunction  In the setting of total knee arthroplasty on 1/30/2025 PT/OT evaluation  History of total left knee replacement  S/p total knee arthroplasty on 1/30/2025  Continue on home cefadroxil for postop prophylaxis  Left knee XR negative for acute traumatic injury  Continue to monitor wound site-currently appears healthy  PT/OT  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HCC)  Continue home tafamidis--patient to bring in since this medication is not on formulary.   Atrial fibrillation, unspecified type (HCC)  Continue home warfarin (7.5 mg Tuesday and Friday, 5 mg all other days)  INR goal 2-3  Bilateral leg edema  Family expressed concerns regarding bilateral lower extremity edema-worsening left leg.  Venous Doppler completed that showed no thrombophlebitis/DVT  Continue to monitor  SCDs  History of DVT (deep vein thrombosis)  Continue home warfarin  Duplex negative  Continue to monitor  Multiple skin tears  Local wound care  Tetanus updated    VTE Prophylaxis: VTE covered by:  warfarin, Oral  warfarin, Oral        Disposition: Pending PT/OT evaluation    TRAUMA TERTIARY SURVEY  Summary of Diagnosed Injuries: No injuries found on primary/secondary/tertiary evaluation    Transfer from: Not a transfer    Mechanism of Injury:Fall     Chief Complaint: \"My knee hurts\"    24 Hour Events : No events overnight  Subjective : Patient describes suffering multiple falls since his surgery he describes that it is normally his left knee that gives out but sometimes it is to his right.  He denies any dizziness or lightheadedness associated with the falls.  He complains of " right knee pain.  No other complaints offered.  Patient is agreeable to rehab placement.  Patient denies head strike or loss of consciousness.    Objective :  Temp:  [98.1 °F (36.7 °C)] 98.1 °F (36.7 °C)  HR:  [2-138] 101  BP: (119-169)/() 129/82  Resp:  [18-20] 18  SpO2:  [91 %-97 %] 91 %  O2 Device: None (Room air)    I/O         02/01 0701 02/02 0700 02/02 0701  02/03 0700 02/03 0701 02/04 0700    Urine (mL/kg/hr)  100     Total Output  100     Net  -100            Unmeasured Urine Occurrence  1 x             Physical Exam  Constitutional:       General: He is not in acute distress.     Appearance: He is not ill-appearing.   HENT:      Head: Normocephalic and atraumatic.      Right Ear: External ear normal.      Left Ear: External ear normal.      Nose: Nose normal.      Mouth/Throat:      Mouth: Mucous membranes are moist.   Cardiovascular:      Rate and Rhythm: Normal rate and regular rhythm.      Pulses: Normal pulses.      Heart sounds: Normal heart sounds. No murmur heard.     No friction rub. No gallop.   Pulmonary:      Effort: Pulmonary effort is normal. No respiratory distress.      Breath sounds: Normal breath sounds. No stridor. No wheezing, rhonchi or rales.   Chest:      Chest wall: No tenderness.   Abdominal:      General: Abdomen is flat. Bowel sounds are normal. There is no distension.      Palpations: Abdomen is soft.      Tenderness: There is no abdominal tenderness. There is no guarding.   Genitourinary:     Comments: Pelvis is stable  Musculoskeletal:      Cervical back: Normal range of motion and neck supple. No rigidity or tenderness.      Comments: Limited range of motion of left knee, generalized tenderness, but no appreciable effusion.    Patient fully ranging right lower extremity and bilateral upper extremities.  These extremities are nontender and display no deformities.  +2 left lower leg edema.  +1 right lower leg edema   Skin:     General: Skin is warm and dry.       Capillary Refill: Capillary refill takes less than 2 seconds.      Comments: Left knee surgical site is healing appropriately with no appreciable erythema or induration surrounding.   Neurological:      Mental Status: He is alert and oriented to person, place, and time.   Psychiatric:      Comments: Cooperative.  Speech clear.             Lab Results: I have reviewed the following results:  Recent Labs     02/02/25  1821 02/02/25  2234 02/03/25  0315 02/03/25  0454   WBC  --   --  12.25*  --    HGB 16.7  --  15.4  --    HCT 49  --  45.3  --    PLT  --   --  157  --    SODIUM  --   --  136  --    K  --   --  3.8  --    CL  --   --  100  --    CO2 29  --  28  --    BUN  --   --  23  --    CREATININE  --   --  0.98  --    GLUC  --   --  107  --    CAIONIZED 1.16  --   --   --    INR  --    < >  --  1.69*    < > = values in this interval not displayed.       Imaging Results Review: No pertinent imaging studies reviewed.  Other Study Results Review: No additional pertinent studies reviewed.

## 2025-02-03 NOTE — ASSESSMENT & PLAN NOTE
Chronic, with multiple new one sustained with recent fall  Wound care  Consider tubi  as protection to extremities

## 2025-02-03 NOTE — PROCEDURES
POC FAST US    Date/Time: 2/3/2025 3:41 AM    Performed by: Speedy Harry MD  Authorized by: Speedy Harry MD    Patient location:  Trauma  Procedure details:     Exam Type:  Diagnostic    Assess for:  Intra-abdominal fluid and pericardial effusion    Technique: FAST      Views obtained:  Heart - Pericardial sac, RUQ - Yeh's Pouch, LUQ - Splenorenal space and Suprapubic - Pouch of Scott    Image quality: diagnostic      Image availability:  Images available in PACS  FAST Findings:     RUQ (Hepatorenal) free fluid: absent      LUQ (Splenorenal) free fluid: absent      Suprapubic free fluid: absent      Cardiac wall motion: identified      Pericardial effusion: absent    Interpretation:     Impressions: negative

## 2025-02-03 NOTE — ASSESSMENT & PLAN NOTE
Daughter concerned  Word finding noted on exam  Recommend check TSH and Vitamin B 12 level for reversible cause of memory loss  CT head (2/2/25) mild microangiopathic changes  OT for MOCA evaluation

## 2025-02-03 NOTE — ASSESSMENT & PLAN NOTE
S/p right knee arthroscopy at [facility] 1/30/25  Incision healing well, steristrip intact  Had frequent falls at home, greater than 4  PT/OT

## 2025-02-03 NOTE — ASSESSMENT & PLAN NOTE
Noted on prior admission, refused straight cath  Urinary retention protocol  Limit caffiene intake  Avoid alcohol, anticholinergics, opoids, sedatives, benzodiazepines  Avoid extremes of fluid intake (< 32 ounces or >64 ounces)  Encourage scheduled tolieting q2 hours  Monitor for constipation - last BM 2/1/25

## 2025-02-03 NOTE — H&P
H&P - Trauma   Name: Jesse France 84 y.o. male I MRN: [id number]  Unit/Bed#: Z5HB I Date of Admission: 2/2/2025   Date of Service: 2/2/2025 I Hospital Day: 0     Assessment & Plan  Fall  - Status post fall with the below noted injuries.  - Fall precautions.  - Geriatric Medicine consultation for evaluation, medication review and recommendations.  - PT and OT evaluation and treatment as indicated.  - Case Management consultation for disposition planning.    History of total knee arthroplasty  -Status post total knee arthroplasty on 1/30/2025  -Continue home cefadroxil for postop prophylaxis  -Monitor incision site  -PT/OT eval  -Follow-up left knee x-ray  HTN (hypertension)  -Continue home metoprolol  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HCC)  -Continue home tafamidis  Atrial fibrillation (HCC)  -Continue home warfarin  -Home regimen: 7.5 mg Tuesdays and Fridays, 5 mg all other days  -Check stat INR  Bilateral leg edema  -Noted by patient's family earlier today, also associated with blue appearance of bilateral feet  -Dopplerable bilateral biphasic DP pulses appreciated in trauma bay  -Otherwise normal strength, sensation, and no pain in the bilateral lower extremities  -Follow-up bilateral venous duplex  CKD (chronic kidney disease)  Lab Results   Component Value Date    EGFR 67 01/13/2025    EGFR 50 12/14/2023    EGFR 70 09/16/2023    CREATININE 1.09 01/13/2025    CREATININE 1.30 12/14/2023    CREATININE 1.06 09/16/2023   -Monitor renal function  ESTER (obstructive sleep apnea)  -States he does not use a CPAP at night  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 male with history of history of  atrial fibrillation on warfarin, CHF, cardiac amyloidosis, hypertension, hyperlipidemia, CKD, and ESTER who presents as a level B trauma for multiple falls today.  Patient had a recent total left knee arthroplasty on 1/30/2025.  Patient was discharged yesterday but reports that he has been feeling unsteady on his feet.  He states that he has had at least 3 mechanical falls today.  Initially endorsed head strike in triage but currently denies head strike.  He states that he did fall on his knees for at least one of the falls.  He is currently endorsing some bilateral knee pain but otherwise denies any complaints.    Review of Systems   Constitutional:  Negative for chills and fever.   HENT:  Negative for congestion, rhinorrhea and sore throat.    Eyes:  Negative for pain and redness.   Respiratory:  Negative for cough and shortness of breath.    Cardiovascular:  Negative for chest pain and palpitations.   Gastrointestinal:  Negative for abdominal pain, constipation, diarrhea, nausea and vomiting.   Genitourinary:  Negative for dysuria and hematuria.   Musculoskeletal:  Positive for arthralgias and gait problem. Negative for back pain and neck pain.   Skin:  Negative for pallor and rash.   Neurological:  Negative for weakness and numbness.   All other systems reviewed and are negative.    I have reviewed the patient's PMH, PSH, Social History, Family History, Meds, and Allergies  Immunization History   Administered Date(s) Administered    COVID-19 MODERNA VACC 0.5 ML IM 01/14/2021, 02/11/2021, 04/26/2022    COVID-19 Moderna mRNA Vaccine 12 Yr+ 50 mcg/0.5 mL (Spikevax) 10/09/2024    COVID-19 Pfizer mRNA vacc PF len-sucrose 12 yr and older (Comirnaty) 12/04/2023    DT (pediatric) 03/06/2014    Influenza Split High Dose Preservative Free IM 12/05/2013, 09/02/2014, 09/09/2015, 09/10/2015, 11/14/2016, 10/02/2017    Influenza, seasonal, injectable 1940, 10/13/2009, 10/25/2010, 11/17/2011, 11/27/2012    Pneumococcal  Conjugate 13-Valent 12/29/2015    Pneumococcal Polysaccharide PPV23 02/01/2007    Td (adult), adsorbed 04/01/2004    Tdap 07/07/2016, 09/12/2023    Zoster 04/01/2013     Last Tetanus: 2023    1. Before the illness or injury that brought you to the Emergency, did you need someone to help you on a regular basis? 0=No   2. Since the illness or injury that brought you to the Emergency, have you needed more help than usual to take care of yourself? 1=Yes   3. Have you been hospitalized for one or more nights during the past 6 months (excluding a stay in the Emergency Department)? 1=Yes   4. In general, do you see well? 0=Yes   5. In general, do you have serious problems with your memory? 0=No   6. Do you take more than three different medications everyday? 1=Yes   TOTAL   3     Did you order a geriatric consult if the score was 2 or greater?: yes       Objective :  Temp:  [98.1 °F (36.7 °C)] 98.1 °F (36.7 °C)  HR:  [2-138] 105  BP: (119-169)/() 119/60  Resp:  [18-20] 18  SpO2:  [94 %-97 %] 97 %  O2 Device: None (Room air)    Initial Vitals:   Temperature: 98.1 °F (36.7 °C) (02/02/25 1818)  Pulse: (!) 138 (02/02/25 1818)  Respirations: 18 (02/02/25 1818)  Blood Pressure: (!) 155/102 (02/02/25 1818)    Primary Survey:   Airway:        Status: patent;        Pre-hospital Interventions: none        Hospital Interventions: none  Breathing:        Pre-hospital Interventions: none       Effort: normal       Right breath sounds: normal       Left breath sounds: normal  Circulation:        Rhythm: regular no murmur       Rate: regular no pericardial rub   Right Pulses Left Pulses    R radial: 2+    R pedal: 2+     L radial: 2+    L pedal: 2+       Disability:        GCS: Eye: 4; Verbal: 5 Motor: 6 Total: 15       Right Pupil: 4 mm;  round;  reactive         Left Pupil:  4 mm;  round;  reactive      R Motor Strength L Motor Strength    R : 5/5  R dorsiflex: 5/5  R plantarflex: 5/5 L : 5/5  L dorsiflex: 5/5  L  plantarflex: 5/5        Sensory:  No sensory deficit  Exposure:       Completed: Yes      Secondary Survey:  Physical Exam  Vitals reviewed.   Constitutional:       General: He is not in acute distress.     Appearance: Normal appearance. He is obese. He is not ill-appearing, toxic-appearing or diaphoretic.   HENT:      Head: Normocephalic and atraumatic.      Right Ear: External ear normal.      Left Ear: External ear normal.      Nose: Nose normal.      Mouth/Throat:      Mouth: Mucous membranes are moist.      Pharynx: Oropharynx is clear.   Eyes:      Conjunctiva/sclera: Conjunctivae normal.      Pupils: Pupils are equal, round, and reactive to light.   Cardiovascular:      Rate and Rhythm: Normal rate and regular rhythm.      Heart sounds: Normal heart sounds. No murmur heard.     No friction rub. No gallop.      Comments: 2+ palpable radial pulses bilaterally.  1+ palpable DP on the right lower extremity  Biphasic PT appreciated on Doppler of the left lower extremity.  Left foot is cold but neurovascularly intact with good strength, sensation, and no pain or paresthesias.  Pulmonary:      Effort: Pulmonary effort is normal. No respiratory distress.      Breath sounds: No stridor. No wheezing, rhonchi or rales.   Chest:      Chest wall: No tenderness.   Abdominal:      General: Abdomen is flat. Bowel sounds are normal. There is no distension.      Palpations: Abdomen is soft.      Tenderness: There is no abdominal tenderness. There is no guarding or rebound.   Musculoskeletal:         General: Normal range of motion.      Cervical back: Normal range of motion and neck supple. No rigidity or tenderness.      Comments: Swelling and warmth of the left knee with a well-healed surgical incision overlying.  There is slight decreased range of motion of the left knee secondary to pain.   Skin:     General: Skin is warm and dry.      Comments: Skin tears of the bilateral forearms      Abrasion of the right upper quadrant    Neurological:      General: No focal deficit present.      Mental Status: He is alert and oriented to person, place, and time. Mental status is at baseline.      Cranial Nerves: No cranial nerve deficit.      Sensory: No sensory deficit.      Motor: No weakness.   Psychiatric:         Mood and Affect: Mood normal.         Behavior: Behavior normal.             Lab Results: I have reviewed the following results:  Recent Labs     02/02/25  1821 02/02/25  2234   HGB 16.7  --    HCT 49  --    CO2 29  --    CAIONIZED 1.16  --    INR  --  1.66*       Imaging Results: I have personally reviewed pertinent images saved in PACS. CT scan findings (and other pertinent positive findings on images) were discussed with radiology. My interpretation of the images/reports are as follows:  Chest Xray(s): negative for acute findings   FAST exam(s): negative for acute findings   CT Scan(s): negative for acute findings   Additional Xray(s): pending     Other Studies: Other Study Results Review: No additional pertinent studies reviewed.

## 2025-02-03 NOTE — ASSESSMENT & PLAN NOTE
-Noted by patient's family earlier today, also associated with blue appearance of bilateral feet  -Dopplerable bilateral biphasic DP pulses appreciated in trauma bay  -Otherwise normal strength, sensation, and no pain in the bilateral lower extremities  -Follow-up bilateral venous duplex

## 2025-02-03 NOTE — ASSESSMENT & PLAN NOTE
Post op, exacerbated by fall  Geriatric pain protocol  Scheduled acetaminophen 975 mg po Q8  Oxycodone 2.5 mg po Q 4 prn moderate pain  Oxycodone 5 mg po Q 4 prn severe pain   Monitor for constipation  Lidoderm patch

## 2025-02-03 NOTE — CONSULTS
Consultation - Geriatric Medicine   Name: Jesse France 84 y.o. 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  CKD (chronic kidney disease)  Lab Results   Component Value Date    EGFR 70 02/03/2025    EGFR 67 01/13/2025    EGFR 50 12/14/2023    CREATININE 0.98 02/03/2025    CREATININE 1.09 01/13/2025    CREATININE 1.30 12/14/2023     Chronic deep vein thrombosis (DVT) of right lower extremity (HCC)  Chronic use of coumadin  Multiple skin tears  Chronic, with multiple new one sustained with recent fall  Wound care  Consider tubi  as protection to extremities  Ambulatory dysfunction  At high risk for falls secondary to age, medications, hx of prior fall  Fall precautions  PT/OT  Rehab post hospitalization  Urinary retention  Noted on prior admission, refused straight cath  Urinary retention protocol  Limit caffiene intake  Avoid alcohol, anticholinergics, opoids, sedatives, benzodiazepines  Avoid extremes of fluid intake (< 32 ounces or >64 ounces)  Encourage scheduled tolieting q2 hours  Monitor for constipation - last BM 2/1/25  Osteoarthritis  S/p right knee arthroscopy at [facility] 1/30/25  Incision healing well, steristrip intact  Had frequent falls at home, greater than 4  PT/OT    Acute pain  Post op, exacerbated by fall  Geriatric pain protocol  Scheduled acetaminophen 975 mg po Q8  Oxycodone 2.5 mg po Q 4 prn moderate pain  Oxycodone 5 mg po Q 4 prn severe pain   Monitor for  constipation  Lidoderm patch   Forgetfulness  Daughter concerned  Word finding noted on exam  Recommend check TSH and Vitamin B 12 level for reversible cause of memory loss  CT head (2/2/25) mild microangiopathic changes  OT for MOCA evaluation  Hearing loss  Hearing impairment strongly correlated with depression, cognitive impairment, delirium and falls in the older adult  Use hearing aids or sound amplifier  Speaking face to face  Use clear dictation, enunciation of words    At risk for delirium  Oriented x 4  Maintain delirium precautions  Provide frequent redirection, reorientation, distraction techniques  Avoid deliriogenic medications such as tramadol, benzodiazepines, anticholinergics,  Benadryl  Treat pain, See geriatric pain protocol  Monitor for constipation and urinary retention  Encourage early and frequent moblization, OOB  Encourage Hydration/ Nutrition  Implement sleep hygiene, limit night time interuptions, group activities  Frailty  Clinical Frail Scale: 5- Mildly Frail  Has supportive wife  PT/OT  Albumin 3.6, maintain protein in diet  Maintain protein in diet      History of Present Illness   Hx and PE limited by: NA  HPI: Jesse France is a 84 y.o. year old male who presents with frequent falls. He had a recent left total knee arthroplasty on 1/30/25 at [facility]. He was discharged to home with [facility].  He reports feeling unsteady on his feet and falling frequently. .     He has afib on warfarin, HF, cardiac amyloidosis, HTN, hyperlipidemia, CKD, ESTER, CKD3, hx of hepatitis C    Prior to arrival he lives at home with his wife. He needs assistance with ADLs and ADLs. He ambulates with a roller walker. He has prior falls. He wears glasses. Daughter reports forgetfulness. He reports he likes to be outside and garden. He also cooks, makes bread and draws and watches old TV shows (M[employer])    He is lying in stretcher, daughter and wife at bedside to review HPI and medications    Review of  Systems   Constitutional:  Negative for unexpected weight change.   HENT:  Positive for hearing loss.    Eyes:  Negative for visual disturbance.   Respiratory:  Negative for cough.    Cardiovascular:  Negative for chest pain.   Gastrointestinal:  Positive for constipation.   Genitourinary:  Positive for difficulty urinating.   Musculoskeletal:  Positive for gait problem.   Neurological:  Positive for weakness.   Psychiatric/Behavioral:  Negative for sleep disturbance.            I have reviewed the patient's PMH, PSH, Social History, Family History, Meds, and Allergies  Historical Information   Past Medical History:   Diagnosis Date    Abnormal glucose level 2018    Acute back pain 10/02/2017    Atrial fibrillation (HCC)     Blood clot in vein 2015    in right foot    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HCC) 2023    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HCC)     Carpal tunnel syndrome     Chronic kidney disease (CKD)     Fever 2017    Frailty syndrome in geriatric patient 2023    FUO (fever of unknown origin) 2017    Heart failure (HCC)     History of DVT (deep vein thrombosis)     History of tobacco abuse     quit in     History of transient cerebral ischemia     Hypertension      Past Surgical History:   Procedure Laterality Date    BACK SURGERY  2018    COLONOSCOPY      COLONOSCOPY N/A 2016    Procedure: COLONOSCOPY;  Surgeon: Salvador Ovalle MD;  Location: [facility] OR;  Service:     HERNIA REPAIR      NEUROPLASTY / TRANSPOSITION MEDIAN NERVE AT CARPAL TUNNEL       Social History     Tobacco Use    Smoking status: Former     Current packs/day: 0.00     Average packs/day: 1 pack/day for 26.0 years (26.0 ttl pk-yrs)     Types: Cigarettes     Start date:      Quit date: 1982     Years since quittin.1    Smokeless tobacco: Never   Vaping Use    Vaping status: Never Used   Substance and Sexual Activity    Alcohol use: Never     Comment: 4 drinks a week    Drug use: Never    Sexual activity: Not on file      E-Cigarette/Vaping    E-Cigarette Use Never User      E-Cigarette/Vaping Substances    Nicotine No     THC No     CBD No     Flavoring No     Other No     Unknown No      Family History   Problem Relation Age of Onset    Heart attack Father     Heart disease Other     Cancer Other      Social History     Tobacco Use    Smoking status: Former     Current packs/day: 0.00     Average packs/day: 1 pack/day for 26.0 years (26.0 ttl pk-yrs)     Types: Cigarettes     Start date:      Quit date:      Years since quittin.1    Smokeless tobacco: Never   Vaping Use    Vaping status: Never Used   Substance and Sexual Activity    Alcohol use: Never     Comment: 4 drinks a week    Drug use: Never    Sexual activity: Not on file       Current Facility-Administered Medications:     acetaminophen (TYLENOL) tablet 650 mg, Q4H PRN    cefadroxil (DURICEF) capsule 500 mg, Q12H MAG    folic acid (FOLVITE) tablet 2 mg, Daily    [Held by provider] furosemide (LASIX) tablet 40 mg, Daily PRN    furosemide (LASIX) tablet 40 mg, Once    HYDROmorphone HCl (DILAUDID) injection 0.2 mg, Q4H PRN    methocarbamol (ROBAXIN) tablet 500 mg, Q6H PRN    metoprolol succinate (TOPROL-XL) 24 hr tablet 25 mg, Daily    multivitamin-minerals (CENTRUM) tablet 1 tablet, Daily    naloxone (NARCAN) 0.04 mg/mL syringe 0.04 mg, Q1MIN PRN    ondansetron (ZOFRAN) injection 4 mg, Q4H PRN    oxyCODONE (ROXICODONE) split tablet 2.5 mg, Q4H PRN **OR** oxyCODONE (ROXICODONE) IR tablet 5 mg, Q4H PRN    senna-docusate sodium (SENOKOT S) 8.6-50 mg per tablet 1 tablet, HS    Tafamidis CAPS 61 mg, Daily    warfarin (COUMADIN) tablet 5 mg, Once per day on     warfarin (COUMADIN) tablet 7.5 mg, Once per day on   Sildenafil and Sildenafil    Meds/Allergies   Home medication review  Duricef 500 mg po BID post op end date 25  Folic acid 1 mg po BID  Furosemide 40 mg po daily  Methocarbamol 500 mg po Q 6 prn-  ordered post op  Metoprolol 25 mg po daily  Oxycodone 5 mg po Q 6 prn- ordered post op  Senna S 2 tablets daily  Tafamidis 61 mg po daily  Tramadol 50 mg po Q 6 prn- ordered post op  Warfarin 5 mg po daily  Personally confirmed with family     Objective :  Temp:  [98.1 °F (36.7 °C)] 98.1 °F (36.7 °C)  HR:  [2-138] 101  BP: (119-169)/() 129/82  Resp:  [18-20] 18  SpO2:  [91 %-97 %] 91 %  O2 Device: None (Room air)    Physical Exam  Vitals and nursing note reviewed.   HENT:      Head: Normocephalic.      Nose: No congestion.      Mouth/Throat:      Mouth: Mucous membranes are moist.   Eyes:      General:         Right eye: No discharge.         Left eye: No discharge.   Cardiovascular:      Rate and Rhythm: Normal rate and regular rhythm.      Pulses: Normal pulses.   Pulmonary:      Effort: Pulmonary effort is normal.      Breath sounds: Normal breath sounds.   Abdominal:      General: Bowel sounds are normal. There is distension.   Musculoskeletal:         General: Tenderness present.      Cervical back: Normal range of motion.      Right lower leg: Edema present.      Left lower leg: Edema present.   Skin:     Findings: Bruising present.   Neurological:      Mental Status: He is alert and oriented to person, place, and time.   Psychiatric:         Mood and Affect: Mood normal.         Lab Results: I have reviewed the following results:CBC/BMP:   .     02/02/25  1821 02/03/25  0315   WBC  --  12.25*   HGB 16.7 15.4   HCT 49 45.3   PLT  --  157   SODIUM  --  136   K  --  3.8   CL  --  100   CO2 29 28   BUN  --  23   CREATININE  --  0.98   GLUC  --  107   CAIONIZED 1.16  --           Other Study Results Review: EKG was reviewed.         VTE Prophylaxis: Sequential compression device (Venodyne)     Code Status: Level 1 - Full Code  Advance Directive and Living Will:      Power of : Yes      Family and Social Support:   No data recorded    I have spent a total time of 90 minutes in caring for this patient  on the day of the visit/encounter including Diagnostic results, Prognosis, Risks and benefits of tx options, Instructions for management, Importance of tx compliance, Risk factor reductions, Impressions, Counseling / Coordination of care, Documenting in the medical record, Reviewing / ordering tests, medicine, procedures  , Obtaining or reviewing history  , and Communicating with other healthcare professionals .

## 2025-02-03 NOTE — ASSESSMENT & PLAN NOTE
Family expressed concerns regarding bilateral lower extremity edema-worsening left leg.  Venous Doppler completed that showed no thrombophlebitis/DVT  Continue to monitor  SCDs

## 2025-02-03 NOTE — ASSESSMENT & PLAN NOTE
Clinical Frail Scale: 5- Mildly Frail  Has supportive wife  PT/OT  Albumin 3.6, maintain protein in diet  Maintain protein in diet

## 2025-02-03 NOTE — ASSESSMENT & PLAN NOTE
-Continue home warfarin  -Home regimen: 7.5 mg Tuesdays and Fridays, 5 mg all other days  -Check stat INR

## 2025-02-04 LAB
ANION GAP SERPL CALCULATED.3IONS-SCNC: 9 MMOL/L (ref 4–13)
BUN SERPL-MCNC: 23 MG/DL (ref 5–25)
CALCIUM SERPL-MCNC: 8.9 MG/DL (ref 8.4–10.2)
CHLORIDE SERPL-SCNC: 100 MMOL/L (ref 96–108)
CO2 SERPL-SCNC: 30 MMOL/L (ref 21–32)
CREAT SERPL-MCNC: 0.99 MG/DL (ref 0.6–1.3)
ERYTHROCYTE [DISTWIDTH] IN BLOOD BY AUTOMATED COUNT: 12.2 % (ref 11.6–15.1)
GFR SERPL CREATININE-BSD FRML MDRD: 69 ML/MIN/1.73SQ M
GLUCOSE SERPL-MCNC: 86 MG/DL (ref 65–140)
HCT VFR BLD AUTO: 46 % (ref 36.5–49.3)
HGB BLD-MCNC: 16.1 G/DL (ref 12–17)
INR PPP: 1.83 (ref 0.85–1.19)
MCH RBC QN AUTO: 33.1 PG (ref 26.8–34.3)
MCHC RBC AUTO-ENTMCNC: 35 G/DL (ref 31.4–37.4)
MCV RBC AUTO: 95 FL (ref 82–98)
PLATELET # BLD AUTO: 167 THOUSANDS/UL (ref 149–390)
PMV BLD AUTO: 11.1 FL (ref 8.9–12.7)
POTASSIUM SERPL-SCNC: 3.4 MMOL/L (ref 3.5–5.3)
PROTHROMBIN TIME: 21.3 SECONDS (ref 12.3–15)
RBC # BLD AUTO: 4.87 MILLION/UL (ref 3.88–5.62)
SODIUM SERPL-SCNC: 139 MMOL/L (ref 135–147)
WBC # BLD AUTO: 10.66 THOUSAND/UL (ref 4.31–10.16)

## 2025-02-04 PROCEDURE — 99232 SBSQ HOSP IP/OBS MODERATE 35: CPT | Performed by: INTERNAL MEDICINE

## 2025-02-04 PROCEDURE — 80048 BASIC METABOLIC PNL TOTAL CA: CPT

## 2025-02-04 PROCEDURE — 97167 OT EVAL HIGH COMPLEX 60 MIN: CPT

## 2025-02-04 PROCEDURE — 85027 COMPLETE CBC AUTOMATED: CPT

## 2025-02-04 PROCEDURE — 97163 PT EVAL HIGH COMPLEX 45 MIN: CPT

## 2025-02-04 PROCEDURE — 99232 SBSQ HOSP IP/OBS MODERATE 35: CPT

## 2025-02-04 PROCEDURE — 85610 PROTHROMBIN TIME: CPT

## 2025-02-04 RX ORDER — WATER 10 ML/10ML
INJECTION INTRAMUSCULAR; INTRAVENOUS; SUBCUTANEOUS
Status: COMPLETED
Start: 2025-02-04 | End: 2025-02-04

## 2025-02-04 RX ORDER — POTASSIUM CHLORIDE 1500 MG/1
40 TABLET, EXTENDED RELEASE ORAL ONCE
Status: COMPLETED | OUTPATIENT
Start: 2025-02-04 | End: 2025-02-04

## 2025-02-04 RX ORDER — QUETIAPINE FUMARATE 25 MG/1
25 TABLET, FILM COATED ORAL
Status: DISCONTINUED | OUTPATIENT
Start: 2025-02-04 | End: 2025-02-06

## 2025-02-04 RX ORDER — OLANZAPINE 10 MG/2ML
5 INJECTION, POWDER, FOR SOLUTION INTRAMUSCULAR ONCE
Status: COMPLETED | OUTPATIENT
Start: 2025-02-04 | End: 2025-02-04

## 2025-02-04 RX ORDER — OLANZAPINE 10 MG/2ML
2.5 INJECTION, POWDER, FOR SOLUTION INTRAMUSCULAR ONCE
Status: COMPLETED | OUTPATIENT
Start: 2025-02-04 | End: 2025-02-04

## 2025-02-04 RX ADMIN — Medication 2.5 MG: at 08:17

## 2025-02-04 RX ADMIN — CEFADROXIL 500 MG: 500 CAPSULE ORAL at 08:19

## 2025-02-04 RX ADMIN — OXYCODONE HYDROCHLORIDE 5 MG: 5 TABLET ORAL at 16:36

## 2025-02-04 RX ADMIN — SENNOSIDES AND DOCUSATE SODIUM 1 TABLET: 50; 8.6 TABLET ORAL at 22:22

## 2025-02-04 RX ADMIN — POTASSIUM CHLORIDE 40 MEQ: 1500 TABLET, EXTENDED RELEASE ORAL at 12:14

## 2025-02-04 RX ADMIN — OLANZAPINE 5 MG: 10 INJECTION, POWDER, FOR SOLUTION INTRAMUSCULAR at 00:34

## 2025-02-04 RX ADMIN — Medication 1 TABLET: at 08:17

## 2025-02-04 RX ADMIN — OLANZAPINE 2.5 MG: 10 INJECTION, POWDER, FOR SOLUTION INTRAMUSCULAR at 05:57

## 2025-02-04 RX ADMIN — CEFADROXIL 500 MG: 500 CAPSULE ORAL at 22:22

## 2025-02-04 RX ADMIN — METOPROLOL SUCCINATE 25 MG: 25 TABLET, EXTENDED RELEASE ORAL at 08:17

## 2025-02-04 RX ADMIN — LIDOCAINE 1 PATCH: 700 PATCH TOPICAL at 08:16

## 2025-02-04 RX ADMIN — ACETAMINOPHEN 975 MG: 325 TABLET, FILM COATED ORAL at 16:36

## 2025-02-04 RX ADMIN — WATER: 1 INJECTION INTRAMUSCULAR; INTRAVENOUS; SUBCUTANEOUS at 00:34

## 2025-02-04 RX ADMIN — ACETAMINOPHEN 975 MG: 325 TABLET, FILM COATED ORAL at 08:17

## 2025-02-04 RX ADMIN — QUETIAPINE FUMARATE 25 MG: 25 TABLET ORAL at 22:22

## 2025-02-04 RX ADMIN — WARFARIN SODIUM 7.5 MG: 7.5 TABLET ORAL at 17:36

## 2025-02-04 RX ADMIN — WATER 10 ML: 1 INJECTION INTRAMUSCULAR; INTRAVENOUS; SUBCUTANEOUS at 05:57

## 2025-02-04 RX ADMIN — FOLIC ACID 2 MG: 1 TABLET ORAL at 08:18

## 2025-02-04 NOTE — PROGRESS NOTES
Progress Note - Geriatric Medicine   Name: Jesse France 84 y.o. male I MRN: [id number]  [facility] Date of Admission: 2/2/2025   Date of Service: 2/4/2025 I Hospital Day: 2    Assessment & Plan  Bilateral leg edema  Multifactorial  Family reports has not been taking lasix at home  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HCC)  Follows with 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  CKD (chronic kidney disease)  Lab Results   Component Value Date    EGFR 69 02/04/2025    EGFR 70 02/03/2025    EGFR 67 01/13/2025    CREATININE 0.99 02/04/2025    CREATININE 0.98 02/03/2025    CREATININE 1.09 01/13/2025     Chronic deep vein thrombosis (DVT) of right lower extremity (HCC)  Chronic use of coumadin  Multiple skin tears  Chronic, with multiple new one sustained with recent fall  Wound care  Consider tubi  as protection to extremities  Ambulatory dysfunction  At high risk for falls secondary to age, medications, hx of prior fall  Fall precautions  PT/OT  Rehab post hospitalization  Urinary retention  Noted on prior admission, refused straight cath  Urinary retention protocol  Limit caffiene intake  Avoid alcohol, anticholinergics, opoids, sedatives, benzodiazepines  Avoid extremes of fluid intake (< 32 ounces or >64 ounces)  Encourage scheduled tolieting q2 hours  Monitor for constipation - last BM 2/1/25- miralax ordered  Osteoarthritis  S/p right knee arthroscopy at [facility] 1/30/25  Incision healing well, steristrip intact  Had frequent falls at home, greater than 4  PT/OT  Acute pain  Post op, exacerbated by fall  Geriatric pain protocol  Scheduled acetaminophen 975 mg po Q8  Oxycodone 2.5 mg po Q 4 prn moderate pain  Oxycodone 5 mg po Q 4 prn severe pain   Monitor for constipation  Lidoderm patch   Forgetfulness  Daughter concerned  Word finding noted on exam  Recommend check TSH and Vitamin B 12 level for reversible cause of memory loss  CT head (2/2/25) mild microangiopathic changes  OT for  MOCA evaluation    Increased agitation and confusion over night, requiring restraints and zyprexa,  this am at baseline, cannot exclude sundowning  Will order seroquel 25 mg po QHS  Hearing loss  Hearing impairment strongly correlated with depression, cognitive impairment, delirium and falls in the older adult  Use hearing aids or sound amplifier  Speaking face to face  Use clear dictation, enunciation of words    At risk for delirium  Oriented x 4  Maintain delirium precautions  Provide frequent redirection, reorientation, distraction techniques  Avoid deliriogenic medications such as tramadol, benzodiazepines, anticholinergics,  Benadryl  Treat pain, See geriatric pain protocol  Monitor for constipation and urinary retention  Encourage early and frequent moblization, OOB  Encourage Hydration/ Nutrition  Implement sleep hygiene, limit night time interuptions, group activities  Frailty  Clinical Frail Scale: 5- Mildly Frail  Has supportive wife  PT/OT  Albumin 3.6, maintain protein in diet  Maintain protein in diet    Subjective   He is oob in recliner chair, He is oriented x 3, per nursing pt with increased agitation overnight, thinking he was at home and staff was invading his house. Now oriented x 3, calm and cooperative.     Objective :  Temp:  [97.5 °F (36.4 °C)-97.9 °F (36.6 °C)] 97.8 °F (36.6 °C)  HR:  [] 95  BP: (101-143)/(70-93) 116/74  Resp:  [15-18] 15  SpO2:  [92 %-99 %] 97 %  O2 Device: None (Room air)    Physical Exam  Vitals and nursing note reviewed.   HENT:      Head: Normocephalic.      Nose: No congestion.      Mouth/Throat:      Mouth: Mucous membranes are moist.   Eyes:      General:         Right eye: No discharge.         Left eye: No discharge.   Cardiovascular:      Rate and Rhythm: Normal rate and regular rhythm.      Pulses: Normal pulses.   Pulmonary:      Effort: Pulmonary effort is normal.      Breath sounds: Normal breath sounds.   Abdominal:      General: Bowel sounds are  normal.      Palpations: Abdomen is soft.   Musculoskeletal:         General: Normal range of motion.      Cervical back: Normal range of motion.   Skin:     General: Skin is warm and dry.   Neurological:      Mental Status: He is alert and oriented to person, place, and time. Mental status is at baseline.   Psychiatric:         Mood and Affect: Mood normal.           Lab Results: I have reviewed the following results:CBC/BMP:   .     02/04/25  0745   WBC 10.66*   HGB 16.1   HCT 46.0      SODIUM 139   K 3.4*      CO2 30   BUN 23   CREATININE 0.99   GLUC 86        Therapies:   Basic Mobility Inpatient Raw Score: 18  -HL Goal: 6: Walk 10 steps or more  -HL Achieved: 1: Laying in bed      VTE Prophylaxis: Sequential compression device (Venodyne)     Code Status: Level 1 - Full Code  Advance Directive and Living Will:      Power of : Yes      Family and Social Support:   Living Arrangements: Lives w/ Spouse/significant other  Support Systems: Spouse/significant other; Self  Assistance Needed: n/a  Type of Current Residence: Private residence  Current Home Care Services: No

## 2025-02-04 NOTE — PLAN OF CARE
"  Problem: PHYSICAL THERAPY ADULT  Goal: Performs mobility at highest level of function for planned discharge setting.  See evaluation for individualized goals.  Description: Treatment/Interventions: ADL retraining, Functional transfer training, LE strengthening/ROM, Elevations, Therapeutic exercise, Endurance training, Cognitive reorientation, Patient/family training, Equipment eval/education, Bed mobility, Gait training, Compensatory technique education, Spoke to nursing, Spoke to case management, Spoke to advanced practitioner, OT, Family  Equipment Recommended: Walker       See flowsheet documentation for full assessment, interventions and recommendations.  Note: Prognosis: Good  Problem List: Decreased strength, Decreased range of motion, Decreased endurance, Impaired balance, Decreased mobility, Impaired judgement, Decreased safety awareness, Obesity, Decreased skin integrity, Orthopedic restrictions, Pain  Assessment: Pt is 84 y.o. male seen for PT evaluation s/p admit to [facility] on 2/2/2025  w/ multiple falls (4) since being d/c from [facility] s/p L TKA ( DOS 1/30/ 25). Imaging (-) for acute injury.  Pt dx including fall; ambulatory dysfunction; cardiac amyloidosis; B/L LE edema; multiple skin tears; afib. PT consulted for mobility and to assist w/ dc planniing recommendations.  Pt  has a past medical history of Abnormal glucose level (02/07/2018), Acute back pain (10/02/2017), Atrial fibrillation (HCC), Blood clot in vein (2015), 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HCC) (04/2023), 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HCC), Carpal tunnel syndrome, Chronic kidney disease (CKD), Fever (09/08/2017), Frailty syndrome in geriatric patient (05/12/2023), FUO (fever of unknown origin) (09/08/2017), Heart failure (HCC), History of DVT (deep vein thrombosis), History of tobacco abuse, History of transient cerebral ischemia, and Hypertension.  Pt spouse present - assisting w/ info- pt and spouse live together in a \"split level home w/ main " bed and bath on 2nd floor. Pt was using a SPC prior to his TKA and is now using RW. Spouse at bedside stating they are having significant difficulty at home post op and she does not feel she can help him at [facility]. Pt agrees.   Currently,  pt  is pleasant  and cooperative- reports pain in L knee (+) significant edema and redness noted- pt requiring modA for sit<>stand and for ambulation w/ RW 30+35' + chair follow- step to gait pattern.  Pt presents functioning below baseline and currently w/ overall mobility deficits 2* to: decreased  LE strength/AROM; edema; erythema LLE; pain;  limited flexibility;  generalized weakness/ deconditioning; decreased endurance; decreased activity tolerance; decreased coordination; impaired balance; gait deviations; decreased safety awareness; SOB/MANLEY; fatigue; impaired safety and judgement; limited insight into current deficits; bed/ chair alarms; multiple lines.   Pt currently at risk for additional fall + readmission falls.  (Please find additional objective findings from PT assessment regarding body systems outlined above.) Pt will continue to benefit from skilled PT interventions to address stated impairments; to maximize functional potential; for ongoing pt/ family training; and DME needs.  PT is recommending PT Resource Intensity Level  1 (needs rehab and is not cleared for d/c home)       PT will follow for STG as outlined on eval. Pt/ family agreeable to PT POC and goals as stated.  Barriers to Discharge: Inaccessible home environment, Decreased caregiver support (spouse unable to safely assist at [facility]- requesting rehab)     Rehab Resource Intensity Level, PT: I (Maximum Resource Intensity)    See flowsheet documentation for full assessment.

## 2025-02-04 NOTE — ASSESSMENT & PLAN NOTE
Daughter concerned  Word finding noted on exam  Recommend check TSH and Vitamin B 12 level for reversible cause of memory loss  CT head (2/2/25) mild microangiopathic changes  OT for MOCA evaluation    Increased agitation and confusion over night, requiring restraints and zyprexa,  this am at baseline, cannot exclude sundowning  Will order seroquel 25 mg po QHS

## 2025-02-04 NOTE — CASE MANAGEMENT
Case Management Assessment & Discharge Planning Note    Patient name Jesse JOAQUIN Figuli  [facility] MRN [id number]  : 1940 Date 2025       Current Admission Date: 2025  Current Admission Diagnosis:Fall   Patient Active Problem List    Diagnosis Date Noted Date Diagnosed    Ambulatory dysfunction 2025     Urinary retention 2025     Osteoarthritis 2025     Acute pain 2025     Forgetfulness 2025     Hearing loss 2025     At risk for delirium 2025     Fall 2025     History of total knee arthroplasty 2025     CKD (chronic kidney disease) 2025     Chronic deep vein thrombosis (DVT) of right lower extremity ([facility]) 2025     Multiple skin tears 2025     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     Chronic diastolic congestive heart failure ([facility]) 2024     Thrombocytopenia ([facility]) 2023     Multiple falls 2023     ESTER (obstructive sleep apnea) 2023     History of DVT (deep vein thrombosis) 2023     Atrial fibrillation ([facility]) 2023     Congestive heart failure (CHF) ([facility]) 2023     Generalized weakness 2023     Chronic venous hypertension (idiopathic) with ulcer of right lower extremity ([facility]) 2023     Elevated troponin 2023     Multiple abrasions 2023     Frailty 2023     Lower extremity cellulitis 2023     Stage 3a chronic kidney disease (CKD) ([facility]) 2023     Hyperbilirubinemia 2023     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 ([facility]) 2022     Bilateral leg edema 2022     Persistent atrial fibrillation ([facility]) 2020     Morbid obesity ([facility]) 2018     Lumbar stenosis 2018     Benign neoplasm of colon 2018     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     Diverticular disease of colon 2018     Internal hemorrhoids 2018     Pure  hypercholesterolemia 02/07/2018     Sacroiliitis (HCC) 02/01/2018     Bilateral hip pain 12/04/2017     Adenomatous colon polyp 12/29/2015     Spinal stenosis of lumbar region 09/10/2015     DDD (degenerative disc disease), lumbar 07/07/2015     Lumbar disc herniation 06/22/2015     Radiculopathy, lumbar region 06/22/2015     MTHFR mutation 03/11/2015     Elevated hemoglobin A1c 06/05/2014     Premature atrial complexes 05/12/2014     Aortic ectasia (HCC) 03/06/2014     Obstructive sleep apnea 11/25/2013     Premature ventricular contraction 11/25/2013     Lichen planus 05/17/2012     HTN (hypertension) 04/04/2012     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     Vitamin D deficiency 04/04/2012       LOS (days): 2  Geometric Mean LOS (GMLOS) (days): 3  Days to GMLOS:1.3     OBJECTIVE:    Risk of Unplanned Readmission Score: 16.23         Current admission status: Inpatient       Preferred Pharmacy:   [facility] [address] [place], [place] - [address]  [address]  [facility] [address]  Phone: [phone] Fax: [phone]    [id number] - [place], [place] - [address]  [address]  [facility] [address]  Phone: [phone] Fax: [phone]    [facility] - [place], [place] - [address]  [address]  [place] [address]  Phone: [phone] Fax: [phone]    Primary Care Provider: LIVIA Parra    Primary Insurance: MEDICARE  Secondary Insurance: AARP    ASSESSMENT:  Active Health Care Proxies       Marlyn France Health Care Agent - Spouse   Primary Phone: [phone] (Home)  Mobile Phone: [phone]                 Advance Directives  Primary Contact: Marlyn France (Spouse) [phone] (H) [phone]    Readmission Root Cause  30 Day Readmission: No    Patient Information  Admitted from:: Home  Mental Status: Alert, Confused  During Assessment patient was accompanied by: Not accompanied during assessment  Assessment information provided by::  Patient  Primary Caregiver: Self  Support Systems: Spouse/significant other, Self, Daughter  County of Residence: [place]  What city do you live in?: [place]  Living Arrangements: Lives w/ Spouse/significant other  Is patient a ?: No    Activities of Daily Living Prior to Admission  Does the patient have a history of Short-Term Rehab?: No  Does patient have a history of HHC?: No  Does patient currently have HHC?: No    Patient Information Continued  Income Source: Pension/longterm  Does patient have prescription coverage?: Yes  Does patient receive dialysis treatments?: No  Does patient have a history of substance abuse?: No    Means of Transportation  Means of Transport to Appts:: Family transport    DISCHARGE DETAILS:    Discharge planning discussed with:: patient and wife  Freedom of Choice: Yes     CM contacted family/caregiver?: Yes  Were Treatment Team discharge recommendations reviewed with patient/caregiver?: Yes  Did patient/caregiver verbalize understanding of patient care needs?: N/A- going to facility  Were patient/caregiver advised of the risks associated with not following Treatment Team discharge recommendations?: Yes    Contacts  Patient Contacts: Marlyn France (Spouse) [phone] (N) [phone]  Relationship to Patient:: Family  Contact Method: Phone  Phone Number: [phone] (h) [phone]  Reason/Outcome: Continuity of Care, Discharge Planning, Emergency Contact    Requested Home Health Care         Is the patient interested in HHC at discharge?: No    DME Referral Provided  Referral made for DME?: No    Other Referral/Resources/Interventions Provided:  Interventions: Short Term Rehab    Treatment Team Recommendation: Short Term Rehab  Discharge Destination Plan:: Short Term Rehab       CM met with pt and his wife, to discuss d/c planning.  Pt was recommended for IP rehab and pt's wife's preference is [facility]. If they can't accept, then pt's wife is amenable to SNF near their  home.  CM will place referrals and then follow up

## 2025-02-04 NOTE — PLAN OF CARE
Problem: PAIN - ADULT  Goal: Verbalizes/displays adequate comfort level or baseline comfort level  Description: Interventions:  - Encourage patient to monitor pain and request assistance  - Assess pain using appropriate pain scale  - Administer analgesics based on type and severity of pain and evaluate response  - Implement non-pharmacological measures as appropriate and evaluate response  - Consider cultural and social influences on pain and pain management  - Notify physician/advanced practitioner if interventions unsuccessful or patient reports new pain  Outcome: Progressing     Problem: INFECTION - ADULT  Goal: Absence or prevention of progression during hospitalization  Description: INTERVENTIONS:  - Assess and monitor for signs and symptoms of infection  - Monitor lab/diagnostic results  - Monitor all insertion sites, i.e. indwelling lines, tubes, and drains  - Monitor endotracheal if appropriate and nasal secretions for changes in amount and color  - [place] appropriate cooling/warming therapies per order  - Administer medications as ordered  - Instruct and encourage patient and family to use good hand hygiene technique  - Identify and instruct in appropriate isolation precautions for identified infection/condition  Outcome: Progressing  Goal: Absence of fever/infection during neutropenic period  Description: INTERVENTIONS:  - Monitor WBC    Outcome: Progressing

## 2025-02-04 NOTE — PLAN OF CARE
Problem: SAFETY ADULT  Goal: Maintain or return to baseline ADL function  Description: INTERVENTIONS:  -  Assess patient's ability to carry out ADLs; assess patient's baseline for ADL function and identify physical deficits which impact ability to perform ADLs (bathing, care of mouth/teeth, toileting, grooming, dressing, etc.)  - Assess/evaluate cause of self-care deficits   - Assess range of motion  - Assess patient's mobility; develop plan if impaired  - Assess patient's need for assistive devices and provide as appropriate  - Encourage maximum independence but intervene and supervise when necessary  - Involve family in performance of ADLs  - Assess for home care needs following discharge   - Consider OT consult to assist with ADL evaluation and planning for discharge  - Provide patient education as appropriate  Outcome: Progressing     Problem: SAFETY ADULT  Goal: Maintains/Returns to pre admission functional level  Description: INTERVENTIONS:  - Perform AM-PAC 6 Click Basic Mobility/ Daily Activity assessment daily.  - Set and communicate daily mobility goal to care team and patient/family/caregiver.   - Collaborate with rehabilitation services on mobility goals if consulted    - Record patient progress and toleration of activity level   Outcome: Progressing     Problem: Knowledge Deficit  Goal: Patient/family/caregiver demonstrates understanding of disease process, treatment plan, medications, and discharge instructions  Description: Complete learning assessment and assess knowledge base.  Interventions:  - Provide teaching at level of understanding  - Provide teaching via preferred learning methods  Outcome: Progressing     Problem: INFECTION - ADULT  Goal: Absence or prevention of progression during hospitalization  Description: INTERVENTIONS:  - Assess and monitor for signs and symptoms of infection  - Monitor lab/diagnostic results  - Monitor all insertion sites, i.e. indwelling lines, tubes, and  drains  - Monitor endotracheal if appropriate and nasal secretions for changes in amount and color  - [place] appropriate cooling/warming therapies per order  - Administer medications as ordered  - Instruct and encourage patient and family to use good hand hygiene technique  - Identify and instruct in appropriate isolation precautions for identified infection/condition  Outcome: Progressing

## 2025-02-04 NOTE — PLAN OF CARE
Problem: OCCUPATIONAL THERAPY ADULT  Goal: Performs self-care activities at highest level of function for planned discharge setting.  See evaluation for individualized goals.  Description: Treatment Interventions: ADL retraining, Functional transfer training, Endurance training, Cognitive reorientation, Patient/family training, Equipment evaluation/education, Compensatory technique education, Energy conservation, Activityengagement          See flowsheet documentation for full assessment, interventions and recommendations.   2/4/2025 1611 by Vane Caballero OT  Note: Limitation: Decreased ADL status, Decreased Safe judgement during ADL, Decreased cognition, Decreased endurance, Decreased self-care trans, Decreased high-level ADLs  Prognosis: Good  Assessment: 85 YO Male SEEN FOR INITIAL OCCUPATIONAL THERAPY EVALUATION FOLLOWING ADMISSION TO [facility] S/P MULTIPLE FALLS FOLLOWING RECENT L TKA ON 1/30/25 AT [facility]. PT WAS D/C'D HOME WITH SPOUSE AND HOME THERAPY SERVICES WITH 4 REPORTED FALLS PRIOR TO READMISSION. IMAGING (-) FOR TRAUMATIC INJURY.  PROBLEMS LIST/PMH INCLUDES Abnormal glucose level, Acute back pain, Atrial fibrillation (HCC), Blood clot in vein, 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HCC), 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HCC), Carpal tunnel syndrome, Chronic kidney disease (CKD), Fever, Frailty syndrome in geriatric patient, FUO (fever of unknown origin), Heart failure (HCC), History of DVT (deep vein thrombosis), History of tobacco abuse, History of transient cerebral ischemia, and Hypertension. PT IS FROM HOME WITH SPOUSE WHERE HE REPORTS BEING I WITH ADLS/IADLS/DRIVING AT BASELINE- PT HAS INCREASED  ASSIST WITH ADLS/IADLS FOLLOWING RETURN HOME S/P TKA + USE OF RW FOR MOBILITY. PT CURRENTLY REQUIRES OVERALL MIN A WITH UB ADLS, MOD-MAX A  WITH LB ADLS AND MOD A  WITH TRANSFERS / FUNCTIONAL MOBILITY WITH USE OF RW. PT IS LIMITED 2' PAIN, FATIGUE, IMPAIRED BALANCE, FALL RISK , ORTHOPEDIC RESTRICTIONS, OVERALL  WEAKNESS/DECONDITIONING ,  INCREASED CONFUSION, QUESTIONABLE HISTORIAN, LIMITED FAMILY/FRIEND SUPPORT , INACCESSIBLE HOME ENVIRONMENT, and OVERALL LIMITED ACTIVITY TOLERANCE. PT EDUCATED ON DEEP BREATHING TECHNIQUES T/O ACTIVITY, SLOWING OF PACE, ENERGY CONSERVATION TECHNIQUES FOR CARRY OVER UPON D/C, INCREASED FAMILY SUPPORT, and CONTINUE PARTICIPATION IN SELF-CARE/MOBILITY WITH STAFF WHILE IN THE HOSPITAL . The patient's raw score on the AM-PAC Daily Activity Inpatient Short Form is 15. A raw score of less than 19 suggests the patient may benefit from discharge to post-acute rehabilitation services. Please refer to the recommendation of the Occupational Therapist for safe discharge planning.  FROM AN OCCUPATIONAL THERAPY PERSPECTIVE, RECOMMEND LEVEL I RESOURCES UPON D/C. WILL CONT TO FOLLOW TO ADDRESS THE BELOW DESCRIBED GOALS.     Rehab Resource Intensity Level, OT: I (Maximum Resource Intensity)       2/4/2025 1611 by Vane Caballero, REI  Note: Limitation: Decreased ADL status, Decreased Safe judgement during ADL, Decreased cognition, Decreased endurance, Decreased self-care trans, Decreased high-level ADLs  Prognosis: Good  Assessment: 83 YO Male SEEN FOR INITIAL OCCUPATIONAL THERAPY EVALUATION FOLLOWING ADMISSION TO [facility] S/P MULTIPLE FALLS FOLLOWING RECENT L TKA ON 1/30/25 AT [facility]. PT WAS D/C'D HOME WITH SPOUSE AND HOME THERAPY SERVICES WITH 4 REPORTED FALLS PRIOR TO READMISSION. IMAGING (-) FOR TRAUMATIC INJURY.  PROBLEMS LIST/PMH INCLUDES Abnormal glucose level, Acute back pain, Atrial fibrillation (HCC), Blood clot in vein, 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HCC), 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HCC), Carpal tunnel syndrome, Chronic kidney disease (CKD), Fever, Frailty syndrome in geriatric patient, FUO (fever of unknown origin), Heart failure (HCC), History of DVT (deep vein thrombosis), History of tobacco abuse, History of transient cerebral ischemia, and Hypertension. PT IS FROM HOME WITH SPOUSE WHERE HE REPORTS  BEING I WITH ADLS/IADLS/DRIVING AT BASELINE- PT HAS INCREASED  ASSIST WITH ADLS/IADLS FOLLOWING RETURN HOME S/P TKA + USE OF RW FOR MOBILITY. PT CURRENTLY REQUIRES OVERALL MIN A WITH UB ADLS, MOD-MAX A  WITH LB ADLS AND MOD A  WITH TRANSFERS / FUNCTIONAL MOBILITY WITH USE OF RW. PT IS LIMITED 2' PAIN, FATIGUE, IMPAIRED BALANCE, FALL RISK , ORTHOPEDIC RESTRICTIONS, OVERALL WEAKNESS/DECONDITIONING ,  INCREASED CONFUSION, QUESTIONABLE HISTORIAN, LIMITED FAMILY/FRIEND SUPPORT , INACCESSIBLE HOME ENVIRONMENT, and OVERALL LIMITED ACTIVITY TOLERANCE. PT EDUCATED ON DEEP BREATHING TECHNIQUES T/O ACTIVITY, SLOWING OF PACE, ENERGY CONSERVATION TECHNIQUES FOR CARRY OVER UPON D/C, INCREASED FAMILY SUPPORT, and CONTINUE PARTICIPATION IN SELF-CARE/MOBILITY WITH STAFF WHILE IN THE HOSPITAL . The patient's raw score on the AM-PAC Daily Activity Inpatient Short Form is 15. A raw score of less than 19 suggests the patient may benefit from discharge to post-acute rehabilitation services. Please refer to the recommendation of the Occupational Therapist for safe discharge planning.  FROM AN OCCUPATIONAL THERAPY PERSPECTIVE, RECOMMEND LEVEL I RESOURCES UPON D/C. WILL CONT TO FOLLOW TO ADDRESS THE BELOW DESCRIBED GOALS.     Rehab Resource Intensity Level, OT: I (Maximum Resource Intensity)

## 2025-02-04 NOTE — ASSESSMENT & PLAN NOTE
S/p right knee arthroscopy at [facility] 1/30/25  Incision healing well, steristrip intact  Had frequent falls at home, greater than 4  PT/OT

## 2025-02-04 NOTE — ASSESSMENT & PLAN NOTE
Lab Results   Component Value Date    EGFR 70 02/03/2025    EGFR 67 01/13/2025    EGFR 50 12/14/2023    CREATININE 0.98 02/03/2025    CREATININE 1.09 01/13/2025    CREATININE 1.30 12/14/2023   -Monitor renal function  -Avoid nephrotoxins  -Outpatient follow up with PCP

## 2025-02-04 NOTE — PLAN OF CARE
Problem: Nutrition/Hydration-ADULT  Goal: Nutrient/Hydration intake appropriate for improving, restoring or maintaining nutritional needs  Description: Monitor and assess patient's nutrition/hydration status for malnutrition. Collaborate with interdisciplinary team and initiate plan and interventions as ordered.  Monitor patient's weight and dietary intake as ordered or per policy. Utilize nutrition screening tool and intervene as necessary. Determine patient's food preferences and provide high-protein, high-caloric foods as appropriate.     INTERVENTIONS:  - Monitor oral intake, urinary output, labs, and treatment plans  - Assess nutrition and hydration status and recommend course of action  - Evaluate amount of meals eaten  - Assist patient with eating if necessary   - Allow adequate time for meals  - Recommend/ encourage appropriate diets, oral nutritional supplements, and vitamin/mineral supplements  - Order, calculate, and assess calorie counts as needed  - Recommend, monitor, and adjust tube feedings and TPN/PPN based on assessed needs  - Assess need for intravenous fluids  - Provide specific nutrition/hydration education as appropriate  - Include patient/family/caregiver in decisions related to nutrition  Outcome: Progressing     Problem: CONFUSION/THOUGHT DISTURBANCE  Goal: Thought disturbances (confusion, delirium, depression, dementia or psychosis) are managed to maintain or return to baseline mental status and functional level  Description: INTERVENTIONS:  - Assess for possible contributors to  thought disturbance, including but not limited to medications, infection, impaired vision or hearing, underlying metabolic abnormalities, dehydration, respiratory compromise,  psychiatric diagnoses and notify attending PHYSICAN/AP  - Monitor and intervene to maintain adequate nutrition, hydration, elimination, sleep and activity  - Decrease environmental stimuli, including noise as appropriate.  - Provide  frequent contacts to provide refocusing, direction and reassurance as needed. Approach patient calmly with eye contact and at their level.  - [place] high risk fall precautions, aspiration precautions and other safety measures, as indicated  - If delirium suspected, notify physician/AP of change in condition and request immediate in-person evaluation  - Pursue consults as appropriate including Geriatric (campus dependent), OT for cognitive evaluation/activity planning, psychiatric, pastoral care, etc.  Outcome: Progressing

## 2025-02-04 NOTE — ASSESSMENT & PLAN NOTE
Noted on prior admission, refused straight cath  Urinary retention protocol  Limit caffiene intake  Avoid alcohol, anticholinergics, opoids, sedatives, benzodiazepines  Avoid extremes of fluid intake (< 32 ounces or >64 ounces)  Encourage scheduled tolieting q2 hours  Monitor for constipation - last BM 2/1/25- miralax ordered

## 2025-02-04 NOTE — WOUND OSTOMY CARE
Consult Note - Wound   Ednohelia S Figuli 84 y.o. male MRN: [id number]  Unit/Bed#: [place] 609-01 Encounter: [id number]        Assessment :   Patient admitted to [facility] due to fall. History of CKD, a-fib., HTN, ESTER. Wound care nurse consulted for pre-hospital wounds. Patient is agreeable to assessment, alert and oriented x4, continent of bowel and bladder, assist of 1 to stand for assessment, is OOB to chair, is an assist with care.     1. Bilateral sacrum and buttock- Skin is dry, intact, blanchable, scaly brown skin.     2. Bilateral arm and posterior hand skin tears- Wounds were pictured and measured separately. Wounds are irregular in shape and scattered, partial thickness, 100% pale pink tissue, with scant amount of serosanguineous drainage noted. Lory-wounds are dry, intact, fragile, with scattered purple ecchymosis.     3. Right anterior knee- Wound is oval in shape, partial thickness, 100% dry pink tissue, no drainage noted. Lory-wound is dry, intact, no redness.    4. Right anterior tibia- Wound is oval in shape, partial thickness, 100% dry pink tissue surrounded by dry brown scabbing, no drainage noted. Lory-wound is dry, intact, no redness.    5. Bilateral hips and heels- Skin is dry, intact, blanchable.     6. Left knee with surgical dressing in place.    Educated patient on importance of frequent offloading of pressure via turning, repositioning, and weight-shifting. Verbalized understanding of plan of care.    No induration, fluctuance, odor, warmth, redness, or purulence noted to the above noted wounds. New dressings applied. Patient tolerated well, reports mild pain to the wounds. See flow sheets for more detailed assessment findings. Will follow along.      Skin care plans:  1-Hydraguard/Silicone Cream to bilateral sacrum, buttock, and heels BID and PRN  2-Elevate heels to offload pressure.  3-Ehob cushion in chair when out of bed.  4-Moisturize skin daily with skin nourishing cream.  5-Turn/reposition q2h  for pressure re-distribution on skin.   6-B/L arm and hand skin tears- Cleanse gently with NSS, pat dry. Apply Dermagran over wound beds, cover with ABD pad, wrap with Mónica. Change every other day and as needed for soilage/displacement.   7-Right knee and tibia- Cleanse wounds with NSS, pat dry. Apply Silicone foam Mepilex dressing over wound beds. Change every other day and as needed for soilage/displacement.       Wound 02/04/25 Knee Anterior;Right (Active)   Wound Image   02/04/25 1124   Wound Description Dry;Pink 02/04/25 1124   Lory-wound Assessment Dry;Intact 02/04/25 1124   Wound Length (cm) 1.5 cm 02/04/25 1124   Wound Width (cm) 1.3 cm 02/04/25 1124   Wound Depth (cm) 0.1 cm 02/04/25 1124   Wound Surface Area (cm^2) 1.95 cm^2 02/04/25 1124   Wound Volume (cm^3) 0.195 cm^3 02/04/25 1124   Calculated Wound Volume (cm^3) 0.2 cm^3 02/04/25 1124   Drainage Amount None 02/04/25 1124   Non-staged Wound Description Partial thickness 02/04/25 1124   Treatments Cleansed;Site care 02/04/25 1124   Dressing Foam, Silicon (eg. Allevyn, etc) 02/04/25 1124   Wound packed? No 02/04/25 1124   Dressing Changed New 02/04/25 1124   Patient Tolerance Tolerated well 02/04/25 1124   Dressing Status Clean;Dry;Intact 02/04/25 1124       Wound 02/04/25 Pretibial Right (Active)   Wound Image   02/04/25 1125   Wound Description Dry;Brown;Pink 02/04/25 1125   Lory-wound Assessment Dry;Intact 02/04/25 1125   Wound Length (cm) 2.5 cm 02/04/25 1125   Wound Width (cm) 1 cm 02/04/25 1125   Wound Depth (cm) 0.1 cm 02/04/25 1125   Wound Surface Area (cm^2) 2.5 cm^2 02/04/25 1125   Wound Volume (cm^3) 0.25 cm^3 02/04/25 1125   Calculated Wound Volume (cm^3) 0.25 cm^3 02/04/25 1125   Drainage Amount None 02/04/25 1125   Non-staged Wound Description Partial thickness 02/04/25 1125   Treatments Cleansed;Site care 02/04/25 1125   Dressing Foam, Silicon (eg. Allevyn, etc) 02/04/25 1125   Wound packed? No 02/04/25 1125   Dressing Changed New  02/04/25 1125   Patient Tolerance Tolerated well 02/04/25 1125   Dressing Status Clean;Dry;Intact 02/04/25 1125       Wound 02/04/25 Hand Posterior;Right (Active)   Wound Image   02/04/25 1129   Wound Description Pink;Pale;Fragile 02/04/25 1129   Lory-wound Assessment Dry;Intact;Fragile 02/04/25 1129   Wound Length (cm) 4.5 cm 02/04/25 1129   Wound Width (cm) 3.5 cm 02/04/25 1129   Wound Depth (cm) 0.1 cm 02/04/25 1129   Wound Surface Area (cm^2) 15.75 cm^2 02/04/25 1129   Wound Volume (cm^3) 1.575 cm^3 02/04/25 1129   Calculated Wound Volume (cm^3) 1.58 cm^3 02/04/25 1129   Drainage Amount Scant 02/04/25 1129   Drainage Description Serosanguineous 02/04/25 1129   Non-staged Wound Description Partial thickness 02/04/25 1129   Treatments Cleansed;Irrigation with NSS;Site care 02/04/25 1129   Dressing Dermagran gauze;ABD;Dry dressing 02/04/25 1129   Wound packed? No 02/04/25 1129   Dressing Changed Changed 02/04/25 1129   Patient Tolerance Tolerated well 02/04/25 1129   Dressing Status Clean;Dry;Intact 02/04/25 1129       Wound 02/04/25 Arm Posterior;Proximal;Right (Active)   Wound Image    02/04/25 1130   Wound Description Pink;Pale;Fragile 02/04/25 1130   Lory-wound Assessment Dry;Intact;Fragile 02/04/25 1130   Wound Length (cm) 10 cm 02/04/25 1130   Wound Width (cm) 14 cm 02/04/25 1130   Wound Depth (cm) 0.1 cm 02/04/25 1130   Wound Surface Area (cm^2) 140 cm^2 02/04/25 1130   Wound Volume (cm^3) 14 cm^3 02/04/25 1130   Calculated Wound Volume (cm^3) 14 cm^3 02/04/25 1130   Drainage Amount Scant 02/04/25 1130   Drainage Description Serosanguineous 02/04/25 1130   Non-staged Wound Description Partial thickness 02/04/25 1130   Treatments Cleansed;Irrigation with NSS;Site care 02/04/25 1130   Dressing Dermagran gauze;ABD;Dry dressing 02/04/25 1130   Wound packed? No 02/04/25 1130   Dressing Changed Changed 02/04/25 1130   Patient Tolerance Tolerated well 02/04/25 1130   Dressing Status Clean;Dry;Intact 02/04/25 1130        Wound 02/04/25 Hand Left;Posterior (Active)   Wound Image   02/04/25 1131   Wound Description Pink;Pale;Fragile 02/04/25 1131   Lory-wound Assessment Intact;Dry;Fragile 02/04/25 1131   Wound Length (cm) 2 cm 02/04/25 1131   Wound Width (cm) 2 cm 02/04/25 1131   Wound Depth (cm) 0.1 cm 02/04/25 1131   Wound Surface Area (cm^2) 4 cm^2 02/04/25 1131   Wound Volume (cm^3) 0.4 cm^3 02/04/25 1131   Calculated Wound Volume (cm^3) 0.4 cm^3 02/04/25 1131   Drainage Amount Scant 02/04/25 1131   Drainage Description Serosanguineous 02/04/25 1131   Non-staged Wound Description Partial thickness 02/04/25 1131   Treatments Cleansed;Irrigation with NSS;Site care 02/04/25 1131   Dressing Dermagran gauze;ABD;Dry dressing 02/04/25 1131   Wound packed? No 02/04/25 1131   Dressing Changed Changed 02/04/25 1131   Patient Tolerance Tolerated well 02/04/25 1131   Dressing Status Clean;Dry;Intact 02/04/25 1131       Wound 02/04/25 Arm Left;Posterior;Lower (Active)   Wound Image   02/04/25 1132   Wound Description Pink;Pale;Fragile 02/04/25 1132   Lory-wound Assessment Dry;Intact;Fragile 02/04/25 1132   Wound Length (cm) 12 cm 02/04/25 1132   Wound Width (cm) 9.5 cm 02/04/25 1132   Wound Depth (cm) 0.1 cm 02/04/25 1132   Wound Surface Area (cm^2) 114 cm^2 02/04/25 1132   Wound Volume (cm^3) 11.4 cm^3 02/04/25 1132   Calculated Wound Volume (cm^3) 11.4 cm^3 02/04/25 1132   Drainage Amount Scant 02/04/25 1132   Drainage Description Serosanguineous 02/04/25 1132   Non-staged Wound Description Partial thickness 02/04/25 1132   Treatments Cleansed;Irrigation with NSS;Site care 02/04/25 1132   Dressing Dermagran gauze;ABD;Dry dressing 02/04/25 1132   Wound packed? No 02/04/25 1132   Dressing Changed Changed 02/04/25 1132   Patient Tolerance Tolerated well 02/04/25 1132   Dressing Status Clean;Dry;Intact 02/04/25 1132     Contact through [employer] Secure Chat with any questions  Wound Care will continue to follow while inpatient    Bambi  John BSN RN [facility]ON  Wound and Ostomy care

## 2025-02-04 NOTE — OCCUPATIONAL THERAPY NOTE
Occupational Therapy Evaluation     Patient Name: Jesse France  Today's Date: 2/4/2025  Problem List  Active Problems:    HTN (hypertension)    Bilateral leg edema    Frailty    ESTER (obstructive sleep apnea)    Atrial fibrillation (HCC)    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HCC)    Fall    History of total knee arthroplasty    CKD (chronic kidney disease)    Chronic deep vein thrombosis (DVT) of right lower extremity (HCC)    Multiple skin tears    Ambulatory dysfunction    Urinary retention    Osteoarthritis    Acute pain    Forgetfulness    Hearing loss    At risk for delirium    Past Medical History  Past Medical History:   Diagnosis Date    Abnormal glucose level 02/07/2018    Acute back pain 10/02/2017    Atrial fibrillation (HCC)     Blood clot in vein 2015    in right foot    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HCC) 04/2023    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HCC)     Carpal tunnel syndrome     Chronic kidney disease (CKD)     Fever 09/08/2017    Frailty syndrome in geriatric patient 05/12/2023    FUO (fever of unknown origin) 09/08/2017    Heart failure (HCC)     History of DVT (deep vein thrombosis)     History of tobacco abuse     quit in 1982    History of transient cerebral ischemia     Hypertension      Past Surgical History  Past Surgical History:   Procedure Laterality Date    BACK SURGERY  2018    COLONOSCOPY      COLONOSCOPY N/A 02/24/2016    Procedure: COLONOSCOPY;  Surgeon: Salvador Ovalle MD;  Location: [facility];  Service:     HERNIA REPAIR      NEUROPLASTY / TRANSPOSITION MEDIAN NERVE AT CARPAL TUNNEL           02/04/25 1220   OT Last Visit   OT Visit Date 02/04/25   Note Type   Note type Evaluation   Pain Assessment   Pain Assessment Tool 0-10   Pain Score 7   Pain Location/Orientation Orientation: Left;Location: Knee   Patient's Stated Pain Goal No pain   Hospital Pain Intervention(s) Repositioned;Ambulation/increased activity;Emotional support;Elevated   Restrictions/Precautions   Weight Bearing Precautions Per Order No    LLE Weight Bearing Per Order WBAT  (PER PT/SPOUSE AS DIRECTED POST-OP AT [facility])   Other Precautions Cognitive;Chair Alarm;Bed Alarm;Multiple lines;Fall Risk;Pain;WBS   Home Living   Type of Home House   Home Layout Multi-level  (SPLIT LEVEL- MULTIPLE STEPS)   Bathroom Shower/Tub Tub/shower unit   Bathroom Toilet Raised   Bathroom Equipment Commode;Shower chair   Bathroom Accessibility Accessible   Home Equipment Walker;Cane   Additional Comments + USE OF RW, BSC AND SC FOLLOWING TKA   Prior Function   Level of [place] Independent with ADLs;Independent with functional mobility;Independent with IADLS   Lives With Spouse   Receives Help From Family   IADLs Independent with driving;Independent with meal prep;Independent with medication management   Falls in the last 6 months (S)  5 to 10  (4 FOLLOWING D/C HOME FROM TKA ON 1/3/25)   Vocational Retired   Lifestyle   Autonomy PT IS A QUESTIONABLE HISTORIAN AT TIMES- SPOUSE PRESENT TO ASSIST WITH ANSWERING QUESTIONS AS NEEDED. FULLY INDEPENDENT AT BASELINE. PT HAS REQUIRED ADDITIONAL ASSIST WITH ADLS/IADLS FOLLOWING RETURN HOME S/P TKA. SPOUSE REPORTS 4 FALLS IN THE PAST FEW DAYS   Reciprocal Relationships LIVES WITH SUPPORTIVE SPOUSE.   Service to Others RETIRED;    Intrinsic Gratification ENJOYS GARDENING   ADL   Eating Assistance 5  Supervision/Setup   Grooming Assistance 5  Supervision/Setup   UB Bathing Assistance 4  Minimal Assistance   LB Bathing Assistance 3  Moderate Assistance   UB Dressing Assistance 4  Minimal Assistance   LB Dressing Assistance 2  Maximal Assistance   Toileting Assistance  3  Moderate Assistance   Functional Assistance 3  Moderate Assistance   Bed Mobility   Supine to Sit Unable to assess   Sit to Supine Unable to assess   Additional Comments PT LEFT OOB WITH ALL NEEDS IN REACH + CHAIR ALARM ACTIVATED.   Transfers   Sit to Stand 3  Moderate assistance   Additional items Assist x 1;Increased time required;Verbal cues   Stand to Sit  3  Moderate assistance   Additional items Assist x 1;Increased time required;Verbal cues   Functional Mobility   Functional Mobility 3  Moderate assistance   Additional items Rolling walker   Balance   Static Sitting Fair   Static Standing Poor +   Ambulatory Poor   Activity Tolerance   Activity Tolerance Patient limited by fatigue;Patient limited by pain  (COG)   Medical Staff Made Aware PT SEEN FOR CO-EVAL WITH SKILLED PHYSICAL THERAPIST 2' NEW PRECAUTIONS/LIMITATIONS, AND LIMITED ACTIVITY TOLERANCE WHICH IMPACT PERFORMANCE AND ARE A REGRESSION FROM PT'S BASELINE.   Nurse Made Aware APPROPRIATE TO SEE PER RN.   RUE Assessment   RUE Assessment WFL   LUE Assessment   LUE Assessment WFL   Hand Function   Gross Motor Coordination Functional   Fine Motor Coordination Functional   Cognition   Overall Cognitive Status Impaired   Arousal/Participation Alert;Cooperative   Attention Attends with cues to redirect   Orientation Level Oriented to person;Oriented to place;Disoriented to time;Disoriented to situation   Memory Decreased recall of recent events;Decreased recall of precautions;Decreased short term memory   Following Commands Follows one step commands without difficulty   Comments PT IS PLEASANT AND COOPERATIVE. QUESTIONABLE HISTORIAN AT TIMES- SPOUSE PRESENT TO CONFIRM. PER CHART, PT WITH INCREASED AGITATION AND CONFUSION OVERNIGHT- SPOUSE RELATES THIS TO PAIN MEDICATION. ALARM ON FOR SAFETY   Assessment   Limitation Decreased ADL status;Decreased Safe judgement during ADL;Decreased cognition;Decreased endurance;Decreased self-care trans;Decreased high-level ADLs   Prognosis Good   Assessment 83 YO Male SEEN FOR INITIAL OCCUPATIONAL THERAPY EVALUATION FOLLOWING ADMISSION TO [facility] S/P MULTIPLE FALLS FOLLOWING RECENT L TKA ON 1/30/25 AT [facility]. PT WAS D/C'D HOME WITH SPOUSE AND HOME THERAPY SERVICES WITH 4 REPORTED FALLS PRIOR TO READMISSION. IMAGING (-) FOR TRAUMATIC INJURY.  PROBLEMS LIST/PMH INCLUDES  Abnormal glucose level, Acute back pain, Atrial fibrillation (HCC), Blood clot in vein, 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HCC), 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HCC), Carpal tunnel syndrome, Chronic kidney disease (CKD), Fever, Frailty syndrome in geriatric patient, FUO (fever of unknown origin), Heart failure (HCC), History of DVT (deep vein thrombosis), History of tobacco abuse, History of transient cerebral ischemia, and Hypertension. PT IS FROM HOME WITH SPOUSE WHERE HE REPORTS BEING I WITH ADLS/IADLS/DRIVING AT BASELINE- PT HAS INCREASED  ASSIST WITH ADLS/IADLS FOLLOWING RETURN HOME S/P TKA + USE OF RW FOR MOBILITY. PT CURRENTLY REQUIRES OVERALL MIN A WITH UB ADLS, MOD-MAX A  WITH LB ADLS AND MOD A  WITH TRANSFERS / FUNCTIONAL MOBILITY WITH USE OF RW. PT IS LIMITED 2' PAIN, FATIGUE, IMPAIRED BALANCE, FALL RISK , ORTHOPEDIC RESTRICTIONS, OVERALL WEAKNESS/DECONDITIONING ,  INCREASED CONFUSION, QUESTIONABLE HISTORIAN, LIMITED FAMILY/FRIEND SUPPORT , INACCESSIBLE HOME ENVIRONMENT, and OVERALL LIMITED ACTIVITY TOLERANCE. PT EDUCATED ON DEEP BREATHING TECHNIQUES T/O ACTIVITY, SLOWING OF PACE, ENERGY CONSERVATION TECHNIQUES FOR CARRY OVER UPON D/C, INCREASED FAMILY SUPPORT, and CONTINUE PARTICIPATION IN SELF-CARE/MOBILITY WITH STAFF WHILE IN THE HOSPITAL . The patient's raw score on the AM-PAC Daily Activity Inpatient Short Form is 15. A raw score of less than 19 suggests the patient may benefit from discharge to post-acute rehabilitation services. Please refer to the recommendation of the Occupational Therapist for safe discharge planning.  FROM AN OCCUPATIONAL THERAPY PERSPECTIVE, RECOMMEND LEVEL I RESOURCES UPON D/C. WILL CONT TO FOLLOW TO ADDRESS THE BELOW DESCRIBED GOALS.   Goals   Patient Goals TO GO TO REHAB   LTG Time Frame 10-14   Long Term Goal #1 SEE BELOW   Plan   Treatment Interventions ADL retraining;Functional transfer training;Endurance training;Cognitive reorientation;Patient/family training;Equipment  evaluation/education;Compensatory technique education;Energy conservation;Activityengagement   Goal Expiration Date 02/18/25   OT Frequency 3-5x/wk   Discharge Recommendation   Rehab Resource Intensity Level, OT I (Maximum Resource Intensity)   AM-PAC Daily Activity Inpatient   Lower Body Dressing 2   Bathing 2   Toileting 2   Upper Body Dressing 3   Grooming 3   Eating 3   Daily Activity Raw Score 15   Daily Activity Standardized Score (Calc for Raw Score >=11) 34.69   AM-PAC Applied Cognition Inpatient   Following a Speech/Presentation 3   Understanding Ordinary Conversation 4   Taking Medications 2   Remembering Where Things Are Placed or Put Away 3   Remembering List of 4-5 Errands 2   Taking Care of Complicated Tasks 2   Applied Cognition Raw Score 16   Applied Cognition Standardized Score 35.03     OCCUPATIONAL THERAPY GOALS TO BE MET WITHIN 14 DAYS:    -Pt will increase bed mobility to MOD I to participate in functional activities with G tolerance and balance.  -Pt will improve functional mobility and transfers to MOD I on/off all surfaces w/ G balance/safety including toileting.  -Pt will participate in lt grooming task with MOD I after set-up standing at sink ~3-5 minutes with G safety and balance.   -Pt will increase independence in all ADLS to MOD I with G balance sitting upright in chair.  -Pt will improve activity tolerance to G for 30 min txment sessions w/ G carry over of learned energy conservation techniques.  -Pt will improve independence in lt homemaking activities to MOD I without requiring cues for safety.  -Pt will demonstrate G carryover of learned safety techniques and proper body mechanics in functional and leisure activities with use of DME.  -Pt will complete additional cognitive assessment with 100% attention to task in order to assist with safe d/c plan.   -Pt will follow 100% simple 2-step commands and be A&O x4 consistently with environmental cues to increase participation in  functional activities.       Documentation completed by HAMILTON Iyer, OTR/L  MOCA Certified ID# [id number]

## 2025-02-04 NOTE — PROGRESS NOTES
Progress Note - Trauma   Name: Jesse France 84 y.o. male I MRN: [id number]  Unit/Bed#: [facility] 609-01 I Date of Admission: 2/2/2025   Date of Service: 2/4/2025 I Hospital Day: 2    Assessment & Plan  Fall  Patient reports multiple falls since surgery on his left knee last week.  Described as mechanical  No injuries found  Admitted for ambulatory dysfunction  History of total knee arthroplasty  S/p total knee arthroplasty on 1/30/2025  Continue on home cefadroxil for postop prophylaxis  Left knee XR negative for acute traumatic injury  Continue to monitor wound site-currently appears healthy  PT/OT  HTN (hypertension)  -Continue home metoprolol  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HCC)  Continue home tafamidis--patient to bring in since this medication is not on formulary.   Atrial fibrillation (HCC)  Continue home warfarin (7.5 mg Tuesday and Friday, 5 mg all other days)  INR goal 2-3  Bilateral leg edema  Family expressed concerns regarding bilateral lower extremity edema-worsening left leg.  Venous Doppler completed that showed no thrombophlebitis/DVT  Continue to monitor  SCDs  CKD (chronic kidney disease)  Lab Results   Component Value Date    EGFR 70 02/03/2025    EGFR 67 01/13/2025    EGFR 50 12/14/2023    CREATININE 0.98 02/03/2025    CREATININE 1.09 01/13/2025    CREATININE 1.30 12/14/2023   -Monitor renal function  -Avoid nephrotoxins  -Outpatient follow up with PCP  ESTER (obstructive sleep apnea)  -States he does not use a CPAP at night  Chronic deep vein thrombosis (DVT) of right lower extremity (HCC)  - On warfarin, continue   - b/l Duplex US without evidence of DVT  Multiple skin tears  Local wound care  Tetanus updated  Ambulatory dysfunction  In the setting of total knee arthroplasty on 1/30/2025 PT/OT evaluation    Bowel Regimen: Senokot S  VTE Prophylaxis:VTE covered by:  warfarin, Oral  warfarin, Oral        Disposition: Continue current level of care.  Pending PT/OT evaluation.  Anticipate need for postacute  rehab placement.  CM following for disposition planning.  Please contact the SecureChat role for the Trauma service with any questions/concerns.    24 Hour Events : No acute events overnight.  Patient remains in a [place] for confusion overnight.  Will wean [place] today.  Subjective : Patient is doing well this morning, resting comfortably upright in the bed.  He is eating breakfast on evaluation and tolerating a diet.  He denies chest pain, shortness of breath, abdominal pain, nausea, vomiting.    Objective :  Temp:  [97.6 °F (36.4 °C)-97.9 °F (36.6 °C)] 97.9 °F (36.6 °C)  HR:  [] 107  BP: (101-143)/(70-93) 143/93  Resp:  [18] 18  SpO2:  [91 %-99 %] 99 %  O2 Device: None (Room air)    I/O         02/02 0701  02/03 0700 02/03 0701  02/04 0700 02/04 0701  02/05 0700    P.O.  100     Total Intake(mL/kg)  100 (0.9)     Urine (mL/kg/hr) 100 650 (0.2)     Total Output 100 650     Net -100 -550            Unmeasured Urine Occurrence 1 x              Physical Exam  Vitals and nursing note reviewed.   Constitutional:       General: He is not in acute distress.     Appearance: He is well-developed.   HENT:      Head: Normocephalic and atraumatic.   Eyes:      Extraocular Movements: Extraocular movements intact.      Conjunctiva/sclera: Conjunctivae normal.   Cardiovascular:      Rate and Rhythm: Normal rate and regular rhythm.      Heart sounds: No murmur heard.  Pulmonary:      Effort: Pulmonary effort is normal. No respiratory distress.      Breath sounds: Normal breath sounds.   Chest:      Chest wall: No tenderness.   Abdominal:      General: There is no distension.      Palpations: Abdomen is soft.      Tenderness: There is no abdominal tenderness. There is no guarding.   Musculoskeletal:         General: No swelling.      Cervical back: Normal range of motion and neck supple.   Skin:     General: Skin is warm and dry.      Capillary Refill: Capillary refill takes less than 2 seconds.   Neurological:      General: No  focal deficit present.      Mental Status: He is alert. Mental status is at baseline.      Sensory: No sensory deficit.      Motor: No weakness.   Psychiatric:         Mood and Affect: Mood normal.         Behavior: Behavior normal.               Lab Results: I have reviewed the following results:  Recent Labs     02/02/25  1821 02/02/25  2234 02/03/25  0315 02/03/25  0454   WBC  --   --  12.25*  --    HGB 16.7  --  15.4  --    HCT 49  --  45.3  --    PLT  --   --  157  --    SODIUM  --   --  136  --    K  --   --  3.8  --    CL  --   --  100  --    CO2 29  --  28  --    BUN  --   --  23  --    CREATININE  --   --  0.98  --    GLUC  --   --  107  --    CAIONIZED 1.16  --   --   --    INR  --    < >  --  1.69*    < > = values in this interval not displayed.       Imaging Results Review: No pertinent imaging studies reviewed.  Other Study Results Review: No additional pertinent studies reviewed.

## 2025-02-04 NOTE — NURSING NOTE
"RN alerted by patients wife that patient left his b/l hearing aids in the ED. Pts hearing aids were in a jacket that was left behind in the ED. Patients hearing aid were not in patients belongings during admission. RN contacted ED chart \"Francois\". Charge to look in ED bay and contact this RN if they are located.     As of 2300 no hearing aids located.     Nell   "

## 2025-02-04 NOTE — PHYSICAL THERAPY NOTE
PHYSICAL THERAPY EVALUATION  NAME:  Jesse France  DATE: 02/04/25    AGE:   84 y.o.  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]  Bilateral leg edema [R60.0]  History of DVT (deep vein thrombosis) [Z86.718]  Multiple skin tears [T14.8XXA]  Fall, initial encounter [W19.XXXA]  Ambulatory dysfunction [R26.2]  History of total left knee replacement [Z96.652]  Atrial fibrillation, unspecified type (HCC) [I48.91]  Unspecified multiple injuries, initial encounter [T07.XXXA]    Past Medical History:   Diagnosis Date    Abnormal glucose level 02/07/2018    Acute back pain 10/02/2017    Atrial fibrillation (HCC)     Blood clot in vein 2015    in right foot    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HCC) 04/2023    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HCC)     Carpal tunnel syndrome     Chronic kidney disease (CKD)     Fever 09/08/2017    Frailty syndrome in geriatric patient 05/12/2023    FUO (fever of unknown origin) 09/08/2017    Heart failure (HCC)     History of DVT (deep vein thrombosis)     History of tobacco abuse     quit in 1982    History of transient cerebral ischemia     Hypertension      Past Surgical History:   Procedure Laterality Date    BACK SURGERY  2018    COLONOSCOPY      COLONOSCOPY N/A 02/24/2016    Procedure: COLONOSCOPY;  Surgeon: Salvador Ovalle MD;  Location: [facility];  Service:     HERNIA REPAIR      NEUROPLASTY / TRANSPOSITION MEDIAN NERVE AT CARPAL TUNNEL         Length Of Stay: 2  PHYSICAL THERAPY EVALUATION :    02/04/25 1219   PT Last Visit   PT Visit Date 02/04/25   Note Type   Note type Evaluation   Pain Assessment   Pain Assessment Tool 0-10   Pain Score 7   Pain Location/Orientation Orientation: Left;Location: Knee   Pain Radiating Towards L foot   Pain Onset/Description Descriptor: Sore;Descriptor: Aching   Effect of Pain on Daily Activities limtied mobility adn comfort   Patient's Stated Pain Goal No pain   Hospital Pain Intervention(s) Ambulation/increased activity;Emotional  "support;Repositioned   Restrictions/Precautions   Weight Bearing Precautions Per Order No   LLE Weight Bearing Per Order (S)  WBAT  (per pt/ spouse as reported from [facility] post op (L TKA DOS 1/30/25))   Braces or Orthoses   (none)   Other Precautions Cognitive;Chair Alarm;Bed Alarm;Multiple lines;Telemetry;Fall Risk;Fluid restriction;Hard of hearing   Home Living   Type of Home House   Home Layout Multi-level;Stairs to enter with rails;1/2 bath on main level;Bed/bath upstairs  (split level home- has been staying downstairs w/ 1/2 bath located there)   Bathroom Equipment Commode   Home Equipment Walker   Prior Function   Level of [place] Independent with functional mobility;Independent with ADLs;Independent with IADLS  (prior to recent TKA (1/30/25)- spouse has been attempting to assist since d/c from [facility])   Lives With Spouse   Receives Help From Family   IADLs Family/Friend/Other provides transportation;Family/Friend/Other provides meals;Family/Friend/Other provides medication management   Falls in the last 6 months 5 to 10  (\"4\" per pt and spouse since d/c post op.)   Comments Pt spouse present - assisting w/ info- pt and spouse live together in a \"split level home w/ main bed and bath on 2nd floor. Pt was using a SPC prior to his TKA and is now using RW. Spouse at bedside stating they are having significant difficulty at home post op and [facility] Pt agrees.   General   Family/Caregiver Present Yes  (spouse)   Cognition   Overall Cognitive Status Impaired   Arousal/Participation Alert   Orientation Level Oriented to person;Oriented to place;Oriented to situation   Memory Decreased recall of precautions;Decreased recall of recent events   Following Commands Follows one step commands without difficulty   Comments overall pleasant and cooperative; has limited insight into deficits/ falls \"Its not exactly as bad as she said it was\"   Subjective   Subjective see above   RUE " Assessment   RUE Assessment WFL   LUE Assessment   LUE Assessment WFL   RLE Assessment   RLE Assessment X  (4/5 throughout- generalized weakness)   LLE Assessment   LLE Assessment X  (ankle and hip 4/5- knee limtied post op 3-3+/5- significant edema and rednedd noted- AROM 0-~95% (sitting))   Light Touch   RLE Light Touch Grossly intact   LLE Light Touch Grossly intact   Bed Mobility   Additional Comments OOB on approach   Transfers   Sit to Stand 3  Moderate assistance   Stand to Sit 3  Moderate assistance   Additional Comments RW + V/C for technique   Ambulation/Elevation   Gait pattern Excessively slow;Step to;Improper Weight shift;Decreased foot clearance;Forward Flexion;Decreased toe off;Decreased heel strike   Gait Assistance 3  Moderate assist  (> min w/ inc distance (required V?C for sequencing / safety- tends to rush))   Assistive Device Rolling walker  (chair follow)   Distance 30+35' + chair follow- step to gait pattern.   Ambulation/Elevation Additional Comments RW + chair follow- cues for safety/ sequecing   Balance   Static Sitting Fair   Dynamic Sitting Fair -   Static Standing Poor +   Dynamic Standing Poor   Ambulatory Poor  (rw)   Endurance Deficit   Endurance Deficit Yes   Activity Tolerance   Activity Tolerance Patient limited by fatigue;Patient limited by pain   Medical Staff Made Aware OT + RN and CM for d/c planning recommendations  (Pt was seen in conjunction w/ OT 2* unstable presentation; medical complexity pain ; new precautions/ limitations + limited activity tolerance and regression from baseline functional mobility.)   Nurse Made Aware yes   Assessment   Prognosis Good   Problem List Decreased strength;Decreased range of motion;Decreased endurance;Impaired balance;Decreased mobility;Impaired judgement;Decreased safety awareness;Obesity;Decreased skin integrity;Orthopedic restrictions;Pain   Assessment Pt is 84 y.o. male seen for PT evaluation s/p admit to [facility]  "2/2/2025  w/ multiple falls (4) since being d/c from [facility] s/p L TKA ( DOS 1/30/ 25). Imaging (-) for acute injury.  Pt dx including fall; ambulatory dysfunction; cardiac amyloidosis; B/L LE edema; multiple skin tears; afib. PT consulted for mobility and to assist w/ dc planniing recommendations.      Pt  has a past medical history of Abnormal glucose level (02/07/2018), Acute back pain (10/02/2017), Atrial fibrillation (HCC), Blood clot in vein (2015), 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HCC) (04/2023), 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HCC), Carpal tunnel syndrome, Chronic kidney disease (CKD), Fever (09/08/2017), Frailty syndrome in geriatric patient (05/12/2023), FUO (fever of unknown origin) (09/08/2017), Heart failure (HCC), History of DVT (deep vein thrombosis), History of tobacco abuse, History of transient cerebral ischemia, and Hypertension.  Pt spouse present - assisting w/ info- pt and spouse live together in a \"split level home w/ main bed and bath on 2nd floor. Pt was using a SPC prior to his TKA and is now using RW. Spouse at bedside stating they are having significant difficulty at home post op and she does not feel she can help him at [facility]. Pt agrees.       Currently,  pt  is pleasant  and cooperative- reports pain in L knee (+) significant edema and redness noted- pt requiring modA for sit<>stand and for ambulation w/ RW 30+35' + chair follow- step to gait pattern.  Pt presents functioning below baseline and currently w/ overall mobility deficits 2* to: decreased  LE strength/AROM; edema; erythema LLE; pain;  limited flexibility;  generalized weakness/ deconditioning; decreased endurance; decreased activity tolerance; decreased coordination; impaired balance; gait deviations; decreased safety awareness; SOB/MANLEY; fatigue; impaired safety and judgement; limited insight into current deficits; bed/ chair alarms; multiple lines.   Pt currently at risk for additional falls + readmission falls.  (Please find additional objective " findings from PT assessment regarding body systems outlined above.)     Pt will continue to benefit from skilled PT interventions to address stated impairments; to maximize functional potential; for ongoing pt/ family training; and DME needs.  PT is recommending PT Resource Intensity Level  1 (needs rehab and is not cleared for d/c home)       PT will follow for STG as outlined on eval. Pt/ family agreeable to PT POC and goals as stated.   Barriers to Discharge Inaccessible home environment;Decreased caregiver support  (spouse unable to safely assist at [facility]- requesting rehab)   Goals   Patient Goals get better and not fall at home   STG Expiration Date 02/18/25   Short Term Goal #1 In 14 days pt will:  Complete bed mobility skills with MI to facilitate safe return to previous living environment and decrease burden on caregivers.  Perform all functional transfers with MI  to facilitate safe return to previous living environment.    Ambulate  with 'x1' without LOB and stable vitals for safe ambulation in home/ community environment.   Complete stair training up/ down flight step/s w/  S for safe access to previous living environment and to increase community access.    Improve balance by 1 grade in order to decrease fall risk.  Indep TKA HEP  Improve LLE strength grades by 1 to increase independence w/ all functional mobility, transfers and gait.  Actively participate w/ PT for ongoing pt and family education; DME needs and D/C planning to promote highest level of function in least restrictive environment.   PT Treatment Day 0   Plan   Treatment/Interventions ADL retraining;Functional transfer training;LE strengthening/ROM;Elevations;Therapeutic exercise;Endurance training;Cognitive reorientation;Patient/family training;Equipment eval/education;Bed mobility;Gait training;Compensatory technique education;Spoke to nursing;Spoke to case management;Spoke to advanced practitioner;OT;Family   PT Frequency 3-5x/wk    Discharge Recommendation   Rehab Resource Intensity Level, PT I (Maximum Resource Intensity)   Equipment Recommended Walker   AM-PAC Basic Mobility Inpatient   Turning in Flat Bed Without Bedrails 2   Lying on Back to Sitting on Edge of Flat Bed Without Bedrails 2   Moving Bed to Chair 2   Standing Up From Chair Using Arms 2   Walk in Room 2   Climb 3-5 Stairs With Railing 1   Basic Mobility Inpatient Raw Score 11   Basic Mobility Standardized Score 30.25   [facility] Highest Level Of Mobility   -HL Goal 4: Move to chair/commode   -[facility] Achieved 7: Walk 25 feet or more   End of Consult   Patient Position at End of Consult Bedside chair;Bed/Chair alarm activated;All needs within reach     The patient's AM-PAC Basic Mobility Inpatient Short Form Raw Score is 11. A Raw score of greater than 16 suggests the patient may benefit from discharge to home. Please also refer to the recommendation of the Physical Therapist for safe discharge planning.

## 2025-02-04 NOTE — ASSESSMENT & PLAN NOTE
Follows with [facility] Cardiology as outpatient   Continue home tafamidis

## 2025-02-04 NOTE — ASSESSMENT & PLAN NOTE
Lab Results   Component Value Date    EGFR 69 02/04/2025    EGFR 70 02/03/2025    EGFR 67 01/13/2025    CREATININE 0.99 02/04/2025    CREATININE 0.98 02/03/2025    CREATININE 1.09 01/13/2025

## 2025-02-05 LAB
ANION GAP SERPL CALCULATED.3IONS-SCNC: 10 MMOL/L (ref 4–13)
BUN SERPL-MCNC: 28 MG/DL (ref 5–25)
CALCIUM SERPL-MCNC: 9.2 MG/DL (ref 8.4–10.2)
CHLORIDE SERPL-SCNC: 102 MMOL/L (ref 96–108)
CO2 SERPL-SCNC: 27 MMOL/L (ref 21–32)
CREAT SERPL-MCNC: 0.88 MG/DL (ref 0.6–1.3)
ERYTHROCYTE [DISTWIDTH] IN BLOOD BY AUTOMATED COUNT: 12.2 % (ref 11.6–15.1)
GFR SERPL CREATININE-BSD FRML MDRD: 78 ML/MIN/1.73SQ M
GLUCOSE SERPL-MCNC: 105 MG/DL (ref 65–140)
HCT VFR BLD AUTO: 53.6 % (ref 36.5–49.3)
HGB BLD-MCNC: 17.6 G/DL (ref 12–17)
INR PPP: 2.32 (ref 0.85–1.19)
MCH RBC QN AUTO: 32.2 PG (ref 26.8–34.3)
MCHC RBC AUTO-ENTMCNC: 32.8 G/DL (ref 31.4–37.4)
MCV RBC AUTO: 98 FL (ref 82–98)
PLATELET # BLD AUTO: 197 THOUSANDS/UL (ref 149–390)
PMV BLD AUTO: 11.4 FL (ref 8.9–12.7)
POTASSIUM SERPL-SCNC: 4.1 MMOL/L (ref 3.5–5.3)
PROTHROMBIN TIME: 25.4 SECONDS (ref 12.3–15)
RBC # BLD AUTO: 5.47 MILLION/UL (ref 3.88–5.62)
SODIUM SERPL-SCNC: 139 MMOL/L (ref 135–147)
WBC # BLD AUTO: 11.79 THOUSAND/UL (ref 4.31–10.16)

## 2025-02-05 PROCEDURE — 97130 THER IVNTJ EA ADDL 15 MIN: CPT

## 2025-02-05 PROCEDURE — 99232 SBSQ HOSP IP/OBS MODERATE 35: CPT | Performed by: INTERNAL MEDICINE

## 2025-02-05 PROCEDURE — 97535 SELF CARE MNGMENT TRAINING: CPT

## 2025-02-05 PROCEDURE — 99232 SBSQ HOSP IP/OBS MODERATE 35: CPT

## 2025-02-05 PROCEDURE — 85027 COMPLETE CBC AUTOMATED: CPT

## 2025-02-05 PROCEDURE — 80048 BASIC METABOLIC PNL TOTAL CA: CPT

## 2025-02-05 PROCEDURE — 85610 PROTHROMBIN TIME: CPT

## 2025-02-05 PROCEDURE — 97129 THER IVNTJ 1ST 15 MIN: CPT

## 2025-02-05 RX ORDER — OLANZAPINE 10 MG/2ML
5 INJECTION, POWDER, FOR SOLUTION INTRAMUSCULAR ONCE
Status: DISCONTINUED | OUTPATIENT
Start: 2025-02-05 | End: 2025-02-07 | Stop reason: HOSPADM

## 2025-02-05 RX ADMIN — LIDOCAINE 1 PATCH: 700 PATCH TOPICAL at 08:15

## 2025-02-05 RX ADMIN — POLYETHYLENE GLYCOL 3350 17 G: 17 POWDER, FOR SOLUTION ORAL at 08:15

## 2025-02-05 RX ADMIN — ACETAMINOPHEN 975 MG: 325 TABLET, FILM COATED ORAL at 23:12

## 2025-02-05 RX ADMIN — METOPROLOL SUCCINATE 25 MG: 25 TABLET, EXTENDED RELEASE ORAL at 08:15

## 2025-02-05 RX ADMIN — CEFADROXIL 500 MG: 500 CAPSULE ORAL at 21:54

## 2025-02-05 RX ADMIN — FOLIC ACID 2 MG: 1 TABLET ORAL at 08:15

## 2025-02-05 RX ADMIN — Medication 1 TABLET: at 08:15

## 2025-02-05 RX ADMIN — ACETAMINOPHEN 975 MG: 325 TABLET, FILM COATED ORAL at 16:13

## 2025-02-05 RX ADMIN — WARFARIN SODIUM 5 MG: 5 TABLET ORAL at 17:18

## 2025-02-05 RX ADMIN — SENNOSIDES AND DOCUSATE SODIUM 1 TABLET: 50; 8.6 TABLET ORAL at 21:54

## 2025-02-05 RX ADMIN — QUETIAPINE FUMARATE 25 MG: 25 TABLET ORAL at 21:55

## 2025-02-05 RX ADMIN — ACETAMINOPHEN 975 MG: 325 TABLET, FILM COATED ORAL at 08:15

## 2025-02-05 RX ADMIN — CEFADROXIL 500 MG: 500 CAPSULE ORAL at 08:11

## 2025-02-05 NOTE — CASE MANAGEMENT
Case Management Discharge Planning Note    Patient name Jesse JOAQUIN Figuli  [facility] [id number]  : 1940 Date 2025       Current Admission Date: 2025  Current Admission Diagnosis:Fall   Patient Active Problem List    Diagnosis Date Noted Date Diagnosed    Ambulatory dysfunction 2025     Urinary retention 2025     Osteoarthritis 2025     Acute pain 2025     Forgetfulness 2025     Hearing loss 2025     At risk for delirium 2025     Fall 2025     History of total knee arthroplasty 2025     CKD (chronic kidney disease) 2025     Chronic deep vein thrombosis (DVT) of right lower extremity ([facility]) 2025     Multiple skin tears 2025     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[facility]) 2024     Chronic diastolic congestive heart failure ([facility]) 2024     Thrombocytopenia ([facility]) 2023     Multiple falls 2023     ESTER (obstructive sleep apnea) 2023     History of DVT (deep vein thrombosis) 2023     Atrial fibrillation ([facility]) 2023     Congestive heart failure (CHF) ([facility]) 2023     Generalized weakness 2023     Chronic venous hypertension (idiopathic) with ulcer of right lower extremity ([facility]) 2023     Elevated troponin 2023     Multiple abrasions 2023     Frailty 2023     Lower extremity cellulitis 2023     Stage 3a chronic kidney disease (CKD) ([facility]) 2023     Hyperbilirubinemia 2023     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 ([facility]) 2022     Bilateral leg edema 2022     Persistent atrial fibrillation ([facility]) 2020     Morbid obesity ([facility]) 2018     Lumbar stenosis 2018     Benign neoplasm of colon 2018     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     Diverticular disease of colon 2018     Internal hemorrhoids 2018     Pure hypercholesterolemia  02/07/2018     Sacroiliitis (HCC) 02/01/2018     Bilateral hip pain 12/04/2017     Adenomatous colon polyp 12/29/2015     Spinal stenosis of lumbar region 09/10/2015     DDD (degenerative disc disease), lumbar 07/07/2015     Lumbar disc herniation 06/22/2015     Radiculopathy, lumbar region 06/22/2015     MTHFR mutation 03/11/2015     Elevated hemoglobin A1c 06/05/2014     Premature atrial complexes 05/12/2014     Aortic ectasia (HCC) 03/06/2014     Obstructive sleep apnea 11/25/2013     Premature ventricular contraction 11/25/2013     Lichen planus 05/17/2012     HTN (hypertension) 04/04/2012     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     Vitamin D deficiency 04/04/2012       LOS (days): 3  Geometric Mean LOS (GMLOS) (days): 3  Days to GMLOS:0.3     OBJECTIVE:  Risk of Unplanned Readmission Score: 17.61         Current admission status: Inpatient   Preferred Pharmacy:   [id number] - [place], [place] - [address]  [address]  [address]  [facility] [address]  Phone: [phone] Fax: [phone]    [id number] - [place], [place] - [address]  [address]  [facility] [address]  Phone: [phone] Fax: [phone]    [facility] - [place], [place] - [address]  [address]  [place] [address]  Phone: [phone] Fax: [phone]    Primary Care Provider: LIVIA Parra    Primary Insurance: MEDICARE  Secondary Insurance: AARP    DISCHARGE DETAILS:    Pt's wife informed CM that [facility] is her preference

## 2025-02-05 NOTE — PLAN OF CARE
Problem: PAIN - ADULT  Goal: Verbalizes/displays adequate comfort level or baseline comfort level  Description: Interventions:  - Encourage patient to monitor pain and request assistance  - Assess pain using appropriate pain scale  - Administer analgesics based on type and severity of pain and evaluate response  - Implement non-pharmacological measures as appropriate and evaluate response  - Consider cultural and social influences on pain and pain management  - Notify physician/advanced practitioner if interventions unsuccessful or patient reports new pain  Outcome: Progressing     Problem: INFECTION - ADULT  Goal: Absence or prevention of progression during hospitalization  Description: INTERVENTIONS:  - Assess and monitor for signs and symptoms of infection  - Monitor lab/diagnostic results  - Monitor all insertion sites, i.e. indwelling lines, tubes, and drains  - Monitor endotracheal if appropriate and nasal secretions for changes in amount and color  - Shaniko appropriate cooling/warming therapies per order  - Administer medications as ordered  - Instruct and encourage patient and family to use good hand hygiene technique  - Identify and instruct in appropriate isolation precautions for identified infection/condition  Outcome: Progressing

## 2025-02-05 NOTE — ASSESSMENT & PLAN NOTE
Lab Results   Component Value Date    EGFR 78 02/05/2025    EGFR 69 02/04/2025    EGFR 70 02/03/2025    CREATININE 0.88 02/05/2025    CREATININE 0.99 02/04/2025    CREATININE 0.98 02/03/2025

## 2025-02-05 NOTE — ASSESSMENT & PLAN NOTE
Lab Results   Component Value Date    EGFR 78 02/05/2025    EGFR 69 02/04/2025    EGFR 70 02/03/2025    CREATININE 0.88 02/05/2025    CREATININE 0.99 02/04/2025    CREATININE 0.98 02/03/2025   -Monitor renal function  -Avoid nephrotoxins  -Outpatient follow up with PCP

## 2025-02-05 NOTE — PROGRESS NOTES
Father Latisha conducted a long pastoral care visit.  Father gave a blessing and prayer and Holy Communion.    02/05/25 0800   Clinical Encounter Type   Visited With Patient and family together   Jain Encounters   Jain Needs Prayer   Sacramental Encounters   Communion Given Indicator Yes

## 2025-02-05 NOTE — PROGRESS NOTES
Progress Note - Trauma   Name: Jesse France 84 y.o. male I MRN: [id number]  Unit/Bed#: [facility] 609-01 I Date of Admission: 2/2/2025   Date of Service: 2/5/2025 I Hospital Day: 3    Assessment & Plan  Fall  Patient reports multiple falls since surgery on his left knee last week.  Described as mechanical  No injuries found  Admitted for ambulatory dysfunction  History of total knee arthroplasty  S/p total knee arthroplasty on 1/30/2025  Continue on home cefadroxil for postop prophylaxis  Left knee XR negative for acute traumatic injury  Continue to monitor wound site-currently appears healthy  PT/OT  HTN (hypertension)  -Continue home metoprolol  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HCC)  Continue home tafamidis--patient to bring in since this medication is not on formulary.   Atrial fibrillation (HCC)  Continue home warfarin (7.5 mg Tuesday and Friday, 5 mg all other days)  INR goal 2-3  Bilateral leg edema  Family expressed concerns regarding bilateral lower extremity edema-worsening left leg.  Venous Doppler completed that showed no thrombophlebitis/DVT  Recommend elevation of legs when at rest  Continue to monitor  SCDs  CKD (chronic kidney disease)  Lab Results   Component Value Date    EGFR 78 02/05/2025    EGFR 69 02/04/2025    EGFR 70 02/03/2025    CREATININE 0.88 02/05/2025    CREATININE 0.99 02/04/2025    CREATININE 0.98 02/03/2025   -Monitor renal function  -Avoid nephrotoxins  -Outpatient follow up with PCP  ESTER (obstructive sleep apnea)  -States he does not use a CPAP at night  Chronic deep vein thrombosis (DVT) of right lower extremity (HCC)  - On warfarin, continue   - b/l Duplex US without evidence of DVT  - 2/5 INR therapeutic at 2.32  Multiple skin tears  Local wound care  Tetanus updated  Ambulatory dysfunction  In the setting of total knee arthroplasty on 1/30/2025 PT/OT evaluation    Bowel Regimen: Senokot-S  VTE Prophylaxis:VTE covered by:  warfarin, Oral  warfarin, Oral, 7.5 mg at 02/04/25 8434         Disposition: Continue current level of care.  Pending rehab placement.  CM following for disposition planning.    Please contact the SecureChat role for the Trauma service with any questions/concerns.    24 Hour Events : No acute events overnight.   Subjective : Patient is doing well on evaluation. He is sitting upright on the side of the bed eating breakfast. It was noted on exam that he has increased LE swelling with erythema of the left leg. Swelling decreased with elevation of the legs.     Objective :  Temp:  [97.5 °F (36.4 °C)-98.2 °F (36.8 °C)] 97.5 °F (36.4 °C)  HR:  [70-85] 70  BP: (123-141)/() 123/81  Resp:  [16-19] 17  SpO2:  [100 %] 100 %  O2 Device: None (Room air)    I/O         / 0701  /04 0700 / 0701  / 0700 02/ 0701  /06 0700    P.O. 100 180 480    Total Intake(mL/kg) 100 (0.9) 180 (1.6) 480 (4.3)    Urine (mL/kg/hr) 650 (0.2)      Total Output 650      Net -550 +180 +480           Unmeasured Urine Occurrence  1 x             Physical Exam  Vitals and nursing note reviewed.   Constitutional:       General: He is not in acute distress.     Appearance: He is well-developed.   HENT:      Head: Normocephalic and atraumatic.   Eyes:      Extraocular Movements: Extraocular movements intact.      Conjunctiva/sclera: Conjunctivae normal.   Cardiovascular:      Rate and Rhythm: Normal rate and regular rhythm.      Heart sounds: Normal heart sounds.   Pulmonary:      Effort: Pulmonary effort is normal. No respiratory distress.      Breath sounds: Normal breath sounds.   Abdominal:      General: There is no distension.      Palpations: Abdomen is soft.      Tenderness: There is no abdominal tenderness. There is no guarding.   Musculoskeletal:         General: No swelling.      Cervical back: Normal range of motion and neck supple.      Right lower le+ Pitting Edema present.      Left lower leg: 3+ Pitting Edema present.   Skin:     General: Skin is warm and dry.      Capillary  Refill: Capillary refill takes less than 2 seconds.   Neurological:      General: No focal deficit present.      Mental Status: He is alert. Mental status is at baseline.      Sensory: No sensory deficit.      Motor: No weakness.   Psychiatric:         Mood and Affect: Mood normal.         Behavior: Behavior normal.               Lab Results: I have reviewed the following results:  Recent Labs     02/02/25  1821 02/02/25  2234 02/05/25  0522 02/05/25  0624   WBC  --    < > 11.79*  --    HGB 16.7   < > 17.6*  --    HCT 49   < > 53.6*  --    PLT  --    < > 197  --    SODIUM  --    < > 139  --    K  --    < > 4.1  --    CL  --    < > 102  --    CO2 29   < > 27  --    BUN  --    < > 28*  --    CREATININE  --    < > 0.88  --    GLUC  --    < > 105  --    CAIONIZED 1.16  --   --   --    INR  --    < >  --  2.32*    < > = values in this interval not displayed.       Imaging Results Review: No pertinent imaging studies reviewed.  Other Study Results Review: No additional pertinent studies reviewed.

## 2025-02-05 NOTE — PROGRESS NOTES
Progress Note - Geriatric Medicine   Name: Jesse France 84 y.o. male I MRN: [id number]  [facility] I Date of Admission: 2/2/2025   Date of Service: 2/5/2025 I Hospital Day: 3    Assessment & Plan  Bilateral leg edema  Multifactorial  Family reports has not been taking lasix at home  Improved with restart of lasix  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HCC)  Follows with [facility] Cardiology as outpatient   Continue home tafamidis  CKD (chronic kidney disease)  Lab Results   Component Value Date    EGFR 78 02/05/2025    EGFR 69 02/04/2025    EGFR 70 02/03/2025    CREATININE 0.88 02/05/2025    CREATININE 0.99 02/04/2025    CREATININE 0.98 02/03/2025     Chronic deep vein thrombosis (DVT) of right lower extremity (HCC)  Chronic use of coumadin  Multiple skin tears  Chronic, with multiple new one sustained with recent fall  Wound care  Consider tubi  as protection to extremities  Ambulatory dysfunction  At high risk for falls secondary to age, medications, hx of prior fall  Fall precautions  PT/OT  Rehab post hospitalization  Urinary retention  Noted on prior admission, refused straight cath  Urinary retention protocol  Limit caffiene intake  Avoid alcohol, anticholinergics, opoids, sedatives, benzodiazepines  Avoid extremes of fluid intake (< 32 ounces or >64 ounces)  Encourage scheduled tolieting q2 hours  Monitor for constipation - last BM 2/1/25- miralax ordered  Osteoarthritis  S/p right knee arthroscopy at [facility] 1/30/25  Incision healing well, steristrip intact  Had frequent falls at home, greater than 4  PT/OT  Acute pain  Post op, exacerbated by fall  Geriatric pain protocol  Scheduled acetaminophen 975 mg po Q8  Oxycodone 2.5 mg po Q 4 prn moderate pain  Oxycodone 5 mg po Q 4 prn severe pain   Monitor for constipation  Lidoderm patch   Forgetfulness  Daughter concerned  Word finding noted on exam  Recommend check TSH and Vitamin B 12 level for reversible cause of memory loss  CT head (2/2/25) mild  microangiopathic changes  MOCA (2/5/25) 19/30 done by OT    Increased agitation and confusion over night, requiring restraints and zyprexa,  this am at baseline, cannot exclude sundowning  Seroquel 25 mg po QHS     Recommend follow up as outpatient for comprehensive assessment and supportive resources  Hearing loss  Hearing impairment strongly correlated with depression, cognitive impairment, delirium and falls in the older adult  Use hearing aids or sound amplifier  Speaking face to face  Use clear dictation, enunciation of words    At risk for delirium  Oriented x 4  Maintain delirium precautions  Provide frequent redirection, reorientation, distraction techniques  Avoid deliriogenic medications such as tramadol, benzodiazepines, anticholinergics,  Benadryl  Treat pain, See geriatric pain protocol  Monitor for constipation and urinary retention  Encourage early and frequent moblization, OOB  Encourage Hydration/ Nutrition  Implement sleep hygiene, limit night time interuptions, group activities  Frailty  Clinical Frail Scale: 5- Mildly Frail  Has supportive wife  PT/OT  Albumin 3.6, maintain protein in diet  Maintain protein in diet    Subjective   He is oriented,calm and cooperative, He reports difficulty sleeping at night.     Objective :  Temp:  [97.5 °F (36.4 °C)-98.2 °F (36.8 °C)] 97.5 °F (36.4 °C)  HR:  [70-85] 70  BP: (123-141)/() 123/81  Resp:  [16-19] 17  SpO2:  [100 %] 100 %  O2 Device: None (Room air)    Physical Exam  Vitals and nursing note reviewed.   HENT:      Head: Normocephalic.      Nose: No congestion.      Mouth/Throat:      Mouth: Mucous membranes are moist.      Pharynx: No oropharyngeal exudate.   Eyes:      General:         Right eye: No discharge.         Left eye: No discharge.   Cardiovascular:      Rate and Rhythm: Normal rate and regular rhythm.      Pulses: Normal pulses.      Heart sounds: Normal heart sounds.   Pulmonary:      Effort: Pulmonary effort is normal.      Breath  sounds: Normal breath sounds.   Abdominal:      General: Bowel sounds are normal.      Palpations: Abdomen is soft.   Musculoskeletal:         General: Normal range of motion.      Cervical back: Normal range of motion.   Skin:     General: Skin is warm and dry.   Neurological:      Mental Status: He is alert and oriented to person, place, and time. Mental status is at baseline.   Psychiatric:         Mood and Affect: Mood normal.           Lab Results: I have reviewed the following results:CBC/BMP:   .     02/05/25  0522   WBC 11.79*   HGB 17.6*   HCT 53.6*      SODIUM 139   K 4.1      CO2 27   BUN 28*   CREATININE 0.88   GLUC 105          Therapies:   Basic Mobility Inpatient Raw Score: 11  [facility] Goal: 4: Move to chair/commode  -[facility] Achieved: 6: Walk 10 steps or more      VTE Prophylaxis: Sequential compression device (Venodyne)     Code Status: Level 1 - Full Code  Advance Directive and Living Will:      Power of : Yes      Family and Social Support:   Living Arrangements: Lives w/ Spouse/significant other  Support Systems: Spouse/significant other; Self; Daughter  Assistance Needed: n/a  Type of Current Residence: Private residence  Current Home Care Services: No  Discharge planning discussed with:: patient and wife  Freedom of Choice: Yes

## 2025-02-05 NOTE — ASSESSMENT & PLAN NOTE
Follows with [facility] Cardiology as outpatient   Continue home tafamidis

## 2025-02-05 NOTE — ASSESSMENT & PLAN NOTE
Family expressed concerns regarding bilateral lower extremity edema-worsening left leg.  Venous Doppler completed that showed no thrombophlebitis/DVT  Recommend elevation of legs when at rest  Continue to monitor  SCDs

## 2025-02-05 NOTE — PROGRESS NOTES
02/05/25 1400   Clinical Encounter Type   Visited With Patient and family together   Sacramental Encounters   Communion Given Indicator Yes   Sacrament Other Other (Comment)  (blessing given)     Father Latisha

## 2025-02-05 NOTE — ASSESSMENT & PLAN NOTE
S/p right knee arthroscopy at [facility] 1/30/25  Incision healing well, steristrip intact  Had frequent falls at home, greater than 4  PT/OT

## 2025-02-05 NOTE — OCCUPATIONAL THERAPY NOTE
Occupational Therapy Progress Note     Patient Name: Jesse France  Today's Date: 2/5/2025  Problem List  Active Problems:    HTN (hypertension)    Bilateral leg edema    Frailty    ESTER (obstructive sleep apnea)    Atrial fibrillation (HCC)    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HCC)    Fall    History of total knee arthroplasty    CKD (chronic kidney disease)    Chronic deep vein thrombosis (DVT) of right lower extremity (HCC)    Multiple skin tears    Ambulatory dysfunction    Urinary retention    Osteoarthritis    Acute pain    Forgetfulness    Hearing loss    At risk for delirium        02/05/25 1045   OT Last Visit   OT Visit Date 02/05/25   Note Type   Note Type Treatment   Pain Assessment   Pain Assessment Tool 0-10   Pain Score 5   Pain Location/Orientation Orientation: Left;Location: Knee   Hospital Pain Intervention(s) Cold applied;Repositioned;Ambulation/increased activity;Emotional support   Restrictions/Precautions   LLE Weight Bearing Per Order WBAT   Other Precautions Cognitive;Chair Alarm;Bed Alarm;WBS;Multiple lines;Fall Risk;Pain   Lifestyle   Autonomy PT IS A QUESTIONABLE HISTORIAN AT TIMES- SPOUSE PRESENT TO ASSIST WITH ANSWERING QUESTIONS AS NEEDED. FULLY INDEPENDENT AT BASELINE. PT HAS REQUIRED ADDITIONAL ASSIST WITH ADLS/IADLS FOLLOWING RETURN HOME S/P TKA. SPOUSE REPORTS 4 FALLS IN THE PAST FEW DAYS   Reciprocal Relationships LIVES WITH SUPPORTIVE SPOUSE.   Service to Others RETIRED;    Intrinsic Gratification ENJOYS GARDENING   ADL   LB Dressing Assistance 2  Maximal Assistance   LB Dressing Deficit Don/doff R sock;Don/doff L sock   Toileting Assistance  2  Maximal Assistance   Toileting Deficit Bedside commode;Supervison/safety;Increased time to complete;Perineal hygiene;Setup   Transfers   Sit to Stand 3  Moderate assistance   Additional items Assist x 2;Increased time required;Verbal cues   Stand to Sit 3  Moderate assistance   Additional items Assist x 1;Increased time required;Verbal cues    Functional Mobility   Functional Mobility 3  Moderate assistance   Additional items Rolling walker   Toilet Transfers   Toilet Transfer From Rolling walker   Toilet Transfer Type To and from   Toilet Transfer to Standard bedside commode   Toilet Transfer Technique Ambulating   Toilet Transfers Moderate assistance   Cognition   Overall Cognitive Status Impaired   Arousal/Participation Alert;Cooperative   Attention Attends with cues to redirect   Orientation Level Oriented X4  (grossly oriented to time)   Memory Decreased recall of precautions;Decreased short term memory;Decreased recall of recent events   Following Commands Follows one step commands without difficulty   Activity Tolerance   Activity Tolerance Patient limited by fatigue;Patient limited by pain;Other (Comment)  (cog)   Medical Staff Made Aware RN   Assessment   Assessment Pt seen for OT tx session on this date between 0783-3282 and again for later session between 4992-5577. Initially pt seen with RN to assist with toileting. Pt required additional assist for sit->stand txf from soft surface (bed) to MOD A x2. Pt conts to require MOD Ax1 for functional mobility however distance was limited during today's session 2' pain/fatigue. Pt completed toileting at MAX A level on BSC. Pt conts to require MAX A for LB dressing. Pt seen in later session with focus on formal cog assessment and pt/family education. Per rn, additional confusion and agitation at night- pt currently overall appropriate and cooperative. Pt completed the MOCA with G attention to task. Pt scored 19/30 indicating a low end of a mild cognitive impairment. Pt reports limited sleep following sx and relates this to scoring.  Time spent educating both pt and spouse on score. Spouse very appreciative of therapist. Pt additionally educated on current limitations, proper sleep hygiene, and d/c planning. Geriatrics team updated. Cont to recommend level I resources upon d/c. Will cont to follow to  address the initially established OT goals.   Plan   Treatment Interventions ADL retraining;Functional transfer training;Endurance training;Cognitive reorientation;Patient/family training;Equipment evaluation/education;Compensatory technique education;Activityengagement;Energy conservation   Goal Expiration Date 02/18/25   OT Treatment Day 1   OT Frequency 3-5x/wk   Discharge Recommendation   Rehab Resource Intensity Level, OT I (Maximum Resource Intensity)   Additional Comments  The patient's raw score on the AM-PAC Daily Activity Inpatient Short Form is 15. A raw score of less than 19 suggests the patient may benefit from discharge to post-acute rehabilitation services. Please refer to the recommendation of the Occupational Therapist for safe discharge planning.   -PAC Daily Activity Inpatient   Lower Body Dressing 2   Bathing 2   Toileting 2   Upper Body Dressing 3   Grooming 3   Eating 3   Daily Activity Raw Score 15   Daily Activity Standardized Score (Calc for Raw Score >=11) 34.69   -PAC Applied Cognition Inpatient   Following a Speech/Presentation 3   Understanding Ordinary Conversation 4   Taking Medications 3   Remembering Where Things Are Placed or Put Away 3   Remembering List of 4-5 Errands 2   Taking Care of Complicated Tasks 2   Applied Cognition Raw Score 17   Applied Cognition Standardized Score 36.52   MOCA   Version 7.1   Visuopatial/Executive 5   Naming 2   Memory 0   Attention: Digits 2   Attention: Letters 0   Attention: Serial 3   Language: Repeat 1   Language: Fluency 0   Abstraction 2   Delayed Recall 0   Orientation 4   Does patient have less than or equal to 12 years of education? 0   MOCA Total Score 19       Documentation completed by HAMILTON Iyer OTR/L  MOCA Certified ID# [id number]

## 2025-02-05 NOTE — PLAN OF CARE
Problem: OCCUPATIONAL THERAPY ADULT  Goal: Performs self-care activities at highest level of function for planned discharge setting.  See evaluation for individualized goals.  Description: Treatment Interventions: ADL retraining, Functional transfer training, Endurance training, Cognitive reorientation, Patient/family training, Equipment evaluation/education, Compensatory technique education, Energy conservation, Activityengagement          See flowsheet documentation for full assessment, interventions and recommendations.   Outcome: Progressing  Note: Limitation: Decreased ADL status, Decreased Safe judgement during ADL, Decreased cognition, Decreased endurance, Decreased self-care trans, Decreased high-level ADLs  Prognosis: Good  Assessment: Pt seen for OT tx session on this date between 9529-4190 and again for later session between 4306-4645. Initially pt seen with RN to assist with toileting. Pt required additional assist for sit->stand txf from soft surface (bed) to MOD A x2. Pt conts to require MOD Ax1 for functional mobility however distance was limited during today's session 2' pain/fatigue. Pt completed toileting at MAX A level on BSC. Pt conts to require MAX A for LB dressing. Pt seen in later session with focus on formal cog assessment and pt/family education. Per rn, additional confusion and agitation at night- pt currently overall appropriate and cooperative. Pt completed the MOCA with G attention to task. Pt scored 19/30 indicating a low end of a mild cognitive impairment. Pt reports limited sleep following sx and relates this to scoring.  Time spent educating both pt and spouse on score. Spouse very appreciative of therapist. Pt additionally educated on current limitations, proper sleep hygiene, and d/c planning. Geriatrics team updated. Cont to recommend level I resources upon d/c. Will cont to follow to address the initially established OT goals.     Rehab Resource Intensity Level, OT: I (Maximum  Resource Intensity)

## 2025-02-05 NOTE — ASSESSMENT & PLAN NOTE
Daughter concerned  Word finding noted on exam  Recommend check TSH and Vitamin B 12 level for reversible cause of memory loss  CT head (2/2/25) mild microangiopathic changes  MOCA (2/5/25) 19/30 done by OT    Increased agitation and confusion over night, requiring restraints and zyprexa,  this am at baseline, cannot exclude sundowning  Seroquel 25 mg po QHS     Recommend follow up as outpatient for comprehensive assessment and supportive resources

## 2025-02-05 NOTE — CASE MANAGEMENT
Case Management Discharge Planning Note    Patient name Jesse JOAQUIN Figuli  [facility] MRN [id number]  : 1940 Date 2025       Current Admission Date: 2025  Current Admission Diagnosis:Fall   Patient Active Problem List    Diagnosis Date Noted Date Diagnosed    Ambulatory dysfunction 2025     Urinary retention 2025     Osteoarthritis 2025     Acute pain 2025     Forgetfulness 2025     Hearing loss 2025     At risk for delirium 2025     Fall 2025     History of total knee arthroplasty 2025     CKD (chronic kidney disease) 2025     Chronic deep vein thrombosis (DVT) of right lower extremity ([facility]) 2025     Multiple skin tears 2025     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     Chronic diastolic congestive heart failure ([facility]) 2024     Thrombocytopenia ([facility]) 2023     Multiple falls 2023     ESTER (obstructive sleep apnea) 2023     History of DVT (deep vein thrombosis) 2023     Atrial fibrillation ([facility]) 2023     Congestive heart failure (CHF) ([facility]) 2023     Generalized weakness 2023     Chronic venous hypertension (idiopathic) with ulcer of right lower extremity ([facility]) 2023     Elevated troponin 2023     Multiple abrasions 2023     Frailty 2023     Lower extremity cellulitis 2023     Stage 3a chronic kidney disease (CKD) ([facility]) 2023     Hyperbilirubinemia 2023     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 ([facility]) 2022     Bilateral leg edema 2022     Persistent atrial fibrillation ([facility]) 2020     Morbid obesity ([facility]) 2018     Lumbar stenosis 2018     Benign neoplasm of colon 2018     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     Diverticular disease of colon 2018     Internal hemorrhoids 2018     Pure hypercholesterolemia  02/07/2018     Sacroiliitis (HCC) 02/01/2018     Bilateral hip pain 12/04/2017     Adenomatous colon polyp 12/29/2015     Spinal stenosis of lumbar region 09/10/2015     DDD (degenerative disc disease), lumbar 07/07/2015     Lumbar disc herniation 06/22/2015     Radiculopathy, lumbar region 06/22/2015     MTHFR mutation 03/11/2015     Elevated hemoglobin A1c 06/05/2014     Premature atrial complexes 05/12/2014     Aortic ectasia (HCC) 03/06/2014     Obstructive sleep apnea 11/25/2013     Premature ventricular contraction 11/25/2013     Lichen planus 05/17/2012     HTN (hypertension) 04/04/2012     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     Vitamin D deficiency 04/04/2012       LOS (days): 3  Geometric Mean LOS (GMLOS) (days): 3  Days to GMLOS:0.4     OBJECTIVE:  Risk of Unplanned Readmission Score: 17.57         Current admission status: Inpatient   Preferred Pharmacy:   [id number] - [place], [place] - [address]  [address]  [address]  [facility] [address]  Phone: [phone] Fax: [phone]    [id number] - [place], [place] - [address]  [address]  [facility] [address]  Phone: [phone] Fax: [phone]    [facility] - [place], [place] - [address]  [address]  [place] [address]  Phone: [phone] Fax: [phone]    Primary Care Provider: LIVIA Parra    Primary Insurance: MEDICARE  Secondary Insurance: AARP    DISCHARGE DETAILS:    Pt was denied by [facility]  Wife and pt are aware  They requested referral to [facility], [facility] and [facility]. CM placed

## 2025-02-06 PROBLEM — I87.2 CHRONIC VENOUS INSUFFICIENCY OF LOWER EXTREMITY: Status: ACTIVE | Noted: 2025-02-06

## 2025-02-06 LAB
ERYTHROCYTE [DISTWIDTH] IN BLOOD BY AUTOMATED COUNT: 12.4 % (ref 11.6–15.1)
HCT VFR BLD AUTO: 49 % (ref 36.5–49.3)
HGB BLD-MCNC: 16.1 G/DL (ref 12–17)
INR PPP: 2.77 (ref 0.85–1.19)
MCH RBC QN AUTO: 32.3 PG (ref 26.8–34.3)
MCHC RBC AUTO-ENTMCNC: 32.9 G/DL (ref 31.4–37.4)
MCV RBC AUTO: 98 FL (ref 82–98)
PLATELET # BLD AUTO: 201 THOUSANDS/UL (ref 149–390)
PMV BLD AUTO: 11.3 FL (ref 8.9–12.7)
PROTHROMBIN TIME: 29 SECONDS (ref 12.3–15)
RBC # BLD AUTO: 4.99 MILLION/UL (ref 3.88–5.62)
WBC # BLD AUTO: 8.49 THOUSAND/UL (ref 4.31–10.16)

## 2025-02-06 PROCEDURE — 99233 SBSQ HOSP IP/OBS HIGH 50: CPT | Performed by: NURSE PRACTITIONER

## 2025-02-06 PROCEDURE — 85610 PROTHROMBIN TIME: CPT

## 2025-02-06 PROCEDURE — 99232 SBSQ HOSP IP/OBS MODERATE 35: CPT | Performed by: SURGERY

## 2025-02-06 PROCEDURE — 85027 COMPLETE CBC AUTOMATED: CPT

## 2025-02-06 RX ORDER — POLYETHYLENE GLYCOL 3350 17 G/17G
17 POWDER, FOR SOLUTION ORAL ONCE
Status: COMPLETED | OUTPATIENT
Start: 2025-02-06 | End: 2025-02-06

## 2025-02-06 RX ORDER — DIVALPROEX SODIUM 125 MG/1
125 CAPSULE, COATED PELLETS ORAL
Status: DISCONTINUED | OUTPATIENT
Start: 2025-02-06 | End: 2025-02-07 | Stop reason: HOSPADM

## 2025-02-06 RX ORDER — QUETIAPINE FUMARATE 25 MG/1
25 TABLET, FILM COATED ORAL
Status: DISCONTINUED | OUTPATIENT
Start: 2025-02-06 | End: 2025-02-07 | Stop reason: HOSPADM

## 2025-02-06 RX ORDER — AMOXICILLIN 250 MG
1 CAPSULE ORAL 2 TIMES DAILY
Status: DISCONTINUED | OUTPATIENT
Start: 2025-02-06 | End: 2025-02-07 | Stop reason: HOSPADM

## 2025-02-06 RX ADMIN — LIDOCAINE 1 PATCH: 700 PATCH TOPICAL at 08:49

## 2025-02-06 RX ADMIN — CEFADROXIL 500 MG: 500 CAPSULE ORAL at 21:24

## 2025-02-06 RX ADMIN — WARFARIN SODIUM 5 MG: 5 TABLET ORAL at 17:32

## 2025-02-06 RX ADMIN — POLYETHYLENE GLYCOL 3350 17 G: 17 POWDER, FOR SOLUTION ORAL at 10:54

## 2025-02-06 RX ADMIN — ACETAMINOPHEN 975 MG: 325 TABLET, FILM COATED ORAL at 23:07

## 2025-02-06 RX ADMIN — Medication 1 TABLET: at 08:50

## 2025-02-06 RX ADMIN — FOLIC ACID 2 MG: 1 TABLET ORAL at 08:49

## 2025-02-06 RX ADMIN — ACETAMINOPHEN 975 MG: 325 TABLET, FILM COATED ORAL at 17:32

## 2025-02-06 RX ADMIN — METOPROLOL SUCCINATE 25 MG: 25 TABLET, EXTENDED RELEASE ORAL at 08:50

## 2025-02-06 RX ADMIN — DIVALPROEX SODIUM 125 MG: 125 CAPSULE ORAL at 21:24

## 2025-02-06 RX ADMIN — SENNOSIDES AND DOCUSATE SODIUM 1 TABLET: 50; 8.6 TABLET ORAL at 17:32

## 2025-02-06 RX ADMIN — ACETAMINOPHEN 975 MG: 325 TABLET, FILM COATED ORAL at 08:50

## 2025-02-06 RX ADMIN — CEFADROXIL 500 MG: 500 CAPSULE ORAL at 08:52

## 2025-02-06 NOTE — ASSESSMENT & PLAN NOTE
Noted on prior admission, refused straight cath  Urinary retention protocol  Limit caffiene intake  Avoid alcohol, anticholinergics, opoids, sedatives, benzodiazepines  Avoid extremes of fluid intake (< 32 ounces or >64 ounces)  Encourage scheduled tolieting q2 hours  Monitor for constipation - last BM 2/3/25- continue senna S BID and miralax prn

## 2025-02-06 NOTE — PROGRESS NOTES
Progress Note - Geriatric Medicine   Name: Jesse France 84 y.o. male I MRN: [id number]  [facility] I Date of Admission: 2/2/2025   Date of Service: 2/6/2025 I Hospital Day: 4    Assessment & Plan  Bilateral leg edema  Multifactorial: HF, venous insufficiency  Family reports has not been taking lasix at home  Lasix restarted  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HCC)  Follows with [facility] as outpatient   Continue home tafamidis  CKD (chronic kidney disease)  Lab Results   Component Value Date    EGFR 78 02/05/2025    EGFR 69 02/04/2025    EGFR 70 02/03/2025    CREATININE 0.88 02/05/2025    CREATININE 0.99 02/04/2025    CREATININE 0.98 02/03/2025     Chronic deep vein thrombosis (DVT) of right lower extremity (HCC)  Chronic use of coumadin  Dopplers done 2/3/25 no evidence of DVT or thrombophlebitis  Multiple skin tears  Chronic, with multiple new one sustained with recent fall  Wound care  Consider tubi  as protection to extremities  Ambulatory dysfunction  At high risk for falls secondary to age, medications, hx of prior fall  Fall precautions  PT/OT  Rehab post hospitalization  Urinary retention  Noted on prior admission, refused straight cath  Urinary retention protocol  Limit caffiene intake  Avoid alcohol, anticholinergics, opoids, sedatives, benzodiazepines  Avoid extremes of fluid intake (< 32 ounces or >64 ounces)  Encourage scheduled tolieting q2 hours  Monitor for constipation - last BM 2/3/25- continue senna S BID and miralax prn  Osteoarthritis  S/p right knee arthroscopy at [facility] 1/30/25  Incision healing well  Continue course of duricef 500 Q 12, last dose 2/7/25  Had frequent falls at home, greater than 4  PT/OT  Acute pain  Post op, exacerbated by fall  Geriatric pain protocol  Scheduled acetaminophen 975 mg po Q8  Oxycodone 2.5 mg po Q 4 prn moderate pain  Oxycodone 5 mg po Q 4 prn severe pain   Monitor for constipation  Lidoderm patch   Forgetfulness  Daughter concerned  Word  finding noted on exam, during hospitalization, worsening confusion over night  Recommend check TSH and Vitamin B 12 level for reversible cause of memory loss  CT head (2/2/25) mild microangiopathic changes  MOCA (2/5/25) 19/30 done by OT, likely underlying dementia  Recommend follow up as outpatient for comprehensive assessment and supportive resources    Secondary to continued confusion overnight even with seroquel, will start depakote sprinkles 125 mg po QHS, will change seroquel to prn    Monitor LFTs    Hearing loss  Hearing impairment strongly correlated with depression, cognitive impairment, delirium and falls in the older adult  Use hearing aids or sound amplifier  Speaking face to face  Use clear dictation, enunciation of words    At risk for delirium  Oriented x 4, during the day, increased confusion at night, consistent with sundowning  Maintain delirium precautions  Provide frequent redirection, reorientation, distraction techniques  Avoid deliriogenic medications such as tramadol, benzodiazepines, anticholinergics,  Benadryl  Treat pain, See geriatric pain protocol  Monitor for constipation and urinary retention  Encourage early and frequent moblization, OOB  Encourage Hydration/ Nutrition  Implement sleep hygiene, limit night time interuptions, group activities  Frailty  Clinical Frail Scale: 5- Mildly Frail  Has supportive wife  PT/OT  Albumin 3.6, maintain protein in diet  Maintain protein in diet  Chronic venous insufficiency of lower extremity  Seen by vascular as outpatient, last OV 2/14/22  Recommended elevation and compression, daily moisturizer to keep skin intact  Continue to monitor    Subjective   He is oob in recliner chair, alert oriented, He reports he did not sleep well last night    Rounded with nursing who confirms pt did not sleep and was confused    Objective :  Temp:  [97.4 °F (36.3 °C)-97.6 °F (36.4 °C)] 97.4 °F (36.3 °C)  HR:  [69-72] 69  BP: (111-129)/(75-83) 126/75  Resp:   [16-17] 17  SpO2:  [92 %-100 %] 100 %  O2 Device: None (Room air)    Physical Exam  Vitals and nursing note reviewed.   HENT:      Head: Normocephalic.      Nose: Congestion present.   Eyes:      General:         Right eye: No discharge.         Left eye: No discharge.   Cardiovascular:      Rate and Rhythm: Normal rate and regular rhythm.   Pulmonary:      Effort: Pulmonary effort is normal.      Breath sounds: Normal breath sounds.   Abdominal:      General: Bowel sounds are normal.      Palpations: Abdomen is soft.   Musculoskeletal:         General: Normal range of motion.      Cervical back: Normal range of motion.   Skin:     Findings: Erythema present.   Neurological:      Mental Status: He is alert and oriented to person, place, and time. Mental status is at baseline.   Psychiatric:         Mood and Affect: Mood normal.         Lab Results: I have reviewed the following results:CBC/BMP:   .     02/06/25  0459   WBC 8.49   HGB 16.1   HCT 49.0             Therapies:   Basic Mobility Inpatient Raw Score: 11  [facility] Goal: 4: Move to chair/commode  [facility] Achieved: 6: Walk 10 steps or more      VTE Prophylaxis: Sequential compression device (Venodyne)     Code Status: Level 1 - Full Code  Advance Directive and Living Will:      Power of : Yes      Family and Social Support:   No data recorded      I have spent a total time of 90 minutes in caring for this patient on the day of the visit/encounter including Diagnostic results, Prognosis, Risks and benefits of tx options, Instructions for management, Importance of tx compliance, Risk factor reductions, Impressions, Counseling / Coordination of care, Documenting in the medical record, Reviewing / ordering tests, medicine, procedures  , Obtaining or reviewing history  , and Communicating with other healthcare professionals .

## 2025-02-06 NOTE — ASSESSMENT & PLAN NOTE
Multifactorial: HF, venous insufficiency  Family reports has not been taking lasix at home  Lasix restarted

## 2025-02-06 NOTE — ASSESSMENT & PLAN NOTE
Oriented x 4, during the day, increased confusion at night, consistent with sundowning  Maintain delirium precautions  Provide frequent redirection, reorientation, distraction techniques  Avoid deliriogenic medications such as tramadol, benzodiazepines, anticholinergics,  Benadryl  Treat pain, See geriatric pain protocol  Monitor for constipation and urinary retention  Encourage early and frequent moblization, OOB  Encourage Hydration/ Nutrition  Implement sleep hygiene, limit night time interuptions, group activities

## 2025-02-06 NOTE — PLAN OF CARE
Problem: PAIN - ADULT  Goal: Verbalizes/displays adequate comfort level or baseline comfort level  Description: Interventions:  - Encourage patient to monitor pain and request assistance  - Assess pain using appropriate pain scale  - Administer analgesics based on type and severity of pain and evaluate response  - Implement non-pharmacological measures as appropriate and evaluate response  - Consider cultural and social influences on pain and pain management  - Notify physician/advanced practitioner if interventions unsuccessful or patient reports new pain  Outcome: Progressing     Problem: INFECTION - ADULT  Goal: Absence or prevention of progression during hospitalization  Description: INTERVENTIONS:  - Assess and monitor for signs and symptoms of infection  - Monitor lab/diagnostic results  - Monitor all insertion sites, i.e. indwelling lines, tubes, and drains  - Monitor endotracheal if appropriate and nasal secretions for changes in amount and color  - [place] appropriate cooling/warming therapies per order  - Administer medications as ordered  - Instruct and encourage patient and family to use good hand hygiene technique  - Identify and instruct in appropriate isolation precautions for identified infection/condition  Outcome: Progressing  Goal: Absence of fever/infection during neutropenic period  Description: INTERVENTIONS:  - Monitor WBC    Outcome: Progressing     Problem: SAFETY ADULT  Goal: Patient will remain free of falls  Description: INTERVENTIONS:  - Educate patient/family on patient safety including physical limitations  - Instruct patient to call for assistance with activity   - Consult OT/PT to assist with strengthening/mobility   - Keep Call bell within reach  - Keep bed low and locked with side rails adjusted as appropriate  - Keep care items and personal belongings within reach  - Initiate and maintain comfort rounds  - Make Fall Risk Sign visible to staff  - Offer Toileting every 2 Hours,  in advance of need  - Initiate/Maintain bed alarm  - Obtain necessary fall risk management equipment:   - Apply yellow socks and bracelet for high fall risk patients  - Consider moving patient to room near nurses station  Outcome: Progressing       Problem: DISCHARGE PLANNING  Goal: Discharge to home or other facility with appropriate resources  Description: INTERVENTIONS:  - Identify barriers to discharge w/patient and caregiver  - Arrange for needed discharge resources and transportation as appropriate  - Identify discharge learning needs (meds, wound care, etc.)  - Arrange for interpretive services to assist at discharge as needed  - Refer to Case Management Department for coordinating discharge planning if the patient needs post-hospital services based on physician/advanced practitioner order or complex needs related to functional status, cognitive ability, or social support system  Outcome: Progressing     Problem: Knowledge Deficit  Goal: Patient/family/caregiver demonstrates understanding of disease process, treatment plan, medications, and discharge instructions  Description: Complete learning assessment and assess knowledge base.  Interventions:  - Provide teaching at level of understanding  - Provide teaching via preferred learning methods  Outcome: Progressing     Problem: SAFETY,RESTRAINT: NV/NON-SELF DESTRUCTIVE BEHAVIOR  Goal: Remains free of harm/injury (restraint for non violent/non self-detsructive behavior)  Description: INTERVENTIONS:  - Instruct patient/family regarding restraint use   - Assess and monitor physiologic and psychological status   - Provide interventions and comfort measures to meet assessed patient needs   - Identify and implement measures to help patient regain control  - Assess readiness for release of restraint   Outcome: Progressing  Goal: Returns to optimal restraint-free functioning  Description: INTERVENTIONS:  - Assess the patient's behavior and symptoms that indicate  continued need for restraint  - Identify and implement measures to help patient regain control  - Assess readiness for release of restraint   Outcome: Progressing     Problem: Nutrition/Hydration-ADULT  Goal: Nutrient/Hydration intake appropriate for improving, restoring or maintaining nutritional needs  Description: Monitor and assess patient's nutrition/hydration status for malnutrition. Collaborate with interdisciplinary team and initiate plan and interventions as ordered.  Monitor patient's weight and dietary intake as ordered or per policy. Utilize nutrition screening tool and intervene as necessary. Determine patient's food preferences and provide high-protein, high-caloric foods as appropriate.     INTERVENTIONS:  - Monitor oral intake, urinary output, labs, and treatment plans  - Assess nutrition and hydration status and recommend course of action  - Evaluate amount of meals eaten  - Assist patient with eating if necessary   - Allow adequate time for meals  - Recommend/ encourage appropriate diets, oral nutritional supplements, and vitamin/mineral supplements  - Order, calculate, and assess calorie counts as needed  - Recommend, monitor, and adjust tube feedings and TPN/PPN based on assessed needs  - Assess need for intravenous fluids  - Provide specific nutrition/hydration education as appropriate  - Include patient/family/caregiver in decisions related to nutrition  Outcome: Progressing     Problem: CONFUSION/THOUGHT DISTURBANCE  Goal: Thought disturbances (confusion, delirium, depression, dementia or psychosis) are managed to maintain or return to baseline mental status and functional level  Description: INTERVENTIONS:  - Assess for possible contributors to  thought disturbance, including but not limited to medications, infection, impaired vision or hearing, underlying metabolic abnormalities, dehydration, respiratory compromise,  psychiatric diagnoses and notify attending PHYSICAN/AP  - Monitor and  intervene to maintain adequate nutrition, hydration, elimination, sleep and activity  - Decrease environmental stimuli, including noise as appropriate.  - Provide frequent contacts to provide refocusing, direction and reassurance as needed. Approach patient calmly with eye contact and at their level.  - Fults high risk fall precautions, aspiration precautions and other safety measures, as indicated  - If delirium suspected, notify physician/AP of change in condition and request immediate in-person evaluation  - Pursue consults as appropriate including Geriatric (campus dependent), OT for cognitive evaluation/activity planning, psychiatric, pastoral care, etc.  Outcome: Progressing

## 2025-02-06 NOTE — CASE MANAGEMENT
Case Management Discharge Planning Note    Patient name Jesse JOAQUIN Figuli  Location [facility] 609-01 MRN [id number]  : 1940 Date 2025       Current Admission Date: 2025  Current Admission Diagnosis:Fall   Patient Active Problem List    Diagnosis Date Noted Date Diagnosed    Chronic venous insufficiency of lower extremity 2025     Ambulatory dysfunction 2025     Urinary retention 2025     Osteoarthritis 2025     Acute pain 2025     Forgetfulness 2025     Hearing loss 2025     At risk for delirium 2025     Fall 2025     History of total knee arthroplasty 2025     CKD (chronic kidney disease) 2025     Chronic deep vein thrombosis (DVT) of right lower extremity ([facility]) 2025     Multiple skin tears 2025     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     Chronic diastolic congestive heart failure ([facility]) 2024     Thrombocytopenia ([facility]) 2023     Multiple falls 2023     ESTER (obstructive sleep apnea) 2023     History of DVT (deep vein thrombosis) 2023     Atrial fibrillation ([facility]) 2023     Congestive heart failure (CHF) ([facility]) 2023     Generalized weakness 2023     Chronic venous hypertension (idiopathic) with ulcer of right lower extremity ([facility]) 2023     Elevated troponin 2023     Multiple abrasions 2023     Frailty 2023     Lower extremity cellulitis 2023     Stage 3a chronic kidney disease (CKD) ([facility]) 2023     Hyperbilirubinemia 2023     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 ([facility]) 2022     Bilateral leg edema 2022     Persistent atrial fibrillation ([facility]) 2020     Morbid obesity ([facility]) 2018     Lumbar stenosis 2018     Benign neoplasm of colon 2018     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     Diverticular disease of colon 2018      Internal hemorrhoids 02/07/2018     Pure hypercholesterolemia 02/07/2018     Sacroiliitis (HCC) 02/01/2018     Bilateral hip pain 12/04/2017     Adenomatous colon polyp 12/29/2015     Spinal stenosis of lumbar region 09/10/2015     DDD (degenerative disc disease), lumbar 07/07/2015     Lumbar disc herniation 06/22/2015     Radiculopathy, lumbar region 06/22/2015     MTHFR mutation 03/11/2015     Elevated hemoglobin A1c 06/05/2014     Premature atrial complexes 05/12/2014     Aortic ectasia (HCC) 03/06/2014     Obstructive sleep apnea 11/25/2013     Premature ventricular contraction 11/25/2013     Lichen planus 05/17/2012     HTN (hypertension) 04/04/2012     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     Vitamin D deficiency 04/04/2012       LOS (days): 4  Geometric Mean LOS (GMLOS) (days): 3  Days to GMLOS:-0.7     OBJECTIVE:  Risk of Unplanned Readmission Score: 18.05         Current admission status: Inpatient   Preferred Pharmacy:   [id number] - [place], [place] - [address]  [address]  [facility] [address]  Phone: [phone] Fax: [phone]    [id number] - [place], [place] - [address]  [address]  [facility] [address]  Phone: [phone] Fax: [phone]    [facility] - [place], [place] - [address]  [address]  [place] [address]  Phone: [phone] Fax: [phone]    Primary Care Provider: LIVIA Parra    Primary Insurance: MEDICARE  Secondary Insurance: AARP    DISCHARGE DETAILS:         IMM Given (Date):: 02/06/25  IMM Given to:: Family  Family notified:: Wife     Pt's wife called CM back, and explained they now want [facility]. Pt has a bed there tomorrow after 11am. CM submitted for wheelchair van transport for that time

## 2025-02-06 NOTE — ASSESSMENT & PLAN NOTE
S/p right knee arthroscopy at [facility] 1/30/25  Incision healing well  Continue course of duricef 500 Q 12, last dose 2/7/25  Had frequent falls at home, greater than 4  PT/OT

## 2025-02-06 NOTE — CASE MANAGEMENT
Case Management Discharge Planning Note    Patient name Jesse JOAQUIN Figuli  Location [facility] 609-01 MRN [id number]  : 1940 Date 2025       Current Admission Date: 2025  Current Admission Diagnosis:Fall   Patient Active Problem List    Diagnosis Date Noted Date Diagnosed    Chronic venous insufficiency of lower extremity 2025     Ambulatory dysfunction 2025     Urinary retention 2025     Osteoarthritis 2025     Acute pain 2025     Forgetfulness 2025     Hearing loss 2025     At risk for delirium 2025     Fall 2025     History of total knee arthroplasty 2025     CKD (chronic kidney disease) 2025     Chronic deep vein thrombosis (DVT) of right lower extremity ([facility]) 2025     Multiple skin tears 2025     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     Chronic diastolic congestive heart failure ([facility]) 2024     Thrombocytopenia ([facility]) 2023     Multiple falls 2023     ESTER (obstructive sleep apnea) 2023     History of DVT (deep vein thrombosis) 2023     Atrial fibrillation ([facility]) 2023     Congestive heart failure (CHF) ([facility]) 2023     Generalized weakness 2023     Chronic venous hypertension (idiopathic) with ulcer of right lower extremity ([facility]) 2023     Elevated troponin 2023     Multiple abrasions 2023     Frailty 2023     Lower extremity cellulitis 2023     Stage 3a chronic kidney disease (CKD) ([facility]) 2023     Hyperbilirubinemia 2023     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 ([facility]) 2022     Bilateral leg edema 2022     Persistent atrial fibrillation ([facility]) 2020     Morbid obesity ([facility]) 2018     Lumbar stenosis 2018     Benign neoplasm of colon 2018     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     Diverticular disease of colon 2018      Internal hemorrhoids 02/07/2018     Pure hypercholesterolemia 02/07/2018     Sacroiliitis (HCC) 02/01/2018     Bilateral hip pain 12/04/2017     Adenomatous colon polyp 12/29/2015     Spinal stenosis of lumbar region 09/10/2015     DDD (degenerative disc disease), lumbar 07/07/2015     Lumbar disc herniation 06/22/2015     Radiculopathy, lumbar region 06/22/2015     MTHFR mutation 03/11/2015     Elevated hemoglobin A1c 06/05/2014     Premature atrial complexes 05/12/2014     Aortic ectasia (HCC) 03/06/2014     Obstructive sleep apnea 11/25/2013     Premature ventricular contraction 11/25/2013     Lichen planus 05/17/2012     HTN (hypertension) 04/04/2012     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     Vitamin D deficiency 04/04/2012       LOS (days): 4  Geometric Mean LOS (GMLOS) (days): 3  Days to GMLOS:-0.6     OBJECTIVE:  Risk of Unplanned Readmission Score: 17.64         Current admission status: Inpatient   Preferred Pharmacy:   [id number] - [place], [place] - [address]  [address]  [address]  [facility] [address]  Phone: [phone] Fax: [phone]    [id number] - [place], [place] - [address]  [address]  [facility] [address]  Phone: [phone] Fax: [phone]    [facility] - [place], [place] - [address]  [address]  [place] [address]  Phone: [phone] Fax: [phone]    Primary Care Provider: LIVIA Parra    Primary Insurance: MEDICARE  Secondary Insurance: AARP    DISCHARGE DETAILS:    CM spoke to pt's wife  [facility], [facility] and [facility] can accept for SNF.  Pt's wife wants to talk to family and will inform CM.

## 2025-02-06 NOTE — PROGRESS NOTES
Patient:    MRN:  [id number]    [place] Request ID:  [id number]    Level of care reserved:  Skilled Nursing Facility    Partner Reserved:  [facility] [place], [place] [address] [phone]    Clinical needs requested:    Geography searched:  4 miles around [id number]    Start of Service:    Request sent:  11:34am EST on 2/5/2025 by Remi Bravo    Partner reserved:  12:24pm EST on 2/6/2025 by Remi Bravo    Choice list shared:  12:05pm EST on 2/6/2025 by Remi Bravo

## 2025-02-06 NOTE — ASSESSMENT & PLAN NOTE
Daughter concerned  Word finding noted on exam, during hospitalization, worsening confusion over night  Recommend check TSH and Vitamin B 12 level for reversible cause of memory loss  CT head (2/2/25) mild microangiopathic changes  MOCA (2/5/25) 19/30 done by OT, likely underlying dementia  Recommend follow up as outpatient for comprehensive assessment and supportive resources    Secondary to continued confusion overnight even with seroquel, will start depakote sprinkles 125 mg po QHS, will change seroquel to prn    Monitor LFTs

## 2025-02-06 NOTE — ASSESSMENT & PLAN NOTE
Seen by vascular as outpatient, last OV 2/14/22  Recommended elevation and compression, daily moisturizer to keep skin intact  Continue to monitor

## 2025-02-06 NOTE — PROGRESS NOTES
Progress Note - Trauma   Name: Jesse France 84 y.o. male I MRN: [id number]  Unit/Bed#: [facility] 609-01 I Date of Admission: 2/2/2025   Date of Service: 2/6/2025 I Hospital Day: 4    Assessment & Plan  Fall  Patient reports multiple falls since surgery on his left knee last week.  Described as mechanical  No injuries found  Admitted for ambulatory dysfunction  History of total knee arthroplasty  S/p total knee arthroplasty on 1/30/2025  Continue on home cefadroxil for postop prophylaxis  Left knee XR negative for acute traumatic injury  Continue to monitor wound site-currently appears healthy  PT/OT  HTN (hypertension)  -Continue home metoprolol  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HCC)  Continue home tafamidis--patient to bring in since this medication is not on formulary.   Atrial fibrillation (HCC)  Continue home warfarin (7.5 mg Tuesday and Friday, 5 mg all other days)  INR goal 2-3  Bilateral leg edema  Family expressed concerns regarding bilateral lower extremity edema-worsening left leg.  Venous Doppler completed that showed no thrombophlebitis/DVT  Recommend elevation of legs when at rest  Continue to monitor  SCDs  CKD (chronic kidney disease)  Lab Results   Component Value Date    EGFR 78 02/05/2025    EGFR 69 02/04/2025    EGFR 70 02/03/2025    CREATININE 0.88 02/05/2025    CREATININE 0.99 02/04/2025    CREATININE 0.98 02/03/2025   -Monitor renal function  -Avoid nephrotoxins  -Outpatient follow up with PCP  ESTER (obstructive sleep apnea)  -States he does not use a CPAP at night  Chronic deep vein thrombosis (DVT) of right lower extremity (HCC)  - On warfarin, continue   - b/l Duplex US without evidence of DVT  - 2/5 INR therapeutic at 2.32  Multiple skin tears  Local wound care  Tetanus updated  Ambulatory dysfunction  In the setting of total knee arthroplasty on 1/30/2025 PT/OT evaluation  Chronic venous insufficiency of lower extremity      Bowel Regimen: Miralax, Senna and Colace  VTE Prophylaxis:Coumadin      Disposition: rehab    24 Hour Events : uneventful  Subjective : My left knee still feels warm    Objective :  Temp:  [97.4 °F (36.3 °C)-97.6 °F (36.4 °C)] 97.4 °F (36.3 °C)  HR:  [69-72] 69  BP: (111-129)/(75-83) 126/75  Resp:  [16-17] 17  SpO2:  [92 %-100 %] 100 %  O2 Device: None (Room air)    I/O         02/04 0701 02/05 0700 02/05 0701 02/06 0700 02/06 0701 02/07 0700    P.O. 180 704 240    Total Intake(mL/kg) 180 (1.6) 704 (6.3) 240 (2.2)    Urine (mL/kg/hr)       Total Output       Net +180 +704 +240           Unmeasured Urine Occurrence 1 x  1 x            Physical Exam  Vitals reviewed.   Constitutional:       Appearance: Normal appearance.   HENT:      Head: Normocephalic and atraumatic.      Right Ear: External ear normal.      Left Ear: External ear normal.      Nose: Nose normal.      Mouth/Throat:      Pharynx: Oropharynx is clear.   Eyes:      Extraocular Movements: Extraocular movements intact.      Pupils: Pupils are equal, round, and reactive to light.   Cardiovascular:      Rate and Rhythm: Normal rate and regular rhythm.      Pulses: Normal pulses.      Heart sounds: Normal heart sounds.   Pulmonary:      Effort: Pulmonary effort is normal.      Breath sounds: Normal breath sounds.   Abdominal:      Palpations: Abdomen is soft.   Genitourinary:     Comments: Voiding    Musculoskeletal:         General: Swelling and tenderness present.      Cervical back: Normal range of motion.      Comments: LLE   Skin:     General: Skin is warm.      Capillary Refill: Capillary refill takes less than 2 seconds.   Neurological:      Mental Status: He is alert and oriented to person, place, and time.   Psychiatric:         Mood and Affect: Mood normal.         Behavior: Behavior normal.               Lab Results: I have reviewed the following results:  Recent Labs     02/05/25 0522 02/05/25  0624 02/06/25  0459   WBC 11.79*  --  8.49   HGB 17.6*  --  16.1   HCT 53.6*  --  49.0     --  201   SODIUM  139  --   --    K 4.1  --   --      --   --    CO2 27  --   --    BUN 28*  --   --    CREATININE 0.88  --   --    GLUC 105  --   --    INR  --    < > 2.77*    < > = values in this interval not displayed.     FYI - wife has not yet brought in home med,  Pharmacy does not carry it.

## 2025-02-07 ENCOUNTER — NURSING HOME VISIT (OUTPATIENT)
Dept: GERIATRICS | Facility: OTHER | Age: 85
End: 2025-02-07
Payer: MEDICARE

## 2025-02-07 ENCOUNTER — TRANSITIONAL CARE MANAGEMENT (OUTPATIENT)
Dept: FAMILY MEDICINE CLINIC | Facility: CLINIC | Age: 85
End: 2025-02-07

## 2025-02-07 VITALS
HEART RATE: 111 BPM | OXYGEN SATURATION: 99 % | WEIGHT: 244.71 LBS | DIASTOLIC BLOOD PRESSURE: 74 MMHG | BODY MASS INDEX: 31.42 KG/M2 | RESPIRATION RATE: 19 BRPM | TEMPERATURE: 97.5 F | SYSTOLIC BLOOD PRESSURE: 129 MMHG

## 2025-02-07 DIAGNOSIS — Z86.718 HISTORY OF DVT (DEEP VEIN THROMBOSIS): ICD-10-CM

## 2025-02-07 DIAGNOSIS — R68.89 FORGETFULNESS: ICD-10-CM

## 2025-02-07 DIAGNOSIS — Z96.652 HISTORY OF TOTAL LEFT KNEE REPLACEMENT: ICD-10-CM

## 2025-02-07 DIAGNOSIS — R33.9 URINARY RETENTION: ICD-10-CM

## 2025-02-07 DIAGNOSIS — R29.6 MULTIPLE FALLS: ICD-10-CM

## 2025-02-07 DIAGNOSIS — I10 PRIMARY HYPERTENSION: ICD-10-CM

## 2025-02-07 DIAGNOSIS — R26.2 AMBULATORY DYSFUNCTION: ICD-10-CM

## 2025-02-07 DIAGNOSIS — G47.33 OSA (OBSTRUCTIVE SLEEP APNEA): ICD-10-CM

## 2025-02-07 DIAGNOSIS — N18.9 CHRONIC KIDNEY DISEASE, UNSPECIFIED CKD STAGE: ICD-10-CM

## 2025-02-07 DIAGNOSIS — I43 CARDIAC AMYLOIDOSIS (HCC): ICD-10-CM

## 2025-02-07 DIAGNOSIS — E55.9 VITAMIN D DEFICIENCY: ICD-10-CM

## 2025-02-07 DIAGNOSIS — R60.0 BILATERAL LEG EDEMA: ICD-10-CM

## 2025-02-07 DIAGNOSIS — I48.19 PERSISTENT ATRIAL FIBRILLATION (HCC): Primary | ICD-10-CM

## 2025-02-07 DIAGNOSIS — E85.4 CARDIAC AMYLOIDOSIS (HCC): ICD-10-CM

## 2025-02-07 LAB
INR PPP: 3.02 (ref 0.85–1.19)
PROTHROMBIN TIME: 31 SECONDS (ref 12.3–15)

## 2025-02-07 PROCEDURE — 99306 1ST NF CARE HIGH MDM 50: CPT | Performed by: INTERNAL MEDICINE

## 2025-02-07 PROCEDURE — 85610 PROTHROMBIN TIME: CPT

## 2025-02-07 RX ORDER — LIDOCAINE 50 MG/G
1 PATCH TOPICAL DAILY
Start: 2025-02-08

## 2025-02-07 RX ORDER — METHOCARBAMOL 500 MG/1
500 TABLET, FILM COATED ORAL EVERY 6 HOURS PRN
Start: 2025-02-07 | End: 2025-02-21

## 2025-02-07 RX ORDER — DIVALPROEX SODIUM 125 MG/1
125 CAPSULE, COATED PELLETS ORAL
Start: 2025-02-07 | End: 2025-02-11

## 2025-02-07 RX ORDER — ACETAMINOPHEN 325 MG/1
975 TABLET ORAL EVERY 8 HOURS SCHEDULED
Start: 2025-02-07 | End: 2025-02-21

## 2025-02-07 RX ORDER — QUETIAPINE FUMARATE 25 MG/1
25 TABLET, FILM COATED ORAL
Start: 2025-02-07 | End: 2025-02-11

## 2025-02-07 RX ADMIN — ACETAMINOPHEN 975 MG: 325 TABLET, FILM COATED ORAL at 08:06

## 2025-02-07 RX ADMIN — CEFADROXIL 500 MG: 500 CAPSULE ORAL at 08:07

## 2025-02-07 RX ADMIN — FOLIC ACID 2 MG: 1 TABLET ORAL at 08:06

## 2025-02-07 RX ADMIN — METOPROLOL SUCCINATE 25 MG: 25 TABLET, EXTENDED RELEASE ORAL at 08:06

## 2025-02-07 RX ADMIN — SENNOSIDES AND DOCUSATE SODIUM 1 TABLET: 50; 8.6 TABLET ORAL at 08:06

## 2025-02-07 RX ADMIN — Medication 1 TABLET: at 08:05

## 2025-02-07 NOTE — NURSING NOTE
This RN attempted to call report on the patient x3. No answer at [facility] and left a voicemail.

## 2025-02-07 NOTE — PLAN OF CARE
Problem: PAIN - ADULT  Goal: Verbalizes/displays adequate comfort level or baseline comfort level  Description: Interventions:  - Encourage patient to monitor pain and request assistance  - Assess pain using appropriate pain scale  - Administer analgesics based on type and severity of pain and evaluate response  - Implement non-pharmacological measures as appropriate and evaluate response  - Consider cultural and social influences on pain and pain management  - Notify physician/advanced practitioner if interventions unsuccessful or patient reports new pain  Outcome: Progressing     Problem: INFECTION - ADULT  Goal: Absence or prevention of progression during hospitalization  Description: INTERVENTIONS:  - Assess and monitor for signs and symptoms of infection  - Monitor lab/diagnostic results  - Monitor all insertion sites, i.e. indwelling lines, tubes, and drains  - Monitor endotracheal if appropriate and nasal secretions for changes in amount and color  - [place] appropriate cooling/warming therapies per order  - Administer medications as ordered  - Instruct and encourage patient and family to use good hand hygiene technique  - Identify and instruct in appropriate isolation precautions for identified infection/condition  Outcome: Progressing  Goal: Absence of fever/infection during neutropenic period  Description: INTERVENTIONS:  - Monitor WBC    Outcome: Progressing     Problem: SAFETY ADULT  Goal: Patient will remain free of falls  Description: INTERVENTIONS:  - Educate patient/family on patient safety including physical limitations  - Instruct patient to call for assistance with activity   - Consult OT/PT to assist with strengthening/mobility   - Keep Call bell within reach  - Keep bed low and locked with side rails adjusted as appropriate  - Keep care items and personal belongings within reach  - Initiate and maintain comfort rounds  - Make Fall Risk Sign visible to staff  - Offer Toileting every 2 Hours,  in advance of need  - Initiate/Maintain bed/chair alarm  - Obtain necessary fall risk management equipment:   - Apply yellow socks and bracelet for high fall risk patients  - Consider moving patient to room near nurses station  Outcome: Progressing  Goal: Maintain or return to baseline ADL function  Description: INTERVENTIONS:  -  Assess patient's ability to carry out ADLs; assess patient's baseline for ADL function and identify physical deficits which impact ability to perform ADLs (bathing, care of mouth/teeth, toileting, grooming, dressing, etc.)  - Assess/evaluate cause of self-care deficits   - Assess range of motion  - Assess patient's mobility; develop plan if impaired  - Assess patient's need for assistive devices and provide as appropriate  - Encourage maximum independence but intervene and supervise when necessary  - Involve family in performance of ADLs  - Assess for home care needs following discharge   - Consider OT consult to assist with ADL evaluation and planning for discharge  - Provide patient education as appropriate  Outcome: Progressing  Goal: Maintains/Returns to pre admission functional level  Description: INTERVENTIONS:  - Perform AM-PAC 6 Click Basic Mobility/ Daily Activity assessment daily.  - Set and communicate daily mobility goal to care team and patient/family/caregiver.   - Collaborate with rehabilitation services on mobility goals if consulted  - Perform Range of Motion 3 times a day.  - Reposition patient every 2 hours.  - Dangle patient 3 times a day  - Stand patient 3 times a day  - Ambulate patient 3 times a day  - Out of bed to chair 3 times a day   - Out of bed for meals 3 times a day  - Out of bed for toileting  - Record patient progress and toleration of activity level   Outcome: Progressing     Problem: DISCHARGE PLANNING  Goal: Discharge to home or other facility with appropriate resources  Description: INTERVENTIONS:  - Identify barriers to discharge w/patient and  caregiver  - Arrange for needed discharge resources and transportation as appropriate  - Identify discharge learning needs (meds, wound care, etc.)  - Arrange for interpretive services to assist at discharge as needed  - Refer to Case Management Department for coordinating discharge planning if the patient needs post-hospital services based on physician/advanced practitioner order or complex needs related to functional status, cognitive ability, or social support system  Outcome: Progressing     Problem: Knowledge Deficit  Goal: Patient/family/caregiver demonstrates understanding of disease process, treatment plan, medications, and discharge instructions  Description: Complete learning assessment and assess knowledge base.  Interventions:  - Provide teaching at level of understanding  - Provide teaching via preferred learning methods  Outcome: Progressing     Problem: SAFETY,RESTRAINT: NV/NON-SELF DESTRUCTIVE BEHAVIOR  Goal: Remains free of harm/injury (restraint for non violent/non self-detsructive behavior)  Description: INTERVENTIONS:  - Instruct patient/family regarding restraint use   - Assess and monitor physiologic and psychological status   - Provide interventions and comfort measures to meet assessed patient needs   - Identify and implement measures to help patient regain control  - Assess readiness for release of restraint   Outcome: Progressing  Goal: Returns to optimal restraint-free functioning  Description: INTERVENTIONS:  - Assess the patient's behavior and symptoms that indicate continued need for restraint  - Identify and implement measures to help patient regain control  - Assess readiness for release of restraint   Outcome: Progressing     Problem: Nutrition/Hydration-ADULT  Goal: Nutrient/Hydration intake appropriate for improving, restoring or maintaining nutritional needs  Description: Monitor and assess patient's nutrition/hydration status for malnutrition. Collaborate with interdisciplinary  team and initiate plan and interventions as ordered.  Monitor patient's weight and dietary intake as ordered or per policy. Utilize nutrition screening tool and intervene as necessary. Determine patient's food preferences and provide high-protein, high-caloric foods as appropriate.     INTERVENTIONS:  - Monitor oral intake, urinary output, labs, and treatment plans  - Assess nutrition and hydration status and recommend course of action  - Evaluate amount of meals eaten  - Assist patient with eating if necessary   - Allow adequate time for meals  - Recommend/ encourage appropriate diets, oral nutritional supplements, and vitamin/mineral supplements  - Order, calculate, and assess calorie counts as needed  - Recommend, monitor, and adjust tube feedings and TPN/PPN based on assessed needs  - Assess need for intravenous fluids  - Provide specific nutrition/hydration education as appropriate  - Include patient/family/caregiver in decisions related to nutrition  Outcome: Progressing     Problem: CONFUSION/THOUGHT DISTURBANCE  Goal: Thought disturbances (confusion, delirium, depression, dementia or psychosis) are managed to maintain or return to baseline mental status and functional level  Description: INTERVENTIONS:  - Assess for possible contributors to  thought disturbance, including but not limited to medications, infection, impaired vision or hearing, underlying metabolic abnormalities, dehydration, respiratory compromise,  psychiatric diagnoses and notify attending PHYSICAN/AP  - Monitor and intervene to maintain adequate nutrition, hydration, elimination, sleep and activity  - Decrease environmental stimuli, including noise as appropriate.  - Provide frequent contacts to provide refocusing, direction and reassurance as needed. Approach patient calmly with eye contact and at their level.  - [place] high risk fall precautions, aspiration precautions and other safety measures, as indicated  - If delirium suspected,  notify physician/AP of change in condition and request immediate in-person evaluation  - Pursue consults as appropriate including Geriatric (campus dependent), OT for cognitive evaluation/activity planning, psychiatric, pastoral care, etc.  Outcome: Progressing

## 2025-02-07 NOTE — DISCHARGE SUMMARY
Discharge Summary - Trauma   Name: Jesse France 84 y.o. male I MRN: [id number]  Unit/Bed#: [facility] 609-01 I Date of Admission: 2/2/2025   Date of Service: 2/7/2025 I Hospital Day: 5    Assessment & Plan  Fall  Patient reports multiple falls since surgery on his left knee last week.  Described as mechanical  No injuries found  Admitted for ambulatory dysfunction  History of total knee arthroplasty  S/p total knee arthroplasty on 1/30/2025  Continue on home cefadroxil for postop prophylaxis  Left knee XR negative for acute traumatic injury  Continue to monitor wound site-currently appears healthy  PT/OT  HTN (hypertension)  -Continue home metoprolol  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HCC)  Continue home tafamidis--patient to bring in since this medication is not on formulary.   Atrial fibrillation (HCC)  Continue home warfarin (7.5 mg Tuesday and Friday, 5 mg all other days)  INR goal 2-3  Bilateral leg edema  Family expressed concerns regarding bilateral lower extremity edema-worsening left leg.  Venous Doppler completed that showed no thrombophlebitis/DVT  Recommend elevation of legs when at rest  Continue to monitor  SCDs  CKD (chronic kidney disease)  Lab Results   Component Value Date    EGFR 78 02/05/2025    EGFR 69 02/04/2025    EGFR 70 02/03/2025    CREATININE 0.88 02/05/2025    CREATININE 0.99 02/04/2025    CREATININE 0.98 02/03/2025   -Monitor renal function  -Avoid nephrotoxins  -Outpatient follow up with PCP  ESTER (obstructive sleep apnea)  -States he does not use a CPAP at night  Chronic deep vein thrombosis (DVT) of right lower extremity (HCC)  - On warfarin, continue   - b/l Duplex US without evidence of DVT  - 2/5 INR therapeutic at 2.32  Multiple skin tears  Local wound care  Tetanus updated  Ambulatory dysfunction  In the setting of total knee arthroplasty on 1/30/2025 PT/OT evaluation  PT/OT  Rehab placement    Admission Date: 2/2/2025 1811  Discharge Date: 02/07/25  Admitting Diagnosis: 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HCC)  "[E85.4, I43]  Bilateral leg edema [R60.0]  History of DVT (deep vein thrombosis) [Z86.718]  Multiple skin tears [T14.8XXA]  Fall, initial encounter [W19.XXXA]  Ambulatory dysfunction [R26.2]  History of total left knee replacement [Z96.652]  Atrial fibrillation, unspecified type (HCC) [I48.91]  Unspecified multiple injuries, initial encounter [T07.XXXA]  Discharge Diagnosis:   Medical Problems       Resolved Problems  Date Reviewed: 3/21/2024   None         HPI: Documented by Speedy Harry MD on 2/2/2025, \"    Procedures Performed:   Orders Placed This Encounter   Procedures    Fast Ultrasound       Summary of Hospital Course: Patient was seen and evaluated by the trauma team after a fall and admitted with ambulatory dysfunction.  PT/OT evaluated patient and recommended discharge to rehab.  Case management was consulted for rehab placement planning.  Geriatric medicine was consulted for aid in patient management and medication reconciliation.  Patient was stable for discharge on 2/7/2025.  He will follow-up as an outpatient with his primary care physician.  For further details on hospitalization, please reference hospital medical chart.    Significant Findings, Care, Treatment and Services Provided: Ambulatory dysfunction, PT/OT    Complications: None    Discharge Day Visit / Exam:   /74   Pulse (!) 111   Temp 97.5 °F (36.4 °C)   Resp 19   Wt 111 kg (244 lb 11.4 oz)   SpO2 98%   BMI 31.42 kg/m²   Physical Exam  Vitals and nursing note reviewed.   Constitutional:       General: He is not in acute distress.     Appearance: He is well-developed.   HENT:      Head: Normocephalic and atraumatic.   Eyes:      Conjunctiva/sclera: Conjunctivae normal.   Cardiovascular:      Rate and Rhythm: Normal rate and regular rhythm.      Pulses: Normal pulses.      Heart sounds: Normal heart sounds.   Pulmonary:      Effort: Pulmonary effort is normal. No respiratory distress.      Breath sounds: Normal breath sounds. "   Chest:      Chest wall: No tenderness.   Abdominal:      General: There is no distension.      Palpations: Abdomen is soft.      Tenderness: There is no abdominal tenderness. There is no guarding.   Musculoskeletal:         General: No swelling.      Cervical back: Normal range of motion and neck supple.      Right lower le+ Pitting Edema present.      Left lower leg: 3+ Pitting Edema present.   Skin:     General: Skin is warm and dry.      Capillary Refill: Capillary refill takes less than 2 seconds.      Comments: stable erythema of the left lower leg  Multiple abrasions of bilateral upper and lower extremities dressed appropriately without signs of infection   Neurological:      General: No focal deficit present.      Mental Status: He is alert and oriented to person, place, and time. Mental status is at baseline.      Motor: No weakness.   Psychiatric:         Mood and Affect: Mood normal.         Behavior: Behavior normal.          Condition at Discharge: good       Discharge instructions/Information to patient and family:   See After Visit Summary (AVS) for information provided to patient and family.      Provisions for Follow-Up Care:  See after visit summary for information related to follow-up care and any pertinent home health orders.      PCP: LIVIA Parra    Disposition: Skilled nursing facility at [facility]    Planned Readmission: No     Discharge Medications:  See after visit summary for reconciled discharge medications provided to patient and family.      Discharge Statement:  I have spent a total time of 28 minutes in caring for this patient on the day of the visit/encounter. .

## 2025-02-07 NOTE — PROGRESS NOTES
Father Latisha conducted a long pastoral visit in which he gave Holy Communion and a blessing and prayer.   02/07/25 0800   Clinical Encounter Type   Visited With Patient   Routine Visit Follow-up   Confucianism Encounters   Confucianism Needs Prayer   Sacramental Encounters   Communion Given Indicator Yes

## 2025-02-07 NOTE — DISCHARGE INSTR - AVS FIRST PAGE
Trauma Discharge Instructions:    Please follow-up as instructed. If you need a follow-up appointment, please call the office when you leave to schedule an appointment.    Activity:  - PT and OT evaluation and treatment as indicated.  - You may resume activity as tolerated.  - Walking and normal light activities are encouraged.  - Normal daily activities including climbing steps are okay.    Diet:    - You may resume your normal diet.    Medications:  - You should continue your current medication regimen after discharge unless otherwise instructed. Please refer to your discharge medication list for further details.    Additional Instructions:  - May shower daily.  - If you have any questions or concerns after discharge please call the office.  - Call office or return to ER if fever greater than 101, chills, persistent nausea/vomiting, worsening/uncontrollable pain, develop productive cough, increasing shortness of breath, difficulty breathing, and/or increasing redness or purulent/foul smelling drainage from incision(s).

## 2025-02-09 NOTE — PROGRESS NOTES
[facility]  [address]  [place] [address]  [id number]    Nursing Home Admission    NAME: Jesse France  AGE: 84 y.o. SEX: male [phone]      Patient Location     [facility]    Patient’s care was coordinated with nursing facility staff. Recent vitals, labs and updated medications were reviewed on [facility] system of facility. Past Medical, surgical, social, medication and allergy history and patient’s previous records reviewed.       Assessment/Plan:    Persistent atrial fibrillation (HCC)  Heart rate stable on Toprol xl 25 mg daily  Patient remains on Coumadin.  Recent INR was supratherapeutic at 3.1.  Coumadin dose was adjusted.  Repeat INR on 2/10/2025    Multiple falls  History of several falls following elective total knee arthroplasty on 1/30/2025.  Continue PT OT     HTN (hypertension)  BP stable on Metoprolol 25 mg daily    History of total knee arthroplasty  S/p total knee arthroplasty on 1/30/2025  Pt was recently treated with cefadroxil for postop prophylaxis  Left knee XR negative for acute traumatic injury  Pt continues with increase edema and blanchable erythema involving LLE. Recent doppler was negative for DVT. No overt cellulitis as erythema is blanchable however the site needs to be monitored closely for infection  PT/OT    History of DVT (deep vein thrombosis)  Continue Coumadin. Last INR was 3.1. Coumadin dose was adjusted. F.U repeat INR    CKD (chronic kidney disease)  Lab Results   Component Value Date    EGFR 78 02/05/2025    EGFR 69 02/04/2025    EGFR 70 02/03/2025    CREATININE 0.88 02/05/2025    CREATININE 0.99 02/04/2025    CREATININE 0.98 02/03/2025     Recent creatinine was stable. Avoid hypotension and nephrotoxins    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HCC)  Continue home tafamidis-  Wife will bring the medicine    Ambulatory dysfunction  History of recurrent falls following total knee arthroplasty on 1/30/2025   Continue PT/OT      Bilateral leg edema  Pt is noted to have increase edema involving LLE  Venous Doppler was negative for DVT  Recommend leg elevation and compression wraps  Per records pt had been on Lasix 40 mg at home on prn basis  Continue to monitor. Will evaluate need for diuretics    ESTER (obstructive sleep apnea)  Per records pt does not use a CPAP at night    Vitamin D deficiency  Continue Vit D supplements    Urinary retention  Per records urinary retention was noted on prior admission,  pt refused straight cath  Urinary retention protocol  Limit caffiene intake  Avoid alcohol, anticholinergics, opoids, sedatives, benzodiazepines  Avoid extremes of fluid intake  Encourage scheduled tolieting   Monitor for constipation -    Forgetfulness  Pt was noted to have episodes of increase confusion recently at the hospital  CT head (2/2/25) mild microangiopathic changes  MOCA (2/5/25) 19/30 done by OT, likely underlying dementia  Recommend follow up as outpatient for comprehensive assessment and supportive resources    Pt is currently noted to be on Olanzapine 5 mg daily and Divalproex 125 mg qhs along with PRN Quetiapine.  Consider weaning off of antipsychotics if behaviors remains stable          Chief Complaint     Fall, History of total knee arthroplasty    HPI       Patient is a 84 y.o. male with past medical history significant for hypertension, obstructive sleep apnea, A-fib, cardiac amyloidosis, CKD, DVT, osteoarthritis and TIA.    Pt was recently discharged home after undergoing elective elective total knee arthroplasty on 1/30/2025.  He was rehospitalized on 2/2/2025 with recurrent falls since returning home.  Trauma workup was negative for fracture.  Patient was noted to have increase swelling involving left lower extremity.  Venous Doppler was reported negative.  Leg elevation was recommended.  Patient remained on Coumadin for history of DVT.  Recent INR was supratherapeutic.  He was subsequently discharged to [facility]hab where he is being seen for posthospital admission.  At the time of my evaluation patient is doing okay.  Continues with increased swelling and blanchable erythema involving the left lower extremity       Past Medical History:   Diagnosis Date    Abnormal glucose level 2018    Acute back pain 10/02/2017    Atrial fibrillation (HCC)     Blood clot in vein 2015    in right foot    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HCC) 2023    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HCC)     Carpal tunnel syndrome     Chronic kidney disease (CKD)     Fever 2017    Frailty syndrome in geriatric patient 2023    FUO (fever of unknown origin) 2017    Heart failure (HCC)     History of DVT (deep vein thrombosis)     History of tobacco abuse     quit in     History of transient cerebral ischemia     Hypertension        Past Surgical History:   Procedure Laterality Date    BACK SURGERY  2018    COLONOSCOPY      COLONOSCOPY N/A 2016    Procedure: COLONOSCOPY;  Surgeon: Salvador Ovalle MD;  Location: [facility];  Service:     HERNIA REPAIR      NEUROPLASTY / TRANSPOSITION MEDIAN NERVE AT CARPAL TUNNEL         Social History     Tobacco Use   Smoking Status Former    Current packs/day: 0.00    Average packs/day: 1 pack/day for 26.0 years (26.0 ttl pk-yrs)    Types: Cigarettes    Start date:     Quit date:     Years since quittin.1   Smokeless Tobacco Never          Family History   Problem Relation Age of Onset    Heart attack Father     Heart disease Other     Cancer Other         Allergies   Allergen Reactions    Sildenafil Hives     Other reaction(s): severe congestion, cialis was OK    Gabapentin Confusion    Sildenafil Other (See Comments)     _          Current Outpatient Medications:     acetaminophen (TYLENOL) 325 mg tablet, Take 3 tablets (975 mg total) by mouth every 8 (eight) hours, Disp: , Rfl:     Cholecalciferol (VITAMIN D-3 PO), Take 4,000 Units by mouth daily., Disp: , Rfl:     colchicine  (COLCRYS) 0.6 mg tablet, Take 1 tablet (0.6 mg total) by mouth daily Do not start before May 4, 2023., Disp: 30 tablet, Rfl: 0    divalproex sodium (DEPAKOTE SPRINKLE) 125 MG capsule, Take 1 capsule (125 mg total) by mouth daily at bedtime, Disp: , Rfl:     folic acid (FOLVITE) 1 mg tablet, Take 2 mg by mouth daily  , Disp: , Rfl:     furosemide (LASIX) 40 mg tablet, Take 1 tablet (40 mg total) by mouth as needed (for weight gain of 3 lbs in a day or 5 lbs in 5-7 days), Disp: , Rfl:     lidocaine (LIDODERM) 5 %, Apply 1 patch topically over 12 hours daily Remove & Discard patch within 12 hours or as directed by MD, Disp: , Rfl:     methocarbamol (ROBAXIN) 500 mg tablet, Take 1 tablet (500 mg total) by mouth every 6 (six) hours as needed for muscle spasms, Disp: , Rfl:     metoprolol succinate (TOPROL-XL) 25 mg 24 hr tablet, Take 1 tablet (25 mg total) by mouth daily, Disp: , Rfl:     Multiple Vitamins-Minerals (MULTIVITAMIN & MINERAL PO), Take 1 tablet by mouth daily. Per [facility] 10/2/23  Indications: supplement, Disp: , Rfl:     OLANZapine (ZyPREXA ZYDIS) 5 mg dispersible tablet, Take 1 tablet (5 mg total) by mouth daily at bedtime, Disp: 30 tablet, Rfl: 0    potassium chloride (Klor-Con M20) 20 mEq tablet, Take 2 tablets (40 mEq total) by mouth if needed (when taking lasix), Disp: 60 tablet, Rfl: 3    QUEtiapine (SEROquel) 25 mg tablet, Take 1 tablet (25 mg total) by mouth daily at bedtime as needed (agitation), Disp: , Rfl:     Tafamidis (Vyndamax) 61 MG CAPS, Take 61 mg by mouth daily, Disp: 30 capsule, Rfl: 12    warfarin (COUMADIN) 5 mg tablet, Take by mouth daily 1 to 1.5 tablet daily per physician, Disp: , Rfl:     warfarin (COUMADIN) 5 mg tablet, TAKE 1 TO 1 AND 1/2 TABLETS BY MOUTH DAILY AS DIRECTED BY PRESCRIBER, Disp: 125 tablet, Rfl: 2    Updated list was reviewed in [facility] system of facility.     Blood pressure 101/62, Tm 20, HR 72,  RR 19      Review of Systems    Constitutional:  Negative for chills, fatigue and fever.   HENT:  Negative for nosebleeds and rhinorrhea.    Eyes:  Negative for discharge and redness.   Respiratory:  Negative for cough, chest tightness, shortness of breath and wheezing.    Cardiovascular:  Positive for leg swelling (involving LLE). Negative for chest pain.   Gastrointestinal:  Negative for abdominal distention, abdominal pain, diarrhea and vomiting.   Genitourinary:  Negative for dysuria, flank pain and hematuria.   Musculoskeletal:  Positive for arthralgias (left knee/ LLE pain) and gait problem. Negative for back pain.   Skin:  Negative for pallor.   Neurological:  Positive for weakness (Generalized). Negative for tremors, seizures, syncope and headaches.   Psychiatric/Behavioral:  Negative for agitation, behavioral problems and confusion.        Physical Exam  Constitutional:       General: He is not in acute distress.  HENT:      Head: Normocephalic and atraumatic.   Eyes:      General: No scleral icterus.  Cardiovascular:      Rate and Rhythm: Normal rate and regular rhythm.   Pulmonary:      Breath sounds: No wheezing, rhonchi or rales.   Abdominal:      General: There is no distension.      Palpations: Abdomen is soft.      Tenderness: There is no abdominal tenderness. There is no guarding.   Musculoskeletal:      Cervical back: Neck supple.      Right lower leg: Edema present.      Left lower leg: Edema present.   Skin:     Coloration: Skin is not jaundiced.      Findings: Erythema (Blanching erythema involving left lower extremity) present.   Neurological:      General: No focal deficit present.      Cranial Nerves: No cranial nerve deficit.      Motor: Weakness present.   Psychiatric:         Mood and Affect: Mood normal.         Behavior: Behavior normal.           Diagnostic Data       Recent labs and imaging studies were reviewed.  Lab Results   Component Value Date    WBC 8.49 02/06/2025    HGB 16.1 02/06/2025    HCT 49.0  "02/06/2025    MCV 98 02/06/2025     02/06/2025      Lab Results   Component Value Date    SODIUM 139 02/05/2025    K 4.1 02/05/2025     02/05/2025    CO2 27 02/05/2025    BUN 28 (H) 02/05/2025    CREATININE 0.88 02/05/2025    GLUC 105 02/05/2025    CALCIUM 9.2 02/05/2025         Code Status:      Full code    Additional notes:      Consider discontinuing  prn Quetiapine if behaviors remain stable  Monitor LLE for signs of cellulitis/infection  Hold Coumadin x 1 followed by decrease in dose to 4 mg daily  Repeat INR on 2/10  Care coordinated with pt's wife at bed side    Portions of the record may have been created with voice recognition software.  Occasional wrong word or \"sound a like\" substitutions may have occurred due to the inherent limitations of voice recognition software.  Read the chart carefully and recognize, using context, where substitutions have occurred.    This note was electronically signed by Dr. Gabi Hernández   "

## 2025-02-10 DIAGNOSIS — E85.4 CARDIAC AMYLOIDOSIS (HCC): ICD-10-CM

## 2025-02-10 DIAGNOSIS — I43 CARDIAC AMYLOIDOSIS (HCC): ICD-10-CM

## 2025-02-10 NOTE — ASSESSMENT & PLAN NOTE
Pt is noted to have increase edema involving LLE  Venous Doppler was negative for DVT  Recommend leg elevation and compression wraps  Per records pt had been on Lasix 40 mg at home on prn basis  Continue to monitor. Will evaluate need for diuretics

## 2025-02-10 NOTE — ASSESSMENT & PLAN NOTE
Lab Results   Component Value Date    EGFR 78 02/05/2025    EGFR 69 02/04/2025    EGFR 70 02/03/2025    CREATININE 0.88 02/05/2025    CREATININE 0.99 02/04/2025    CREATININE 0.98 02/03/2025     Recent creatinine was stable. Avoid hypotension and nephrotoxins

## 2025-02-10 NOTE — TELEPHONE ENCOUNTER
Was the auth done for the Vyndaqel done through patient's new Rx insurance, [employer]?  Copy in Media.      I have a feeling we may not have auth'd the Vyndaqel through new Rx coverage.  I see that Vyndamax was denied.  I will change the medication to Vyndaqel in chart.

## 2025-02-10 NOTE — ASSESSMENT & PLAN NOTE
History of several falls following elective total knee arthroplasty on 1/30/2025.  Continue PT OT

## 2025-02-10 NOTE — ASSESSMENT & PLAN NOTE
Heart rate stable on Toprol xl 25 mg daily  Patient remains on Coumadin.  Recent INR was supratherapeutic at 3.1.  Coumadin dose was adjusted.  Repeat INR on 2/10/2025

## 2025-02-10 NOTE — ASSESSMENT & PLAN NOTE
Per records urinary retention was noted on prior admission,  pt refused straight cath  Urinary retention protocol  Limit caffiene intake  Avoid alcohol, anticholinergics, opoids, sedatives, benzodiazepines  Avoid extremes of fluid intake  Encourage scheduled tolieting   Monitor for constipation -

## 2025-02-10 NOTE — ASSESSMENT & PLAN NOTE
S/p total knee arthroplasty on 1/30/2025  Pt was recently treated with cefadroxil for postop prophylaxis  Left knee XR negative for acute traumatic injury  Pt continues with increase edema and blanchable erythema involving LLE. Recent doppler was negative for DVT. No overt cellulitis as erythema is blanchable however the site needs to be monitored closely for infection  PT/OT

## 2025-02-10 NOTE — ASSESSMENT & PLAN NOTE
Pt was noted to have episodes of increase confusion recently at the hospital  CT head (2/2/25) mild microangiopathic changes  MOCA (2/5/25) 19/30 done by OT, likely underlying dementia  Recommend follow up as outpatient for comprehensive assessment and supportive resources    Pt is currently noted to be on Olanzapine 5 mg daily and Divalproex 125 mg qhs along with PRN Quetiapine.  Consider weaning off of antipsychotics if behaviors remains stable

## 2025-02-11 ENCOUNTER — TELEPHONE (OUTPATIENT)
Dept: CARDIOLOGY CLINIC | Facility: CLINIC | Age: 85
End: 2025-02-11

## 2025-02-11 ENCOUNTER — TELEPHONE (OUTPATIENT)
Age: 85
End: 2025-02-11

## 2025-02-11 ENCOUNTER — NURSING HOME VISIT (OUTPATIENT)
Dept: GERIATRICS | Facility: OTHER | Age: 85
End: 2025-02-11
Payer: MEDICARE

## 2025-02-11 DIAGNOSIS — Z96.652 HISTORY OF TOTAL LEFT KNEE REPLACEMENT: ICD-10-CM

## 2025-02-11 DIAGNOSIS — I50.32 CHRONIC DIASTOLIC CONGESTIVE HEART FAILURE (HCC): Primary | ICD-10-CM

## 2025-02-11 DIAGNOSIS — E85.4 CARDIAC AMYLOIDOSIS (HCC): ICD-10-CM

## 2025-02-11 DIAGNOSIS — I10 PRIMARY HYPERTENSION: ICD-10-CM

## 2025-02-11 DIAGNOSIS — I48.19 PERSISTENT ATRIAL FIBRILLATION (HCC): ICD-10-CM

## 2025-02-11 DIAGNOSIS — I43 CARDIAC AMYLOIDOSIS (HCC): ICD-10-CM

## 2025-02-11 DIAGNOSIS — E55.9 VITAMIN D DEFICIENCY: ICD-10-CM

## 2025-02-11 DIAGNOSIS — I82.561 CHRONIC DEEP VEIN THROMBOSIS (DVT) OF CALF MUSCLE VEIN OF RIGHT LOWER EXTREMITY (HCC): Chronic | ICD-10-CM

## 2025-02-11 DIAGNOSIS — R29.6 MULTIPLE FALLS: ICD-10-CM

## 2025-02-11 DIAGNOSIS — N18.31 STAGE 3A CHRONIC KIDNEY DISEASE (CKD) (HCC): ICD-10-CM

## 2025-02-11 DIAGNOSIS — I50.30 DIASTOLIC CONGESTIVE HEART FAILURE, UNSPECIFIED HF CHRONICITY (HCC): ICD-10-CM

## 2025-02-11 DIAGNOSIS — R60.0 BILATERAL LEG EDEMA: ICD-10-CM

## 2025-02-11 DIAGNOSIS — N18.9 CHRONIC KIDNEY DISEASE, UNSPECIFIED CKD STAGE: ICD-10-CM

## 2025-02-11 DIAGNOSIS — R68.89 FORGETFULNESS: ICD-10-CM

## 2025-02-11 DIAGNOSIS — R26.2 AMBULATORY DYSFUNCTION: ICD-10-CM

## 2025-02-11 PROCEDURE — 99310 SBSQ NF CARE HIGH MDM 45: CPT | Performed by: INTERNAL MEDICINE

## 2025-02-11 RX ORDER — TAFAMIDIS MEGLUMINE 20 MG/1
80 CAPSULE, LIQUID FILLED ORAL DAILY
Qty: 120 CAPSULE | Refills: 12 | Status: SHIPPED | OUTPATIENT
Start: 2025-02-11

## 2025-02-11 RX ORDER — FUROSEMIDE 20 MG/1
20 TABLET ORAL DAILY
COMMUNITY

## 2025-02-11 NOTE — ASSESSMENT & PLAN NOTE
Pt was noted to have episodes of increase confusion recently at the hospital  CT head (2/2/25) mild microangiopathic changes  MOCA (2/5/25) 19/30 done by OT, likely underlying dementia  Recommend follow up as outpatient for comprehensive assessment and supportive resources    Pt is currently noted to be on Olanzapine 5 mg daily and Divalproex 125 mg qhs along with PRN Quetiapine.    Discontinue as needed quetiapine and divalproex as behaviors have been stable for the most part.  Will eventually attempt to wean off of olanzapine as well

## 2025-02-11 NOTE — TELEPHONE ENCOUNTER
PA for vyndaqel 20 mg  APPROVED     Date(s) approved  January 12, 2025 to February 11, 2026     Case #[id number]     Patient advised by          []HelloWallethart Message  []Phone call   []LMOM  []L/M to call office as no active Communication consent on file  []Unable to leave detailed message as VM not approved on Communication consent       Pharmacy advised by    []Fax  []Phone call    Approval letter scanned into Media No

## 2025-02-11 NOTE — PROGRESS NOTES
[facility]  [address]   [address]  [place], [place], [address] [phone]    Progress Note  Code SNF 31    Patient Location     [facility]    Reason for visit     Follow-up left total knee arthroplasty, multiple falls, hypertension, history of DVT, CKD, mild dementia with behavioral issues.    Patient’s recent vitals, labs and updated medications were reviewed on [facility] system of facility.     Problem List Items Addressed This Visit       HTN (hypertension)    BP stable on Metoprolol 25 mg daily         Relevant Medications    furosemide (LASIX) 20 mg tablet    Vitamin D deficiency    Continue Vit D supplements.  Family is requesting to  check vitamin D level.  Last reported level was 52 1 year ago         Persistent atrial fibrillation (HCC)    Heart rate stable on Toprol xl 25 mg daily  Patient remains on Coumadin.   INR was 2.4 yesterday.  Continue Coumadin 4 mg.  Follow-up repeat INR         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 ([facility]) (Chronic)    History of DVT.  Patient remains on Coumadin.  INR was therapeutic at 2.5 yesterday         Bilateral leg edema    Edema is more pronounced in left lower extremity.  Recently his Doppler was negative for DVT.  Patient is currently on as needed Lasix for weight gain.  Discussed with daughter.  Will start Lasix 20 mg every other day for now.  Follow-up         Stage 3a chronic kidney disease (CKD) ([facility])    Lab Results   Component Value Date    EGFR 78 02/05/2025    EGFR 69 02/04/2025    EGFR 70 02/03/2025    CREATININE 0.88 02/05/2025    CREATININE 0.99 02/04/2025    CREATININE 0.98 02/03/2025   Recent creatinine was stable.  Avoid hypotension and nephrotoxins         Relevant Medications    furosemide (LASIX) 20 mg tablet    Multiple falls    History of several falls following elective total knee arthroplasty on 1/30/2025.  Continue PT OT          Congestive heart  failure (CHF) (HCC)    Wt Readings from Last 3 Encounters:   02/02/25 111 kg (244 lb 11.4 oz)   01/16/25 111 kg (244 lb)   01/10/25 111 kg (244 lb)     Patient is currently on as needed Lasix only.  Given increase edema will start Lasix 20 mg every other day.  Daughter is concerned with increased frequency of urination with diuretic use which may cause patient to get up and increase risk of fall               Relevant Medications    furosemide (LASIX) 20 mg tablet    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HCC)    Continue home tafamidis-         Chronic diastolic congestive heart failure (HCC) - Primary    Wt Readings from Last 3 Encounters:   02/02/25 111 kg (244 lb 11.4 oz)   01/16/25 111 kg (244 lb)   01/10/25 111 kg (244 lb)     History of CHF.  Patient has increase edema involving left lower extremity in the setting of recent knee surgery along with mild edema at distal right lower extremity.  Currently remains on as needed Lasix.  Will start Lasix 20 mg daily and follow-up               Relevant Medications    furosemide (LASIX) 20 mg tablet    History of total knee arthroplasty    S/p total knee arthroplasty on 1/30/2025  Pt was recently treated with cefadroxil for postop prophylaxis  Left knee XR negative for acute traumatic injury  Pt continues with increase edema and blanchable erythema involving LLE.  No overt cellulitis.  Recent doppler was negative for DVT.  Patient reports increased pain.  Continue scheduled dose of Tylenol.  avoiding oxycodone as this has caused increased confusion in the past.  Patient additionally remains on lidocaine patch and as needed methocarbamol PT/OT         CKD (chronic kidney disease)    Lab Results   Component Value Date    EGFR 78 02/05/2025    EGFR 69 02/04/2025    EGFR 70 02/03/2025    CREATININE 0.88 02/05/2025    CREATININE 0.99 02/04/2025    CREATININE 0.98 02/03/2025   Recent creatinine was stable.  Avoid hypotension and nephrotoxins.         Relevant Medications    furosemide  (LASIX) 20 mg tablet    Ambulatory dysfunction    History of recurrent falls following total knee arthroplasty on 1/30/2025   Continue PT/OT   Fall precautions  Out of bed as tolerated  Encourage early and frequent mobilization  Encourage adequate hydration and nutrition  Provide adequate pain management   Goal is to return home  Continue with PT/OT for continued strength and balance training          Forgetfulness    Pt was noted to have episodes of increase confusion recently at the hospital  CT head (2/2/25) mild microangiopathic changes  MOCA (2/5/25) 19/30 done by OT, likely underlying dementia  Recommend follow up as outpatient for comprehensive assessment and supportive resources    Pt is currently noted to be on Olanzapine 5 mg daily and Divalproex 125 mg qhs along with PRN Quetiapine.    Discontinue as needed quetiapine and divalproex as behaviors have been stable for the most part.  Will eventually attempt to wean off of olanzapine as well                  HPI       Patient is being seen for a follow-up visit today.  He is doing okay at present.  Patient is awake however tends to get confused at times during evening hours.  No episodes of restlessness or irritability.  Patient complains of increased pain in the left lower extremity at times.  Left lower extremity remains edematous.  No overt cellulitis.  Recent Doppler was negative for DVT.        Review of Systems   Constitutional:  Negative for chills and fever.   Respiratory:  Negative for shortness of breath and wheezing.    Cardiovascular:  Positive for leg swelling (Involving left lower extremity). Negative for chest pain.   Gastrointestinal:  Negative for abdominal distention, abdominal pain and vomiting.   Genitourinary:  Negative for flank pain and hematuria.   Musculoskeletal:  Positive for arthralgias (Involving left knee/LLE) and gait problem.   Neurological:  Positive for weakness. Negative for seizures and syncope.   Psychiatric/Behavioral:   Positive for behavioral problems (Episodes of increased confusion during the night/) and confusion. Negative for agitation.        Past Medical History:   Diagnosis Date    Abnormal glucose level 2018    Acute back pain 10/02/2017    Atrial fibrillation (HCC)     Blood clot in vein     in right foot    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HCC) 2023    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HCC)     Carpal tunnel syndrome     Chronic kidney disease (CKD)     Fever 2017    Frailty syndrome in geriatric patient 2023    FUO (fever of unknown origin) 2017    Heart failure (HCC)     History of DVT (deep vein thrombosis)     History of tobacco abuse     quit in     History of transient cerebral ischemia     Hypertension        Past Surgical History:   Procedure Laterality Date    BACK SURGERY  2018    COLONOSCOPY      COLONOSCOPY N/A 2016    Procedure: COLONOSCOPY;  Surgeon: Salvador Ovalle MD;  Location: [facility];  Service:     HERNIA REPAIR      NEUROPLASTY / TRANSPOSITION MEDIAN NERVE AT CARPAL TUNNEL         Social History     Tobacco Use   Smoking Status Former    Current packs/day: 0.00    Average packs/day: 1 pack/day for 26.0 years (26.0 ttl pk-yrs)    Types: Cigarettes    Start date:     Quit date:     Years since quittin.1   Smokeless Tobacco Never       Family History   Problem Relation Age of Onset    Heart attack Father     Heart disease Other     Cancer Other         Allergies   Allergen Reactions    Sildenafil Hives     Other reaction(s): severe congestion, cialis was OK    Gabapentin Confusion    Sildenafil Other (See Comments)     _         Current Outpatient Medications:     furosemide (LASIX) 20 mg tablet, Take 20 mg by mouth every other day, Disp: , Rfl:     acetaminophen (TYLENOL) 325 mg tablet, Take 3 tablets (975 mg total) by mouth every 8 (eight) hours, Disp: , Rfl:     Cholecalciferol (VITAMIN D-3 PO), Take 4,000 Units by mouth daily., Disp: , Rfl:     folic acid  (FOLVITE) 1 mg tablet, Take 2 mg by mouth daily  , Disp: , Rfl:     lidocaine (LIDODERM) 5 %, Apply 1 patch topically over 12 hours daily Remove & Discard patch within 12 hours or as directed by MD, Disp: , Rfl:     methocarbamol (ROBAXIN) 500 mg tablet, Take 1 tablet (500 mg total) by mouth every 6 (six) hours as needed for muscle spasms, Disp: , Rfl:     metoprolol succinate (TOPROL-XL) 25 mg 24 hr tablet, Take 1 tablet (25 mg total) by mouth daily, Disp: , Rfl:     Multiple Vitamins-Minerals (MULTIVITAMIN & MINERAL PO), Take 1 tablet by mouth daily. Per Felipa Moore MD [facility] 10/2/23  Indications: supplement, Disp: , Rfl:     OLANZapine (ZyPREXA ZYDIS) 5 mg dispersible tablet, Take 1 tablet (5 mg total) by mouth daily at bedtime, Disp: 30 tablet, Rfl: 0    potassium chloride (Klor-Con M20) 20 mEq tablet, Take 2 tablets (40 mEq total) by mouth if needed (when taking lasix), Disp: 60 tablet, Rfl: 3    Tafamidis Meglumine, Cardiac, (Vyndaqel) 20 MG CAPS, Take 80 mg by mouth in the morning Take 20mg 4 caps once daily, Disp: 120 capsule, Rfl: 12    warfarin (COUMADIN) 5 mg tablet, Take by mouth daily 1 to 1.5 tablet daily per physician, Disp: , Rfl:     warfarin (COUMADIN) 5 mg tablet, TAKE 1 TO 1 AND 1/2 TABLETS BY MOUTH DAILY AS DIRECTED BY PRESCRIBER, Disp: 125 tablet, Rfl: 2    Updated list was reviewed in [facility] of facility.     Blood pressure 149/89 ,temperature 97.1, pulse 68, weight 240 pounds, respiratory rate 18, oxygen saturation 100%    Physical Exam  Constitutional:       General: He is not in acute distress.  HENT:      Head: Normocephalic and atraumatic.   Eyes:      General: No scleral icterus.  Cardiovascular:      Rate and Rhythm: Normal rate and regular rhythm.   Pulmonary:      Breath sounds: No wheezing, rhonchi or rales.   Abdominal:      General: There is no distension.      Palpations: Abdomen is soft.      Tenderness: There is no abdominal tenderness. There is no  "guarding.   Musculoskeletal:      Cervical back: Neck supple.      Right lower leg: Edema (mild at distal lower extremity extending to dorsum of foot) present.      Left lower leg: Edema (2-3+) present.   Skin:     Coloration: Skin is not jaundiced.      Comments: Left knee incision is healing well   Neurological:      General: No focal deficit present.      Cranial Nerves: No cranial nerve deficit.      Motor: Weakness present.   Psychiatric:         Mood and Affect: Mood normal.         Behavior: Behavior normal.         Diagnostic Data:    Recent labs were reviewed.  Lab Results   Component Value Date    WBC 8.49 02/06/2025    HGB 16.1 02/06/2025    HCT 49.0 02/06/2025    MCV 98 02/06/2025     02/06/2025      Lab Results   Component Value Date    SODIUM 139 02/05/2025    K 4.1 02/05/2025     02/05/2025    CO2 27 02/05/2025    BUN 28 (H) 02/05/2025    CREATININE 0.88 02/05/2025    GLUC 105 02/05/2025    CALCIUM 9.2 02/05/2025      Additional Notes:     Care coordinated with patient's wife in person and daughter over the phone.  Total time spent on encounter and coordination of care with the staff and family members was in excess of 40 minutes.    Portions of the record may have been created with voice recognition software.  Occasional wrong word or \"sound a like\" substitutions may have occurred due to the inherent limitations of voice recognition software.  Read the chart carefully and recognize, using context, where substitutions have occurred.    This note was electronically signed by Dr. Gabi Hernández   "

## 2025-02-11 NOTE — ASSESSMENT & PLAN NOTE
Continue Vit D supplements.  Family is requesting to  check vitamin D level.  Last reported level was 52 1 year ago

## 2025-02-11 NOTE — ASSESSMENT & PLAN NOTE
Wt Readings from Last 3 Encounters:   02/02/25 111 kg (244 lb 11.4 oz)   01/16/25 111 kg (244 lb)   01/10/25 111 kg (244 lb)     History of CHF.  Patient has increase edema involving left lower extremity in the setting of recent knee surgery along with mild edema at distal right lower extremity.  Currently remains on as needed Lasix.  Will start Lasix 20 mg daily and follow-up

## 2025-02-11 NOTE — TELEPHONE ENCOUNTER
[facility] approval for 2025:    ID# [id number]  [facility]# [id number]  BIN#  [id number]  N#  [id number]    $8,000.00

## 2025-02-11 NOTE — ASSESSMENT & PLAN NOTE
Heart rate stable on Toprol xl 25 mg daily  Patient remains on Coumadin.   INR was 2.4 yesterday.  Continue Coumadin 4 mg.  Follow-up repeat INR

## 2025-02-11 NOTE — ASSESSMENT & PLAN NOTE
S/p total knee arthroplasty on 1/30/2025  Pt was recently treated with cefadroxil for postop prophylaxis  Left knee XR negative for acute traumatic injury  Pt continues with increase edema and blanchable erythema involving LLE.  No overt cellulitis.  Recent doppler was negative for DVT.  Patient reports increased pain.  Continue scheduled dose of Tylenol.  avoiding oxycodone as this has caused increased confusion in the past.  Patient additionally remains on lidocaine patch and as needed methocarbamol PT/OT

## 2025-02-11 NOTE — TELEPHONE ENCOUNTER
Called [facility], Rx confirmed, instructed patient's wife to call to set up account and provided #.  Also instructed to provide  Foundation information.

## 2025-02-11 NOTE — TELEPHONE ENCOUNTER
PA for Vyndaqel 20 mg SUBMITTED to [employer]    via    []CMM-KEY:   [x][employer]-Case ID # [id number]   []Availity-Auth ID # NDC #   []Faxed to plan   []Other website   []Phone call Case ID #     [x]PA sent as URGENT    All office notes, labs and other pertaining documents and studies sent. Clinical questions answered. Awaiting determination from insurance company.     Turnaround time for your insurance to make a decision on your Prior Authorization can take 7-21 business days.

## 2025-02-12 NOTE — ASSESSMENT & PLAN NOTE
Edema is more pronounced in left lower extremity.  Recently his Doppler was negative for DVT.  Patient is currently on as needed Lasix for weight gain.  Discussed with daughter.  Will start Lasix 20 mg every other day for now.  Follow-up

## 2025-02-12 NOTE — ASSESSMENT & PLAN NOTE
History of recurrent falls following total knee arthroplasty on 1/30/2025   Continue PT/OT   Fall precautions  Out of bed as tolerated  Encourage early and frequent mobilization  Encourage adequate hydration and nutrition  Provide adequate pain management   Goal is to return home  Continue with PT/OT for continued strength and balance training

## 2025-02-12 NOTE — ASSESSMENT & PLAN NOTE
Wt Readings from Last 3 Encounters:   02/02/25 111 kg (244 lb 11.4 oz)   01/16/25 111 kg (244 lb)   01/10/25 111 kg (244 lb)     Patient is currently on as needed Lasix only.  Given increase edema will start Lasix 20 mg every other day.  Daughter is concerned with increased frequency of urination with diuretic use which may cause patient to get up and increase risk of fall

## 2025-02-12 NOTE — ASSESSMENT & PLAN NOTE
Lab Results   Component Value Date    EGFR 78 02/05/2025    EGFR 69 02/04/2025    EGFR 70 02/03/2025    CREATININE 0.88 02/05/2025    CREATININE 0.99 02/04/2025    CREATININE 0.98 02/03/2025   Recent creatinine was stable.  Avoid hypotension and nephrotoxins.

## 2025-02-14 ENCOUNTER — NURSING HOME VISIT (OUTPATIENT)
Dept: GERIATRICS | Facility: OTHER | Age: 85
End: 2025-02-14
Payer: MEDICARE

## 2025-02-14 VITALS
SYSTOLIC BLOOD PRESSURE: 120 MMHG | WEIGHT: 240.8 LBS | DIASTOLIC BLOOD PRESSURE: 68 MMHG | OXYGEN SATURATION: 98 % | HEART RATE: 89 BPM | RESPIRATION RATE: 18 BRPM | TEMPERATURE: 97.8 F | BODY MASS INDEX: 30.92 KG/M2

## 2025-02-14 DIAGNOSIS — I48.19 PERSISTENT ATRIAL FIBRILLATION (HCC): ICD-10-CM

## 2025-02-14 DIAGNOSIS — E55.9 VITAMIN D DEFICIENCY: ICD-10-CM

## 2025-02-14 DIAGNOSIS — I10 PRIMARY HYPERTENSION: ICD-10-CM

## 2025-02-14 DIAGNOSIS — I82.5Y1 CHRONIC DEEP VEIN THROMBOSIS (DVT) OF PROXIMAL VEIN OF RIGHT LOWER EXTREMITY (HCC): ICD-10-CM

## 2025-02-14 DIAGNOSIS — I43 CARDIAC AMYLOIDOSIS (HCC): ICD-10-CM

## 2025-02-14 DIAGNOSIS — Z96.652 HISTORY OF TOTAL LEFT KNEE REPLACEMENT: ICD-10-CM

## 2025-02-14 DIAGNOSIS — E85.4 CARDIAC AMYLOIDOSIS (HCC): ICD-10-CM

## 2025-02-14 DIAGNOSIS — N18.9 CHRONIC KIDNEY DISEASE, UNSPECIFIED CKD STAGE: ICD-10-CM

## 2025-02-14 DIAGNOSIS — I50.32 CHRONIC DIASTOLIC CONGESTIVE HEART FAILURE (HCC): Primary | ICD-10-CM

## 2025-02-14 DIAGNOSIS — R68.89 FORGETFULNESS: ICD-10-CM

## 2025-02-14 PROCEDURE — 99309 SBSQ NF CARE MODERATE MDM 30: CPT | Performed by: INTERNAL MEDICINE

## 2025-02-14 RX ORDER — OLANZAPINE 5 MG/1
2.5 TABLET, ORALLY DISINTEGRATING ORAL
Start: 2025-02-14

## 2025-02-14 NOTE — ASSESSMENT & PLAN NOTE
Wt Readings from Last 3 Encounters:   02/13/25 109 kg (240 lb 12.8 oz)   02/02/25 111 kg (244 lb 11.4 oz)   01/16/25 111 kg (244 lb)     Patient was recently started on Lasix 20 mg every other day for increase edema of lower extremities.  Continue same.  Weights have been stable at around 240 pounds.  Continue to monitor

## 2025-02-14 NOTE — ASSESSMENT & PLAN NOTE
Pt was noted to have episodes of increase confusion recently at the hospital  CT head (2/2/25) mild microangiopathic changes  MOCA (2/5/25) 19/30   Recommend follow up as outpatient for comprehensive assessment and supportive resources  Suspect increased confusion postoperatively was due to superimposed delirium.  Mental status is gradually improving.  Patient is more awake and alert.  He has not had any behavioral issues except tends to get sundowning at times.  Patient was taken off of developed Re[place] and as needed quetiapine on last encounter.  Currently remains on olanzapine 5 mg daily, will further reduce dose of olanzapine to 2.5  mg and follow    .

## 2025-02-14 NOTE — PROGRESS NOTES
[facility]  [facility]  [address]   [address]  [place], [address] [phone]    Progress Note  Code SNF 31    Patient Location     [facility]    Reason for visit     Follow-up hypertension, atrial fibrillation, DVT, vitamin D deficiency, falls, CHF    Patient’s recent vitals, labs and updated medications were reviewed on [facility] system of facility.     Problem List Items Addressed This Visit       HTN (hypertension)    BP stable at 120/68 on Metoprolol 25 mg daily         Vitamin D deficiency    Vitamin D level was 33 on 2/13  Continue Vitamin D supplements         Persistent atrial fibrillation (HCC)    Heart rate stable on Toprol xl 25 mg daily  Patient remains on Coumadin.   INR was 2.4 yesterday.  Continue Coumadin 4 mg.  Follow-up repeat INR on 2/17         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HCC)    Continue home tafamidis-         Chronic diastolic congestive heart failure (HCC) - Primary    Wt Readings from Last 3 Encounters:   02/13/25 109 kg (240 lb 12.8 oz)   02/02/25 111 kg (244 lb 11.4 oz)   01/16/25 111 kg (244 lb)     Patient was recently started on Lasix 20 mg every other day for increase edema of lower extremities.  Continue same.  Weights have been stable at around 240 pounds.  Continue to monitor               History of total knee arthroplasty    S/p total knee arthroplasty on 1/30/2025  Pt was recently treated with cefadroxil for postop prophylaxis  Left knee XR negative for acute traumatic injury  Patient is noted to have increase edema of extremities slightly improved from.  Recent doppler was negative for DVT.  Continue Tylenol lidocaine patch and as needed methocarbamol for pain.    Continue PT/OT         CKD (chronic kidney disease)    Lab Results   Component Value Date    EGFR 78 02/05/2025    EGFR 69 02/04/2025    EGFR 70 02/03/2025    CREATININE 0.88 02/05/2025    CREATININE 0.99 02/04/2025    CREATININE 0.98 02/03/2025   Recent creatinine was  stable.  Avoid hypotension and nephrotoxins         Chronic deep vein thrombosis (DVT) of right lower extremity (HCC)    History of DVT.  Patient remains on Coumadin.  Recent INR was therapeutic at 2.4.  Repeat INR is due on 2/17.  Will follow-up         Forgetfulness    Pt was noted to have episodes of increase confusion recently at the hospital  CT head (2/2/25) mild microangiopathic changes  MOCA (2/5/25) 19/30   Recommend follow up as outpatient for comprehensive assessment and supportive resources  Suspect increased confusion postoperatively was due to superimposed delirium.  Mental status is gradually improving.  Patient is more awake and alert.  He has not had any behavioral issues except tends to get sundowning at times.  Patient was taken off of developed Reicks and as needed quetiapine on last encounter.  Currently remains on olanzapine 5 mg daily, will further reduce dose of olanzapine to 2.5  mg and follow    .               Relevant Medications    OLANZapine (ZyPREXA ZYDIS) 5 mg dispersible tablet           HPI       Patient is being seen for a follow-up visit today.  He is doing okay at present except continues with intermittent discomfort involving left lower extremity.  He is awake alert able to make his needs known.  No behavioral issues.  No fever chills dyspnea or wheezing      Review of Systems   Constitutional:  Negative for chills and fever.   Respiratory:  Negative for shortness of breath and wheezing.    Cardiovascular:  Positive for leg swelling (Involving left lower extremity). Negative for chest pain.   Gastrointestinal:  Negative for abdominal distention, abdominal pain and vomiting.   Genitourinary:  Negative for flank pain and hematuria.   Musculoskeletal:  Positive for arthralgias (Involving left knee/LLE) and gait problem.   Neurological:  Positive for weakness. Negative for seizures and syncope.   Psychiatric/Behavioral:  Positive for confusion (sundowning at times). Negative for  agitation and behavioral problems.        Past Medical History:   Diagnosis Date    Abnormal glucose level 2018    Acute back pain 10/02/2017    Atrial fibrillation (HCC)     Blood clot in vein 2015    in right foot    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HCC) 2023    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HCC)     Carpal tunnel syndrome     Chronic kidney disease (CKD)     Fever 2017    Frailty syndrome in geriatric patient 2023    FUO (fever of unknown origin) 2017    Heart failure (HCC)     History of DVT (deep vein thrombosis)     History of tobacco abuse     quit in     History of transient cerebral ischemia     Hypertension        Past Surgical History:   Procedure Laterality Date    BACK SURGERY  2018    COLONOSCOPY      COLONOSCOPY N/A 2016    Procedure: COLONOSCOPY;  Surgeon: Salvador Ovalle MD;  Location: [facility];  Service:     HERNIA REPAIR      NEUROPLASTY / TRANSPOSITION MEDIAN NERVE AT CARPAL TUNNEL         Social History     Tobacco Use   Smoking Status Former    Current packs/day: 0.00    Average packs/day: 1 pack/day for 26.0 years (26.0 ttl pk-yrs)    Types: Cigarettes    Start date:     Quit date:     Years since quittin.1   Smokeless Tobacco Never       Family History   Problem Relation Age of Onset    Heart attack Father     Heart disease Other     Cancer Other         Allergies   Allergen Reactions    Sildenafil Hives     Other reaction(s): severe congestion, cialis was OK    Gabapentin Confusion    Sildenafil Other (See Comments)     _         Current Outpatient Medications:     OLANZapine (ZyPREXA ZYDIS) 5 mg dispersible tablet, Take 0.5 tablets (2.5 mg total) by mouth daily at bedtime, Disp: , Rfl:     acetaminophen (TYLENOL) 325 mg tablet, Take 3 tablets (975 mg total) by mouth every 8 (eight) hours, Disp: , Rfl:     Cholecalciferol (VITAMIN D-3 PO), Take 4,000 Units by mouth daily., Disp: , Rfl:     folic acid (FOLVITE) 1 mg tablet, Take 2 mg by mouth daily  , Disp: ,  Rfl:     furosemide (LASIX) 20 mg tablet, Take 20 mg by mouth every other day, Disp: , Rfl:     lidocaine (LIDODERM) 5 %, Apply 1 patch topically over 12 hours daily Remove & Discard patch within 12 hours or as directed by MD, Disp: , Rfl:     methocarbamol (ROBAXIN) 500 mg tablet, Take 1 tablet (500 mg total) by mouth every 6 (six) hours as needed for muscle spasms, Disp: , Rfl:     metoprolol succinate (TOPROL-XL) 25 mg 24 hr tablet, Take 1 tablet (25 mg total) by mouth daily, Disp: , Rfl:     Multiple Vitamins-Minerals (MULTIVITAMIN & MINERAL PO), Take 1 tablet by mouth daily. Per Felipa Moore MD [facility] 10/2/23  Indications: supplement, Disp: , Rfl:     potassium chloride (Klor-Con M20) 20 mEq tablet, Take 2 tablets (40 mEq total) by mouth if needed (when taking lasix), Disp: 60 tablet, Rfl: 3    Tafamidis Meglumine, Cardiac, (Vyndaqel) 20 MG CAPS, Take 80 mg by mouth in the morning Take 20mg 4 caps once daily, Disp: 120 capsule, Rfl: 12    warfarin (COUMADIN) 5 mg tablet, Take by mouth daily 1 to 1.5 tablet daily per physician, Disp: , Rfl:     warfarin (COUMADIN) 5 mg tablet, TAKE 1 TO 1 AND 1/2 TABLETS BY MOUTH DAILY AS DIRECTED BY PRESCRIBER, Disp: 125 tablet, Rfl: 2    Updated list was reviewed in [facility] system of facility.     Vitals:    02/13/25 1115   BP: 120/68   Pulse: 89   Resp: 18   Temp: 97.8 °F (36.6 °C)   SpO2: 98%       Physical Exam  Constitutional:       General: He is not in acute distress.  HENT:      Head: Normocephalic and atraumatic.   Cardiovascular:      Rate and Rhythm: Normal rate and regular rhythm.   Pulmonary:      Breath sounds: No wheezing, rhonchi or rales.   Abdominal:      General: There is no distension.      Palpations: Abdomen is soft.      Tenderness: There is no abdominal tenderness. There is no guarding.   Musculoskeletal:      Cervical back: Neck supple.      Right lower leg: Edema present.      Left lower leg: Edema (2-3+) present.   Skin:      "Coloration: Skin is not jaundiced.      Comments: Left knee incision is healing well   Neurological:      General: No focal deficit present.      Cranial Nerves: No cranial nerve deficit.      Motor: Weakness present.   Psychiatric:         Mood and Affect: Mood normal.         Behavior: Behavior normal.         Diagnostic Data:    Recent labs were reviewed.  Vitamin D level was 33 on 2/13/2025  PT/INR was 2.4 on 2/10/2025    Lab Results   Component Value Date    WBC 8.49 02/06/2025    HGB 16.1 02/06/2025    HCT 49.0 02/06/2025    MCV 98 02/06/2025     02/06/2025        Lab Results   Component Value Date    SODIUM 139 02/05/2025    K 4.1 02/05/2025     02/05/2025    CO2 27 02/05/2025    BUN 28 (H) 02/05/2025    CREATININE 0.88 02/05/2025    GLUC 105 02/05/2025    CALCIUM 9.2 02/05/2025        Care coordinated with pt's daughter over the phone.  Portions of the record may have been created with voice recognition software.  Occasional wrong word or \"sound a like\" substitutions may have occurred due to the inherent limitations of voice recognition software.  Read the chart carefully and recognize, using context, where substitutions have occurred.    This note was electronically signed by Dr. Gabi Hernández   "

## 2025-02-14 NOTE — ASSESSMENT & PLAN NOTE
History and Physical        Jone Levin, 75 year old male   : 1945, MRN [id number]  Room: CATH/EP    Admitted: 2021  DOS: 2021    Attending Provider: Dr. Becka Dwyer MD  Admission Date: 2021    History of Present Illness:  Jone Levin is a 75 year old male presents today to undergo AVN ablation and BiV implant.  Patient was last seen in the hospital on 21 for AFLu RVR and new onset systolic heart failure.  Because of comorbidities, most anti-arrhythmics cannot be used. Tikosyn was the one main option left, however patient prefers rate control. Decision was made to proceed with AVN ablation and BiV implant.    Reports a \"few minutes\" of \"fluttering\" that occurs \"once every few days.\" No other associated symptoms.     Reports SOB with activity only. No change in this since he has maintained SR. Chronic cough from COPD, \"loose cough\" lately since he has been using his albuterol TID. He has gained 6lbs in the past 2 months. Chronic BLE edema, not any worse than normal. Has some anxiety today.     No CP, dizziness, light-headedness, syncope, falls, orthopnea, PND, or recent infection.      He held eliquis this AM as instructed     Assessment:  · Paroxsymal atrial flutter with RVR, first dx on 21 EKG rates 130s when admitted to [facility] for worsened SOB. VERONICA 21 revealed EMILIA thrombus, DCCV cancelled. Was loaded with 1000mcg of Digoxin 21, d/c on 250mcg daily and atenolol 50mg daily. Metoprolol caused respiratory distress. Allergy to cardizem. Recurrence of AFLu 21 rates 140s, in ER given IV Digoxin 250mcg x 1. Dig level 0.5 (21). Symptomatic with fluttering only when RVR, not with CVR. Converted to sinus. Maintaining SR 60-70s with occ PVCs   · CHADSVASc Stroke Risk Score = 5  (HF-1, HTN-1, age-2, CAD-1). AC with eliquis. Started 21.   · SVT, hx in , no recurrence.EKG 10/2012 with , probable AFLu  · LA thrombus on 21 VERONICA   · New onset  History of DVT.  Patient remains on Coumadin.  Recent INR was therapeutic at 2.4.  Repeat INR is due on 2/17.  Will follow-up   cardiomyopathy with acute systolic heart failure,  EF 35% per 1/29/21 echo; EF 50% per 6/2019 Echo. EF WNL per 2/2018 cath,  normal EF on cath 2/2018); suspect tachycardia induced, however cannot exclude ischemia given CAD history and viral etiology ( visit 12/14/20 for COPD exac, suspect bacterial infection, given abx and steroids). CXR 2/5/21 R pleural effusion, BNP 1978. On losartan 50mg daily, atenolol, and lasix.    · Echo 1/29/21: EF 35%, CLAUDIA 29.7, IVS 1.4, trace MR,RVSP 48.9  · CAD s/p stent in 2012 (denies any symptoms at time of stent)  · Severe COPD, PFTs 12/2019: DLCO 27/DLCO/VA 65.  Carotid stenosis -US 12/2020 showed JANNETH 50-69% stenosis  · R lung adenocarcinoma, status post lobectomy.  · Hypertension, stable  · Obesity   · Carotid stenosis  · TSH 1.531 1/29/21  ·  / QTc 451 in SR on 6/30/20 EKG   · COVID negative 4/10/21        Medications tried  · Metoprolol caused respiratory distress.   · allergy to cardizem  · Severe COPD, not a candidate for amiodarone       Plan:  · Proceed with AVN ablation and BiV PPM implant  · Stop digoxin post procedure   · Continue BB for CAD and CHF        Patient Active Problem List   Diagnosis   • Essential hypertension   • COPD without exacerbation (CMS/HCC)   • Anxiety   • Gastroesophageal reflux disease without esophagitis   • Contracture of palmar fascia   • Paroxysmal SVT (supraventricular tachycardia) (CMS/HCC)   • CAD (coronary artery disease)   • Erectile dysfunction   • Environmental allergies   • Carotid artery stenosis   • Chronic midline low back pain without sciatica   • Benign prostatic hyperplasia without lower urinary tract symptoms   • Adenocarcinoma of right lung (CMS/HCC)   • Pneumothorax after biopsy   • S/P robotic right lower lobectomy of lung   • Atrial flutter (CMS/HCC)   • Cardiomyopathy (CMS/HCC)     Past Medical History:   Diagnosis Date   • Adenocarcinoma of right lung (CMS/HCC) 8/8/2019   • Allergy    • Anxiety    • Arthritis      back   • Atrial flutter (CMS/HCC)     1/29/2021 new onset per EKG   • Blood clot associated with vein wall inflammation    • Bronchitis    • CAD (coronary artery disease)    • Cardiomyopathy (CMS/HCC)     1/29/2021 per Echo EF 35%   • Carotid artery stenosis     R ICA 50-69%   • Chronic pain     back   • COPD (chronic obstructive pulmonary disease) (CMS/[facility])    • Diaphragmatic hernia without mention of obstruction or gangrene 6/22/11    4cm   • Diverticulosis of colon (without mention of hemorrhage) 6/22/11    sigmoid colon   • Environmental allergies    • Erectile dysfunction    • Fracture     jaw, toes   • GERD (gastroesophageal reflux disease) 6/22/11   • Heart murmur    • HTN (hypertension)    • Obesity    • S/P robotic right lower lobectomy of lung 9/16/2019   • Sinusitis, chronic    • Stricture and stenosis of esophagus 6/22/11    wide open Schatzki's ring at 40cm   • Unspecified hemorrhoids without mention of complication 6/22/11    moderate size internal     Past Surgical History:   Procedure Laterality Date   • Colonoscopy diagnostic  6/22/11recall due 6/2016    Dr Cao   • Coronary stent placement  10/09/2012    3.0x18 Xience stent in distal circ. Normal LVSF, EF 60%.   • Echo heart resting  09/10/2010    EF 40%   • Echo heart resting  1008/2012    EF 55%. Grade I/IV DD. Mod incr LA volume.   • Echo raquel  02/04/2021    DCCV canceled- clot   • Esophagogastroduodenoscopy transoral flex diag  6/22/11    Dr Cao   • Fracture surgery      jaw wiring   • Hernia repair      umbilical   • Left heart cath,percutaneous      2/13/18/ [facility] Dr. Caballero/  CAD mild/ prox OM 40%/  EF 65%.   • Mandible fracture surgery     • Myocardl perf rest stress  09/17/2010    Normal Adenosine. EF 41%.     ALLERGIES:   Allergen Reactions   • Diltiazem RASH   • Metoprolol SHORTNESS OF BREATH     Pt tolerates Atenolol without problem, but gets SOB with Metoprolol   • Norvasc [Amlodipine] SHORTNESS OF BREATH   • Valsartan  DIZZINESS     Low blood pressure   • Chlorthalidone Other (See Comments)     Hyponatremia   • Clindamycin GI UPSET     reflux     Social History     Tobacco Use   • Smoking status: Former Smoker     Packs/day: 2.00     Years: 40.00     Pack years: 80.00     Types: Cigarettes     Quit date: 10/22/1997     Years since quittin.4   • Smokeless tobacco: Never Used   Substance Use Topics   • Alcohol use: No     Family History   Problem Relation Age of Onset   • Cancer Mother         breast   • Diabetes Father    • Cancer Sister         breast cancer   • Substance abuse Sister    • Liver Disease Sister        Medications:  Prior to Admission medications    Medication Sig Start Date End Date Taking? Authorizing Provider   LORazepam (ATIVAN) 1 MG tablet TAKE 1 TABLET BY MOUTH EVERY 8 HOURS AS NEEDED FOR ANXIETY 3/12/21   Lindsey Carranza MD   predniSONE (DELTASONE) 20 MG tablet 40mg daily for 4 days then 20mg daily for 4 days then 10mg daily for 4 days. 3/11/21   Leandra Walker NP   ipratropium-albuterol (DUONEB) 0.5-2.5 (3) MG/3ML nebulizer solution Take 3 mLs by nebulization every 6 hours as needed for Wheezing. 3/1/21   Lindsey Carranza MD   atenolol (TENORMIN) 50 MG tablet TAKE 1 TABLET BY MOUTH TWICE DAILY 21   Lindsey Carranza MD   apixaBAN (ELIQUIS) 5 MG Tab Take 1 tablet by mouth every 12 hours. 21   KAYLIE Jerome   digoxin (LANOXIN) 0.25 MG tablet Take 1 tablet by mouth daily. 21   Larry Caballero MD   aspirin 81 MG EC tablet Take 1 tablet by mouth daily. 2/3/21   Larry Caballero MD   pantoprazole (PROTONIX) 40 MG tablet TAKE 1 TABLET BY MOUTH DAILY 21   Lindsey Carranza MD   doxazosin (CARDURA) 4 MG tablet TAKE 1 TABLET BY MOUTH DAILY 21   Lindsey Carranza MD   SPIRIVA RESPIMAT 2.5 MCG/ACT inhaler INHALE 2 PUFFS INTO THE LUNGS DAILY 20   Lindsey Carranza MD   albuterol (PROAIR RESPICLICK) 108 (90 Base) MCG/ACT inhaler Inhale 2 puffs into the lungs every 4 hours  as needed for Shortness of Breath or Wheezing. 7/28/20   Viki Drew MD   albuterol (VENTOLIN) (2.5 MG/3ML) 0.083% nebulizer solution Take 3 mLs by nebulization every 6 hours as needed for Wheezing.  Patient taking differently: Take 2.5 mg by nebulization 3 times daily.  7/28/20 7/28/21  Viki Drew MD   SYMBICORT 160-4.5 MCG/ACT inhaler INHALE 2 PUFFS INTO THE LUNGS TWICE DAILY 6/9/20   Lindsey Carranza MD   furosemide (LASIX) 20 MG tablet Take 1 tablet by mouth daily. 5/27/20   Lindsey Carranza MD   atorvastatin (LIPITOR) 40 MG tablet Take 1 tablet by mouth daily. 5/27/20   Lindsey Carranza MD   losartan (COZAAR) 50 MG tablet Take 1 tablet by mouth daily. 5/27/20   Lindsey Carranza MD   DISPENSE 1 each as needed (shortness of breath). Dispense 1 nebulizer with mouthpiece and tubing. 10/25/19   Lindsey Carranza MD   MAGNESIUM OXIDE PO Take 1 tablet by mouth at bedtime. 50mg tablet    Outside Provider   guaiFENesin (MUCINEX) 600 MG 12 hr tablet Take 1,200 mg by mouth 2 times daily.    Outside Provider   cetirizine (ZYRTEC) 10 MG tablet Take 1 tablet by mouth nightly. 2/28/17   Horacio Ulloa MD   Multiple Vitamins-Minerals (MULTIVITAMIN PO) Take 1 tablet by mouth daily.    Outside Provider     Scheduled Meds:      Continuous Infusions:      PRN Meds:    Review of Systems:  As in HPI. The reminder of systems reviewed and negative.    Objective:    PHYSICAL EXAMINATION :    There were no vitals filed for this visit.  Wt Readings from Last 1 Encounters:   04/05/21 105.2 kg     Ht Readings from Last 1 Encounters:   04/05/21 5' 10\" (1.778 m)     Constitutional: appears stated age. Well-developed. Well groomed.   HEENT: Head normocephalic. Nose normal. No icterus, no scleral injection. PER. no external ear or nasal discharge. Neck is supple, no rigidity with movement. Trachea midline. No obvious thyroid mass. No lesions noted on lips.   Neurologic: Alert and oriented to person, place and  time. Recent and remote memory appear to be intact. Judgment appears normal. Has good insight into personal medical history. Equal strength of bilateral upper and lower extremities. No focal cranial nerve deficits.   Respiratory: Breath sounds clear to auscultation in all lung fields. Respiratory rate normal, even breaths, no increased effort of breathing.   Cardiovascular: Regular rhythm and rate, , +murmur, S1, S2, no rub. Pedal pulses 2+. Bilateral upper extremities with no edema. Bilateral lower extremities with trace edema.  Musculoskeletal: No tenderness to palpation of bilateral lower extremities. No obvious defects of bilateral upper or lower extremities.  Skin: warm and dry. no obvious rashes or lesions noted.   Psychiatric: Calm, cooperative. No agitation. No anxiety noted.      OTHER:  Wt Readings from Last 4 Encounters:   04/05/21 105.2 kg   03/11/21 102.6 kg   03/10/21 102.6 kg   02/24/21 105.9 kg     No intake/output data recorded.      DIAGNOSTICS    Labs:  Recent Labs   Lab 03/10/21  1335 02/08/21  0605 02/07/21  1958 02/07/21  1318 02/07/21  0506 02/06/21  0819 02/05/21  1155 02/04/21  0941 01/31/21  0614 01/30/21  0637 01/30/21  0625 01/29/21  0711 01/29/21  0219 01/29/21  0219   Sodium 141  --   --   --  139 139 136  --  131* 124*  --   --   --  127*   Potassium 3.9 3.8 3.6 3.3* 3.1* 3.7 4.5 4.0 3.1* 3.6  --   --   --  3.8   BUN 8  --   --   --  19 10 6  --  9 8  --   --   --  10   Creatinine 0.68  --   --   --  0.73 0.62* 0.64*  --  0.69 0.55*  --   --   --  0.66*   GOT/AST 15  --   --   --   --  12 17  --  15 17  --   --   --  25   GPT 30  --   --   --   --  23 25  --  26 25  --   --   --  31   Alkaline Phosphatase 79  --   --   --   --  65 78  --  60 60  --   --   --  78   WBC 5.7  --   --   --  5.9 5.0 6.1  --  6.8  --  9.6  --   --  10.5   HGB 14.0  --   --   --  11.9* 13.0 13.9  --  13.3  --  13.4  --   --  13.6   HCT 39.9  --   --   --  34.8* 38.1* 40.2  --  36.4*  --  36.6*  --   --  38.5*      --   --   --  152 148 172  --  152  --  166  --   --  186   Magnesium  --  2.0  --   --  1.7 1.7 2.0 1.7 1.9 1.8  --  1.8   < > 1.6*   TSH  --   --   --   --   --   --   --   --   --   --   --  1.531  --   --    NT-proBNP  --   --   --   --   --   --  1,978*  --   --   --   --   --   --   --     < > = values in this interval not displayed.        Digoxin (ng/mL)   Date Value   02/15/2021 0.6 (L)   02/07/2021 0.5 (L)         Entered by:   KAYLIE Jerome    4/13/2021

## 2025-02-14 NOTE — ASSESSMENT & PLAN NOTE
S/p total knee arthroplasty on 1/30/2025  Pt was recently treated with cefadroxil for postop prophylaxis  Left knee XR negative for acute traumatic injury  Patient is noted to have increase edema of extremities slightly improved from.  Recent doppler was negative for DVT.  Continue Tylenol lidocaine patch and as needed methocarbamol for pain.    Continue PT/OT

## 2025-02-14 NOTE — ASSESSMENT & PLAN NOTE
Heart rate stable on Toprol xl 25 mg daily  Patient remains on Coumadin.   INR was 2.4 yesterday.  Continue Coumadin 4 mg.  Follow-up repeat INR on 2/17

## 2025-02-17 ENCOUNTER — NURSING HOME VISIT (OUTPATIENT)
Dept: GERIATRICS | Facility: OTHER | Age: 85
End: 2025-02-17
Payer: MEDICARE

## 2025-02-17 VITALS
BODY MASS INDEX: 30.99 KG/M2 | RESPIRATION RATE: 20 BRPM | WEIGHT: 241.4 LBS | TEMPERATURE: 97.3 F | HEART RATE: 91 BPM | SYSTOLIC BLOOD PRESSURE: 125 MMHG | OXYGEN SATURATION: 98 % | DIASTOLIC BLOOD PRESSURE: 76 MMHG

## 2025-02-17 DIAGNOSIS — E85.4 CARDIAC AMYLOIDOSIS (HCC): ICD-10-CM

## 2025-02-17 DIAGNOSIS — I43 CARDIAC AMYLOIDOSIS (HCC): ICD-10-CM

## 2025-02-17 DIAGNOSIS — Z96.652 HISTORY OF TOTAL LEFT KNEE REPLACEMENT: Primary | ICD-10-CM

## 2025-02-17 DIAGNOSIS — I82.561 CHRONIC DEEP VEIN THROMBOSIS (DVT) OF CALF MUSCLE VEIN OF RIGHT LOWER EXTREMITY (HCC): Chronic | ICD-10-CM

## 2025-02-17 DIAGNOSIS — R29.6 MULTIPLE FALLS: ICD-10-CM

## 2025-02-17 DIAGNOSIS — N18.9 CHRONIC KIDNEY DISEASE, UNSPECIFIED CKD STAGE: ICD-10-CM

## 2025-02-17 DIAGNOSIS — I10 PRIMARY HYPERTENSION: ICD-10-CM

## 2025-02-17 DIAGNOSIS — E55.9 VITAMIN D DEFICIENCY: ICD-10-CM

## 2025-02-17 DIAGNOSIS — R68.89 FORGETFULNESS: ICD-10-CM

## 2025-02-17 DIAGNOSIS — R33.9 URINARY RETENTION: ICD-10-CM

## 2025-02-17 PROCEDURE — 99309 SBSQ NF CARE MODERATE MDM 30: CPT | Performed by: INTERNAL MEDICINE

## 2025-02-17 NOTE — ASSESSMENT & PLAN NOTE
Pt was noted to have episodes of increase confusion recently at the hospital  CT head (2/2/25) mild microangiopathic changes  MOCA (2/5/25) 19/30   Recommend follow up as outpatient for comprehensive assessment and supportive resources  Suspect increased confusion postoperatively was due to delirium superimposed on cognitive impairment.  Mental status is gradually improving.    Patient is awake with periods  of confusion.  He was recently taken off of Divalproex and  as needed quetiapine.  Olanzapine dose was reduced to 2.5 mg daily.  Continue supportive care.  Goal is to eventually wean him off of Olanazapine  .

## 2025-02-17 NOTE — PROGRESS NOTES
[facility]  [address]   [address]  [place], [address] [phone]    Progress Note  Code SNF 31     Patient Location     [facility].      Reason for visit     Follow-up recurrent falls, knee arthroplasty, history of DVT, CKD, atrial fibrillation, ambulatory dysfunction    Patient’s recent vitals, labs and updated medications were reviewed on [employer] system of facility.     Problem List Items Addressed This Visit       HTN (hypertension)    BP stable at 120/68 on Metoprolol 25 mg daily         Vitamin D deficiency    Vitamin D level was 33 on 2/13  Continue Vitamin D supplements         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 (HCC) (Chronic)    History of DVT.  Patient remains on Coumadin.  INR was 2.1 today, down from 2.5 earlier.  Increase Coumadin to 5 mg from 4 mg daily         Multiple falls    History of several falls following elective total knee arthroplasty on 1/30/2025.  Continue PT OT          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HCC)    Continue home tafamidis-         History of total knee arthroplasty - Primary    S/p total knee arthroplasty on 1/30/2025  Pt was recently treated with cefadroxil for postop prophylaxis  Patient was noted to have increase edema of of LLE Recent doppler was negative for DVT.  Continue Tylenol lidocaine patch and as needed methocarbamol for pain.    Continue PT/OT         CKD (chronic kidney disease)    Lab Results   Component Value Date    EGFR 78 02/05/2025    EGFR 69 02/04/2025    EGFR 70 02/03/2025    CREATININE 0.88 02/05/2025    CREATININE 0.99 02/04/2025    CREATININE 0.98 02/03/2025   Recent creatinine was stable.  Avoid hypotension and nephrotoxins         Urinary retention    Per records urinary retention was noted on prior admission,  pt refused straight cath  Urinary retention protocol  Limit caffiene intake  Avoid alcohol, anticholinergics, opoids, sedatives,  benzodiazepines  Avoid extremes of fluid intake  Encourage scheduled tolieting   Monitor for constipation -         Forgetfulness    Pt was noted to have episodes of increase confusion recently at the hospital  CT head (2/2/25) mild microangiopathic changes  MOCA (2/5/25) 19/30   Recommend follow up as outpatient for comprehensive assessment and supportive resources  Suspect increased confusion postoperatively was due to delirium superimposed on cognitive impairment.  Mental status is gradually improving.    Patient is awake with periods  of confusion.  He was recently taken off of Divalproex and  as needed quetiapine.  Olanzapine dose was reduced to 2.5 mg daily.  Continue supportive care.  Goal is to eventually wean him off of Olanazapine  .                      HPI       Patient is being seen for a follow-up visit.  He is doing okay at present.  No fever chills dyspnea or chest congestion.  Left lower extremity pain is controlled.  Behaviors are stable.  Olanzapine dose was recently reduced.  Patient is awake with periods  of confusion.  LE edema appears to have slightly improved. Left knee incision is healing well.      Review of Systems   Constitutional:  Negative for chills and fever.   Respiratory:  Negative for shortness of breath and wheezing.    Cardiovascular:  Positive for leg swelling (Involving left lower extremity). Negative for chest pain.   Gastrointestinal:  Negative for abdominal distention, abdominal pain and vomiting.   Genitourinary:  Negative for flank pain and hematuria.   Musculoskeletal:  Positive for arthralgias (Involving left knee/LLE) and gait problem.   Neurological:  Positive for weakness. Negative for seizures and syncope.   Psychiatric/Behavioral:  Positive for confusion (sundowning at times). Negative for agitation and behavioral problems.        Past Medical History:   Diagnosis Date    Abnormal glucose level 02/07/2018    Acute back pain 10/02/2017    Atrial fibrillation (HCC)      Blood clot in vein 2015    in right foot    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HCC) 2023    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HCC)     Carpal tunnel syndrome     Chronic kidney disease (CKD)     Fever 2017    Frailty syndrome in geriatric patient 2023    FUO (fever of unknown origin) 2017    Heart failure (HCC)     History of DVT (deep vein thrombosis)     History of tobacco abuse     quit in     History of transient cerebral ischemia     Hypertension        Past Surgical History:   Procedure Laterality Date    BACK SURGERY  2018    COLONOSCOPY      COLONOSCOPY N/A 2016    Procedure: COLONOSCOPY;  Surgeon: Salvador Ovalle MD;  Location: [facility];  Service:     HERNIA REPAIR      NEUROPLASTY / TRANSPOSITION MEDIAN NERVE AT CARPAL TUNNEL         Social History     Tobacco Use   Smoking Status Former    Current packs/day: 0.00    Average packs/day: 1 pack/day for 26.0 years (26.0 ttl pk-yrs)    Types: Cigarettes    Start date:     Quit date:     Years since quittin.1   Smokeless Tobacco Never       Family History   Problem Relation Age of Onset    Heart attack Father     Heart disease Other     Cancer Other         Allergies   Allergen Reactions    Sildenafil Hives     Other reaction(s): severe congestion, cialis was OK    Gabapentin Confusion    Sildenafil Other (See Comments)     _         Current Outpatient Medications:     acetaminophen (TYLENOL) 325 mg tablet, Take 3 tablets (975 mg total) by mouth every 8 (eight) hours, Disp: , Rfl:     Cholecalciferol (VITAMIN D-3 PO), Take 4,000 Units by mouth daily., Disp: , Rfl:     folic acid (FOLVITE) 1 mg tablet, Take 2 mg by mouth daily  , Disp: , Rfl:     furosemide (LASIX) 20 mg tablet, Take 20 mg by mouth every other day, Disp: , Rfl:     lidocaine (LIDODERM) 5 %, Apply 1 patch topically over 12 hours daily Remove & Discard patch within 12 hours or as directed by MD, Disp: , Rfl:     methocarbamol (ROBAXIN) 500 mg tablet, Take 1 tablet (500 mg  total) by mouth every 6 (six) hours as needed for muscle spasms, Disp: , Rfl:     metoprolol succinate (TOPROL-XL) 25 mg 24 hr tablet, Take 1 tablet (25 mg total) by mouth daily, Disp: , Rfl:     Multiple Vitamins-Minerals (MULTIVITAMIN & MINERAL PO), Take 1 tablet by mouth daily. Per [facility] 10/2/23  Indications: supplement, Disp: , Rfl:     OLANZapine (ZyPREXA ZYDIS) 5 mg dispersible tablet, Take 0.5 tablets (2.5 mg total) by mouth daily at bedtime, Disp: , Rfl:     potassium chloride (Klor-Con M20) 20 mEq tablet, Take 2 tablets (40 mEq total) by mouth if needed (when taking lasix), Disp: 60 tablet, Rfl: 3    Tafamidis Meglumine, Cardiac, (Vyndaqel) 20 MG CAPS, Take 80 mg by mouth in the morning Take 20mg 4 caps once daily, Disp: 120 capsule, Rfl: 12    warfarin (COUMADIN) 5 mg tablet, Take by mouth daily 1 to 1.5 tablet daily per physician, Disp: , Rfl:     warfarin (COUMADIN) 5 mg tablet, TAKE 1 TO 1 AND 1/2 TABLETS BY MOUTH DAILY AS DIRECTED BY PRESCRIBER, Disp: 125 tablet, Rfl: 2    Updated list was reviewed in [facility] care system of facility.     Vitals:    02/17/25 1314   BP: 125/76   Pulse: 91   Resp: 20   Temp: (!) 97.3 °F (36.3 °C)   SpO2: 98%       Physical Exam  Constitutional:       General: He is not in acute distress.  HENT:      Head: Normocephalic and atraumatic.   Cardiovascular:      Rate and Rhythm: Normal rate and regular rhythm.   Pulmonary:      Breath sounds: No wheezing, rhonchi or rales.   Abdominal:      General: There is no distension.      Palpations: Abdomen is soft.      Tenderness: There is no abdominal tenderness. There is no guarding.   Musculoskeletal:      Cervical back: Neck supple.      Right lower leg: Edema present.      Left lower leg: Edema present.      Comments: Edema more pronounced in left lower extremity although slightly improved from before   Skin:     Coloration: Skin is not jaundiced.      Comments: Left knee incision is healing  "well  Abrasion over right knee   Neurological:      General: No focal deficit present.      Cranial Nerves: No cranial nerve deficit.      Motor: Weakness present.   Psychiatric:         Mood and Affect: Mood normal.         Behavior: Behavior normal.         Diagnostic Data:    Recent labs were reviewed.  Lab Results   Component Value Date    WBC 8.49 02/06/2025    HGB 16.1 02/06/2025    HCT 49.0 02/06/2025    MCV 98 02/06/2025     02/06/2025      Lab Results   Component Value Date    SODIUM 139 02/05/2025    K 4.1 02/05/2025     02/05/2025    CO2 27 02/05/2025    BUN 28 (H) 02/05/2025    CREATININE 0.88 02/05/2025    GLUC 105 02/05/2025    CALCIUM 9.2 02/05/2025       Portions of the record may have been created with voice recognition software.  Occasional wrong word or \"sound a like\" substitutions may have occurred due to the inherent limitations of voice recognition software.  Read the chart carefully and recognize, using context, where substitutions have occurred.    This note was electronically signed by Dr. Gabi Hernández   "

## 2025-02-17 NOTE — ASSESSMENT & PLAN NOTE
History of DVT.  Patient remains on Coumadin.  INR was 2.1 today, down from 2.5 earlier.  Increase Coumadin to 5 mg from 4 mg daily

## 2025-02-17 NOTE — ASSESSMENT & PLAN NOTE
S/p total knee arthroplasty on 1/30/2025  Pt was recently treated with cefadroxil for postop prophylaxis  Patient was noted to have increase edema of of LLE Recent doppler was negative for DVT.  Continue Tylenol lidocaine patch and as needed methocarbamol for pain.    Continue PT/OT

## 2025-02-19 ENCOUNTER — NURSING HOME VISIT (OUTPATIENT)
Dept: GERIATRICS | Facility: OTHER | Age: 85
End: 2025-02-19
Payer: MEDICARE

## 2025-02-19 VITALS
BODY MASS INDEX: 30.99 KG/M2 | OXYGEN SATURATION: 98 % | SYSTOLIC BLOOD PRESSURE: 144 MMHG | DIASTOLIC BLOOD PRESSURE: 78 MMHG | WEIGHT: 241.4 LBS | RESPIRATION RATE: 18 BRPM | TEMPERATURE: 88 F

## 2025-02-19 DIAGNOSIS — I43 CARDIAC AMYLOIDOSIS (HCC): ICD-10-CM

## 2025-02-19 DIAGNOSIS — Z86.718 HISTORY OF DVT (DEEP VEIN THROMBOSIS): ICD-10-CM

## 2025-02-19 DIAGNOSIS — R29.6 MULTIPLE FALLS: ICD-10-CM

## 2025-02-19 DIAGNOSIS — I48.19 PERSISTENT ATRIAL FIBRILLATION (HCC): ICD-10-CM

## 2025-02-19 DIAGNOSIS — E85.4 CARDIAC AMYLOIDOSIS (HCC): ICD-10-CM

## 2025-02-19 DIAGNOSIS — R68.89 FORGETFULNESS: ICD-10-CM

## 2025-02-19 DIAGNOSIS — Z96.652 HISTORY OF TOTAL LEFT KNEE REPLACEMENT: Primary | ICD-10-CM

## 2025-02-19 DIAGNOSIS — N18.31 STAGE 3A CHRONIC KIDNEY DISEASE (CKD) (HCC): ICD-10-CM

## 2025-02-19 DIAGNOSIS — I10 PRIMARY HYPERTENSION: ICD-10-CM

## 2025-02-19 DIAGNOSIS — E55.9 VITAMIN D DEFICIENCY: ICD-10-CM

## 2025-02-19 PROCEDURE — 99309 SBSQ NF CARE MODERATE MDM 30: CPT | Performed by: INTERNAL MEDICINE

## 2025-02-19 NOTE — ASSESSMENT & PLAN NOTE
Pt was noted to have increased confusion postoperatively.  CT head (2/2/25) mild microangiopathic changes  MOCA (2/5/25) 19/30   Recommend follow up as outpatient for comprehensive assessment and supportive resources  He was recently taken off of Divalproex and as needed quetiapine.  Olanzapine dose was reduced to 2.5 mg from 5 mg daily.  Behaviors remain stable without any episodes of irritability and agitation.  Will change olanzapine to 2.5 mg every other day for the next week and eventually discontinue    .

## 2025-02-19 NOTE — TELEPHONE ENCOUNTER
Marlyn (Patient's wife) called back to reach Tamiko. She stated that she needs the name of the medication correctly spelled to fill out the form. Spoke to Vargas in the office and she said she will send a message to Tamiko to call Marlyn back. Thank you!

## 2025-02-19 NOTE — ASSESSMENT & PLAN NOTE
S/p total knee arthroplasty on 1/30/2025  Pt was recently treated with cefadroxil for postop prophylaxis  Patient was noted to have increase edema of of LLE Recent doppler was negative for DVT.  Continue Tylenol and lidocaine patch for pain.  Patient additionally remains on as needed methocarbamol which will be discontinued if patient has not been using it.  Continue PT/OT  Follow-up with orthopedic service

## 2025-02-19 NOTE — PROGRESS NOTES
[facility]  [facility]  [address]   [address]  [place], [address] [phone]    Progress Note  Code SNF 31    Patient Location     [facility]    Reason for visit     Follow-up left total knee arthroplasty, edema lower extremities, hypertension, ambulatory dysfunction, cognitive impairment, CKD, CHF    Patient’s recent vitals, labs and updated medications were reviewed on [employer] system of facility.     Problem List Items Addressed This Visit       HTN (hypertension)    BP stable  on Metoprolol 25 mg daily         Vitamin D deficiency    Vitamin D level was 33 on 2/13  Continue Vitamin D supplements         Persistent atrial fibrillation (HCC)    Heart rate stable on Toprol xl 25 mg daily  Patient remains on Coumadin.   INR was 2.1 on 2/17.  Continue Coumadin 5 mg.  Follow-up repeat INR          Stage 3a chronic kidney disease (CKD) (HCC)    Lab Results   Component Value Date    EGFR 78 02/05/2025    EGFR 69 02/04/2025    EGFR 70 02/03/2025    CREATININE 0.88 02/05/2025    CREATININE 0.99 02/04/2025    CREATININE 0.98 02/03/2025   Recent creatinine was stable.  Avoid hypotension and nephrotoxins         Multiple falls    History of several falls following elective total knee arthroplasty on 1/30/2025.  Continue PT OT          History of DVT (deep vein thrombosis)    Continue Coumadin.  INR was 2.1 on 2/17/2025.  Follow-up repeat INR.          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HCC)    Continue home tafamidis-         History of total knee arthroplasty - Primary    S/p total knee arthroplasty on 1/30/2025  Pt was recently treated with cefadroxil for postop prophylaxis  Patient was noted to have increase edema of of LLE Recent doppler was negative for DVT.  Continue Tylenol and lidocaine patch for pain.  Patient additionally remains on as needed methocarbamol which will be discontinued if patient has not been using it.  Continue PT/OT  Follow-up with orthopedic service          Forgetfulness    Pt was noted to have increased confusion postoperatively.  CT head (2/2/25) mild microangiopathic changes  MOCA (2/5/25) 19/30   Recommend follow up as outpatient for comprehensive assessment and supportive resources  He was recently taken off of Divalproex and as needed quetiapine.  Olanzapine dose was reduced to 2.5 mg from 5 mg daily.  Behaviors remain stable without any episodes of irritability and agitation.  Will change olanzapine to 2.5 mg every other day for the next week and eventually discontinue    .              [place]        HPI         Patient is being seen for a follow-up visit today.  He is doing okay at present.  Awake alert with PERIODS of s of confusion..  Patient is pleasant.  No behavioral issues.  Olanzapine dose was recently increased from 5 to 2.5 mg daily.  Left lower extremity edema is persistent but slightly improved from before.  Pain appears to be controlled.  No fever chills or dyspnea      Review of Systems   Constitutional:  Negative for chills and fever.   Respiratory:  Negative for shortness of breath and wheezing.    Cardiovascular:  Positive for leg swelling (Involving left lower extremity). Negative for chest pain.   Gastrointestinal:  Negative for abdominal distention, abdominal pain and vomiting.   Genitourinary:  Negative for flank pain and hematuria.   Musculoskeletal:  Positive for arthralgias (Involving left knee/LLE) and gait problem.   Neurological:  Positive for weakness. Negative for seizures and syncope.   Psychiatric/Behavioral:  Positive for confusion (sundowning at times). Negative for agitation and behavioral problems.        Past Medical History:   Diagnosis Date    Abnormal glucose level 02/07/2018    Acute back pain 10/02/2017    Atrial fibrillation (HCC)     Blood clot in vein 2015    in right foot    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HCC) 04/2023    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HCC)     Carpal tunnel syndrome     Chronic kidney disease (CKD)     Fever  2017    Frailty syndrome in geriatric patient 2023    FUO (fever of unknown origin) 2017    Heart failure (HCC)     History of DVT (deep vein thrombosis)     History of tobacco abuse     quit in     History of transient cerebral ischemia     Hypertension        Past Surgical History:   Procedure Laterality Date    BACK SURGERY  2018    COLONOSCOPY      COLONOSCOPY N/A 2016    Procedure: COLONOSCOPY;  Surgeon: Salvador Ovalle MD;  Location: [facility];  Service:     HERNIA REPAIR      NEUROPLASTY / TRANSPOSITION MEDIAN NERVE AT CARPAL TUNNEL         Social History     Tobacco Use   Smoking Status Former    Current packs/day: 0.00    Average packs/day: 1 pack/day for 26.0 years (26.0 ttl pk-yrs)    Types: Cigarettes    Start date:     Quit date:     Years since quittin.1   Smokeless Tobacco Never       Family History   Problem Relation Age of Onset    Heart attack Father     Heart disease Other     Cancer Other         Allergies   Allergen Reactions    Sildenafil Hives     Other reaction(s): severe congestion, cialis was OK    Gabapentin Confusion    Sildenafil Other (See Comments)     _         Current Outpatient Medications:     acetaminophen (TYLENOL) 325 mg tablet, Take 3 tablets (975 mg total) by mouth every 8 (eight) hours, Disp: , Rfl:     Cholecalciferol (VITAMIN D-3 PO), Take 4,000 Units by mouth daily., Disp: , Rfl:     folic acid (FOLVITE) 1 mg tablet, Take 2 mg by mouth daily  , Disp: , Rfl:     furosemide (LASIX) 20 mg tablet, Take 20 mg by mouth every other day, Disp: , Rfl:     lidocaine (LIDODERM) 5 %, Apply 1 patch topically over 12 hours daily Remove & Discard patch within 12 hours or as directed by MD, Disp: , Rfl:     methocarbamol (ROBAXIN) 500 mg tablet, Take 1 tablet (500 mg total) by mouth every 6 (six) hours as needed for muscle spasms, Disp: , Rfl:     metoprolol succinate (TOPROL-XL) 25 mg 24 hr tablet, Take 1 tablet (25 mg total) by mouth daily,  Disp: , Rfl:     Multiple Vitamins-Minerals (MULTIVITAMIN & MINERAL PO), Take 1 tablet by mouth daily. Per [facility] 10/2/23  Indications: supplement, Disp: , Rfl:     OLANZapine (ZyPREXA ZYDIS) 5 mg dispersible tablet, Take 0.5 tablets (2.5 mg total) by mouth daily at bedtime, Disp: , Rfl:     potassium chloride (Klor-Con M20) 20 mEq tablet, Take 2 tablets (40 mEq total) by mouth if needed (when taking lasix), Disp: 60 tablet, Rfl: 3    Tafamidis Meglumine, Cardiac, (Vyndaqel) 20 MG CAPS, Take 80 mg by mouth in the morning Take 20mg 4 caps once daily, Disp: 120 capsule, Rfl: 12    warfarin (COUMADIN) 5 mg tablet, Take by mouth daily 1 to 1.5 tablet daily per physician, Disp: , Rfl:     warfarin (COUMADIN) 5 mg tablet, TAKE 1 TO 1 AND 1/2 TABLETS BY MOUTH DAILY AS DIRECTED BY PRESCRIBER, Disp: 125 tablet, Rfl: 2    Updated list was reviewed in [facility] system of facility.     Vitals:    02/19/25 1443   BP: 144/78   Resp: 18   Temp: (!) 88 °F (31.1 °C)   SpO2: 98%       Physical Exam  Constitutional:       General: He is not in acute distress.  HENT:      Head: Normocephalic and atraumatic.   Cardiovascular:      Rate and Rhythm: Normal rate and regular rhythm.   Pulmonary:      Breath sounds: No wheezing, rhonchi or rales.   Abdominal:      General: There is no distension.      Palpations: Abdomen is soft.      Tenderness: There is no abdominal tenderness. There is no guarding.   Musculoskeletal:      Cervical back: Neck supple.      Left lower leg: Edema present.      Comments: Edema more pronounced in left lower extremity although slightly improved from before   Skin:     Coloration: Skin is not jaundiced.      Comments: Left knee incision is healing well  Abrasion over right knee.  No cellulitis   Neurological:      General: No focal deficit present.      Cranial Nerves: No cranial nerve deficit.      Comments: Oriented to month and year   Psychiatric:         Mood and Affect:  "Mood normal.         Behavior: Behavior normal.         Diagnostic Data:    Recent labs were reviewed.  PT/INR was 2.1 on 2/17/2025      Portions of the record may have been created with voice recognition software.  Occasional wrong word or \"sound a like\" substitutions may have occurred due to the inherent limitations of voice recognition software.  Read the chart carefully and recognize, using context, where substitutions have occurred.    This note was electronically signed by Dr. Gabi Hernández   "

## 2025-02-19 NOTE — ASSESSMENT & PLAN NOTE
Heart rate stable on Toprol xl 25 mg daily  Patient remains on Coumadin.   INR was 2.1 on 2/17.  Continue Coumadin 5 mg.  Follow-up repeat INR

## 2025-02-19 NOTE — TELEPHONE ENCOUNTER
Patient's spouse Marlyn Roa [phone] contacting [facility] requesting to speak with Tamiko Calloway regarding a form she received.

## 2025-02-21 ENCOUNTER — NURSING HOME VISIT (OUTPATIENT)
Dept: GERIATRICS | Facility: OTHER | Age: 85
End: 2025-02-21
Payer: MEDICARE

## 2025-02-21 VITALS
HEART RATE: 80 BPM | WEIGHT: 245.8 LBS | OXYGEN SATURATION: 98 % | SYSTOLIC BLOOD PRESSURE: 149 MMHG | RESPIRATION RATE: 20 BRPM | DIASTOLIC BLOOD PRESSURE: 84 MMHG | TEMPERATURE: 97.8 F | BODY MASS INDEX: 31.56 KG/M2

## 2025-02-21 DIAGNOSIS — E85.4 CARDIAC AMYLOIDOSIS (HCC): ICD-10-CM

## 2025-02-21 DIAGNOSIS — I43 CARDIAC AMYLOIDOSIS (HCC): ICD-10-CM

## 2025-02-21 DIAGNOSIS — N18.31 STAGE 3A CHRONIC KIDNEY DISEASE (CKD) (HCC): ICD-10-CM

## 2025-02-21 DIAGNOSIS — R29.6 MULTIPLE FALLS: ICD-10-CM

## 2025-02-21 DIAGNOSIS — I82.561 CHRONIC DEEP VEIN THROMBOSIS (DVT) OF CALF MUSCLE VEIN OF RIGHT LOWER EXTREMITY (HCC): Chronic | ICD-10-CM

## 2025-02-21 DIAGNOSIS — Z96.652 HISTORY OF TOTAL LEFT KNEE REPLACEMENT: ICD-10-CM

## 2025-02-21 DIAGNOSIS — R68.89 FORGETFULNESS: ICD-10-CM

## 2025-02-21 DIAGNOSIS — I50.30 DIASTOLIC CONGESTIVE HEART FAILURE, UNSPECIFIED HF CHRONICITY (HCC): Primary | ICD-10-CM
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
## 2025-02-21 DIAGNOSIS — I48.19 PERSISTENT ATRIAL FIBRILLATION (HCC): ICD-10-CM

## 2025-02-21 DIAGNOSIS — E55.9 VITAMIN D DEFICIENCY: ICD-10-CM

## 2025-02-21 PROCEDURE — 99309 SBSQ NF CARE MODERATE MDM 30: CPT | Performed by: INTERNAL MEDICINE

## 2025-02-21 RX ORDER — ACETAMINOPHEN 325 MG/1
975 TABLET ORAL EVERY 12 HOURS
Start: 2025-02-21 | End: 2025-03-23

## 2025-02-21 NOTE — ASSESSMENT & PLAN NOTE
S/p total knee arthroplasty on 1/30/2025  Pt was recently treated with cefadroxil for postop prophylaxis  Patient was noted to have increase edema of of LLE Recent doppler was negative for DVT.  Continue Tylenol and lidocaine patch for pain.   DC as needed methocarbamol  Continue PT/OT  Follow-up with orthopedic service

## 2025-02-21 NOTE — ASSESSMENT & PLAN NOTE
Wt Readings from Last 3 Encounters:   02/21/25 111 kg (245 lb 12.8 oz)   02/19/25 109 kg (241 lb 6.4 oz)   02/17/25 109 kg (241 lb 6.4 oz)       History of CHF.  P per records patient has gained around 4 pounds during the last few days.  He is recently noted to have increase edema involving lower extremities.  Patient was recently started on Lasix 20 mg every other day.  Will change Lasix to 20 mg daily and follow

## 2025-02-21 NOTE — PROGRESS NOTES
[facility]  [address]   [address]  [place], [address] [phone]    Progress Note  Code SNF 31    Patient Location     [facility]    Reason for visit     F.U left knee total arthoplasty, HTN, PAF, CKD, Falls    Patient’s recent vitals, labs and updated medications were reviewed on [employer] system of facility.     Problem List Items Addressed This Visit       Vitamin D deficiency    Vitamin D level was 33 on 2/13  Continue Vitamin D supplements         Persistent atrial fibrillation (HCC)    Heart rate stable on Toprol xl 25 mg daily  Patient remains on Coumadin.   INR was 2.1 on 2/17.  Continue Coumadin 5 mg.  Follow-up repeat INR          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 ([facility]) (Chronic)    History of DVT.  Patient remains on Coumadin.  INR was 2.1 on 2/17/2025.  Repeat INR is due on 2/24/2025.  Will follow         Stage 3a chronic kidney disease (CKD) ([facility])    Lab Results   Component Value Date    EGFR 78 02/05/2025    EGFR 69 02/04/2025    EGFR 70 02/03/2025    CREATININE 0.88 02/05/2025    CREATININE 0.99 02/04/2025    CREATININE 0.98 02/03/2025   Recent creatinine was stable.  Avoid hypotension and nephrotoxins         Multiple falls    History of several falls following elective total knee arthroplasty on 1/30/2025.  Continue PT OT          Congestive heart failure (CHF) ([facility]) - Primary    Wt Readings from Last 3 Encounters:   02/21/25 111 kg (245 lb 12.8 oz)   02/19/25 109 kg (241 lb 6.4 oz)   02/17/25 109 kg (241 lb 6.4 oz)       History of CHF.  P per records patient has gained around 4 pounds during the last few days.  He is recently noted to have increase edema involving lower extremities.  Patient was recently started on Lasix 20 mg every other day.  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  Continue Tylenol and lidocaine patch for pain.   DC as needed methocarbamol  Continue PT/OT  Follow-up with orthopedic service         Cognitive impairment    Pt was noted to have increased confusion postoperatively.  CT head on  (2/2/25) revealed  mild microangiopathic changes  MOCA (2/5/25) 19/30   Check Vitamin B12 and TSH  Recommend follow up as outpatient for detailed cognitive assessment.  Initial MoCA was performed in the hospital, delirium may have affected the results.  He was recently taken off of Divalproex and as needed quetiapine.  Olanzapine dose was reduced to 2.5 mg from 5 mg daily.  Behaviors remain stable without any episodes of irritability and agitation.  Daughter wishes to continue current dose of olanzapine for now to avoid any behavioral issues.  Family also feels should not be driving.  Above assessment can be made based on formal  neurocognitive testing    .                      HPI         Patient is being seen for a follow-up visit.  He is doing okay at present.  Knee/Lower extremity pain is controlled.    No fever chills dyspnea or wheezing.  Behaviors have been stable without any episodes of irritability or agitation.  Patient has gained around 4 pounds during the last few months.  Continues with increase edema of lower extremities.  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  Left knee surgical incision has healed well.    Review of Systems   Constitutional:  Negative for chills and fever.   Respiratory:  Negative for shortness of breath and wheezing.    Cardiovascular:  Positive for leg swelling (Involving left lower extremity). Negative for chest pain.   Gastrointestinal:  Negative for abdominal distention, abdominal pain and vomiting.   Genitourinary:  Negative for flank pain and  hematuria.   Musculoskeletal:  Positive for arthralgias (Involving left knee/LLE) and gait problem.   Neurological:  Positive for weakness. Negative for seizures and syncope.   Psychiatric/Behavioral:  Positive for confusion (sundowning at times). Negative for agitation and behavioral problems.        Past Medical History:   Diagnosis Date    Abnormal glucose level 2018    Acute back pain 10/02/2017    Atrial fibrillation (HCC)     Blood clot in vein 2015    in right foot    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HCC) 2023    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HCC)     Carpal tunnel syndrome     Chronic kidney disease (CKD)     Fever 2017    Frailty syndrome in geriatric patient 2023    FUO (fever of unknown origin) 2017    Heart failure (HCC)     History of DVT (deep vein thrombosis)     History of tobacco abuse     quit in     History of transient cerebral ischemia     Hypertension        Past Surgical History:   Procedure Laterality Date    BACK SURGERY      COLONOSCOPY      COLONOSCOPY N/A 2016    Procedure: COLONOSCOPY;  Surgeon: Salvador Ovalle MD;  Location: [facility] OR;  Service:     HERNIA REPAIR      NEUROPLASTY / TRANSPOSITION MEDIAN NERVE AT CARPAL TUNNEL         Social History     Tobacco Use   Smoking Status Former    Current packs/day: 0.00    Average packs/day: 1 pack/day for 26.0 years (26.0 ttl pk-yrs)    Types: Cigarettes    Start date:     Quit date:     Years since quittin.1   Smokeless Tobacco Never       Family History   Problem Relation Age of Onset    Heart attack Father     Heart disease Other     Cancer Other         Allergies   Allergen Reactions    Sildenafil Hives     Other reaction(s): severe congestion, cialis was OK    Gabapentin Confusion    Sildenafil Other (See Comments)     _         Current Outpatient Medications:     acetaminophen (TYLENOL) 325 mg tablet, Take 3 tablets (975 mg total) by mouth every 8 (eight) hours, Disp: , Rfl:     Cholecalciferol  (VITAMIN D-3 PO), Take 4,000 Units by mouth daily., Disp: , Rfl:     folic acid (FOLVITE) 1 mg tablet, Take 2 mg by mouth daily  , Disp: , Rfl:     furosemide (LASIX) 20 mg tablet, Take 20 mg by mouth every other day, Disp: , Rfl:     lidocaine (LIDODERM) 5 %, Apply 1 patch topically over 12 hours daily Remove & Discard patch within 12 hours or as directed by MD, Disp: , Rfl:     methocarbamol (ROBAXIN) 500 mg tablet, Take 1 tablet (500 mg total) by mouth every 6 (six) hours as needed for muscle spasms, Disp: , Rfl:     metoprolol succinate (TOPROL-XL) 25 mg 24 hr tablet, Take 1 tablet (25 mg total) by mouth daily, Disp: , Rfl:     Multiple Vitamins-Minerals (MULTIVITAMIN & MINERAL PO), Take 1 tablet by mouth daily. Per Felipa Moore MD [facility] 10/2/23  Indications: supplement, Disp: , Rfl:     OLANZapine (ZyPREXA ZYDIS) 5 mg dispersible tablet, Take 0.5 tablets (2.5 mg total) by mouth daily at bedtime, Disp: , Rfl:     potassium chloride (Klor-Con M20) 20 mEq tablet, Take 2 tablets (40 mEq total) by mouth if needed (when taking lasix), Disp: 60 tablet, Rfl: 3    Tafamidis Meglumine, Cardiac, (Vyndaqel) 20 MG CAPS, Take 80 mg by mouth in the morning Take 20mg 4 caps once daily, Disp: 120 capsule, Rfl: 12    warfarin (COUMADIN) 5 mg tablet, Take by mouth daily 1 to 1.5 tablet daily per physician, Disp: , Rfl:     warfarin (COUMADIN) 5 mg tablet, TAKE 1 TO 1 AND 1/2 TABLETS BY MOUTH DAILY AS DIRECTED BY PRESCRIBER, Disp: 125 tablet, Rfl: 2    Updated list was reviewed in [facility] system of facility.     Vitals:    02/21/25 1335   BP: 149/84   Pulse: 80   Resp: 20   Temp: 97.8 °F (36.6 °C)   SpO2: 98%       Physical Exam  Constitutional:       General: He is not in acute distress.  HENT:      Head: Normocephalic and atraumatic.   Cardiovascular:      Rate and Rhythm: Normal rate and regular rhythm.   Pulmonary:      Breath sounds: No wheezing, rhonchi or rales.   Abdominal:      General: There is no  "distension.      Palpations: Abdomen is soft.      Tenderness: There is no abdominal tenderness. There is no guarding.   Musculoskeletal:         General: Swelling (mild edema involving dorsal aspects of hands b/l) present.      Cervical back: Neck supple.      Left lower leg: Edema present.      Comments: Edema more pronounced in left lower extremity although slightly improved from before   Skin:     Coloration: Skin is not jaundiced.      Comments: Left knee incision is healing well     Neurological:      General: No focal deficit present.      Cranial Nerves: No cranial nerve deficit.      Comments: Oriented to month and year   Psychiatric:         Mood and Affect: Mood normal.         Behavior: Behavior normal.         Diagnostic Data:    Recent labs were reviewed.  Lab Results   Component Value Date    WBC 8.49 02/06/2025    HGB 16.1 02/06/2025    HCT 49.0 02/06/2025    MCV 98 02/06/2025     02/06/2025      Lab Results   Component Value Date    SODIUM 139 02/05/2025    K 4.1 02/05/2025     02/05/2025    CO2 27 02/05/2025    BUN 28 (H) 02/05/2025    CREATININE 0.88 02/05/2025    GLUC 105 02/05/2025    CALCIUM 9.2 02/05/2025      Additional Notes:   Care coordinated with pt's daughter over the phone  Check CBC, BMP, TSH, Vitamin B12, PT, INR on 2/24/25    Portions of the record may have been created with voice recognition software.  Occasional wrong word or \"sound a like\" substitutions may have occurred due to the inherent limitations of voice recognition software.  Read the chart carefully and recognize, using context, where substitutions have occurred.    This note was electronically signed by Dr. Gabi Hernández   "

## 2025-02-21 NOTE — ASSESSMENT & PLAN NOTE
Pt was noted to have increased confusion postoperatively.  CT head on  (2/2/25) revealed  mild microangiopathic changes  MOCA (2/5/25) 19/30   Check Vitamin B12 and TSH  Recommend follow up as outpatient for detailed cognitive assessment.  Initial MoCA was performed in the hospital, delirium may have affected the results.  He was recently taken off of Divalproex and as needed quetiapine.  Olanzapine dose was reduced to 2.5 mg from 5 mg daily.  Behaviors remain stable without any episodes of irritability and agitation.  Daughter wishes to continue current dose of olanzapine for now to avoid any behavioral issues.  Family also feels should not be driving.  Above assessment can be made based on formal  neurocognitive testing    .

## 2025-02-21 NOTE — ASSESSMENT & PLAN NOTE
History of DVT.  Patient remains on Coumadin.  INR was 2.1 on 2/17/2025.  Repeat INR is due on 2/24/2025.  Will follow

## 2025-02-24 ENCOUNTER — NURSING HOME VISIT (OUTPATIENT)
Dept: GERIATRICS | Facility: OTHER | Age: 85
End: 2025-02-24
Payer: MEDICARE

## 2025-02-24 VITALS
WEIGHT: 245.8 LBS | DIASTOLIC BLOOD PRESSURE: 83 MMHG | SYSTOLIC BLOOD PRESSURE: 136 MMHG | OXYGEN SATURATION: 98 % | HEART RATE: 78 BPM | BODY MASS INDEX: 31.56 KG/M2 | TEMPERATURE: 97.6 F | RESPIRATION RATE: 16 BRPM
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
## 2025-02-24 DIAGNOSIS — Z96.652 HISTORY OF TOTAL LEFT KNEE REPLACEMENT: ICD-10-CM

## 2025-02-24 DIAGNOSIS — R29.6 MULTIPLE FALLS: Primary | ICD-10-CM

## 2025-02-24 DIAGNOSIS — N18.31 STAGE 3A CHRONIC KIDNEY DISEASE (CKD) (HCC): ICD-10-CM

## 2025-02-24 DIAGNOSIS — E85.4 CARDIAC AMYLOIDOSIS (HCC): ICD-10-CM

## 2025-02-24 DIAGNOSIS — I50.30 DIASTOLIC CONGESTIVE HEART FAILURE, UNSPECIFIED HF CHRONICITY (HCC): ICD-10-CM

## 2025-02-24 DIAGNOSIS — I48.19 PERSISTENT ATRIAL FIBRILLATION (HCC): ICD-10-CM

## 2025-02-24 DIAGNOSIS — E55.9 VITAMIN D DEFICIENCY: ICD-10-CM

## 2025-02-24 DIAGNOSIS — I82.561 CHRONIC DEEP VEIN THROMBOSIS (DVT) OF CALF MUSCLE VEIN OF RIGHT LOWER EXTREMITY (HCC): ICD-10-CM

## 2025-02-24 DIAGNOSIS — I43 CARDIAC AMYLOIDOSIS (HCC): ICD-10-CM

## 2025-02-24 DIAGNOSIS — R41.89 COGNITIVE IMPAIRMENT: ICD-10-CM

## 2025-02-24 PROCEDURE — 99309 SBSQ NF CARE MODERATE MDM 30: CPT | Performed by: INTERNAL MEDICINE

## 2025-02-24 RX ORDER — ACETAMINOPHEN 500 MG
1000 TABLET ORAL EVERY 12 HOURS
COMMUNITY

## 2025-02-24 RX ORDER — ACETAMINOPHEN 325 MG/1
650 TABLET ORAL EVERY 4 HOURS PRN
Start: 2025-02-24 | End: 2025-03-26

## 2025-02-24 NOTE — PROGRESS NOTES
[facility]  [address]   [address]  [place], [address] [phone]    Progress Note  Code SNF 31    Patient Location     [facility]     Reason for visit     F.U left knee total arthoplasty, HTN, PAF, CKD, Falls     Patient’s recent vitals, labs and updated medications were reviewed on [facility] system of facility.     Diagnoses and all orders for this visit:    Multiple falls  -patient had elective left total knee arthroplasty on 1/30/2025, correlated with history of several falls  -admitted to acute rehab for PT and OT  -f/u orthopedics    History of total left knee replacement  -S/p total knee arthroplasty on 1/30/2025  -Pt was recently treated with cefadroxil for postop prophylaxis  -Patient was noted to have increase edema of of LLE Recent doppler was negative for DVT.  Continue Tylenol and lidocaine patch for pain.     Cognitive impairment  -CT head on  (2/2/25) revealed  mild microangiopathic changes  -MOCA (2/5/25) 19/30 - which was performed in the hospital, possibly under delirium  -He was recently taken off of divalproex and as needed quetiapine.  -Olanzapine dose was reduced to 2.5 mg from 5 mg daily  -despite above changes, his behaviors remain stable without any episodes of irritability and agitation.   -f/u vitamin B12, TSH level on 2/24  -family states that patient should not be driving    Diastolic congestive heart failure, unspecified HF chronicity (HCC)  -last echo 4/2023 - EF 65%, wall thickness severely increased, LA moderately dilated, RA severely dilated  -hx of CHF, etiology likely from cardiac amyloidosis  -baseline dry weight 240-245 lb  -c/w lasix 20mg daily, last adjusted on 2/21 from lasix 20mg every other day  -daily weight, in/out, low sodium diet    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HCC)  -treated with tafamidis at home (vyndamax), continue while in rehab    Persistent atrial fibrillation (HCC)  -rate control with metoprolol succinate  25mg daily  -anticoagulation with warfarin (currently takes 5mg to 7.5mg daily)  -INR was 2.1 on 2/17  -repeat INR lab on 2/24 - adjust dose as needed    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 ([facility])  -hx DVT, currently anticoagulated on warfarin    Stage 3a chronic kidney disease (CKD) ([facility])        Lab Results   Component Value Date     EGFR 78 02/05/2025     EGFR 69 02/04/2025     EGFR 70 02/03/2025     CREATININE 0.88 02/05/2025     CREATININE 0.99 02/04/2025     CREATININE 0.98 02/03/2025   Recent creatinine was stable.  Avoid hypotension and nephrotoxins     Vitamin D deficiency  Vitamin D level was 33 on 2/13  Continue Vitamin D supplements    Patient reports no acute complaints. AAO x3. Denies constipation, urine retention, chest pain, SOB. Continues to have swelling in bilateral legs. His bilateral arm skin tear are being treated with local wound care. Continues to require PT due to imbalance after knee replacement.                        Review of Systems   Constitutional:  Negative for chills and fever.   HENT:  Negative for ear pain and sore throat.    Eyes:  Negative for pain and visual disturbance.   Respiratory:  Negative for cough and shortness of breath.    Cardiovascular:  Positive for leg swelling. Negative for chest pain and palpitations.   Gastrointestinal:  Negative for abdominal pain, constipation and vomiting.   Genitourinary:  Negative for dysuria and hematuria.   Musculoskeletal:  Positive for arthralgias and gait problem. Negative for back pain.   Skin:  Negative for color change and rash.   Neurological:  Negative for seizures and syncope.   All other systems reviewed and are negative.      Past Medical History:   Diagnosis Date    Abnormal glucose level 02/07/2018    Acute back pain 10/02/2017    Atrial fibrillation (HCC)     Blood clot in vein 2015    in right foot    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HCC) 04/2023    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HCC)     Carpal tunnel  syndrome     Chronic kidney disease (CKD)     Fever 2017    Frailty syndrome in geriatric patient 2023    FUO (fever of unknown origin) 2017    Heart failure (HCC)     History of DVT (deep vein thrombosis)     History of tobacco abuse     quit in     History of transient cerebral ischemia     Hypertension        Past Surgical History:   Procedure Laterality Date    BACK SURGERY  2018    COLONOSCOPY      COLONOSCOPY N/A 2016    Procedure: COLONOSCOPY;  Surgeon: Salvador Ovalle MD;  Location: [facility];  Service:     HERNIA REPAIR      NEUROPLASTY / TRANSPOSITION MEDIAN NERVE AT CARPAL TUNNEL         Social History     Tobacco Use   Smoking Status Former    Current packs/day: 0.00    Average packs/day: 1 pack/day for 26.0 years (26.0 ttl pk-yrs)    Types: Cigarettes    Start date:     Quit date:     Years since quittin.1   Smokeless Tobacco Never       Family History   Problem Relation Age of Onset    Heart attack Father     Heart disease Other     Cancer Other         Allergies   Allergen Reactions    Sildenafil Hives     Other reaction(s): severe congestion, cialis was OK    Gabapentin Confusion    Sildenafil Other (See Comments)     _         Current Outpatient Medications:     acetaminophen (TYLENOL) 325 mg tablet, Take 2 tablets (650 mg total) by mouth every 4 (four) hours as needed for mild pain, Disp: , Rfl:     acetaminophen (TYLENOL) 500 mg tablet, Take 1,000 mg by mouth every 12 (twelve) hours Max 3g daily, Disp: , Rfl:     Cholecalciferol (VITAMIN D-3 PO), Take 2,000 Units by mouth daily, Disp: , Rfl:     folic acid (FOLVITE) 1 mg tablet, Take 2 mg by mouth daily  , Disp: , Rfl:     furosemide (LASIX) 20 mg tablet, Take 20 mg by mouth daily, Disp: , Rfl:     lidocaine (LIDODERM) 5 %, Apply 1 patch topically over 12 hours daily Remove & Discard patch within 12 hours or as directed by MD, Disp: , Rfl:     metoprolol succinate (TOPROL-XL) 25 mg 24 hr tablet, Take 1  tablet (25 mg total) by mouth daily, Disp: , Rfl:     Multiple Vitamins-Minerals (MULTIVITAMIN & MINERAL PO), Take 1 tablet by mouth daily. Per [facility] 10/2/23  Indications: supplement, Disp: , Rfl:     OLANZapine (ZyPREXA ZYDIS) 5 mg dispersible tablet, Take 0.5 tablets (2.5 mg total) by mouth daily at bedtime, Disp: , Rfl:     potassium chloride (Klor-Con M20) 20 mEq tablet, Take 2 tablets (40 mEq total) by mouth if needed (when taking lasix) (Patient not taking: Reported on 2/24/2025), Disp: 60 tablet, Rfl: 3    Tafamidis Meglumine, Cardiac, (Vyndaqel) 20 MG CAPS, Take 80 mg by mouth in the morning Take 20mg 4 caps once daily, Disp: 120 capsule, Rfl: 12    warfarin (COUMADIN) 5 mg tablet, Take by mouth daily 1 to 1.5 tablet daily per physician, Disp: , Rfl:     warfarin (COUMADIN) 5 mg tablet, TAKE 1 TO 1 AND 1/2 TABLETS BY MOUTH DAILY AS DIRECTED BY PRESCRIBER, Disp: 125 tablet, Rfl: 2    [facility].     Vitals:    02/24/25 1253   BP: 136/83   Pulse: 78   Resp: 16   Temp: 97.6 °F (36.4 °C)   SpO2: 98%       Physical Exam  Constitutional:       General: He is not in acute distress.  HENT:      Head: Normocephalic and atraumatic.   Cardiovascular:      Rate and Rhythm: Normal rate and regular rhythm.   Pulmonary:      Breath sounds: No wheezing, rhonchi or rales.   Abdominal:      General: There is no distension.      Palpations: Abdomen is soft.      Tenderness: There is no abdominal tenderness. There is no guarding.   Musculoskeletal:         General: Swelling (mild edema in bilateral lower legs) present.      Cervical back: Neck supple.      Left lower leg: Edema present.      Comments: Edema more pronounced in left lower extremity compared to right lower extremity. Both legs have improved from past week.   Skin:     Coloration: Skin is not jaundiced.      Comments: Left anterior knee incision is healing well     Neurological:      " General: No focal deficit present.      Cranial Nerves: No cranial nerve deficit.      Comments: AAO x3  Psychiatric:         Mood and Affect: Mood normal.         Behavior: Behavior normal.    Diagnostic Data:    Recent labs were reviewed.  Lab Results   Component Value Date     WBC 8.49 02/06/2025     HGB 16.1 02/06/2025     HCT 49.0 02/06/2025     MCV 98 02/06/2025      02/06/2025            Lab Results   Component Value Date     SODIUM 139 02/05/2025     K 4.1 02/05/2025      02/05/2025     CO2 27 02/05/2025     BUN 28 (H) 02/05/2025     CREATININE 0.88 02/05/2025     GLUC 105 02/05/2025     CALCIUM 9.2 02/05/2025 2/17/25   PT - 23  INR - 2.1    2/13/25  Vitamin D - 33      Additional Notes:   Check CBC, BMP, TSH, Vitamin B12, PT, INR on 2/24/25     Portions of the record may have been created with voice recognition software.  Occasional wrong word or \"sound a like\" substitutions may have occurred due to the inherent limitations of voice recognition software.  Read the chart carefully and recognize, using context, where substitutions have occurred.    This note was electronically signed by Dr. Kristin Torres   "

## 2025-02-27 ENCOUNTER — NURSING HOME VISIT (OUTPATIENT)
Dept: GERIATRICS | Facility: OTHER | Age: 85
End: 2025-02-27
Payer: MEDICARE

## 2025-02-27 VITALS
SYSTOLIC BLOOD PRESSURE: 143 MMHG | RESPIRATION RATE: 20 BRPM | TEMPERATURE: 97.2 F | WEIGHT: 246.2 LBS | HEART RATE: 80 BPM | OXYGEN SATURATION: 99 % | BODY MASS INDEX: 31.61 KG/M2 | DIASTOLIC BLOOD PRESSURE: 83 MMHG

## 2025-02-27 DIAGNOSIS — I43 CARDIAC AMYLOIDOSIS (HCC): ICD-10-CM

## 2025-02-27 DIAGNOSIS — I10 PRIMARY HYPERTENSION: ICD-10-CM

## 2025-02-27 DIAGNOSIS — R68.89 FORGETFULNESS: ICD-10-CM

## 2025-02-27 DIAGNOSIS — I50.32 CHRONIC DIASTOLIC CONGESTIVE HEART FAILURE (HCC): ICD-10-CM

## 2025-02-27 DIAGNOSIS — R26.2 AMBULATORY DYSFUNCTION: Primary | ICD-10-CM

## 2025-02-27 DIAGNOSIS — N18.31 STAGE 3A CHRONIC KIDNEY DISEASE (CKD) (HCC): ICD-10-CM

## 2025-02-27 DIAGNOSIS — E85.4 CARDIAC AMYLOIDOSIS (HCC): ICD-10-CM

## 2025-02-27 DIAGNOSIS — E55.9 VITAMIN D DEFICIENCY: ICD-10-CM

## 2025-02-27 DIAGNOSIS — Z96.652 HISTORY OF TOTAL LEFT KNEE REPLACEMENT: ICD-10-CM

## 2025-02-27 PROCEDURE — 99309 SBSQ NF CARE MODERATE MDM 30: CPT | Performed by: INTERNAL MEDICINE

## 2025-02-27 NOTE — ASSESSMENT & PLAN NOTE
Wt Readings from Last 3 Encounters:   02/26/25 112 kg (246 lb 3.2 oz)   02/24/25 111 kg (245 lb 12.8 oz)   02/21/25 111 kg (245 lb 12.8 oz)   Pt had a weight gain of 5 lbs since admission along with increase edema of LE.  He was recently started on Lasix 20 mg daily. Increase dose to 40 mg daily and follow

## 2025-02-27 NOTE — PROGRESS NOTES
[facility]  [facility]  [address]   [address]  [place], [place], [address] [phone]    Progress Note  Code  SNF 31    Patient Location     [facility]    Reason for visit     F.U left knee total arthoplasty, HTN, PAF, CKD, Falls     Patient’s recent vitals, labs and updated medications were reviewed on [employer] system of facility.     Problem List Items Addressed This Visit       HTN (hypertension)    BP stable  on Metoprolol 25 mg daily         Vitamin D deficiency    Vitamin D level was 33 on 2/13  Continue Vitamin D supplements         Stage 3a chronic kidney disease (CKD) (HCC)    Lab Results   Component Value Date    EGFR 78 02/05/2025    EGFR 69 02/04/2025    EGFR 70 02/03/2025    CREATININE 0.88 02/05/2025    CREATININE 0.99 02/04/2025    CREATININE 0.98 02/03/2025   Recent creatinine was stable. Avoid hypotension and nephrotoxins         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HCC)    Continue  tTafamidis         History of total knee arthroplasty    S/p total knee arthroplasty on 1/30/2025  Pt was recently treated with cefadroxil for postop prophylaxis  Patient was noted to have increase edema of of LLE Recent doppler was negative for DVT.  Continue Tylenol and lidocaine patch for pain.   Continue PT/OT  Follow-up with orthopedic service         Ambulatory dysfunction - Primary    History of recurrent falls following total knee arthroplasty on 1/30/2025   Continue PT/OT   Fall precautions  Out of bed as tolerated  Encourage early and frequent mobilization  Encourage adequate hydration and nutrition  Provide adequate pain management   Goal is to return home  Continue with PT/OT for continued strength and balance training          Cognitive impairment    Pt was noted to have increased confusion postoperatively.  CT head on  (2/2/25) revealed  mild microangiopathic changes  MOCA (2/5/25) 19/30 2/24 TSH 2.3, B12  685  Recommend follow up as outpatient for detailed cognitive  assessment.  Initial MoCA was performed in the hospital, delirium may have affected the results.  He was recently taken off of Divalproex and as needed quetiapine.  Olanzapine dose was reduced to 2.5 mg from 5 mg daily.  Behaviors remain stable without any episodes of irritability and agitation.  Daughter wishes to continue current dose of olanzapine for now to avoid any behavioral issues.  Suspect pt can be weaned off of Olanzapine in future      .                      HPI       Pt is being seen for a follow up visit. He is doing ok at present. No fever chills dyspnea or chest marquez  Pt continues with increase edema of LE. Per records he has gained around 5 lbs since admission.  Behaviors have been stable .         Review of Systems   Constitutional:  Positive for unexpected weight change (Weight gain of around 5 lbs since admission). Negative for chills and fever.   Respiratory:  Negative for shortness of breath and wheezing.    Cardiovascular:  Positive for leg swelling (Involving left lower extremity). Negative for chest pain.   Gastrointestinal:  Negative for abdominal distention, abdominal pain and vomiting.   Genitourinary:  Negative for flank pain and hematuria.   Musculoskeletal:  Positive for arthralgias (Involving left knee/LLE, controlled at present) and gait problem.   Neurological:  Negative for seizures and syncope.   Psychiatric/Behavioral:  Positive for confusion (at times). Negative for agitation and behavioral problems.        Past Medical History:   Diagnosis Date    Abnormal glucose level 02/07/2018    Acute back pain 10/02/2017    Atrial fibrillation (HCC)     Blood clot in vein 2015    in right foot    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HCC) 04/2023    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HCC)     Carpal tunnel syndrome     Chronic kidney disease (CKD)     Fever 09/08/2017    Frailty syndrome in geriatric patient 05/12/2023    FUO (fever of unknown origin) 09/08/2017    Heart failure (HCC)     History of DVT (deep vein  thrombosis)     History of tobacco abuse     quit in     History of transient cerebral ischemia     Hypertension        Past Surgical History:   Procedure Laterality Date    BACK SURGERY  2018    COLONOSCOPY      COLONOSCOPY N/A 2016    Procedure: COLONOSCOPY;  Surgeon: Salvador Ovalle MD;  Location: [facility];  Service:     HERNIA REPAIR      NEUROPLASTY / TRANSPOSITION MEDIAN NERVE AT CARPAL TUNNEL         Social History     Tobacco Use   Smoking Status Former    Current packs/day: 0.00    Average packs/day: 1 pack/day for 26.0 years (26.0 ttl pk-yrs)    Types: Cigarettes    Start date:     Quit date:     Years since quittin.1   Smokeless Tobacco Never       Family History   Problem Relation Age of Onset    Heart attack Father     Heart disease Other     Cancer Other         Allergies   Allergen Reactions    Sildenafil Hives     Other reaction(s): severe congestion, cialis was OK    Gabapentin Confusion    Sildenafil Other (See Comments)     _         Current Outpatient Medications:     acetaminophen (TYLENOL) 325 mg tablet, Take 2 tablets (650 mg total) by mouth every 4 (four) hours as needed for mild pain, Disp: , Rfl:     acetaminophen (TYLENOL) 500 mg tablet, Take 1,000 mg by mouth every 12 (twelve) hours Max 3g daily, Disp: , Rfl:     Cholecalciferol (VITAMIN D-3 PO), Take 2,000 Units by mouth daily, Disp: , Rfl:     folic acid (FOLVITE) 1 mg tablet, Take 2 mg by mouth daily  , Disp: , Rfl:     furosemide (LASIX) 20 mg tablet, Take 20 mg by mouth daily, Disp: , Rfl:     lidocaine (LIDODERM) 5 %, Apply 1 patch topically over 12 hours daily Remove & Discard patch within 12 hours or as directed by MD, Disp: , Rfl:     metoprolol succinate (TOPROL-XL) 25 mg 24 hr tablet, Take 1 tablet (25 mg total) by mouth daily, Disp: , Rfl:     Multiple Vitamins-Minerals (MULTIVITAMIN & MINERAL PO), Take 1 tablet by mouth daily. Per [facility] 10[facility]  Indications: supplement,  Disp: , Rfl:     OLANZapine (ZyPREXA ZYDIS) 5 mg dispersible tablet, Take 0.5 tablets (2.5 mg total) by mouth daily at bedtime, Disp: , Rfl:     potassium chloride (Klor-Con M20) 20 mEq tablet, Take 2 tablets (40 mEq total) by mouth if needed (when taking lasix) (Patient not taking: Reported on 2/24/2025), Disp: 60 tablet, Rfl: 3    Tafamidis Meglumine, Cardiac, (Vyndaqel) 20 MG CAPS, Take 80 mg by mouth in the morning Take 20mg 4 caps once daily, Disp: 120 capsule, Rfl: 12    warfarin (COUMADIN) 5 mg tablet, Take by mouth daily 1 to 1.5 tablet daily per physician, Disp: , Rfl:     warfarin (COUMADIN) 5 mg tablet, TAKE 1 TO 1 AND 1/2 TABLETS BY MOUTH DAILY AS DIRECTED BY PRESCRIBER, Disp: 125 tablet, Rfl: 2    Updated list was reviewed in [facility] system of facility.     Vitals:    02/26/25 1245   BP: 143/83   Pulse: 80   Resp: 20   Temp: (!) 97.2 °F (36.2 °C)   SpO2: 99%       Physical Exam  Constitutional:       General: He is not in acute distress.  HENT:      Head: Normocephalic and atraumatic.   Cardiovascular:      Rate and Rhythm: Normal rate and regular rhythm.   Pulmonary:      Breath sounds: No wheezing, rhonchi or rales.   Abdominal:      General: There is no distension.      Palpations: Abdomen is soft.      Tenderness: There is no abdominal tenderness. There is no guarding.   Musculoskeletal:         General: No swelling (mild edema involving dorsal aspects of hands b/l).      Cervical back: Neck supple.      Right lower leg: Edema present.      Left lower leg: Edema present.   Skin:     Coloration: Skin is not jaundiced.      Comments: Left knee incision has healed well     Neurological:      General: No focal deficit present.      Cranial Nerves: No cranial nerve deficit.      Comments: Oriented to month and year   Psychiatric:         Mood and Affect: Mood normal.         Behavior: Behavior normal.       Diagnostic Data:    Recent labs were reviewed.  2/24  HGB 15.1, WBC 8.1, Plt 276  BUN 19,  "Creatinine 0.80  Na 141, K 4.0  TSH 1.81, B12 685, INR 2.3    Additional Notes:   Care coordinated with pt's daughter over the phone    Portions of the record may have been created with voice recognition software.  Occasional wrong word or \"sound a like\" substitutions may have occurred due to the inherent limitations of voice recognition software.  Read the chart carefully and recognize, using context, where substitutions have occurred.    This note was electronically signed by Dr. Gabi Hernández   "

## 2025-02-27 NOTE — ASSESSMENT & PLAN NOTE
S/p total knee arthroplasty on 1/30/2025  Pt was recently treated with cefadroxil for postop prophylaxis  Patient was noted to have increase edema of of LLE Recent doppler was negative for DVT.  Continue Tylenol and lidocaine patch for pain.   Continue PT/OT  Follow-up with orthopedic service

## 2025-02-27 NOTE — ASSESSMENT & PLAN NOTE
Pt was noted to have increased confusion postoperatively.  CT head on  (2/2/25) revealed  mild microangiopathic changes  MOCA (2/5/25) 19/30 2/24 TSH 2.3, B12  685  Recommend follow up as outpatient for detailed cognitive assessment.  Initial MoCA was performed in the hospital, delirium may have affected the results.  He was recently taken off of Divalproex and as needed quetiapine.  Olanzapine dose was reduced to 2.5 mg from 5 mg daily.  Behaviors remain stable without any episodes of irritability and agitation.  Daughter wishes to continue current dose of olanzapine for now to avoid any behavioral issues.  Suspect pt can be weaned off of Olanzapine in future      .

## 2025-03-03 ENCOUNTER — NURSING HOME VISIT (OUTPATIENT)
Dept: GERIATRICS | Facility: OTHER | Age: 85
End: 2025-03-03
Payer: MEDICARE

## 2025-03-03 VITALS
SYSTOLIC BLOOD PRESSURE: 157 MMHG | RESPIRATION RATE: 18 BRPM | BODY MASS INDEX: 31.64 KG/M2 | HEART RATE: 85 BPM | WEIGHT: 246.4 LBS | DIASTOLIC BLOOD PRESSURE: 110 MMHG | OXYGEN SATURATION: 98 % | TEMPERATURE: 96.9 F

## 2025-03-03 DIAGNOSIS — I48.19 PERSISTENT ATRIAL FIBRILLATION (HCC): ICD-10-CM

## 2025-03-03 DIAGNOSIS — L03.012 CELLULITIS OF FINGER OF LEFT HAND: Primary | ICD-10-CM

## 2025-03-03 DIAGNOSIS — I50.32 CHRONIC DIASTOLIC CONGESTIVE HEART FAILURE (HCC): ICD-10-CM

## 2025-03-03 DIAGNOSIS — R26.2 AMBULATORY DYSFUNCTION: ICD-10-CM

## 2025-03-03 DIAGNOSIS — E85.4 CARDIAC AMYLOIDOSIS (HCC): ICD-10-CM

## 2025-03-03 DIAGNOSIS — Z96.652 HISTORY OF TOTAL LEFT KNEE REPLACEMENT: ICD-10-CM

## 2025-03-03 DIAGNOSIS — I43 CARDIAC AMYLOIDOSIS (HCC): ICD-10-CM

## 2025-03-03 DIAGNOSIS — I10 PRIMARY HYPERTENSION: ICD-10-CM

## 2025-03-03 DIAGNOSIS — N18.31 STAGE 3A CHRONIC KIDNEY DISEASE (CKD) (HCC): ICD-10-CM

## 2025-03-03 PROCEDURE — 99309 SBSQ NF CARE MODERATE MDM 30: CPT | Performed by: INTERNAL MEDICINE

## 2025-03-03 RX ORDER — PREDNISONE 20 MG/1
TABLET ORAL 2 TIMES DAILY WITH MEALS
COMMUNITY

## 2025-03-03 RX ORDER — CEPHALEXIN 500 MG/1
500 CAPSULE ORAL EVERY 6 HOURS SCHEDULED
COMMUNITY

## 2025-03-03 NOTE — ASSESSMENT & PLAN NOTE
Wt Readings from Last 3 Encounters:   03/03/25 112 kg (246 lb 6.4 oz)   02/26/25 112 kg (246 lb 3.2 oz)   02/24/25 111 kg (245 lb 12.8 oz)       Patient appears to have increase edema involving bilateral lower extremities.  There has been a weight gain of around 6 pounds since admission.  Overall appears to be volume overloaded.  Currently remains on Lasix 40 mg daily.  Will increase dose to 40 mg twice daily and follow.  Recent BUN and creatinine were stable

## 2025-03-03 NOTE — ASSESSMENT & PLAN NOTE
Heart rate stable on metoprolol XL 25 mg daily.  INR was subtherapeutic at 1.8 today.  Patient remains on Coumadin 5 mg daily.  Will give Coumadin 7.5 mg today followed by 5 mg daily.  Repeat INR in 1 week

## 2025-03-03 NOTE — ASSESSMENT & PLAN NOTE
Lab Results   Component Value Date    EGFR 78 02/05/2025    EGFR 69 02/04/2025    EGFR 70 02/03/2025    CREATININE 0.88 02/05/2025    CREATININE 0.99 02/04/2025    CREATININE 0.98 02/03/2025   Creatinine was stable at 0.97 today with BUN of 25.  Avoid hypotension and nephrotoxins

## 2025-03-03 NOTE — ASSESSMENT & PLAN NOTE
Pt was noted to have increased redness and swelling involving the left hand over the weekend.  He was empirically started on Keflex for suspected cellulitis and prednisone for possible gout.  Patient denies any significant pain however continues with swelling and erythema.  Monitor response on above.  Uric acid level was elevated at 8.2.  Will reevaluate in 24 to 48 hours

## 2025-03-03 NOTE — ASSESSMENT & PLAN NOTE
Blood was reported to be high at 157/110 earlier today however all other readings are stable.  Patient currently remains on metoprolol XL 25 mg daily and furosemide 40 mg daily.  Furosemide dose is being increased due to volume overload.  Monitor a few more readings before further adjustment

## 2025-03-03 NOTE — PROGRESS NOTES
[facility]  [facility]  [address]   [address]  [place], [place], [address] [phone]    Progress Note  Code SNF 31  Patient Location     [facility]    Reason for visit     Left hand cellulitis, atrial fibrillation, hypertension, history of DVT, congestive heart failure, edema lower extremities, history of total knee arthroplasty    Patient’s recent vitals, labs and updated medications were reviewed on [facility] system of facility.     Problem List Items Addressed This Visit       HTN (hypertension)    Blood was reported to be high at 157/110 earlier today however all other readings are stable.  Patient currently remains on metoprolol XL 25 mg daily and furosemide 40 mg daily.  Furosemide dose is being increased due to volume overload.  Monitor a few more readings before further adjustment         Persistent atrial fibrillation (HCC)    Heart rate stable on metoprolol XL 25 mg daily.  INR was subtherapeutic at 1.8 today.  Patient remains on Coumadin 5 mg daily.  Will give Coumadin 7.5 mg today followed by 5 mg daily.  Repeat INR in 1 week         Stage 3a chronic kidney disease (CKD) (HCC)    Lab Results   Component Value Date    EGFR 78 02/05/2025    EGFR 69 02/04/2025    EGFR 70 02/03/2025    CREATININE 0.88 02/05/2025    CREATININE 0.99 02/04/2025    CREATININE 0.98 02/03/2025   Creatinine was stable at 0.97 today with BUN of 25.  Avoid hypotension and nephrotoxins         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HCC)    Continue  tTafamidis         Chronic diastolic congestive heart failure (HCC)    Wt Readings from Last 3 Encounters:   03/03/25 112 kg (246 lb 6.4 oz)   02/26/25 112 kg (246 lb 3.2 oz)   02/24/25 111 kg (245 lb 12.8 oz)       Patient appears to have increase edema involving bilateral lower extremities.  There has been a weight gain of around 6 pounds since admission.  Overall appears to be volume overloaded.  Currently remains on Lasix 40 mg daily.  Will increase dose  to 40 mg twice daily and follow.  Recent BUN and creatinine were stable             History of total knee arthroplasty    S/p total knee arthroplasty on 1/30/2025  Pt was recently treated with cefadroxil for postop prophylaxis  Patient was noted to have increase edema of of LLE Recent doppler was negative for DVT.  Continue Tylenol and lidocaine patch for pain.   Continue PT/OT  Follow-up with orthopedic service         Ambulatory dysfunction    History of recurrent falls following total knee arthroplasty on 1/30/2025   Continue PT/OT   Fall precautions  Out of bed as tolerated  Encourage early and frequent mobilization  Encourage adequate hydration and nutrition  Provide adequate pain management   Goal is to return home  Continue with PT/OT for continued strength and balance training          Cellulitis of  left hand - Primary    Pt was noted to have increased redness and swelling involving the left hand over the weekend.  He was empirically started on Keflex for suspected cellulitis and prednisone for possible gout.  Patient denies any significant pain however continues with swelling and erythema.  Monitor response on above.  Uric acid level was elevated at 8.2.  Will reevaluate in 24 to 48 hours          Hyperuricemia:  Uric acid level was elevated at 8.2 today.  Patient denies any history of gout.  He is noted to have swelling involving dorsal aspect of left hand more suggestive of cellulitis however gout could not be excluded.  Will hold off treatment for hyperuricemia at this point .  If patient has recurrent episodes of hand swelling and redness may consider treatment for hyperuricemia    HPI         Patient is being seen for a follow-up visit today.  He was noted to have redness and swelling involving left hand over the weekend.  Patient was empirically started on prednisone and Keflex.  At the time of my evaluation today patient is doing okay.  Continues with redness and swelling involving left hand.   Swelling is more pronounced in left index finger.  Patient has discomfort but denies any overt pain.  No fever chills or chest congestion.  Patient continues with increase edema of lower extremities.  He has gained around 6 pounds since admission      Review of Systems   Constitutional:  Negative for chills and fever.   Respiratory:  Negative for shortness of breath, wheezing and stridor.    Cardiovascular:  Positive for leg swelling. Negative for chest pain.   Gastrointestinal:  Negative for abdominal distention and vomiting.   Genitourinary:  Negative for flank pain and hematuria.   Musculoskeletal:  Positive for gait problem and joint swelling (Swelling involving dorsal aspect of left hand and fingers).   Neurological:  Negative for seizures and syncope.   Psychiatric/Behavioral:  Negative for agitation and behavioral problems.        Past Medical History:   Diagnosis Date    Abnormal glucose level 02/07/2018    Acute back pain 10/02/2017    Atrial fibrillation (HCC)     Blood clot in vein 2015    in right foot    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HCC) 04/2023    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HCC)     Carpal tunnel syndrome     Chronic kidney disease (CKD)     Fever 09/08/2017    Frailty syndrome in geriatric patient 05/12/2023    FUO (fever of unknown origin) 09/08/2017    Heart failure (HCC)     History of DVT (deep vein thrombosis)     History of tobacco abuse     quit in 1982    History of transient cerebral ischemia     Hypertension        Past Surgical History:   Procedure Laterality Date    BACK SURGERY  2018    COLONOSCOPY      COLONOSCOPY N/A 02/24/2016    Procedure: COLONOSCOPY;  Surgeon: Salvador Ovalle MD;  Location: [facility] OR;  Service:     HERNIA REPAIR      NEUROPLASTY / TRANSPOSITION MEDIAN NERVE AT CARPAL TUNNEL         Social History     Tobacco Use   Smoking Status Former    Current packs/day: 0.00    Average packs/day: 1 pack/day for 26.0 years (26.0 ttl pk-yrs)    Types: Cigarettes    Start date: 1956    Quit  date:     Years since quittin.1   Smokeless Tobacco Never       Family History   Problem Relation Age of Onset    Heart attack Father     Heart disease Other     Cancer Other         Allergies   Allergen Reactions    Sildenafil Hives     Other reaction(s): severe congestion, cialis was OK    Gabapentin Confusion    Sildenafil Other (See Comments)     _         Current Outpatient Medications:     acetaminophen (TYLENOL) 325 mg tablet, Take 2 tablets (650 mg total) by mouth every 4 (four) hours as needed for mild pain, Disp: , Rfl:     acetaminophen (TYLENOL) 500 mg tablet, Take 1,000 mg by mouth every 12 (twelve) hours Max 3g daily, Disp: , Rfl:     Cholecalciferol (VITAMIN D-3 PO), Take 2,000 Units by mouth daily, Disp: , Rfl:     folic acid (FOLVITE) 1 mg tablet, Take 2 mg by mouth daily  , Disp: , Rfl:     furosemide (LASIX) 20 mg tablet, Take 40 mg by mouth daily, Disp: , Rfl:     lidocaine (LIDODERM) 5 %, Apply 1 patch topically over 12 hours daily Remove & Discard patch within 12 hours or as directed by MD, Disp: , Rfl:     metoprolol succinate (TOPROL-XL) 25 mg 24 hr tablet, Take 1 tablet (25 mg total) by mouth daily, Disp: , Rfl:     Multiple Vitamins-Minerals (MULTIVITAMIN & MINERAL PO), Take 1 tablet by mouth daily. Per Felipa Moore MD [facility] 10/2/23  Indications: supplement, Disp: , Rfl:     OLANZapine (ZyPREXA ZYDIS) 5 mg dispersible tablet, Take 0.5 tablets (2.5 mg total) by mouth daily at bedtime, Disp: , Rfl:     potassium chloride (Klor-Con M20) 20 mEq tablet, Take 2 tablets (40 mEq total) by mouth if needed (when taking lasix) (Patient not taking: Reported on 2025), Disp: 60 tablet, Rfl: 3    Tafamidis Meglumine, Cardiac, (Vyndaqel) 20 MG CAPS, Take 80 mg by mouth in the morning Take 20mg 4 caps once daily, Disp: 120 capsule, Rfl: 12    warfarin (COUMADIN) 5 mg tablet, Take by mouth daily 1 to 1.5 tablet daily per physician, Disp: , Rfl:     warfarin (COUMADIN) 5 mg  "tablet, TAKE 1 TO 1 AND 1/2 TABLETS BY MOUTH DAILY AS DIRECTED BY PRESCRIBER, Disp: 125 tablet, Rfl: 2    Updated list was reviewed in [facility] of facility.     Vitals:    03/03/25 1617   BP: (!) 157/110   Pulse: 85   Resp: 18   Temp: (!) 96.9 °F (36.1 °C)   SpO2: 98%       Physical Exam  HENT:      Head: Normocephalic and atraumatic.   Eyes:      General: No scleral icterus.  Pulmonary:      Breath sounds: No wheezing, rhonchi or rales.   Abdominal:      Tenderness: There is no abdominal tenderness.   Musculoskeletal:         General: Swelling (Involving dorsal aspect of left hand and fingers more pronounced at left index finger) present.      Right lower leg: Edema present.      Left lower leg: Edema present.   Skin:     Findings: Erythema (Involving dorsal aspect of left hand extending to the fingers, more pronounced over left index finger) present.      Comments: Small scab noted over dorsal aspect of left hand from possible recent injury   Neurological:      General: No focal deficit present.      Cranial Nerves: No cranial nerve deficit.         Diagnostic Data:    Recent labs were reviewed.    Labs from today revealed BUN of 25, creatinine 0.97, sodium 142, potassium 4.1  AST ALT normal  Hemoglobin 14.8, WBC count 9.9, platelet count 258, uric acid 8.2  PT/INR was 1.8 today, down from 2.3 earlier    Additional Notes:   Care coordinated with patient's daughter over the phone      Portions of the record may have been created with voice recognition software.  Occasional wrong word or \"sound a like\" substitutions may have occurred due to the inherent limitations of voice recognition software.  Read the chart carefully and recognize, using context, where substitutions have occurred.    This note was electronically signed by Dr. Gabi Hernández   "

## 2025-03-04 ENCOUNTER — NURSING HOME VISIT (OUTPATIENT)
Dept: GERIATRICS | Facility: OTHER | Age: 85
End: 2025-03-04
Payer: MEDICARE

## 2025-03-04 VITALS
WEIGHT: 246.4 LBS | HEART RATE: 96 BPM | TEMPERATURE: 97.5 F | SYSTOLIC BLOOD PRESSURE: 121 MMHG | DIASTOLIC BLOOD PRESSURE: 80 MMHG | RESPIRATION RATE: 17 BRPM | OXYGEN SATURATION: 96 % | BODY MASS INDEX: 31.64 KG/M2

## 2025-03-04 DIAGNOSIS — N18.31 STAGE 3A CHRONIC KIDNEY DISEASE (CKD) (HCC): ICD-10-CM

## 2025-03-04 DIAGNOSIS — I50.30 DIASTOLIC CONGESTIVE HEART FAILURE, UNSPECIFIED HF CHRONICITY (HCC): ICD-10-CM

## 2025-03-04 DIAGNOSIS — L03.012 CELLULITIS OF FINGER OF LEFT HAND: Primary | ICD-10-CM

## 2025-03-04 DIAGNOSIS — Z96.652 HISTORY OF TOTAL LEFT KNEE REPLACEMENT: ICD-10-CM

## 2025-03-04 DIAGNOSIS — R29.6 MULTIPLE FALLS: ICD-10-CM

## 2025-03-04 DIAGNOSIS — E85.4 CARDIAC AMYLOIDOSIS (HCC): ICD-10-CM

## 2025-03-04 DIAGNOSIS — I48.19 PERSISTENT ATRIAL FIBRILLATION (HCC): ICD-10-CM

## 2025-03-04 DIAGNOSIS — I10 PRIMARY HYPERTENSION: ICD-10-CM

## 2025-03-04 DIAGNOSIS — I43 CARDIAC AMYLOIDOSIS (HCC): ICD-10-CM

## 2025-03-04 PROBLEM — T14.8XXA MULTIPLE SKIN TEARS: Status: RESOLVED | Noted: 2025-02-02 | Resolved: 2025-03-04

## 2025-03-04 PROCEDURE — 99309 SBSQ NF CARE MODERATE MDM 30: CPT | Performed by: INTERNAL MEDICINE

## 2025-03-04 NOTE — ASSESSMENT & PLAN NOTE
Wt Readings from Last 3 Encounters:   03/03/25 112 kg (246 lb 6.4 oz)   02/26/25 112 kg (246 lb 3.2 oz)   02/24/25 111 kg (245 lb 12.8 oz)     Mildly exacerbated.  Patient had weight gain of around 6 pounds since admission along with increase edema of lower extremities.  Lasix dose was increased to 40 mg twice daily yesterday.  Overall edema appears to be improving.  Continue current dose of Lasix.  BMP was stable yesterday.  Repeat BMP in 1 week

## 2025-03-04 NOTE — ASSESSMENT & PLAN NOTE
S/p total knee arthroplasty on 1/30/2025  Patient was noted to have increase edema of of LLE Recent doppler was negative for DVT.  Continue Tylenol and lidocaine patch for pain.  He was recently started on  Lasix, dose was increased to 40 mg twice daily for worsening edema related to volume overload  Patient remains on Coumadin for history of A-fib and for DVT prophylaxis.  INR was 1.8 yesterday.  Dose was adjusted  Continue PT/OT  Follow-up with orthopedic service

## 2025-03-04 NOTE — ASSESSMENT & PLAN NOTE
Heart rate stable on metoprolol XL 25 mg daily.  Patient received extra dose of Coumadin total of 7.5 mg yesterday for INR of 1.8.  Currently remains on high Coumadin 5 mg daily.  Follow-up repeat INR

## 2025-03-04 NOTE — ASSESSMENT & PLAN NOTE
Lab Results   Component Value Date    EGFR 78 02/05/2025    EGFR 69 02/04/2025    EGFR 70 02/03/2025    CREATININE 0.88 02/05/2025    CREATININE 0.99 02/04/2025    CREATININE 0.98 02/03/2025   Creatinine was stable yesterday.  Avoid hypotension and nephrotoxins

## 2025-03-04 NOTE — ASSESSMENT & PLAN NOTE
Left hand redness and swelling has improved on today's encounter.  Patient is able to move his index finger better today.  Continue Keflex.  Patient additionally remains on prednisone 20 mg daily twice daily for suspected gout.  Uric acid level was elevated at 8.2.  Continue to monitor

## 2025-03-04 NOTE — PROGRESS NOTES
[facility]  [address]   [address]  [place], [place], [address] [phone]    Progress Note  Code SNF 31    Patient Location     [facility]    Reason for visit     F.U right hand cellulitis and CHF    Patient’s recent vitals, labs and updated medications were reviewed on [facility] system of facility.     Problem List Items Addressed This Visit       HTN (hypertension)    Patient stable on metoprolol succinate 25 mg daily and Lasix 40 mg twice daily         Persistent atrial fibrillation (HCC)    Heart rate stable on metoprolol XL 25 mg daily.  Patient received extra dose of Coumadin total of 7.5 mg yesterday for INR of 1.8.  Currently remains on high Coumadin 5 mg daily.  Follow-up repeat INR         Stage 3a chronic kidney disease (CKD) (HCC)    Lab Results   Component Value Date    EGFR 78 02/05/2025    EGFR 69 02/04/2025    EGFR 70 02/03/2025    CREATININE 0.88 02/05/2025    CREATININE 0.99 02/04/2025    CREATININE 0.98 02/03/2025   Creatinine was stable yesterday.  Avoid hypotension and nephrotoxins         Multiple falls    History of several falls following elective total knee arthroplasty on 1/30/2025.  Continue PT OT          Congestive heart failure (CHF) (HCC)    Wt Readings from Last 3 Encounters:   03/03/25 112 kg (246 lb 6.4 oz)   02/26/25 112 kg (246 lb 3.2 oz)   02/24/25 111 kg (245 lb 12.8 oz)     Mildly exacerbated.  Patient had weight gain of around 6 pounds since admission along with increase edema of lower extremities.  Lasix dose was increased to 40 mg twice daily yesterday.  Overall edema appears to be improving.  Continue current dose of Lasix.  BMP was stable yesterday.  Repeat BMP in 1 week             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HCC)    Continue Tafamidis         History of total knee arthroplasty    S/p total knee arthroplasty on 1/30/2025  Patient was noted to have increase edema of of LLE Recent doppler was negative for DVT.  Continue  Tylenol and lidocaine patch for pain.  He was recently started on  Lasix, dose was increased to 40 mg twice daily for worsening edema related to volume overload  Patient remains on Coumadin for history of A-fib and for DVT prophylaxis.  INR was 1.8 yesterday.  Dose was adjusted  Continue PT/OT  Follow-up with orthopedic service         Cellulitis of  left hand - Primary    Left hand redness and swelling has improved on today's encounter.  Patient is able to move his index finger better today.  Continue Keflex.  Patient additionally remains on prednisone 20 mg daily twice daily for suspected gout.  Uric acid level was elevated at 8.2.  Continue to monitor                    HPI           Patient being seen for a follow-up visit today.  He was noted to have right hand redness and swelling 3 days ago  Patient was clinically suspected to have cellulitis.  There was also question of gout.  He was started on both Keflex and prednisone over the weekend.    Pt was noted to have significant erythema involving dorsal aspect of right hand yesterday.Erythema was more pronounced on the right index finger.  At the time of my evaluation today right hand swelling and erythema appears to have improved. Patient had difficulty moving his finger yesterday however right finger mobility appears to have improved as well today.  Lower extremity edema is persistent but improved.  Recent BMP was stable except uric acid level was elevated   Patient was additionally noted to have mild exacerbation of CHF yesterday for which Lasix dose was increased to 40 mg twice daily.   .      Review of Systems   Constitutional:  Negative for chills and fever.   Respiratory:  Negative for shortness of breath, wheezing and stridor.    Cardiovascular:  Positive for leg swelling. Negative for chest pain.   Gastrointestinal:  Negative for abdominal distention and vomiting.   Genitourinary:  Negative for flank pain and hematuria.   Musculoskeletal:  Positive for  gait problem and joint swelling (Swelling involving dorsal aspect of left hand and fingers).   Neurological:  Negative for seizures and syncope.   Psychiatric/Behavioral:  Negative for agitation and behavioral problems.        Past Medical History:   Diagnosis Date    Abnormal glucose level 2018    Acute back pain 10/02/2017    Atrial fibrillation (HCC)     Blood clot in vein 2015    in right foot    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HCC) 2023    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HCC)     Carpal tunnel syndrome     Chronic kidney disease (CKD)     Fever 2017    Frailty syndrome in geriatric patient 2023    FUO (fever of unknown origin) 2017    Heart failure (HCC)     History of DVT (deep vein thrombosis)     History of tobacco abuse     quit in     History of transient cerebral ischemia     Hypertension        Past Surgical History:   Procedure Laterality Date    BACK SURGERY  2018    COLONOSCOPY      COLONOSCOPY N/A 2016    Procedure: COLONOSCOPY;  Surgeon: Salvador Ovalle MD;  Location: [facility];  Service:     HERNIA REPAIR      NEUROPLASTY / TRANSPOSITION MEDIAN NERVE AT CARPAL TUNNEL         Social History     Tobacco Use   Smoking Status Former    Current packs/day: 0.00    Average packs/day: 1 pack/day for 26.0 years (26.0 ttl pk-yrs)    Types: Cigarettes    Start date:     Quit date:     Years since quittin.2   Smokeless Tobacco Never       Family History   Problem Relation Age of Onset    Heart attack Father     Heart disease Other     Cancer Other         Allergies   Allergen Reactions    Sildenafil Hives     Other reaction(s): severe congestion, cialis was OK    Gabapentin Confusion    Sildenafil Other (See Comments)     _         Current Outpatient Medications:     acetaminophen (TYLENOL) 325 mg tablet, Take 2 tablets (650 mg total) by mouth every 4 (four) hours as needed for mild pain, Disp: , Rfl:     acetaminophen (TYLENOL) 500 mg tablet, Take 1,000 mg by mouth every 12  (twelve) hours Max 3g daily, Disp: , Rfl:     cephalexin (KEFLEX) 500 mg capsule, Take 500 mg by mouth every 6 (six) hours X 7 days, Disp: , Rfl:     Cholecalciferol (VITAMIN D-3 PO), Take 2,000 Units by mouth daily, Disp: , Rfl:     folic acid (FOLVITE) 1 mg tablet, Take 2 mg by mouth daily  , Disp: , Rfl:     furosemide (LASIX) 20 mg tablet, Take 40 mg by mouth 2 (two) times a day, Disp: , Rfl:     lidocaine (LIDODERM) 5 %, Apply 1 patch topically over 12 hours daily Remove & Discard patch within 12 hours or as directed by MD, Disp: , Rfl:     metoprolol succinate (TOPROL-XL) 25 mg 24 hr tablet, Take 1 tablet (25 mg total) by mouth daily, Disp: , Rfl:     Multiple Vitamins-Minerals (MULTIVITAMIN & MINERAL PO), Take 1 tablet by mouth daily. Per Felipa Moore MD [facility] 10/2/23  Indications: supplement, Disp: , Rfl:     OLANZapine (ZyPREXA ZYDIS) 5 mg dispersible tablet, Take 0.5 tablets (2.5 mg total) by mouth daily at bedtime, Disp: , Rfl:     potassium chloride (Klor-Con M20) 20 mEq tablet, Take 2 tablets (40 mEq total) by mouth if needed (when taking lasix) (Patient not taking: Reported on 2/24/2025), Disp: 60 tablet, Rfl: 3    predniSONE 20 mg tablet, Take by mouth 2 (two) times a day with meals X 5 days, Disp: , Rfl:     Tafamidis Meglumine, Cardiac, (Vyndaqel) 20 MG CAPS, Take 80 mg by mouth in the morning Take 20mg 4 caps once daily, Disp: 120 capsule, Rfl: 12    warfarin (COUMADIN) 5 mg tablet, Take by mouth daily 1 to 1.5 tablet daily per physician, Disp: , Rfl:     warfarin (COUMADIN) 5 mg tablet, TAKE 1 TO 1 AND 1/2 TABLETS BY MOUTH DAILY AS DIRECTED BY PRESCRIBER, Disp: 125 tablet, Rfl: 2    Updated list was reviewed in [facility] of facility.     Vitals:    03/04/25 1217   BP: 121/80   Pulse: 96   Resp: 17   Temp: 97.5 °F (36.4 °C)   SpO2: 96%       Physical Exam  HENT:      Head: Normocephalic and atraumatic.   Eyes:      General: No scleral icterus.  Pulmonary:      Breath  "sounds: No wheezing, rhonchi or rales.   Abdominal:      Tenderness: There is no abdominal tenderness.   Musculoskeletal:         General: Swelling (Involving dorsal aspect of left hand and fingers more pronounced at left index finger. Overall swelling and redness have improved from yesterday) present.      Right lower leg: Edema present.      Left lower leg: Edema present.   Skin:     Findings: Erythema (Involving dorsal aspect of left hand extending to the fingers, more pronounced over left index finger, overall  improved from before) present.      Comments: Small scab noted over dorsal aspect of left hand from possible recent injury   Neurological:      General: No focal deficit present.      Cranial Nerves: No cranial nerve deficit.         Diagnostic Data:    Recent labs were reviewed.  BUN of 25, creatinine 0.97, sodium 142, potassium 4.1  AST ALT normal  Hemoglobin 14.8, WBC count 9.9, platelet count 258, uric acid 8.2  PT/INR  1.8        Portions of the record may have been created with voice recognition software.  Occasional wrong word or \"sound a like\" substitutions may have occurred due to the inherent limitations of voice recognition software.  Read the chart carefully and recognize, using context, where substitutions have occurred.    This note was electronically signed by Dr. Gabi Hernández   "

## 2025-03-06 ENCOUNTER — NURSING HOME VISIT (OUTPATIENT)
Dept: GERIATRICS | Facility: OTHER | Age: 85
End: 2025-03-06
Payer: MEDICARE

## 2025-03-06 VITALS
OXYGEN SATURATION: 97 % | BODY MASS INDEX: 31.64 KG/M2 | HEART RATE: 76 BPM | WEIGHT: 246.4 LBS | SYSTOLIC BLOOD PRESSURE: 128 MMHG | DIASTOLIC BLOOD PRESSURE: 68 MMHG | RESPIRATION RATE: 18 BRPM

## 2025-03-06 DIAGNOSIS — I50.30 DIASTOLIC CONGESTIVE HEART FAILURE, UNSPECIFIED HF CHRONICITY (HCC): ICD-10-CM

## 2025-03-06 DIAGNOSIS — E85.4 CARDIAC AMYLOIDOSIS (HCC): ICD-10-CM

## 2025-03-06 DIAGNOSIS — N18.31 STAGE 3A CHRONIC KIDNEY DISEASE (CKD) (HCC): ICD-10-CM

## 2025-03-06 DIAGNOSIS — I48.19 PERSISTENT ATRIAL FIBRILLATION (HCC): ICD-10-CM

## 2025-03-06 DIAGNOSIS — I43 CARDIAC AMYLOIDOSIS (HCC): ICD-10-CM

## 2025-03-06 DIAGNOSIS — I10 PRIMARY HYPERTENSION: Primary | ICD-10-CM

## 2025-03-06 DIAGNOSIS — R68.89 FORGETFULNESS: ICD-10-CM

## 2025-03-06 DIAGNOSIS — L03.012 CELLULITIS OF FINGER OF LEFT HAND: ICD-10-CM

## 2025-03-06 PROCEDURE — 99309 SBSQ NF CARE MODERATE MDM 30: CPT | Performed by: INTERNAL MEDICINE

## 2025-03-06 NOTE — ASSESSMENT & PLAN NOTE
CTH mild microangiopathic changes  MOCA 2/4 19/30    -Olanzapine 2.5 mg at bedtime  -Reorientation and other supportive measures, stable behaviors

## 2025-03-06 NOTE — ASSESSMENT & PLAN NOTE
Heart rate stable on metoprolol XL 25 mg daily.    Continue high Coumadin 5 mg daily.    Repeat INR

## 2025-03-06 NOTE — ASSESSMENT & PLAN NOTE
Left hand redness and swelling with significant improvement  Patient is able to move his index finger with minimal erythema and discomfort   Uric acid level was elevated at 8.2.    -Continue to monitor  -Complete Keflex and prednisone

## 2025-03-06 NOTE — ASSESSMENT & PLAN NOTE
Wt Readings from Last 3 Encounters:   03/06/25 112 kg (246 lb 6.4 oz)   03/04/25 112 kg (246 lb 6.4 oz)   03/03/25 112 kg (246 lb 6.4 oz)     Mildly exacerbated.     Lasix dose was increased to 40 mg twice daily   -Edema improved, continue lasix    -BMP was stable, repeat BMP in 1 week

## 2025-03-06 NOTE — PROGRESS NOTES
[facility]  [facility]     Progress Note  Code SNF31    Patient Location     [facility]     Reason for visit     Follow up    Patient’s recent vitals, labs and updated medications were reviewed on [facility].     Problem List Items Addressed This Visit       HTN (hypertension) - Primary    Well controlled  Patient stable on metoprolol succinate 25 mg daily and Lasix 40 mg twice daily         Persistent atrial fibrillation (HCC)    Heart rate stable on metoprolol XL 25 mg daily.    Continue high Coumadin 5 mg daily.    Repeat INR         Stage 3a chronic kidney disease (CKD) ([facility])    Lab Results   Component Value Date    EGFR 78 02/05/2025    EGFR 69 02/04/2025    EGFR 70 02/03/2025    CREATININE 0.88 02/05/2025    CREATININE 0.99 02/04/2025    CREATININE 0.98 02/03/2025     -Stable, monitor and avoid nephrotoxins         Congestive heart failure (CHF) ([facility])    Wt Readings from Last 3 Encounters:   03/06/25 112 kg (246 lb 6.4 oz)   03/04/25 112 kg (246 lb 6.4 oz)   03/03/25 112 kg (246 lb 6.4 oz)     Mildly exacerbated.     Lasix dose was increased to 40 mg twice daily   -Edema improved, continue lasix    -BMP was stable, repeat BMP in 1 week             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[facility])    Continue Tafamidis         Cognitive impairment    CTH mild microangiopathic changes  MOCA 2/4 19/30    -Olanzapine 2.5 mg at bedtime  -Reorientation and other supportive measures, stable behaviors         Cellulitis of  left hand    Left hand redness and swelling with significant improvement  Patient is able to move his index finger with minimal erythema and discomfort   Uric acid level was elevated at 8.2.    -Continue to monitor  -Complete Keflex and prednisone                    Pt reports significant improvement in swelling and pain of L hand, improved dexterity and mobility, minimal pain. No other complaints, good appetite, bowel movements, and sleep.  Reduced LE swelling with lasix.                        Review of Systems   Constitutional:  Negative for chills and fever.   Respiratory:  Negative for shortness of breath.    Cardiovascular:  Negative for chest pain.   Gastrointestinal:  Negative for abdominal pain and constipation.   Musculoskeletal:  Negative for arthralgias.   Neurological:  Negative for dizziness.       Past Medical History:   Diagnosis Date    Abnormal glucose level 2018    Acute back pain 10/02/2017    Atrial fibrillation (HCC)     Blood clot in vein 2015    in right foot    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HCC) 2023    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HCC)     Carpal tunnel syndrome     Chronic kidney disease (CKD)     Fever 2017    Frailty syndrome in geriatric patient 2023    FUO (fever of unknown origin) 2017    Heart failure (HCC)     History of DVT (deep vein thrombosis)     History of tobacco abuse     quit in     History of transient cerebral ischemia     Hypertension        Past Surgical History:   Procedure Laterality Date    BACK SURGERY  2018    COLONOSCOPY      COLONOSCOPY N/A 2016    Procedure: COLONOSCOPY;  Surgeon: Salvador Ovalle MD;  Location: [facility];  Service:     HERNIA REPAIR      NEUROPLASTY / TRANSPOSITION MEDIAN NERVE AT CARPAL TUNNEL         Social History     Tobacco Use   Smoking Status Former    Current packs/day: 0.00    Average packs/day: 1 pack/day for 26.0 years (26.0 ttl pk-yrs)    Types: Cigarettes    Start date:     Quit date:     Years since quittin.2   Smokeless Tobacco Never       Family History   Problem Relation Age of Onset    Heart attack Father     Heart disease Other     Cancer Other         Allergies   Allergen Reactions    Sildenafil Hives     Other reaction(s): severe congestion, cialis was OK    Gabapentin Confusion    Sildenafil Other (See Comments)     _         Current Outpatient Medications:     acetaminophen (TYLENOL) 325 mg tablet, Take 2 tablets (650 mg  total) by mouth every 4 (four) hours as needed for mild pain, Disp: , Rfl:     acetaminophen (TYLENOL) 500 mg tablet, Take 1,000 mg by mouth every 12 (twelve) hours Max 3g daily, Disp: , Rfl:     cephalexin (KEFLEX) 500 mg capsule, Take 500 mg by mouth every 6 (six) hours X 7 days, Disp: , Rfl:     Cholecalciferol (VITAMIN D-3 PO), Take 2,000 Units by mouth daily, Disp: , Rfl:     folic acid (FOLVITE) 1 mg tablet, Take 2 mg by mouth daily  , Disp: , Rfl:     furosemide (LASIX) 20 mg tablet, Take 40 mg by mouth 2 (two) times a day, Disp: , Rfl:     lidocaine (LIDODERM) 5 %, Apply 1 patch topically over 12 hours daily Remove & Discard patch within 12 hours or as directed by MD, Disp: , Rfl:     metoprolol succinate (TOPROL-XL) 25 mg 24 hr tablet, Take 1 tablet (25 mg total) by mouth daily, Disp: , Rfl:     Multiple Vitamins-Minerals (MULTIVITAMIN & MINERAL PO), Take 1 tablet by mouth daily. Per Felipa Moore MD [facility] 10/2/23  Indications: supplement, Disp: , Rfl:     OLANZapine (ZyPREXA ZYDIS) 5 mg dispersible tablet, Take 0.5 tablets (2.5 mg total) by mouth daily at bedtime, Disp: , Rfl:     potassium chloride (Klor-Con M20) 20 mEq tablet, Take 2 tablets (40 mEq total) by mouth if needed (when taking lasix) (Patient not taking: Reported on 2/24/2025), Disp: 60 tablet, Rfl: 3    predniSONE 20 mg tablet, Take by mouth 2 (two) times a day with meals X 5 days, Disp: , Rfl:     Tafamidis Meglumine, Cardiac, (Vyndaqel) 20 MG CAPS, Take 80 mg by mouth in the morning Take 20mg 4 caps once daily, Disp: 120 capsule, Rfl: 12    warfarin (COUMADIN) 5 mg tablet, Take by mouth daily 1 to 1.5 tablet daily per physician, Disp: , Rfl:     warfarin (COUMADIN) 5 mg tablet, TAKE 1 TO 1 AND 1/2 TABLETS BY MOUTH DAILY AS DIRECTED BY PRESCRIBER, Disp: 125 tablet, Rfl: 2    Updated list was reviewed in [facility].     Vitals:    03/06/25 1308   BP: 128/68   Pulse: 76   Resp: 18   SpO2: 97%  "      Physical Exam  Vitals reviewed.   Constitutional:       General: He is not in acute distress.  HENT:      Head: Normocephalic and atraumatic.   Eyes:      Conjunctiva/sclera: Conjunctivae normal.   Cardiovascular:      Rate and Rhythm: Normal rate. Rhythm irregular.      Heart sounds: Normal heart sounds.   Pulmonary:      Effort: Pulmonary effort is normal.      Breath sounds: Normal breath sounds.   Abdominal:      Palpations: Abdomen is soft.      Tenderness: There is no abdominal tenderness.   Musculoskeletal:         General: Swelling (Minimal in L hand, significantly improved ROM and erythema) present. No tenderness. Normal range of motion.      Right lower leg: Edema present.      Left lower leg: Edema present.      Comments: ACE wrapped LE   Skin:     General: Skin is warm.   Neurological:      Mental Status: He is alert and oriented to person, place, and time.         Diagnostic Data:    Recent labs were reviewed.  Lab Results   Component Value Date    WBC 8.49 02/06/2025    HGB 16.1 02/06/2025    HCT 49.0 02/06/2025    MCV 98 02/06/2025     02/06/2025      Lab Results   Component Value Date     12/17/2015    SODIUM 139 02/05/2025    K 4.1 02/05/2025     02/05/2025    CO2 27 02/05/2025    ANIONGAP 6 12/17/2015    AGAP 10 02/05/2025    BUN 28 (H) 02/05/2025    CREATININE 0.88 02/05/2025    GLUC 105 02/05/2025    GLUF 105 (H) 12/14/2023    CALCIUM 9.2 02/05/2025    AST 24 01/13/2025    ALT 17 01/13/2025    ALKPHOS 76 01/13/2025    PROT 7.0 12/17/2015    TP 6.5 01/13/2025    BILITOT 1.04 (H) 12/17/2015    TBILI 2.6 (H) 01/13/2025    EGFR 78 02/05/2025        Additional Notes:       Portions of the record may have been created with voice recognition software.  Occasional wrong word or \"sound a like\" substitutions may have occurred due to the inherent limitations of voice recognition software.  Read the chart carefully and recognize, using context, where substitutions have " occurred.    This note was electronically signed by Dr. Gabi Hernández

## 2025-03-06 NOTE — ASSESSMENT & PLAN NOTE
Lab Results   Component Value Date    EGFR 78 02/05/2025    EGFR 69 02/04/2025    EGFR 70 02/03/2025    CREATININE 0.88 02/05/2025    CREATININE 0.99 02/04/2025    CREATININE 0.98 02/03/2025     -Stable, monitor and avoid nephrotoxins

## 2025-03-10 DIAGNOSIS — I48.19 PERSISTENT ATRIAL FIBRILLATION (HCC): Primary | ICD-10-CM

## 2025-03-10 NOTE — TELEPHONE ENCOUNTER
Medication: warfarin    Dose/Frequency: 5mg/once  1-1/2 daily as prescribed by doctor     Quantity: 125    Pharmacy: [facility] - [facility] - [address]     Office:   [] PCP/Provider -   [x] Speciality/Provider - cardiology/BASIL Marte    Does the patient have enough for 3 days?   [x] Yes   [] No - Send as HP to POD

## 2025-03-11 ENCOUNTER — EVALUATION (OUTPATIENT)
Dept: PHYSICAL THERAPY | Facility: CLINIC | Age: 85
End: 2025-03-11
Payer: MEDICARE

## 2025-03-11 DIAGNOSIS — R26.2 AMBULATORY DYSFUNCTION: ICD-10-CM

## 2025-03-11 DIAGNOSIS — Z96.652 TOTAL KNEE REPLACEMENT STATUS, LEFT: Primary | ICD-10-CM

## 2025-03-11 DIAGNOSIS — I48.19 PERSISTENT ATRIAL FIBRILLATION (HCC): Primary | ICD-10-CM

## 2025-03-11 DIAGNOSIS — R29.6 FALLS: ICD-10-CM

## 2025-03-11 PROCEDURE — 97110 THERAPEUTIC EXERCISES: CPT | Performed by: PHYSICAL THERAPIST

## 2025-03-11 PROCEDURE — 97161 PT EVAL LOW COMPLEX 20 MIN: CPT | Performed by: PHYSICAL THERAPIST

## 2025-03-11 RX ORDER — WARFARIN SODIUM 5 MG/1
5 TABLET ORAL
Qty: 45 TABLET | Refills: 11 | Status: SHIPPED | OUTPATIENT
Start: 2025-03-11

## 2025-03-11 NOTE — LETTER
2025    Jayden Ruff MD  [address] [address]    Patient: Jesse France   YOB: 1940   Date of Visit: 3/11/2025     Encounter Diagnosis     ICD-10-CM    1. Total knee replacement status, left  Z96.652       2. Falls  R29.6       3. Ambulatory dysfunction  R26.2           Dear Dr. Ruff:    Thank you for your recent referral of Jesse France. Please review the attached evaluation summary from Jesse's recent visit.     Please verify that you agree with the plan of care by signing the attached order.     If you have any questions or concerns, please do not hesitate to call.     I sincerely appreciate the opportunity to share in the care of one of your patients and hope to have another opportunity to work with you in the near future.       Sincerely,    Tara Castillo, PT      Referring Provider:      I certify that I have read the below Plan of Care and certify the need for these services furnished under this plan of treatment while under my care.                    Jayden Ruff MD  [address] [address]  Via Fax: [phone]          PT Evaluation     Today's date: 3/11/2025  Patient name: Jesse France  : 1940  MRN: [id number]  Referring provider: Jayden Ruff MD  Dx:   Encounter Diagnosis     ICD-10-CM    1. Total knee replacement status, left  Z96.652       2. Falls  R29.6       3. Ambulatory dysfunction  R26.2                      Assessment  Impairments: abnormal or restricted ROM, activity intolerance, impaired physical strength, lacks appropriate home exercise program and pain with function  Functional limitations: walking; standing  Symptom irritability: low    Assessment details: Pt is 85yo male presenting to therapy following Lt TKA with extensive PMH. Pt is showing good rom and decreased strength and increased fall risk. Pt is using RW for all ambulation in the home and a chair lift as well. Pt would benefit from PT services to  improve strength and balance to reduce fall risk and improve ambulation.  Understanding of Dx/Px/POC: good     Prognosis: good    Goals  1. Pt will be independent with HEP upon discharge.  2. Pt will improve Lt knee rom to 3-125 degrees.  3. Pt will improve Lt knee strength to 4/5 to be able to stand from a chair without difficulty.  4. Pt will report no falls.    Plan  Patient would benefit from: skilled physical therapy    Planned therapy interventions: functional ROM exercises, therapeutic activities, therapeutic exercise, therapeutic training, stretching, strengthening, home exercise program, neuromuscular re-education, manual therapy and patient education    Frequency: 2x week  Duration in weeks: 8  Treatment plan discussed with: patient        Subjective Evaluation    History of Present Illness  Mechanism of injury: Pt had Lt TKA on 1/30/25. He has a complicated recovery with some falls that landed him in a SNF rehab for a few weeks. Pt has returned home and has a chair lift to help with stairs. He is primarily walking with walker, sometimes without and with cane and furniture walking.   Patient Goals  Patient goals for therapy: increased strength and independence with ADLs/IADLs    Pain  Current pain rating: 3  At worst pain rating: 3  Location: Lt knee  Quality: dull ache  Aggravating factors: walking, standing and stair climbing  Progression: no change          Objective     Passive Range of Motion   Left Knee   Flexion: 125 degrees   Extension: -8 degrees     Strength/Myotome Testing     Left Knee   Flexion: 3+  Extension: 3  Quadriceps contraction: fair    Right Knee   Flexion: 3+  Extension: 3+    Additional Strength Details  Slight ext lag on Left but improved with verbal cues             Precautions: fall risk; Lt TKA 1/30/25      Manuals 3/11            Lt knee Ext FH                                                   Neuro Re-Ed                                                                                                         Ther Ex             Standing Marches 20xea            TKE standing 30x blue            LAQ 30x5'' 3#            Heel raises 30x            Standing Ham curls 30x            Hip ABD stand             Side stepping             Bike 6' L3            Ther Activity             Sit to stands [place] mat 10x            Step ups             Gait Training                                       Modalities

## 2025-03-11 NOTE — PROGRESS NOTES
PT Evaluation     Today's date: 3/11/2025  Patient name: Jesse France  : 1940  MRN: [id number]  Referring provider: Jayden Ruff MD  Dx:   Encounter Diagnosis     ICD-10-CM    1. Total knee replacement status, left  Z96.652       2. Falls  R29.6       3. Ambulatory dysfunction  R26.2                      Assessment  Impairments: abnormal or restricted ROM, activity intolerance, impaired physical strength, lacks appropriate home exercise program and pain with function  Functional limitations: walking; standing  Symptom irritability: low    Assessment details: Pt is 85yo male presenting to therapy following Lt TKA with extensive PMH. Pt is showing good rom and decreased strength and increased fall risk. Pt is using RW for all ambulation in the home and a chair lift as well. Pt would benefit from PT services to improve strength and balance to reduce fall risk and improve ambulation.  Understanding of Dx/Px/POC: good     Prognosis: good    Goals  1. Pt will be independent with HEP upon discharge.  2. Pt will improve Lt knee rom to 3-125 degrees.  3. Pt will improve Lt knee strength to 4/5 to be able to stand from a chair without difficulty.  4. Pt will report no falls.    Plan  Patient would benefit from: skilled physical therapy    Planned therapy interventions: functional ROM exercises, therapeutic activities, therapeutic exercise, therapeutic training, stretching, strengthening, home exercise program, neuromuscular re-education, manual therapy and patient education    Frequency: 2x week  Duration in weeks: 8  Treatment plan discussed with: patient        Subjective Evaluation    History of Present Illness  Mechanism of injury: Pt had Lt TKA on 25. He has a complicated recovery with some falls that landed him in a SNF rehab for a few weeks. Pt has returned home and has a chair lift to help with stairs. He is primarily walking with walker, sometimes without and with cane and furniture walking.    Patient Goals  Patient goals for therapy: increased strength and independence with ADLs/IADLs    Pain  Current pain rating: 3  At worst pain rating: 3  Location: Lt knee  Quality: dull ache  Aggravating factors: walking, standing and stair climbing  Progression: no change          Objective     Passive Range of Motion   Left Knee   Flexion: 125 degrees   Extension: -8 degrees     Strength/Myotome Testing     Left Knee   Flexion: 3+  Extension: 3  Quadriceps contraction: fair    Right Knee   Flexion: 3+  Extension: 3+    Additional Strength Details  Slight ext lag on Left but improved with verbal cues             Precautions: fall risk; Lt TKA 1/30/25      Manuals 3/11            Lt knee Ext FH                                                   Neuro Re-Ed                                                                                                        Ther Ex             Standing Marches 20xea            TKE standing 30x blue            LAQ 30x5'' 3#            Heel raises 30x            Standing Ham curls 30x            Hip ABD stand             Side stepping             Bike 6' L3            Ther Activity             Sit to stands [place] mat 10x            Step ups             Gait Training                                       Modalities

## 2025-03-12 ENCOUNTER — APPOINTMENT (OUTPATIENT)
Dept: LAB | Facility: CLINIC | Age: 85
End: 2025-03-12
Payer: MEDICARE

## 2025-03-12 DIAGNOSIS — E85.82 SENILE SYSTEMIC AMYLOIDOSIS (HCC): ICD-10-CM

## 2025-03-12 DIAGNOSIS — I10 ESSENTIAL HYPERTENSION, MALIGNANT: ICD-10-CM

## 2025-03-12 DIAGNOSIS — M54.16 LUMBAR RADICULOPATHY: ICD-10-CM

## 2025-03-12 DIAGNOSIS — Z47.1 AFTERCARE FOLLOWING JOINT REPLACEMENT SURGERY, UNSPECIFIED JOINT: ICD-10-CM

## 2025-03-12 DIAGNOSIS — R26.2 CANNOT WALK: ICD-10-CM

## 2025-03-12 DIAGNOSIS — R29.6 FALLS FREQUENTLY: ICD-10-CM

## 2025-03-12 DIAGNOSIS — I48.19 PERSISTENT ATRIAL FIBRILLATION (HCC): ICD-10-CM

## 2025-03-12 DIAGNOSIS — Z96.652 PRESENCE OF LEFT ARTIFICIAL KNEE JOINT: ICD-10-CM

## 2025-03-12 DIAGNOSIS — D68.59 PRIMARY HYPERCOAGULABLE STATE (HCC): ICD-10-CM

## 2025-03-12 DIAGNOSIS — I50.9 HEART FAILURE, UNSPECIFIED HF CHRONICITY, UNSPECIFIED HEART FAILURE TYPE (HCC): ICD-10-CM

## 2025-03-12 DIAGNOSIS — I87.2 PERIPHERAL VENOUS INSUFFICIENCY: ICD-10-CM

## 2025-03-12 LAB
ALBUMIN SERPL BCG-MCNC: 3.5 G/DL (ref 3.5–5)
ALP SERPL-CCNC: 114 U/L (ref 34–104)
ALT SERPL W P-5'-P-CCNC: 18 U/L (ref 7–52)
ANION GAP SERPL CALCULATED.3IONS-SCNC: 6 MMOL/L (ref 4–13)
AST SERPL W P-5'-P-CCNC: 26 U/L (ref 13–39)
BASOPHILS # BLD AUTO: 0.06 THOUSANDS/ÂΜL (ref 0–0.1)
BASOPHILS NFR BLD AUTO: 1 % (ref 0–1)
BILIRUB SERPL-MCNC: 1.48 MG/DL (ref 0.2–1)
BUN SERPL-MCNC: 35 MG/DL (ref 5–25)
CALCIUM SERPL-MCNC: 9.4 MG/DL (ref 8.4–10.2)
CHLORIDE SERPL-SCNC: 103 MMOL/L (ref 96–108)
CO2 SERPL-SCNC: 35 MMOL/L (ref 21–32)
CREAT SERPL-MCNC: 0.93 MG/DL (ref 0.6–1.3)
EOSINOPHIL # BLD AUTO: 0.44 THOUSAND/ÂΜL (ref 0–0.61)
EOSINOPHIL NFR BLD AUTO: 4 % (ref 0–6)
ERYTHROCYTE [DISTWIDTH] IN BLOOD BY AUTOMATED COUNT: 13 % (ref 11.6–15.1)
GFR SERPL CREATININE-BSD FRML MDRD: 75 ML/MIN/1.73SQ M
GLUCOSE SERPL-MCNC: 118 MG/DL (ref 65–140)
HCT VFR BLD AUTO: 48.9 % (ref 36.5–49.3)
HGB BLD-MCNC: 15.8 G/DL (ref 12–17)
IMM GRANULOCYTES # BLD AUTO: 0.04 THOUSAND/UL (ref 0–0.2)
IMM GRANULOCYTES NFR BLD AUTO: 0 % (ref 0–2)
INR PPP: 1.96 (ref 0.85–1.19)
LYMPHOCYTES # BLD AUTO: 1.74 THOUSANDS/ÂΜL (ref 0.6–4.47)
LYMPHOCYTES NFR BLD AUTO: 18 % (ref 14–44)
MCH RBC QN AUTO: 32.2 PG (ref 26.8–34.3)
MCHC RBC AUTO-ENTMCNC: 32.3 G/DL (ref 31.4–37.4)
MCV RBC AUTO: 100 FL (ref 82–98)
MONOCYTES # BLD AUTO: 1.79 THOUSAND/ÂΜL (ref 0.17–1.22)
MONOCYTES NFR BLD AUTO: 18 % (ref 4–12)
NEUTROPHILS # BLD AUTO: 5.85 THOUSANDS/ÂΜL (ref 1.85–7.62)
NEUTS SEG NFR BLD AUTO: 59 % (ref 43–75)
NRBC BLD AUTO-RTO: 0 /100 WBCS
PLATELET # BLD AUTO: 214 THOUSANDS/UL (ref 149–390)
PMV BLD AUTO: 10.9 FL (ref 8.9–12.7)
POTASSIUM SERPL-SCNC: 4.1 MMOL/L (ref 3.5–5.3)
PROT SERPL-MCNC: 6.7 G/DL (ref 6.4–8.4)
PROTHROMBIN TIME: 22.3 SECONDS (ref 12.3–15)
RBC # BLD AUTO: 4.9 MILLION/UL (ref 3.88–5.62)
SODIUM SERPL-SCNC: 144 MMOL/L (ref 135–147)
WBC # BLD AUTO: 9.92 THOUSAND/UL (ref 4.31–10.16)

## 2025-03-12 PROCEDURE — 36415 COLL VENOUS BLD VENIPUNCTURE: CPT

## 2025-03-12 PROCEDURE — 85025 COMPLETE CBC W/AUTO DIFF WBC: CPT

## 2025-03-12 PROCEDURE — 80053 COMPREHEN METABOLIC PANEL: CPT

## 2025-03-12 PROCEDURE — 85610 PROTHROMBIN TIME: CPT

## 2025-03-13 ENCOUNTER — OFFICE VISIT (OUTPATIENT)
Dept: PHYSICAL THERAPY | Facility: CLINIC | Age: 85
End: 2025-03-13
Payer: MEDICARE

## 2025-03-13 ENCOUNTER — ANTICOAG VISIT (OUTPATIENT)
Dept: CARDIOLOGY CLINIC | Facility: CLINIC | Age: 85
End: 2025-03-13

## 2025-03-13 DIAGNOSIS — R26.2 AMBULATORY DYSFUNCTION: ICD-10-CM

## 2025-03-13 DIAGNOSIS — I48.19 PERSISTENT ATRIAL FIBRILLATION (HCC): Primary | ICD-10-CM

## 2025-03-13 DIAGNOSIS — Z96.652 TOTAL KNEE REPLACEMENT STATUS, LEFT: ICD-10-CM

## 2025-03-13 DIAGNOSIS — R29.6 FALLS: Primary | ICD-10-CM

## 2025-03-13 PROCEDURE — 97110 THERAPEUTIC EXERCISES: CPT

## 2025-03-13 NOTE — PROGRESS NOTES
Daily Note     Today's date: 3/13/2025  Patient name: Jesse France  : 1940  MRN: [id number]  Referring provider: Jayden Ruff MD  Dx:   Encounter Diagnosis     ICD-10-CM    1. Falls  R29.6       2. Total knee replacement status, left  Z96.652       3. Ambulatory dysfunction  R26.2           Start Time: 1451  Stop Time: 1530  Total time in clinic (min): 39 minutes    Subjective: Patient states he tired and sore.       Objective: See treatment diary below      Assessment: Good tolerance to strengthening but has a tendency to rush through reps. Limited by muscular deconditioning and requires frequent rest breaks throughout session. Requires consistent verbal cues to transfer weight anteriorly with STS transfers. Patient would benefit from continued PT to improve level of function.       Plan: Continue per POC. Increase reps/resistance as tolerated.      Precautions: fall risk; Lt TKA 25      Manuals 3/11 3/13           Lt knee Ext FH                                                   Neuro Re-Ed                                                                                                        Ther Ex             Standing Marches 20xea Red 20x ea           TKE standing 30x blue 30x blue           LAQ 30x5'' 3# 30x5'' 3#           Heel raises 30x 30x           Standing Ham curls 30x 30x           Hip ABD stand  30x           Side stepping  3 laps           Bike 6' L3 6' L3           Ther Activity             Sit to stands [place] mat 10x T/o session           Step ups             Gait Training                                       Modalities

## 2025-03-17 ENCOUNTER — OFFICE VISIT (OUTPATIENT)
Dept: PHYSICAL THERAPY | Facility: CLINIC | Age: 85
End: 2025-03-17
Payer: MEDICARE

## 2025-03-17 DIAGNOSIS — R29.6 FALLS: Primary | ICD-10-CM

## 2025-03-17 DIAGNOSIS — Z96.652 TOTAL KNEE REPLACEMENT STATUS, LEFT: ICD-10-CM

## 2025-03-17 PROCEDURE — 97110 THERAPEUTIC EXERCISES: CPT | Performed by: PHYSICAL THERAPIST

## 2025-03-17 NOTE — PROGRESS NOTES
Daily Note     Today's date: 3/17/2025  Patient name: Jesse France  : 1940  MRN: [id number]  Referring provider: Jayden Ruff MD  Dx: No diagnosis found.               Subjective: Pt reports he had a tough morning, wants to know why he isn't better.      Objective: See treatment diary below      Assessment: Pt is tolerating strengthening exercises fairly well. Pt is having more pain in his knees today, so having a much more difficult time with sit to stands today. Pt requires verbal cues and encouragement for completion of many exercises. Educated to move more at home with walking and exercises.      Plan: Continue per plan of care.      Precautions: fall risk; Lt TKA 25      Manuals 3/11 3/13 3/17          Lt knee Ext FH                                                   Neuro Re-Ed                                                                                                        Ther Ex             Standing Marches 20xea Red 20x ea           TKE standing 30x blue 30x blue 30x blue          LAQ 30x5'' 3# 30x5'' 3# 30x5'' 5#          Heel raises 30x 30x 30x          Standing Ham curls 30x 30x 30x           Hip ABD stand  30x 30x          Mini squats   2x10          Side stepping  3 laps           Bike 6' L3 6' L3 8' L2          Ther Activity             Sit to stands [place] mat 10x T/o session Very hard 2 foams  4x?          Step ups             Gait Training                                       Modalities

## 2025-03-20 ENCOUNTER — OFFICE VISIT (OUTPATIENT)
Dept: PHYSICAL THERAPY | Facility: CLINIC | Age: 85
End: 2025-03-20
Payer: MEDICARE

## 2025-03-20 DIAGNOSIS — R26.2 AMBULATORY DYSFUNCTION: ICD-10-CM

## 2025-03-20 DIAGNOSIS — R29.6 FALLS: Primary | ICD-10-CM

## 2025-03-20 DIAGNOSIS — Z96.652 TOTAL KNEE REPLACEMENT STATUS, LEFT: ICD-10-CM

## 2025-03-20 PROCEDURE — 97110 THERAPEUTIC EXERCISES: CPT

## 2025-03-20 NOTE — PROGRESS NOTES
Daily Note     Today's date: 3/20/2025  Patient name: Jesse France  : 1940  MRN: [id number]  Referring provider: Jayden Ruff MD  Dx:   Encounter Diagnosis     ICD-10-CM    1. Falls  R29.6       2. Total knee replacement status, left  Z96.652       3. Ambulatory dysfunction  R26.2           Start Time: 1101  Stop Time: 1140  Total time in clinic (min): 39 minutes    Subjective: Patient reports no new complaints.       Objective: See treatment diary below      Assessment: Patient completed listed exercises without complaint today. Added some resistance to some standing strengthening which was challenging and fatiguing. He continues to exhibit significant difficulty with STS transfers requiring strong UE support and elevated chair in order to ascend. Patient would benefit from continued PT to improve level of function.       Plan: Continue per POC. Increase reps/resistance as tolerated.      Precautions: fall risk; Lt TKA 25      Manuals 3/11 3/13 3/17 3/20         Lt knee Ext FH                                                                Neuro Re-Ed                                                                                                        Ther Ex             Standing Marches 20xea Red 20x ea           TKE standing 30x blue 30x blue 30x blue 30x blue         LAQ 30x5'' 3# 30x5'' 3# 30x5'' 5# 30x5'' 5#         Heel raises 30x 30x 30x 30x         Standing Ham curls 30x 30x 30x  2# 20x         Hip ABD stand  30x 30x 2# 20x         Mini squats   2x10 2x10         Side stepping  3 laps  2# 4 laps         Bike 6' L3 6' L3 8' L2 8' L2         Ther Activity             Sit to stands [place] mat 10x T/o session Very hard 2 foams  4x?          Step ups             Gait Training                                       Modalities

## 2025-03-25 ENCOUNTER — OFFICE VISIT (OUTPATIENT)
Dept: PHYSICAL THERAPY | Facility: CLINIC | Age: 85
End: 2025-03-25
Payer: MEDICARE

## 2025-03-25 DIAGNOSIS — Z96.652 TOTAL KNEE REPLACEMENT STATUS, LEFT: ICD-10-CM

## 2025-03-25 DIAGNOSIS — R26.2 AMBULATORY DYSFUNCTION: ICD-10-CM

## 2025-03-25 DIAGNOSIS — R29.6 FALLS: Primary | ICD-10-CM

## 2025-03-25 PROCEDURE — 97110 THERAPEUTIC EXERCISES: CPT

## 2025-03-25 NOTE — PROGRESS NOTES
"Daily Note     Today's date: 3/25/2025  Patient name: Jesse France  : 1940  MRN: [id number]  Referring provider: Jayden Ruff MD  Dx:   Encounter Diagnosis     ICD-10-CM    1. Falls  R29.6       2. Total knee replacement status, left  Z96.652       3. Ambulatory dysfunction  R26.2           Start Time: 1443  Stop Time: 1527  Total time in clinic (min): 44 minutes    Subjective: Patient reports his knees felt painful and sore when he woke up this morning.       Objective: See treatment diary below      Assessment: Patient completes exercises at a slow pace but tolerates them fairly well. Continues to have most difficulty performing STS due to pain and weakness. Patient would benefit from continued PT to improve level of function.       Plan: Continue per POC. Increase reps/resistance as tolerated.      Precautions: fall risk; Lt TKA 25      Manuals 3/11 3/13 3/17 3/20 3/25        Lt knee Ext FH                                                                Neuro Re-Ed                                                                                                        Ther Ex             Standing Marches 20xea Red 20x ea           TKE standing 30x blue 30x blue 30x blue 30x blue         LAQ 30x5'' 3# 30x5'' 3# 30x5'' 5# 30x5'' 5# 30x5'' 5#        Heel raises 30x 30x 30x 30x 30x        Standing Ham curls 30x 30x 30x  2# 20x 2# 20x        Hip ABD stand  30x 30x 2# 20x 2# 20x        Mini squats   2x10 2x10         Side stepping  3 laps  2# 4 laps 2# 4 laps        Bike 6' L3 6' L3 8' L2 8' L2 8' L2        Ther Activity             Sit to stands [place] mat 10x T/o session Very hard 2 foams  4x?  2 foam, 1 UE   5x        Step ups     4\" x10 ea        Gait Training                                       Modalities                                                  "

## 2025-03-27 ENCOUNTER — OFFICE VISIT (OUTPATIENT)
Dept: PHYSICAL THERAPY | Facility: CLINIC | Age: 85
End: 2025-03-27
Payer: MEDICARE

## 2025-03-27 DIAGNOSIS — R29.6 FALLS: Primary | ICD-10-CM

## 2025-03-27 DIAGNOSIS — Z96.652 TOTAL KNEE REPLACEMENT STATUS, LEFT: ICD-10-CM

## 2025-03-27 PROCEDURE — 97110 THERAPEUTIC EXERCISES: CPT | Performed by: PHYSICAL THERAPIST

## 2025-03-27 PROCEDURE — 97530 THERAPEUTIC ACTIVITIES: CPT | Performed by: PHYSICAL THERAPIST

## 2025-03-27 NOTE — PROGRESS NOTES
"Daily Note     Today's date: 3/27/2025  Patient name: Jesse France  : 1940  MRN: [id number]  Referring provider: Jayden Ruff MD  Dx:   Encounter Diagnosis     ICD-10-CM    1. Falls  R29.6       2. Total knee replacement status, left  Z96.652                      Subjective: Pt reports his left knee is painful today.      Objective: See treatment diary below      Assessment: Educated to walk 1-2x every hour at home. Pt is tolerating strengthening well. Continues to need encouragement to complete exercises and HEP.      Plan: Continue per plan of care.      Precautions: fall risk; Lt TKA 25      Manuals 3/11 3/13 3/17 3/20 3/25 3/27       Lt knee Ext FH                                                                Neuro Re-Ed                                                                                                        Ther Ex             Standing Marches 20xea Red 20x ea           TKE standing 30x blue 30x blue 30x blue 30x blue         LAQ 30x5'' 3# 30x5'' 3# 30x5'' 5# 30x5'' 5# 30x5'' 5#        Heel raises 30x 30x 30x 30x 30x 30x       Standing Ham curls 30x 30x 30x  2# 20x 2# 20x 30x 3#       Hip ABD stand  30x 30x 2# 20x 2# 20x 30x 3#       Mini squats   2x10 2x10         Side stepping  3 laps  2# 4 laps 2# 4 laps 5 laps 3#       Bike 6' L3 6' L3 8' L2 8' L2 8' L2 8' L2       Ther Activity             Sit to stands [place] mat 10x T/o session Very hard 2 foams  4x?  2 foam, 1 UE   5x 2 foam 6x       Step ups     4\" x10 ea 10xea 6''       Gait Training                                       Modalities                                                    "

## 2025-04-01 ENCOUNTER — OFFICE VISIT (OUTPATIENT)
Dept: PHYSICAL THERAPY | Facility: CLINIC | Age: 85
End: 2025-04-01
Payer: MEDICARE

## 2025-04-01 ENCOUNTER — OFFICE VISIT (OUTPATIENT)
Dept: CARDIOLOGY CLINIC | Facility: CLINIC | Age: 85
End: 2025-04-01
Payer: MEDICARE

## 2025-04-01 VITALS
DIASTOLIC BLOOD PRESSURE: 70 MMHG | OXYGEN SATURATION: 100 % | HEART RATE: 88 BPM | BODY MASS INDEX: 31.07 KG/M2 | WEIGHT: 242.1 LBS | SYSTOLIC BLOOD PRESSURE: 114 MMHG | HEIGHT: 74 IN

## 2025-04-01 DIAGNOSIS — N18.31 STAGE 3A CHRONIC KIDNEY DISEASE (HCC): ICD-10-CM

## 2025-04-01 DIAGNOSIS — R26.2 AMBULATORY DYSFUNCTION: ICD-10-CM

## 2025-04-01 DIAGNOSIS — Z96.652 TOTAL KNEE REPLACEMENT STATUS, LEFT: ICD-10-CM

## 2025-04-01 DIAGNOSIS — I43 CARDIAC AMYLOIDOSIS (HCC): ICD-10-CM

## 2025-04-01 DIAGNOSIS — I50.32 CHRONIC HEART FAILURE WITH PRESERVED EJECTION FRACTION (HCC): Primary | ICD-10-CM

## 2025-04-01 DIAGNOSIS — E85.4 CARDIAC AMYLOIDOSIS (HCC): ICD-10-CM

## 2025-04-01 DIAGNOSIS — R29.6 FALLS: Primary | ICD-10-CM

## 2025-04-01 DIAGNOSIS — I48.19 PERSISTENT ATRIAL FIBRILLATION (HCC): ICD-10-CM

## 2025-04-01 PROCEDURE — 97110 THERAPEUTIC EXERCISES: CPT

## 2025-04-01 PROCEDURE — 97530 THERAPEUTIC ACTIVITIES: CPT

## 2025-04-01 PROCEDURE — 99214 OFFICE O/P EST MOD 30 MIN: CPT | Performed by: INTERNAL MEDICINE

## 2025-04-01 PROCEDURE — G2211 COMPLEX E/M VISIT ADD ON: HCPCS | Performed by: INTERNAL MEDICINE

## 2025-04-01 RX ORDER — POTASSIUM CHLORIDE 1500 MG/1
20 TABLET, EXTENDED RELEASE ORAL AS NEEDED
Qty: 30 TABLET | Refills: 3 | Status: SHIPPED | OUTPATIENT
Start: 2025-04-01

## 2025-04-01 RX ORDER — FUROSEMIDE 40 MG/1
40 TABLET ORAL DAILY
COMMUNITY
Start: 2025-03-03 | End: 2025-04-01 | Stop reason: SDUPTHER

## 2025-04-01 RX ORDER — FUROSEMIDE 40 MG/1
20 TABLET ORAL DAILY
Qty: 30 TABLET | Refills: 5 | Status: SHIPPED | OUTPATIENT
Start: 2025-04-01

## 2025-04-01 NOTE — PROGRESS NOTES
"Daily Note     Today's date: 2025  Patient name: eJsse France  : 1940  MRN: [id number]  Referring provider: Jayden Ruff MD  Dx:   Encounter Diagnosis     ICD-10-CM    1. Falls  R29.6       2. Total knee replacement status, left  Z96.652       3. Ambulatory dysfunction  R26.2           Start Time: 1443  Stop Time: 1523  Total time in clinic (min): 40 minutes    Subjective: Patient reports no new complaints prior to PT session.       Objective: See treatment diary below      Assessment: Tolerates slow progression of TE program fairly well. Patient able to complete exercises with fewer rest breaks, but needs encouragement to do so. Patient would benefit from continued PT to improve level of function.       Plan: Continue per POC. Increase reps/resistance as tolerated.      Precautions: fall risk; Lt TKA 25      Manuals 3/11 3/13 3/17 3/20 3/25 3/27 4/1      Lt knee Ext FH                                                                Neuro Re-Ed                                                                                                        Ther Ex             Standing Marches 20xea Red 20x ea     3 laps      TKE standing 30x blue 30x blue 30x blue 30x blue         LAQ 30x5'' 3# 30x5'' 3# 30x5'' 5# 30x5'' 5# 30x5'' 5#        Heel raises 30x 30x 30x 30x 30x 30x 30x      Standing Ham curls 30x 30x 30x  2# 20x 2# 20x 30x 3# 30x 3#      Hip ABD stand  30x 30x 2# 20x 2# 20x 30x 3# 30x 3#      Mini squats   2x10 2x10   2x10      Side stepping  3 laps  2# 4 laps 2# 4 laps 5 laps 3# 5 laps 3#      Bike 6' L3 6' L3 8' L2 8' L2 8' L2 8' L2 8' L2      Ther Activity             Sit to stands [place] mat 10x T/o session Very hard 2 foams  4x?  2 foam, 1 UE   5x 2 foam 6x 2 foam 8x      Step ups     4\" x10 ea 10xea 6'' 12xea 6''      Gait Training                                       Modalities                                                      "

## 2025-04-01 NOTE — PATIENT INSTRUCTIONS
Decrease lasix to 20mg daily. Take additional 20mg as needed for weight gain of 3 lbs in a day or 5 lbs in 5-7 days.  Decrease potassium to 20 meq as needed when taking additional potassium  2g sodium diet   2L fluid restriction  Daily weights  Physical activities/walking as tolerated

## 2025-04-01 NOTE — PROGRESS NOTES
Advanced Heart Failure Outpatient Progress Note - Jesse JOAQUIN Figuli 84 y.o. male MRN: [id number]    Encounter: [id number]      Assessment/Plan:    Patient Active Problem List    Diagnosis Date Noted    Cellulitis of  left hand 03/03/2025    Chronic venous insufficiency of lower extremity 02/06/2025    Ambulatory dysfunction 02/03/2025    Urinary retention 02/03/2025    Osteoarthritis 02/03/2025    Acute pain 02/03/2025    Cognitive impairment 02/03/2025    Hearing loss 02/03/2025    At risk for delirium 02/03/2025    Fall 02/02/2025    History of total knee arthroplasty 02/02/2025    CKD (chronic kidney disease) 02/02/2025    Chronic deep vein thrombosis (DVT) of right lower extremity ([facility]) 02/02/2025    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[facility]) 03/21/2024    Chronic diastolic congestive heart failure ([facility]) 03/21/2024    Thrombocytopenia ([facility]) 09/14/2023    Multiple falls 09/12/2023    ESTER (obstructive sleep apnea) 09/12/2023    History of DVT (deep vein thrombosis) 09/12/2023    Atrial fibrillation ([facility]) 09/12/2023    Congestive heart failure (CHF) ([facility]) 09/12/2023    Generalized weakness 09/12/2023    Chronic venous hypertension (idiopathic) with ulcer of right lower extremity ([facility]) 09/12/2023    Elevated troponin 09/12/2023    Multiple abrasions 09/12/2023    Frailty 05/12/2023    Lower extremity cellulitis 04/24/2023    Stage 3a chronic kidney disease (CKD) ([facility]) 04/24/2023    Hyperbilirubinemia 04/24/2023    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 ([facility]) 02/14/2022    Bilateral leg edema 02/14/2022    Persistent atrial fibrillation ([facility]) 05/14/2020    Morbid obesity ([facility]) 03/07/2018    Lumbar stenosis 03/07/2018    Benign neoplasm of colon 02/07/2018    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    Internal hemorrhoids 02/07/2018    Pure hypercholesterolemia 02/07/2018    Sacroiliitis ([facility]) 02/01/2018    Bilateral hip pain  "12/04/2017    Adenomatous colon polyp 12/29/2015    Spinal stenosis of lumbar region 09/10/2015    DDD (degenerative disc disease), lumbar 07/07/2015    Lumbar disc herniation 06/22/2015    Radiculopathy, lumbar region 06/22/2015    MTHFR mutation 03/11/2015    Elevated hemoglobin A1c 06/05/2014    Premature atrial complexes 05/12/2014    Aortic ectasia (HCC) 03/06/2014    Obstructive sleep apnea 11/25/2013    Premature ventricular contraction 11/25/2013    Lichen planus 05/17/2012    HTN (hypertension) 04/04/2012    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  EKG to mass mismatch: yes.   Tc-PYP scan 04/29/2023: H/CL ratio 1.77. Grade 3 uptake. \"Strongly suggestive of TTR amyloidosis.\"  Genetic testing: negative  SPEP 04/2023: insufficient sample.   Free light chains 04/2023: kappa/lambda ratio 1.49.     Amyloid-specific Therapies:   --Tafamidis 61 mg daily.                # Chronic HFpEF; LVEF 65%; LVIDd 3.4 cm; NYHA II; ACC/AHA Stage C  Etiology: TTR amyloidosis.   Diuretic: Lasix 40 mg with potassium 40meq daily    Weight: 253 lbs 11/21/23; 246 3/21/24; 245 lbs 9/24/24; 244 lbs 1/10/25; 242 lbs 4/1/25    Studies:  TTE 07/22/2020: LVEF 60-70%. LVIDd 4 cm. IVSd 1.45 cm. Moderate concentric hypertrophy. Normal RV. HESHAM. Trace MR. Mild TR.   TTE 04/25/2023: LVEF 65%. LVIDd 3.4 cm. IVSd 1.6 cm. Normal RV size with low normal RVSF. HESHAM (R>L). Small PFO. Mild MR and TR. Trace AI. Normal IVC.   Pharm Nuc Stress 1/16/25: image artifact with no diagnostic perfusion abnormality; LVEF 54%     # Atrial fibrillation, persistent  [id number] = 6 (age, HF, HTN, DVT/TIA).  Anticoagulation on Coumadin.   Rate control: metoprolol succinate 25mg daily  Rhythm control: None.     # VT   Zio 11/7/23: 31 Ventricular Tachycardia runs occurred, the run with the fastest interval lasting " "13 beats with a max rate of 226 bpm, the longest lasting 15.1 secs with an avg rate of 107 bpm. Atrial Fibrillation occurred continuously (100% burden), ranging from  bpm (avg of 83 bpm). Isolated VEs were occasional (1.8%, 71011), VE Couplets were rare (<1.0%, 1028), and VE Triplets were rare (<1.0%, 203). Ventricular Bigeminy and Trigeminy were present. MD notification criteria for Rapid Atrial Fibrillation met - report posted prior to notification per account request (CP)   Increased metoprolol with VTs on Zio  Denies episodes of palpitations  Continue metoprolol as above    # Hypertension, controlled    # Chronic kidney disease, stage IIIa  Baseline creatinine of 1.3-1.5.  1.3 12/14/2023; 0.93 3/12/25  # Hyperlipidemia  # Obstructive sleep apnea: compliant with CPAP.   # Spinal stenosis, lumbar - s/p surgery in 2018  # Carpal tunnel syndrome  # MTHFR mutation  # History of DVT  # History of TIA  # History of tobacco abuse: quit in 1982.    TODAY'S PLAN:  Decrease lasix to 20mg daily. Take additional 20mg as needed for weight gain of 3 lbs in a day or 5 lbs in 5-7 days.  Decrease potassium to 20 meq as needed when taking additional potassium  2g sodium diet   2L fluid restriction  Daily weights  Physical activities/walking as tolerated      HPI:   Jesse France is an 84 y.o. male with PMH as above who is here for follow up.     05/08/2023 with TH: \"82 year old with PMH as above recently admitted with volume overload and celllulitis. Echocardiogram  concerning for cardiac amyloidosis. Underwent workup. SPEP, UPEP showed no monoclonal banding and serum free light chains showing mild elevation in Ig Kappa with normal Kappa Lambda fluid ratio. PYP scan highly suggestive of aTTR amyloid. He presents today for hospital follow up. Feeling deconditioned and worn out. Questioning why he is not to start his diuretic until 5/13. Having a lot of neuropathy in the lower extremities.      Will have patient begin taking " "Lasix 40 mg once daily today with Kdur 20 meq BID. Will check labs before the end of the week. Start application process for Tafamidis. Genetic testing to determine wild type vs familial.\"     05/23/2023: Patient presents with his wife for follow-up. Down 3 lbs since last visit. Reports great improvement in LE swelling since starting Lasix. Due to his active lifestyle and significant urinary response to Lasix, has only been taking Lasix and potassium 4-5 days a week. Continues with daily weights. Provided patient with \"Living With Heart Failure\" booklet and \"Don't Pass the Salt\" cookbook.     6/7/23: presents today for follow up. Started tafamidis on Thursday. Having some fatigue, which he thinks may be related to deconditioning. Improving. Some MANLEY, improving. No SOB at rest. No PND, orthopnea. Swelling has improved. Did have a blister on his right leg, dressed by wound care and has an appointment with them next week. Takes lasix about 3 days a week on days when he is home. Eating minimal salt, drinking under 2L. Urine output has been consistent. Weights stable at home.     8/21/23: Presents for follow up. Having shortness of breath on exertion. Overall leg swelling stable. Denies abdominal distension. Has only been taking lasix and potassium 2-3 times a depending on his schedule for the day. Mostly sedentary per wife. Does not weight himself daily, last 2 weights 247 and 250 lbs on home scale. Wife reports occasional dizziness. Last dose of lasix yesterday.    \"Presented to [facility] on 09/12/2023 s/p fall and being found \"facedown on the tile floor in his bathroom.\" In preceding days had been having more frequent falls at home per wife/family. That same day, had near fall backwards onto his wife when walking up stairs (live in split level). Patient unable to recall fall onto bathroom floor; unclear how long patient down before wife found patient. Received 500 mL IV fluids in ED. Trauma XR with \"Mild pulmonary " "vascular congestion.\" CT head and C-spine without acute abnormalities. CT c/a/p without acute injuries but did note \"mild patchy groundglass opacity scattered in the lungs with interlobular septal thickening suggestive of mild interstitial edema.\" BNP not checked. Intermittently noted to be in Afib with RVR on telemetry per ED notes. Given one dose IV Lasix, and HF team consulted. Felt to be euvolemic when examined, and recommended to take his outpatient Lasix more regularly as prescribed. Outpatient cardiac event monitor could be considered to evaluate for arrhythmogenic cause for falls.     10/6/23: Presents today for follow up. Feeling better after rehab, denies MANLEY, SOB, PND, orthopnea. Has been using his CPAP. Denies chest pain, dizziness, palpitations. Reports prior to fall, he felt generalized weakness and couldn't stand, but denies CP/dizziness/lightheadedness. Takes his Lasix 3-4 days a week when he's home. Urinates very well with current dose. Doesn't weigh himself every day; will start this again. Some leg swelling noted today, last took Lasix 2-3 days ago.\"    11/21/23: here for follow up. Zio showed 100% afib burden and episodes of VT up to 15 seconds. Metoprolol increased to 25mg BID. Denies palpitations. Occasional dizziness with a full flight of steps. Weight 242 lbs on home scale. Denies PND or orthopnea. Has been taking lasix 3x/week on average instead of daily.      3/21/24: Here for follow up.  12/14/2023 sodium 141 potassium 4.4 creatinine 1.3. Reporting mild shortness of breat. Leg swelling overall better. Denies chest pain, PND or orthopnea. No abdominal distension. Weight down, he is actively trying to lose weight. He is not taking lasix daily.    9/24/2024: Patient is here for follow-up.  He is mated impacted by left knee pain.  Considering left knee surgery has skin lesions that needs to heal per Ortho.  Denies leg swelling or abdominal distention weight overall has been stable.  He is on " Lasix as needed.  Denies shortness of breath or chest pain on current level of exertion.  No PND orthopnea.  He is taking medications as prescribed.  He is only active during physical therapy but mostly inactive at home due to fear of moving the wrong way and inducing knee pain.    1/10/25: Here for preoperative cardiovascular examination.  He denies shortness of breath or chest pain on current level of exertion.  Exertion limited by knee pain and has only been walking short distances.  Stable chronic bilateral lower extremity swelling. He denies palpitations,  dizziness or lightheadedness.    4/1/25: Here for follow up. Admitted 2/2/25 after mechanical fall.  Reports not using CPAP since discharge at that time.  Wife notes increased sleepiness during today and fatigue.  Patient also mainly dealing with short-term memory loss recently.  Denies chest pain or shortness of breath and current level of exertion.  No leg swelling, PND orthopnea.  Wife also concerned that patient gets up at night to urinate a lot.  Weight has overall been stable and wants to trial lower dose of diuretic.  Doing physical therapy.  Limited by knee pains.      Review of Systems   Constitutional:  Positive for fatigue. Negative for chills and fever.   HENT:  Negative for ear pain and sore throat.    Eyes:  Negative for pain and visual disturbance.   Respiratory:  Negative for cough and shortness of breath.    Cardiovascular:  Negative for chest pain, palpitations and leg swelling.   Gastrointestinal:  Negative for abdominal distention, abdominal pain and vomiting.   Genitourinary:  Negative for dysuria and hematuria.   Musculoskeletal:  Positive for arthralgias. Negative for back pain.   Skin:  Negative for color change and rash.   Neurological:  Negative for dizziness, seizures, syncope and light-headedness.   All other systems reviewed and are negative.      Past Medical History:   Diagnosis Date    Abnormal glucose level 02/07/2018    Acute  back pain 10/02/2017    Atrial fibrillation (HCC)     Blood clot in vein 2015    in right foot    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HCC) 04/2023    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HCC)     Carpal tunnel syndrome     Chronic kidney disease (CKD)     Fever 09/08/2017    Frailty syndrome in geriatric patient 05/12/2023    FUO (fever of unknown origin) 09/08/2017    Heart failure (HCC)     History of DVT (deep vein thrombosis)     History of tobacco abuse     quit in 1982    History of transient cerebral ischemia     Hypertension          Allergies   Allergen Reactions    Sildenafil Hives     Other reaction(s): severe congestion, cialis was OK    Gabapentin Confusion    Sildenafil Other (See Comments)     _     .    Current Outpatient Medications:     acetaminophen (TYLENOL) 500 mg tablet, Take 1,000 mg by mouth every 12 (twelve) hours Max 3g daily, Disp: , Rfl:     Cholecalciferol (VITAMIN D-3 PO), Take 2,000 Units by mouth daily, Disp: , Rfl:     folic acid (FOLVITE) 1 mg tablet, Take 2 mg by mouth daily  , Disp: , Rfl:     furosemide (LASIX) 40 mg tablet, Take 0.5 tablets (20 mg total) by mouth daily Take additional 0.5 tablet (20mg) as needed for weight gain of 3 lbs in a day or 5 lbs in 5-7 days., Disp: 30 tablet, Rfl: 5    metoprolol succinate (TOPROL-XL) 25 mg 24 hr tablet, Take 1 tablet (25 mg total) by mouth daily, Disp: , Rfl:     Multiple Vitamins-Minerals (MULTIVITAMIN & MINERAL PO), Take 1 tablet by mouth daily. Per Felipa Moore MD [facility] 10/2/23  Indications: supplement, Disp: , Rfl:     potassium chloride (Klor-Con M20) 20 mEq tablet, Take 1 tablet (20 mEq total) by mouth if needed (when taking additonal lasix), Disp: 30 tablet, Rfl: 3    Tafamidis Meglumine, Cardiac, (Vyndaqel) 20 MG CAPS, Take 80 mg by mouth in the morning Take 20mg 4 caps once daily, Disp: 120 capsule, Rfl: 12    warfarin (COUMADIN) 5 mg tablet, TAKE 1 TO 1 AND 1/2 TABLETS BY MOUTH DAILY AS DIRECTED BY PRESCRIBER, Disp: 125 tablet, Rfl: 2     lidocaine (LIDODERM) 5 %, Apply 1 patch topically over 12 hours daily Remove & Discard patch within 12 hours or as directed by MD (Patient not taking: Reported on 2025), Disp: , Rfl:     OLANZapine (ZyPREXA ZYDIS) 5 mg dispersible tablet, Take 0.5 tablets (2.5 mg total) by mouth daily at bedtime (Patient not taking: Reported on 2025), Disp: , Rfl:     warfarin (COUMADIN) 5 mg tablet, Take 1 tablet (5 mg total) by mouth daily Take 1 to 1 1/2 tabs by mouth daily or as directed by physician (Patient not taking: Reported on 2025), Disp: 45 tablet, Rfl: 11    Social History     Socioeconomic History    Marital status: /Civil Union     Spouse name: Not on file    Number of children: Not on file    Years of education: Not on file    Highest education level: Not on file   Occupational History    Not on file   Tobacco Use    Smoking status: Former     Current packs/day: 0.00     Average packs/day: 1 pack/day for 26.0 years (26.0 ttl pk-yrs)     Types: Cigarettes     Start date:      Quit date:      Years since quittin.2    Smokeless tobacco: Never   Vaping Use    Vaping status: Never Used   Substance and Sexual Activity    Alcohol use: Never     Comment: 4 drinks a week    Drug use: Never    Sexual activity: Not on file   Other Topics Concern    Not on file   Social History Narrative    ** Merged History Encounter **          Social Drivers of Health     Financial Resource Strain: Not At Risk (2025)    Received from [facility]    Financial Insecurity     In the last 12 months did you skip medications to save money?: No     In the last 12 months was there a time when you needed to see a doctor but could not because of cost?: No   Food Insecurity: Patient Declined (2025)    Nursing - Inadequate Food Risk Classification     Worried About Running Out of Food in the Last Year: Not on file     Ran Out of Food in the Last Year: Not on file     Ran Out of Food in the Last  Year: Patient declined   Transportation Needs: Patient Declined (2025)    Nursing - Transportation Risk Classification     Lack of Transportation: Not on file     Lack of Transportation: Patient declined   Physical Activity: Not on file   Stress: Not on file   Social Connections: Socially Integrated (2025)    [facility] from [facility]    Social Connection     Do you often feel lonely?: No   Intimate Partner Violence: Patient Declined (2025)    Nursing IPS     Feels Physically and Emotionally Safe: Not on file     Physically Hurt by Someone: Not on file     Humiliated or Emotionally Abused by Someone: Not on file     Physically Hurt by Someone: Patient declined     Hurt or Threatened by Someone: Patient declined   Housing Stability: Patient Declined (2025)    Nursing: Inadequate Housing Risk Classification     Has Housing: Not on file     Worried About Losing Housing: Not on file     Unable to Get Utilities: Not on file     Unable to Pay for Housing in the Last Year: Patient declined     Has Housin       Family History   Problem Relation Age of Onset    Heart attack Father     Heart disease Other     Cancer Other        Physical Exam:    Vitals:   Vitals:    25 1047   BP: 114/70   Pulse: 88   SpO2: 100%         Physical Exam  Constitutional:       General: He is not in acute distress.     Appearance: Normal appearance.   HENT:      Head: Normocephalic and atraumatic.      Mouth/Throat:      Mouth: Mucous membranes are moist.   Eyes:      General: No scleral icterus.     Extraocular Movements: Extraocular movements intact.   Neck:      Vascular: No JVD.   Cardiovascular:      Rate and Rhythm: Normal rate. Rhythm irregularly irregular.      Pulses: Normal pulses.      Heart sounds: S1 normal and S2 normal. No murmur heard.     No friction rub. No gallop.   Pulmonary:      Breath sounds: Normal breath sounds.   Abdominal:      General: There is no distension.      Palpations:  Abdomen is soft.      Tenderness: There is no abdominal tenderness. There is no guarding or rebound.   Musculoskeletal:         General: Normal range of motion.      Cervical back: Neck supple.      Right lower leg: No edema.      Left lower leg: No edema.   Skin:     General: Skin is warm and dry.      Capillary Refill: Capillary refill takes less than 2 seconds.   Neurological:      General: No focal deficit present.      Mental Status: He is alert and oriented to person, place, and time.   Psychiatric:         Mood and Affect: Mood normal.         Labs & Results:    Lab Results   Component Value Date    SODIUM 144 03/12/2025    K 4.1 03/12/2025     03/12/2025    CO2 35 (H) 03/12/2025    BUN 35 (H) 03/12/2025    CREATININE 0.93 03/12/2025    GLUC 118 03/12/2025    CALCIUM 9.4 03/12/2025     Lab Results   Component Value Date    WBC 9.92 03/12/2025    HGB 15.8 03/12/2025    HCT 48.9 03/12/2025     (H) 03/12/2025     03/12/2025     Lab Results   Component Value Date    NTBNP 1,904 (H) 05/16/2023      Lab Results   Component Value Date    CHOLESTEROL 167 12/14/2023    CHOLESTEROL 183 05/16/2023    CHOLESTEROL 194 12/01/2022     Lab Results   Component Value Date    HDL 70 12/14/2023    HDL 69 05/16/2023    HDL 74 12/01/2022     Lab Results   Component Value Date    TRIG 97 12/14/2023    TRIG 81 05/16/2023    TRIG 132 12/01/2022     Lab Results   Component Value Date    NONHDLC 120 12/01/2022    NONHDLC 116 05/23/2022    NONHDLC 103 01/22/2020       EKG personally reviewed by Cathy Woods MD.       Thank you for the opportunity to participate in the care of this patient.    CATHY WOODS MD  ADVANCED HEART FAILURE AND MECHANICAL CIRCULATORY SUPPORT  [facility]

## 2025-04-08 ENCOUNTER — OFFICE VISIT (OUTPATIENT)
Dept: PHYSICAL THERAPY | Facility: CLINIC | Age: 85
End: 2025-04-08
Payer: MEDICARE

## 2025-04-08 DIAGNOSIS — Z96.652 TOTAL KNEE REPLACEMENT STATUS, LEFT: ICD-10-CM

## 2025-04-08 DIAGNOSIS — R26.2 AMBULATORY DYSFUNCTION: ICD-10-CM

## 2025-04-08 DIAGNOSIS — R29.6 FALLS: Primary | ICD-10-CM

## 2025-04-08 PROCEDURE — 97110 THERAPEUTIC EXERCISES: CPT

## 2025-04-08 PROCEDURE — 97530 THERAPEUTIC ACTIVITIES: CPT

## 2025-04-08 NOTE — PROGRESS NOTES
"Daily Note     Today's date: 2025  Patient name: Jesse France  : 1940  MRN: [id number]  Referring provider: Jayden Ruff MD  Dx:   Encounter Diagnosis     ICD-10-CM    1. Falls  R29.6       2. Total knee replacement status, left  Z96.652       3. Ambulatory dysfunction  R26.2           Start Time: 1451  Stop Time: 1530  Total time in clinic (min): 39 minutes    Subjective: Patient reports no new complaints prior to PT session.       Objective: See treatment diary below      Assessment: Increased weight for some standing strengthening and also decreased chair height. Patient more motivated and agreeable to complete exercises during today's session. Patient would benefit from continued PT to improve level of function.       Plan: Continue per POC. Increase reps/resistance as tolerated.      Precautions: fall risk; Lt TKA 25      Manuals 3/11 3/13 3/17 3/20 3/25 3/27 4/1 4/8     Lt knee Ext FH                                                                Neuro Re-Ed                                                                                                        Ther Ex             Standing Marches 20xea Red 20x ea     3 laps      TKE standing 30x blue 30x blue 30x blue 30x blue         LAQ 30x5'' 3# 30x5'' 3# 30x5'' 5# 30x5'' 5# 30x5'' 5#        Heel raises 30x 30x 30x 30x 30x 30x 30x 30x     Standing Ham curls 30x 30x 30x  2# 20x 2# 20x 30x 3# 30x 3# 30x 4#     Hip ABD stand  30x 30x 2# 20x 2# 20x 30x 3# 30x 3# 30x 4#     Mini squats   2x10 2x10   2x10 2x10     Side stepping  3 laps  2# 4 laps 2# 4 laps 5 laps 3# 5 laps 3# 5 laps 4#     Bike 6' L3 6' L3 8' L2 8' L2 8' L2 8' L2 8' L2 8' L3     Ther Activity             Sit to stands [place] mat 10x T/o session Very hard 2 foams  4x?  2 foam, 1 UE   5x 2 foam 6x 2 foam 8x 1 foam  8x     Step ups     4\" x10 ea 10xea 6'' 12xea 6'' 16x ea 6\"     Gait Training                                       Modalities                                     "

## 2025-04-10 ENCOUNTER — OFFICE VISIT (OUTPATIENT)
Dept: PHYSICAL THERAPY | Facility: CLINIC | Age: 85
End: 2025-04-10
Payer: MEDICARE

## 2025-04-10 DIAGNOSIS — R26.2 AMBULATORY DYSFUNCTION: ICD-10-CM

## 2025-04-10 DIAGNOSIS — R29.6 FALLS: Primary | ICD-10-CM

## 2025-04-10 DIAGNOSIS — Z96.652 TOTAL KNEE REPLACEMENT STATUS, LEFT: ICD-10-CM

## 2025-04-10 PROCEDURE — 97530 THERAPEUTIC ACTIVITIES: CPT

## 2025-04-10 PROCEDURE — 97110 THERAPEUTIC EXERCISES: CPT

## 2025-04-10 NOTE — PROGRESS NOTES
"Daily Note     Today's date: 4/10/2025  Patient name: Jesse France  : 1940  MRN: [id number]  Referring provider: Jayden Ruff MD  Dx:   Encounter Diagnosis     ICD-10-CM    1. Falls  R29.6       2. Total knee replacement status, left  Z96.652       3. Ambulatory dysfunction  R26.2           Start Time: 1400  Stop Time: 1441  Total time in clinic (min): 41 minutes    Subjective: \"My knees are feeling better\".       Objective: See treatment diary below      Assessment: Sit to stands are still the most challenging intervention but demonstrates better tolerance. Added lateral step ups today to further challenge quad/gluteal strength. Patient would benefit from continued PT to improve level of function.       Plan: Continue per POC. Increase reps/resistance as tolerated.      Precautions: fall risk; Lt TKA 25      Manuals 3/11 3/13 3/17 3/20 3/25 3/27 4/1 4/8 4/10    Lt knee Ext FH                                                                Neuro Re-Ed                                                                                                        Ther Ex             Standing Marches 20xea Red 20x ea     3 laps      TKE standing 30x blue 30x blue 30x blue 30x blue         LAQ 30x5'' 3# 30x5'' 3# 30x5'' 5# 30x5'' 5# 30x5'' 5#        Heel raises 30x 30x 30x 30x 30x 30x 30x 30x 30x    Standing Ham curls 30x 30x 30x  2# 20x 2# 20x 30x 3# 30x 3# 30x 4# 30x 4#    Hip ABD stand  30x 30x 2# 20x 2# 20x 30x 3# 30x 3# 30x 4# 30x 4#    Mini squats   2x10 2x10   2x10 2x10 2x10    Side stepping  3 laps  2# 4 laps 2# 4 laps 5 laps 3# 5 laps 3# 5 laps 4# 5 laps 4#    Bike 6' L3 6' L3 8' L2 8' L2 8' L2 8' L2 8' L2 8' L3 8' L3    Ther Activity             Sit to stands [place] mat 10x T/o session Very hard 2 foams  4x?  2 foam, 1 UE   5x 2 foam 6x 2 foam 8x 1 foam  8x 1 foam  10x    Step ups     4\" x10 ea 10xea 6'' 12xea 6'' 16x ea 6\" Fwd 17x ea 6\"; lat 11x ea    Gait Training                                     "   Modalities

## 2025-04-15 ENCOUNTER — OFFICE VISIT (OUTPATIENT)
Dept: PHYSICAL THERAPY | Facility: CLINIC | Age: 85
End: 2025-04-15
Payer: MEDICARE

## 2025-04-15 DIAGNOSIS — Z96.652 TOTAL KNEE REPLACEMENT STATUS, LEFT: ICD-10-CM

## 2025-04-15 DIAGNOSIS — R29.6 FALLS: Primary | ICD-10-CM

## 2025-04-15 DIAGNOSIS — R26.2 AMBULATORY DYSFUNCTION: ICD-10-CM

## 2025-04-15 PROCEDURE — 97110 THERAPEUTIC EXERCISES: CPT

## 2025-04-15 PROCEDURE — 97530 THERAPEUTIC ACTIVITIES: CPT

## 2025-04-15 NOTE — PROGRESS NOTES
Daily Note     Today's date: 4/15/2025  Patient name: Jesse France  : 1940  MRN: [id number]  Referring provider: Jayden Ruff MD  Dx:   Encounter Diagnosis     ICD-10-CM    1. Falls  R29.6       2. Total knee replacement status, left  Z96.652       3. Ambulatory dysfunction  R26.2           Start Time: 1600  Stop Time: 1642  Total time in clinic (min): 42 minutes    Subjective: Patient reports his knees are sore from doing household chores over the weekend.       Objective: See treatment diary below      Assessment: Patient tolerating exercises fairly well. Increased ankle weights for standing exercises and performed step ups from a higher step height. Continues to require education on importance of movement daily and to increase frequency of short walks around home. Patient would benefit from continued PT to improve level of function.       Plan: Continue per POC. Increase reps/resistance as tolerated.      Precautions: fall risk; Lt TKA 25      Manuals 3/11 3/13 3/17 3/20 3/25 3/27 4/1 4/8 4/10 4/15   Lt knee Ext FH                                                                Neuro Re-Ed                                                                                                        Ther Ex             Standing Marches 20xea Red 20x ea     3 laps   3  laps   TKE standing 30x blue 30x blue 30x blue 30x blue         LAQ 30x5'' 3# 30x5'' 3# 30x5'' 5# 30x5'' 5# 30x5'' 5#        Heel raises 30x 30x 30x 30x 30x 30x 30x 30x 30x 30x   Standing Ham curls 30x 30x 30x  2# 20x 2# 20x 30x 3# 30x 3# 30x 4# 30x 4# 20x 5#   Hip ABD stand  30x 30x 2# 20x 2# 20x 30x 3# 30x 3# 30x 4# 30x 4# 20x 5#   Mini squats   2x10 2x10   2x10 2x10 2x10 2x11   Side stepping  3 laps  2# 4 laps 2# 4 laps 5 laps 3# 5 laps 3# 5 laps 4# 5 laps 4# 5 laps 5#   Bike 6' L3 6' L3 8' L2 8' L2 8' L2 8' L2 8' L2 8' L3 8' L3 8' L3   Ther Activity             Sit to stands [place] mat 10x T/o session Very hard 2 foams  4x?  2 foam, 1 UE  "  5x 2 foam 6x 2 foam 8x 1 foam  8x 1 foam  10x 1 foam  11x   Step ups     4\" x10 ea 10xea 6'' 12xea 6'' 16x ea 6\" Fwd 17x ea 6\"; lat 11x ea Fwd 8\"x11 ea; Lat 12 ea   Gait Training                                       Modalities                                                            "

## 2025-04-17 ENCOUNTER — OFFICE VISIT (OUTPATIENT)
Dept: PHYSICAL THERAPY | Facility: CLINIC | Age: 85
End: 2025-04-17
Payer: MEDICARE

## 2025-04-17 DIAGNOSIS — R29.6 FALLS: Primary | ICD-10-CM

## 2025-04-17 DIAGNOSIS — Z96.652 TOTAL KNEE REPLACEMENT STATUS, LEFT: ICD-10-CM

## 2025-04-17 DIAGNOSIS — R26.2 AMBULATORY DYSFUNCTION: ICD-10-CM

## 2025-04-17 PROCEDURE — 97530 THERAPEUTIC ACTIVITIES: CPT

## 2025-04-17 PROCEDURE — 97110 THERAPEUTIC EXERCISES: CPT

## 2025-04-17 NOTE — PROGRESS NOTES
"Daily Note     Today's date: 2025  Patient name: Jesse France  : 1940  MRN: [id number]  Referring provider: Jayden Ruff MD  Dx:   Encounter Diagnosis     ICD-10-CM    1. Falls  R29.6       2. Total knee replacement status, left  Z96.652       3. Ambulatory dysfunction  R26.2           Start Time: 1445  Stop Time: 1527  Total time in clinic (min): 42 minutes    Subjective: Patient reports no new complaints.       Objective: See treatment diary below      Assessment: Patient is tolerating strengthening exercises well. Progressed some repetitions and added ankle weights which was appropriately challenging. Continue to progress as able.       Plan: Continue per POC. Increase reps/resistance as tolerated.      Precautions: fall risk; Lt TKA 25      Manuals 3/11 3/13 3/17 3/20 3/25 3/27 4/1 4/8 4/10 4/15 4/17   Lt knee Ext FH                                                                     Neuro Re-Ed                                                                                                                Ther Ex              Standing Marches 20xea Red 20x ea     3 laps   3  laps 5# 3 laps   TKE standing 30x blue 30x blue 30x blue 30x blue          LAQ 30x5'' 3# 30x5'' 3# 30x5'' 5# 30x5'' 5# 30x5'' 5#         Heel raises 30x 30x 30x 30x 30x 30x 30x 30x 30x 30x 32x   Standing Ham curls 30x 30x 30x  2# 20x 2# 20x 30x 3# 30x 3# 30x 4# 30x 4# 20x 5# 32x 5#   Hip ABD stand  30x 30x 2# 20x 2# 20x 30x 3# 30x 3# 30x 4# 30x 4# 20x 5# 32x 5#   Mini squats   2x10 2x10   2x10 2x10 2x10 2x11    Side stepping  3 laps  2# 4 laps 2# 4 laps 5 laps 3# 5 laps 3# 5 laps 4# 5 laps 4# 5 laps 5# 5 laps 5#   Bike 6' L3 6' L3 8' L2 8' L2 8' L2 8' L2 8' L2 8' L3 8' L3 8' L3 8' L3   Ther Activity              Sit to stands [place] mat 10x T/o session Very hard 2 foams  4x?  2 foam, 1 UE   5x 2 foam 6x 2 foam 8x 1 foam  8x 1 foam  10x 1 foam  11x    Step ups     4\" x10 ea 10xea 6'' 12xea 6'' 16x ea 6\" Fwd 17x ea 6\"; lat " "11x ea Fwd 8\"x11 ea; Lat 12 ea Fwd 8\"x12 ea; Lat 12 ea   Gait Training                                          Modalities                                                                 "

## 2025-04-22 ENCOUNTER — OFFICE VISIT (OUTPATIENT)
Dept: PHYSICAL THERAPY | Facility: CLINIC | Age: 85
End: 2025-04-22
Payer: MEDICARE

## 2025-04-22 DIAGNOSIS — R26.2 AMBULATORY DYSFUNCTION: ICD-10-CM

## 2025-04-22 DIAGNOSIS — Z96.652 TOTAL KNEE REPLACEMENT STATUS, LEFT: ICD-10-CM

## 2025-04-22 DIAGNOSIS — R29.6 FALLS: Primary | ICD-10-CM

## 2025-04-22 PROCEDURE — 97110 THERAPEUTIC EXERCISES: CPT

## 2025-04-22 PROCEDURE — 97530 THERAPEUTIC ACTIVITIES: CPT

## 2025-04-22 NOTE — PROGRESS NOTES
"Daily Note     Today's date: 2025  Patient name: Jesse France  : 1940  MRN: [id number]  Referring provider: Jayden Ruff MD  Dx:   Encounter Diagnosis     ICD-10-CM    1. Falls  R29.6       2. Total knee replacement status, left  Z96.652       3. Ambulatory dysfunction  R26.2           Start Time: 1446          Subjective: Patient reports his knees are hurting today.       Objective: See treatment diary below      Assessment: Patient challenged by decreased upper extremity support with standing exercises. Also demonstrated difficulty balancing with a NBOS, required frequent utilization of bar for correction. Needed to increase seat height for sit to stands due to more knee pain today. Patient would benefit from continued PT to improve level of function.       Plan: Continue per POC. Increase reps/resistance as tolerated.      Precautions: fall risk; Lt TKA 25      Manuals 3/11 3/13 3/17 3/20 3/25 3/27 4/1 4/8 4/10 4/15 4/17 4/22   Lt knee Ext FH                                                                          Neuro Re-Ed            30\"x2   NBOS foam               Tandem stance            30\"x1ea                                                                              Ther Ex               Standing Marches 20xea Red 20x ea     3 laps   3  laps 5# 3 laps 5# 3 laps   TKE standing 30x blue 30x blue 30x blue 30x blue           LAQ 30x5'' 3# 30x5'' 3# 30x5'' 5# 30x5'' 5# 30x5'' 5#          Heel raises 30x 30x 30x 30x 30x 30x 30x 30x 30x 30x 32x 30x   Standing Ham curls 30x 30x 30x  2# 20x 2# 20x 30x 3# 30x 3# 30x 4# 30x 4# 20x 5# 32x 5# 1 UE 5# 30x   Hip ABD stand  30x 30x 2# 20x 2# 20x 30x 3# 30x 3# 30x 4# 30x 4# 20x 5# 32x 5# 1 UE 30x 5#   Mini squats   2x10 2x10   2x10 2x10 2x10 2x11  2x10   Side stepping  3 laps  2# 4 laps 2# 4 laps 5 laps 3# 5 laps 3# 5 laps 4# 5 laps 4# 5 laps 5# 5 laps 5# 5 laps 5#   Bike 6' L3 6' L3 8' L2 8' L2 8' L2 8' L2 8' L2 8' L3 8' L3 8' L3 8' L3 8' L3 " "  Ther Activity               Sit to stands [place] mat 10x T/o session Very hard 2 foams  4x?  2 foam, 1 UE   5x 2 foam 6x 2 foam 8x 1 foam  8x 1 foam  10x 1 foam  11x  2 foam 12x   Step ups     4\" x10 ea 10xea 6'' 12xea 6'' 16x ea 6\" Fwd 17x ea 6\"; lat 11x ea Fwd 8\"x11 ea; Lat 12 ea Fwd 8\"x12 ea; Lat 12 ea    Gait Training                                             Modalities                                                                      "

## 2025-04-24 ENCOUNTER — EVALUATION (OUTPATIENT)
Dept: PHYSICAL THERAPY | Facility: CLINIC | Age: 85
End: 2025-04-24
Payer: MEDICARE

## 2025-04-24 DIAGNOSIS — R29.6 FALLS: Primary | ICD-10-CM

## 2025-04-24 DIAGNOSIS — Z96.652 TOTAL KNEE REPLACEMENT STATUS, LEFT: ICD-10-CM

## 2025-04-24 DIAGNOSIS — R26.2 AMBULATORY DYSFUNCTION: ICD-10-CM

## 2025-04-24 PROCEDURE — 97164 PT RE-EVAL EST PLAN CARE: CPT | Performed by: PHYSICAL THERAPIST

## 2025-04-24 PROCEDURE — 97110 THERAPEUTIC EXERCISES: CPT | Performed by: PHYSICAL THERAPIST

## 2025-04-24 NOTE — PROGRESS NOTES
"Daily Note/Progress Note     Today's date: 2025  Patient name: Jesse France  : 1940  MRN: [id number]  Referring provider: Jayden Ruff MD  Dx:   Encounter Diagnosis     ICD-10-CM    1. Falls  R29.6       2. Total knee replacement status, left  Z96.652       3. Ambulatory dysfunction  R26.2                      Subjective:       Objective: See treatment diary below    Objective     Passive Range of Motion   Left Knee   Flexion: 125 degrees   Extension: -8 degrees     Strength/Myotome Testing     Left Knee   Flexion: 4+  Extension: 4+    Right Knee   Flexion: 4+  Extension: 4+    Additional Strength Details  Hip 4-/5 in all directions b/l    Assessment: Pt is showing good progress with quad and hamstring strength but hip remain limited. Pt's rom also remains unchanged. Pt would continue to benefit from further strengthening. Long term, pt is looking at skilled maintenance due to continued fall risk.    Goals  1. Pt will be independent with HEP upon discharge. Progressing  2. Pt will improve Lt knee rom to 3-125 degrees. Not Met  3. Pt will improve Lt knee strength to 4/5 to be able to stand from a chair without difficulty. Progressing  4. Pt will report no falls. Met    Plan: Continue per plan of care.      Precautions: fall risk; Lt TKA 25      Manuals 4/24   3/20 3/25 3/27 4/1 4/8 4/10 4/15 4/17 4/22   Lt knee Ext FH                                                                          Neuro Re-Ed            30\"x2   NBOS foam               Tandem stance            30\"x1ea                                                                              Ther Ex               Standing Marches 3 laps 5#      3 laps   3  laps 5# 3 laps 5# 3 laps   TKE standing    30x blue           LAQ    30x5'' 5# 30x5'' 5#          Heel raises 31x   30x 30x 30x 30x 30x 30x 30x 32x 30x   Standing Ham curls 1 UE 5# 30x   2# 20x 2# 20x 30x 3# 30x 3# 30x 4# 30x 4# 20x 5# 32x 5# 1 UE 5# 30x   Hip ABD stand 1 UE 30x 5# " "  2# 20x 2# 20x 30x 3# 30x 3# 30x 4# 30x 4# 20x 5# 32x 5# 1 UE 30x 5#   Mini squats    2x10   2x10 2x10 2x10 2x11  2x10   Side stepping 5 laps 5#   2# 4 laps 2# 4 laps 5 laps 3# 5 laps 3# 5 laps 4# 5 laps 4# 5 laps 5# 5 laps 5# 5 laps 5#   Bike 8' L3   8' L2 8' L2 8' L2 8' L2 8' L3 8' L3 8' L3 8' L3 8' L3   Ther Activity               Sit to stands 2 foam 13x    2 foam, 1 UE   5x 2 foam 6x 2 foam 8x 1 foam  8x 1 foam  10x 1 foam  11x  2 foam 12x   Step ups Fwd 8\"x13 ea; Lat 13 ea    4\" x10 ea 10xea 6'' 12xea 6'' 16x ea 6\" Fwd 17x ea 6\"; lat 11x ea Fwd 8\"x11 ea; Lat 12 ea Fwd 8\"x12 ea; Lat 12 ea    Gait Training                                             Modalities                                                                        "

## 2025-04-29 ENCOUNTER — OFFICE VISIT (OUTPATIENT)
Dept: PHYSICAL THERAPY | Facility: CLINIC | Age: 85
End: 2025-04-29
Payer: MEDICARE

## 2025-04-29 DIAGNOSIS — Z96.652 TOTAL KNEE REPLACEMENT STATUS, LEFT: ICD-10-CM

## 2025-04-29 DIAGNOSIS — R26.2 AMBULATORY DYSFUNCTION: ICD-10-CM

## 2025-04-29 DIAGNOSIS — R29.6 FALLS: Primary | ICD-10-CM

## 2025-04-29 PROCEDURE — 97110 THERAPEUTIC EXERCISES: CPT | Performed by: PHYSICAL THERAPIST

## 2025-04-29 PROCEDURE — 97530 THERAPEUTIC ACTIVITIES: CPT | Performed by: PHYSICAL THERAPIST

## 2025-04-29 NOTE — PROGRESS NOTES
"Daily Note     Today's date: 2025  Patient name: Jesse France  : 1940  MRN: [id number]  Referring provider: Jayden Ruff MD  Dx:   Encounter Diagnosis     ICD-10-CM    1. Falls  R29.6       2. Total knee replacement status, left  Z96.652       3. Ambulatory dysfunction  R26.2                      Subjective: Pt offers \"don't get old\" when asked how he was feeling today.       Objective: See treatment diary below      Assessment: Patient fatigues easily and requires frequent rest breaks. Step ups completed on stairs with bilateral handrails.       Plan: Continue per plan of care.      Precautions: fall risk; Lt TKA 25      Manuals 4/24 4/29  3/20 3/25 3/27 4/1 4/8 4/10 4/15 4/17 4/22   Lt knee Ext FH KT                                                                         Neuro Re-Ed            30\"x2   NBOS foam               Tandem stance            30\"x1ea                                                                              Ther Ex               Standing Marches 3 laps 5# 5 laps 5#     3 laps   3  laps 5# 3 laps 5# 3 laps   TKE standing    30x blue           LAQ    30x5'' 5# 30x5'' 5#          Heel raises 31x 30x  30x 30x 30x 30x 30x 30x 30x 32x 30x   Standing Ham curls 1 UE 5# 30x 1 UE 5# 30x  2# 20x 2# 20x 30x 3# 30x 3# 30x 4# 30x 4# 20x 5# 32x 5# 1 UE 5# 30x   Hip ABD stand 1 UE 30x 5# 1 UE 30x 5#  2# 20x 2# 20x 30x 3# 30x 3# 30x 4# 30x 4# 20x 5# 32x 5# 1 UE 30x 5#   Mini squats    2x10   2x10 2x10 2x10 2x11  2x10   Side stepping 5 laps 5# 5 laps 5#  2# 4 laps 2# 4 laps 5 laps 3# 5 laps 3# 5 laps 4# 5 laps 4# 5 laps 5# 5 laps 5# 5 laps 5#   Bike 8' L3 8' L3  8' L2 8' L2 8' L2 8' L2 8' L3 8' L3 8' L3 8' L3 8' L3   Ther Activity               Sit to stands 2 foam 13x 2 foam 13x   2 foam, 1 UE   5x 2 foam 6x 2 foam 8x 1 foam  8x 1 foam  10x 1 foam  11x  2 foam 12x   Step ups Fwd 8\"x13 ea; Lat 13 ea Fwd 7\"x13 ea; Lat 13 ea   4\" x10 ea 10xea 6'' 12xea 6'' 16x ea 6\" Fwd 17x ea 6\"; lat " "11x ea Fwd 8\"x11 ea; Lat 12 ea Fwd 8\"x12 ea; Lat 12 ea    Gait Training                                             Modalities                                                                          "

## 2025-05-01 ENCOUNTER — OFFICE VISIT (OUTPATIENT)
Dept: PHYSICAL THERAPY | Facility: CLINIC | Age: 85
End: 2025-05-01
Payer: MEDICARE

## 2025-05-01 DIAGNOSIS — Z96.652 TOTAL KNEE REPLACEMENT STATUS, LEFT: ICD-10-CM

## 2025-05-01 DIAGNOSIS — R26.2 AMBULATORY DYSFUNCTION: ICD-10-CM

## 2025-05-01 DIAGNOSIS — R29.6 FALLS: Primary | ICD-10-CM

## 2025-05-01 PROCEDURE — 97110 THERAPEUTIC EXERCISES: CPT

## 2025-05-01 PROCEDURE — 97530 THERAPEUTIC ACTIVITIES: CPT

## 2025-05-01 NOTE — PROGRESS NOTES
"Daily Note     Today's date: 2025  Patient name: Jesse France  : 1940  MRN: [id number]  Referring provider: Jayden Ruff MD  Dx:   Encounter Diagnosis     ICD-10-CM    1. Falls  R29.6       2. Total knee replacement status, left  Z96.652       3. Ambulatory dysfunction  R26.2           Start Time: 1400  Stop Time: 1440  Total time in clinic (min): 40 minutes    Subjective: Patient  reports no new changes.      Objective: See treatment diary below      Assessment: Patient is tolerating all exercises well. Cueing required to slightly improve squat and STS form noting kyphotic movements. Strong B/l upper extremity assistance needed to ascend on step. Patient would benefit from continued PT to improve level of function.       Plan: Continue per POC. Increase reps/resistance as tolerated.      Precautions: fall risk; Lt TKA 25      Manuals 4/24 4/29 5/1 3/20 3/25 3/27 4/1 4/8 4/10 4/15 4/17 4/22   Lt knee Ext FH KT                                                                         Neuro Re-Ed            30\"x2   NBOS foam               Tandem stance            30\"x1ea                                                                              Ther Ex               Standing Marches 3 laps 5# 5 laps 5# 5 laps 5#    3 laps   3  laps 5# 3 laps 5# 3 laps   TKE standing    30x blue           LAQ    30x5'' 5# 30x5'' 5#          Heel raises 31x 30x 30x 30x 30x 30x 30x 30x 30x 30x 32x 30x   Standing Ham curls 1 UE 5# 30x 1 UE 5# 30x 1 UE 5# 30x\ 2# 20x 2# 20x 30x 3# 30x 3# 30x 4# 30x 4# 20x 5# 32x 5# 1 UE 5# 30x   Hip ABD stand 1 UE 30x 5# 1 UE 30x 5# 1 UE 30x 5# 2# 20x 2# 20x 30x 3# 30x 3# 30x 4# 30x 4# 20x 5# 32x 5# 1 UE 30x 5#   Mini squats   3x10 2x10   2x10 2x10 2x10 2x11  2x10   Side stepping 5 laps 5# 5 laps 5# 5 laps 5# 2# 4 laps 2# 4 laps 5 laps 3# 5 laps 3# 5 laps 4# 5 laps 4# 5 laps 5# 5 laps 5# 5 laps 5#   Bike 8' L3 8' L3 8' L3 8' L2 8' L2 8' L2 8' L2 8' L3 8' L3 8' L3 8' L3 8' L3   Ther " "Activity               Sit to stands 2 foam 13x 2 foam 13x 2 foam 13x  2 foam, 1 UE   5x 2 foam 6x 2 foam 8x 1 foam  8x 1 foam  10x 1 foam  11x  2 foam 12x   Step ups Fwd 8\"x13 ea; Lat 13 ea Fwd 7\"x13 ea; Lat 13 ea Fwd 7\"x13 ea; Lat 13 ea  4\" x10 ea 10xea 6'' 12xea 6'' 16x ea 6\" Fwd 17x ea 6\"; lat 11x ea Fwd 8\"x11 ea; Lat 12 ea Fwd 8\"x12 ea; Lat 12 ea    Gait Training                                             Modalities                                                                            "

## 2025-05-07 ENCOUNTER — OFFICE VISIT (OUTPATIENT)
Dept: PHYSICAL THERAPY | Facility: CLINIC | Age: 85
End: 2025-05-07
Payer: MEDICARE

## 2025-05-07 DIAGNOSIS — R29.6 FALLS: Primary | ICD-10-CM

## 2025-05-07 DIAGNOSIS — Z96.652 TOTAL KNEE REPLACEMENT STATUS, LEFT: ICD-10-CM

## 2025-05-07 DIAGNOSIS — R26.2 AMBULATORY DYSFUNCTION: ICD-10-CM

## 2025-05-07 PROCEDURE — 97530 THERAPEUTIC ACTIVITIES: CPT | Performed by: PHYSICAL THERAPIST

## 2025-05-07 PROCEDURE — 97110 THERAPEUTIC EXERCISES: CPT | Performed by: PHYSICAL THERAPIST

## 2025-05-07 NOTE — PROGRESS NOTES
"Daily Note     Today's date: 2025  Patient name: Jesse France  : 1940  MRN: [id number]  Referring provider: Jayden Ruff MD  Dx:   Encounter Diagnosis     ICD-10-CM    1. Falls  R29.6       2. Total knee replacement status, left  Z96.652       3. Ambulatory dysfunction  R26.2           Start Time: 1445  Stop Time: 1530  Total time in clinic (min): 45 minutes    Subjective: Patient offers no complaints or adverse effects since LV.       Objective: See treatment diary below  Skin integrity: wound on L shin where fluid filled blister recently popped. Second fluid-filled blister intact in similar region. No visible signs of infection and he denies fever, chills.     Assessment: Patient continues to tolerate treatment well with good lower extremity muscle fatigue achieved by end of session without onset of knee pain. SOB present throughout session, but patient did not require extended recovery periods to complete activity and no evidence of functional decline demonstrated as session progressed.        Plan: Continue per plan of care.      Precautions: fall risk; Lt TKA 25      Manuals 4/24 4/29 5/1 5/7  3/27 4/1 4/8 4/10 4/15 4/17 4/22   Lt knee Ext FH KT                                                                         Neuro Re-Ed            30\"x2   NBOS foam               Tandem stance            30\"x1ea                                                                              Ther Ex               Standing Marches 3 laps 5# 5 laps 5# 5 laps 5# 5 laps 5#   3 laps   3  laps 5# 3 laps 5# 3 laps   TKE standing               LAQ               Heel raises 31x 30x 30x 30x  30x 30x 30x 30x 30x 32x 30x   Standing Ham curls 1 UE 5# 30x 1 UE 5# 30x 1 UE 5# 30x\ 1 UE 5# 30x  30x 3# 30x 3# 30x 4# 30x 4# 20x 5# 32x 5# 1 UE 5# 30x   Hip ABD stand 1 UE 30x 5# 1 UE 30x 5# 1 UE 30x 5# 1 UE 30   30x 3# 30x 3# 30x 4# 30x 4# 20x 5# 32x 5# 1 UE 30x 5#   Mini squats   3x10 3x10   2x10 2x10 2x10 2x11  2x10 " "  Side stepping 5 laps 5# 5 laps 5# 5 laps 5# 5 laps 5#  5 laps 3# 5 laps 3# 5 laps 4# 5 laps 4# 5 laps 5# 5 laps 5# 5 laps 5#   Bike 8' L3 8' L3 8' L3 8' L3  8' L2 8' L2 8' L3 8' L3 8' L3 8' L3 8' L3   Ther Activity               Sit to stands 2 foam 13x 2 foam 13x 2 foam 13x 2 foam 13x  2 foam 6x 2 foam 8x 1 foam  8x 1 foam  10x 1 foam  11x  2 foam 12x   Step ups Fwd 8\"x13 ea; Lat 13 ea Fwd 7\"x13 ea; Lat 13 ea Fwd 7\"x13 ea; Lat 13 ea 7\"x13 ea fwd and lat  10xea 6'' 12xea 6'' 16x ea 6\" Fwd 17x ea 6\"; lat 11x ea Fwd 8\"x11 ea; Lat 12 ea Fwd 8\"x12 ea; Lat 12 ea    Gait Training                                             Modalities                                                                              "

## 2025-05-09 ENCOUNTER — APPOINTMENT (OUTPATIENT)
Dept: LAB | Facility: CLINIC | Age: 85
End: 2025-05-09
Attending: INTERNAL MEDICINE
Payer: MEDICARE

## 2025-05-09 DIAGNOSIS — I48.19 PERSISTENT ATRIAL FIBRILLATION (HCC): ICD-10-CM

## 2025-05-09 LAB
INR PPP: 1.94 (ref 0.85–1.19)
PROTHROMBIN TIME: 22.2 SECONDS (ref 12.3–15)

## 2025-05-09 PROCEDURE — 85610 PROTHROMBIN TIME: CPT

## 2025-05-09 PROCEDURE — 36415 COLL VENOUS BLD VENIPUNCTURE: CPT

## 2025-05-12 ENCOUNTER — ANTICOAG VISIT (OUTPATIENT)
Dept: CARDIOLOGY CLINIC | Facility: CLINIC | Age: 85
End: 2025-05-12

## 2025-05-12 DIAGNOSIS — I48.19 PERSISTENT ATRIAL FIBRILLATION (HCC): Primary | ICD-10-CM

## 2025-05-13 ENCOUNTER — APPOINTMENT (OUTPATIENT)
Dept: PHYSICAL THERAPY | Facility: CLINIC | Age: 85
End: 2025-05-13
Payer: MEDICARE

## 2025-05-15 ENCOUNTER — APPOINTMENT (OUTPATIENT)
Dept: PHYSICAL THERAPY | Facility: CLINIC | Age: 85
End: 2025-05-15
Payer: MEDICARE

## 2025-05-19 ENCOUNTER — OFFICE VISIT (OUTPATIENT)
Dept: PHYSICAL THERAPY | Facility: CLINIC | Age: 85
End: 2025-05-19
Payer: MEDICARE

## 2025-05-19 ENCOUNTER — APPOINTMENT (OUTPATIENT)
Dept: LAB | Facility: CLINIC | Age: 85
End: 2025-05-19
Payer: MEDICARE

## 2025-05-19 DIAGNOSIS — I48.19 PERSISTENT ATRIAL FIBRILLATION (HCC): ICD-10-CM

## 2025-05-19 DIAGNOSIS — R29.6 FALLS: Primary | ICD-10-CM

## 2025-05-19 DIAGNOSIS — R26.2 AMBULATORY DYSFUNCTION: ICD-10-CM

## 2025-05-19 DIAGNOSIS — Z96.652 TOTAL KNEE REPLACEMENT STATUS, LEFT: ICD-10-CM

## 2025-05-19 LAB
INR PPP: 1.6 (ref 0.85–1.19)
PROTHROMBIN TIME: 19.2 SECONDS (ref 12.3–15)

## 2025-05-19 PROCEDURE — 36415 COLL VENOUS BLD VENIPUNCTURE: CPT

## 2025-05-19 PROCEDURE — 85610 PROTHROMBIN TIME: CPT

## 2025-05-19 PROCEDURE — 97530 THERAPEUTIC ACTIVITIES: CPT | Performed by: PHYSICAL THERAPIST

## 2025-05-19 PROCEDURE — 97110 THERAPEUTIC EXERCISES: CPT | Performed by: PHYSICAL THERAPIST

## 2025-05-19 NOTE — PROGRESS NOTES
"Daily Note     Today's date: 2025  Patient name: Jesse France  : 1940  MRN: [id number]  Referring provider: Jayden Ruff MD  Dx:   Encounter Diagnosis     ICD-10-CM    1. Falls  R29.6       2. Total knee replacement status, left  Z96.652       3. Ambulatory dysfunction  R26.2                      Subjective: Pt reports his Lt knee is doing very well. He was in the hospital a few days ago due to Rt hand cellulitis.      Objective: See treatment diary below      Assessment: Pt is tolerating exercises well. Pt continues to use UE assistance for sit to stands. Pt will continue for a few more appointments and may benefit from decrease or HEP.      Plan: Continue per plan of care.      Precautions: fall risk; Lt TKA 25      Manuals 4/24 4/29 5/1 5/7 5/19    4/10 4/15 4/17 4/22   Lt knee Ext FH KT                                                                         Neuro Re-Ed            30\"x2   NBOS foam               Tandem stance            30\"x1ea                                                                              Ther Ex               Standing Marches 3 laps 5# 5 laps 5# 5 laps 5# 5 laps 5# 5 laps 5#     3  laps 5# 3 laps 5# 3 laps   TKE standing               LAQ               Heel raises 31x 30x 30x 30x 30x    30x 30x 32x 30x   Standing Ham curls 1 UE 5# 30x 1 UE 5# 30x 1 UE 5# 30x\ 1 UE 5# 30x 1 UE 5# 30x    30x 4# 20x 5# 32x 5# 1 UE 5# 30x   Hip ABD stand 1 UE 30x 5# 1 UE 30x 5# 1 UE 30x 5# 1 UE 30  1 Ue 20    30x 4# 20x 5# 32x 5# 1 UE 30x 5#   Mini squats   3x10 3x10 3x10    2x10 2x11  2x10   Side stepping 5 laps 5# 5 laps 5# 5 laps 5# 5 laps 5# 5 laps 5#    5 laps 4# 5 laps 5# 5 laps 5# 5 laps 5#   Bike 8' L3 8' L3 8' L3 8' L3 8' L2    8' L3 8' L3 8' L3 8' L3   Ther Activity               Sit to stands 2 foam 13x 2 foam 13x 2 foam 13x 2 foam 13x 2 foam 13x    1 foam  10x 1 foam  11x  2 foam 12x   Step ups Fwd 8\"x13 ea; Lat 13 ea Fwd 7\"x13 ea; Lat 13 ea Fwd 7\"x13 ea; Lat 13 ea " "7\"x13 ea fwd and lat 7\"x13 ea fwd and lat    Fwd 17x ea 6\"; lat 11x ea Fwd 8\"x11 ea; Lat 12 ea Fwd 8\"x12 ea; Lat 12 ea    Gait Training                                             Modalities                                                                                "

## 2025-05-20 ENCOUNTER — ANTICOAG VISIT (OUTPATIENT)
Dept: CARDIOLOGY CLINIC | Facility: CLINIC | Age: 85
End: 2025-05-20

## 2025-05-20 ENCOUNTER — APPOINTMENT (OUTPATIENT)
Dept: PHYSICAL THERAPY | Facility: CLINIC | Age: 85
End: 2025-05-20
Payer: MEDICARE

## 2025-05-20 DIAGNOSIS — I48.19 PERSISTENT ATRIAL FIBRILLATION (HCC): Primary | ICD-10-CM

## 2025-05-22 ENCOUNTER — APPOINTMENT (OUTPATIENT)
Dept: PHYSICAL THERAPY | Facility: CLINIC | Age: 85
End: 2025-05-22
Payer: MEDICARE

## 2025-05-27 ENCOUNTER — APPOINTMENT (OUTPATIENT)
Dept: PHYSICAL THERAPY | Facility: CLINIC | Age: 85
End: 2025-05-27
Payer: MEDICARE

## 2025-05-29 ENCOUNTER — APPOINTMENT (OUTPATIENT)
Dept: PHYSICAL THERAPY | Facility: CLINIC | Age: 85
End: 2025-05-29
Payer: MEDICARE

## 2025-06-24 ENCOUNTER — APPOINTMENT (OUTPATIENT)
Dept: LAB | Facility: CLINIC | Age: 85
End: 2025-06-24
Payer: MEDICARE

## 2025-06-24 DIAGNOSIS — I48.19 PERSISTENT ATRIAL FIBRILLATION (HCC): ICD-10-CM

## 2025-06-24 LAB
INR PPP: 2.66 (ref 0.85–1.19)
PROTHROMBIN TIME: 28.2 SECONDS (ref 12.3–15)

## 2025-06-24 PROCEDURE — 36415 COLL VENOUS BLD VENIPUNCTURE: CPT

## 2025-06-24 PROCEDURE — 85610 PROTHROMBIN TIME: CPT

## 2025-06-25 ENCOUNTER — ANTICOAG VISIT (OUTPATIENT)
Dept: CARDIOLOGY CLINIC | Facility: CLINIC | Age: 85
End: 2025-06-25

## 2025-06-25 DIAGNOSIS — I48.19 PERSISTENT ATRIAL FIBRILLATION (HCC): Primary | ICD-10-CM

## 2025-08-07 DIAGNOSIS — N32.81 OVERACTIVE BLADDER: ICD-10-CM

## 2025-08-07 DIAGNOSIS — R41.3 OTHER AMNESIA: ICD-10-CM

## 2025-08-07 DIAGNOSIS — M25.449 EFFUSION, UNSPECIFIED HAND: ICD-10-CM

## 2025-08-07 DIAGNOSIS — I50.9 HEART FAILURE, UNSPECIFIED (HCC): ICD-10-CM

## 2025-08-07 DIAGNOSIS — E85.82 WILD-TYPE TRANSTHYRETIN-RELATED (ATTR) AMYLOIDOSIS (HCC): ICD-10-CM

## 2025-08-07 DIAGNOSIS — L03.113 CELLULITIS OF RIGHT UPPER LIMB: ICD-10-CM
